# Patient Record
Sex: FEMALE | Race: BLACK OR AFRICAN AMERICAN | NOT HISPANIC OR LATINO | Employment: FULL TIME | URBAN - METROPOLITAN AREA
[De-identification: names, ages, dates, MRNs, and addresses within clinical notes are randomized per-mention and may not be internally consistent; named-entity substitution may affect disease eponyms.]

---

## 2017-02-17 ENCOUNTER — OFFICE VISIT (OUTPATIENT)
Dept: CARDIOLOGY | Facility: CLINIC | Age: 57
End: 2017-02-17
Payer: COMMERCIAL

## 2017-02-17 VITALS
HEART RATE: 70 BPM | WEIGHT: 205.5 LBS | BODY MASS INDEX: 32.25 KG/M2 | DIASTOLIC BLOOD PRESSURE: 80 MMHG | SYSTOLIC BLOOD PRESSURE: 122 MMHG | HEIGHT: 67 IN

## 2017-02-17 DIAGNOSIS — M79.606 PAIN OF LOWER EXTREMITY, UNSPECIFIED LATERALITY: ICD-10-CM

## 2017-02-17 DIAGNOSIS — E78.5 HYPERLIPIDEMIA, UNSPECIFIED HYPERLIPIDEMIA TYPE: ICD-10-CM

## 2017-02-17 DIAGNOSIS — I83.813 VARICOSE VEINS OF BILATERAL LOWER EXTREMITIES WITH PAIN: Primary | ICD-10-CM

## 2017-02-17 PROCEDURE — 99999 PR PBB SHADOW E&M-EST. PATIENT-LVL III: CPT | Mod: PBBFAC,,, | Performed by: INTERNAL MEDICINE

## 2017-02-17 PROCEDURE — 99203 OFFICE O/P NEW LOW 30 MIN: CPT | Mod: S$GLB,,, | Performed by: INTERNAL MEDICINE

## 2017-02-17 NOTE — MR AVS SNAPSHOT
[facility]   [facility]  [place] [address]  Phone: [phone]                  Rosamaria Agosto   2017 3:00 PM   Office Visit    Description:  Female : 1960   Provider:  Kathy Cross MD   Department:  [facility]           Reason for Visit     Leg Problem           Diagnoses this Visit        Comments    Varicose veins of bilateral lower extremities with pain    -  Primary     Pain of lower extremity, unspecified laterality         Hyperlipidemia, unspecified hyperlipidemia type                To Do List           Goals (5 Years of Data)     None      Follow-Up and Disposition     Call patient with results Return in about 6 months (around 2017).      [facility]     [facility] On Call Nurse Care Line -  Assistance  Registered nurses in the [facility] On Call Center provide clinical advisement, health education, appointment booking, and other advisory services.  Call for this free service at [phone].             Medications           Message regarding Medications     Verify the changes and/or additions to your medication regime listed below are the same as discussed with your clinician today.  If any of these changes or additions are incorrect, please notify your healthcare provider.             Verify that the below list of medications is an accurate representation of the medications you are currently taking.  If none reported, the list may be blank. If incorrect, please contact your healthcare provider. Carry this list with you in case of emergency.           Current Medications     atorvastatin (LIPITOR) 80 MG tablet Take 80 mg by mouth nightly.    diclofenac (VOLTAREN) 75 MG EC tablet Take 75 mg by mouth 2 (two) times daily.    hydrochlorothiazide (HYDRODIURIL) 25 MG tablet Take 25 mg by mouth once daily.    meloxicam (MOBIC) 7.5 MG tablet Take 7.5 mg by mouth once daily.    tramadol (ULTRAM) 50 mg tablet Take 50  "mg by mouth every 6 (six) hours as needed for Pain.    estradiol (VIVELLE-DOT) 0.1 mg/24 hr PTSW Place 1 patch onto the skin twice a week.           Clinical Reference Information           Your Vitals Were     BP Pulse Height Weight BMI    122/80 (BP Location: Left arm, Patient Position: Sitting, BP Method: Manual) 70 5' 7" (1.702 m) 93.2 kg (205 lb 7.5 oz) 32.18 kg/m2      Blood Pressure          Most Recent Value    BP  122/80      Allergies as of 2/17/2017     No Known Allergies      Immunizations Administered on Date of Encounter - 2/17/2017     None      Orders Placed During Today's Visit      Normal Orders This Visit    COMPRESSION STOCKINGS     Future Labs/Procedures Expected by Expires    CAR Ultrasound doppler venous legs bilat  2/17/2017 (Approximate) 2/17/2018      Language Assistance Services     ATTENTION: Language assistance services are available, free of charge. Please call [phone].      ATENCIÓN: Si habla joaquim, tiene a [place] disposición servicios gratuitos de asistencia lingüística. Llame al [phone].     CHÚ Ý: N?u b?n nói Ti?ng Vi?t, có các d?ch v? h? tr? ngôn ng? mi?n phí dành cho b?n. G?i s? [phone].         [facility] complies with applicable Federal civil rights laws and does not discriminate on the basis of race, color, national origin, age, disability, or sex.        "

## 2017-02-17 NOTE — PROGRESS NOTES
Subjective:    Patient ID:  Rosamaria Agosto is a 56 y.o. Y.o.female who presents for evaluation of leg pain.      HPI: 56 year old female with PMH of varicose veins S/P multiple EVLT/ FOAMS,a sclerotherapy long time ago, today she reports thigh tightness and pain, ankle swelling and new spider veins. She can walk with no limitation, she is on her feet for 8 hours daily. She doesn't use compression stocking.    Past Medical History   Diagnosis Date    Abnormal Pap smear of cervix yrs ago    History of uterine fibroid     Hyperlipidemia     Hypertension     Sinus problem        indicated that her mother is alive. She indicated that all of her three sisters are alive. She indicated that both of her brothers are alive. She indicated that her maternal grandmother is . She indicated that the status of her neg hx is unknown.     Social History     Social History    Marital status:      Spouse name: N/A    Number of children: N/A    Years of education: N/A     Occupational History    Not on file.     Social History Main Topics    Smoking status: Never Smoker    Smokeless tobacco: Not on file    Alcohol use Yes      Comment: seldom    Drug use: No    Sexual activity: Yes     Partners: Male     Birth control/ protection: Post-menopausal     Other Topics Concern    Not on file     Social History Narrative       Current Outpatient Prescriptions   Medication Sig    atorvastatin (LIPITOR) 80 MG tablet Take 80 mg by mouth nightly.    diclofenac (VOLTAREN) 75 MG EC tablet Take 75 mg by mouth 2 (two) times daily.    hydrochlorothiazide (HYDRODIURIL) 25 MG tablet Take 25 mg by mouth once daily.    meloxicam (MOBIC) 7.5 MG tablet Take 7.5 mg by mouth once daily.    tramadol (ULTRAM) 50 mg tablet Take 50 mg by mouth every 6 (six) hours as needed for Pain.    estradiol (VIVELLE-DOT) 0.1 mg/24 hr PTSW Place 1 patch onto the skin twice a week.     No current facility-administered medications for this  visit.        CMP  Sodium   Date Value Ref Range Status   10/19/2011 137 136 - 145 mmol/L Final     Potassium   Date Value Ref Range Status   10/19/2011 3.9 3.5 - 5.1 mmol/L Final     Chloride   Date Value Ref Range Status   10/19/2011 105 95 - 110 mmol/L Final     CO2   Date Value Ref Range Status   10/19/2011 25 23.0 - 29.0 mmol/L Final     Glucose   Date Value Ref Range Status   10/19/2011 104 70 - 110 mg/dl Final     BUN, Bld   Date Value Ref Range Status   10/19/2011 9 6 - 20 mg/dl Final     Creatinine   Date Value Ref Range Status   10/19/2011 0.6 0.5 - 1.4 mg/dl Final     Calcium   Date Value Ref Range Status   10/19/2011 8.8 8.7 - 10.5 mg/dl Final     Total Protein   Date Value Ref Range Status   10/19/2011 7.0 6.0 - 8.4 g/dL Final     Albumin   Date Value Ref Range Status   10/19/2011 3.6 3.5 - 5.2 g/dl Final     Total Bilirubin   Date Value Ref Range Status   10/19/2011 0.4 0.1 - 1.0 mg/dl Final     Comment:     For infants and newborns, interpretation of results should be based  on gestational age, weight and in agreement with clinical  observations.  .  Premature Infant recommended reference ranges:  Up to 24 hours.............<8.0 mg/dl  Up to 48 hours............<12.0 mg/dl  3-5 days..................<15.0 mg/dl  6-29 days.................<15.0 mg/dl     Alkaline Phosphatase   Date Value Ref Range Status   10/19/2011 60 55 - 135 U/L Final     AST   Date Value Ref Range Status   10/19/2011 12 10 - 40 U/L Final     ALT   Date Value Ref Range Status   10/19/2011 8 (L) 10 - 44 U/L Final     Anion Gap   Date Value Ref Range Status   10/19/2011 12 8 - 16 mmol/L Final     eGFR if    Date Value Ref Range Status   10/19/2011 >60 >60 mL/min Final     Comment:     Estimated glomerular filtration rate (eGFR) is normalized to an  average body surface area of 1.73 square meters.  The calculation  used to obtain the eGFR is the adjusted MDRD equation, which factors  patient sex, age, race, and  "creatinine result.  Since race is unknown  in our information system, the eGFR values for -American  and Non--American patients are given for each creatinine  result.     eGFR if non    Date Value Ref Range Status   10/19/2011 >60 >60 mL/min Final       Lab Results   Component Value Date    WBC 9.71 12/14/2010    HGB 14.6 12/14/2010    HCT 44.4 12/14/2010    MCV 90.4 12/14/2010     (H) 12/14/2010       Review of Systems   Constitution: Negative for decreased appetite, fever and weight gain.   HENT: Negative.    Cardiovascular: Positive for leg swelling. Negative for chest pain, claudication and cyanosis.   Respiratory: Negative for cough, shortness of breath and wheezing.    Skin: Negative for color change, dry skin, itching, rash and suspicious lesions.   Musculoskeletal: Positive for myalgias. Negative for arthritis, back pain, joint swelling and muscle weakness.   Gastrointestinal: Negative.    Genitourinary: Negative.    Neurological: Negative.  Negative for loss of balance, numbness and paresthesias.        Objective:     Visit Vitals    /80 (BP Location: Left arm, Patient Position: Sitting, BP Method: Manual)    Pulse 70    Ht 5' 7" (1.702 m)    Wt 93.2 kg (205 lb 7.5 oz)    BMI 32.18 kg/m2     Physical Exam   Constitutional: She is oriented to person, place, and time. She appears well-developed and well-nourished. No distress.   HENT:   Head: Normocephalic and atraumatic.   Eyes: Conjunctivae and EOM are normal. Pupils are equal, round, and reactive to light.   Neck: Normal range of motion. Neck supple. No JVD present.   Cardiovascular: Normal rate, regular rhythm, normal heart sounds and intact distal pulses.  Exam reveals no gallop and no friction rub.    No murmur heard.  Pulmonary/Chest: Effort normal and breath sounds normal. No respiratory distress. She has no wheezes.   Musculoskeletal: Normal range of motion. She exhibits edema and tenderness. "   Neurological: She is alert and oriented to person, place, and time.   Skin: Skin is warm. No rash noted. She is not diaphoretic. No erythema. No pallor.         Assessment:       1. Varicose veins of bilateral lower extremities with pain  CAR Ultrasound doppler venous legs bilat    COMPRESSION STOCKINGS   2. Pain of lower extremity, unspecified laterality  CAR Ultrasound doppler venous legs bilat    COMPRESSION STOCKINGS   3. Hyperlipidemia, unspecified hyperlipidemia type          Plan:       Rosamaria was seen today for leg problem.    Diagnoses and all orders for this visit:    Varicose veins of bilateral lower extremities with pain  -     CAR Ultrasound doppler venous legs bilat; Future; Expected date: 2/17/17  -     COMPRESSION STOCKINGS    Pain of lower extremity, unspecified laterality  -     CAR Ultrasound doppler venous legs bilat; Future; Expected date: 2/17/17  -     COMPRESSION STOCKINGS    Hyperlipidemia, unspecified hyperlipidemia type     -  Continue Same medications.    Kathy Cross

## 2017-02-24 ENCOUNTER — CLINICAL SUPPORT (OUTPATIENT)
Dept: CARDIOLOGY | Facility: CLINIC | Age: 57
End: 2017-02-24
Payer: COMMERCIAL

## 2017-02-24 DIAGNOSIS — I83.813 VARICOSE VEINS OF BILATERAL LOWER EXTREMITIES WITH PAIN: ICD-10-CM

## 2017-02-24 DIAGNOSIS — M79.606 PAIN OF LOWER EXTREMITY, UNSPECIFIED LATERALITY: ICD-10-CM

## 2017-02-24 PROCEDURE — 93970 EXTREMITY STUDY: CPT | Mod: S$GLB,,, | Performed by: INTERNAL MEDICINE

## 2017-02-27 ENCOUNTER — TELEPHONE (OUTPATIENT)
Dept: CARDIOLOGY | Facility: CLINIC | Age: 57
End: 2017-02-27

## 2017-02-27 NOTE — TELEPHONE ENCOUNTER
Spoke with patient in regards to venous doppler results.    Patient verbalized understanding .  Follow up appointment has been made.

## 2017-02-27 NOTE — TELEPHONE ENCOUNTER
----- Message from Kathy Cross MD sent at 2/26/2017  7:37 PM CST -----  Venous doppler shows evidence of varicose veins in both legs.  No DVT  Compression stocking follow up in 3 months.

## 2017-05-05 ENCOUNTER — OFFICE VISIT (OUTPATIENT)
Dept: CARDIOLOGY | Facility: CLINIC | Age: 57
End: 2017-05-05
Payer: COMMERCIAL

## 2017-05-05 VITALS
HEIGHT: 67 IN | SYSTOLIC BLOOD PRESSURE: 144 MMHG | DIASTOLIC BLOOD PRESSURE: 82 MMHG | HEART RATE: 80 BPM | WEIGHT: 210.44 LBS | BODY MASS INDEX: 33.03 KG/M2

## 2017-05-05 DIAGNOSIS — I10 ESSENTIAL HYPERTENSION: Primary | ICD-10-CM

## 2017-05-05 PROCEDURE — 1160F RVW MEDS BY RX/DR IN RCRD: CPT | Mod: S$GLB,,, | Performed by: INTERNAL MEDICINE

## 2017-05-05 PROCEDURE — 99213 OFFICE O/P EST LOW 20 MIN: CPT | Mod: S$GLB,,, | Performed by: INTERNAL MEDICINE

## 2017-05-05 PROCEDURE — 99999 PR PBB SHADOW E&M-EST. PATIENT-LVL III: CPT | Mod: PBBFAC,,, | Performed by: INTERNAL MEDICINE

## 2017-05-05 PROCEDURE — 3077F SYST BP >= 140 MM HG: CPT | Mod: S$GLB,,, | Performed by: INTERNAL MEDICINE

## 2017-05-05 PROCEDURE — 3079F DIAST BP 80-89 MM HG: CPT | Mod: S$GLB,,, | Performed by: INTERNAL MEDICINE

## 2017-05-05 RX ORDER — HYDROCHLOROTHIAZIDE 25 MG/1
25 TABLET ORAL DAILY
Qty: 90 TABLET | Refills: 3 | Status: SHIPPED | OUTPATIENT
Start: 2017-05-05 | End: 2017-05-17 | Stop reason: SDUPTHER

## 2017-05-05 NOTE — MR AVS SNAPSHOT
[facility]   [address]  [place] [address]  Phone: [phone]                  Rosamaria Agosto   2017 3:00 PM   Office Visit    Description:  Female : 1960   Provider:  Kathy Cross MD   Department:  [facility]           Reason for Visit     Varicose Veins           Diagnoses this Visit        Comments    Essential hypertension    -  Primary            To Do List           Future Appointments        Provider Department Dept Phone    2017 8:00 AM Bill Burns MD [facility] [phone]      Goals (5 Years of Data)     None      Follow-Up and Disposition     Return in about 1 year (around 2018), or if symptoms worsen or fail to improve.       These Medications        Disp Refills Start End    hydrochlorothiazide (HYDRODIURIL) 25 MG tablet 90 tablet 3 2017     Take 1 tablet (25 mg total) by mouth once daily. - Oral    Pharmacy: [address] #: [phone]         [facility] On Call     [facility] On Call Nurse Care Line -  Assistance  Unless otherwise directed by your provider, please contact [facility], our nurse care line that is available for  assistance.     Registered nurses in the [facility] On Call Center provide: appointment scheduling, clinical advisement, health education, and other advisory services.  Call: [phone] (toll free)               Medications           Message regarding Medications     Verify the changes and/or additions to your medication regime listed below are the same as discussed with your clinician today.  If any of these changes or additions are incorrect, please notify your healthcare provider.        CHANGE how you are taking these medications     Start Taking Instead of    hydrochlorothiazide (HYDRODIURIL) 25 MG tablet hydrochlorothiazide (HYDRODIURIL) 25 MG tablet    Dosage:  Take  "1 tablet (25 mg total) by mouth once daily. Dosage:  Take 25 mg by mouth once daily.    Reason for Change:  Reorder            Verify that the below list of medications is an accurate representation of the medications you are currently taking.  If none reported, the list may be blank. If incorrect, please contact your healthcare provider. Carry this list with you in case of emergency.           Current Medications     atorvastatin (LIPITOR) 80 MG tablet Take 80 mg by mouth nightly.    diclofenac (VOLTAREN) 75 MG EC tablet Take 75 mg by mouth 2 (two) times daily.    estradiol (VIVELLE-DOT) 0.1 mg/24 hr PTSW Place 1 patch onto the skin twice a week.    hydrochlorothiazide (HYDRODIURIL) 25 MG tablet Take 1 tablet (25 mg total) by mouth once daily.    meloxicam (MOBIC) 7.5 MG tablet Take 7.5 mg by mouth once daily.    tramadol (ULTRAM) 50 mg tablet Take 50 mg by mouth every 6 (six) hours as needed for Pain.           Clinical Reference Information           Your Vitals Were     BP Pulse Height Weight BMI    144/82 (BP Location: Left arm, Patient Position: Sitting, BP Method: Manual) 80 5' 7" (1.702 m) 95.5 kg (210 lb 6.9 oz) 32.96 kg/m2      Blood Pressure          Most Recent Value    BP  (!)  144/82      Allergies as of 5/5/2017     No Known Allergies      Immunizations Administered on Date of Encounter - 5/5/2017     None      Language Assistance Services     ATTENTION: Language assistance services are available, free of charge. Please call [phone].      ATENCIÓN: Si habla español, tiene a [place] disposición servicios gratuitos de asistencia lingüística. Llame al [phone].     [facility] Ý: N?u b?n nói Ti?ng Vi?t, có các d?ch v? h? tr? ngôn ng? mi?n phí dành cho b?n. G?i s? [phone].         [facility] complies with applicable Federal civil rights laws and does not discriminate on the basis of race, color, national origin, age, disability, or sex.        "

## 2017-05-05 NOTE — PROGRESS NOTES
Subjective:    Patient ID:  Rosamaria Agosto is a 56 y.o. Y.o.female who presents for evaluation of leg pain.      HPI: Mrs. Agosto presents today for a follow up visit. She report stable leg swelling and discomfort. She is S/P multiple EVLT/ FOAMS,a sclerotherapy long time ago,, she is on her feet for 8 hours daily. She started using compression stocking few months ago.    Past Medical History:   Diagnosis Date    Abnormal Pap smear of cervix yrs ago    History of uterine fibroid     Hyperlipidemia     Hypertension     Sinus problem        indicated that her mother is alive. She indicated that all of her three sisters are alive. She indicated that both of her brothers are alive. She indicated that her maternal grandmother is . She indicated that the status of her neg hx is unknown.     Social History     Social History    Marital status:      Spouse name: N/A    Number of children: N/A    Years of education: N/A     Occupational History    Not on file.     Social History Main Topics    Smoking status: Never Smoker    Smokeless tobacco: Not on file    Alcohol use Yes      Comment: seldom    Drug use: No    Sexual activity: Yes     Partners: Male     Birth control/ protection: Post-menopausal     Other Topics Concern    Not on file     Social History Narrative       Current Outpatient Prescriptions   Medication Sig    atorvastatin (LIPITOR) 80 MG tablet Take 80 mg by mouth nightly.    diclofenac (VOLTAREN) 75 MG EC tablet Take 75 mg by mouth 2 (two) times daily.    estradiol (VIVELLE-DOT) 0.1 mg/24 hr PTSW Place 1 patch onto the skin twice a week.    hydrochlorothiazide (HYDRODIURIL) 25 MG tablet Take 1 tablet (25 mg total) by mouth once daily.    meloxicam (MOBIC) 7.5 MG tablet Take 7.5 mg by mouth once daily.    tramadol (ULTRAM) 50 mg tablet Take 50 mg by mouth every 6 (six) hours as needed for Pain.     No current facility-administered medications for this visit.         CMP  Sodium   Date Value Ref Range Status   10/19/2011 137 136 - 145 mmol/L Final     Potassium   Date Value Ref Range Status   10/19/2011 3.9 3.5 - 5.1 mmol/L Final     Chloride   Date Value Ref Range Status   10/19/2011 105 95 - 110 mmol/L Final     CO2   Date Value Ref Range Status   10/19/2011 25 23.0 - 29.0 mmol/L Final     Glucose   Date Value Ref Range Status   10/19/2011 104 70 - 110 mg/dl Final     BUN, Bld   Date Value Ref Range Status   10/19/2011 9 6 - 20 mg/dl Final     Creatinine   Date Value Ref Range Status   10/19/2011 0.6 0.5 - 1.4 mg/dl Final     Calcium   Date Value Ref Range Status   10/19/2011 8.8 8.7 - 10.5 mg/dl Final     Total Protein   Date Value Ref Range Status   10/19/2011 7.0 6.0 - 8.4 g/dL Final     Albumin   Date Value Ref Range Status   10/19/2011 3.6 3.5 - 5.2 g/dl Final     Total Bilirubin   Date Value Ref Range Status   10/19/2011 0.4 0.1 - 1.0 mg/dl Final     Comment:     For infants and newborns, interpretation of results should be based  on gestational age, weight and in agreement with clinical  observations.  .  Premature Infant recommended reference ranges:  Up to 24 hours.............<8.0 mg/dl  Up to 48 hours............<12.0 mg/dl  3-5 days..................<15.0 mg/dl  6-29 days.................<15.0 mg/dl     Alkaline Phosphatase   Date Value Ref Range Status   10/19/2011 60 55 - 135 U/L Final     AST   Date Value Ref Range Status   10/19/2011 12 10 - 40 U/L Final     ALT   Date Value Ref Range Status   10/19/2011 8 (L) 10 - 44 U/L Final     Anion Gap   Date Value Ref Range Status   10/19/2011 12 8 - 16 mmol/L Final     eGFR if    Date Value Ref Range Status   10/19/2011 >60 >60 mL/min Final     Comment:     Estimated glomerular filtration rate (eGFR) is normalized to an  average body surface area of 1.73 square meters.  The calculation  used to obtain the eGFR is the adjusted MDRD equation, which factors  patient sex, age, race, and creatinine  "result.  Since race is unknown  in our information system, the eGFR values for -American  and Non--American patients are given for each creatinine  result.     eGFR if non    Date Value Ref Range Status   10/19/2011 >60 >60 mL/min Final       Lab Results   Component Value Date    WBC 9.71 12/14/2010    HGB 14.6 12/14/2010    HCT 44.4 12/14/2010    MCV 90.4 12/14/2010     (H) 12/14/2010       Review of Systems   Constitution: Negative for decreased appetite, fever and weight gain.   HENT: Negative.    Cardiovascular: Negative for chest pain, claudication, cyanosis and leg swelling.   Respiratory: Negative for cough, shortness of breath and wheezing.    Skin: Negative for color change, dry skin, itching, rash and suspicious lesions.   Musculoskeletal: Positive for myalgias. Negative for arthritis, back pain, joint swelling and muscle weakness.   Gastrointestinal: Negative.    Genitourinary: Negative.    Neurological: Negative.  Negative for loss of balance, numbness and paresthesias.        Objective:     BP (!) 144/82 (BP Location: Left arm, Patient Position: Sitting, BP Method: Manual)  Pulse 80  Ht 5' 7" (1.702 m)  Wt 95.5 kg (210 lb 6.9 oz)  BMI 32.96 kg/m2  Physical Exam   Constitutional: She is oriented to person, place, and time. She appears well-developed and well-nourished. No distress.   HENT:   Head: Normocephalic and atraumatic.   Eyes: Conjunctivae and EOM are normal. Pupils are equal, round, and reactive to light.   Neck: Normal range of motion. Neck supple. No JVD present.   Cardiovascular: Normal rate, regular rhythm and intact distal pulses.    Pulmonary/Chest: Effort normal and breath sounds normal. No respiratory distress. She has no wheezes.   Musculoskeletal: Normal range of motion. She exhibits edema. She exhibits no tenderness.   Neurological: She is alert and oriented to person, place, and time.   Skin: Skin is warm. No rash noted. She is not " diaphoretic. No erythema. No pallor.         Assessment:       1. Essential hypertension  hydrochlorothiazide (HYDRODIURIL) 25 MG tablet        Plan:       Rosamaria was seen today for leg problem.    Diagnoses and all orders for this visit:    1. Continue Compression stocking  2. Walking exercise  3. HCTZ refilled today.    Kathy Cross

## 2017-05-17 ENCOUNTER — OFFICE VISIT (OUTPATIENT)
Dept: INTERNAL MEDICINE | Facility: CLINIC | Age: 57
End: 2017-05-17
Payer: COMMERCIAL

## 2017-05-17 ENCOUNTER — HOSPITAL ENCOUNTER (OUTPATIENT)
Dept: RADIOLOGY | Facility: HOSPITAL | Age: 57
Discharge: HOME | End: 2017-05-17
Attending: FAMILY MEDICINE
Payer: COMMERCIAL

## 2017-05-17 ENCOUNTER — PATIENT MESSAGE (OUTPATIENT)
Dept: INTERNAL MEDICINE | Facility: CLINIC | Age: 57
End: 2017-05-17

## 2017-05-17 VITALS
HEART RATE: 73 BPM | WEIGHT: 207 LBS | TEMPERATURE: 98 F | SYSTOLIC BLOOD PRESSURE: 138 MMHG | BODY MASS INDEX: 32.49 KG/M2 | RESPIRATION RATE: 16 BRPM | HEIGHT: 67 IN | DIASTOLIC BLOOD PRESSURE: 98 MMHG

## 2017-05-17 DIAGNOSIS — M19.90 OSTEOARTHRITIS, UNSPECIFIED OSTEOARTHRITIS TYPE, UNSPECIFIED SITE: ICD-10-CM

## 2017-05-17 DIAGNOSIS — Z00.00 WELL ADULT EXAM: Primary | ICD-10-CM

## 2017-05-17 DIAGNOSIS — E66.9 OBESITY, UNSPECIFIED OBESITY SEVERITY, UNSPECIFIED OBESITY TYPE: ICD-10-CM

## 2017-05-17 DIAGNOSIS — J31.0 CHRONIC RHINITIS: ICD-10-CM

## 2017-05-17 DIAGNOSIS — E78.5 HYPERLIPIDEMIA, UNSPECIFIED HYPERLIPIDEMIA TYPE: ICD-10-CM

## 2017-05-17 DIAGNOSIS — Z12.31 VISIT FOR SCREENING MAMMOGRAM: ICD-10-CM

## 2017-05-17 DIAGNOSIS — M79.606 PAIN OF LOWER EXTREMITY, UNSPECIFIED LATERALITY: ICD-10-CM

## 2017-05-17 DIAGNOSIS — G47.33 OSA (OBSTRUCTIVE SLEEP APNEA): ICD-10-CM

## 2017-05-17 DIAGNOSIS — I10 ESSENTIAL HYPERTENSION: ICD-10-CM

## 2017-05-17 DIAGNOSIS — E55.9 VITAMIN D DEFICIENCY: ICD-10-CM

## 2017-05-17 DIAGNOSIS — I83.813 VARICOSE VEINS OF BILATERAL LOWER EXTREMITIES WITH PAIN: ICD-10-CM

## 2017-05-17 PROCEDURE — 77063 BREAST TOMOSYNTHESIS BI: CPT | Mod: 26,,, | Performed by: RADIOLOGY

## 2017-05-17 PROCEDURE — 77067 SCR MAMMO BI INCL CAD: CPT | Mod: 26,,, | Performed by: RADIOLOGY

## 2017-05-17 PROCEDURE — 3080F DIAST BP >= 90 MM HG: CPT | Mod: S$GLB,,, | Performed by: FAMILY MEDICINE

## 2017-05-17 PROCEDURE — 3075F SYST BP GE 130 - 139MM HG: CPT | Mod: S$GLB,,, | Performed by: FAMILY MEDICINE

## 2017-05-17 PROCEDURE — 99386 PREV VISIT NEW AGE 40-64: CPT | Mod: S$GLB,,, | Performed by: FAMILY MEDICINE

## 2017-05-17 PROCEDURE — 99999 PR PBB SHADOW E&M-EST. PATIENT-LVL IV: CPT | Mod: PBBFAC,,, | Performed by: FAMILY MEDICINE

## 2017-05-17 PROCEDURE — 77067 SCR MAMMO BI INCL CAD: CPT | Mod: TC

## 2017-05-17 RX ORDER — HYDROCHLOROTHIAZIDE 25 MG/1
25 TABLET ORAL DAILY
Qty: 30 TABLET | Refills: 11 | Status: SHIPPED | OUTPATIENT
Start: 2017-05-17 | End: 2018-05-01 | Stop reason: SDUPTHER

## 2017-05-17 RX ORDER — ATORVASTATIN CALCIUM 80 MG/1
80 TABLET, FILM COATED ORAL NIGHTLY
Qty: 30 TABLET | Refills: 11 | Status: SHIPPED | OUTPATIENT
Start: 2017-05-17 | End: 2018-05-01 | Stop reason: SDUPTHER

## 2017-05-17 RX ORDER — ERGOCALCIFEROL 1.25 MG/1
50000 CAPSULE ORAL
Qty: 4 CAPSULE | Refills: 2 | Status: SHIPPED | OUTPATIENT
Start: 2017-05-17 | End: 2017-06-16

## 2017-05-17 RX ORDER — DICLOFENAC SODIUM 75 MG/1
75 TABLET, DELAYED RELEASE ORAL 2 TIMES DAILY
Qty: 60 TABLET | Refills: 11 | Status: SHIPPED | OUTPATIENT
Start: 2017-05-17 | End: 2018-11-09 | Stop reason: SDUPTHER

## 2017-05-17 NOTE — MR AVS SNAPSHOT
[facility]   [facility]  [place] [address]  Phone: [phone]  Fax: [phone]                  Rosamaria Agosto   2017 8:00 AM   Office Visit    Description:  Female : 1960   Provider:  Bill Burns MD   Department:  [facility] [facility]           Reason for Visit     Annual Exam           Diagnoses this Visit        Comments    Well adult exam    -  Primary     Essential hypertension         Hyperlipidemia, unspecified hyperlipidemia type         Varicose veins of bilateral lower extremities with pain         Pain of lower extremity, unspecified laterality         Chronic rhinitis         DAVID (obstructive sleep apnea)         Osteoarthritis, unspecified osteoarthritis type, unspecified site         Obesity, unspecified obesity severity, unspecified obesity type         Visit for screening mammogram                To Do List           Goals (5 Years of Data)     None      Follow-Up and Disposition     Return in about 1 month (around 2017), or if symptoms worsen or fail to improve, for HTN reevaluation .       These Medications        Disp Refills Start End    atorvastatin (LIPITOR) 80 MG tablet 30 tablet 11 2017     Take 1 tablet (80 mg total) by mouth nightly. - Oral    Pharmacy: [address] Ph #: [phone]       hydrochlorothiazide (HYDRODIURIL) 25 MG tablet 30 tablet 11 2017     Take 1 tablet (25 mg total) by mouth once daily. - Oral    Pharmacy: [address] Ph #: [phone]       diclofenac (VOLTAREN) 75 MG EC tablet 60 tablet 11 2017     Take 1 tablet (75 mg total) by mouth 2 (two) times daily. - Oral    Pharmacy: [address] Ph #: [phone]         [facility]     [facility] Care [facility] -   Assistance  Unless otherwise directed by your provider, please contact [facility], our nurse care line that is available for 24/7 assistance.     Registered nurses in the [facility] provide: appointment scheduling, clinical advisement, health education, and other advisory services.  Call: [phone] (toll free)               Medications           Message regarding Medications     Verify the changes and/or additions to your medication regime listed below are the same as discussed with your clinician today.  If any of these changes or additions are incorrect, please notify your healthcare provider.        CHANGE how you are taking these medications     Start Taking Instead of    atorvastatin (LIPITOR) 80 MG tablet atorvastatin (LIPITOR) 80 MG tablet    Dosage:  Take 1 tablet (80 mg total) by mouth nightly. Dosage:  Take 80 mg by mouth nightly.    Reason for Change:  Reorder     diclofenac (VOLTAREN) 75 MG EC tablet diclofenac (VOLTAREN) 75 MG EC tablet    Dosage:  Take 1 tablet (75 mg total) by mouth 2 (two) times daily. Dosage:  Take 75 mg by mouth 2 (two) times daily.    Reason for Change:  Reorder            Verify that the below list of medications is an accurate representation of the medications you are currently taking.  If none reported, the list may be blank. If incorrect, please contact your healthcare provider. Carry this list with you in case of emergency.           Current Medications     atorvastatin (LIPITOR) 80 MG tablet Take 1 tablet (80 mg total) by mouth nightly.    diclofenac (VOLTAREN) 75 MG EC tablet Take 1 tablet (75 mg total) by mouth 2 (two) times daily.    hydrochlorothiazide (HYDRODIURIL) 25 MG tablet Take 1 tablet (25 mg total) by mouth once daily.    meloxicam (MOBIC) 7.5 MG tablet Take 7.5 mg by mouth once daily.    tramadol (ULTRAM) 50 mg tablet Take 50 mg by mouth every 6 (six) hours as needed for Pain.    estradiol (VIVELLE-DOT) 0.1 mg/24 hr PTSW Place 1 patch onto  "the skin twice a week.           Clinical Reference Information           Your Vitals Were     BP Pulse Temp Resp Height Weight    138/98 (BP Location: Left arm, Patient Position: Sitting, BP Method: Manual) 73 98.4 °F (36.9 °C) (Oral) 16 5' 7" (1.702 m) 93.9 kg (207 lb 0.2 oz)    BMI                32.42 kg/m2          Blood Pressure          Most Recent Value    BP  (!)  138/98      Allergies as of 5/17/2017     No Known Allergies      Immunizations Administered on Date of Encounter - 5/17/2017     None      Orders Placed During Today's Visit      Normal Orders This Visit    Ambulatory Referral to Bariatric Medicine     Ambulatory Referral to ENT     Future Labs/Procedures Expected by Expires    CBC auto differential  5/17/2017 5/17/2018    Comprehensive metabolic panel  5/17/2017 5/17/2018    Hemoglobin A1c  5/17/2017 7/16/2018    Lipid panel  5/17/2017 5/17/2018    Mammo Digital Screening Bilateral With CAD  5/17/2017 7/17/2018    T4, free  5/17/2017 5/17/2018    TSH  5/17/2017 5/17/2018    Urinalysis  5/17/2017 5/17/2018    Vitamin D  5/17/2017 5/17/2018    Polysomnography 4 or more parameters  As directed 5/17/2018      Language Assistance Services     ATTENTION: Language assistance services are available, free of charge. Please call [phone].      ATENCIÓN: Si habla español, tiene a [place] disposición servicios gratuitos de asistencia lingüística. Llame al [phone].     CHÚ Ý: N?u b?n nói Ti?ng Vi?t, có các d?ch v? h? tr? ngôn ng? mi?n phí dành cho b?n. G?i s? [phone].         [facility] complies with applicable Federal civil rights laws and does not discriminate on the basis of race, color, national origin, age, disability, or sex.        "

## 2017-05-17 NOTE — PROGRESS NOTES
Subjective:       Patient ID: Rosamaria Agosto is a 56 y.o. female.    Chief Complaint: Annual Exam    HPI 56-year-old -American female presents to clinic today to establish care.  She has previously been treated by cardiology for hypertension, hyperlipidemia, and currently lower extremity pain with varicose veins.  She reports that she has been out of her medications since January but has on occasion used her 's blood pressure medication.  At this time she has taken no medication in over 1 week.  She is routinely treated with hydrochlorothiazide 25 mg daily.  Hyperlipidemia is routinely treated with Lipitor 80 mg daily.  She reports chronic arthritic pain to the shoulders, knees, and back for which she has been seen orthopedics and has obtained relief with the use of diclofenac as needed.  She reports chronic sinusitis with frequent sinus infections for which she has seen ENT in the past.  Currently she uses Flonase nasal spray without relief.  She reports a past surgical history of hysterectomy, bilateral tubal ligation, breast biopsy, and encephalocele repair.  She reports a family history of diabetes and hypertension.  She is up-to-date with colonoscopy which was performed 3 years ago.  She cannot recall her last mammogram.  She is also interested in assistance with weight loss.  Finally, she reports frequent difficulty sleeping and states that her  reports snoring and has witnessed apneic episodes while the patient sleeps.  Review of Systems   Constitutional: Negative for appetite change, chills, fatigue and fever.   HENT: Positive for congestion, postnasal drip, rhinorrhea and sinus pressure. Negative for ear pain, hearing loss, sore throat and tinnitus.    Eyes: Negative for redness, itching and visual disturbance.   Respiratory: Positive for shortness of breath. Negative for cough and chest tightness.    Cardiovascular: Negative for chest pain and palpitations.   Gastrointestinal:  Negative for abdominal pain, constipation, diarrhea, nausea and vomiting.   Genitourinary: Negative for decreased urine volume, difficulty urinating, dysuria, frequency, hematuria and urgency.   Musculoskeletal: Positive for arthralgias and back pain. Negative for myalgias, neck pain and neck stiffness.   Skin: Negative for rash.   Neurological: Positive for headaches. Negative for dizziness and light-headedness.   Psychiatric/Behavioral: Negative.        Objective:      Physical Exam   Constitutional: She is oriented to person, place, and time. She appears well-developed and well-nourished. No distress.   HENT:   Head: Normocephalic and atraumatic.   Right Ear: External ear normal.   Left Ear: External ear normal.   Nose: Nose normal.   Mouth/Throat: Oropharynx is clear and moist. No oropharyngeal exudate.   Eyes: Conjunctivae and EOM are normal. Pupils are equal, round, and reactive to light. Right eye exhibits no discharge. Left eye exhibits no discharge. No scleral icterus.   Neck: Normal range of motion. Neck supple. No JVD present. No tracheal deviation present. No thyromegaly present.   Cardiovascular: Normal rate, regular rhythm, normal heart sounds and intact distal pulses.  Exam reveals no gallop and no friction rub.    No murmur heard.  Pulmonary/Chest: Effort normal and breath sounds normal. No stridor. No respiratory distress. She has no wheezes. She has no rales.   Abdominal: Soft. Bowel sounds are normal. She exhibits no distension and no mass. There is no tenderness. There is no rebound and no guarding.   Musculoskeletal: Normal range of motion. She exhibits no edema or tenderness.   Lymphadenopathy:     She has no cervical adenopathy.   Neurological: She is alert and oriented to person, place, and time.   Skin: Skin is warm and dry. No rash noted. She is not diaphoretic. No erythema. No pallor.   Psychiatric: She has a normal mood and affect. Her behavior is normal. Judgment and thought content  normal.   Nursing note and vitals reviewed.      Assessment:       1. Well adult exam    2. Essential hypertension    3. Hyperlipidemia, unspecified hyperlipidemia type    4. Varicose veins of bilateral lower extremities with pain    5. Pain of lower extremity, unspecified laterality    6. Chronic rhinitis    7. DAVID (obstructive sleep apnea)    8. Osteoarthritis, unspecified osteoarthritis type, unspecified site    9. Obesity, unspecified obesity severity, unspecified obesity type    10. Visit for screening mammogram        Plan:       1.  CBC, CMP, UA, TSH, free T4, fasting lipids, vitamin D level, and hemoglobin A1c.  2.  Restart hydrochlorothiazide 25 mg daily.  Encourage daily blood pressure monitoring.  3.  Restart Lipitor 80 mg daily.  Encourage diet and exercise.  4.  Continue follow-up with cardiology as scheduled.  5.  Continue diclofenac 75 mg twice a day as needed.  Continue follow-up with orthopedics as scheduled.  6.  Continue Flonase nasal spray 2 sprays per nostril daily and start over-the-counter Claritin nightly.  Refer to ENT for further evaluation and treatment.  7.  Sleep study.  8.  Refer to bariatric medicine for weight loss assistance.  9.  Screening mammogram.  10.  Return to clinic as needed or in 1 month for hypertension reevaluation.

## 2017-06-08 ENCOUNTER — TELEPHONE (OUTPATIENT)
Dept: INTERNAL MEDICINE | Facility: CLINIC | Age: 57
End: 2017-06-08

## 2017-06-08 DIAGNOSIS — G47.30 SLEEP APNEA, UNSPECIFIED TYPE: Primary | ICD-10-CM

## 2017-06-08 NOTE — TELEPHONE ENCOUNTER
[employer] was calling for a verbal order for a home study for the patient. [employer] is the company who handles sleep study authorizations for [employer].

## 2017-06-08 NOTE — TELEPHONE ENCOUNTER
----- Message from Nika Crain sent at 6/8/2017  8:36 AM CDT -----  Contact: Marija/ JACLYN [phone]  [employer] called about a sleep study you ordered. Pt is meeting for a home sleep study instead of lab study. If you send an order for the home sleep they can approve this right away. Please call to advise.    [employer] is the company who handles sleep study authorizations for [employer].

## 2017-06-14 ENCOUNTER — TELEPHONE (OUTPATIENT)
Dept: INTERNAL MEDICINE | Facility: CLINIC | Age: 57
End: 2017-06-14

## 2017-06-14 NOTE — TELEPHONE ENCOUNTER
----- Message from Sonya Mercado sent at 6/14/2017  8:20 AM CDT -----  Contact: patient   Department will call directly re scheduling sleep studies  ----- Message -----  From: Jesus Hinds  Sent: 6/13/2017   4:38 PM  To: Sonya Mercado    Patient called to scheduled Sleep apnea appointment    Please advise

## 2017-06-28 ENCOUNTER — OFFICE VISIT (OUTPATIENT)
Dept: OTOLARYNGOLOGY | Facility: CLINIC | Age: 57
End: 2017-06-28
Payer: COMMERCIAL

## 2017-06-28 VITALS — HEART RATE: 67 BPM | SYSTOLIC BLOOD PRESSURE: 138 MMHG | DIASTOLIC BLOOD PRESSURE: 89 MMHG

## 2017-06-28 DIAGNOSIS — J34.3 NASAL TURBINATE HYPERTROPHY: ICD-10-CM

## 2017-06-28 DIAGNOSIS — J34.2 DEVIATED NASAL SEPTUM: ICD-10-CM

## 2017-06-28 DIAGNOSIS — J32.0 CHRONIC MAXILLARY SINUSITIS: ICD-10-CM

## 2017-06-28 DIAGNOSIS — J31.0 CHRONIC RHINITIS: Primary | ICD-10-CM

## 2017-06-28 PROCEDURE — 99999 PR PBB SHADOW E&M-EST. PATIENT-LVL III: CPT | Mod: PBBFAC,,, | Performed by: OTOLARYNGOLOGY

## 2017-06-28 PROCEDURE — 31231 NASAL ENDOSCOPY DX: CPT | Mod: S$GLB,,, | Performed by: OTOLARYNGOLOGY

## 2017-06-28 PROCEDURE — 99244 OFF/OP CNSLTJ NEW/EST MOD 40: CPT | Mod: 25,S$GLB,, | Performed by: OTOLARYNGOLOGY

## 2017-06-28 RX ORDER — AMOXICILLIN 500 MG/1
500 TABLET, FILM COATED ORAL EVERY 12 HOURS
Qty: 20 TABLET | Refills: 0 | Status: SHIPPED | OUTPATIENT
Start: 2017-06-28 | End: 2017-07-08

## 2017-06-28 NOTE — Clinical Note
June 28, 2017      Bill Burns MD  [address] [facility] [place] [address]           [facility]  [address]  [facility] [address]  Phone: [phone]  Fax: [phone]          Patient: Rosamaria Agosto   MR Number: [id number]   YOB: 1960   Date of Visit: 6/28/2017       Dear Dr. Bill Burns:    Thank you for referring Rosamaria Agosto to me for evaluation. Attached you will find relevant portions of my assessment and plan of care.    If you have questions, please do not hesitate to call me. I look forward to following Rosamaria Agosto along with you.    Sincerely,    Nikunj Sofia MD    Enclosure  CC:  No Recipients    If you would like to receive this communication electronically, please contact [email or web address] or [phone] to request more information on [employer] access.    For providers and/or their staff who would like to refer a patient to [facility], please contact us through our one-stop-shop provider referral line, [facility], at [phone].    If you feel you have received this communication in error or would no longer like to receive these types of communications, please e-mail [email or web address]

## 2017-06-28 NOTE — LETTER
2017    [place] [address]           OTOLARYNGOLOGY AND COMMUNICATION SCIENCES    Froilan Valdez MD, FACS          SURGICAL AND MEDICAL DISEASES OF HEAD AND NECK  MD Froilan Rushing MD, FACS  Alonzo Palmer III, MD    OTOLOGY, NEUROTOLOGY and SKULL-BASE SURGERY  Justen George MD, FACS  Mauro Mcknight MD, Director    PEDIATRIC OTOLARYNGOLOGY & OTOLOGY  ISACC Sandoval MD, FAAP  Esau Palacio MD, FACS    FACIAL PLASTIC and RECONSTRUCTIVE SURGERY  LIVIA Edwards III, MD, FACS    RHINOLOGY and SINUS SURGERY  Nikunj Sofia MD, MPH, FACS  Director   LIVIA Edwards III, MD, FACS    LARYNGOLOGY  Yousif Crowder MD    SPEECH-LANGUAGE PATHOLOGY  Farhana Reid, PhD, [facility]-SLP  Bhumika Pelayo, MS, CCC-SLP  Cristal Rubi, MS, CCC-SLP  Mónica Gonzales MA, [facility]-SLP    AUDIOLOGY SECTION  Юлия Ward, MS, CCC-A  VASQUEZ Lopes, CCC-A  Nancy Kirkpatrick, CCC-A  Nancy Sanches, CCC-A  Mejia Tovar Jr., VASQUEZ, CCA-A  VASQUEZ Salazar, CCC-A  Nancy Bai, CCC-A    ADVANCED PRACTICE CLINICIANS  Head and Neck Surgical Oncology  REY Ricketts, FNP-C  Pedatric Otolaryngology  REY Pond, PNP-C       Re:  Rosamaria Agosto  :  1960    Dear Dr. Burns,    Thank you for referring your patient, Rosamaria Agosto, to us for evaluation and treatment.  I have enclosed a copy of my clinic note for your review and records.  If you have any questions please do not hesitate to contact our office.     Thank you for allowing me to participate in the care of your patient.    Sincerely,        Nikunj Sofia MD, MPH, FACS    Director, Rhinology and Sinus Surgery  Department of Otorhinolaryngology  [facility]    Encl:  Progress note       [facility] [address] [place] [address]  phone [phone]  fax [phone]   [email or web address]

## 2017-06-28 NOTE — PROCEDURES
Nasal/sinus endoscopy  Date/Time: 6/28/2017 5:10 PM  Performed by: SAMIA BATES  Authorized by: SAMIA BATES     Consent Done?:  Yes (Verbal)  Anesthesia:     Local anesthetic:  Lidocaine 4% and Michael-Synephrine 1/2%    Patient tolerance:  Patient tolerated the procedure well with no immediate complications  Nose:     Procedure Performed:  Nasal Endoscopy  External:      No external nasal deformity  Intranasal:      Mucosa no polyps     Mucosa ulcers not present     No mucosa lesions present     Enlarged turbinates     No septum gross deformity  Nasopharynx:      No mucosa lesions     Adenoids not present     Posterior choanae patent     Eustachian tube patent     Biphasic septal deviation, worse on right.  Large inferior turbinates, greater on left.  Diffuse edema and mucoid exudate.  Maxillary antrostomy not appreciated.  No pulsation in left ethmoid region.

## 2017-06-28 NOTE — PROGRESS NOTES
Subjective:      Rosamaria Agosto is a 56 y.o. female who was referred to me by Dr. Bill Burns in consultation for nasal congestion.    She relates a long history of sinonasal issues that date back about 15 years when she had a large left-sided cribiform encephalocele repaired by Dr. Jovi Santos.  This apparently healed without incident, though over the past several years she has had increasing difficulty with nasal congestion and blockage, which is bilateral but worse on the right.  She describes this as perennial and moderately severe and present throughout the day and night.  She had sought surgery last year by Dr. Oh, who ordered a CT scan, but this did not proceed.  She notes also worsening snoring, though denies witnessed apneas.  She notes anosmia since the prior surgery, and also notes postnasal drip and rhinorrhea bilaterally, which is only somewhat improved with fluticasone spray.  She has not had antibiotics for several months but notes that it has only provided short-lived relief in the past.  She denies more recent nasal trauma or surgery.    SNOT-22 score = 73, NOSE score = 80%    Global QOL = 65%    Days missed = Less than 5    PTC = deferred      Past Medical History  She has a past medical history of Abnormal Pap smear of cervix; Arthritis; History of uterine fibroid; Hyperlipidemia; Hypertension; and Sinus problem.    Past Surgical History  She has a past surgical history that includes Breast biopsy (24-25 years old); Tubal ligation; Hysterectomy (93'-95'); and Encephalocele repair (46).    Family History  Her family history includes Diabetes in her mother, sister, and sister; Hypertension in her mother; Stroke in her maternal grandmother.    Social History  She reports that she has never smoked. She does not have any smokeless tobacco history on file. She reports that she drinks alcohol. She reports that she does not use drugs.    Allergies  She has No Known  Allergies.    Medications   She has a current medication list which includes the following prescription(s): atorvastatin, diclofenac, estradiol, hydrochlorothiazide, meloxicam, and tramadol.    Review of Systems  Review of Systems   Constitutional: Positive for fatigue. Negative for fever and unexpected weight change.   HENT: Positive for congestion, postnasal drip and rhinorrhea. Negative for dental problem, ear discharge, ear pain, facial swelling, hearing loss, hoarse voice, nosebleeds, sinus pressure, sore throat, tinnitus, trouble swallowing and voice change.    Eyes: Negative for photophobia, discharge, itching and visual disturbance.   Respiratory: Negative for apnea, cough, shortness of breath and wheezing.    Cardiovascular: Negative for chest pain and palpitations.   Gastrointestinal: Negative for abdominal pain, nausea and vomiting.   Endocrine: Negative for cold intolerance and heat intolerance.   Genitourinary: Negative for difficulty urinating.   Musculoskeletal: Negative for arthralgias, back pain, myalgias and neck pain.   Skin: Negative for rash.   Allergic/Immunologic: Negative for environmental allergies and food allergies.   Neurological: Negative for dizziness, seizures, syncope, weakness and headaches.   Hematological: Negative for adenopathy. Does not bruise/bleed easily.   Psychiatric/Behavioral: Positive for sleep disturbance. Negative for decreased concentration and dysphoric mood. The patient is not nervous/anxious.           Objective:     /89 (BP Location: Right arm, Patient Position: Sitting, BP Method: Automatic)   Pulse 67        Constitutional:   She appears well-developed. She is cooperative. Normal speech.  No hoarse voice.      Head:  Normocephalic. Salivary glands normal.  Facial strength is normal.      Ears:    Right Ear: No drainage or tenderness. Tympanic membrane is not perforated. Tympanic membrane mobility is normal. No middle ear effusion. No decreased hearing is  noted.   Left Ear: No drainage or tenderness. Tympanic membrane is not perforated. Tympanic membrane mobility is normal.  No middle ear effusion. No decreased hearing is noted.     Nose:  No mucosal edema, rhinorrhea, septal deviation or polyps. No epistaxis. Turbinates normal, no turbinate masses and no turbinate hypertrophy.  Right sinus exhibits no maxillary sinus tenderness and no frontal sinus tenderness. Left sinus exhibits no maxillary sinus tenderness and no frontal sinus tenderness.     Mouth/Throat  Oropharynx clear and moist without lesions or asymmetry and normal uvula midline. She does not have dentures. Normal dentition. No oral lesions or mucous membrane lesions. No oropharyngeal exudate or posterior oropharyngeal erythema. Mirror exam not performed due to patient tolerance.  Mirror exam not performed due to patient tolerance.      Neck:  Neck normal without thyromegaly masses, asymmetry, normal tracheal structure, crepitus, and tenderness, thyroid normal, trachea normal and no adenopathy. Normal range of motion present.     She has no cervical adenopathy.     Cardiovascular:   Regular rhythm.      Pulmonary/Chest:   Effort normal.     Psychiatric:   She has a normal mood and affect. Her speech is normal and behavior is normal.     Neurological:   No cranial nerve deficit.     Skin:   No rash noted.       Procedure    Nasal endoscopy performed.  See procedure note.     Left nasal valve     Left MT remnant     Left maxillary ostium obstructed     Right nasal valve with septal deviation     Right MT     Right choana with mucoid exudate        Data Reviewed    WBC (K/uL)   Date Value   05/17/2017 8.29     Eosinophil% (%)   Date Value   05/17/2017 2.8     Eos # (K/uL)   Date Value   05/17/2017 0.2     Platelets (K/uL)   Date Value   05/17/2017 347     Glucose (mg/dL)   Date Value   05/17/2017 105     No results found for: IGE    I independently reviewed the images of the CT sinuses dated 2/24/16.  Pertinent findings include partial opacification of left ethmoid and frontals, bilateral OMC obstruction, and prior repair of expanded left cribiform defect with bony graft.       Assessment:     1. Chronic maxillary sinusitis    2. Chronic rhinitis    3. Deviated nasal septum    4. Nasal turbinate hypertrophy         Plan:     I had a long discussion with the patient regarding her condition and the further workup and management options.  I ordered a CT scan of the sinuses to assess for intraluminal obstruction.  I briefly mentioned that surgical management may be considered, to include septoplasty, turbinate reduction, and bilateral maxillary antrstomy, and possibly right sinuses based on CT findings, with avoidance of the left ethmoid region.  In the meantime, I prescribed a therapeutic course of amoxicillin for 10 days.    Return for test results.

## 2017-07-05 DIAGNOSIS — J32.9 CHRONIC RECURRENT SINUSITIS: Primary | ICD-10-CM

## 2017-07-21 ENCOUNTER — HOSPITAL ENCOUNTER (OUTPATIENT)
Dept: RADIOLOGY | Facility: OTHER | Age: 57
Discharge: HOME | End: 2017-07-21
Attending: OTOLARYNGOLOGY
Payer: COMMERCIAL

## 2017-07-21 DIAGNOSIS — J32.9 CHRONIC RECURRENT SINUSITIS: ICD-10-CM

## 2017-07-21 PROCEDURE — 70486 CT MAXILLOFACIAL W/O DYE: CPT | Mod: TC

## 2017-07-21 PROCEDURE — 70486 CT MAXILLOFACIAL W/O DYE: CPT | Mod: 26,,, | Performed by: RADIOLOGY

## 2017-07-27 ENCOUNTER — TELEPHONE (OUTPATIENT)
Dept: OTOLARYNGOLOGY | Facility: CLINIC | Age: 57
End: 2017-07-27

## 2017-07-27 NOTE — TELEPHONE ENCOUNTER
The CT showed sinusitis in both cheeks, but relatively clear otherwise on the right.  Left side still opacified with apparent skull base defect from prior repair.  She needs a follow-up appointment to discuss surgery.  Please let her know.

## 2017-08-08 ENCOUNTER — OFFICE VISIT (OUTPATIENT)
Dept: OTOLARYNGOLOGY | Facility: CLINIC | Age: 57
End: 2017-08-08
Payer: COMMERCIAL

## 2017-08-08 VITALS
BODY MASS INDEX: 32.46 KG/M2 | WEIGHT: 207.25 LBS | HEART RATE: 68 BPM | DIASTOLIC BLOOD PRESSURE: 85 MMHG | SYSTOLIC BLOOD PRESSURE: 131 MMHG

## 2017-08-08 DIAGNOSIS — Z01.818 PREOP TESTING: Primary | ICD-10-CM

## 2017-08-08 DIAGNOSIS — J34.3 NASAL TURBINATE HYPERTROPHY: ICD-10-CM

## 2017-08-08 DIAGNOSIS — Q01.9: ICD-10-CM

## 2017-08-08 DIAGNOSIS — J34.2 DEVIATED NASAL SEPTUM: ICD-10-CM

## 2017-08-08 DIAGNOSIS — J32.0 CHRONIC MAXILLARY SINUSITIS: Primary | ICD-10-CM

## 2017-08-08 PROCEDURE — 3079F DIAST BP 80-89 MM HG: CPT | Mod: S$GLB,,, | Performed by: OTOLARYNGOLOGY

## 2017-08-08 PROCEDURE — 99999 PR PBB SHADOW E&M-EST. PATIENT-LVL II: CPT | Mod: PBBFAC,,, | Performed by: OTOLARYNGOLOGY

## 2017-08-08 PROCEDURE — 99214 OFFICE O/P EST MOD 30 MIN: CPT | Mod: S$GLB,,, | Performed by: OTOLARYNGOLOGY

## 2017-08-08 PROCEDURE — 3008F BODY MASS INDEX DOCD: CPT | Mod: S$GLB,,, | Performed by: OTOLARYNGOLOGY

## 2017-08-08 PROCEDURE — 3075F SYST BP GE 130 - 139MM HG: CPT | Mod: S$GLB,,, | Performed by: OTOLARYNGOLOGY

## 2017-08-09 ENCOUNTER — PATIENT MESSAGE (OUTPATIENT)
Dept: OTOLARYNGOLOGY | Facility: CLINIC | Age: 57
End: 2017-08-09

## 2017-08-09 DIAGNOSIS — R44.8 FACIAL PRESSURE: ICD-10-CM

## 2017-08-09 DIAGNOSIS — J34.2 NASAL SEPTAL DEVIATION: ICD-10-CM

## 2017-08-09 DIAGNOSIS — Z01.818 PREOP TESTING: ICD-10-CM

## 2017-08-09 DIAGNOSIS — Q01.9: ICD-10-CM

## 2017-08-09 DIAGNOSIS — J34.3 HYPERTROPHY OF NASAL TURBINATES: ICD-10-CM

## 2017-08-09 DIAGNOSIS — J32.9 CHRONIC RECURRENT SINUSITIS: Primary | ICD-10-CM

## 2017-08-09 NOTE — PROGRESS NOTES
Subjective:      Rosamaria is a 56 y.o. female who comes for follow-up of sinusitis.  No change, still very congested daily, bilateral, with associated postnasal drip and moderate constant pressure in both cheeks.  No frontal headache or pressure.  Sprays and antibiotics no help.    QOL assessment deferred.    The patient's medications, allergies, past medical, surgical, social and family histories were reviewed and updated as appropriate.    A detailed review of systems was obtained with pertinent positives as per the above HPI, and otherwise negative.        Objective:     /85 (BP Location: Left arm, Patient Position: Sitting, BP Method: Automatic)   Pulse 68   Wt 94 kg (207 lb 3.7 oz)   BMI 32.46 kg/m²        Constitutional:   She appears well-developed. She is cooperative.     Head:  Normocephalic.     Nose:  Septal deviation present. No mucosal edema, rhinorrhea or polyps. No epistaxis. Turbinate hypertrophy.  Turbinates normal and no turbinate masses.  Right sinus exhibits no maxillary sinus tenderness and no frontal sinus tenderness. Left sinus exhibits no maxillary sinus tenderness and no frontal sinus tenderness.     Mouth/Throat  Oropharynx clear and moist without lesions or asymmetry. No oropharyngeal exudate or posterior oropharyngeal erythema.     Neck:  No adenopathy. Normal range of motion present.     She has no cervical adenopathy.       Procedure    None        Data Reviewed    I independently reviewed the images of the CT sinuses dated 7/21/17. Pertinent findings include diffuse patchy opacification of ethmoid and maxillaries, with obstructed OMCs bilaterally, partially opacified left frontal, deviated septum, large turbinates, and apparent repair of left cribiform meningocele defect.      Assessment:     1. Chronic maxillary sinusitis    2. Deviated nasal septum    3. Nasal turbinate hypertrophy    4. Congenital cranial meningocele         Plan:     She would benefit from endoscopic sinus  surgery and septoplasty for the treatment of her condition.  This would include the right ethmoid and frontal and bilateral maxillary sinuses.  I specifically counseled her that the left ethmoid and frontal would be avoided since they are not currently contributing to her symptoms and the prior history of skull base repair would greatly increase the risk of a postoperative CSF leak.  Inferior turbinate reduction would be included.  I discussed the risks, benefits and alternatives to surgery with the patient, as well as the expected postoperative course.  I gave her the opportunity to ask questions and I answered all of them.  I provided relevant printed information on her condition for her to review at home.  Same-day discharge is anticipated.  She will need evaluation in the pre-anesthesia clinic. She will also need diclofenac prior to surgery.  The surgery will be tentatively scheduled for August 21.  She will need to return for a postoperative visit 1 week after surgery.  Return for surgery.

## 2017-08-10 ENCOUNTER — TELEPHONE (OUTPATIENT)
Dept: OTOLARYNGOLOGY | Facility: CLINIC | Age: 57
End: 2017-08-10

## 2017-08-10 NOTE — TELEPHONE ENCOUNTER
Left message on voicemail for pt to call back when received message in regards to rescheduling pre op appointments.

## 2017-08-10 NOTE — TELEPHONE ENCOUNTER
----- Message from Florecita Wills sent at 8/10/2017  3:30 PM CDT -----  Contact: pt   Pt is calling to speak with the nurse pt wants to reschedule her ekg and lab appt for today due to the weather can you please call pt at [phone] jc

## 2017-08-11 ENCOUNTER — TELEPHONE (OUTPATIENT)
Dept: OTOLARYNGOLOGY | Facility: CLINIC | Age: 57
End: 2017-08-11

## 2017-08-11 NOTE — TELEPHONE ENCOUNTER
Left message on voicemail for pt to call back when received message in regards to re-schedulng pre op appointments.

## 2017-08-11 NOTE — TELEPHONE ENCOUNTER
----- Message from Ashlie Florez sent at 8/11/2017  9:18 AM CDT -----  Contact: patient  Please call above patient needs to reschedule test waiting on a call from the nurse

## 2017-08-14 ENCOUNTER — HOSPITAL ENCOUNTER (OUTPATIENT)
Dept: CARDIOLOGY | Facility: CLINIC | Age: 57
Discharge: HOME | End: 2017-08-14
Payer: COMMERCIAL

## 2017-08-14 DIAGNOSIS — Z01.818 PREOP TESTING: ICD-10-CM

## 2017-08-14 PROCEDURE — 93000 ELECTROCARDIOGRAM COMPLETE: CPT | Mod: S$GLB,,, | Performed by: INTERNAL MEDICINE

## 2017-08-15 ENCOUNTER — PATIENT MESSAGE (OUTPATIENT)
Dept: INTERNAL MEDICINE | Facility: CLINIC | Age: 57
End: 2017-08-15

## 2017-08-15 DIAGNOSIS — E55.9 VITAMIN D DEFICIENCY: Primary | ICD-10-CM

## 2017-08-15 RX ORDER — ERGOCALCIFEROL 1.25 MG/1
50000 CAPSULE ORAL
Qty: 12 CAPSULE | Refills: 0 | Status: SHIPPED | OUTPATIENT
Start: 2017-08-15 | End: 2017-09-14

## 2017-08-18 ENCOUNTER — TELEPHONE (OUTPATIENT)
Dept: OTOLARYNGOLOGY | Facility: CLINIC | Age: 57
End: 2017-08-18

## 2017-08-18 NOTE — TELEPHONE ENCOUNTER
Left message on voicemail for pt to call back when received message in regards to arrival time for surgery on Monday 08/21/17 with Dr. Suárez.

## 2017-08-18 NOTE — TELEPHONE ENCOUNTER
Spoke with pt and gave her arrival time of 6:15am for surgery on Monday 08/21/17 with Dr. Suárez. Pt understood and agreed.

## 2017-08-18 NOTE — TELEPHONE ENCOUNTER
----- Message from Rony Herron sent at 8/18/2017 12:55 PM CDT -----  Contact: [phone]  Please follow up with pt regarding confirmed arrival time for surgery at soonest convenience

## 2017-08-18 NOTE — TELEPHONE ENCOUNTER
----- Message from Rony Herron sent at 8/18/2017  1:01 PM CDT -----  Contact: [phone]  Pt returning staff call, please follow up at soonest convenience

## 2017-08-20 ENCOUNTER — ANESTHESIA EVENT (OUTPATIENT)
Dept: SURGERY | Facility: HOSPITAL | Age: 57
End: 2017-08-20
Payer: COMMERCIAL

## 2017-08-20 NOTE — ANESTHESIA PREPROCEDURE EVALUATION
Pre-operative evaluation for Procedure(s) (LRB):  SINUS SURGERY FUNCTIONAL ENDOSCOPIC WITH NAVIGATION stealth and propel needed (Bilateral)  SEPTOPLASTY (Bilateral)  RESECTION-TURBINATES (SMR) (Bilateral)      Mrs. Agosto is a 57 year old female with a  Past medical history of HLD, HTN, and obesity who presents for the above stated procedure 2/2 chronic maxillary sinusitis, deviated nasal septum, nasal turbinate hypertrophy, and congenital cranial meningocele.       IV Access: Peripheral IV    Last Airway:  Noneon file.     Patient Active Problem List   Diagnosis    Pain of lower extremity    Varicose veins of bilateral lower extremities with pain    Hyperlipidemia    Essential hypertension    Chronic rhinitis    Obesity    Osteoarthritis    Vitamin D deficiency       Review of patient's allergies indicates:  No Known Allergies    No current facility-administered medications on file prior to encounter.      Current Outpatient Prescriptions on File Prior to Encounter   Medication Sig Dispense Refill    atorvastatin (LIPITOR) 80 MG tablet Take 1 tablet (80 mg total) by mouth nightly. 30 tablet 11    diclofenac (VOLTAREN) 75 MG EC tablet Take 1 tablet (75 mg total) by mouth 2 (two) times daily. 60 tablet 11    estradiol (VIVELLE-DOT) 0.1 mg/24 hr PTSW Place 1 patch onto the skin twice a week. 8 patch 11    hydrochlorothiazide (HYDRODIURIL) 25 MG tablet Take 1 tablet (25 mg total) by mouth once daily. 30 tablet 11    meloxicam (MOBIC) 7.5 MG tablet Take 7.5 mg by mouth once daily.      tramadol (ULTRAM) 50 mg tablet Take 50 mg by mouth every 6 (six) hours as needed for Pain.         Past Surgical History:   Procedure Laterality Date    BREAST BIOPSY  24-25 years old    ENCEPHALOCELE REPAIR  46    HYSTERECTOMY  93'-95'    ovaries remain    TUBAL LIGATION         Social History     Social History    Marital status: Single     Spouse name: N/A    Number of children: N/A    Years of education: N/A      Occupational History    Not on file.     Social History Main Topics    Smoking status: Never Smoker    Smokeless tobacco: Not on file    Alcohol use Yes      Comment: seldom    Drug use: No    Sexual activity: Yes     Partners: Male     Birth control/ protection: Post-menopausal     Other Topics Concern    Not on file     Social History Narrative    No narrative on file         Vital Signs Range (Last 24H):         CBC: No results for input(s): WBC, RBC, HGB, HCT, PLT, MCV, MCH, MCHC in the last 72 hours.    CMP: No results for input(s): NA, K, CL, CO2, BUN, CREATININE, GLU, MG, PHOS, CALCIUM, ALBUMIN, PROT, ALKPHOS, ALT, AST, BILITOT in the last 72 hours.    INR  No results for input(s): INR, PROTIME, APTT in the last 72 hours.    Invalid input(s): PT        Diagnostic Studies:      EK/14/17    Test Reason : Z01.818  Vent. Rate : 069 BPM     Atrial Rate : 069 BPM     P-R Int : 180 ms          QRS Dur : 084 ms      QT Int : 430 ms       P-R-T Axes : 050 -10 046 degrees     QTc Int : 460 ms    Normal sinus rhythm with sinus arrhythmia  Normal ECG  When compared with ECG of 04-MAY-2011 19:25,  No significant change was found  Confirmed by Amy ALEXANDER, Ashkan Lan ([id number]) on 2017 6:13:12 PM      2D Echo: None on file.       Anesthesia Evaluation    I have reviewed the Patient Summary Reports.    I have reviewed the Nursing Notes.      Review of Systems  Anesthesia Hx:  History of prior surgery of interest to airway management or planning: Previous anesthesia: General Denies Family Hx of Anesthesia complications.   Denies Personal Hx of Anesthesia complications.   Social:  Social Alcohol Use, Non-Smoker    Hematology/Oncology:  Hematology Normal   Oncology Normal     EENT/Dental:   chronic maxillary sinusitis, deviated nasal septum, nasal turbinate hypertrophy, and congenital cranial meningocele.   Cardiovascular:   Hypertension hyperlipidemia    Pulmonary:  Pulmonary Normal     Renal/:  Renal/ Normal     Hepatic/GI:  Hepatic/GI Normal    Musculoskeletal:   Arthritis     Neurological:  Neurology Normal    Endocrine:  Endocrine Normal    Dermatological:  Skin Normal    Psych:  Psychiatric Normal           Physical Exam  General:  Obesity    Airway/Jaw/Neck:  Airway Findings: Mouth Opening: Normal Tongue: Normal  General Airway Assessment: Adult  Mallampati: I  TM Distance: Normal, at least 6 cm  Jaw/Neck Findings:  Neck ROM: Normal ROM  Neck Findings: Normal    Eyes/Ears/Nose:  EYES/EARS/NOSE FINDINGS: Normal   Dental:  Dental Findings: Periodontal disease, Mild, In tact, upper front caps    Chest/Lungs:  Chest/Lungs Findings: Clear to auscultation, Normal Respiratory Rate     Heart/Vascular:  Heart Findings: Rate: Normal  Rhythm: Regular Rhythm     Abdomen:  Abdomen Findings: Normal    Musculoskeletal:  Musculoskeletal Findings: Normal   Skin:  Skin Findings: Normal    Mental Status:  Mental Status Findings: Normal        Anesthesia Plan  Type of Anesthesia, risks & benefits discussed:  Anesthesia Type:  general  Patient's Preference:   Intra-op Monitoring Plan: standard ASA monitors  Intra-op Monitoring Plan Comments:   Post Op Pain Control Plan: multimodal analgesia and per primary service following discharge from PACU  Post Op Pain Control Plan Comments:   Induction:   IV  Beta Blocker:  Patient is not currently on a Beta-Blocker (No further documentation required).       Informed Consent: Patient understands risks and agrees with Anesthesia plan.  Questions answered. Anesthesia consent signed with patient.  ASA Score: 2     Day of Surgery Review of History & Physical: I have interviewed and examined the patient. I have reviewed the patient's H&P dated:  There are no significant changes.  H&P update referred to the surgeon.         Ready For Surgery From Anesthesia Perspective.

## 2017-08-21 ENCOUNTER — SURGERY (OUTPATIENT)
Age: 57
End: 2017-08-21

## 2017-08-21 ENCOUNTER — HOSPITAL ENCOUNTER (OUTPATIENT)
Facility: HOSPITAL | Age: 57
Discharge: HOME | End: 2017-08-21
Attending: OTOLARYNGOLOGY | Admitting: OTOLARYNGOLOGY
Payer: COMMERCIAL

## 2017-08-21 ENCOUNTER — ANESTHESIA (OUTPATIENT)
Dept: SURGERY | Facility: HOSPITAL | Age: 57
End: 2017-08-21
Payer: COMMERCIAL

## 2017-08-21 DIAGNOSIS — J32.9 SINUSITIS: Primary | ICD-10-CM

## 2017-08-21 DIAGNOSIS — J34.2 DEVIATED NASAL SEPTUM: ICD-10-CM

## 2017-08-21 DIAGNOSIS — J34.3 NASAL TURBINATE HYPERTROPHY: ICD-10-CM

## 2017-08-21 DIAGNOSIS — J32.8 OTHER CHRONIC SINUSITIS: ICD-10-CM

## 2017-08-21 PROCEDURE — 25000003 PHARM REV CODE 250: Performed by: STUDENT IN AN ORGANIZED HEALTH CARE EDUCATION/TRAINING PROGRAM

## 2017-08-21 PROCEDURE — 88305 TISSUE EXAM BY PATHOLOGIST: CPT | Mod: 26,,, | Performed by: PATHOLOGY

## 2017-08-21 PROCEDURE — 63600175 PHARM REV CODE 636 W HCPCS: Performed by: STUDENT IN AN ORGANIZED HEALTH CARE EDUCATION/TRAINING PROGRAM

## 2017-08-21 PROCEDURE — 63600175 PHARM REV CODE 636 W HCPCS: Performed by: OTOLARYNGOLOGY

## 2017-08-21 PROCEDURE — 27800903 OPTIME MED/SURG SUP & DEVICES OTHER IMPLANTS: Performed by: OTOLARYNGOLOGY

## 2017-08-21 PROCEDURE — 71000016 HC POSTOP RECOV ADDL HR: Performed by: OTOLARYNGOLOGY

## 2017-08-21 PROCEDURE — 36000710: Performed by: OTOLARYNGOLOGY

## 2017-08-21 PROCEDURE — D9220A PRA ANESTHESIA: Mod: ,,, | Performed by: ANESTHESIOLOGY

## 2017-08-21 PROCEDURE — 30520 REPAIR OF NASAL SEPTUM: CPT | Mod: ,,, | Performed by: OTOLARYNGOLOGY

## 2017-08-21 PROCEDURE — 61782 SCAN PROC CRANIAL EXTRA: CPT | Mod: ,,, | Performed by: OTOLARYNGOLOGY

## 2017-08-21 PROCEDURE — 71000033 HC RECOVERY, INTIAL HOUR: Performed by: OTOLARYNGOLOGY

## 2017-08-21 PROCEDURE — 37000008 HC ANESTHESIA 1ST 15 MINUTES: Performed by: OTOLARYNGOLOGY

## 2017-08-21 PROCEDURE — 27000221 HC OXYGEN, UP TO 24 HOURS

## 2017-08-21 PROCEDURE — 27201423 OPTIME MED/SURG SUP & DEVICES STERILE SUPPLY: Performed by: OTOLARYNGOLOGY

## 2017-08-21 PROCEDURE — 31276 NSL/SINS NDSC FRNT TISS RMVL: CPT | Mod: 51,RT,, | Performed by: OTOLARYNGOLOGY

## 2017-08-21 PROCEDURE — 88305 TISSUE EXAM BY PATHOLOGIST: CPT | Performed by: PATHOLOGY

## 2017-08-21 PROCEDURE — 37000009 HC ANESTHESIA EA ADD 15 MINS: Performed by: OTOLARYNGOLOGY

## 2017-08-21 PROCEDURE — 71000015 HC POSTOP RECOV 1ST HR: Performed by: OTOLARYNGOLOGY

## 2017-08-21 PROCEDURE — 31267 ENDOSCOPY MAXILLARY SINUS: CPT | Mod: 50,51,, | Performed by: OTOLARYNGOLOGY

## 2017-08-21 PROCEDURE — 36000711: Performed by: OTOLARYNGOLOGY

## 2017-08-21 PROCEDURE — 25000003 PHARM REV CODE 250: Performed by: OTOLARYNGOLOGY

## 2017-08-21 PROCEDURE — 30140 RESECT INFERIOR TURBINATE: CPT | Mod: 50,51,, | Performed by: OTOLARYNGOLOGY

## 2017-08-21 PROCEDURE — 71000039 HC RECOVERY, EACH ADD'L HOUR: Performed by: OTOLARYNGOLOGY

## 2017-08-21 PROCEDURE — 31255 NSL/SINS NDSC W/TOT ETHMDCT: CPT | Mod: 51,RT,, | Performed by: OTOLARYNGOLOGY

## 2017-08-21 DEVICE — IMPLANT PROPEL MOMETASONE: Type: IMPLANTABLE DEVICE | Site: NOSE | Status: FUNCTIONAL

## 2017-08-21 RX ORDER — ACETAMINOPHEN 10 MG/ML
INJECTION, SOLUTION INTRAVENOUS
Status: DISCONTINUED | OUTPATIENT
Start: 2017-08-21 | End: 2017-08-21

## 2017-08-21 RX ORDER — NEOSTIGMINE METHYLSULFATE 1 MG/ML
INJECTION, SOLUTION INTRAVENOUS
Status: DISCONTINUED | OUTPATIENT
Start: 2017-08-21 | End: 2017-08-21

## 2017-08-21 RX ORDER — PROPOFOL 10 MG/ML
VIAL (ML) INTRAVENOUS
Status: DISCONTINUED | OUTPATIENT
Start: 2017-08-21 | End: 2017-08-21

## 2017-08-21 RX ORDER — FENTANYL CITRATE 50 UG/ML
INJECTION, SOLUTION INTRAMUSCULAR; INTRAVENOUS
Status: DISCONTINUED | OUTPATIENT
Start: 2017-08-21 | End: 2017-08-21

## 2017-08-21 RX ORDER — PHENYLEPHRINE HYDROCHLORIDE 10 MG/ML
INJECTION INTRAVENOUS
Status: DISCONTINUED | OUTPATIENT
Start: 2017-08-21 | End: 2017-08-21

## 2017-08-21 RX ORDER — SODIUM CHLORIDE 0.9 % (FLUSH) 0.9 %
3 SYRINGE (ML) INJECTION
Status: DISCONTINUED | OUTPATIENT
Start: 2017-08-21 | End: 2017-08-21 | Stop reason: HOSPADM

## 2017-08-21 RX ORDER — LIDOCAINE HCL/PF 100 MG/5ML
SYRINGE (ML) INTRAVENOUS
Status: DISCONTINUED | OUTPATIENT
Start: 2017-08-21 | End: 2017-08-21

## 2017-08-21 RX ORDER — OXYCODONE AND ACETAMINOPHEN 5; 325 MG/1; MG/1
1 TABLET ORAL EVERY 6 HOURS PRN
Status: DISCONTINUED | OUTPATIENT
Start: 2017-08-21 | End: 2017-08-21 | Stop reason: HOSPADM

## 2017-08-21 RX ORDER — DEXAMETHASONE SODIUM PHOSPHATE 4 MG/ML
INJECTION, SOLUTION INTRA-ARTICULAR; INTRALESIONAL; INTRAMUSCULAR; INTRAVENOUS; SOFT TISSUE
Status: DISCONTINUED | OUTPATIENT
Start: 2017-08-21 | End: 2017-08-21

## 2017-08-21 RX ORDER — ROCURONIUM BROMIDE 10 MG/ML
INJECTION, SOLUTION INTRAVENOUS
Status: DISCONTINUED | OUTPATIENT
Start: 2017-08-21 | End: 2017-08-21

## 2017-08-21 RX ORDER — MIDAZOLAM HYDROCHLORIDE 1 MG/ML
INJECTION, SOLUTION INTRAMUSCULAR; INTRAVENOUS
Status: DISCONTINUED | OUTPATIENT
Start: 2017-08-21 | End: 2017-08-21

## 2017-08-21 RX ORDER — SODIUM CHLORIDE 9 MG/ML
INJECTION, SOLUTION INTRAVENOUS CONTINUOUS PRN
Status: DISCONTINUED | OUTPATIENT
Start: 2017-08-21 | End: 2017-08-21

## 2017-08-21 RX ORDER — EPINEPHRINE 1 MG/ML
INJECTION, SOLUTION INTRACARDIAC; INTRAMUSCULAR; INTRAVENOUS; SUBCUTANEOUS
Status: DISCONTINUED | OUTPATIENT
Start: 2017-08-21 | End: 2017-08-21 | Stop reason: HOSPADM

## 2017-08-21 RX ORDER — GLYCOPYRROLATE 0.2 MG/ML
INJECTION INTRAMUSCULAR; INTRAVENOUS
Status: DISCONTINUED | OUTPATIENT
Start: 2017-08-21 | End: 2017-08-21

## 2017-08-21 RX ORDER — CEFAZOLIN SODIUM 2 G/50ML
2 SOLUTION INTRAVENOUS ONCE
Status: COMPLETED | OUTPATIENT
Start: 2017-08-21 | End: 2017-08-21

## 2017-08-21 RX ORDER — OXYCODONE AND ACETAMINOPHEN 5; 325 MG/1; MG/1
1 TABLET ORAL EVERY 6 HOURS PRN
Qty: 40 TABLET | Refills: 0 | Status: SHIPPED | OUTPATIENT
Start: 2017-08-21 | End: 2017-12-13

## 2017-08-21 RX ORDER — ONDANSETRON 4 MG/1
4 TABLET, ORALLY DISINTEGRATING ORAL EVERY 6 HOURS PRN
Qty: 10 TABLET | Refills: 0 | Status: SHIPPED | OUTPATIENT
Start: 2017-08-21 | End: 2017-09-26

## 2017-08-21 RX ORDER — ONDANSETRON 2 MG/ML
INJECTION INTRAMUSCULAR; INTRAVENOUS
Status: DISCONTINUED | OUTPATIENT
Start: 2017-08-21 | End: 2017-08-21

## 2017-08-21 RX ORDER — ESMOLOL HYDROCHLORIDE 10 MG/ML
INJECTION INTRAVENOUS
Status: DISCONTINUED | OUTPATIENT
Start: 2017-08-21 | End: 2017-08-21

## 2017-08-21 RX ORDER — KETAMINE HCL IN 0.9 % NACL 50 MG/5 ML
SYRINGE (ML) INTRAVENOUS
Status: DISCONTINUED | OUTPATIENT
Start: 2017-08-21 | End: 2017-08-21

## 2017-08-21 RX ORDER — LIDOCAINE HYDROCHLORIDE 10 MG/ML
1 INJECTION, SOLUTION EPIDURAL; INFILTRATION; INTRACAUDAL; PERINEURAL ONCE
Status: COMPLETED | OUTPATIENT
Start: 2017-08-21 | End: 2017-08-21

## 2017-08-21 RX ORDER — LIDOCAINE HYDROCHLORIDE AND EPINEPHRINE 20; 10 MG/ML; UG/ML
INJECTION, SOLUTION INFILTRATION; PERINEURAL
Status: DISCONTINUED | OUTPATIENT
Start: 2017-08-21 | End: 2017-08-21 | Stop reason: HOSPADM

## 2017-08-21 RX ORDER — HYDROMORPHONE HYDROCHLORIDE 1 MG/ML
0.2 INJECTION, SOLUTION INTRAMUSCULAR; INTRAVENOUS; SUBCUTANEOUS EVERY 5 MIN PRN
Status: DISCONTINUED | OUTPATIENT
Start: 2017-08-21 | End: 2017-08-21 | Stop reason: HOSPADM

## 2017-08-21 RX ADMIN — PHENYLEPHRINE HYDROCHLORIDE 100 MCG: 10 INJECTION INTRAVENOUS at 08:08

## 2017-08-21 RX ADMIN — FENTANYL CITRATE 25 MCG: 50 INJECTION INTRAMUSCULAR; INTRAVENOUS at 09:08

## 2017-08-21 RX ADMIN — COCAINE HYDROCHLORIDE 4 ML: 40 SOLUTION TOPICAL at 08:08

## 2017-08-21 RX ADMIN — Medication 30 MG: at 07:08

## 2017-08-21 RX ADMIN — LIDOCAINE HYDROCHLORIDE AND EPINEPHRINE 16 ML: 20; 10 INJECTION, SOLUTION INFILTRATION; PERINEURAL at 11:08

## 2017-08-21 RX ADMIN — SODIUM CHLORIDE, SODIUM GLUCONATE, SODIUM ACETATE, POTASSIUM CHLORIDE, MAGNESIUM CHLORIDE, SODIUM PHOSPHATE, DIBASIC, AND POTASSIUM PHOSPHATE: .53; .5; .37; .037; .03; .012; .00082 INJECTION, SOLUTION INTRAVENOUS at 08:08

## 2017-08-21 RX ADMIN — OXYCODONE HYDROCHLORIDE AND ACETAMINOPHEN 1 TABLET: 5; 325 TABLET ORAL at 11:08

## 2017-08-21 RX ADMIN — LIDOCAINE HYDROCHLORIDE 1 MG: 10 INJECTION, SOLUTION EPIDURAL; INFILTRATION; INTRACAUDAL; PERINEURAL at 07:08

## 2017-08-21 RX ADMIN — ACETAMINOPHEN 1000 MG: 10 INJECTION, SOLUTION INTRAVENOUS at 07:08

## 2017-08-21 RX ADMIN — ESMOLOL HYDROCHLORIDE 30 MG: 10 INJECTION INTRAVENOUS at 07:08

## 2017-08-21 RX ADMIN — SODIUM CHLORIDE: 0.9 INJECTION, SOLUTION INTRAVENOUS at 07:08

## 2017-08-21 RX ADMIN — MIDAZOLAM HYDROCHLORIDE 2 MG: 1 INJECTION, SOLUTION INTRAMUSCULAR; INTRAVENOUS at 07:08

## 2017-08-21 RX ADMIN — PHENYLEPHRINE HYDROCHLORIDE 100 MCG: 10 INJECTION INTRAVENOUS at 09:08

## 2017-08-21 RX ADMIN — FENTANYL CITRATE 50 MCG: 50 INJECTION INTRAMUSCULAR; INTRAVENOUS at 08:08

## 2017-08-21 RX ADMIN — THROMBIN, TOPICAL (BOVINE) 15000 UNITS: KIT at 11:08

## 2017-08-21 RX ADMIN — Medication 10 MG: at 09:08

## 2017-08-21 RX ADMIN — FENTANYL CITRATE 50 MCG: 50 INJECTION INTRAMUSCULAR; INTRAVENOUS at 07:08

## 2017-08-21 RX ADMIN — ONDANSETRON 4 MG: 2 INJECTION INTRAMUSCULAR; INTRAVENOUS at 10:08

## 2017-08-21 RX ADMIN — GLYCOPYRROLATE 0.6 MG: 0.2 INJECTION, SOLUTION INTRAMUSCULAR; INTRAVENOUS at 10:08

## 2017-08-21 RX ADMIN — PHENYLEPHRINE HYDROCHLORIDE 100 MCG: 10 INJECTION INTRAVENOUS at 10:08

## 2017-08-21 RX ADMIN — NEOSTIGMINE METHYLSULFATE 5 MG: 1 INJECTION INTRAVENOUS at 10:08

## 2017-08-21 RX ADMIN — ESMOLOL HYDROCHLORIDE 20 MG: 10 INJECTION INTRAVENOUS at 09:08

## 2017-08-21 RX ADMIN — PROPOFOL 30 MG: 10 INJECTION, EMULSION INTRAVENOUS at 07:08

## 2017-08-21 RX ADMIN — ROCURONIUM BROMIDE 40 MG: 10 SOLUTION INTRAVENOUS at 07:08

## 2017-08-21 RX ADMIN — PROPOFOL 150 MG: 10 INJECTION, EMULSION INTRAVENOUS at 07:08

## 2017-08-21 RX ADMIN — HYDROMORPHONE HYDROCHLORIDE 0.2 MG: 1 INJECTION, SOLUTION INTRAMUSCULAR; INTRAVENOUS; SUBCUTANEOUS at 02:08

## 2017-08-21 RX ADMIN — LIDOCAINE HYDROCHLORIDE 60 MG: 20 INJECTION, SOLUTION INTRAVENOUS at 07:08

## 2017-08-21 RX ADMIN — DEXAMETHASONE SODIUM PHOSPHATE 8 MG: 4 INJECTION, SOLUTION INTRAMUSCULAR; INTRAVENOUS at 08:08

## 2017-08-21 RX ADMIN — EPINEPHRINE 2 MG: 1 INJECTION, SOLUTION INTRAMUSCULAR; SUBCUTANEOUS at 11:08

## 2017-08-21 RX ADMIN — CEFAZOLIN SODIUM 2 G: 2 SOLUTION INTRAVENOUS at 07:08

## 2017-08-21 NOTE — PROGRESS NOTES
Daughter, Joe, Updated via cell phone of mother's stable status in pacu, and plan of care including pain treatment and transfer to phase II upon full recovery from anesthesia. Daughter verbalized understanding. All questions answered. PT VSS, will continue to monitor.

## 2017-08-21 NOTE — BRIEF OP NOTE
[facility]  Brief Operative Note     SUMMARY     Surgery Date: 8/21/2017     Surgeon(s) and Role:     * Jacob Patrick Brunner, MD - Resident - Assisting     * Nikunj Sofia MD - Primary        Pre-op Diagnosis:  Hypertrophy of nasal turbinates [J34.3]  Congenital cranial meningocele [Q01.9]  Nasal septal deviation [J34.2]  Preop testing [Z01.818]  Chronic recurrent sinusitis [J32.9]  Facial pressure [R68.89]    Post-op Diagnosis:  Post-Op Diagnosis Codes:     * Hypertrophy of nasal turbinates [J34.3]     * Congenital cranial meningocele [Q01.9]     * Nasal septal deviation [J34.2]     * Preop testing [Z01.818]     * Chronic recurrent sinusitis [J32.9]     * Facial pressure [R68.89]    Procedure(s) (LRB):  SINUS SURGERY FUNCTIONAL ENDOSCOPIC WITH NAVIGATION stealth and propel needed (Bilateral)  SEPTOPLASTY (Bilateral)  RESECTION-TURBINATES (SMR) (Bilateral)    Anesthesia: General    Description of the findings of the procedure: see op note    Findings/Key Components: see op note    Estimated Blood Loss: * No values recorded between 8/21/2017  8:07 AM and 8/21/2017 11:02 AM *         Specimens:   Specimen (12h ago through future)    Start     Ordered    08/21/17 1042  Specimen to Pathology - Surgery  Once     Comments:  1. Right ethmoid (perm)2. Left maxillary sinus (perm)      08/21/17 1041          Discharge Note    SUMMARY     Admit Date: 8/21/2017    Discharge Date and Time:  08/21/2017 11:02 AM    Hospital Course (synopsis of major diagnoses, care, treatment, and services provided during the course of the hospital stay): POD0 s/p FESS, septoplasty, turbinate reduction. Procedure went without complications and was well tolerated by patient, will discharge to home with prompt follow up.     Final Diagnosis: Post-Op Diagnosis Codes:     * Hypertrophy of nasal turbinates [J34.3]     * Congenital cranial meningocele [Q01.9]     * Nasal septal deviation [J34.2]     * Preop testing [Z01.818]      * Chronic recurrent sinusitis [J32.9]     * Facial pressure [R68.89]    Disposition: Home or Self Care    Follow Up/Patient Instructions:     Medications:  Reconciled Home Medications:   Current Discharge Medication List      START taking these medications    Details   ondansetron (ZOFRAN-ODT) 4 MG TbDL Take 1 tablet (4 mg total) by mouth every 6 (six) hours as needed.  Qty: 10 tablet, Refills: 0      oxycodone-acetaminophen (PERCOCET) 5-325 mg per tablet Take 1 tablet by mouth every 6 (six) hours as needed for Pain.  Qty: 40 tablet, Refills: 0         CONTINUE these medications which have NOT CHANGED    Details   diclofenac (VOLTAREN) 75 MG EC tablet Take 1 tablet (75 mg total) by mouth 2 (two) times daily.  Qty: 60 tablet, Refills: 11      ergocalciferol (ERGOCALCIFEROL) 50,000 unit Cap Take 1 capsule (50,000 Units total) by mouth every 7 days.  Qty: 12 capsule, Refills: 0    Associated Diagnoses: Vitamin D deficiency      hydrochlorothiazide (HYDRODIURIL) 25 MG tablet Take 1 tablet (25 mg total) by mouth once daily.  Qty: 30 tablet, Refills: 11    Associated Diagnoses: Essential hypertension      meloxicam (MOBIC) 7.5 MG tablet Take 7.5 mg by mouth once daily.      tramadol (ULTRAM) 50 mg tablet Take 50 mg by mouth every 6 (six) hours as needed for Pain.      atorvastatin (LIPITOR) 80 MG tablet Take 1 tablet (80 mg total) by mouth nightly.  Qty: 30 tablet, Refills: 11      estradiol (VIVELLE-DOT) 0.1 mg/24 hr PTSW Place 1 patch onto the skin twice a week.  Qty: 8 patch, Refills: 11    Associated Diagnoses: Menopausal symptoms             Discharge Procedure Orders  Diet general     Other restrictions (specify):   Scheduling Instructions: See patient instructions     Call MD for:  temperature >100.4     Call MD for:  persistent nausea and vomiting or diarrhea     Call MD for:  severe uncontrolled pain     Call MD for:  redness, tenderness, or signs of infection (pain, swelling, redness, odor or green/yellow  discharge around incision site)     Call MD for:  difficulty breathing or increased cough     Call MD for:  severe persistent headache     Call MD for:  worsening rash     Call MD for:  persistent dizziness, light-headedness, or visual disturbances     Call MD for:  increased confusion or weakness     No dressing needed       Follow-up Information     Nikunj Sofia MD In 1 week.    Specialty:  Otolaryngology  Contact information:  [address]  [facility] [address]  [phone]

## 2017-08-21 NOTE — DISCHARGE INSTRUCTIONS
INSTRUCTIONS TO FOLLOW AFTER SINUS SURGERY  DR. McCOUL - [facility]      Your first office visit with Dr. Sofia after surgery should have been already scheduled.  If you dont know when it is, call Dr. Maldonado nurse Jose at [phone].    ACTIVITY:    Sleep on your back with the head of the bead elevated, up on 2-3 pillows, or in a recliner for the first 3 to 5 days. This will help with swelling.     After surgery you may have a lot of nasal drainage. This is normal. Do not wipe, touch, or dab your nose. You may breathe through your nose if youre able but avoid inhaling forcibly. Let all drainage fall on your mustache dressing and change it as needed.    You may shower 24 hours after surgery.    If you use CPAP or BiPAP to sleep at night, you should wait at least 48 hours before resuming use.  Dr. Sofia will advise you when it is safe to do this.    DRESSINGS:    Change the mustache dressing under your nose as needed. (If unsure what this dressing is or how to do this, ask your doctor or nurse before you leave the clinic/hospital.) You may have pinkish-red drainage for 2-3 days.    In certain cases you may have gauze packing inside your nostrils.  If so, these will turn from white to red from the drainage. This is normal so do not be alarmed. Do not touch or pull at the packing. The packing will be removed by your doctor at your first post-op visit.     You may also have a dissolvable stent or dissolvable sponge placed into the sinuses during surgery.  These usually do not need to be removed unless you are told otherwise by Dr. Sofia.  You may notice small fragments of these items come out of your nose in the weeks following surgery.    MEDICATIONS:  After surgery, you are generally sent home with prescription for a pain medication and an anti-nausea medication.       Most people need prescription pain medication for the first few days after surgery.  If you find that this is not necessary, you may use  over-the-counter Tylenol (Acetaminophen) instead.    Avoid Aspirin, Motrin (Ibuprofen), Advil, Aleve and Vitamin E for 1 week before surgery and 1 week after surgery. There are many other medications to avoid as well due to the fact they act as blood thinners.      Some people have problems with bowel movements after surgery. If you have NOT had a bowel movement 3-5 days after surgery, go to your local pharmacy and purchase an over the counter stool softener such as COLACE. You can also ask the pharmacist for his or her recommendation. If you still do not have a bowel movement after starting the softener, please call Dr. Maldonado office.    You will need these over-the-counter medications after surgery:    Saline Sinus Rinse (Saeed Med brand):  You will use this to rinse out your nose and sinuses after surgery.  Begin doing this the day after surgery, unless instructed otherwise by Dr. Sofia.  You should do this 2 times a day, following the instructions on the box.    Afrin (regular strength): Only use if you have nasal congestion or bleeding. Use 2 times per day for 3 days, stop for 1 day, continue 2 times per day for 3 days, then stop completely.  NOTE:  You may not need to do this at all.      RESTRICTED ACTIVITIES AFTER SURGERY:    Do NOT blow your nose for 2 weeks. If you have to sneeze or cough, do so with your mouth open.   AVOID all heavy lifting, straining or bending for 2 weeks.   AVOID any sexual activity for 2 weeks after surgery.  AVOID semi-contact sports or vigorous exercising for 3-4 weeks. Dr. Sofia will let you know when you are cleared to resume exercise.  No Swimming for 3-4 weeks.  No Flying for 2 weeks.    Do NOT operate a motor vehicle or any type of heavy machinery within 24 hours of taking pain medication.  DO NOT smoke or be around smokers.  AVOID irritating substances such as dust, chalk, harsh chemicals, and allergic triggers.    DIET:    Avoid hot and spicy foods, or salty soups for 1  week after surgery.  Begin with bland foods the day after surgery and advance to your regular diet as tolerated.  It is not necessary to take only soft food unless you are recovering from tonsil surgery.  Drink plenty of fluids (water is best).   Avoid alcoholic and caffeinated beverages for 1 week after surgery.

## 2017-08-21 NOTE — TRANSFER OF CARE
"Anesthesia Transfer of Care Note    Patient: Rosamaria Agosto    Procedure(s) Performed: Procedure(s) (LRB):  SINUS SURGERY FUNCTIONAL ENDOSCOPIC WITH NAVIGATION stealth and propel needed (Bilateral)  SEPTOPLASTY (Bilateral)  RESECTION-TURBINATES (SMR) (Bilateral)    Patient location: PACU    Anesthesia Type: general    Transport from OR: Transported from OR on 6-10 L/min O2 by face mask with adequate spontaneous ventilation    Post pain: adequate analgesia    Post assessment: no apparent anesthetic complications    Post vital signs: stable    Level of consciousness: awake    Nausea/Vomiting: no nausea/vomiting    Complications: none    Transfer of care protocol was followed      Last vitals:   Visit Vitals  /67 (BP Location: Left arm, Patient Position: Lying)   Pulse 94   Temp 36 °C (96.8 °F) (Axillary)   Resp 14   Ht 5' 7" (1.702 m)   Wt 93.9 kg (207 lb)   SpO2 98%   Breastfeeding? No   BMI 32.42 kg/m²     "

## 2017-08-21 NOTE — OP NOTE
DATE OF OPERATION: 8/21/2017    SURGEON:  Nikunj Sofia MD     ASSISTANT SURGEON:  Jacob Brunner, MD     OPERATION:     1. Endoscopic septoplasty.  2. Bilateral inferior turbinate reduction with submucosal resection with posterior pole resection.  3. Right image-guided endoscopic total ethmoidectomy.  4. Bilateral image-guided endoscopic maxillary antrostomy.  5. Right image-guided endoscopic frontal dissection with Draf IIA sinusotomy.     PREOPERATIVE DIAGNOSIS:      1. Deviated nasal septum.  2. Hypertrophic turbinates.  3. Chronic rhinosinusitis.  4. History of left-sided anterior skull base meningoencephalocele.     POSTOPERATIVE DIAGNOSIS:      1. Deviated nasal septum.  2. Hypertrophic turbinates.  3. Chronic rhinosinusitis.  4. History of left-sided anterior skull base meningoencephalocele.     ANESTHESIA: General.     COMPLICATIONS: None.     ESTIMATED BLOOD LOSS: 150 mL     SPECIMEN: Right ethmoid. Left maxillary sinus contents.     WOUND EXPECTANCY: Clean-contaminated.    DRESSING: Propel in right middle meatus.  No nasal packing.    FINDINGS: Rightward septal deviation with prior superior septectomy.  Compound inferior turbinate hypertrophy.   Obstruction of osteomeatal complex by uncinate process bilaterally.  Polypoid mucosa in maxillary sinuses bilaterally.  Soft tissue obscuring left frontoethmoidal recess at site of prior skull base reconstruction.     INDICATIONS: Chronic rhinosinusitis and anatomic nasal obstruction, not controlled with maximal medical therapy.     I discussed the risks, benefits and alternatives of surgical correction of the septal deviation, turbinate hypertrophy and chronically obstructed sinuses with the patient as well as the expected postoperative course. I gave her the opportunity to ask questions and I answered all of them. On the morning of surgery I again met with the patient and reviewed the indications for surgery and she consented to proceed.     DESCRIPTION OF  PROCEDURE: The patient was brought to the operating room and placed supine on the operating table. The patient was placed under general anesthesia and intubated. The patient was positioned with a donut under the head and the image-guidance headset for the Med[employer]onic Fusion system was applied.  Image-guided navigation was indicated to facilitate exenteration of all ethmoid cells and the extent of sinusotomy.  The CT scan disc was loaded into the image-guidance system and registered with the patient tracking system according to the 's instructions. The pointer was calibrated and registration was verified using predefined landmarks.  Cottonoid pledgets soaked with 4% cocaine was placed into the nasal cavity bilaterally for mucosal decongestion. The mucosa of the nasal septum was injected with 1% lidocaine with 1:100,000 parts epinephrine. Prophylactic cefazolin was given prior to the surgery start. A time-out was performed to confirm the proper patient, site and procedure.  The CT images were again reviewed prior to the surgical start. The patient was prepped and draped in the usual fashion.  The bed was placed in 20-degree reverse Trendelenberg position.     Surgery began with performance of submucous resection of the nasal septum. This began with additional injections, assisted by a 0-degree endoscope. Then, a hemitransfixion incision was made using a #15 blade on the left side. The submucoperichondrial plane was identified and this was elevated using a Ewing elevator. This plane was carried posteriorly to the bony-cartilaginous junction.  A Ivan elevator was used to incise the cartilage at the junction and a contralateral mucoperiosteal flap was elevated. Then, using a 0-degree endoscope, the septal pocket was visualized and there was an additional long process of cartilage along the floor causing a deviation. This was resected using a Stonewall elevator and was removed.       Additional elevation of the  flaps over the vomer revealed a large spur that was jutting into the right middle meatus. This portion of the bone was resected top and bottom using a Fomon scissors and the spur was removed using a Diego forceps. After removal, there was no perforation of the mucoperichondrial flap.  A preexisting superior septectomy from the prior surgery was identified and not involved in the dissection.     At this point, 10,000 units of topical thrombin with 1:10,000 parts epinephrine was applied to pledgets and placed between the flaps for topical hemostasis.  After these pledgets were removed, there was excellent hemostasis at the septal site.       Then, under endoscopic visualization, a transseptal quilting suture of 4-0 plain gut was used to reapproximate the nasal septal flaps.  Then the hemitransfixion incision was closed using 4-0 chromic gut suture in figure-of-eight fashion times two.   Repeated nasal endoscopy at this point revealed marked improvement of the septal deviation and good visualization of the vertical suspension of both middle turbinates.     Attention was then turned to the turbinates.  Additional injections were performed around the inferior turbinates and the sphenopalatine ganglion region bilaterally.  Incisions were made in the anterior head of the inferior turbinate using a #6400 blade.  A Ivan elevator was then tunnelled medially to the turbinate bone and used to segmentally outfracture and morselize the bone.  Then, a 2 mm blade on the powered tissue shaver was tunneled submucosally and used to resect soft tissue of the turbinate while overlying mucosa was preserved. The posterior pole was polypoid and therefore reduced using the tissue shaver, with hemostasis by the suction cautery at a setting of 25 sorto. Finally, the turbinates were outfractured using a Gupta/Boies elevator.  An identical procedure was performed bilaterally.     Attention was then turned to the sinuses. The right  middle meatus was topically decongested using thrombin with epinephrine pledgets.    The uncinate process was then visualized and a micro-fredo backbiter was used to incise the uncinate process. The uncinate was dissected to its superior attachment and removed using cutting forceps.  A estela bullosa was not present.       After removal of the bone, the natural ostium of the maxillary sinus was visible. The lumen was visualized with a 30-degree scope and entered using a curved suction to confirm the dimension. The antrostomy was enlarged with cutting instruments to include the natural sinus ostium, taking care not to move anterior to the maxillary line so as to avoid injury to the nasolacrimal duct.  Additional bone was removed using forceps.  Polypoid tissue  was present within the maxillary sinus.  This was thoroughly removed and sent for pathologic evaluation.     The ethmoid bulla was entered using a microdebrider and anterior ethmoidectomy was performed.  The roof of the bulla was removed with forceps and the suprabullar recess was exposed. The grand lamella of the middle turbinate was then traversed and posterior ethmoidectomy was performed.  An Onodi cell was not present.  The mucosa was hypertrophic throughout the sinuses, indicative of chronic inflammation.  Topical hemostatic agents on pledgets were then placed into the ethmoid cavity for hemostasis.    Dissection was performed at the site of the nasofrontal recess.  Using a 70-degree scope, a suprabullar cell was dissected and uncapped in a medial-to-lateral direction.  An agger nasi cell was then opened from an retrograde approach.  A supra-agger frontal cell was present which was also opened in a retrograde fashion using angulated cutting instruments.  Afterward, the frontal sinus ostium was visible but stenotic.  Therefore, the ostium was enlarged anteriorly using cutting instruments, taking care to avoid circumerential mucosal injury.  Powered  instrumentation was not required.  The floor of the frontal sinus was removed between the lamina of the orbit and the suspension of the middle turbinate to complete a Draf IIA sinusotomy.  The anterior ethmoidal artery was identified and avoided with assistance from the image-guidance system probe.  At the conclusion of dissection there was excellent visualization into the frontal sinus, which would not otherwise have been possible.     Attention was then turned to the left side of the sinonasal tract.  A similar procedure of uncinectomy and maxillary antrostomy were performed due to the elevated risk of CSF leak with frontoethmoidal dissection.     At the conclusion of these procedures, the image-guidance probe was used to verify that all ethmoid cells had been properly opened and that the skull base was visible and that the lamina papyracea had not been traversed on either side.  All sinuses were copiously irrigated with warm normal saline solution.     At this point, the pledgets were all removed. Chencho hemostatic agent was placed into the surgical sites.  A Propel steroid-eluting stent was placed into the right ethmoid cavity to stent open the surgical site.  The nasal cavity was not packed.  Mupirocin ointment was applied to the vestibule bilaterally.  At this point, the headset was removed and the drapes were taken down. Intravenous dexamethasone was given toward the end of the case. The patient was turned back toward the anesthesiologist and awakened from anesthesia, extubated and transferred to the recovery room in stable condition.      POSTOPERATIVE PLAN:  Budesonide once stents are out.

## 2017-08-21 NOTE — ANESTHESIA POSTPROCEDURE EVALUATION
"Anesthesia Post Evaluation    Patient: Rosamaria Agosto    Procedure(s) Performed: Procedure(s) (LRB):  SINUS SURGERY FUNCTIONAL ENDOSCOPIC WITH NAVIGATION stealth and propel needed (Bilateral)  SEPTOPLASTY (Bilateral)  RESECTION-TURBINATES (SMR) (Bilateral)    Final Anesthesia Type: general  Patient location during evaluation: PACU  Patient participation: Yes- Able to Participate  Level of consciousness: awake and alert and oriented  Post-procedure vital signs: reviewed and stable  Pain management: adequate  Airway patency: patent  PONV status at discharge: No PONV  Anesthetic complications: no      Cardiovascular status: blood pressure returned to baseline and hemodynamically stable  Respiratory status: unassisted, spontaneous ventilation and room air  Hydration status: euvolemic  Follow-up not needed.        Visit Vitals  BP (!) 156/88   Pulse 84   Temp 36.8 °C (98.3 °F) (Skin)   Resp 16   Ht 5' 7" (1.702 m)   Wt 93.9 kg (207 lb)   SpO2 96%   Breastfeeding? No   BMI 32.42 kg/m²       Pain/Bakari Score: Pain Assessment Performed: Yes (8/21/2017 12:17 PM)  Presence of Pain: denies (8/21/2017 12:17 PM)  Pain Rating Prior to Med Admin: 3 (8/21/2017 11:57 AM)  Bakari Score: 9 (8/21/2017 12:17 PM)      "

## 2017-08-22 VITALS
OXYGEN SATURATION: 98 % | BODY MASS INDEX: 32.49 KG/M2 | WEIGHT: 207 LBS | TEMPERATURE: 98 F | RESPIRATION RATE: 16 BRPM | DIASTOLIC BLOOD PRESSURE: 90 MMHG | SYSTOLIC BLOOD PRESSURE: 169 MMHG | HEART RATE: 92 BPM | HEIGHT: 67 IN

## 2017-08-30 ENCOUNTER — OFFICE VISIT (OUTPATIENT)
Dept: OTOLARYNGOLOGY | Facility: CLINIC | Age: 57
End: 2017-08-30
Payer: COMMERCIAL

## 2017-08-30 VITALS
HEART RATE: 69 BPM | DIASTOLIC BLOOD PRESSURE: 75 MMHG | BODY MASS INDEX: 31.59 KG/M2 | WEIGHT: 201.75 LBS | SYSTOLIC BLOOD PRESSURE: 132 MMHG

## 2017-08-30 DIAGNOSIS — J31.0 CHRONIC RHINITIS, UNSPECIFIED TYPE: ICD-10-CM

## 2017-08-30 DIAGNOSIS — D72.10 EOSINOPHILIA: ICD-10-CM

## 2017-08-30 DIAGNOSIS — J32.4 CHRONIC PANSINUSITIS: Primary | ICD-10-CM

## 2017-08-30 PROCEDURE — 99999 PR PBB SHADOW E&M-EST. PATIENT-LVL III: CPT | Mod: PBBFAC,,, | Performed by: OTOLARYNGOLOGY

## 2017-08-30 PROCEDURE — 31237 NSL/SINS NDSC SURG BX POLYPC: CPT | Mod: 79,RT,S$GLB, | Performed by: OTOLARYNGOLOGY

## 2017-08-30 PROCEDURE — 99024 POSTOP FOLLOW-UP VISIT: CPT | Mod: S$GLB,,, | Performed by: OTOLARYNGOLOGY

## 2017-08-30 RX ORDER — BUDESONIDE 0.5 MG/2ML
0.5 INHALANT ORAL 2 TIMES DAILY
Qty: 180 ML | Refills: 1 | Status: SHIPPED | OUTPATIENT
Start: 2017-08-30 | End: 2017-10-06 | Stop reason: SDUPTHER

## 2017-08-30 NOTE — PROGRESS NOTES
Subjective:      Rosamaria Agosto is a 57 y.o. female who comes for follow-up 1 week status-post endoscopic sinus surgery.  Doing fine, some right frontal headache starting yesterday.  Mild congestion, some mucoid discharge, using saline rinse.    QOL assessment deferred.      Objective:     /75 (BP Location: Right arm)   Pulse 69   Wt 91.5 kg (201 lb 11.5 oz)   BMI 31.59 kg/m²      General:   not in distress   Nasal:  edematous mucosa   no septal hematoma   no bleeding   Oral Cavity:   clear   Oropharynx:   no bleeding   Neck:   nontender       Procedure    Endoscopic debridement performed.  See procedure note.        Data Reviewed    Pathology report indicated chronic inflammation with eosinophilia.         Assessment:     Doing well following bilateral endoscopic sinus surgery.  She also underwent septum/turbinate surgery which is unrelated to the reason for today's visit.    1. Chronic pansinusitis    2. Eosinophilia    3. Chronic rhinitis, unspecified type         Plan:     Rx budesonide sinus rinse BID.  RTW letter given for Sept 6.  Return in about 3 weeks (around 9/20/2017).

## 2017-08-30 NOTE — LETTER
2017             OTOLARYNGOLOGY AND COMMUNICATION SCIENCES    Froilan Valdez MD, FACS          SURGICAL AND MEDICAL DISEASES OF HEAD AND NECK  MD Froilan Rushing MD, FACS  Alonzo Palmer III, MD    OTOLOGY, NEUROTOLOGY and SKULL-BASE SURGERY  Justen George MD, FACS  Mauro Mcknight MD, Director    PEDIATRIC OTOLARYNGOLOGY & OTOLOGY  ISACC Sandoval MD, FAAP  Esau Palacio MD, FACS    FACIAL PLASTIC and RECONSTRUCTIVE SURGERY  LIVIA Edwards III, MD, FACS    RHINOLOGY and SINUS SURGERY  Nikunj Sofia MD, MPH, FACS  Director   LIVIA Edwards III, MD, FACS    LARYNGOLOGY  Yousif Crowder MD    SPEECH-LANGUAGE PATHOLOGY  Farhana Reid, PhD, [facility]-SLP  Bhumika Pelayo, MS, CCC-SLP  Cristal Rubi, MS, CCC-SLP  Mónica Gonzales MA, [facility]-SLP    AUDIOLOGY SECTION  Юлия Ward, MS, CCC-A  VAQSUEZ Lopes, CCC-A  Priscilla Wolf, Nancy, CCC-A  Nancy Sanches, CCC-A  Mejia Tovar Jr., VASQUEZ, CCA-A  VASQUEZ Salazar, CCC-A  Nancy Bai, CCC-A    [facility]  Head and Neck Surgical Oncology  REY Ricketts, FNP-C  Pedatric Otolaryngology  REY Pond, PNP-C       Re:  Rosamaria Agosto  :  1960    To whom it may concern:    Rosamaria Agosto underwent surgery on 2017.  She may return to work without restrictions as of 2017.      Feel free to contact me with any questions.    Sincerely,        Nikunj Sofia MD, MPH, FACS    Director, Rhinology and Sinus Surgery  Department of Otorhinolaryngology   [facility]         [facility] [address] [place] [address]  phone [phone]  fax [phone]  [email or web address]

## 2017-08-30 NOTE — PROCEDURES
Nasal/sinus endoscopy  Date/Time: 8/30/2017 5:24 PM  Performed by: SAMIA BATES  Authorized by: SAMIA BATES     Consent Done?:  Yes (Verbal)  Anesthesia:     Local anesthetic:  Lidocaine 4% and Michael-Synephrine 1/2%    Patient tolerance:  Patient tolerated the procedure well with no immediate complications  Nose:     Procedure Performed:  Removal of Debridement  External:      No external nasal deformity  Intranasal:      Mucosa no polyps     Mucosa ulcers not present     No mucosa lesions present     Turbinates not enlarged     No septum gross deformity  Nasopharynx:      No mucosa lesions     Adenoids not present     Posterior choanae patent     Eustachian tube patent     Debridement of right ethmoid cavity performed with Cheney forceps.  Sinuses otherwise patent.  Propel stent in place.

## 2017-09-03 ENCOUNTER — PATIENT MESSAGE (OUTPATIENT)
Dept: OTOLARYNGOLOGY | Facility: CLINIC | Age: 57
End: 2017-09-03

## 2017-09-03 DIAGNOSIS — J32.4 CHRONIC PANSINUSITIS: Primary | ICD-10-CM

## 2017-09-05 RX ORDER — AMOXICILLIN 500 MG/1
500 TABLET, FILM COATED ORAL EVERY 12 HOURS
Qty: 20 TABLET | Refills: 0 | Status: SHIPPED | OUTPATIENT
Start: 2017-09-05 | End: 2017-09-15

## 2017-09-05 NOTE — TELEPHONE ENCOUNTER
I spoke to her.  Symptoms consistent with acute sinusitis, will call in amoxicillin 10 day course.

## 2017-09-26 ENCOUNTER — OFFICE VISIT (OUTPATIENT)
Dept: OTOLARYNGOLOGY | Facility: CLINIC | Age: 57
End: 2017-09-26
Payer: COMMERCIAL

## 2017-09-26 VITALS
SYSTOLIC BLOOD PRESSURE: 146 MMHG | DIASTOLIC BLOOD PRESSURE: 87 MMHG | WEIGHT: 207 LBS | HEART RATE: 68 BPM | BODY MASS INDEX: 32.42 KG/M2

## 2017-09-26 DIAGNOSIS — J32.4 CHRONIC PANSINUSITIS: ICD-10-CM

## 2017-09-26 DIAGNOSIS — J31.0 CHRONIC RHINITIS, UNSPECIFIED TYPE: ICD-10-CM

## 2017-09-26 DIAGNOSIS — J01.20 ACUTE NON-RECURRENT ETHMOIDAL SINUSITIS: Primary | ICD-10-CM

## 2017-09-26 PROCEDURE — 87186 SC STD MICRODIL/AGAR DIL: CPT

## 2017-09-26 PROCEDURE — 87076 CULTURE ANAEROBE IDENT EACH: CPT

## 2017-09-26 PROCEDURE — 87077 CULTURE AEROBIC IDENTIFY: CPT

## 2017-09-26 PROCEDURE — 3077F SYST BP >= 140 MM HG: CPT | Mod: S$GLB,,, | Performed by: OTOLARYNGOLOGY

## 2017-09-26 PROCEDURE — 31231 NASAL ENDOSCOPY DX: CPT | Mod: 79,S$GLB,, | Performed by: OTOLARYNGOLOGY

## 2017-09-26 PROCEDURE — 99999 PR PBB SHADOW E&M-EST. PATIENT-LVL III: CPT | Mod: PBBFAC,,, | Performed by: OTOLARYNGOLOGY

## 2017-09-26 PROCEDURE — 99214 OFFICE O/P EST MOD 30 MIN: CPT | Mod: 25,24,S$GLB, | Performed by: OTOLARYNGOLOGY

## 2017-09-26 PROCEDURE — 87075 CULTR BACTERIA EXCEPT BLOOD: CPT

## 2017-09-26 PROCEDURE — 3008F BODY MASS INDEX DOCD: CPT | Mod: S$GLB,,, | Performed by: OTOLARYNGOLOGY

## 2017-09-26 PROCEDURE — 3079F DIAST BP 80-89 MM HG: CPT | Mod: S$GLB,,, | Performed by: OTOLARYNGOLOGY

## 2017-09-26 PROCEDURE — 87070 CULTURE OTHR SPECIMN AEROBIC: CPT

## 2017-09-26 NOTE — PROGRESS NOTES
Subjective:      Rosamaria Agosto is a 57 y.o. female who comes for follow-up 5 weeks status-post endoscopic sinus surgery.  Doing better, breathing well though still snoring.  Some pain around midface, mild but daily.  Blowing out thick mucus occasionally.  Using budesonide rinse.    QOL assessment deferred.      Objective:     BP (!) 146/87   Pulse 68   Wt 93.9 kg (207 lb 0.2 oz)   BMI 32.42 kg/m²      General:   not in distress   Nasal:  edematous mucosa   no septal hematoma   no bleeding   Oral Cavity:   clear   Oropharynx:   no bleeding   Neck:   nontender       Procedure    Nasal endoscopy performed.  See procedure note.     Left nasal valve     Left maxillary sinus clear     Right nasal valve     Right ethmoid with exudate, swabbed for culture     Right posterior mucopurulent drainage        Data Reviewed    Pathology report indicated chronic inflammation with eosinophilia.         Assessment:     Acute sinusitis following bilateral endoscopic sinus surgery.  She also underwent septum/turbinate surgery which is unrelated to the reason for today's visit.    1. Chronic pansinusitis    2. Chronic rhinitis, unspecified type         Plan:     Cultures taken.  Will call in antibiotics based on result.  Continue budesonide rinse daily.  Return in about 2 months (around 11/26/2017).

## 2017-09-26 NOTE — PROCEDURES
Nasal/sinus endoscopy  Date/Time: 9/26/2017 4:40 PM  Performed by: SAMIA BATES  Authorized by: SAMIA BATES     Consent Done?:  Yes (Verbal)  Anesthesia:     Local anesthetic:  Lidocaine 4% and Michael-Synephrine 1/2%    Patient tolerance:  Patient tolerated the procedure well with no immediate complications  Nose:     Procedure Performed:  Nasal Endoscopy  External:      No external nasal deformity  Intranasal:      Mucosa no polyps     Mucosa ulcers not present     No mucosa lesions present     Turbinates not enlarged     No septum gross deformity  Nasopharynx:      No mucosa lesions     Adenoids not present     Posterior choanae patent     Eustachian tube patent     All post-surgical sinuses patent bilaterally.  Trail of mucopurulence from right anterior ethmoid trailing into nasopharynx, swabbed for culture.

## 2017-09-29 LAB — BACTERIA SPEC AEROBE CULT: NORMAL

## 2017-10-01 ENCOUNTER — PATIENT MESSAGE (OUTPATIENT)
Dept: OTOLARYNGOLOGY | Facility: CLINIC | Age: 57
End: 2017-10-01

## 2017-10-01 DIAGNOSIS — J01.20 ACUTE NON-RECURRENT ETHMOIDAL SINUSITIS: Primary | ICD-10-CM

## 2017-10-02 ENCOUNTER — TELEPHONE (OUTPATIENT)
Dept: OTOLARYNGOLOGY | Facility: CLINIC | Age: 57
End: 2017-10-02

## 2017-10-02 RX ORDER — AMOXICILLIN AND CLAVULANATE POTASSIUM 875; 125 MG/1; MG/1
1 TABLET, FILM COATED ORAL 2 TIMES DAILY
Qty: 28 TABLET | Refills: 0 | Status: SHIPPED | OUTPATIENT
Start: 2017-10-02 | End: 2017-10-16

## 2017-10-02 NOTE — TELEPHONE ENCOUNTER
LPN spoke with patient and advised her that Dr. Sofia will call in antibiotic.Patient verbalized understanding.  Patient asked if she should continue saline rinses and if she should continue using budesonide.  LPN advised her that she will ask Dr. Sofia and call her back. Patient verbalized understanding.

## 2017-10-03 ENCOUNTER — TELEPHONE (OUTPATIENT)
Dept: BARIATRICS | Facility: CLINIC | Age: 57
End: 2017-10-03

## 2017-10-03 ENCOUNTER — TELEPHONE (OUTPATIENT)
Dept: OTOLARYNGOLOGY | Facility: CLINIC | Age: 57
End: 2017-10-03

## 2017-10-03 NOTE — TELEPHONE ENCOUNTER
LPN spoke with patient and asked her if she picked up her prescription, she confirmed that she did. LPN also advised her as per Dr. Sofia to continue using budesonide and saline. Patient verbalized understanding and was appreciative.  Patient stated that she needs budesonide refilled.  LPN advised patient that refill request would be put in.  Patient verbalized understanding.

## 2017-10-04 ENCOUNTER — INITIAL CONSULT (OUTPATIENT)
Dept: BARIATRICS | Facility: CLINIC | Age: 57
End: 2017-10-04
Payer: COMMERCIAL

## 2017-10-04 VITALS
BODY MASS INDEX: 32.25 KG/M2 | SYSTOLIC BLOOD PRESSURE: 124 MMHG | HEIGHT: 67 IN | DIASTOLIC BLOOD PRESSURE: 81 MMHG | HEART RATE: 69 BPM | WEIGHT: 205.5 LBS

## 2017-10-04 DIAGNOSIS — E66.9 OBESITY (BMI 30.0-34.9): Primary | ICD-10-CM

## 2017-10-04 DIAGNOSIS — I10 ESSENTIAL HYPERTENSION: ICD-10-CM

## 2017-10-04 DIAGNOSIS — E78.00 PURE HYPERCHOLESTEROLEMIA: ICD-10-CM

## 2017-10-04 LAB — BACTERIA SPEC ANAEROBE CULT: NORMAL

## 2017-10-04 PROCEDURE — 99244 OFF/OP CNSLTJ NEW/EST MOD 40: CPT | Mod: S$GLB,,, | Performed by: INTERNAL MEDICINE

## 2017-10-04 PROCEDURE — 99999 PR PBB SHADOW E&M-EST. PATIENT-LVL III: CPT | Mod: PBBFAC,,, | Performed by: INTERNAL MEDICINE

## 2017-10-04 RX ORDER — MUPIROCIN 20 MG/G
OINTMENT TOPICAL
Refills: 0 | COMMUNITY
Start: 2017-08-02 | End: 2017-12-13

## 2017-10-04 RX ORDER — PHENTERMINE HYDROCHLORIDE 37.5 MG/1
37.5 TABLET ORAL
Qty: 30 TABLET | Refills: 2 | Status: SHIPPED | OUTPATIENT
Start: 2017-10-04 | End: 2017-11-03

## 2017-10-04 RX ORDER — NAPROXEN 500 MG/1
TABLET ORAL
Refills: 1 | COMMUNITY
Start: 2017-08-07 | End: 2017-12-13

## 2017-10-04 RX ORDER — CEPHALEXIN 500 MG/1
CAPSULE ORAL
Refills: 0 | COMMUNITY
Start: 2017-08-07 | End: 2017-10-18

## 2017-10-04 RX ORDER — SULFAMETHOXAZOLE AND TRIMETHOPRIM 800; 160 MG/1; MG/1
TABLET ORAL
Refills: 0 | COMMUNITY
Start: 2017-08-02 | End: 2017-12-13 | Stop reason: ALTCHOICE

## 2017-10-04 RX ORDER — ERGOCALCIFEROL 1.25 MG/1
CAPSULE ORAL
Refills: 0 | COMMUNITY
Start: 2017-10-02 | End: 2018-05-28 | Stop reason: SDUPTHER

## 2017-10-04 RX ORDER — AMOXICILLIN 500 MG/1
CAPSULE ORAL
Refills: 0 | COMMUNITY
Start: 2017-09-05 | End: 2017-10-18

## 2017-10-04 NOTE — LETTER
[facility]  [address]  [facility] [address]  Phone: [phone]  Fax: [phone] October 5, 2017      Bill Burns MD  [address] [facility] [address]    Patient: Rosamaria Agosto   MR Number: [id number]   YOB: 1960   Date of Visit: 10/4/2017     Dear Dr. Burns:    Thank you for referring Rosamaria Agosto to me for evaluation. Below are the relevant portions of my assessment and plan of care.    ASSESSMENT:  1. Obesity (BMI 30.0-34.9)    2. Essential hypertension    3. Pure hypercholesterolemia      PLAN: 1. Obesity (BMI 30.0-34.9) - Patient warned of common side effects of Phentermine including anxiety, insomnia, palpitations and increased blood pressure. It was also explained that it is for short-term usage along with diet and exercise, and that stopping the medication without making lifestyle changes will result in regain of weight. Patient states understanding.     Weight loss medications are controlled substances.  They require routine follow up. Prescription or pills that are lost or destroyed will not be replaced.     Start Phentermine with 1/2 pill a day to see if that will control your appetite.  Go up to a full pill when needed.     Get back to walking.     Patient counseled in strategies for long term weight loss and maintenance: Keeping a food diary, exercise for 1 hour a day and eating breakfast everyday.    3 meals a day made up of the following:  Unlimited green vegetables, tomatoes, mushrooms, spaghetti squash, cauliflower, meat, poultry, seafood, eggs and hard cheeses.   Milk and plain yogurt  Dressings, seasonings, condiments, etc should have less than 2 g sugars.   beans or nuts can have 1 x a day.   1-2 servings of citrus fruits, berries, pineapple or melon a day (1/2 cup)  Avoid fried foods    No grains, rice, pasta, potatoes, bread, corn, peas, oatmeal, grits, tortillas, crackers, chips    No soda, sweet tea, juices or  lemonade    [email or web address] for recipes.    The patient was given individualized diet, exercise, and follow-up instructions.      2. Essential hypertension - The current medical regimen is effective;  continue present plan and medications. Expect improvement with weight loss.     3. Pure hypercholesterolemia - Expect improvement with weight loss. Recheck after 10% TBW lost.     If you have questions, please do not hesitate to call me. I look forward to following Rosamaria along with you.    Sincerely,      Li Kamara MD   Medical Weight Loss   [facility]     AF/brigitte

## 2017-10-04 NOTE — PROGRESS NOTES
"Subjective:       Patient ID: Rosamaria Agosto is a 57 y.o. female.    Chief Complaint: No chief complaint on file.    CC: "Ms. Agosto can't seem to lose weight"    Pt 18 min late for consult today.     Current attempts at weight loss: New pt to me referred by Bill Burns MD  [address] [place] [address] with Patient Active Problem List:     Pain of lower extremity     Varicose veins of bilateral lower extremities with pain     Hyperlipidemia     Essential hypertension     Chronic rhinitis     Obesity     Osteoarthritis     Vitamin D deficiency     Chronic sinusitis     Deviated nasal septum     Nasal turbinate hypertrophy     Lab Results       Component                Value               Date                       CHOL                     285 (H)             05/17/2017                 CHOL                     229 (H)             10/19/2011                 CHOL                     217 (H)             04/14/2011            Lab Results       Component                Value               Date                       HDL                      58                  05/17/2017                 HDL                      55                  10/19/2011                 HDL                      56                  04/14/2011            Lab Results       Component                Value               Date                       LDLCALC                  207.8 (H)           05/17/2017                 LDLCALC                  160.8 (H)           10/19/2011                 LDLCALC                  147.0 (H)           04/14/2011            Lab Results       Component                Value               Date                       TRIG                     96                  05/17/2017                 TRIG                     66                  10/19/2011                 TRIG                     70                  04/14/2011            Lab Results       Component                Value               Date                     "   CHOLHDL                  20.4                05/17/2017                 CHOLHDL                  24.0                10/19/2011                 CHOLHDL                  25.8                04/14/2011              No exercise in over a month. Has been cleared for normal activity since surgery. Poor eating habits.       Previous diet attempts: WW > 15 years ago. Lost 5-10 lbs, but did not stick with it.     History of medication for loss: Redux years ago. States did help.    Heaviest weight: 215#    Lightest weight: 119#    Goal weight:  135#        Typical eating patterns:   at [employer]. Lives with grand son and . Pt does cooking.   Breakfast: 2 boiled eggs with 2 sausage or wilder. Grits with cheese. Weekends- grits, sausage, egg or skips.     Lunch: Eats at work. Fried chicken( 2 quarters) with red beans and rice. Fried fish or shrimp. If off: Fried chicken with fires or rice, or shrimp poboy    Dinner: Pork chops with rice, gravy and vegetable. Ribs with potatoes and veg.     Snacks: chips, pickles, candy, frozen cups.      Beverages: Coke 1 a day, Juice every morining,  lemonade with added sugar, iced tea- sweet, green tea with AS. 1-2 drinks if out at a party.     Willingness to change: 10/10    EKG:Normal sinus rhythm with sinus arrhythmia  Normal ECG  When compared with ECG of 04-MAY-2011 19:25,  No significant change was found      BMR: 1556        Review of Systems   Constitutional: Negative for chills and fever.   Respiratory: Positive for shortness of breath.         + snores badly. Waiting to see if sinus surgery helps.    Cardiovascular: Positive for leg swelling. Negative for chest pain.   Gastrointestinal: Negative for constipation and diarrhea.        Some GERD   Genitourinary: Negative for difficulty urinating and dyspareunia.   Musculoskeletal: Positive for arthralgias and back pain.        Shoulders   Neurological: Negative for dizziness and light-headedness.  "  Psychiatric/Behavioral: Negative for dysphoric mood. The patient is not nervous/anxious.        Objective:     /81   Pulse 69   Ht 5' 7" (1.702 m)   Wt 93.2 kg (205 lb 7.5 oz)   BMI 32.18 kg/m²     Physical Exam   Constitutional: She is oriented to person, place, and time. She appears well-developed and well-nourished. No distress.   HENT:   Head: Normocephalic and atraumatic.   Eyes: EOM are normal. Pupils are equal, round, and reactive to light. No scleral icterus.   Neck: Normal range of motion. Neck supple. No thyromegaly present.   Cardiovascular: Normal rate and normal heart sounds.  Exam reveals no gallop and no friction rub.    No murmur heard.  Pulmonary/Chest: Effort normal and breath sounds normal. No respiratory distress. She has no wheezes.   Abdominal: Soft. Bowel sounds are normal. She exhibits no distension. There is no tenderness.   Musculoskeletal: Normal range of motion. She exhibits no edema.   Neurological: She is alert and oriented to person, place, and time. No cranial nerve deficit.   Skin: Skin is warm and dry. No erythema.   Psychiatric: She has a normal mood and affect. Her behavior is normal. Judgment normal.   Vitals reviewed.      Assessment:       1. Obesity (BMI 30.0-34.9)    2. Essential hypertension    3. Pure hypercholesterolemia        Plan:       1. Obesity (BMI 30.0-34.9)  Patient warned of common side effects of phentermine including anxiety, insomnia, palpitations and increased blood pressure. It was also explained that it is for short-term usage along with diet and exercise, and that stopping the medication without making lifestyle changes will result in regain of weight. Patient states understanding.     Weight loss medications are controlled substances.  They require routine follow up. Prescription or pills that are lost or destroyed will not be replaced.     Start phentermine with 1/2 pill a day to see if that will control your appetite.  Go up to a full pill " when needed.     Get back to walking.     Patient counseled in strategies for long term weight loss and maintenance: Keeping a food diary, exercise for 1 hour a day and eating breakfast everyday.      3 meals a day made up of the following:  Unlimited green vegetables, tomatoes, mushrooms, spaghetti squash, cauliflower, meat, poultry, seafood, eggs and hard cheeses.   Milk and plain yogurt  Dressings, seasonings, condiments, etc should have less than 2 g sugars.   beans or nuts can have 1 x a day.   1-2 servings of citrus fruits, berries, pineapple or melon a day (1/2 cup)  Avoid fried foods    No grains, rice, pasta, potatoes, bread, corn, peas, oatmeal, grits, tortillas, crackers, chips    No soda, sweet tea, juices or lemonade    [email or web address] for recipes.      Nutrition materials provided today.  Meal ideas given today.     2. Essential hypertension  The current medical regimen is effective;  continue present plan and medications. Expect improvement with weight loss.     3. Pure hypercholesterolemia  Expect improvement with weight loss. Recheck after 10% TBW lost.

## 2017-10-04 NOTE — PATIENT INSTRUCTIONS
Patient warned of common side effects of phentermine including anxiety, insomnia, palpitations and increased blood pressure. It was also explained that it is for short-term usage along with diet and exercise, and that stopping the medication without making lifestyle changes will result in regain of weight. Patient states understanding.     Weight loss medications are controlled substances.  They require routine follow up. Prescription or pills that are lost or destroyed will not be replaced.     Start phentermine with 1/2 pill a day to see if that will control your appetite.  Go up to a full pill when needed.     Get back to walking.     Patient counseled in strategies for long term weight loss and maintenance: Keeping a food diary, exercise for 1 hour a day and eating breakfast everyday.      3 meals a day made up of the following:  Unlimited green vegetables, tomatoes, mushrooms, spaghetti squash, cauliflower, meat, poultry, seafood, eggs and hard cheeses.   Milk and plain yogurt  Dressings, seasonings, condiments, etc should have less than 2 g sugars.   beans or nuts can have 1 x a day.   1-2 servings of citrus fruits, berries, pineapple or melon a day (1/2 cup)  Avoid fried foods    No grains, rice, pasta, potatoes, bread, corn, peas, oatmeal, grits, tortillas, crackers, chips    No soda, sweet tea, juices or lemonade    [email or web address] for recipes.    Fruits and Vegetables       Include 1-2 servings of fruit daily.      1 serving of fruit includes ½ cup unsweetened applesauce, ½ medium banana, tennis ball size piece of fruit, 17 grapes, 1 cup melon, 1 cup strawberries, ¼ cup dried fruit     Include 2-3 servings of vegetables daily. 1 serving is 1 cup raw or ½ cup cooked.     Non-starchy vegetables include artichoke, asparagus, baby corn, bamboo shoots, beans: green/Italian/wax, bean sprouts, beets, broccoli, Bennet sprouts, cabbage, carrots, cauliflower, celery, cucumber, eggplant, green onions or  scallions, greens, jicama, leeks, mushrooms, okra, onions, pea pods, peppers, radishes, spinach, summer squash, tomatoes and salsa, turnips, vegetable juice cocktail, water chestnuts, zucchini        Meal Ideas for Regular Bariatric Diet  *Recipes and products available at [email or web address]      Breakfast: (15-20g protein)    - Egg white omelet: 2 egg whites or ½ cup Egg Beaters. (Optional proteins: cheese, shrimp, black beans, chicken, sliced turkey) (Optional veggies: tomatoes, salsa, spinach, mushrooms, onions, green peppers, or small slice avocado)     - Egg and sausage: 1 egg or ¼ cup Egg Beaters (any variety), with 1 kasandra or 2 links of Turkey sausage or Veggie breakfast sausage ([employer] or 5app)    - Crust-less breakfast quiche: To make a glass pie dish, mix 4oz part skim Ricotta, 1 cup skim milk, and 2 eggs as your base. Add protein: shredded cheese, sliced lean ham or turkey, turkey wilder/sausage. Add veggies: tomato, onion, green onion, mushroom, green pepper, spinach, etc.    - Yogurt parfait: Mix 1 - 6oz container Dannon Light N Fit vanilla yogurt, with ¼ cup Kashi Go Lean cereal    - Cottage cheese and fruit: ½ cup part-skim cottage cheese or ricotta cheese topped with fresh fruit or sugar free preserves     - Na Mcclellan's Vanilla Egg custard* (add 2 Tbsp instant coffee granules to make Cappuccino Custard*)    - Hi-Protein café latte (skim milk, decaf coffee, 1 scoop protein powder). Optional to add Sugar free syrup or extract flavoring.    Lunch: (20-30g protein)    - ½ cup Black bean soup (Homemade or Progresso), with ¼ cup shredded low-fat cheese. Top with chopped tomato or fresh salsa.     - Lean deli turkey breast and low-fat sliced cheese, mustard or light donaldsno to moisten, rolled up together, or wrapped in a Yunior lettuce leaf    - Chicken salad made from dinner leftovers, moisten with low-fat salad dressing or light donaldson. Also try leftover salmon, shrimp, tuna or boiled eggs.  Serve ½ cup over dark green salad    - Fat-free canned refried beans, topped with ¼ cup shredded low-fat cheese. Top with chopped tomato or fresh salsa.     - Greek salad: Top mixed greens with 1-2oz grilled chicken, tomatoes, red onions, 2-3 kalamata olives, and sprinkle lightly with feta cheese. Spritz with Balsamic vinegar to taste.     - Crust-less lunch quiche: To make a glass pie dish, mix 4oz part skim Ricotta, 1 cup skim milk, and 2 eggs as your base. Add protein: shredded cheese, sliced lean ham or turkey, shrimp, chicken. Add veggies: tomato, onion, green onion, mushroom, green pepper, spinach, artichoke, broccoli, etc.    - Pizza bake: tomato sauce, low-fat shredded mozzarella and turkey pepperoni or [place] wilder. Add any veggies.    - Cucumber crab bites: Spread ¼ cup crab dip (lump crabmeat + light cream cheese and green onions) over sliced cucumber.     - Chicken with light spinach and artichoke dip*: Puree in : 6oz cooked and drained spinach, 2 cloves garlic, 1 can cannelloni beans, ½ cup chopped green onions, 1 can drained artichoke hearts (not marinated in oil), lemon juice and basil. Mix in 2oz chopped up chicken.    Supper: (20-30g protein)    - Serve grilled fish over dark green salad tossed with low-fat dressing, served with grilled asparagus diaz     - Rotisserie chicken salad: served with sliced strawberries, walnuts, fat-free feta cheese crumbles and 1 tbsp Nelsons Own Light Raspberry Marathon Vinaigrette    - Shrimp cocktail: Dip cold boiled shrimp in homemade low-sugar cocktail sauce (1/2 cup Jean One Carb ketchup, 2 tbsp horseradish, 1/4 tsp hot sauce, 1 tsp Worcestershire sauce, 1 tbsp freshly-squeezed lemon juice). Serve with dark green salad, walnuts, and crumbled blue cheese drizzled with olive oil and Balsamic vinegar    - Tuna Melt: Spread tuna salad onto 2 thick slices of tomato. Top with low-fat cheese and broil until cheese is melted. May also be made with  chicken salad of shrimp salad. [place] with different types of cheeses.    - Homemade low-fat Chili using extra lean ground beef or ground turkey. Top with shredded cheese and salsa as desired. May add dollop fat-free sour cream if desired    - Dinner Omelet with shrimp or chicken and onion, green peppers and chives.    - No noodle lasagna: Use sliced zucchini or eggplant in place of noodles.  Layer with part skim ricotta cheese and low sugar meat sauce (use very lean ground beef or ground turkey).    - Mexican chicken bake: Bake chunks of chicken breast or thigh with taco seasoning, Pace brand enchilada sauce, green onions and low-fat cheese. Serve with ¼ cup black beans or fat free refried beans topped with chopped tomatoes or salsa.    - Vilma frozen meatballs, simmered in Classico Marinara sauce. Different flavors of salsa or spaghetti sauce create different dishes! Sprinkle with parmesan cheese. Serve with grilled or steamed veggies, or a dark green salad.    - Simmer boneless skinless chicken thigh chunks in Classico Marinara sauce or roasted salsa until tender with chopped onion, bell pepper, garlic, mushrooms, spinach, etc.     - Hamburger, without the bun, dressed the way you like. Served with grilled or steamed veggies.    - Eggplant parmesan: Bake slices of eggplant at 350 degrees for 15 minutes. Layer tomato sauce, sliced eggplant and low-fat mozzarella cheese in a baking dish and cover with foil. Bake 30-40 more minutes or until bubbly. Uncover and bake at 400 degrees for about 15 more minutes, or until top is slightly crisp.    - Fish tacos: grilled/baked white fish, wrapped in Yunior lettuce leaf, topped with salsa, shredded low-fat cheese, and light coleslaw.    Snacks: (100-200 calories; >5g protein)    - 1 low-fat cheese stick with 8 cherry tomatoes or 1 serving fresh fruit  - 4 thin slices fat-free turkey breast and 1 slice low-fat cheese  - 4 thin slices fat-free honey ham with wedge of  melon  - 1/4 cup unsalted nuts with ½ cup fruit  - 6-oz container Dannon Light n Fit vanilla yogurt, topped with 1oz unsalted nuts         - apple, celery or baby carrots spread with 2 Tbsp natural peanut butter or almond butter   - apple slices with 1 oz slice low-fat cheese  - celery, cucumber, bell pepper or baby carrots dipped in ¼ cup hummus bean spread or light spinach and artichoke dip (*recipe in lunch section)  - 100 calorie bag microwave light popcorn with 3 tbsp grated parmesan cheese  - Jameson Links Beef Steak - 14g protein! (similar to beef jerky)  - 2 wedges Laughing Cow - Light Herb & Garlic Cheese with sliced cucumber or green bell pepper  - 1/2 cup low-fat cottage cheese with ¼ cup fruit or ¼ cup salsa  - RTD Protein drinks: Atkins, Low Carb Slim Fast, EAS light, Muscle Milk Light, etc.  - Homemade Protein drinks: GN Soy95, Isopure, Nectar, UNJURY, Whey Gourmet, etc. Mix 1 scoop powder with 8oz skim/1% milk or light soymilk.  - Protein bars: Atkins, EAS, Pure Protein, Think Thin, Detour, etc. Must have 0-4 grams sugar - Read the label.    Takeout Options: No more than twice/week  Deli - Salads (no pasta or rice), meats, cheeses. Roasted chicken. Lox (salmon)    Mexican - Platters which don't include tortillas, chips, or rice. Go easy on the beans. Example: Fajitas without the tortillas. Ask the  not to bring chips to the table if they are too tempting.    Greek - Meat or fish and vegetable, but no bread or rice. Including hummus, baba ganoush, etc, is OK. Most sit-down Greek restaurants can provide you with cucumber slices for dipping instead of анна bread.    Fast Food (Avoid as much as possible) - Salads (no croutons and limit salad dressing to 2 tbsp), grilled chicken sandwich without the bun and ask for no donaldson. Nithyas low fat chili or Taco Bell pintos and cheese.    BBQ - The meats are fine if you ask for sauces on the side, but most of the traditional side dishes are loaded with carbs.  Qamar slaw, baked beans and BBQ sauce are typically made with sugar.    Chinese - Nothing deep-fried, no rice or noodles. Many Chinese sauces have starch and sugar in them, so you'll have to use your judgement. If you find that these sauces trigger cravings, or cause Dumping, you can ask for the sauce to be made without sugar or just use soy sauce.

## 2017-10-04 NOTE — LETTER
October 5, 2017      Bill Burns MD  [address] [facility] [place] [address]           [facility]  [address]  [facility] [address]  Phone: [phone]  Fax: [phone]          Patient: Rosamaria Agosto   MR Number: [id number]   YOB: 1960   Date of Visit: 10/4/2017       Dear Dr. Bill Burns:    Thank you for referring Rosamaria Agosto to me for evaluation. Attached you will find relevant portions of my assessment and plan of care.    If you have questions, please do not hesitate to call me. I look forward to following Rosamaria Agosto along with you.    Sincerely,    Li Kamara MD    Enclosure  CC:  No Recipients    If you would like to receive this communication electronically, please contact [email or web address] or [phone] to request more information on [employer] Link access.    For providers and/or their staff who would like to refer a patient to [facility], please contact us through our one-stop-shop provider referral line, [facility], at [phone].    If you feel you have received this communication in error or would no longer like to receive these types of communications, please e-mail [email or web address]

## 2017-10-06 DIAGNOSIS — J32.4 CHRONIC PANSINUSITIS: Primary | ICD-10-CM

## 2017-10-06 RX ORDER — BUDESONIDE 0.5 MG/2ML
0.5 INHALANT ORAL 2 TIMES DAILY
Qty: 180 ML | Refills: 1 | Status: SHIPPED | OUTPATIENT
Start: 2017-10-06 | End: 2017-11-20

## 2017-10-18 DIAGNOSIS — J32.4 CHRONIC PANSINUSITIS: Primary | ICD-10-CM

## 2017-10-18 RX ORDER — AMOXICILLIN AND CLAVULANATE POTASSIUM 875; 125 MG/1; MG/1
1 TABLET, FILM COATED ORAL 2 TIMES DAILY
Qty: 28 TABLET | Refills: 0 | Status: SHIPPED | OUTPATIENT
Start: 2017-10-18 | End: 2017-11-01

## 2017-10-19 ENCOUNTER — TELEPHONE (OUTPATIENT)
Dept: OTOLARYNGOLOGY | Facility: CLINIC | Age: 57
End: 2017-10-19

## 2017-10-19 NOTE — TELEPHONE ENCOUNTER
LPN spoke with patient and advised her that her medication has been sent to pharmacy. Patient verbalized understanding.

## 2017-10-19 NOTE — TELEPHONE ENCOUNTER
LPN left message for patient to return my call. (regarding want to let her know that her prescription has been sent)

## 2017-11-22 ENCOUNTER — OFFICE VISIT (OUTPATIENT)
Dept: OTOLARYNGOLOGY | Facility: CLINIC | Age: 57
End: 2017-11-22
Payer: COMMERCIAL

## 2017-11-22 VITALS
DIASTOLIC BLOOD PRESSURE: 86 MMHG | BODY MASS INDEX: 31.77 KG/M2 | SYSTOLIC BLOOD PRESSURE: 135 MMHG | HEART RATE: 68 BPM | WEIGHT: 202.81 LBS

## 2017-11-22 DIAGNOSIS — J31.0 CHRONIC RHINITIS, UNSPECIFIED TYPE: Primary | ICD-10-CM

## 2017-11-22 DIAGNOSIS — J32.4 CHRONIC PANSINUSITIS: ICD-10-CM

## 2017-11-22 PROCEDURE — 99999 PR PBB SHADOW E&M-EST. PATIENT-LVL III: CPT | Mod: PBBFAC,,, | Performed by: OTOLARYNGOLOGY

## 2017-11-22 PROCEDURE — 99213 OFFICE O/P EST LOW 20 MIN: CPT | Mod: 25,S$GLB,, | Performed by: OTOLARYNGOLOGY

## 2017-11-22 PROCEDURE — 31231 NASAL ENDOSCOPY DX: CPT | Mod: S$GLB,,, | Performed by: OTOLARYNGOLOGY

## 2017-11-22 RX ORDER — AZELASTINE 1 MG/ML
1 SPRAY, METERED NASAL 2 TIMES DAILY
Qty: 30 ML | Refills: 2 | Status: SHIPPED | OUTPATIENT
Start: 2017-11-22 | End: 2024-03-20

## 2017-11-22 NOTE — PROGRESS NOTES
Subjective:      Rosamaria is a 57 y.o. female who comes for follow-up of sinusitis.  Her last visit with me was on 9/26/2017.  Now just over 3 months status-post endoscopic sinus surgery.   Feels much better.  No nasal discharge, minimal mucus, no pressure, breathing well.  Using saline rinse daily.    SNOT-22 score = 21, NOSE score = 10%    The patient's medications, allergies, past medical, surgical, social and family histories were reviewed and updated as appropriate.    A detailed review of systems was obtained with pertinent positives as per the above HPI, and otherwise negative.        Objective:     /86   Pulse 68   Wt 92 kg (202 lb 13.2 oz)   BMI 31.77 kg/m²        Constitutional:   She appears well-developed. She is cooperative.     Head:  Normocephalic.     Nose:  No mucosal edema, rhinorrhea, septal deviation or polyps. No epistaxis. Turbinates normal, no turbinate masses and no turbinate hypertrophy.  Right sinus exhibits no maxillary sinus tenderness and no frontal sinus tenderness. Left sinus exhibits no maxillary sinus tenderness and no frontal sinus tenderness.     Mouth/Throat  Oropharynx clear and moist without lesions or asymmetry. No oropharyngeal exudate or posterior oropharyngeal erythema.     Neck:  No adenopathy. Normal range of motion present.     She has no cervical adenopathy.       Procedure    Nasal endoscopy performed.  See procedure note.     Left nasal valve     Left ethmoid repair site from prior surgery     Right nasal valve     Right MT     Right maxillary and ethmoid     Right frontal recess     Right choana with edema and mucus        Data Reviewed    WBC (K/uL)   Date Value   05/17/2017 8.29     Eosinophil% (%)   Date Value   05/17/2017 2.8     Eos # (K/uL)   Date Value   05/17/2017 0.2     Platelets (K/uL)   Date Value   05/17/2017 347     Glucose (mg/dL)   Date Value   08/14/2017 84     No results found for: IGE    Pathology report indicated chronic inflammation with  eosinophilia.    Cultures showed E coli.      Assessment:     1. Chronic rhinitis, unspecified type    2. Chronic pansinusitis         Plan:     Rx azelastine spray 1-2 times daily.  Saline prn.  Return if symptoms worsen or fail to improve.

## 2017-11-22 NOTE — PROCEDURES
Nasal/sinus endoscopy  Date/Time: 11/22/2017 4:41 PM  Performed by: SAMIA BATES  Authorized by: SAMIA BATES     Consent Done?:  Yes (Verbal)  Anesthesia:     Local anesthetic:  Lidocaine 4% and Michael-Synephrine 1/2%    Patient tolerance:  Patient tolerated the procedure well with no immediate complications  Nose:     Procedure Performed:  Nasal Endoscopy  External:      No external nasal deformity  Intranasal:      Mucosa no polyps     Mucosa ulcers not present     No mucosa lesions present     Turbinates not enlarged     No septum gross deformity  Nasopharynx:      No mucosa lesions     Adenoids not present     Posterior choanae patent     Eustachian tube patent     Sinuses all patent.  No purulence.  Copious posterior nasal mucus.

## 2017-12-13 ENCOUNTER — OFFICE VISIT (OUTPATIENT)
Dept: OBSTETRICS AND GYNECOLOGY | Facility: CLINIC | Age: 57
End: 2017-12-13
Attending: OBSTETRICS & GYNECOLOGY
Payer: COMMERCIAL

## 2017-12-13 ENCOUNTER — OFFICE VISIT (OUTPATIENT)
Dept: URGENT CARE | Facility: CLINIC | Age: 57
End: 2017-12-13
Payer: COMMERCIAL

## 2017-12-13 VITALS
TEMPERATURE: 98 F | OXYGEN SATURATION: 97 % | HEIGHT: 67 IN | DIASTOLIC BLOOD PRESSURE: 96 MMHG | BODY MASS INDEX: 31.39 KG/M2 | SYSTOLIC BLOOD PRESSURE: 150 MMHG | WEIGHT: 200 LBS | HEART RATE: 73 BPM | RESPIRATION RATE: 18 BRPM

## 2017-12-13 VITALS
HEIGHT: 67 IN | BODY MASS INDEX: 31.45 KG/M2 | SYSTOLIC BLOOD PRESSURE: 128 MMHG | WEIGHT: 200.38 LBS | DIASTOLIC BLOOD PRESSURE: 88 MMHG

## 2017-12-13 DIAGNOSIS — R10.2 PELVIC PRESSURE IN FEMALE: Primary | ICD-10-CM

## 2017-12-13 DIAGNOSIS — N81.89 PELVIC RELAXATION: ICD-10-CM

## 2017-12-13 DIAGNOSIS — Z78.0 POSTMENOPAUSAL STATUS: ICD-10-CM

## 2017-12-13 DIAGNOSIS — Z90.710 HISTORY OF HYSTERECTOMY: ICD-10-CM

## 2017-12-13 DIAGNOSIS — R52 BODY ACHES: Primary | ICD-10-CM

## 2017-12-13 DIAGNOSIS — J01.00 ACUTE NON-RECURRENT MAXILLARY SINUSITIS: ICD-10-CM

## 2017-12-13 LAB
CTP QC/QA: YES
FLUAV AG NPH QL: NEGATIVE
FLUBV AG NPH QL: NEGATIVE

## 2017-12-13 PROCEDURE — 87088 URINE BACTERIA CULTURE: CPT

## 2017-12-13 PROCEDURE — 99999 PR PBB SHADOW E&M-EST. PATIENT-LVL III: CPT | Mod: PBBFAC,,, | Performed by: OBSTETRICS & GYNECOLOGY

## 2017-12-13 PROCEDURE — 96372 THER/PROPH/DIAG INJ SC/IM: CPT | Mod: S$GLB,,, | Performed by: EMERGENCY MEDICINE

## 2017-12-13 PROCEDURE — 99214 OFFICE O/P EST MOD 30 MIN: CPT | Mod: 25,S$GLB,, | Performed by: EMERGENCY MEDICINE

## 2017-12-13 PROCEDURE — 99213 OFFICE O/P EST LOW 20 MIN: CPT | Mod: S$GLB,,, | Performed by: OBSTETRICS & GYNECOLOGY

## 2017-12-13 PROCEDURE — 87086 URINE CULTURE/COLONY COUNT: CPT

## 2017-12-13 PROCEDURE — 87186 SC STD MICRODIL/AGAR DIL: CPT

## 2017-12-13 PROCEDURE — 87804 INFLUENZA ASSAY W/OPTIC: CPT | Mod: 59,QW,S$GLB, | Performed by: EMERGENCY MEDICINE

## 2017-12-13 PROCEDURE — 87077 CULTURE AEROBIC IDENTIFY: CPT

## 2017-12-13 RX ORDER — CODEINE PHOSPHATE AND GUAIFENESIN 10; 100 MG/5ML; MG/5ML
10 SOLUTION ORAL EVERY 8 HOURS PRN
Qty: 150 ML | Refills: 0 | Status: SHIPPED | OUTPATIENT
Start: 2017-12-13 | End: 2021-08-20 | Stop reason: SDUPTHER

## 2017-12-13 RX ORDER — BUDESONIDE 0.5 MG/2ML
INHALANT ORAL
Refills: 1 | COMMUNITY
Start: 2017-12-09 | End: 2019-12-31

## 2017-12-13 RX ORDER — PHENTERMINE HYDROCHLORIDE 37.5 MG/1
37.5 TABLET ORAL
Refills: 2 | COMMUNITY
Start: 2017-12-09 | End: 2018-11-09 | Stop reason: SDUPTHER

## 2017-12-13 RX ORDER — BETAMETHASONE SODIUM PHOSPHATE AND BETAMETHASONE ACETATE 3; 3 MG/ML; MG/ML
9 INJECTION, SUSPENSION INTRA-ARTICULAR; INTRALESIONAL; INTRAMUSCULAR; SOFT TISSUE
Status: COMPLETED | OUTPATIENT
Start: 2017-12-13 | End: 2017-12-13

## 2017-12-13 RX ORDER — CEFDINIR 300 MG/1
300 CAPSULE ORAL EVERY 12 HOURS
Qty: 14 CAPSULE | Refills: 0 | Status: SHIPPED | OUTPATIENT
Start: 2017-12-13 | End: 2021-08-20 | Stop reason: SDUPTHER

## 2017-12-13 RX ADMIN — BETAMETHASONE SODIUM PHOSPHATE AND BETAMETHASONE ACETATE 9 MG: 3; 3 INJECTION, SUSPENSION INTRA-ARTICULAR; INTRALESIONAL; INTRAMUSCULAR; SOFT TISSUE at 05:12

## 2017-12-13 NOTE — PROGRESS NOTES
"Chief Complaint   Patient presents with    Vaginal Pain     pt says that she was exercising when she felt a pull that now is a pressure in her vagina and it feels as if its falling       HPI:  Rosamaria Agosto is a 57 y.o. female patient  who presents today for evaluation of pelvic pressure.  S/P hysterectomy.  About 2 weeks ago, while performing squats for exercise, she began to notice a prominent pelvic pressure / fullness.  After discontinuation of the exercise, this sensation has persisted.  She denies dysuria or urgency but notes some urinary frequency.  No pelvic "pain" or fever.  No bowel complaints.  Reports hot flashes / sweats - previously on ERT patches, but does not want to return to hormones.  No LMP recorded (lmp unknown). Patient is postmenopausal.     Urine dip: Negative    Past Medical History:   Diagnosis Date    Abnormal Pap smear of cervix yrs ago    Arthritis     History of uterine fibroid     Hyperlipidemia     Hypertension     Sinus problem        Past Surgical History:   Procedure Laterality Date    BREAST BIOPSY  24-25 years old    ENCEPHALOCELE REPAIR  46    HYSTERECTOMY  93'-95'    ovaries remain    TUBAL LIGATION           ROS:  GENERAL: Reports some fatigue.   SKIN: Denies rash or lesions.   HEAD: Denies head injury or headache.   NODES: Denies enlarged lymph nodes.   CHEST: Denies chest pain or shortness of breath.   CARDIOVASCULAR: Denies palpitations or left sided chest pain.   ABDOMEN: No abdominal pain, nausea, vomiting or rectal bleeding.   URINARY: Reports some frequency, but no dysuria or hematuria.  REPRODUCTIVE: See HPI.   BREASTS: Denies pain, lumps, or nipple discharge.   HEMATOLOGIC: No easy bruisability or excessive bleeding.   MUSCULOSKELETAL: Reports low back and knee discomfort.   NEUROLOGIC: Denies syncope or weakness.   PSYCHIATRIC: Denies depression.    PE:   (chaperone present during entire exam)  APPEARANCE: Well nourished, well developed, in no " acute distress.  ABDOMEN: Soft. No tenderness or masses. No CVA tenderness.  VULVA: Atrophic. No lesions. Normal female genitalia.  URETHRAL MEATUS: Normal size and location, no lesions, no prolapse.  URETHRA: No masses, tenderness, prolapse or scarring.  VAGINA: Atrophic, no discharge, mild / moderate cystocele.  CERVIX / UTERUS: Surgically absent.  ADNEXA: No masses, tenderness or CDS nodularity.  ANUS PERINEUM: Normal.    Diagnosis:  1. Pelvic pressure in female    2. Pelvic relaxation    3. History of hysterectomy    4. Postmenopausal status          PLAN:    Orders Placed This Encounter    Urine culture       Patient was counseled today on pelvic pressure and the various etiologies.  Urine dip today was negative- we will send urine for a culture to rule out UTI.  We also discussed her symptoms possibly related to pelvic relaxation / cystocele.  If her urine culture is negative, we will refer her to GYN-urology for evaluation.    Follow-up for annual exam    Total time of visit: 20 minutes (counseling >75% of time)

## 2017-12-13 NOTE — PATIENT INSTRUCTIONS
REST AND HYDRATE WITH PLENTY OF FLUIDS  OTC CLARITIN FOR CONGESTION (OR ZYRTEC OR ALLEGRA)  FLU SWAB NEGATIVE  1.5 CC CELESTONE GIVEN IN CLINIC  CEFDINIR RX  CHERATUSSIN AC RX FOR SEVERE COUGH. MAY HELP WITH ACHES AS WELL  OTC NASAL SPRAY     SEE SINUSITIS SHEET  FOLLOW UP WITH PCP  RETURN FOR ANY CONCERNS OR PROBLEMS  Sinusitis (Antibiotic Treatment)    The sinuses are air-filled spaces within the bones of the face. They connect to the inside of the nose. Sinusitis is an inflammation of the tissue lining the sinus cavity. Sinus inflammation can occur during a cold. It can also be due to allergies to pollens and other particles in the air. Sinusitis can cause symptoms of sinus congestion and fullness. A sinus infection causes fever, headache and facial pain. There is often green or yellow drainage from the nose or into the back of the throat (post-nasal drip). You have been given antibiotics to treat this condition.  Home care:  · Take the full course of antibiotics as instructed. Do not stop taking them, even if you feel better.  · Drink plenty of water, hot tea, and other liquids. This may help thin mucus. It also may promote sinus drainage.  · Heat may help soothe painful areas of the face. Use a towel soaked in hot water. Or,  the shower and direct the hot spray onto your face. Using a vaporizer along with a menthol rub at night may also help.   · An expectorant containing guaifenesin may help thin the mucus and promote drainage from the sinuses.  · Over-the-counter decongestants may be used unless a similar medicine was prescribed. Nasal sprays work the fastest. Use one that contains phenylephrine or oxymetazoline. First blow the nose gently. Then use the spray. Do not use these medicines more often than directed on the label or symptoms may get worse. You may also use tablets containing pseudoephedrine. Avoid products that combine ingredients, because side effects may be increased. Read labels. You can  also ask the pharmacist for help. (NOTE: Persons with high blood pressure should not use decongestants. They can raise blood pressure.)  · Over-the-counter antihistamines may help if allergies contributed to your sinusitis.    · Do not use nasal rinses or irrigation during an acute sinus infection, unless told to by your health care provider. Rinsing may spread the infection to other sinuses.  · Use acetaminophen or ibuprofen to control pain, unless another pain medicine was prescribed. (If you have chronic liver or kidney disease or ever had a stomach ulcer, talk with your doctor before using these medicines. Aspirin should never be used in anyone under 18 years of age who is ill with a fever. It may cause severe liver damage.)  · Don't smoke. This can worsen symptoms.  Follow-up care  Follow up with your healthcare provider or our staff if you are not improving within the next week.  When to seek medical advice  Call your healthcare provider if any of these occur:  · Facial pain or headache becoming more severe  · Stiff neck  · Unusual drowsiness or confusion  · Swelling of the forehead or eyelids  · Vision problems, including blurred or double vision  · Fever of 100.4ºF (38ºC) or higher, or as directed by your healthcare provider  · Seizure  · Breathing problems  · Symptoms not resolving within 10 days  Date Last Reviewed: 4/13/2015  © 4156-7749 [employer]. [address] [place] [address], [place] [address]. All rights reserved. This information is not intended as a substitute for professional medical care. Always follow your healthcare professional's instructions.

## 2017-12-16 LAB — BACTERIA UR CULT: NORMAL

## 2017-12-18 ENCOUNTER — TELEPHONE (OUTPATIENT)
Dept: OBSTETRICS AND GYNECOLOGY | Facility: CLINIC | Age: 57
End: 2017-12-18

## 2017-12-18 RX ORDER — NITROFURANTOIN 25; 75 MG/1; MG/1
100 CAPSULE ORAL 2 TIMES DAILY
Qty: 14 CAPSULE | Refills: 0 | Status: SHIPPED | OUTPATIENT
Start: 2017-12-18 | End: 2017-12-25

## 2017-12-18 NOTE — TELEPHONE ENCOUNTER
Called patient:    Discussed results of urine culture:    49k E coli, sensitive to Macrobid.    Reports continued lower abdominal pressure.    Rx Macrobid sent to pharmacy.    To increase water intake.    To let us know her progress in 1-2 weeks.

## 2017-12-29 ENCOUNTER — OFFICE VISIT (OUTPATIENT)
Dept: BARIATRICS | Facility: CLINIC | Age: 57
End: 2017-12-29
Payer: COMMERCIAL

## 2017-12-29 VITALS
WEIGHT: 197.75 LBS | SYSTOLIC BLOOD PRESSURE: 130 MMHG | DIASTOLIC BLOOD PRESSURE: 72 MMHG | HEART RATE: 60 BPM | BODY MASS INDEX: 31.04 KG/M2 | HEIGHT: 67 IN

## 2017-12-29 DIAGNOSIS — I10 ESSENTIAL HYPERTENSION: ICD-10-CM

## 2017-12-29 DIAGNOSIS — E66.9 OBESITY (BMI 30-39.9): ICD-10-CM

## 2017-12-29 PROCEDURE — 99213 OFFICE O/P EST LOW 20 MIN: CPT | Mod: S$GLB,,, | Performed by: INTERNAL MEDICINE

## 2017-12-29 PROCEDURE — 99999 PR PBB SHADOW E&M-EST. PATIENT-LVL III: CPT | Mod: PBBFAC,,, | Performed by: INTERNAL MEDICINE

## 2017-12-29 RX ORDER — DIETHYLPROPION HYDROCHLORIDE 75 MG/1
75 TABLET, EXTENDED RELEASE ORAL DAILY
Qty: 30 TABLET | Refills: 1 | Status: SHIPPED | OUTPATIENT
Start: 2018-01-29 | End: 2018-11-09

## 2017-12-29 NOTE — PATIENT INSTRUCTIONS
Patient warned of common side effects of diethylpropion including anxiety, insomnia, palpitations and increased blood pressure. It was also explained that it is for short-term usage along with diet and exercise, and that stopping the medication without making lifestyle changes will result in regain of weight. Patient states understanding.    Weight loss medications are controlled substances.  They require routine follow up. Prescription or pills that are lost or destroyed will not be replaced.       Get back to walking.       3 meals a day made up of the following:  Unlimited green vegetables, tomatoes, mushrooms, spaghetti squash, cauliflower, meat, poultry, seafood, eggs and hard cheeses.   Milk and plain yogurt  Dressings, seasonings, condiments, etc should have less than 2 g sugars.   beans or nuts can have 1 x a day.   1-2 servings of citrus fruits, berries, pineapple or melon a day (1/2 cup)  Avoid fried foods    No grains, rice, pasta, potatoes, bread, corn, peas, oatmeal, grits, tortillas, crackers, chips    No soda, sweet tea, juices or lemonade    [email or web address] for recipes. Moderate carb intake.       Eating well to be healthy and lose weight does not have to be hard. It also does not have to be time consuming or expensive. There a lots of ways you can work in healthy choices into your day. Many of these are easy, quick and even family friendly!    Homemade hazelnut au lait  Brew your favorite brand of hazelnut flavored coffee ([employer] makes a good one). Microwave 1/2 cup of milk that fits your eating plan (whole, skim or sugar-free almond milk can all work). Add half to 1 oz sugar free hazelnut syrup.     Quick and easy breakfast  1-2 boiled eggs or mini-frittatas with a tangerine. The boiled eggs and mini-frittatas can both be made ahead and last for up to 4 days in the refrigerator. Bonus if you portion them out in ready to go containers or zipper bags.     Breakfast Egg Muffins with Ashraf and  Spinach  Makes 12 muffins  Ingredients    6 eggs  ¼ cup milk  ¼ teaspoon salt  2 cups grated cheddar cheese  3/4 cup spinach, cooked and drained (about 8 oz fresh spinach)  6 wilder slices, cooked, drained of fat, and chopped  1/2 cup grated Parmesan cheese (optional)    Instructions      Preheat oven to 350 degrees. Use a regular 12-cup muffin pan. Spray the muffin pan with non-stick cooking spray.  In a large bowl, beat eggs until smooth. Add milk, salt, Cheddar cheese and mix. Stir spinach, cooked wilder into the egg mixture. Ladle the egg mixture into greased muffin cups ¾ full.  Top each muffin cup with grated Parmesan cheese.  Bake for 25 minutes. Remove from the oven, let the muffins cool for 30 minutes before removing them from the pan.      Be a brown bagger! When you make dinner, plan for an extra helping. When you serve your plate for dinner, serve an additional helping into a container that you can take with you the next day. If you don't have a refrigerator available during the day, an insulated lunch bag and ice packs will help you safely store you lunch.     Cold Brewed Iced tea. Fill a pitcher with 64 oz filtered water. Add either 4 regular tea bags of your choice or a large iced tea bag. Refrigerate over night then remove the tea bags. The tea will not be bitter and is super flavorful. Get creative! Try combinations like green tea and hibiscus tea or black tea with lemon zinger. Add orange or lemon slices for even more flavor.     Snack wisely. Protein filled snacks will fill you up, allowing you to get by with fewer calories. String cheese, pork skins (chicharrones), turkey pepperoni, or celery with cream cheese will all fit the bill.       Ditch the take out. Turkey tacos (with or without a low carb tortilla), burgers (without the bun), or fun stir fries are all quick and easy. The whole family will be happy, and you can save calories and money.      Orange Chicken Stir shabazz with asparagus   Makes 6  servings  Ingredients:    1.5 lbs boneless skinless chicken breast/tenders, diced into 1-inch pieces  1 Tbsp extra virgin olive or avocado oil, divided  2 lb asparagus, end portions trimmed and remainder diced into 1 1/2-inch pieces  1 small yellow onion, sliced into thin strips  8 oz button mushrooms, sliced  1 Tbsp peeled and finely grated fresh justin  4 cloves garlic, minced  1/2 cup low-sodium chicken broth  Juice of 2 fresh oranges  2 Tbsp low sodium soy sauce  2 Tbsp cornstarch  Sea salt and freshly ground black pepper    Directions:    In a 12-inch non-stick wok, heat 1/2 oil over moderately high heat. Once oil is hot, add diced chicken and season lightly with salt and pepper. Sauté until cooked through, tossing occasionally, about 5-6 minutes.  Place chicken on a large plate and set aside. Return wok, reduce to medium-high heat, add remaining oil.  Once oil is hot, add asparagus, yellow onion and mushrooms, and sauté until tender-crisp, about 4 - 5 minutes, adding in garlic and justin during the last 1 minute of sautéing.  Meanwhile, in a mixing bowl whisk together chicken broth, orange juice, soy sauce and cornstarch until well blended.  Pour chicken broth mixture into skillet with veggies, season with salt and pepper to taste, and bring mixture to a light boil, stirring constantly. Allow mixture to gently boil, stirring constantly, until thickened, about 1 minute.  Toss chicken into mixture and serve immediately over cauliflower rice or Shirataki noodles.      Skinny Chicken Tortilla Soup  Makes 7 servings    2 teaspoons olive oil  1 cup onion, chopped (about 1 small)  2 cups celery, sliced (about 4 medium stalks)  4 garlic cloves, minced  4 medium tomatoes, chopped  2 cups water  4 cups low-sodium organic chicken broth  3 cups chopped and/or shredded rotisserie chicken, skinless  2 cups sliced carrots (about 3 medium)  1 teaspoon dried oregano leaves  2 teaspoons chili powder  1 teaspoon garlic powder  2  teaspoons cumin  ½ teaspoon cayenne pepper (add less or omit, if you don't want a spicy soup)  ½ teaspoon sea salt + more to taste  ½ teaspoon pepper + more to taste    Directions:   Put all ingredients into a large crock pot. Cook on low for 5-6 hours.     Optional garnish with chopped avocado, chopped fresh cilantro, crumbled Cotija cheese, sour cream, Greek yogurt, your favorite hot sauce.           Vegan Avocado Banana Chocolate Pudding  Makes 4 servings  Ingredients    1 1/2 ripe avocados  2 ripe bananas  6 tbsp raw cacao powder or unsweetened cocoa powder  2-3 tbsp maple syrup (or calorie free sweetener)  1/4 cup almond milk  Instructions    Blend everything together in a  until the consistency is smooth and velvety. Taste and see if more sweetener is needed and stir to make sure everything is evenly mixed. Blend a second time if needed.  Top with banana slices, raw cacao nibs, almond butter, or any other toppings and enjoy!

## 2017-12-29 NOTE — PROGRESS NOTES
"Subjective:       Patient ID: Rosamaria Agosto is a 57 y.o. female.    Chief Complaint: Follow-up    Pt here today for follow-up. Has lost 8 lbs. BP is good today. Has been on LCHF diet with phentermine x 2 months last filled 12/9/17. States she did not exercise as much as she would have liked. Was sick for a while. Did get off track with holiday. Has 1 rf left.       Review of Systems   Constitutional: Negative for chills and fever.   Respiratory: Positive for shortness of breath.         + snores badly. Waiting to see if sinus surgery helps.    Cardiovascular: Positive for leg swelling. Negative for chest pain.   Gastrointestinal: Negative for constipation and diarrhea.        Some GERD   Genitourinary: Negative for difficulty urinating and dyspareunia.   Musculoskeletal: Positive for arthralgias and back pain.        Shoulders   Neurological: Negative for dizziness and light-headedness.   Psychiatric/Behavioral: Negative for dysphoric mood. The patient is not nervous/anxious.        Objective:     /72   Pulse 60   Ht 5' 7" (1.702 m)   Wt 89.7 kg (197 lb 12 oz)   LMP  (LMP Unknown)   BMI 30.97 kg/m²     Physical Exam   Constitutional: She is oriented to person, place, and time. She appears well-developed and well-nourished. No distress.   HENT:   Head: Normocephalic and atraumatic.   Eyes: EOM are normal. Pupils are equal, round, and reactive to light. No scleral icterus.   Neck: Normal range of motion. Neck supple.   Cardiovascular: Normal rate and normal heart sounds.  Exam reveals no gallop and no friction rub.    No murmur heard.  Pulmonary/Chest: Effort normal and breath sounds normal. No respiratory distress. She has no wheezes.   Musculoskeletal: Normal range of motion. She exhibits no edema.   Neurological: She is alert and oriented to person, place, and time. No cranial nerve deficit.   Skin: Skin is warm and dry. No erythema.   Psychiatric: She has a normal mood and affect. Her behavior is " normal. Judgment normal.   Vitals reviewed.      Assessment:       1. Essential hypertension    2. Obesity (BMI 30-39.9)        Plan:       1. Essential hypertension  The current medical regimen is effective;  continue present plan and medications. Expect improvement with weight loss.     2. Obesity (BMI 30-39.9)  Can use add'l rf on phentermine  - diethylpropion 75 mg TbSR; Take 75 mg by mouth once daily.  Dispense: 30 tablet; Refill: 1    Patient warned of common side effects of diethylpropion including anxiety, insomnia, palpitations and increased blood pressure. It was also explained that it is for short-term usage along with diet and exercise, and that stopping the medication without making lifestyle changes will result in regain of weight. Patient states understanding.    Weight loss medications are controlled substances.  They require routine follow up. Prescription or pills that are lost or destroyed will not be replaced.       Get back to walking.       3 meals a day made up of the following:  Unlimited green vegetables, tomatoes, mushrooms, spaghetti squash, cauliflower, meat, poultry, seafood, eggs and hard cheeses.   Milk and plain yogurt  Dressings, seasonings, condiments, etc should have less than 2 g sugars.   beans or nuts can have 1 x a day.   1-2 servings of citrus fruits, berries, pineapple or melon a day (1/2 cup)  Avoid fried foods    No grains, rice, pasta, potatoes, bread, corn, peas, oatmeal, grits, tortillas, crackers, chips    No soda, sweet tea, juices or lemonade    [email or web address] for recipes. Moderate carb intake.   Healthy all day tips given.

## 2018-03-26 ENCOUNTER — TELEPHONE (OUTPATIENT)
Dept: OTOLARYNGOLOGY | Facility: CLINIC | Age: 58
End: 2018-03-26

## 2018-03-26 NOTE — TELEPHONE ENCOUNTER
Spoke with patient and she reports yellow mucous again. She said it keeps recurring every so often and that she has completed antibiotic therapy.Patient wants to know what to do next.

## 2018-03-28 ENCOUNTER — TELEPHONE (OUTPATIENT)
Dept: OTOLARYNGOLOGY | Facility: CLINIC | Age: 58
End: 2018-03-28

## 2018-03-28 NOTE — TELEPHONE ENCOUNTER
Spoke with patient and offered to schedule appt for her as per Dr. Sofia.  Patient stated that she will call back next week to see when she is able to.

## 2018-05-01 ENCOUNTER — OFFICE VISIT (OUTPATIENT)
Dept: INTERNAL MEDICINE | Facility: CLINIC | Age: 58
End: 2018-05-01
Payer: COMMERCIAL

## 2018-05-01 VITALS
SYSTOLIC BLOOD PRESSURE: 130 MMHG | TEMPERATURE: 98 F | RESPIRATION RATE: 18 BRPM | BODY MASS INDEX: 32.95 KG/M2 | OXYGEN SATURATION: 97 % | DIASTOLIC BLOOD PRESSURE: 80 MMHG | HEART RATE: 62 BPM | HEIGHT: 65 IN | WEIGHT: 197.75 LBS

## 2018-05-01 DIAGNOSIS — M19.90 OSTEOARTHRITIS, UNSPECIFIED OSTEOARTHRITIS TYPE, UNSPECIFIED SITE: ICD-10-CM

## 2018-05-01 DIAGNOSIS — I83.813 VARICOSE VEINS OF BILATERAL LOWER EXTREMITIES WITH PAIN: ICD-10-CM

## 2018-05-01 DIAGNOSIS — E66.9 OBESITY, UNSPECIFIED CLASSIFICATION, UNSPECIFIED OBESITY TYPE, UNSPECIFIED WHETHER SERIOUS COMORBIDITY PRESENT: ICD-10-CM

## 2018-05-01 DIAGNOSIS — I10 ESSENTIAL HYPERTENSION: ICD-10-CM

## 2018-05-01 DIAGNOSIS — E55.9 VITAMIN D DEFICIENCY: ICD-10-CM

## 2018-05-01 DIAGNOSIS — J34.3 NASAL TURBINATE HYPERTROPHY: ICD-10-CM

## 2018-05-01 DIAGNOSIS — J34.2 DEVIATED NASAL SEPTUM: ICD-10-CM

## 2018-05-01 DIAGNOSIS — J31.0 CHRONIC RHINITIS: ICD-10-CM

## 2018-05-01 DIAGNOSIS — Z00.00 WELL ADULT EXAM: Primary | ICD-10-CM

## 2018-05-01 DIAGNOSIS — Z09 FOLLOW UP: Primary | ICD-10-CM

## 2018-05-01 DIAGNOSIS — E78.00 PURE HYPERCHOLESTEROLEMIA: ICD-10-CM

## 2018-05-01 DIAGNOSIS — M47.816 OSTEOARTHRITIS OF LUMBAR SPINE, UNSPECIFIED SPINAL OSTEOARTHRITIS COMPLICATION STATUS: ICD-10-CM

## 2018-05-01 DIAGNOSIS — J32.9 CHRONIC SINUSITIS, UNSPECIFIED LOCATION: ICD-10-CM

## 2018-05-01 DIAGNOSIS — M79.606 PAIN OF LOWER EXTREMITY, UNSPECIFIED LATERALITY: ICD-10-CM

## 2018-05-01 PROCEDURE — 3075F SYST BP GE 130 - 139MM HG: CPT | Mod: CPTII,S$GLB,, | Performed by: FAMILY MEDICINE

## 2018-05-01 PROCEDURE — 99999 PR PBB SHADOW E&M-EST. PATIENT-LVL IV: CPT | Mod: PBBFAC,,, | Performed by: FAMILY MEDICINE

## 2018-05-01 PROCEDURE — 3079F DIAST BP 80-89 MM HG: CPT | Mod: CPTII,S$GLB,, | Performed by: FAMILY MEDICINE

## 2018-05-01 PROCEDURE — 99214 OFFICE O/P EST MOD 30 MIN: CPT | Mod: S$GLB,,, | Performed by: FAMILY MEDICINE

## 2018-05-01 RX ORDER — ATORVASTATIN CALCIUM 80 MG/1
80 TABLET, FILM COATED ORAL NIGHTLY
Qty: 30 TABLET | Refills: 11 | Status: SHIPPED | OUTPATIENT
Start: 2018-05-01 | End: 2020-05-18

## 2018-05-01 RX ORDER — FLUTICASONE PROPIONATE 50 MCG
2 SPRAY, SUSPENSION (ML) NASAL DAILY
Qty: 16 G | Refills: 11 | Status: SHIPPED | OUTPATIENT
Start: 2018-05-01 | End: 2018-05-31

## 2018-05-01 RX ORDER — HYDROCHLOROTHIAZIDE 25 MG/1
25 TABLET ORAL DAILY
Qty: 30 TABLET | Refills: 11 | Status: SHIPPED | OUTPATIENT
Start: 2018-05-01 | End: 2019-06-26 | Stop reason: SDUPTHER

## 2018-05-01 NOTE — PROGRESS NOTES
Subjective:       Patient ID: Rosamaria Agosto is a 57 y.o. female.    Chief Complaint: Cough; Nasal Congestion; Medication Refill; and Sinus Problem    HPI 57-year-old -American female returns to clinic today for medication refills.  She was last seen in May 2017 and did not return for follow-up concerning her hypertension.  She continues to be treated for hypertension and hydrochlorothiazide 25 mg daily.  At this time blood pressure is well controlled.  She continues to be treated for hyperlipidemia with Lipitor 80 mg daily.  She reports being followed on a an outside neurosurgeon for chronic back pain secondary to degenerative joint disease.  At this time she is interested in reestablishing care with a new back specialist.  She also continues to see ENT for chronic sinusitis.  At this point she uses nasal sinus rinse and Astelin nasal spray with mild relief.  She reports that over the past week she has been noticing increased congestion, postnasal drip, and occasional cough.  Review of Systems   Constitutional: Negative for appetite change, chills, fatigue and fever.   HENT: Positive for congestion, postnasal drip and rhinorrhea. Negative for ear pain, hearing loss, sinus pressure, sore throat and tinnitus.    Eyes: Negative for redness, itching and visual disturbance.   Respiratory: Positive for cough (Yellowish sputum) and chest tightness. Negative for shortness of breath.    Cardiovascular: Negative for chest pain and palpitations.   Gastrointestinal: Negative for abdominal pain, constipation, diarrhea, nausea and vomiting.   Genitourinary: Negative for decreased urine volume, difficulty urinating, dysuria, frequency, hematuria and urgency.   Musculoskeletal: Negative for back pain, myalgias, neck pain and neck stiffness.   Skin: Negative for rash.   Neurological: Negative for dizziness, light-headedness and headaches.   Psychiatric/Behavioral: Negative.        Objective:      Physical Exam    Constitutional: She is oriented to person, place, and time. She appears well-developed and well-nourished. No distress.   HENT:   Head: Normocephalic and atraumatic.   Right Ear: External ear normal.   Left Ear: External ear normal.   Nose: Mucosal edema and rhinorrhea present.   Mouth/Throat: Oropharynx is clear and moist. No oropharyngeal exudate.   Eyes: Conjunctivae and EOM are normal. Pupils are equal, round, and reactive to light. Right eye exhibits no discharge. Left eye exhibits no discharge. No scleral icterus.   Neck: Normal range of motion. Neck supple. No JVD present. No tracheal deviation present. No thyromegaly present.   Cardiovascular: Normal rate, regular rhythm, normal heart sounds and intact distal pulses.  Exam reveals no gallop and no friction rub.    No murmur heard.  Pulmonary/Chest: Effort normal and breath sounds normal. No stridor. No respiratory distress. She has no wheezes. She has no rales.   Abdominal: Soft. Bowel sounds are normal. She exhibits no distension and no mass. There is no tenderness. There is no rebound and no guarding.   Musculoskeletal: Normal range of motion. She exhibits no edema or tenderness.   Lymphadenopathy:     She has no cervical adenopathy.   Neurological: She is alert and oriented to person, place, and time.   Skin: Skin is warm and dry. No rash noted. She is not diaphoretic. No erythema. No pallor.   Psychiatric: She has a normal mood and affect. Her behavior is normal. Judgment and thought content normal.   Nursing note and vitals reviewed.      Assessment:       1. Follow up    2. Essential hypertension    3. Chronic sinusitis, unspecified location    4. Chronic rhinitis    5. Pure hypercholesterolemia    6. Vitamin D deficiency    7. Osteoarthritis, unspecified osteoarthritis type, unspecified site    8. Obesity, unspecified classification, unspecified obesity type, unspecified whether serious comorbidity present    9. Osteoarthritis of lumbar spine,  unspecified spinal osteoarthritis complication status        Plan:       1.  Continue hydrochlorothiazide 25 mg daily.  Hypertension is well controlled.  2.  Continue nasal sinus rinse and Astelin nasal spray as prescribed and start Flonase nasal spray 2 sprays per nostril daily.  Continue follow-up with ENT as scheduled.  3.  Continue Lipitor 80 mg daily.  4.  Continue vitamin D 2000 units daily.  5.  Refer to physical medicine and rehabilitation for treatment of osteoarthritis of the lumbar spine.  6.  Encourage diet and exercise.  7.  Return to clinic as needed or in 1 month for annual exam.

## 2018-05-08 ENCOUNTER — OFFICE VISIT (OUTPATIENT)
Dept: URGENT CARE | Facility: CLINIC | Age: 58
End: 2018-05-08
Payer: COMMERCIAL

## 2018-05-08 VITALS
SYSTOLIC BLOOD PRESSURE: 102 MMHG | WEIGHT: 197 LBS | HEIGHT: 65 IN | RESPIRATION RATE: 18 BRPM | BODY MASS INDEX: 32.82 KG/M2 | OXYGEN SATURATION: 96 % | HEART RATE: 71 BPM | DIASTOLIC BLOOD PRESSURE: 68 MMHG | TEMPERATURE: 98 F

## 2018-05-08 DIAGNOSIS — L03.113 RIGHT ARM CELLULITIS: Primary | ICD-10-CM

## 2018-05-08 PROCEDURE — 3078F DIAST BP <80 MM HG: CPT | Mod: CPTII,S$GLB,, | Performed by: NURSE PRACTITIONER

## 2018-05-08 PROCEDURE — 99214 OFFICE O/P EST MOD 30 MIN: CPT | Mod: S$GLB,,, | Performed by: NURSE PRACTITIONER

## 2018-05-08 PROCEDURE — 3008F BODY MASS INDEX DOCD: CPT | Mod: CPTII,S$GLB,, | Performed by: NURSE PRACTITIONER

## 2018-05-08 PROCEDURE — 3074F SYST BP LT 130 MM HG: CPT | Mod: CPTII,S$GLB,, | Performed by: NURSE PRACTITIONER

## 2018-05-08 RX ORDER — SULFAMETHOXAZOLE AND TRIMETHOPRIM 800; 160 MG/1; MG/1
1 TABLET ORAL 2 TIMES DAILY
Qty: 10 TABLET | Refills: 0 | Status: SHIPPED | OUTPATIENT
Start: 2018-05-08 | End: 2018-05-13

## 2018-05-08 RX ORDER — MUPIROCIN 20 MG/G
OINTMENT TOPICAL
Qty: 22 G | Refills: 0 | Status: SHIPPED | OUTPATIENT
Start: 2018-05-08 | End: 2018-10-18

## 2018-05-08 NOTE — PROGRESS NOTES
"Subjective:       Patient ID: Rosamaria Agosto is a 57 y.o. female.    Vitals:    05/08/18 1735   BP: 102/68   Pulse: 71   Resp: 18   Temp: 98.2 °F (36.8 °C)   TempSrc: Oral   SpO2: 96%   Weight: 89.4 kg (197 lb)   Height: 5' 5" (1.651 m)       Chief Complaint: Insect Bite    Pt states she thinks she was bitten by a spider on Sunday on her right forearm. Pt states she was sitting by her bed and saw a spider drop down then she took a shower and her arm was burning.  Pt states she has some body aches.      Insect Bite   This is a new problem. The current episode started in the past 7 days (Sunday). The problem occurs constantly. The problem has been gradually worsening. Pertinent negatives include no abdominal pain, chest pain, chills, fever, headaches, nausea, rash, sore throat or vomiting. Nothing aggravates the symptoms. Treatments tried: bandaid  The treatment provided no relief.     Review of Systems   Constitution: Negative for chills and fever.   HENT: Negative for sore throat.    Eyes: Negative for blurred vision.   Cardiovascular: Negative for chest pain.   Respiratory: Negative for shortness of breath.    Skin: Positive for color change, poor wound healing and suspicious lesions. Negative for rash.   Musculoskeletal: Negative for back pain and joint pain.   Gastrointestinal: Negative for abdominal pain, diarrhea, nausea and vomiting.   Neurological: Negative for headaches.   Psychiatric/Behavioral: The patient is not nervous/anxious.        Objective:      Physical Exam   Constitutional: She is oriented to person, place, and time. She appears well-developed and well-nourished.   HENT:   Head: Normocephalic and atraumatic. Head is without abrasion, without contusion and without laceration.   Right Ear: External ear normal.   Left Ear: External ear normal.   Nose: Nose normal.   Mouth/Throat: Oropharynx is clear and moist.   Eyes: Conjunctivae, EOM and lids are normal. Pupils are equal, round, and reactive " to light.   Neck: Trachea normal, full passive range of motion without pain and phonation normal. Neck supple.   Cardiovascular: Normal rate, regular rhythm and normal heart sounds.    Pulmonary/Chest: Effort normal and breath sounds normal. No stridor. No respiratory distress.   Musculoskeletal: Normal range of motion.   Neurological: She is alert and oriented to person, place, and time.   Skin: Skin is warm, dry and intact. Capillary refill takes less than 2 seconds. Lesion and rash noted. No abrasion, no bruising, no burn, no ecchymosis and no laceration noted. There is erythema.        Psychiatric: She has a normal mood and affect. Her speech is normal and behavior is normal. Judgment and thought content normal. Cognition and memory are normal.   Nursing note and vitals reviewed.            Assessment:       1. Right arm cellulitis        Plan:     The patient presents to the clinic today with complaints of worsening swelling and redness to the right wrist. She noticed on Sunday that a possible spider could have bit her. She noticed a pain in her right wrist and soon after she killed a spider. She does have a reddened area to the right wrist. The area is tender and warm to the touch. She has been having body aches. The patient will be treated for possible cellulitis. She is given education on wound care and concerns of worsening infection. She is also given education on when to go to the ER.    Rosamaria was seen today for insect bite.    Diagnoses and all orders for this visit:    Right arm cellulitis  -     sulfamethoxazole-trimethoprim 800-160mg (BACTRIM DS) 800-160 mg Tab; Take 1 tablet by mouth 2 (two) times daily.  -     mupirocin (BACTROBAN) 2 % ointment; Apply to affected area 3 times daily for the next 48 hours      Patient Instructions     Cellulitis  Cellulitis is an infection of the deep layers of skin. A break in the skin, such as a cut or scratch, can let bacteria under the skin. If the bacteria get to  deep layers of the skin, it can be serious. If not treated, cellulitis can get into the bloodstream and lymph nodes. The infection can then spread throughout the body. This causes serious illness.  Cellulitis causes the affected skin to become red, swollen, warm, and sore. The reddened areas have a visible border. An open sore may leak fluid (pus). You may have a fever, chills, and pain.  Cellulitis is treated with antibiotics taken for 7 to 10 days. An open sore may be cleaned and covered with cool wet gauze. Symptoms should get better 1 to 2 days after treatment is started. Make sure to take all the antibiotics for the full number of days until they are gone. Keep taking the medicine even if your symptoms go away.  Home care  Follow these tips:  · Limit the use of the part of your body with cellulitis.   · If the infection is on your leg, keep your leg raised while sitting. This will help to reduce swelling.  · Take all of the antibiotic medicine exactly as directed until it is gone. Do not miss any doses, especially during the first 7 days. Dont stop taking the medicine when your symptoms get better.  · Keep the affected area clean and dry.  · Wash your hands with soap and warm water before and after touching your skin. Anyone else who touches your skin should also wash his or her hands. Don't share towels.  Follow-up care  Follow up with your healthcare provider, or as advised. If your infection does not go away on the first antibiotic, your healthcare provider will prescribe a different one.  When to seek medical advice  Call your healthcare provider right away if any of these occur:  · Red areas that spread  · Swelling or pain that gets worse  · Fluid leaking from the skin (pus)  · Fever higher of 100.4º F (38.0º C) or higher after 2 days on antibiotics  Date Last Reviewed: 9/1/2016  © 3610-1418 [employer]. [address] [place] [address], [place] [address]. All rights reserved. This information is not  intended as a substitute for professional medical care. Always follow your healthcare professional's instructions.        Brown Recluse Spider Bite    Most spiders are harmless to people. But there are 2 spiders in the [place] that can cause harm: the black  and the brown recluse.  Brown recluse spiders may be light or dark brown. They are ¼ inch to ¾ inch long, and have long, thin, legs. Some have a fiddle-shaped andree on their back. They are most often found in the [place], [place], and [place]. They live in places such as closets, attics, basements, porches, laila, and woodpiles.  A brown recluse spider bite may be painless at first or may burn slightly like a bee sting. A small red andree may form at the site. Then small blisters may appear. Within 2 to 8 hours, the area may become painful, swollen, and itchy. Other symptoms can include a general feeling of discomfort (malaise), fever, joint pain, nausea, and vomiting. In most cases, the bite site heals on its own within a week. But in some cases, the skin at the bite site may break down. (For instance, the skin may turn black and sink down as the tissue below dies.) This can cause an open wound called an ulcer to form. It may take a few weeks to a few months to heal. In rare cases, a brown recluse spider bite leads to more serious problems. These can include muscle damage, kidney failure, problems with blood clotting, and coma.  The bite site will be cleaned. Medicines may be given to relieve pain, if needed. Antibiotics may also be given to treat infection. A tetanus shot may be given. If an ulcer forms, skin grafting may be needed later to repair severe damage to the skin. But most bites heal without scarring.  Home care  · You may use acetaminophen or ibuprofen to relieve pain, unless another pain medicine was prescribed. Note: Talk with your healthcare provider before using these medicines if you have chronic liver or kidney disease or ever had a stomach ulcer or  GI bleeding.  · If you were prescribed antibiotics, be sure to take them exactly as directed. Also be sure to complete the medicines.  · Care for the bite site as directed by your healthcare provider. This may involve cleaning the wound at least once a day with soap and water.  · To help ease pain and swelling, apply cold packs to the bite site as directed. You can use a cool wet washcloth. Or you can make a cold pack by filling a plastic bag that seals at the top with ice cubes and wrapping it with a thin towel. Do not apply ice directly on the skin.  · If you were bitten on the arm or leg, keep the body part raised (elevated). Swelling may worsen in the down position.  · Check the bite site daily for signs of infection (see below).  Follow-up care  Follow up with your healthcare provider, or as advised.  When to seek medical advice  Call your healthcare provider right away if any of these occur:  · Fever of 100.4°F (38°C) or higher, or as directed by your provider  · Pain worsens and is not relieved with medicine  · Signs of infection at the bite site such as increased redness or streaking, swelling, or foul-smelling drainage  · Bite site becomes black or blue  · Bite site wont heal or grows larger  Date Last Reviewed: 3/1/2017  © 5919-8837 [employer]. [address] [place] [address], [place] [address]. All rights reserved. This information is not intended as a substitute for professional medical care. Always follow your healthcare professional's instructions.    -education given for possible opportunity of a brown recluse spider bite.  -Start applying bactroban to the area for the next 48 hours.  -be sure to complete your antibiotics that are given today.  -if you symptoms worsen and you start to develop fever, worsening redness and swelling go to the ER.

## 2018-05-08 NOTE — PATIENT INSTRUCTIONS
Cellulitis  Cellulitis is an infection of the deep layers of skin. A break in the skin, such as a cut or scratch, can let bacteria under the skin. If the bacteria get to deep layers of the skin, it can be serious. If not treated, cellulitis can get into the bloodstream and lymph nodes. The infection can then spread throughout the body. This causes serious illness.  Cellulitis causes the affected skin to become red, swollen, warm, and sore. The reddened areas have a visible border. An open sore may leak fluid (pus). You may have a fever, chills, and pain.  Cellulitis is treated with antibiotics taken for 7 to 10 days. An open sore may be cleaned and covered with cool wet gauze. Symptoms should get better 1 to 2 days after treatment is started. Make sure to take all the antibiotics for the full number of days until they are gone. Keep taking the medicine even if your symptoms go away.  Home care  Follow these tips:  · Limit the use of the part of your body with cellulitis.   · If the infection is on your leg, keep your leg raised while sitting. This will help to reduce swelling.  · Take all of the antibiotic medicine exactly as directed until it is gone. Do not miss any doses, especially during the first 7 days. Dont stop taking the medicine when your symptoms get better.  · Keep the affected area clean and dry.  · Wash your hands with soap and warm water before and after touching your skin. Anyone else who touches your skin should also wash his or her hands. Don't share towels.  Follow-up care  Follow up with your healthcare provider, or as advised. If your infection does not go away on the first antibiotic, your healthcare provider will prescribe a different one.  When to seek medical advice  Call your healthcare provider right away if any of these occur:  · Red areas that spread  · Swelling or pain that gets worse  · Fluid leaking from the skin (pus)  · Fever higher of 100.4º F (38.0º C) or higher after 2 days  on antibiotics  Date Last Reviewed: 9/1/2016  © 1644-8427 [employer]. [address] [place] [address], [place] [address]. All rights reserved. This information is not intended as a substitute for professional medical care. Always follow your healthcare professional's instructions.        Brown Recluse Spider Bite    Most spiders are harmless to people. But there are 2 spiders in the [place] that can cause harm: the black  and the brown recluse.  Brown recluse spiders may be light or dark brown. They are ¼ inch to ¾ inch long, and have long, thin, legs. Some have a fiddle-shaped andree on their back. They are most often found in the [place], [place], and [place]. They live in places such as closets, attics, basements, porches, laila, and woodpiles.  A brown recluse spider bite may be painless at first or may burn slightly like a bee sting. A small red andree may form at the site. Then small blisters may appear. Within 2 to 8 hours, the area may become painful, swollen, and itchy. Other symptoms can include a general feeling of discomfort (malaise), fever, joint pain, nausea, and vomiting. In most cases, the bite site heals on its own within a week. But in some cases, the skin at the bite site may break down. (For instance, the skin may turn black and sink down as the tissue below dies.) This can cause an open wound called an ulcer to form. It may take a few weeks to a few months to heal. In rare cases, a brown recluse spider bite leads to more serious problems. These can include muscle damage, kidney failure, problems with blood clotting, and coma.  The bite site will be cleaned. Medicines may be given to relieve pain, if needed. Antibiotics may also be given to treat infection. A tetanus shot may be given. If an ulcer forms, skin grafting may be needed later to repair severe damage to the skin. But most bites heal without scarring.  Home care  · You may use acetaminophen or ibuprofen to relieve pain, unless another  pain medicine was prescribed. Note: Talk with your healthcare provider before using these medicines if you have chronic liver or kidney disease or ever had a stomach ulcer or GI bleeding.  · If you were prescribed antibiotics, be sure to take them exactly as directed. Also be sure to complete the medicines.  · Care for the bite site as directed by your healthcare provider. This may involve cleaning the wound at least once a day with soap and water.  · To help ease pain and swelling, apply cold packs to the bite site as directed. You can use a cool wet washcloth. Or you can make a cold pack by filling a plastic bag that seals at the top with ice cubes and wrapping it with a thin towel. Do not apply ice directly on the skin.  · If you were bitten on the arm or leg, keep the body part raised (elevated). Swelling may worsen in the down position.  · Check the bite site daily for signs of infection (see below).  Follow-up care  Follow up with your healthcare provider, or as advised.  When to seek medical advice  Call your healthcare provider right away if any of these occur:  · Fever of 100.4°F (38°C) or higher, or as directed by your provider  · Pain worsens and is not relieved with medicine  · Signs of infection at the bite site such as increased redness or streaking, swelling, or foul-smelling drainage  · Bite site becomes black or blue  · Bite site wont heal or grows larger  Date Last Reviewed: 3/1/2017  © 5444-5180 [employer]. [address] [place] [address], [place] [address]. All rights reserved. This information is not intended as a substitute for professional medical care. Always follow your healthcare professional's instructions.    -education given for possible opportunity of a brown recluse spider bite.  -Start applying bactroban to the area for the next 48 hours.  -be sure to complete your antibiotics that are given today.  -if you symptoms worsen and you start to develop fever, worsening redness and  swelling go to the ER.

## 2018-05-16 ENCOUNTER — HOSPITAL ENCOUNTER (EMERGENCY)
Facility: OTHER | Age: 58
Discharge: HOME | End: 2018-05-16
Attending: EMERGENCY MEDICINE
Payer: COMMERCIAL

## 2018-05-16 VITALS
TEMPERATURE: 100 F | HEART RATE: 91 BPM | WEIGHT: 194 LBS | SYSTOLIC BLOOD PRESSURE: 138 MMHG | RESPIRATION RATE: 18 BRPM | OXYGEN SATURATION: 99 % | BODY MASS INDEX: 32.32 KG/M2 | DIASTOLIC BLOOD PRESSURE: 72 MMHG | HEIGHT: 65 IN

## 2018-05-16 DIAGNOSIS — T63.484A INSECT STINGS, UNDETERMINED INTENT, INITIAL ENCOUNTER: Primary | ICD-10-CM

## 2018-05-16 DIAGNOSIS — L03.113 CELLULITIS OF RIGHT UPPER EXTREMITY: ICD-10-CM

## 2018-05-16 PROCEDURE — 25000003 PHARM REV CODE 250: Performed by: PHYSICIAN ASSISTANT

## 2018-05-16 PROCEDURE — 99283 EMERGENCY DEPT VISIT LOW MDM: CPT

## 2018-05-16 RX ORDER — IBUPROFEN 400 MG/1
400 TABLET ORAL
Status: COMPLETED | OUTPATIENT
Start: 2018-05-16 | End: 2018-05-16

## 2018-05-16 RX ORDER — CLINDAMYCIN HYDROCHLORIDE 300 MG/1
300 CAPSULE ORAL 4 TIMES DAILY
Qty: 28 CAPSULE | Refills: 0 | Status: SHIPPED | OUTPATIENT
Start: 2018-05-16 | End: 2018-05-23

## 2018-05-16 RX ORDER — ACETAMINOPHEN 500 MG
1000 TABLET ORAL
Status: COMPLETED | OUTPATIENT
Start: 2018-05-16 | End: 2018-05-16

## 2018-05-16 RX ADMIN — ACETAMINOPHEN 1000 MG: 500 TABLET, FILM COATED ORAL at 08:05

## 2018-05-16 RX ADMIN — IBUPROFEN 400 MG: 400 TABLET, FILM COATED ORAL at 08:05

## 2018-05-17 NOTE — ED PROVIDER NOTES
"Encounter Date: 5/16/2018       History     Chief Complaint   Patient presents with    Insect Bite     Pt was bit by spider on Thurdsday last week, pt was given Bactrim and Bactroban. Pt was bit on the L ankle today by an unknown insect. Pt states that she has found several spiders in her room today. Pt complains of feeling like she has "the flu" since being bit. Pt has small raised bumps on R wrist and L ankle.     Patient is 57 year old female who presents with complaints of "insect stings" to the right upper extremity and the left lower extremity.  She reports that she was cleaning her room earlier today and noticed a bunch of spiders.  She suspects that she was bitten by spiders in the right upper arm and left lower leg.  She reports the areas are very tender and sometimes itchy.  She admits that she is concerned that they will get infected like the 1st and 2nd bite on her right wrist.  It was recently treated with a course of Bactrim which she completed today.  She reports overall today she felt generalized malaise with possible fever but denies checking her temperature.  She has not taken any medications to help with her symptoms today but admits that she cleaned her insect stings and apply topical Bactroban to them.  She denies associated chest pain, shortness of breath, dizziness, altered mental status, bleeding, purulence.  She is currently accompanied by her  is at bedside.          Review of patient's allergies indicates:  No Known Allergies  Past Medical History:   Diagnosis Date    Abnormal Pap smear of cervix yrs ago    Arthritis     History of uterine fibroid     Hyperlipidemia     Hypertension     Sinus problem      Past Surgical History:   Procedure Laterality Date    BREAST BIOPSY  24-25 years old    ENCEPHALOCELE REPAIR  46    HYSTERECTOMY  93'-95'    ovaries remain    TUBAL LIGATION       Family History   Problem Relation Age of Onset    Stroke Maternal Grandmother     " Diabetes Mother     Hypertension Mother     Diabetes Sister     Diabetes Sister     Breast cancer Neg Hx     Colon cancer Neg Hx     Ovarian cancer Neg Hx      Social History   Substance Use Topics    Smoking status: Never Smoker    Smokeless tobacco: Never Used    Alcohol use Yes      Comment: seldom     Review of Systems   Constitutional: Negative for fever.   HENT: Negative for sore throat.    Respiratory: Negative for shortness of breath.    Cardiovascular: Negative for chest pain.   Gastrointestinal: Negative for nausea.   Genitourinary: Negative for dysuria.   Musculoskeletal: Negative for back pain.   Skin: Positive for wound. Negative for rash.        Insect stings to right upper extremity and left lower extremity   Neurological: Negative for weakness.   Hematological: Does not bruise/bleed easily.       Physical Exam     Initial Vitals [05/16/18 1945]   BP Pulse Resp Temp SpO2   138/72 91 18 99.9 °F (37.7 °C) 99 %      MAP       94         Physical Exam    Nursing note and vitals reviewed.  Constitutional: She appears well-developed and well-nourished. She is not diaphoretic. No distress.   Healthy appearing female in no acute distress or apparent pain. She makes good eye contact, speaks in clear full sentences and ambulate with ease.   HENT:   Head: Normocephalic and atraumatic.   Eyes: Conjunctivae and EOM are normal. Pupils are equal, round, and reactive to light. Right eye exhibits no discharge. Left eye exhibits no discharge. No scleral icterus.   Neck: Normal range of motion.   Cardiovascular: Normal rate, regular rhythm and normal heart sounds. Exam reveals no gallop and no friction rub.    No murmur heard.  Pulmonary/Chest: Breath sounds normal. She has no wheezes. She has no rhonchi. She has no rales.   Clear lungs auscultation bilaterally   Abdominal: Soft. Bowel sounds are normal. There is no tenderness. There is no rebound and no guarding.   Benign abdomen   Musculoskeletal: Normal  range of motion. She exhibits no edema or tenderness.   Lymphadenopathy:     She has no cervical adenopathy.   Neurological: She is alert and oriented to person, place, and time. She has normal strength. No cranial nerve deficit or sensory deficit.   Skin: Skin is warm. Capillary refill takes less than 2 seconds. No rash and no abscess noted. No erythema.   There are 2 erythematous papular lesions to the triceps area on the right upper extremity.  There is tenderness to palpation with no purulence her centralized fluctuance.  No active bleeding.  There is 2 cm circular area of tenderness surrounding with slight erythema.  No warmth to touch or edema noted.    There is a 2 erythematous papular lesions to the lateral aspect of the left lower leg with no surrounding erythema, edema, tenderness to palpation.  No bleeding or purulence.   Psychiatric: She has a normal mood and affect. Her behavior is normal. Thought content normal.         ED Course   Procedures  Labs Reviewed - No data to display          Medical Decision Making:   ED Management:  Urgent evaluation a 57-year-old female who presents with complaints of insect stings to her extremities with concern for cellulitis to right upper extremity.  She is afebrile, nontoxic appearing, hemodynamically stable. Physical exam outlined above and reveals possible insect sting with early surrounding cellulitis noted. No abscess requiring I and D. No systemic sequelae noted at this time.  Will initiate clindamycin antibiotic regimen encourage wound care and will encourage application of Bactroban twice daily.  She is encouraged to follow up with the primary care provider in 1-2 days for wound recheck.  She is educated on return precautions and verbalized understanding.                        Clinical Impression:   The primary encounter diagnosis was Insect stings, undetermined intent, initial encounter. A diagnosis of Cellulitis of right upper extremity was also pertinent  to this visit.                           Li Stewart PA-C  05/16/18 2057

## 2018-05-17 NOTE — ED TRIAGE NOTES
"Pt states " I got bit by a spider yesterday. Today I noticed I had bites all over my body. My right arm started feeling weak". Pt denies fever, chills, sob, cp, n/v. Pt states " I have been putting cream on the bites"   "

## 2018-05-22 ENCOUNTER — OFFICE VISIT (OUTPATIENT)
Dept: URGENT CARE | Facility: CLINIC | Age: 58
End: 2018-05-22
Payer: COMMERCIAL

## 2018-05-22 VITALS
HEART RATE: 95 BPM | BODY MASS INDEX: 32.32 KG/M2 | DIASTOLIC BLOOD PRESSURE: 95 MMHG | TEMPERATURE: 101 F | SYSTOLIC BLOOD PRESSURE: 172 MMHG | OXYGEN SATURATION: 98 % | WEIGHT: 194 LBS | HEIGHT: 65 IN

## 2018-05-22 DIAGNOSIS — J01.10 ACUTE NON-RECURRENT FRONTAL SINUSITIS: Primary | ICD-10-CM

## 2018-05-22 PROCEDURE — 3077F SYST BP >= 140 MM HG: CPT | Mod: CPTII,S$GLB,, | Performed by: NURSE PRACTITIONER

## 2018-05-22 PROCEDURE — 96372 THER/PROPH/DIAG INJ SC/IM: CPT | Mod: S$GLB,,, | Performed by: EMERGENCY MEDICINE

## 2018-05-22 PROCEDURE — 3008F BODY MASS INDEX DOCD: CPT | Mod: CPTII,S$GLB,, | Performed by: NURSE PRACTITIONER

## 2018-05-22 PROCEDURE — 3080F DIAST BP >= 90 MM HG: CPT | Mod: CPTII,S$GLB,, | Performed by: NURSE PRACTITIONER

## 2018-05-22 PROCEDURE — 99214 OFFICE O/P EST MOD 30 MIN: CPT | Mod: 25,S$GLB,, | Performed by: NURSE PRACTITIONER

## 2018-05-22 RX ORDER — IBUPROFEN 200 MG
400 TABLET ORAL
Status: COMPLETED | OUTPATIENT
Start: 2018-05-22 | End: 2018-05-22

## 2018-05-22 RX ORDER — BETAMETHASONE SODIUM PHOSPHATE AND BETAMETHASONE ACETATE 3; 3 MG/ML; MG/ML
9 INJECTION, SUSPENSION INTRA-ARTICULAR; INTRALESIONAL; INTRAMUSCULAR; SOFT TISSUE ONCE
Status: COMPLETED | OUTPATIENT
Start: 2018-05-22 | End: 2018-05-22

## 2018-05-22 RX ORDER — AMOXICILLIN AND CLAVULANATE POTASSIUM 875; 125 MG/1; MG/1
1 TABLET, FILM COATED ORAL 2 TIMES DAILY
Qty: 20 TABLET | Refills: 0 | Status: SHIPPED | OUTPATIENT
Start: 2018-05-22 | End: 2018-05-28 | Stop reason: SDUPTHER

## 2018-05-22 RX ADMIN — BETAMETHASONE SODIUM PHOSPHATE AND BETAMETHASONE ACETATE 9 MG: 3; 3 INJECTION, SUSPENSION INTRA-ARTICULAR; INTRALESIONAL; INTRAMUSCULAR; SOFT TISSUE at 01:05

## 2018-05-22 RX ADMIN — Medication 400 MG: at 01:05

## 2018-05-22 NOTE — PROGRESS NOTES
"Subjective:       Patient ID: Rosamaria Agosto is a 57 y.o. female.    Vitals:  height is 5' 5" (1.651 m) and weight is 88 kg (194 lb). Her oral temperature is 101.2 °F (38.4 °C) (abnormal). Her blood pressure is 172/95 (abnormal) and her pulse is 95. Her oxygen saturation is 98%.     Chief Complaint: Migraine    Woke up this morning to severe headache. Elevated temp, elevated BP, challenged both twice.       Migraine    This is a new problem. The current episode started today. The problem occurs constantly. The pain is located in the temporal and frontal region. The pain does not radiate. The pain quality is not similar to prior headaches. The quality of the pain is described as pulsating and throbbing. The pain is at a severity of 10/10. The pain is severe. Associated symptoms include a fever. Pertinent negatives include no blurred vision, dizziness, nausea, neck pain, numbness, photophobia, seizures, tinnitus, vomiting or weakness. She has tried nothing for the symptoms.     Review of Systems   Constitution: Positive for fever. Negative for chills and weakness.   HENT: Positive for congestion and hoarse voice. Negative for tinnitus.    Eyes: Negative for blurred vision and photophobia.   Skin: Negative for rash.   Musculoskeletal: Negative for neck pain.   Gastrointestinal: Negative for nausea and vomiting.   Neurological: Positive for headaches. Negative for disturbances in coordination, dizziness, numbness and seizures.   Psychiatric/Behavioral: Negative for altered mental status. The patient is not nervous/anxious.        Objective:      Physical Exam   Constitutional: She is oriented to person, place, and time. She appears well-developed and well-nourished. She is cooperative.  Non-toxic appearance. She does not appear ill. No distress.   HENT:   Head: Normocephalic and atraumatic.   Right Ear: Hearing, tympanic membrane and ear canal normal.   Left Ear: Hearing and ear canal normal. A middle ear effusion " is present.   Nose: Sinus tenderness present. No mucosal edema, rhinorrhea or nasal deformity. No epistaxis. Right sinus exhibits frontal sinus tenderness. Right sinus exhibits no maxillary sinus tenderness. Left sinus exhibits frontal sinus tenderness. Left sinus exhibits no maxillary sinus tenderness.   Mouth/Throat: Uvula is midline and mucous membranes are normal. No trismus in the jaw. Normal dentition. No uvula swelling. No posterior oropharyngeal erythema.   Eyes: Conjunctivae and lids are normal. Right eye exhibits no discharge. Left eye exhibits no discharge. No scleral icterus.   Sclera clear bilat   Neck: Trachea normal, normal range of motion, full passive range of motion without pain and phonation normal. Neck supple.   Cardiovascular: Normal rate, regular rhythm, normal heart sounds, intact distal pulses and normal pulses.    Pulmonary/Chest: Effort normal and breath sounds normal. No respiratory distress.   Abdominal: Soft. Normal appearance and bowel sounds are normal. She exhibits no distension, no pulsatile midline mass and no mass. There is no tenderness.   Musculoskeletal: Normal range of motion. She exhibits no edema or deformity.   Neurological: She is alert and oriented to person, place, and time. She exhibits normal muscle tone. Coordination normal.   Skin: Skin is warm, dry and intact. She is not diaphoretic. No pallor.   Psychiatric: She has a normal mood and affect. Her speech is normal and behavior is normal. Judgment and thought content normal. Cognition and memory are normal.   Nursing note and vitals reviewed.      Assessment:       1. Acute non-recurrent frontal sinusitis        Plan:       Patient Instructions     Sinusitis (Antibiotic Treatment)    The sinuses are air-filled spaces within the bones of the face. They connect to the inside of the nose. Sinusitis is an inflammation of the tissue lining the sinus cavity. Sinus inflammation can occur during a cold. It can also be due to  allergies to pollens and other particles in the air. Sinusitis can cause symptoms of sinus congestion and fullness. A sinus infection causes fever, headache and facial pain. There is often green or yellow drainage from the nose or into the back of the throat (post-nasal drip). You have been given antibiotics to treat this condition.  Home care:  · Take the full course of antibiotics as instructed. Do not stop taking them, even if you feel better.  · Drink plenty of water, hot tea, and other liquids. This may help thin mucus. It also may promote sinus drainage.  · Heat may help soothe painful areas of the face. Use a towel soaked in hot water. Or,  the shower and direct the hot spray onto your face. Using a vaporizer along with a menthol rub at night may also help.   · An expectorant containing guaifenesin may help thin the mucus and promote drainage from the sinuses.  · Over-the-counter decongestants may be used unless a similar medicine was prescribed. Nasal sprays work the fastest. Use one that contains phenylephrine or oxymetazoline. First blow the nose gently. Then use the spray. Do not use these medicines more often than directed on the label or symptoms may get worse. You may also use tablets containing pseudoephedrine. Avoid products that combine ingredients, because side effects may be increased. Read labels. You can also ask the pharmacist for help. (NOTE: Persons with high blood pressure should not use decongestants. They can raise blood pressure.)  · Over-the-counter antihistamines may help if allergies contributed to your sinusitis.    · Do not use nasal rinses or irrigation during an acute sinus infection, unless told to by your health care provider. Rinsing may spread the infection to other sinuses.  · Use acetaminophen or ibuprofen to control pain, unless another pain medicine was prescribed. (If you have chronic liver or kidney disease or ever had a stomach ulcer, talk with your doctor before  using these medicines. Aspirin should never be used in anyone under 18 years of age who is ill with a fever. It may cause severe liver damage.)  · Don't smoke. This can worsen symptoms.  Follow-up care  Follow up with your healthcare provider or our staff if you are not improving within the next week.  When to seek medical advice  Call your healthcare provider if any of these occur:  · Facial pain or headache becoming more severe  · Stiff neck  · Unusual drowsiness or confusion  · Swelling of the forehead or eyelids  · Vision problems, including blurred or double vision  · Fever of 100.4ºF (38ºC) or higher, or as directed by your healthcare provider  · Seizure  · Breathing problems  · Symptoms not resolving within 10 days  Date Last Reviewed: 4/13/2015 © 2000-2017 [employer]. [address] [place] [address], [place] [address]. All rights reserved. This information is not intended as a substitute for professional medical care. Always follow your healthcare professional's instructions.              Acute non-recurrent frontal sinusitis    Other orders  -     betamethasone acetate-betamethasone sodium phosphate injection 9 mg; Inject 1.5 mLs (9 mg total) into the muscle once.  -     amoxicillin-clavulanate 875-125mg (AUGMENTIN) 875-125 mg per tablet; Take 1 tablet by mouth 2 (two) times daily.  Dispense: 20 tablet; Refill: 0  -     ibuprofen tablet 400 mg; Take 2 tablets (400 mg total) by mouth one time.

## 2018-05-22 NOTE — PATIENT INSTRUCTIONS
Sinusitis (Antibiotic Treatment)    The sinuses are air-filled spaces within the bones of the face. They connect to the inside of the nose. Sinusitis is an inflammation of the tissue lining the sinus cavity. Sinus inflammation can occur during a cold. It can also be due to allergies to pollens and other particles in the air. Sinusitis can cause symptoms of sinus congestion and fullness. A sinus infection causes fever, headache and facial pain. There is often green or yellow drainage from the nose or into the back of the throat (post-nasal drip). You have been given antibiotics to treat this condition.  Home care:  · Take the full course of antibiotics as instructed. Do not stop taking them, even if you feel better.  · Drink plenty of water, hot tea, and other liquids. This may help thin mucus. It also may promote sinus drainage.  · Heat may help soothe painful areas of the face. Use a towel soaked in hot water. Or,  the shower and direct the hot spray onto your face. Using a vaporizer along with a menthol rub at night may also help.   · An expectorant containing guaifenesin may help thin the mucus and promote drainage from the sinuses.  · Over-the-counter decongestants may be used unless a similar medicine was prescribed. Nasal sprays work the fastest. Use one that contains phenylephrine or oxymetazoline. First blow the nose gently. Then use the spray. Do not use these medicines more often than directed on the label or symptoms may get worse. You may also use tablets containing pseudoephedrine. Avoid products that combine ingredients, because side effects may be increased. Read labels. You can also ask the pharmacist for help. (NOTE: Persons with high blood pressure should not use decongestants. They can raise blood pressure.)  · Over-the-counter antihistamines may help if allergies contributed to your sinusitis.    · Do not use nasal rinses or irrigation during an acute sinus infection, unless told to by  your health care provider. Rinsing may spread the infection to other sinuses.  · Use acetaminophen or ibuprofen to control pain, unless another pain medicine was prescribed. (If you have chronic liver or kidney disease or ever had a stomach ulcer, talk with your doctor before using these medicines. Aspirin should never be used in anyone under 18 years of age who is ill with a fever. It may cause severe liver damage.)  · Don't smoke. This can worsen symptoms.  Follow-up care  Follow up with your healthcare provider or our staff if you are not improving within the next week.  When to seek medical advice  Call your healthcare provider if any of these occur:  · Facial pain or headache becoming more severe  · Stiff neck  · Unusual drowsiness or confusion  · Swelling of the forehead or eyelids  · Vision problems, including blurred or double vision  · Fever of 100.4ºF (38ºC) or higher, or as directed by your healthcare provider  · Seizure  · Breathing problems  · Symptoms not resolving within 10 days  Date Last Reviewed: 4/13/2015  © 2065-9151 [employer]. [address] [place] [address], [place] [address]. All rights reserved. This information is not intended as a substitute for professional medical care. Always follow your healthcare professional's instructions.

## 2018-05-24 ENCOUNTER — NURSE TRIAGE (OUTPATIENT)
Dept: ADMINISTRATIVE | Facility: CLINIC | Age: 58
End: 2018-05-24

## 2018-05-24 NOTE — TELEPHONE ENCOUNTER
Spoke with pt re: symptoms.  Pt went to Urgent care on Tuesday 5-22-18. She was given an ABX & steroid injection.  Pt still feeling bad. Pain scale  in head =9  Pt states she has fever at night & none during the day.    Please advise

## 2018-05-24 NOTE — TELEPHONE ENCOUNTER
Reason for Disposition   Nursing judgment    Protocols used: ST NO PROTOCOL CALL: INFORMATION ONLY-[place]    Rosamaria is calling to report she is not receiving any relief from the sinus pain and pressure. States is taking Tylenol and meds prescribed with no relief.  Please contact Rosamaria to advise.  She can be reached at [phone] or [phone].

## 2018-05-25 ENCOUNTER — TELEPHONE (OUTPATIENT)
Dept: OTOLARYNGOLOGY | Facility: CLINIC | Age: 58
End: 2018-05-25

## 2018-05-25 NOTE — TELEPHONE ENCOUNTER
Spoke with patient and advised her to continue antibiotics as prescribed and call back on Monday if not feeling better to make appt.

## 2018-05-25 NOTE — TELEPHONE ENCOUNTER
Spoke with patient and she states that she went to [facility] Tues 5/22/18.  Was told she has bad ear and sinus infection.  Was given steroid shot, amox-clav, ibuprofen and Tylenol for pain.  Patient reports that pain is not going away and running fever every now and then.  Patient requesting appt on Monday.

## 2018-05-28 ENCOUNTER — LAB VISIT (OUTPATIENT)
Dept: LAB | Facility: HOSPITAL | Age: 58
End: 2018-05-28
Attending: FAMILY MEDICINE
Payer: COMMERCIAL

## 2018-05-28 ENCOUNTER — TELEPHONE (OUTPATIENT)
Dept: OTOLARYNGOLOGY | Facility: CLINIC | Age: 58
End: 2018-05-28

## 2018-05-28 ENCOUNTER — PATIENT MESSAGE (OUTPATIENT)
Dept: INTERNAL MEDICINE | Facility: CLINIC | Age: 58
End: 2018-05-28

## 2018-05-28 ENCOUNTER — OFFICE VISIT (OUTPATIENT)
Dept: OTOLARYNGOLOGY | Facility: CLINIC | Age: 58
End: 2018-05-28
Payer: COMMERCIAL

## 2018-05-28 VITALS
WEIGHT: 194 LBS | BODY MASS INDEX: 32.32 KG/M2 | DIASTOLIC BLOOD PRESSURE: 81 MMHG | HEART RATE: 73 BPM | TEMPERATURE: 97 F | HEIGHT: 65 IN | SYSTOLIC BLOOD PRESSURE: 130 MMHG

## 2018-05-28 DIAGNOSIS — R51.9 NONINTRACTABLE EPISODIC HEADACHE, UNSPECIFIED HEADACHE TYPE: ICD-10-CM

## 2018-05-28 DIAGNOSIS — H92.02 LEFT EAR PAIN: ICD-10-CM

## 2018-05-28 DIAGNOSIS — E55.9 VITAMIN D DEFICIENCY: ICD-10-CM

## 2018-05-28 DIAGNOSIS — E78.00 PURE HYPERCHOLESTEROLEMIA: ICD-10-CM

## 2018-05-28 DIAGNOSIS — J34.2 NASAL SEPTAL DEVIATION: ICD-10-CM

## 2018-05-28 DIAGNOSIS — E55.9 VITAMIN D DEFICIENCY: Primary | ICD-10-CM

## 2018-05-28 DIAGNOSIS — J30.9 ALLERGIC RHINITIS, UNSPECIFIED SEASONALITY, UNSPECIFIED TRIGGER: ICD-10-CM

## 2018-05-28 DIAGNOSIS — H69.93 DYSFUNCTION OF BOTH EUSTACHIAN TUBES: ICD-10-CM

## 2018-05-28 DIAGNOSIS — J01.90 ACUTE NON-RECURRENT SINUSITIS, UNSPECIFIED LOCATION: Primary | ICD-10-CM

## 2018-05-28 DIAGNOSIS — Z00.00 ROUTINE GENERAL MEDICAL EXAMINATION AT A HEALTH CARE FACILITY: Primary | ICD-10-CM

## 2018-05-28 DIAGNOSIS — J34.3 NASAL TURBINATE HYPERTROPHY: ICD-10-CM

## 2018-05-28 DIAGNOSIS — Z00.00 WELL ADULT EXAM: ICD-10-CM

## 2018-05-28 LAB
25(OH)D3+25(OH)D2 SERPL-MCNC: 12 NG/ML
ALBUMIN SERPL BCP-MCNC: 3.7 G/DL
ALP SERPL-CCNC: 70 U/L
ALT SERPL W/O P-5'-P-CCNC: 11 U/L
ANION GAP SERPL CALC-SCNC: 14 MMOL/L
AST SERPL-CCNC: 12 U/L
BASOPHILS # BLD AUTO: 0.07 K/UL
BASOPHILS NFR BLD: 0.6 %
BILIRUB SERPL-MCNC: 1.1 MG/DL
BUN SERPL-MCNC: 15 MG/DL
CALCIUM SERPL-MCNC: 10.3 MG/DL
CHLORIDE SERPL-SCNC: 89 MMOL/L
CHOLEST SERPL-MCNC: 259 MG/DL
CHOLEST/HDLC SERPL: 6.3 {RATIO}
CO2 SERPL-SCNC: 30 MMOL/L
CREAT SERPL-MCNC: 0.7 MG/DL
DIFFERENTIAL METHOD: ABNORMAL
EOSINOPHIL # BLD AUTO: 0.2 K/UL
EOSINOPHIL NFR BLD: 1.2 %
ERYTHROCYTE [DISTWIDTH] IN BLOOD BY AUTOMATED COUNT: 13 %
EST. GFR  (AFRICAN AMERICAN): >60 ML/MIN/1.73 M^2
EST. GFR  (NON AFRICAN AMERICAN): >60 ML/MIN/1.73 M^2
ESTIMATED AVG GLUCOSE: 114 MG/DL
GLUCOSE SERPL-MCNC: 97 MG/DL
HBA1C MFR BLD HPLC: 5.6 %
HCT VFR BLD AUTO: 44.8 %
HDLC SERPL-MCNC: 41 MG/DL
HDLC SERPL: 15.8 %
HGB BLD-MCNC: 14.4 G/DL
IMM GRANULOCYTES # BLD AUTO: 0.1 K/UL
IMM GRANULOCYTES NFR BLD AUTO: 0.8 %
LDLC SERPL CALC-MCNC: 192.2 MG/DL
LYMPHOCYTES # BLD AUTO: 2.1 K/UL
LYMPHOCYTES NFR BLD: 17.1 %
MCH RBC QN AUTO: 29.3 PG
MCHC RBC AUTO-ENTMCNC: 32.1 G/DL
MCV RBC AUTO: 91 FL
MONOCYTES # BLD AUTO: 1 K/UL
MONOCYTES NFR BLD: 8.1 %
NEUTROPHILS # BLD AUTO: 9 K/UL
NEUTROPHILS NFR BLD: 72.2 %
NONHDLC SERPL-MCNC: 218 MG/DL
NRBC BLD-RTO: 0 /100 WBC
PLATELET # BLD AUTO: 509 K/UL
PMV BLD AUTO: 9.1 FL
POTASSIUM SERPL-SCNC: 3.9 MMOL/L
PROT SERPL-MCNC: 8 G/DL
RBC # BLD AUTO: 4.91 M/UL
SODIUM SERPL-SCNC: 133 MMOL/L
T4 FREE SERPL-MCNC: 1.42 NG/DL
TRIGL SERPL-MCNC: 129 MG/DL
TSH SERPL DL<=0.005 MIU/L-ACNC: 1.24 UIU/ML
WBC # BLD AUTO: 12.42 K/UL

## 2018-05-28 PROCEDURE — 3079F DIAST BP 80-89 MM HG: CPT | Mod: CPTII,S$GLB,, | Performed by: SPECIALIST

## 2018-05-28 PROCEDURE — 82306 VITAMIN D 25 HYDROXY: CPT

## 2018-05-28 PROCEDURE — 31231 NASAL ENDOSCOPY DX: CPT | Mod: S$GLB,,, | Performed by: SPECIALIST

## 2018-05-28 PROCEDURE — 3075F SYST BP GE 130 - 139MM HG: CPT | Mod: CPTII,S$GLB,, | Performed by: SPECIALIST

## 2018-05-28 PROCEDURE — 85025 COMPLETE CBC W/AUTO DIFF WBC: CPT

## 2018-05-28 PROCEDURE — 3008F BODY MASS INDEX DOCD: CPT | Mod: CPTII,S$GLB,, | Performed by: SPECIALIST

## 2018-05-28 PROCEDURE — 80053 COMPREHEN METABOLIC PANEL: CPT

## 2018-05-28 PROCEDURE — 99214 OFFICE O/P EST MOD 30 MIN: CPT | Mod: S$GLB,,, | Performed by: SPECIALIST

## 2018-05-28 PROCEDURE — 36415 COLL VENOUS BLD VENIPUNCTURE: CPT | Mod: PO

## 2018-05-28 PROCEDURE — 80061 LIPID PANEL: CPT

## 2018-05-28 PROCEDURE — 84443 ASSAY THYROID STIM HORMONE: CPT

## 2018-05-28 PROCEDURE — 84439 ASSAY OF FREE THYROXINE: CPT

## 2018-05-28 PROCEDURE — 83036 HEMOGLOBIN GLYCOSYLATED A1C: CPT

## 2018-05-28 RX ORDER — ERGOCALCIFEROL 1.25 MG/1
CAPSULE ORAL
Qty: 4 CAPSULE | Refills: 2 | Status: SHIPPED | OUTPATIENT
Start: 2018-05-28 | End: 2019-04-08 | Stop reason: SDUPTHER

## 2018-05-28 RX ORDER — PREDNISONE 5 MG/1
5 TABLET ORAL DAILY
Qty: 21 TABLET | Refills: 0 | Status: SHIPPED | OUTPATIENT
Start: 2018-05-28 | End: 2018-06-03

## 2018-05-28 RX ORDER — AMOXICILLIN AND CLAVULANATE POTASSIUM 875; 125 MG/1; MG/1
1 TABLET, FILM COATED ORAL 2 TIMES DAILY
Qty: 20 TABLET | Refills: 0 | Status: SHIPPED | OUTPATIENT
Start: 2018-05-28 | End: 2018-06-07

## 2018-05-28 NOTE — TELEPHONE ENCOUNTER
Spoke with patient.  She states that she has pain/pressure L-temple radiating across eyes, still staking antibiotics and ibuprofen for pain.  Advised her to call [facility] and [facility] and gave her name and numbers of physicians.

## 2018-05-28 NOTE — TELEPHONE ENCOUNTER
Mrs Agosto, is experiencing headache and sinus congestion. Would like to be seen today. She went to urgent care already and still having symptoms. Appt scheduled today for 11:45am

## 2018-05-28 NOTE — TELEPHONE ENCOUNTER
----- Message from Florecita Jett sent at 5/28/2018 10:34 AM CDT -----  Contact: Pt  ---FST Request---    Name of Who is Calling: MARGIE FERNANDEZ [[id number]]      What is the request in detail: Patient states she is experiencing a severe headache and sinus congestion patient is requesting to be seen today..............Please contact to further discuss and advise       Can the clinic reply by [facility]: No      What Number to Call Back if not in [facility]: [phone]

## 2018-05-29 NOTE — PROGRESS NOTES
Subjective:       Patient ID: Rosamaria Agosto is a 57 y.o. female.    Chief Complaint: Sinusitis (Pressure in head by temples/feels congested/started on Tuesday/went to  Tuesday)    The patient has been having nasal congestion, mild headaches and postnasal drip for the last few weeks.  The left facial and ear pain became severe 6 days ago.  She was treated in an urgent care clinic where she received a steroid injection and was started on Augmentin.  She felt no improvement from the steroid injection.  At the time nasal secretions or yellow and there are now clear.  She continues to have the pressure and pain in the entire left facial area and side of the head with radiation to the left ear.  She is not having fever.      Review of Systems   Constitutional: Positive for fatigue. Negative for activity change, appetite change, chills, fever and unexpected weight change.   HENT: Positive for congestion, ear pain, postnasal drip, rhinorrhea, sinus pain, sinus pressure and sore throat. Negative for ear discharge, facial swelling, hearing loss, mouth sores, sneezing, tinnitus, trouble swallowing and voice change.    Eyes: Negative for photophobia, pain, discharge, redness, itching and visual disturbance.   Respiratory: Positive for cough. Negative for apnea, choking, shortness of breath and wheezing.    Cardiovascular: Negative for chest pain and palpitations.   Gastrointestinal: Negative for abdominal distention, abdominal pain, nausea and vomiting.   Musculoskeletal: Negative for arthralgias, myalgias, neck pain and neck stiffness.   Skin: Negative.  Negative for color change, pallor and rash.   Allergic/Immunologic: Negative for environmental allergies, food allergies and immunocompromised state.   Neurological: Positive for headaches. Negative for dizziness, facial asymmetry, speech difficulty, weakness, light-headedness and numbness.   Hematological: Negative for adenopathy. Does not bruise/bleed easily.    Psychiatric/Behavioral: Negative for confusion, decreased concentration and sleep disturbance.       Objective:      Physical Exam   Constitutional: She is oriented to person, place, and time. She appears well-developed and well-nourished. She is cooperative.   HENT:   Head: Normocephalic.   Right Ear: External ear and ear canal normal. Tympanic membrane is retracted.   Left Ear: External ear and ear canal normal. Tympanic membrane is retracted. Tympanic membrane mobility is abnormal.   Nose: Mucosal edema (cyanotic, boggy inferior turbinates bilaterally), rhinorrhea (clear mucus bilaterally) and septal deviation present.   Mouth/Throat: Uvula is midline, oropharynx is clear and moist and mucous membranes are normal. No oral lesions.   Eyes: EOM and lids are normal. Pupils are equal, round, and reactive to light. Right eye exhibits no discharge and no exudate. Left eye exhibits no discharge and no exudate. Right conjunctiva is injected. Left conjunctiva is injected.   Neck: Trachea normal and normal range of motion. No muscular tenderness present. No tracheal deviation present. No thyroid mass and no thyromegaly present.   Cardiovascular: Normal rate, regular rhythm, normal heart sounds and normal pulses.    Pulmonary/Chest: Effort normal and breath sounds normal. No stridor. She has no decreased breath sounds. She has no wheezes. She has no rhonchi. She has no rales.   Abdominal: Soft. Bowel sounds are normal. There is no tenderness.   Musculoskeletal: Normal range of motion.   Lymphadenopathy:        Head (right side): No submental, no submandibular, no preauricular, no posterior auricular and no occipital adenopathy present.        Head (left side): No submental, no submandibular, no preauricular, no posterior auricular and no occipital adenopathy present.     She has no cervical adenopathy.   Neurological: She is alert and oriented to person, place, and time. She has normal strength. No cranial nerve deficit  or sensory deficit. Gait normal.   Skin: Skin is warm and dry. No petechiae and no rash noted. No cyanosis. Nails show no clubbing.   Psychiatric: She has a normal mood and affect. Her speech is normal and behavior is normal. Judgment and thought content normal. Cognition and memory are normal.       Assessment:       1. Acute non-recurrent sinusitis, unspecified location    2. Nonintractable episodic headache, unspecified headache type    3. Dysfunction of both eustachian tubes    4. Left ear pain    5. Allergic rhinitis, unspecified seasonality, unspecified trigger    6. Nasal septal deviation    7. Nasal turbinate hypertrophy        Plan:       The patient continue with her antibiotics.  I'm placing her on an oral steroid taper and will recheck her in 1 week.

## 2018-05-29 NOTE — PROCEDURES
"Nasal/sinus endoscopy  Date/Time: 5/29/2018 8:25 AM    Time out: Immediately prior to procedure a "time out" was called to verify the correct patient, procedure, equipment, support staff and site/side marked as required.  Performed by: KARINA TUBBS  Authorized by: KARINA TUBBS     Consent Done?:  Yes (Verbal)  Anesthesia:     Local anesthetic:  Lidocaine 2% and Michael-Synephrine 1/2% (Topical aerosol)    Location: Bilateral rigid nasal endoscopy with video.    Endoscope type: 0 degree, 3 mm rigid nasal telescope.    Patient tolerance:  Patient tolerated the procedure well with no immediate complications  Nose:     Procedure Performed:  Nasal Endoscopy  External:      No external nasal deformity  Intranasal:      Mucosa no polyps     Mucosa ulcers not present     Mucosa lesions present (Left endoscopic antrostomy and nasal frontal recess surgical resection is with clear margins and well-healed, mucus crusting in sphenoid ethmoid recess on the right)     Enlarged turbinates ( edema of middle turbinates bilaterally, inferior turbinates enlarged bilaterally )     Septum gross deformity ( to the right)  Nasopharynx:      No mucosa lesions     Adenoids not present     Posterior choanae patent     Eustachian tube not patent ( edema of torus tubarius bilaterally)        "

## 2018-06-01 ENCOUNTER — OFFICE VISIT (OUTPATIENT)
Dept: INTERNAL MEDICINE | Facility: CLINIC | Age: 58
End: 2018-06-01
Payer: COMMERCIAL

## 2018-06-01 VITALS
HEART RATE: 63 BPM | RESPIRATION RATE: 18 BRPM | OXYGEN SATURATION: 98 % | SYSTOLIC BLOOD PRESSURE: 124 MMHG | WEIGHT: 197.75 LBS | BODY MASS INDEX: 32.95 KG/M2 | HEIGHT: 65 IN | DIASTOLIC BLOOD PRESSURE: 68 MMHG

## 2018-06-01 DIAGNOSIS — E78.00 PURE HYPERCHOLESTEROLEMIA: ICD-10-CM

## 2018-06-01 DIAGNOSIS — E55.9 VITAMIN D DEFICIENCY: ICD-10-CM

## 2018-06-01 DIAGNOSIS — M19.90 OSTEOARTHRITIS, UNSPECIFIED OSTEOARTHRITIS TYPE, UNSPECIFIED SITE: ICD-10-CM

## 2018-06-01 DIAGNOSIS — I10 ESSENTIAL HYPERTENSION: ICD-10-CM

## 2018-06-01 DIAGNOSIS — E66.9 OBESITY, UNSPECIFIED CLASSIFICATION, UNSPECIFIED OBESITY TYPE, UNSPECIFIED WHETHER SERIOUS COMORBIDITY PRESENT: ICD-10-CM

## 2018-06-01 DIAGNOSIS — Z00.00 WELL ADULT EXAM: Primary | ICD-10-CM

## 2018-06-01 DIAGNOSIS — Z12.31 VISIT FOR SCREENING MAMMOGRAM: Primary | ICD-10-CM

## 2018-06-01 DIAGNOSIS — I83.813 VARICOSE VEINS OF BILATERAL LOWER EXTREMITIES WITH PAIN: ICD-10-CM

## 2018-06-01 PROCEDURE — 99999 PR PBB SHADOW E&M-EST. PATIENT-LVL III: CPT | Mod: PBBFAC,,, | Performed by: FAMILY MEDICINE

## 2018-06-01 PROCEDURE — 3078F DIAST BP <80 MM HG: CPT | Mod: CPTII,S$GLB,, | Performed by: FAMILY MEDICINE

## 2018-06-01 PROCEDURE — 99396 PREV VISIT EST AGE 40-64: CPT | Mod: S$GLB,,, | Performed by: FAMILY MEDICINE

## 2018-06-01 PROCEDURE — 3074F SYST BP LT 130 MM HG: CPT | Mod: CPTII,S$GLB,, | Performed by: FAMILY MEDICINE

## 2018-06-01 NOTE — PROGRESS NOTES
Subjective:       Patient ID: Rosamaria Agosto is a 57 y.o. female.    Chief Complaint: Annual Exam    HPI 57-year-old  female presents to clinic today for annual physical exam.  She has previously been treated by Cardiology for hypertension, hyperlipidemia, and lower extremity varicose veins.  At this time she has well-controlled hypertension on hydrochlorothiazide 25 mg daily.  She continues to take Lipitor 80 mg daily but continues to have substantially elevated cholesterol levels.  She reports frequent dietary noncompliance with increased fried food intake.  She continues to be followed by ENT secondary to chronic sinusitis which is currently stable on Flonase nasal spray she has a past surgical history of hysterectomy, bilateral tubal ligation, breast biopsy, encephalocele repair, and nasal sinus surgery.  She has a family history of diabetes and hypertension.  She is up-to-date with colonoscopy and mammogram.  Review of Systems   Constitutional: Negative for appetite change, chills, fatigue and fever.   HENT: Negative for congestion, ear pain, hearing loss, postnasal drip, rhinorrhea, sinus pressure, sore throat and tinnitus.    Eyes: Negative for redness, itching and visual disturbance.   Respiratory: Negative for cough, chest tightness and shortness of breath.    Cardiovascular: Negative for chest pain and palpitations.   Gastrointestinal: Negative for abdominal pain, constipation, diarrhea, nausea and vomiting.   Genitourinary: Negative for decreased urine volume, difficulty urinating, dysuria, frequency, hematuria and urgency.   Musculoskeletal: Negative for back pain, myalgias, neck pain and neck stiffness.   Skin: Negative for rash.   Neurological: Negative for dizziness, light-headedness and headaches.   Psychiatric/Behavioral: Negative.        Objective:      Physical Exam   Constitutional: She is oriented to person, place, and time. She appears well-developed and well-nourished. No  distress.   HENT:   Head: Normocephalic and atraumatic.   Right Ear: External ear normal.   Left Ear: External ear normal.   Nose: Nose normal.   Mouth/Throat: Oropharynx is clear and moist. No oropharyngeal exudate.   Eyes: Conjunctivae and EOM are normal. Pupils are equal, round, and reactive to light. Right eye exhibits no discharge. Left eye exhibits no discharge. No scleral icterus.   Neck: Normal range of motion. Neck supple. No JVD present. No tracheal deviation present. No thyromegaly present.   Cardiovascular: Normal rate, regular rhythm, normal heart sounds and intact distal pulses.  Exam reveals no gallop and no friction rub.    No murmur heard.  Pulmonary/Chest: Effort normal and breath sounds normal. No stridor. No respiratory distress. She has no wheezes. She has no rales.   Abdominal: Soft. Bowel sounds are normal. She exhibits no distension and no mass. There is no tenderness. There is no rebound and no guarding.   Musculoskeletal: Normal range of motion. She exhibits no edema or tenderness.   Lymphadenopathy:     She has no cervical adenopathy.   Neurological: She is alert and oriented to person, place, and time.   Skin: Skin is warm and dry. No rash noted. She is not diaphoretic. No erythema. No pallor.   Psychiatric: She has a normal mood and affect. Her behavior is normal. Judgment and thought content normal.   Nursing note and vitals reviewed.      Assessment:       1. Well adult exam    2. Essential hypertension    3. Pure hypercholesterolemia    4. Vitamin D deficiency    5. Varicose veins of bilateral lower extremities with pain    6. Obesity, unspecified classification, unspecified obesity type, unspecified whether serious comorbidity present    7. Osteoarthritis, unspecified osteoarthritis type, unspecified site        Plan:       1.  Labs have been reviewed and were overall within normal limits except for vitamin D deficiency and continued hyperlipidemia.  2.  Continue hydrochlorothiazide  25 mg daily.  Hypertension is well controlled.  3.  Continue Lipitor 80 mg daily and encourage low-fat diet.  4.  Vitamin-D 17632 units once weekly and repeat vitamin-D level in 3 months.  5.  Continue aspirin daily.  6.  Encourage diet and exercise.  7.  Tylenol or meloxicam as needed for osteoarthritis.  8.  Return to clinic as needed or in 1 year for annual exam.

## 2018-06-12 ENCOUNTER — HOSPITAL ENCOUNTER (OUTPATIENT)
Dept: RADIOLOGY | Facility: HOSPITAL | Age: 58
Discharge: HOME | End: 2018-06-12
Attending: PHYSICAL MEDICINE & REHABILITATION
Payer: COMMERCIAL

## 2018-06-12 ENCOUNTER — INITIAL CONSULT (OUTPATIENT)
Dept: PHYSICAL MEDICINE AND REHAB | Facility: CLINIC | Age: 58
End: 2018-06-12
Payer: COMMERCIAL

## 2018-06-12 VITALS
HEIGHT: 65 IN | SYSTOLIC BLOOD PRESSURE: 146 MMHG | BODY MASS INDEX: 32.51 KG/M2 | HEART RATE: 65 BPM | DIASTOLIC BLOOD PRESSURE: 85 MMHG | WEIGHT: 195.13 LBS

## 2018-06-12 DIAGNOSIS — G89.29 CHRONIC BILATERAL LOW BACK PAIN, WITH SCIATICA PRESENCE UNSPECIFIED: Primary | ICD-10-CM

## 2018-06-12 DIAGNOSIS — G89.29 CHRONIC BILATERAL LOW BACK PAIN, WITH SCIATICA PRESENCE UNSPECIFIED: ICD-10-CM

## 2018-06-12 DIAGNOSIS — M54.5 CHRONIC BILATERAL LOW BACK PAIN, WITH SCIATICA PRESENCE UNSPECIFIED: Primary | ICD-10-CM

## 2018-06-12 DIAGNOSIS — M47.26 OSTEOARTHRITIS OF SPINE WITH RADICULOPATHY, LUMBAR REGION: ICD-10-CM

## 2018-06-12 DIAGNOSIS — E66.9 OBESITY (BMI 30.0-34.9): ICD-10-CM

## 2018-06-12 DIAGNOSIS — G89.29 CHRONIC PAIN OF RIGHT KNEE: ICD-10-CM

## 2018-06-12 DIAGNOSIS — M25.561 CHRONIC PAIN OF RIGHT KNEE: ICD-10-CM

## 2018-06-12 DIAGNOSIS — M54.5 CHRONIC BILATERAL LOW BACK PAIN, WITH SCIATICA PRESENCE UNSPECIFIED: ICD-10-CM

## 2018-06-12 PROCEDURE — 99204 OFFICE O/P NEW MOD 45 MIN: CPT | Mod: S$GLB,,, | Performed by: PHYSICAL MEDICINE & REHABILITATION

## 2018-06-12 PROCEDURE — 72120 X-RAY BEND ONLY L-S SPINE: CPT | Mod: TC

## 2018-06-12 PROCEDURE — 3077F SYST BP >= 140 MM HG: CPT | Mod: CPTII,S$GLB,, | Performed by: PHYSICAL MEDICINE & REHABILITATION

## 2018-06-12 PROCEDURE — 72120 X-RAY BEND ONLY L-S SPINE: CPT | Mod: 26,,, | Performed by: RADIOLOGY

## 2018-06-12 PROCEDURE — 73560 X-RAY EXAM OF KNEE 1 OR 2: CPT | Mod: 26,50,, | Performed by: RADIOLOGY

## 2018-06-12 PROCEDURE — 99999 PR PBB SHADOW E&M-EST. PATIENT-LVL III: CPT | Mod: PBBFAC,,, | Performed by: PHYSICAL MEDICINE & REHABILITATION

## 2018-06-12 PROCEDURE — 73560 X-RAY EXAM OF KNEE 1 OR 2: CPT | Mod: TC,50

## 2018-06-12 PROCEDURE — 72100 X-RAY EXAM L-S SPINE 2/3 VWS: CPT | Mod: 26,,, | Performed by: RADIOLOGY

## 2018-06-12 PROCEDURE — 3008F BODY MASS INDEX DOCD: CPT | Mod: CPTII,S$GLB,, | Performed by: PHYSICAL MEDICINE & REHABILITATION

## 2018-06-12 PROCEDURE — 3079F DIAST BP 80-89 MM HG: CPT | Mod: CPTII,S$GLB,, | Performed by: PHYSICAL MEDICINE & REHABILITATION

## 2018-06-12 RX ORDER — ACETAMINOPHEN 500 MG
500-1000 TABLET ORAL 3 TIMES DAILY PRN
Refills: 0 | COMMUNITY
Start: 2018-06-12 | End: 2018-10-18

## 2018-06-12 RX ORDER — MELOXICAM 15 MG/1
15 TABLET ORAL DAILY
Qty: 90 TABLET | Refills: 1 | Status: SHIPPED | OUTPATIENT
Start: 2018-06-12 | End: 2019-04-08 | Stop reason: SDUPTHER

## 2018-06-12 RX ORDER — TRAMADOL HYDROCHLORIDE 50 MG/1
50 TABLET ORAL DAILY PRN
Qty: 30 TABLET | Refills: 2 | Status: SHIPPED | OUTPATIENT
Start: 2018-06-12 | End: 2021-08-20

## 2018-06-12 NOTE — PATIENT INSTRUCTIONS
Neck/Back Pain [General]    Both neck and back pain are usually caused by injury to the muscles or ligaments of the spine. Sometimes the disks that separate each bone of the spine may cause pain by putting pressure on a nearby nerve. Back and neck pain may appear after a sudden twisting/bending force (such as in a car accident), or sometimes after a simple awkward movement. In either case, muscle spasm is often present and adds to the pain.  Acute neck and back pain usually gets better in one to two weeks. Pain related to disk disease, arthritis in the spinal joints or spinal stenosis (narrowing of the spinal canal) can become chronic and last for months or years.  Home Care:  · FOR NECK PAIN: Use a comfortable pillow that supports the head and keeps the spine in a neutral position. The position of the head should not be tilted forward or backward.  · FOR BACK PAIN: You may need to stay in bed the first few days. But, as soon as possible, begin sitting or walking to avoid problems with prolonged bed rest (muscle weakness, worsening back stiffness and pain, blood clots in the legs).  · When in bed, try to find a position of comfort. A firm mattress is best. Try lying flat on your back with pillows under your knees. You can also try lying on your side with your knees bent up towards your chest and a pillow between your knees.  · Avoid prolonged sitting. This puts more stress on the lower back than standing or walking.  · During the first two days after injury, apply an ICE PACK to the painful area for 20 minutes every 2-4 hours. This will reduce swelling and pain. HEAT (hot shower, hot bath or heating pad) works well for muscle spasm. You can start with ice, then switch to heat after two days. Some patients feel best alternating ice and heat treatments. Use the one method that feels the best to you.  · You may use acetaminophen (Tylenol) or ibuprofen (Motrin, Advil) to control pain, unless another pain medicine was  prescribed. [NOTE: If you have chronic liver or kidney disease or ever had a stomach ulcer or GI bleeding, talk with your doctor before using these medicines.]  · Be aware of safe lifting methods and do not lift anything over 15 pounds until all the pain is gone.  Follow Up  with your physician or this facility if your symptoms do not start to improve after one week. Physical therapy or further tests may be needed.  [NOTE: If X-rays were taken, they will be reviewed by a radiologist. You will be notified of any new findings that may affect your care.]  Get Prompt Medical Attention  if any of the following occur:  · Pain becomes worse or spreads into your arms or legs  · Weakness, numbness or pain in one or both arms or legs  · Loss of bowel or bladder control  · Numbness in the groin area  · Difficulty walking  · Fever of 100.4ºF (38ºC) or higher, or as directed by your healthcare provider  © 6576-7204 [facility], [address] [place] [address]. All rights reserved. This information is not intended as a substitute for professional medical care. Always follow your healthcare professional's instructions.    Back Exercises: Back Press  Do this exercise on your hands and knees. Keep your knees under your hips and your hands under your shoulders. Keep your spine in a neutral position (not arched or sagging). Be sure to maintain your necks natural curve.  · Tighten your abdominal and buttocks muscles to press your back upward. Let your head drop slightly.  · Hold for 5 seconds. Return to starting position.  · Repeat 5 times.     © 8338-4445 [facility], [address] [place] [address]. All rights reserved. This information is not intended as a substitute for professional medical care. Always follow your healthcare professional's instructions.    Back Exercises: Back Release  Do this exercise on your hands and knees. Keep your knees under your hips and your hands under your shoulders. Keep  your spine in a neutral position (not arched or sagging). Be sure to maintain your necks natural curve.  · Relax your abdominal and buttocks muscles, lift your head, and let your back sag. Be sure to keep your weight evenly distributed. Dont sit back on your hips.   · Hold for 5 seconds.  · Return to starting position.  · Repeat 5 times.  © 6360-4962 [facility] [place] [address]. All rights reserved. This information is not intended as a substitute for professional medical care. Always follow your healthcare professional's instructions.    Back Exercises: Bridge  The Bridge exercise strengthens your abdominal, buttocks, and hamstring muscles. This helps keep your back stable and aligned when you walk.  · Lie on the floor with your back and palms flat. Bend your knees. Keep your feet flat on the floor.  · Contract your abdominal and buttocks muscles. Slowly lift your buttocks off the floor until there is a straight line from your knees to your shoulders.  · Hold for 5  seconds. Repeat 10 times.    © 3061-5989 [place] [address]. All rights reserved. This information is not intended as a substitute for professional medical care. Always follow your healthcare professional's instructions.    Back Exercises: Elbow Press    To start, lie face down on your stomach, feet slightly apart, forehead on the floor. Breathe deeply. You should feel comfortable and relaxed in this position.  · Press up on your forearms. Keep your abdomen and hips on the floor.  · Hold for 20 seconds. Lower slowly.  · Repeat 2 times.  · Return to starting position.  © 6934-3118 [facility], [address] [place] [address]. All rights reserved. This information is not intended as a substitute for professional medical care. Always follow your healthcare professional's instructions.    Back Exercises: Pelvic Tilt  To start, lie on your back with your knees bent and  feet flat on the floor. Dont press your neck or lower back to the floor. Breathe deeply. You should feel comfortable and relaxed in this position.  · Tighten your abdomen and buttocks, and press your lower back toward the floor. This should be a small, subtle movement.  · Hold for 5 seconds. Release.  · Repeat 5 times.         © 8436-8551 [facility], [address] [place] [address]. All rights reserved. This information is not intended as a substitute for professional medical care. Always follow your healthcare professional's instructions.    Back Exercises: Partial Curl-Ups          To start, lie on your back with your knees bent and feet flat on the floor. Dont press your neck or lower back to the floor. Breathe deeply. You should feel comfortable and relaxed in this position.  · Cross your arms loosely.  · Tighten your abdomen and curl USP up, keeping your head in line with your shoulders.  · Hold for 5 seconds. Uncurl to lie down.  · Repeat 5 times.   © 3230-0754 [facility], [address] [place] [address]. All rights reserved. This information is not intended as a substitute for professional medical care. Always follow your healthcare professional's instructions.    Back Exercises: Lower Back Stretch                            To start, sit in a chair with your feet flat on the floor. Shift your weight slightly forward to avoid rounding your back. Relax, and keep your ears, shoulders, and hips aligned.  · Sit with your feet well apart.  · Bend forward and touch the floor with the backs of your hands. Relax and let your body drop.  · Hold for 20 seconds. Return to starting position.  · Repeat 2 times.   © 4516-4460 [facility], [address] [place] [address]. All rights reserved. This information is not intended as a substitute for professional medical care. Always follow your healthcare professional's instructions.    Back Exercises: Seated Rotation      To  start, sit in a chair with your feet flat on the floor. Shift your weight slightly forward to avoid rounding your back. Relax, and keep your ears, shoulders, and hips aligned.  · Fold your arms, elbows just below shoulder height.  · Turn from the waist with hips forward. Turn your head last.  · Hold for a count of 5. Return to starting position.  · Repeat 5 times on one side. Then switch sides.  © 6214-2406 [facility], [address] [place] [address]. All rights reserved. This information is not intended as a substitute for professional medical care. Always follow your healthcare professional's instructions.    Back Exercises: Side Stretch  To start, sit in a chair with your feet flat on the floor. Shift your weight slightly forward to avoid rounding your back. Relax. Keep your ears, shoulders, and hips aligned.  · Stretch your right arm overhead.  · Slowly bend to the left. Dont twist your torso.  · Hold for 20 seconds. Return to starting position.  · Repeat 2 times. Then, switch to the other side.  © 3923-7479 [facility], [address] [place] [address]. All rights reserved. This information is not intended as a substitute for professional medical care. Always follow your healthcare professional's instructions

## 2018-06-12 NOTE — LETTER
June 12, 2018      Bill Burns MD  [address] [facility] [place] [address]           [facility]  [address]  [facility] [address]  Phone: [phone]          Patient: Rosamaria Agosto   MR Number: [id number]   YOB: 1960   Date of Visit: 6/12/2018       Dear Dr. Bill Burns:    Thank you for referring Rosamaria Agosto to me for evaluation. Attached you will find relevant portions of my assessment and plan of care.    If you have questions, please do not hesitate to call me. I look forward to following Rosamaria Agosto along with you.    Sincerely,    Aimee Camara MD    Enclosure  CC:  No Recipients    If you would like to receive this communication electronically, please contact [email or web address] or [phone] to request more information on [facility] Link access.    For providers and/or their staff who would like to refer a patient to [facility], please contact us through our one-stop-shop provider referral line, [facility], at [phone].    If you feel you have received this communication in error or would no longer like to receive these types of communications, please e-mail [email or web address]
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
## 2018-06-18 ENCOUNTER — PATIENT MESSAGE (OUTPATIENT)
Dept: PHYSICAL MEDICINE AND REHAB | Facility: CLINIC | Age: 58
End: 2018-06-18

## 2018-06-23 ENCOUNTER — HOSPITAL ENCOUNTER (OUTPATIENT)
Dept: RADIOLOGY | Facility: HOSPITAL | Age: 58
Discharge: HOME | End: 2018-06-23
Attending: FAMILY MEDICINE
Payer: COMMERCIAL

## 2018-06-23 DIAGNOSIS — Z12.31 VISIT FOR SCREENING MAMMOGRAM: ICD-10-CM

## 2018-06-23 PROCEDURE — 77067 SCR MAMMO BI INCL CAD: CPT | Mod: TC

## 2018-06-23 PROCEDURE — 77067 SCR MAMMO BI INCL CAD: CPT | Mod: 26,,, | Performed by: RADIOLOGY

## 2018-06-23 PROCEDURE — 77063 BREAST TOMOSYNTHESIS BI: CPT | Mod: 26,,, | Performed by: RADIOLOGY

## 2018-08-27 PROBLEM — J01.90 ACUTE NON-RECURRENT SINUSITIS: Status: RESOLVED | Noted: 2018-05-28 | Resolved: 2018-08-27

## 2018-09-12 ENCOUNTER — HOSPITAL ENCOUNTER (EMERGENCY)
Facility: OTHER | Age: 58
Discharge: HOME | End: 2018-09-12
Attending: EMERGENCY MEDICINE
Payer: COMMERCIAL

## 2018-09-12 VITALS
HEART RATE: 68 BPM | HEIGHT: 69 IN | SYSTOLIC BLOOD PRESSURE: 136 MMHG | OXYGEN SATURATION: 98 % | BODY MASS INDEX: 29.62 KG/M2 | WEIGHT: 200 LBS | TEMPERATURE: 98 F | RESPIRATION RATE: 18 BRPM | DIASTOLIC BLOOD PRESSURE: 82 MMHG
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
## 2018-09-12 DIAGNOSIS — S39.012A BACK STRAIN, INITIAL ENCOUNTER: ICD-10-CM

## 2018-09-12 PROCEDURE — 63600175 PHARM REV CODE 636 W HCPCS: Performed by: EMERGENCY MEDICINE

## 2018-09-12 PROCEDURE — 96372 THER/PROPH/DIAG INJ SC/IM: CPT

## 2018-09-12 PROCEDURE — 99283 EMERGENCY DEPT VISIT LOW MDM: CPT | Mod: 25

## 2018-09-12 RX ORDER — CYCLOBENZAPRINE HCL 10 MG
10 TABLET ORAL 3 TIMES DAILY PRN
Qty: 20 TABLET | Refills: 0 | Status: SHIPPED | OUTPATIENT
Start: 2018-09-12 | End: 2018-09-17

## 2018-09-12 RX ORDER — KETOROLAC TROMETHAMINE 30 MG/ML
15 INJECTION, SOLUTION INTRAMUSCULAR; INTRAVENOUS
Status: COMPLETED | OUTPATIENT
Start: 2018-09-12 | End: 2018-09-12

## 2018-09-12 RX ADMIN — KETOROLAC TROMETHAMINE 15 MG: 30 INJECTION, SOLUTION INTRAMUSCULAR at 11:09
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
## 2018-10-18 ENCOUNTER — HOSPITAL ENCOUNTER (OUTPATIENT)
Dept: RADIOLOGY | Facility: HOSPITAL | Age: 58
Discharge: HOME | End: 2018-10-18
Attending: OTOLARYNGOLOGY
Payer: COMMERCIAL

## 2018-10-18 ENCOUNTER — OFFICE VISIT (OUTPATIENT)
Dept: OTOLARYNGOLOGY | Facility: CLINIC | Age: 58
End: 2018-10-18
Payer: COMMERCIAL

## 2018-10-18 VITALS — HEART RATE: 64 BPM | DIASTOLIC BLOOD PRESSURE: 92 MMHG | SYSTOLIC BLOOD PRESSURE: 138 MMHG

## 2018-10-18 DIAGNOSIS — J34.89 RHINORRHEA: ICD-10-CM

## 2018-10-18 DIAGNOSIS — J30.9 ALLERGIC RHINITIS, UNSPECIFIED SEASONALITY, UNSPECIFIED TRIGGER: ICD-10-CM

## 2018-10-18 DIAGNOSIS — G47.30 SLEEP-DISORDERED BREATHING: ICD-10-CM

## 2018-10-18 DIAGNOSIS — J32.4 CHRONIC PANSINUSITIS: Primary | ICD-10-CM

## 2018-10-18 PROCEDURE — 87077 CULTURE AEROBIC IDENTIFY: CPT

## 2018-10-18 PROCEDURE — 87070 CULTURE OTHR SPECIMN AEROBIC: CPT

## 2018-10-18 PROCEDURE — 70486 CT MAXILLOFACIAL W/O DYE: CPT | Mod: 26,,, | Performed by: RADIOLOGY

## 2018-10-18 PROCEDURE — 70486 CT MAXILLOFACIAL W/O DYE: CPT | Mod: TC

## 2018-10-18 PROCEDURE — 3075F SYST BP GE 130 - 139MM HG: CPT | Mod: CPTII,S$GLB,, | Performed by: OTOLARYNGOLOGY

## 2018-10-18 PROCEDURE — 99214 OFFICE O/P EST MOD 30 MIN: CPT | Mod: 25,S$GLB,, | Performed by: OTOLARYNGOLOGY

## 2018-10-18 PROCEDURE — 87075 CULTR BACTERIA EXCEPT BLOOD: CPT

## 2018-10-18 PROCEDURE — 87186 SC STD MICRODIL/AGAR DIL: CPT

## 2018-10-18 PROCEDURE — 99999 PR PBB SHADOW E&M-EST. PATIENT-LVL III: CPT | Mod: PBBFAC,,, | Performed by: OTOLARYNGOLOGY

## 2018-10-18 PROCEDURE — 3080F DIAST BP >= 90 MM HG: CPT | Mod: CPTII,S$GLB,, | Performed by: OTOLARYNGOLOGY

## 2018-10-18 PROCEDURE — 31231 NASAL ENDOSCOPY DX: CPT | Mod: S$GLB,,, | Performed by: OTOLARYNGOLOGY

## 2018-10-18 NOTE — PROCEDURES
Nasal/sinus endoscopy  Date/Time: 10/18/2018 11:07 AM  Performed by: Nikunj Sofia MD  Authorized by: Nikunj Sofia MD     Consent Done?:  Yes (Verbal)  Anesthesia:     Local anesthetic:  Lidocaine 4% and Michael-Synephrine 1/2%    Patient tolerance:  Patient tolerated the procedure well with no immediate complications  Nose:     Procedure Performed:  Nasal Endoscopy  External:      No external nasal deformity  Intranasal:      Mucosa no polyps     Mucosa ulcers not present     No mucosa lesions present     Turbinates not enlarged     No septum gross deformity  Nasopharynx:      No mucosa lesions     Adenoids not present     Posterior choanae patent     Eustachian tube patent     Mucopurulent exudate in bilateral sinuses, swabbed for culture.  Sinuses partially open.  No visible pulsations or masses.

## 2018-10-18 NOTE — PROGRESS NOTES
Subjective:      Rosamaria is a 58 y.o. female who comes for follow-up of sinusitis.  Her last visit with me was on 11/22/2017.  Now 14 months status-post endoscopic sinus surgery.   Here for unrelated issue, 3 days ago while stooping in bathroom had spontaneous onset of left-sided watery rhinorrhea.  This has persisted when straining or bending over.  Prior to this, about 4 months ago she had a sinus infection with severe frontal headache that was treated with antibiotics and a steroid shot.  She continues to have occasional yellow-brown nasal discharge.  She has used Flonase daily and saline rinse once a month.  Also wishes to proceed with sleep study.    SNOT-22 score = 37, NOSE score = 30%, ETDQ-7 score = 1.0    The patient's medications, allergies, past medical, surgical, social and family histories were reviewed and updated as appropriate.    A detailed review of systems was obtained with pertinent positives as per the above HPI, and otherwise negative.        Objective:     BP (!) 138/92   Pulse 64   LMP  (LMP Unknown)        Constitutional:   She appears well-developed. She is cooperative. Normal speech.  No hoarse voice.      Head:  Normocephalic. Salivary glands normal.  Facial strength is normal.      Ears:    Right Ear: No drainage or tenderness. Tympanic membrane is not perforated. Tympanic membrane mobility is normal. No middle ear effusion. No decreased hearing is noted.   Left Ear: No drainage or tenderness. Tympanic membrane is not perforated. Tympanic membrane mobility is normal.  No middle ear effusion. No decreased hearing is noted.     Nose:  No mucosal edema, rhinorrhea, septal deviation or polyps. No epistaxis. Turbinates normal, no turbinate masses and no turbinate hypertrophy.  Right sinus exhibits no maxillary sinus tenderness and no frontal sinus tenderness. Left sinus exhibits no maxillary sinus tenderness and no frontal sinus tenderness.     Mouth/Throat  Oropharynx clear and moist  without lesions or asymmetry and normal uvula midline. She does not have dentures. Normal dentition. No oral lesions or mucous membrane lesions. No oropharyngeal exudate or posterior oropharyngeal erythema. Mirror exam not performed due to patient tolerance.  Mirror exam not performed due to patient tolerance.      Neck:  Neck normal without thyromegaly masses, asymmetry, normal tracheal structure, crepitus, and tenderness, thyroid normal, trachea normal and no adenopathy. Normal range of motion present.     She has no cervical adenopathy.     Cardiovascular:   Regular rhythm.      Pulmonary/Chest:   Effort normal.     Psychiatric:   She has a normal mood and affect. Her speech is normal and behavior is normal.     Neurological:   No cranial nerve deficit.     Skin:   No rash noted.       Procedure    Nasal endoscopy performed.  See procedure note.     Left ethmoid     Left ethmoid     Right MT     Right SE recess with mucopurulence, swabbed for culture        Data Reviewed    WBC (K/uL)   Date Value   05/28/2018 12.42     Eosinophil% (%)   Date Value   05/28/2018 1.2     Eos # (K/uL)   Date Value   05/28/2018 0.2     Platelets (K/uL)   Date Value   05/28/2018 509 (H)     Glucose (mg/dL)   Date Value   05/28/2018 97     No results found for: IGE    Pathology report indicated chronic inflammation with eosinophilia.    Cultures showed E coli.      Assessment:     1. Chronic pansinusitis    2. Rhinorrhea    3. Sleep-disordered breathing    4. Allergic rhinitis, unspecified seasonality, unspecified trigger         Plan:     Cultures taken.  Get CT sinuses to rule out skull base defect.  Given vial to collect nasal fluid for beta-2 transferrin assay.  Referred to sleep medicine, with caveat to defer testing until after current acute issue is resolved.  Follow-up for test results.

## 2018-10-18 NOTE — LETTER
October 18, 2018      [facility]  [address]  [facility] [address]  Phone: [phone]  Fax: [phone]       Patient: Rosamaria Agosto   YOB: 1960  Date of Visit: 10/18/2018    To Whom It May Concern:    Timothy Agosto  was at [facility] on 10/18/2018. She may return to work on 10/19/18 with no restrictions. If you have any questions or concerns, or if I can be of further assistance, please do not hesitate to contact me.    Sincerely,    Alexandra Kamara LPN

## 2018-10-19 ENCOUNTER — OFFICE VISIT (OUTPATIENT)
Dept: OBSTETRICS AND GYNECOLOGY | Facility: CLINIC | Age: 58
End: 2018-10-19
Payer: COMMERCIAL

## 2018-10-19 VITALS — WEIGHT: 200.63 LBS | BODY MASS INDEX: 29.71 KG/M2 | HEIGHT: 69 IN

## 2018-10-19 DIAGNOSIS — L98.9 SKIN LESION: Primary | ICD-10-CM

## 2018-10-19 DIAGNOSIS — N81.10 PROLAPSE OF ANTERIOR VAGINAL WALL: ICD-10-CM

## 2018-10-19 DIAGNOSIS — E66.3 OVERWEIGHT: ICD-10-CM

## 2018-10-19 LAB
BILIRUB SERPL-MCNC: NORMAL MG/DL
BLOOD URINE, POC: NORMAL
COLOR, POC UA: NORMAL
GLUCOSE UR QL STRIP: NORMAL
KETONES UR QL STRIP: 15
LEUKOCYTE ESTERASE URINE, POC: NORMAL
NITRITE, POC UA: NORMAL
PH, POC UA: 7
PROTEIN, POC: NORMAL
SPECIFIC GRAVITY, POC UA: 1.02
UROBILINOGEN, POC UA: NORMAL

## 2018-10-19 PROCEDURE — 3008F BODY MASS INDEX DOCD: CPT | Mod: CPTII,S$GLB,, | Performed by: OBSTETRICS & GYNECOLOGY

## 2018-10-19 PROCEDURE — 99214 OFFICE O/P EST MOD 30 MIN: CPT | Mod: 25,S$GLB,, | Performed by: OBSTETRICS & GYNECOLOGY

## 2018-10-19 PROCEDURE — 99999 PR PBB SHADOW E&M-EST. PATIENT-LVL III: CPT | Mod: PBBFAC,,, | Performed by: OBSTETRICS & GYNECOLOGY

## 2018-10-19 PROCEDURE — 81002 URINALYSIS NONAUTO W/O SCOPE: CPT | Mod: S$GLB,,, | Performed by: OBSTETRICS & GYNECOLOGY

## 2018-10-19 NOTE — PROGRESS NOTES
"CC: 59 yo  female, here for problem visit     HPI: 58 is overall well today. She is a . S/p TVH in  for uterine fibroids. Has several complaints today. 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 Never sees bulge but feels lots of pressure. No problems emptying her bladder or rectum. No splinting. No loss of urine. Also complains of vaginal dryness. Given Rx years ago for premarin but never used it. She reports growth on her left labia as well as skin change on her lower abdomen that has been there for almost 9 months, but possibly growing.     She is overweight, BMI 29. Was working with nutritionist previously, but stopped going. She has plans to return to diet/exercise routine in order to lose excess weight.     OB History    Para Term  AB Living   2 2 0     2   SAB TAB Ectopic Multiple Live Births           2      # Outcome Date GA Lbr Aleksey/2nd Weight Sex Delivery Anes PTL Lv   2 Para     M Vag-Spont   JESU   1 Para     F Vag-Spont   JESU          Past Medical History:   Diagnosis Date    Abnormal Pap smear of cervix yrs ago    Arthritis     History of uterine fibroid     Hyperlipidemia     Hypertension     Sinus problem        Past Surgical History:   Procedure Laterality Date    BREAST BIOPSY  24-25 years old    ENCEPHALOCELE REPAIR  46    HYSTERECTOMY  93'-95'    ovaries remain    NASAL SINUS SURGERY      RESECTION-TURBINATES (SMR) Bilateral 2017    [facility] 2ND FLR    SEPTOPLASTY Bilateral 2017    [facility]    SINUS SURGERY FUNCTIONAL ENDOSCOPIC WITH NAVIGATION stealth and propel needed Bilateral 2017    Performed [facility] at [facility] OR 2ND FLR    TUBAL LIGATION      VAGINAL DELIVERY         OB History      Para Term  AB Living    2 2 0     2    SAB TAB Ectopic " "Multiple Live Births            2          Current Outpatient Medications on File Prior to Visit   Medication Sig Dispense Refill    atorvastatin (LIPITOR) 80 MG tablet Take 1 tablet (80 mg total) by mouth nightly. 30 tablet 11    budesonide (PULMICORT) 0.5 mg/2 mL nebulizer solution inhale 1 nebules per nebulizer twice daily, for use in saline irrigation as directed  1    diclofenac (VOLTAREN) 75 MG EC tablet Take 1 tablet (75 mg total) by mouth 2 (two) times daily. 60 tablet 11    diethylpropion 75 mg TbSR Take 75 mg by mouth once daily. 30 tablet 1    hydroCHLOROthiazide (HYDRODIURIL) 25 MG tablet Take 1 tablet (25 mg total) by mouth once daily. 30 tablet 11    meloxicam (MOBIC) 15 MG tablet Take 1 tablet (15 mg total) by mouth once daily. 90 tablet 1    phentermine (ADIPEX-P) 37.5 mg tablet Take 37.5 mg by mouth before breakfast.  2    traMADol (ULTRAM) 50 mg tablet Take 1 tablet (50 mg total) by mouth daily as needed (severe pain). 30 tablet 2    VITAMIN D2 50,000 unit capsule Take 1 capsule (50,000 Units total) by mouth every 7 days. 4 capsule 2    azelastine (ASTELIN) 137 mcg (0.1 %) nasal spray 1 spray (137 mcg total) by Nasal route 2 (two) times daily. 30 mL 2     No current facility-administered medications on file prior to visit.          ROS:  GENERAL: Reports weigh gain. Feeling well overall.   SKIN: Reports skin lesion   ABDOMEN: No abdominal pain  URINARY: No frequency, dysuria, hematuria or burning on urination.  REPRODUCTIVE: See HPI.   MUSCULOSKELETAL: Denies joint pain or swelling.     Physical Exam:   Ht 5' 9" (1.753 m)   Wt 91 kg (200 lb 9.9 oz)   LMP 10/19/1993 (Approximate)   BMI 29.63 kg/m²   General: No distress, well appearing, overweight   Heart: Regular rate  Lungs: No increased work of breathing  Abdomen: soft, nontender, no masses, obese. There is oval shaped hyperpigmentation in right lower abdomen, just below her panus.   MS: lower extremeties symmetrical, no " edema  Pelvic Exam: NEFG. There is a 3 mm skin tag on the left labia minora. Vaginal mucosa is atrophic appearing. On speculum exam, vaginal cuff normal and intact. See below for POP-Q. On bimanual exam, uterus is surgically absent. No adnexal masses/fullnes. Cuff intact.     Aa: -1  Ba: -1  C: - 9 cm   TVL: 9 cm  Ap:-3  Bp:-3  D: X    Voided (erroneously did not measure)  PVR 5 ccs  Urine dip negative       ASSESSMENT/PLAN: 59 yo here with multiple complaints.    1. Pelvic pressure, s/p TVH   - no evidence of apical prolapse on exam  - Anterior prolapse, stage 2. We discussed management options including expectant management/weight loss and lifestyle changes, pessary and surgical management. After reviewing risks/benefits of each, she has opted to try expectant management with weight loss. She plans to follow up with nutritionist she was seeing previously.     2. Vaginal atrophy  - Previously given Rx for premarin but never used.   - Will send Rx for estring. Reviewed instructions for use.    3. Skin lesion  - Referral placed to dermatology.     4. Vulvar skin tag  - Expectant management. Offered removal. Not too bothersome, so she would like to leave alone for now.     Parul Marie MD  Obstetrics and Gynecology  [facility]

## 2018-10-22 LAB — BACTERIA SPEC AEROBE CULT: NORMAL

## 2018-10-23 ENCOUNTER — PATIENT MESSAGE (OUTPATIENT)
Dept: OTOLARYNGOLOGY | Facility: CLINIC | Age: 58
End: 2018-10-23

## 2018-10-23 DIAGNOSIS — J32.8 OTHER CHRONIC SINUSITIS: Primary | ICD-10-CM

## 2018-10-23 LAB — BACTERIA SPEC ANAEROBE CULT: NORMAL

## 2018-10-23 RX ORDER — AMOXICILLIN 500 MG/1
500 TABLET, FILM COATED ORAL EVERY 12 HOURS
Qty: 42 TABLET | Refills: 0 | Status: SHIPPED | OUTPATIENT
Start: 2018-10-23 | End: 2018-11-13

## 2018-10-24 ENCOUNTER — PATIENT MESSAGE (OUTPATIENT)
Dept: OTOLARYNGOLOGY | Facility: CLINIC | Age: 58
End: 2018-10-24

## 2018-11-02 ENCOUNTER — PATIENT MESSAGE (OUTPATIENT)
Dept: OBSTETRICS AND GYNECOLOGY | Facility: CLINIC | Age: 58
End: 2018-11-02

## 2018-11-05 RX ORDER — FLUCONAZOLE 150 MG/1
150 TABLET ORAL DAILY
Qty: 1 TABLET | Refills: 0 | Status: SHIPPED | OUTPATIENT
Start: 2018-11-05 | End: 2018-11-06

## 2018-11-09 ENCOUNTER — OFFICE VISIT (OUTPATIENT)
Dept: BARIATRICS | Facility: CLINIC | Age: 58
End: 2018-11-09
Payer: COMMERCIAL

## 2018-11-09 VITALS
SYSTOLIC BLOOD PRESSURE: 120 MMHG | BODY MASS INDEX: 29.65 KG/M2 | DIASTOLIC BLOOD PRESSURE: 74 MMHG | WEIGHT: 200.19 LBS | HEIGHT: 69 IN | HEART RATE: 72 BPM

## 2018-11-09 DIAGNOSIS — E66.3 OVERWEIGHT (BMI 25.0-29.9): Primary | ICD-10-CM

## 2018-11-09 PROCEDURE — 99999 PR PBB SHADOW E&M-EST. PATIENT-LVL III: CPT | Mod: PBBFAC,,, | Performed by: INTERNAL MEDICINE

## 2018-11-09 PROCEDURE — 3078F DIAST BP <80 MM HG: CPT | Mod: CPTII,S$GLB,, | Performed by: INTERNAL MEDICINE

## 2018-11-09 PROCEDURE — 3074F SYST BP LT 130 MM HG: CPT | Mod: CPTII,S$GLB,, | Performed by: INTERNAL MEDICINE

## 2018-11-09 PROCEDURE — 3008F BODY MASS INDEX DOCD: CPT | Mod: CPTII,S$GLB,, | Performed by: INTERNAL MEDICINE

## 2018-11-09 PROCEDURE — 99213 OFFICE O/P EST LOW 20 MIN: CPT | Mod: S$GLB,,, | Performed by: INTERNAL MEDICINE

## 2018-11-09 RX ORDER — PHENTERMINE HYDROCHLORIDE 37.5 MG/1
37.5 TABLET ORAL
Qty: 30 TABLET | Refills: 2 | Status: SHIPPED | OUTPATIENT
Start: 2018-11-09 | End: 2019-06-07

## 2018-11-09 RX ORDER — DICLOFENAC SODIUM 75 MG/1
75 TABLET, DELAYED RELEASE ORAL 2 TIMES DAILY
Qty: 60 TABLET | Refills: 11 | Status: SHIPPED | OUTPATIENT
Start: 2018-11-09 | End: 2019-03-12

## 2018-11-09 NOTE — PROGRESS NOTES
"Subjective:       Patient ID: Rosamaria Agosto is a 58 y.o. female.    Chief Complaint: Follow-up    Pt here today for follow-up. Has gained 3 lbs since last in a year ago. Net neg 5 lbs.  BP is good today. In past tried LCHF diet with both phentermine (had some modest weight lost), then diethylpropion (she did not follow up after).  Pt states she just quit. She had lost 12 or 13 lbs in total.. She is now having some bladder issues. Feels pressure. Weight loss has been advised. In the past few days shehas been cutting soda and increasing her vegetable.       Review of Systems   Constitutional: Negative for chills and fever.   Respiratory: Positive for shortness of breath.         + snores. Not as bad since sinus surgery   Cardiovascular: Positive for leg swelling. Negative for chest pain.   Gastrointestinal: Negative for constipation and diarrhea.        Some GERD   Genitourinary: Positive for urgency. Negative for difficulty urinating and dyspareunia.   Musculoskeletal: Positive for arthralgias and back pain.        Shoulders   Neurological: Negative for dizziness and light-headedness.   Psychiatric/Behavioral: Negative for dysphoric mood. The patient is not nervous/anxious.        Objective:     /74   Pulse 72   Ht 5' 9" (1.753 m)   Wt 90.8 kg (200 lb 2.8 oz)   LMP 10/19/1993 (Approximate)   BMI 29.56 kg/m²     Physical Exam   Constitutional: She is oriented to person, place, and time. She appears well-developed and well-nourished. No distress.   HENT:   Head: Normocephalic and atraumatic.   Eyes: EOM are normal. Pupils are equal, round, and reactive to light. No scleral icterus.   Neck: Normal range of motion. Neck supple.   Cardiovascular: Normal rate.   Pulmonary/Chest: Effort normal.   Musculoskeletal: Normal range of motion. She exhibits no edema.   Neurological: She is alert and oriented to person, place, and time. No cranial nerve deficit.   Skin: Skin is warm and dry. No erythema. "   Psychiatric: She has a normal mood and affect. Her behavior is normal. Judgment normal.   Vitals reviewed.      Assessment:       1. Overweight (BMI 25.0-29.9)        Plan:       Rosamaria was seen today for follow-up.    Diagnoses and all orders for this visit:    Overweight (BMI 25.0-29.9)  -     phentermine (ADIPEX-P) 37.5 mg tablet; Take 1 tablet (37.5 mg total) by mouth before breakfast.        Patient warned of common side effects of phentermine including anxiety, insomnia, palpitations and increased blood pressure. It was also explained that it is for short-term usage along with diet and exercise, and that stopping the medication without making lifestyle changes will result in regain of weight. Patient states understanding.     Weight loss medications are controlled substances.  They require routine follow up. Prescription or pills that are lost or destroyed will not be replaced.      Start phentermine with 1/2 pill a day for at least 1 week to see if that will control your appetite.  Go up to a full pill when needed.      Start Exercise 30 min 3 times a week.         3 meals a day made up of the following:  Unlimited green vegetables, tomatoes, mushrooms, spaghetti squash, cauliflower, meat, poultry, seafood, eggs and hard cheeses.   Milk and plain yogurt  Dressings, seasonings, condiments, etc should have less than 2 g sugars.   Beans (1-1.5 cups) or nuts (1/4 cup) can have 1 x a day.   1-2 servings of citrus fruits, berries, pineapple or melon a day (1/2 cup)  Avoid fried foods    No grains, rice, pasta, potatoes, bread, corn, peas, oatmeal, grits, tortillas, crackers, chips    No soda, sweet tea, juices or lemonade    Www.dietdoctor.com for recipes. Moderate carb intake    Holiday tips given.

## 2018-11-09 NOTE — PATIENT INSTRUCTIONS
Patient warned of common side effects of phentermine including anxiety, insomnia, palpitations and increased blood pressure. It was also explained that it is for short-term usage along with diet and exercise, and that stopping the medication without making lifestyle changes will result in regain of weight. Patient states understanding.     Weight loss medications are controlled substances.  They require routine follow up. Prescription or pills that are lost or destroyed will not be replaced.      Start phentermine with 1/2 pill a day for at least 1 week to see if that will control your appetite.  Go up to a full pill when needed.      Start Exercise 30 min 3 times a week.     3 meals a day made up of the following:  Unlimited green vegetables, tomatoes, mushrooms, spaghetti squash, cauliflower, meat, poultry, seafood, eggs and hard cheeses.   Milk and plain yogurt  Dressings, seasonings, condiments, etc should have less than 2 g sugars.   Beans (1-1.5 cups) or nuts (1/4 cup) can have 1 x a day.   1-2 servings of citrus fruits, berries, pineapple or melon a day (1/2 cup)  Avoid fried foods    No grains, rice, pasta, potatoes, bread, corn, peas, oatmeal, grits, tortillas, crackers, chips    No soda, sweet tea, juices or lemonade    Www.dietdoctor.[employer] for recipes. Moderate carb intake    Helpful tips to survive the holidays:  - Dont save yourself for the meal: Make sure you continue to eat high protein small meals every 3-4 hours to ensure to do not become over-hungry. Avoid temptation by not showing up to a holiday party or gathering hungry.   - Plan ahead. Bring a dish to a party if you know there may not be an appropriate option.   - Choose sugar-free drinks: Stick to water or other sugar-free beverages and make sure you are getting 6-8 cups of fluid each day (but not with meals!). Avoid alcohol, carbonated beverages, and high-fat/high-sugar beverages like hot chocolate and eggnog. Try sugar-free hot cocoa made  with skim milk or water, or sugar-free spiced tea to add some holiday flair to your beverage (see sugar-free mulled cider recipe below)  - Take your time: Eat mindfully. Dont graze on food throughout the day. Sit down to enjoy your small meals. Chew slowly and thoroughly. Cut your food into small pieces before eating.  - Listen to your body: Stop eating as soon as you feel full. Do not feel pressured to try certain (or all) foods or to eat all of the food on your plate. Listen to your hunger cues.   - REMEMBER: Make your holidays about spending time with family and friends instead of focusing gatherings around food.  - Keep up your exercise routine: Make sure you continue to get regular exercise throughout the holiday season. Encourage friends and family to be active by taking a walk together after a meal, to look at holiday lights, or to window-shop.    Good Holiday Meal Options:  - Roasted Turkey, NO skin. Use low sodium broth instead of gravy.   - Stuffed Bell Peppers made WITHOUT bread crumbs or Rice. Try using parmesan cheese instead  - Gumbo, NO rice. Try picking out mostly the meat/seafood and vegetables with little broth.   - Green Bean Casserole made with 98% fat free cream of mushroom soup and crushed almonds/pecans instead of fried onions  - Side salad w/ low fat dressing. Try a different kind of salad maybe use Kale or spinach.   - Roasted non-starchy vegetables like brussel sprouts, broccoli, green beans, zucchini, butternut squash, cauliflower  - Cauliflower Mash (steam or roast cauliflower, puree w/ low fat cheese, dash of fat free milk and 2-3 sprays of I cant believe its not butter spray. Add garlic powder and black pepper to season). Use Low sodium broth instead of gravy.   - Try Loaded Cauliflower Mash (Make cauliflower like above cauliflower mash. Top with diced turkey wilder, ¼ cup low fat cheddar cheese and bake @ 350* F for 5-10 minutes, until cheese is melted. Top with minced chives, black  pepper and garlic to taste).   - Homemade cranberry sauce using Splenda or another alternative sweetener. Boil fresh cranberries and add splenda to taste. Boil until cranberries break open and then simmer until it reaches the consistency you want (less time for more watery sauce and simmer for longer to create a thicker sauce).   - Deviled eggs: make using low fat donaldson, mustard, DILL relish (not sweet relish).   - Vegetable tray w/ Greek yogurt Ranch Dip. Mix 1 packet of [place] ranch dip mix w/ 16 oz low fat plain greek yogurt.     Good Holiday Dessert Options:  - High protein Pumpkin Cheesecake (see recipe below)  - Pumpkin Whip (see recipe below)  - Quest Apple Pie or Cinnamon Roll flavored protein bar (warm in microwave for 10-15 seconds)  - Eggnog Protein shake (see recipe below)  - Fresh fruit w/ low fat cheese  - Sugar-free Jello Parfaits. Layer Red and Green sugar-free jello in cups and top w/ 2 tbsp Sugar-free cool-whip    Pumpkin Cheesecake    8 ounces fat free cream cheese, softened   2 scoops of vanilla protein powder (<4 g sugar per serving)   ¼ tsp Fine salt   2 eggs, at room temperature   1/3 cup fat free sour cream  1/3 cup fat free half and half  1 15 -ounce can pure pumpkin puree   1 tablespoon pumpkin pie spice, plus more for dusting   Unsalted nuts, crushed  *Add splenda to taste    Directions     1. Preheat the oven to 300 degrees F. Line 18 muffin cups with paper liners. Sprinkle 1 tsp crushed unsalted nuts at the bottom of each of muffin cup liner.     2. In a large bowl, beat the cream cheese, vanilla protein powder and 1/4 teaspoon fine salt on medium-high speed until smooth and creamy, 2 to 3 minutes. Scrape down the sides, reduce speed to low and beat in the eggs, 1 at a time, until combined. Beat in 1/3 cup fat free sour cream and fat free half and half. Stir in the pumpkin puree and pumpkin pie spice until smooth. Divide evenly among cookie-lined paper cups, filling almost all the  way to the top.     3. Bake until the filling is just set, 40 to 45 minutes. A sharp knife inserted into the center will come out moist, but clean. Cool completely in tins on a wire rack. Refrigerate until cold, 4 hours, or overnight. Top with a dusting of pumpkin pie spice.    Recipe altered from the following recipe: http://www.[employer]/recipes/food-network-parth/mini-pumpkin-cheesecakes-recipe.print.html?oc=linkback    Pumpkin Whip    Box of sugar-free vanilla pudding  Can of pumpkin puree  Pumpkin Pie spice (sprinkle to taste)  ½ cup of sugar-free Cool Whip    Directions:  Make sugar-free pudding according to package directions using fat free or 1% milk. Stir in pumpkin and cool whip. Add pumpkin pie spice to taste.     Egg Nog Protein shake    8 oz skim or 1% milk  1 scoop vanilla protein powder  1 tbsp sugar-free vanilla pudding mix  ½ tsp butter flavor extract  ½ tsp rum extract  ½ tsp cinnamon     Shake together or blend with ice and serve.     Sugar-Free Mulled Cider    3 oz diet cran-apple juice  6 oz water  1 packet sugar-free apple cider mix  ½ tsp apple pie spice  ½ tsp butter flavor extract  1 tbsp Sugar-free Syrup    Mix together. Warm if needed and serve w/ orange wedge and cinnamon stick.

## 2019-01-22 ENCOUNTER — OFFICE VISIT (OUTPATIENT)
Dept: URGENT CARE | Facility: CLINIC | Age: 59
End: 2019-01-22
Payer: COMMERCIAL

## 2019-01-22 VITALS
OXYGEN SATURATION: 99 % | TEMPERATURE: 98 F | RESPIRATION RATE: 18 BRPM | SYSTOLIC BLOOD PRESSURE: 140 MMHG | HEIGHT: 69 IN | WEIGHT: 200 LBS | HEART RATE: 69 BPM | DIASTOLIC BLOOD PRESSURE: 83 MMHG | BODY MASS INDEX: 29.62 KG/M2

## 2019-01-22 DIAGNOSIS — R30.0 DYSURIA: Primary | ICD-10-CM

## 2019-01-22 DIAGNOSIS — N32.89 BLADDER SPASM: ICD-10-CM

## 2019-01-22 DIAGNOSIS — B36.9 FUNGAL RASH OF TORSO: ICD-10-CM

## 2019-01-22 LAB
BILIRUB UR QL STRIP: NEGATIVE
GLUCOSE UR QL STRIP: NEGATIVE
KETONES UR QL STRIP: NEGATIVE
LEUKOCYTE ESTERASE UR QL STRIP: NEGATIVE
PH, POC UA: 5.5 (ref 5–8)
POC BLOOD, URINE: POSITIVE
POC NITRATES, URINE: NEGATIVE
PROT UR QL STRIP: NEGATIVE
SP GR UR STRIP: 1.01 (ref 1–1.03)
UROBILINOGEN UR STRIP-ACNC: ABNORMAL (ref 0.1–1.1)

## 2019-01-22 PROCEDURE — 3008F PR BODY MASS INDEX (BMI) DOCUMENTED: ICD-10-PCS | Mod: CPTII,S$GLB,, | Performed by: NURSE PRACTITIONER

## 2019-01-22 PROCEDURE — 87088 URINE BACTERIA CULTURE: CPT

## 2019-01-22 PROCEDURE — 87186 SC STD MICRODIL/AGAR DIL: CPT

## 2019-01-22 PROCEDURE — 3008F BODY MASS INDEX DOCD: CPT | Mod: CPTII,S$GLB,, | Performed by: NURSE PRACTITIONER

## 2019-01-22 PROCEDURE — 87077 CULTURE AEROBIC IDENTIFY: CPT

## 2019-01-22 PROCEDURE — 99214 PR OFFICE/OUTPT VISIT, EST, LEVL IV, 30-39 MIN: ICD-10-PCS | Mod: 25,S$GLB,, | Performed by: NURSE PRACTITIONER

## 2019-01-22 PROCEDURE — 87184 SC STD DISK METHOD PER PLATE: CPT

## 2019-01-22 PROCEDURE — 99214 OFFICE O/P EST MOD 30 MIN: CPT | Mod: 25,S$GLB,, | Performed by: NURSE PRACTITIONER

## 2019-01-22 PROCEDURE — 3079F PR MOST RECENT DIASTOLIC BLOOD PRESSURE 80-89 MM HG: ICD-10-PCS | Mod: CPTII,S$GLB,, | Performed by: NURSE PRACTITIONER

## 2019-01-22 PROCEDURE — 3077F SYST BP >= 140 MM HG: CPT | Mod: CPTII,S$GLB,, | Performed by: NURSE PRACTITIONER

## 2019-01-22 PROCEDURE — 3079F DIAST BP 80-89 MM HG: CPT | Mod: CPTII,S$GLB,, | Performed by: NURSE PRACTITIONER

## 2019-01-22 PROCEDURE — 81003 POCT URINALYSIS, DIPSTICK, AUTOMATED, W/O SCOPE: ICD-10-PCS | Mod: QW,S$GLB,, | Performed by: NURSE PRACTITIONER

## 2019-01-22 PROCEDURE — 87086 URINE CULTURE/COLONY COUNT: CPT

## 2019-01-22 PROCEDURE — 81003 URINALYSIS AUTO W/O SCOPE: CPT | Mod: QW,S$GLB,, | Performed by: NURSE PRACTITIONER

## 2019-01-22 PROCEDURE — 3077F PR MOST RECENT SYSTOLIC BLOOD PRESSURE >= 140 MM HG: ICD-10-PCS | Mod: CPTII,S$GLB,, | Performed by: NURSE PRACTITIONER

## 2019-01-22 RX ORDER — CLOTRIMAZOLE 1 %
CREAM (GRAM) TOPICAL
Qty: 30 G | Refills: 1 | Status: SHIPPED | OUTPATIENT
Start: 2019-01-22 | End: 2019-04-08

## 2019-01-22 RX ORDER — CIPROFLOXACIN 250 MG/1
250 TABLET, FILM COATED ORAL 2 TIMES DAILY
Qty: 6 TABLET | Refills: 0 | Status: SHIPPED | OUTPATIENT
Start: 2019-01-22 | End: 2019-01-25

## 2019-01-22 RX ORDER — HYOSCYAMINE SULFATE 0.125 MG
125 TABLET ORAL EVERY 6 HOURS PRN
Qty: 15 TABLET | Refills: 0 | Status: SHIPPED | OUTPATIENT
Start: 2019-01-22 | End: 2019-04-08

## 2019-01-22 NOTE — LETTER
January 22, 2019      [facility] [address] [address]  Phone: [phone]  Fax: [phone]       Patient: Rosamaria Agosto   YOB: 1960  Date of Visit: 01/22/2019    To Whom It May Concern:    Timothy Agosto  was at [facility] on 01/22/2019. She may return to work/school on 1/23/19 with no restrictions. If you have any questions or concerns, or if I can be of further assistance, please do not hesitate to contact me.    Sincerely,      Charisse Shoemaker NP

## 2019-01-22 NOTE — PROGRESS NOTES
"Subjective:       Patient ID: Rosamaria Agosto is a 58 y.o. female.    Vitals:  height is 5' 9" (1.753 m) and weight is 90.7 kg (200 lb). Her temperature is 98.3 °F (36.8 °C). Her blood pressure is 140/83 (abnormal) and her pulse is 69. Her respiration is 18 and oxygen saturation is 99%.     Chief Complaint: Urinary Tract Infection (started yesterday) and Rash (2-3 weeks)    Urinary Tract Infection    This is a new problem. The current episode started yesterday. The problem has been unchanged. The quality of the pain is described as aching. The pain is at a severity of 7/10. The pain is moderate. There has been no fever. Associated symptoms include urgency. Pertinent negatives include no chills, frequency, hematuria, nausea, vomiting or rash. Treatments tried: AZO. The treatment provided no relief.   Rash   This is a new problem. The current episode started 1 to 4 weeks ago. The problem is unchanged. Location: lower abdomen. The rash is characterized by itchiness. She was exposed to nothing. Pertinent negatives include no fever or vomiting. Past treatments include antibiotic cream (triple antibiotic). The treatment provided no relief.       Constitution: Negative for chills and fever.   Neck: Negative for painful lymph nodes.   Gastrointestinal: Negative for abdominal pain, nausea and vomiting.   Genitourinary: Positive for dysuria and urgency. Negative for frequency, urine decreased, hematuria, history of kidney stones, painful menstruation, irregular menstruation, missed menses, heavy menstrual bleeding, ovarian cysts, genital trauma, vaginal pain, vaginal discharge, vaginal bleeding, vaginal odor, painful intercourse, genital sore, painful ejaculation and pelvic pain.   Musculoskeletal: Negative for back pain.   Skin: Negative for rash and lesion.   Hematologic/Lymphatic: Negative for swollen lymph nodes.       Objective:      Physical Exam   Constitutional: She is oriented to person, place, and time. She " appears well-developed and well-nourished.   HENT:   Head: Normocephalic and atraumatic.   Right Ear: External ear normal.   Left Ear: External ear normal.   Nose: Nose normal.   Mouth/Throat: Mucous membranes are normal.   Eyes: Conjunctivae and lids are normal.   Neck: Trachea normal, normal range of motion, full passive range of motion without pain and phonation normal. Neck supple.   Cardiovascular: Normal rate, regular rhythm, normal heart sounds and normal pulses.   Pulmonary/Chest: Effort normal and breath sounds normal. No respiratory distress.   Abdominal: Soft. Normal appearance and bowel sounds are normal. She exhibits no distension, no abdominal bruit, no pulsatile midline mass and no mass. There is no tenderness. There is no CVA tenderness.       Musculoskeletal: Normal range of motion. She exhibits no edema.   Neurological: She is alert and oriented to person, place, and time. She has normal strength.   Skin: Skin is warm, dry and intact. She is not diaphoretic. No pallor.   Psychiatric: She has a normal mood and affect. Her speech is normal and behavior is normal. Judgment and thought content normal. Cognition and memory are normal.   Nursing note and vitals reviewed.      Assessment:       1. Dysuria    2. Bladder spasm    3. Fungal rash of torso        Plan:         Dysuria  -     Cancel: POCT Urinalysis, Dipstick, Automated, W/O Scope  -     POCT Urinalysis, Dipstick, Automated, W/O Scope  -     Urine culture    Bladder spasm  -     Urine culture    Fungal rash of torso    Other orders  -     ciprofloxacin HCl (CIPRO) 250 MG tablet; Take 1 tablet (250 mg total) by mouth 2 (two) times daily. for 3 days  Dispense: 6 tablet; Refill: 0  -     clotrimazole (LOTRIMIN) 1 % cream; Apply to affected area 2 times daily  Dispense: 30 g; Refill: 1  -     hyoscyamine (ANASPAZ,LEVSIN) 0.125 mg Tab; Take 1 tablet (125 mcg total) by mouth every 6 (six) hours as needed.  Dispense: 15 tablet; Refill: 0      Results  for orders placed or performed in visit on 01/22/19   POCT Urinalysis, Dipstick, Automated, W/O Scope   Result Value Ref Range    POC Blood, Urine Positive (A) Negative    POC Bilirubin, Urine Negative Negative    POC Urobilinogen, Urine norm 0.1 - 1.1    POC Ketones, Urine Negative Negative    POC Protein, Urine Negative Negative    POC Nitrates, Urine Negative Negative    POC Glucose, Urine Negative Negative    pH, UA 5.5 5 - 8    POC Specific Gravity, Urine 1.010 1.003 - 1.029    POC Leukocytes, Urine Negative Negative

## 2019-01-26 LAB — BACTERIA UR CULT: NORMAL

## 2019-01-27 ENCOUNTER — TELEPHONE (OUTPATIENT)
Dept: URGENT CARE | Facility: CLINIC | Age: 59
End: 2019-01-27

## 2019-01-27 NOTE — TELEPHONE ENCOUNTER
----- Message from Sherrill Holliday NP sent at 1/26/2019  6:55 PM CST -----  Please let patient know urine culture shows infection. She was treated effectively with cipro. Have her finish out the antibiotics and follow up if symptoms not improved.    Called patient about culture results. Left voicemail

## 2019-01-28 ENCOUNTER — TELEPHONE (OUTPATIENT)
Dept: URGENT CARE | Facility: CLINIC | Age: 59
End: 2019-01-28

## 2019-01-28 RX ORDER — CIPROFLOXACIN 250 MG/1
250 TABLET, FILM COATED ORAL 2 TIMES DAILY
Qty: 4 TABLET | Refills: 0 | Status: SHIPPED | OUTPATIENT
Start: 2019-01-28 | End: 2019-01-30

## 2019-01-28 NOTE — TELEPHONE ENCOUNTER
Patient is better but not completely resolved, will add 2 more days of antibiotics for a total of 5 days of antibiotics with Cipro which is sensitive to Klebsiella

## 2019-01-29 ENCOUNTER — TELEPHONE (OUTPATIENT)
Dept: URGENT CARE | Facility: CLINIC | Age: 59
End: 2019-01-29

## 2019-01-29 NOTE — TELEPHONE ENCOUNTER
----- Message from Sherrill Holliday NP sent at 1/26/2019  6:55 PM CST -----  Please let patient know urine culture shows infection. She was treated effectively with cipro. Have her finish out the antibiotics and follow up if symptoms not improved.

## 2019-03-06 ENCOUNTER — OFFICE VISIT (OUTPATIENT)
Dept: URGENT CARE | Facility: CLINIC | Age: 59
End: 2019-03-06
Payer: COMMERCIAL

## 2019-03-06 VITALS
OXYGEN SATURATION: 98 % | TEMPERATURE: 98 F | RESPIRATION RATE: 18 BRPM | SYSTOLIC BLOOD PRESSURE: 153 MMHG | WEIGHT: 200 LBS | BODY MASS INDEX: 29.62 KG/M2 | DIASTOLIC BLOOD PRESSURE: 80 MMHG | HEIGHT: 69 IN | HEART RATE: 66 BPM

## 2019-03-06 DIAGNOSIS — G89.29 CHRONIC PAIN OF BOTH SHOULDERS: ICD-10-CM

## 2019-03-06 DIAGNOSIS — S99.921A RIGHT FOOT INJURY, INITIAL ENCOUNTER: ICD-10-CM

## 2019-03-06 DIAGNOSIS — M25.512 CHRONIC PAIN OF BOTH SHOULDERS: ICD-10-CM

## 2019-03-06 DIAGNOSIS — M79.622 LEFT UPPER ARM PAIN: Primary | ICD-10-CM

## 2019-03-06 DIAGNOSIS — M25.511 CHRONIC PAIN OF BOTH SHOULDERS: ICD-10-CM

## 2019-03-06 PROCEDURE — 3079F DIAST BP 80-89 MM HG: CPT | Mod: CPTII,S$GLB,, | Performed by: PHYSICIAN ASSISTANT

## 2019-03-06 PROCEDURE — 3008F BODY MASS INDEX DOCD: CPT | Mod: CPTII,S$GLB,, | Performed by: PHYSICIAN ASSISTANT

## 2019-03-06 PROCEDURE — 96372 THER/PROPH/DIAG INJ SC/IM: CPT | Mod: S$GLB,,, | Performed by: PHYSICIAN ASSISTANT

## 2019-03-06 PROCEDURE — 99213 PR OFFICE/OUTPT VISIT, EST, LEVL III, 20-29 MIN: ICD-10-PCS | Mod: 25,S$GLB,, | Performed by: PHYSICIAN ASSISTANT

## 2019-03-06 PROCEDURE — 96372 PR INJECTION,THERAP/PROPH/DIAG2ST, IM OR SUBCUT: ICD-10-PCS | Mod: S$GLB,,, | Performed by: PHYSICIAN ASSISTANT

## 2019-03-06 PROCEDURE — 73630 XR FOOT COMPLETE 3 VIEW RIGHT: ICD-10-PCS | Mod: RT,S$GLB,, | Performed by: RADIOLOGY

## 2019-03-06 PROCEDURE — 3079F PR MOST RECENT DIASTOLIC BLOOD PRESSURE 80-89 MM HG: ICD-10-PCS | Mod: CPTII,S$GLB,, | Performed by: PHYSICIAN ASSISTANT

## 2019-03-06 PROCEDURE — 3077F SYST BP >= 140 MM HG: CPT | Mod: CPTII,S$GLB,, | Performed by: PHYSICIAN ASSISTANT

## 2019-03-06 PROCEDURE — 3008F PR BODY MASS INDEX (BMI) DOCUMENTED: ICD-10-PCS | Mod: CPTII,S$GLB,, | Performed by: PHYSICIAN ASSISTANT

## 2019-03-06 PROCEDURE — 3077F PR MOST RECENT SYSTOLIC BLOOD PRESSURE >= 140 MM HG: ICD-10-PCS | Mod: CPTII,S$GLB,, | Performed by: PHYSICIAN ASSISTANT

## 2019-03-06 PROCEDURE — 99213 OFFICE O/P EST LOW 20 MIN: CPT | Mod: 25,S$GLB,, | Performed by: PHYSICIAN ASSISTANT

## 2019-03-06 PROCEDURE — 73630 X-RAY EXAM OF FOOT: CPT | Mod: RT,S$GLB,, | Performed by: RADIOLOGY

## 2019-03-06 RX ORDER — KETOROLAC TROMETHAMINE 30 MG/ML
30 INJECTION, SOLUTION INTRAMUSCULAR; INTRAVENOUS
Status: COMPLETED | OUTPATIENT
Start: 2019-03-06 | End: 2019-03-06

## 2019-03-06 RX ORDER — KETOROLAC TROMETHAMINE 10 MG/1
10 TABLET, FILM COATED ORAL EVERY 6 HOURS
Qty: 20 TABLET | Refills: 0 | Status: SHIPPED | OUTPATIENT
Start: 2019-03-07 | End: 2019-03-12

## 2019-03-06 RX ADMIN — KETOROLAC TROMETHAMINE 30 MG: 30 INJECTION, SOLUTION INTRAMUSCULAR; INTRAVENOUS at 03:03

## 2019-03-06 NOTE — PROGRESS NOTES
Subjective:       Patient ID: Rosamaria Agosto is a 58 y.o. female.    Chief Complaint: Arm Pain; Shoulder Pain; and Foot Injury    Both shoulder pain for over 2yrs and receiving treatment. New left arm pain for 3 days      Arm Pain    The incident occurred 3 to 5 days ago. The incident occurred at home. There was no injury mechanism. The pain is present in the upper left arm, left elbow, left shoulder and right shoulder. The quality of the pain is described as aching. The pain radiates to the left arm. The pain is at a severity of 10/10. The pain is severe. The pain has been intermittent since the incident. The symptoms are aggravated by movement and lifting.   Shoulder Pain    The pain is present in the left shoulder, right shoulder and left arm. This is a chronic problem. The current episode started more than 1 year ago. There has been no history of extremity trauma. The problem occurs intermittently. The problem has been gradually worsening. The quality of the pain is described as aching. The pain is at a severity of 10/10. The pain is severe. Associated symptoms include headaches and stiffness. The symptoms are aggravated by activity. She has tried oral narcotics and movement for the symptoms. The treatment provided moderate relief. Family history includes arthritis.   Foot Injury    The incident occurred more than 1 week ago. The incident occurred at home. The injury mechanism was a fall. The pain is present in the right foot. The quality of the pain is described as burning and stabbing. The pain is at a severity of 7/10. The pain is moderate. The pain has been intermittent since onset. She reports no foreign bodies present. The symptoms are aggravated by weight bearing and movement. She has tried nothing for the symptoms.     Review of Systems   Musculoskeletal: Positive for joint swelling and stiffness.   Neurological: Positive for headaches.       Patient states that she fell off of a stool in her kitchen  a few weeks ago but has noticed an increase in discomfort in the past few days. She states that touch elicits pain, as does wearing her shoes which makes walking more difficult.    Patient also c/o bilateral shoulder pain which she has been treated for with cortisone injections and has found relief in the past. Patient states that she is now with [facility] and would like a referral to see an [facility] physician. She denies any recent trauma or other causative factor to explain the return of her pain.     Objective:      Physical Exam   Constitutional: She is oriented to person, place, and time. She appears well-developed and well-nourished. She is cooperative.  Non-toxic appearance. She does not appear ill. No distress.   HENT:   Head: Normocephalic and atraumatic. Head is without abrasion, without contusion and without laceration.   Right Ear: Hearing, tympanic membrane, external ear and ear canal normal. No hemotympanum.   Left Ear: Hearing, tympanic membrane, external ear and ear canal normal. No hemotympanum.   Nose: Nose normal. No mucosal edema, rhinorrhea or nasal deformity. No epistaxis. Right sinus exhibits no maxillary sinus tenderness and no frontal sinus tenderness. Left sinus exhibits no maxillary sinus tenderness and no frontal sinus tenderness.   Mouth/Throat: Uvula is midline, oropharynx is clear and moist and mucous membranes are normal. No trismus in the jaw. Normal dentition. No uvula swelling. No posterior oropharyngeal erythema.   Eyes: Conjunctivae, EOM and lids are normal. Pupils are equal, round, and reactive to light. Right eye exhibits no discharge. Left eye exhibits no discharge. No scleral icterus.   Sclera clear bilat   Neck: Trachea normal, normal range of motion, full passive range of motion without pain and phonation normal. Neck supple. No spinous process tenderness and no muscular tenderness present. No neck rigidity. No tracheal deviation present.   Cardiovascular: Normal  rate, regular rhythm, normal heart sounds, intact distal pulses and normal pulses.   Pulmonary/Chest: Effort normal and breath sounds normal. No respiratory distress.   Abdominal: Soft. Normal appearance and bowel sounds are normal. She exhibits no distension, no pulsatile midline mass and no mass. There is no tenderness.   Musculoskeletal: Normal range of motion. She exhibits no edema or deformity.        Right foot: There is normal range of motion and no deformity.   Bilateral shoulder pain, history of arthritis and successful intra-articular cortisone injections   Feet:   Right Foot:   Skin Integrity: Negative for ulcer, blister, skin breakdown, erythema, warmth, callus or dry skin.   Neurological: She is alert and oriented to person, place, and time. She has normal strength. No cranial nerve deficit or sensory deficit. She exhibits normal muscle tone. She displays no seizure activity. Coordination normal. GCS eye subscore is 4. GCS verbal subscore is 5. GCS motor subscore is 6.   Skin: Skin is warm, dry and intact. Capillary refill takes less than 2 seconds. No abrasion, no bruising, no burn, no ecchymosis and no laceration noted. She is not diaphoretic. No pallor.   Psychiatric: She has a normal mood and affect. Her speech is normal and behavior is normal. Judgment and thought content normal. Cognition and memory are normal.   Nursing note and vitals reviewed.      Reading Physician Reading Date Result Priority   Bob Hendrix MD 3/6/2019       Narrative     EXAMINATION:  XR FOOT COMPLETE 3 VIEW RIGHT    CLINICAL HISTORY:  fell from stool landed on lateral right foot, pain to palpation;. Unspecified injury of right foot, initial encounter    TECHNIQUE:  AP, lateral, and oblique views of the right foot were performed.    COMPARISON:  Right heel 12/14/2010    FINDINGS:  The skeletal structures are intact.  No fracture or dislocation is identified.  Small soft tissue calcification next to 1st metatarsal  head looks old, and mild degenerative joint disease is noted at the 1st MTP joint.  Other joint spaces are satisfactory.  Spurs are present at the posterior and plantar aspects of the calcaneus.  No focal soft tissue swelling is detected.      Impression       No acute fracture or dislocation.  Chronic findings including calcaneal enthesophytes and mild DJD.      Electronically signed by: Bob Hendrix MD  Date: 03/06/2019  Time: 15:57       Assessment:       1. Left upper arm pain    2. Right foot injury, initial encounter    3. Chronic pain of both shoulders        Plan:       Referral with Orthopedic physician. Patient was informed that she will receive a phone call to make arrangements for follow up.  Toradol IM injection given in clinic with RX for po sent to patient's pharmacy. Patient was also informed of xray finding and to treat foot pain symptomatically with elevation, NSAIDs, and possible further work up as needed.    Patient Instructions     Shoulder Pain with Uncertain Cause  Shoulder pain can have many causes. Pain often comes from the structures that surround the shoulder joint. These are the joint capsule, ligaments, tendons, muscles, and bursa. Pain can also come from cartilage in the joint. Cartilage can become worn out or injured. Its important to know whats causing your pain so the healthcare provider can use the correct treatment. But sometimes its difficult to find the exact cause of shoulder pain. You may need to see a specialist (orthopedist). You may also need special tests such as a CT scan or MRI. The provider may need to use special tools to look inside the joint (arthroscopy).  Shoulder pain can be treated with a sling or a device that keeps your shoulder from moving. You can take an anti-inflammatory medicine such as ibuprofen to ease pain. You may need to do special shoulder exercises. Follow up with a specialist if the pain is severe or doesnt go away after a few weeks.  Home  care  Follow these tips when caring for yourself at home:  · If a sling was given to you, leave it in place for the time advised by your healthcare provider. If you arent sure how long to wear it, ask for advice. If the sling becomes loose, adjust it so that your forearm is level with the ground. Your shoulder should feel well supported.  · Put an ice pack on the injured area for 20 minutes every 1 to 2 hours the first day. You can make your own ice pack by putting ice cubes in a plastic bag. Wrap the bag in a thin towel. Continue with ice packs 3 to 4 times a day for the next 2 days. Then use the pack as needed to ease pain and swelling.  · You may use acetaminophen or ibuprofen to control pain, unless another pain medicine was prescribed. If you have chronic liver or kidney disease, talk with your healthcare provider before using these medicines. Also talk with your provider if youve ever had a stomach ulcer or GI bleeding.  · Shoulder pain may seem worse at night, when there is less to distract you from the pain. If you sleep on your side, try to keep weight off your painful shoulder. Propping pillows behind you may stop you from rolling over onto that shoulder during sleep.   · Shoulder and elbow joints can become stiff if left in a sling for too long. You should start range of motion exercises about 7 to 10 days after the injury. Talk with your provider to find out what type of exercises to do and how soon to start.  · You can take the sling off to shower or bathe.  Follow-up care  Follow up with your healthcare provider if you dont start to get better in the next 5 days.  When to seek medical advice  Call your healthcare provider right away if any of these occur:  · Pain or swelling gets worse or continues for more than a few days  · Your hand or fingers become cold, blue, numb, or tingly  · Large amount of bruising on your shoulder or upper arm  · Difficulty moving your hand or fingers  · Weakness in your  hand or fingers  · Your shoulder becomes stiff  · It feels like your shoulder is popping out  · You are less able to do your daily activities  Date Last Reviewed: 10/1/2016  © 8386-9401 [employer], [email or web address]. [address] [place] [address], [place] [address]. All rights reserved. This information is not intended as a substitute for professional medical care. Always follow your healthcare professional's instructions.          Please follow up with your Primary care provider within 2-5 days if your signs and symptoms have not resolved or worsen.     If your condition worsens or fails to improve we recommend that you receive another evaluation at the emergency room immediately or contact your primary medical clinic to discuss your concerns.   You must understand that you have received an Urgent Care treatment only and that you may be released before all of your medical problems are known or treated. You, the patient, will arrange for follow up care as instructed.     RED FLAGS/WARNING SYMPTOMS DISCUSSED WITH PATIENT THAT WOULD WARRANT EMERGENT MEDICAL ATTENTION. PATIENT VERBALIZED UNDERSTANDING.

## 2019-03-06 NOTE — PATIENT INSTRUCTIONS
Shoulder Pain with Uncertain Cause  Shoulder pain can have many causes. Pain often comes from the structures that surround the shoulder joint. These are the joint capsule, ligaments, tendons, muscles, and bursa. Pain can also come from cartilage in the joint. Cartilage can become worn out or injured. Its important to know whats causing your pain so the healthcare provider can use the correct treatment. But sometimes its difficult to find the exact cause of shoulder pain. You may need to see a specialist (orthopedist). You may also need special tests such as a CT scan or MRI. The provider may need to use special tools to look inside the joint (arthroscopy).  Shoulder pain can be treated with a sling or a device that keeps your shoulder from moving. You can take an anti-inflammatory medicine such as ibuprofen to ease pain. You may need to do special shoulder exercises. Follow up with a specialist if the pain is severe or doesnt go away after a few weeks.  Home care  Follow these tips when caring for yourself at home:  · If a sling was given to you, leave it in place for the time advised by your healthcare provider. If you arent sure how long to wear it, ask for advice. If the sling becomes loose, adjust it so that your forearm is level with the ground. Your shoulder should feel well supported.  · Put an ice pack on the injured area for 20 minutes every 1 to 2 hours the first day. You can make your own ice pack by putting ice cubes in a plastic bag. Wrap the bag in a thin towel. Continue with ice packs 3 to 4 times a day for the next 2 days. Then use the pack as needed to ease pain and swelling.  · You may use acetaminophen or ibuprofen to control pain, unless another pain medicine was prescribed. If you have chronic liver or kidney disease, talk with your healthcare provider before using these medicines. Also talk with your provider if youve ever had a stomach ulcer or GI bleeding.  · Shoulder pain may seem  worse at night, when there is less to distract you from the pain. If you sleep on your side, try to keep weight off your painful shoulder. Propping pillows behind you may stop you from rolling over onto that shoulder during sleep.   · Shoulder and elbow joints can become stiff if left in a sling for too long. You should start range of motion exercises about 7 to 10 days after the injury. Talk with your provider to find out what type of exercises to do and how soon to start.  · You can take the sling off to shower or bathe.  Follow-up care  Follow up with your healthcare provider if you dont start to get better in the next 5 days.  When to seek medical advice  Call your healthcare provider right away if any of these occur:  · Pain or swelling gets worse or continues for more than a few days  · Your hand or fingers become cold, blue, numb, or tingly  · Large amount of bruising on your shoulder or upper arm  · Difficulty moving your hand or fingers  · Weakness in your hand or fingers  · Your shoulder becomes stiff  · It feels like your shoulder is popping out  · You are less able to do your daily activities  Date Last Reviewed: 10/1/2016  © 8565-9900 [employer]. [address] [place] [address], [place] [address]. All rights reserved. This information is not intended as a substitute for professional medical care. Always follow your healthcare professional's instructions.

## 2019-03-12 ENCOUNTER — HOSPITAL ENCOUNTER (OUTPATIENT)
Dept: RADIOLOGY | Facility: HOSPITAL | Age: 59
Discharge: HOME | End: 2019-03-12
Attending: PHYSICIAN ASSISTANT
Payer: COMMERCIAL

## 2019-03-12 ENCOUNTER — OFFICE VISIT (OUTPATIENT)
Dept: ORTHOPEDICS | Facility: CLINIC | Age: 59
End: 2019-03-12
Payer: COMMERCIAL

## 2019-03-12 VITALS
HEART RATE: 66 BPM | DIASTOLIC BLOOD PRESSURE: 83 MMHG | BODY MASS INDEX: 29.61 KG/M2 | SYSTOLIC BLOOD PRESSURE: 130 MMHG | HEIGHT: 69 IN | WEIGHT: 199.94 LBS

## 2019-03-12 DIAGNOSIS — M25.511 BILATERAL SHOULDER PAIN, UNSPECIFIED CHRONICITY: Primary | ICD-10-CM

## 2019-03-12 DIAGNOSIS — M25.511 BILATERAL SHOULDER PAIN, UNSPECIFIED CHRONICITY: ICD-10-CM

## 2019-03-12 DIAGNOSIS — M79.671 RIGHT FOOT PAIN: ICD-10-CM

## 2019-03-12 DIAGNOSIS — M25.512 BILATERAL SHOULDER PAIN, UNSPECIFIED CHRONICITY: Primary | ICD-10-CM

## 2019-03-12 DIAGNOSIS — M25.819 SHOULDER IMPINGEMENT, UNSPECIFIED LATERALITY: ICD-10-CM

## 2019-03-12 DIAGNOSIS — M25.512 BILATERAL SHOULDER PAIN, UNSPECIFIED CHRONICITY: ICD-10-CM

## 2019-03-12 PROCEDURE — 73030 XR SHOULDER COMPLETE 2 OR MORE VIEWS BILATERAL: ICD-10-PCS | Mod: 26,RT,, | Performed by: RADIOLOGY

## 2019-03-12 PROCEDURE — 73030 X-RAY EXAM OF SHOULDER: CPT | Mod: TC,50

## 2019-03-12 PROCEDURE — 20610 PR DRAIN/INJECT LARGE JOINT/BURSA: ICD-10-PCS | Mod: 50,S$GLB,, | Performed by: PHYSICIAN ASSISTANT

## 2019-03-12 PROCEDURE — 3075F PR MOST RECENT SYSTOLIC BLOOD PRESS GE 130-139MM HG: ICD-10-PCS | Mod: CPTII,S$GLB,, | Performed by: PHYSICIAN ASSISTANT

## 2019-03-12 PROCEDURE — 3075F SYST BP GE 130 - 139MM HG: CPT | Mod: CPTII,S$GLB,, | Performed by: PHYSICIAN ASSISTANT

## 2019-03-12 PROCEDURE — 73030 X-RAY EXAM OF SHOULDER: CPT | Mod: 26,RT,, | Performed by: RADIOLOGY

## 2019-03-12 PROCEDURE — 99999 PR PBB SHADOW E&M-EST. PATIENT-LVL IV: CPT | Mod: PBBFAC,,, | Performed by: PHYSICIAN ASSISTANT

## 2019-03-12 PROCEDURE — 3008F BODY MASS INDEX DOCD: CPT | Mod: CPTII,S$GLB,, | Performed by: PHYSICIAN ASSISTANT

## 2019-03-12 PROCEDURE — 3079F DIAST BP 80-89 MM HG: CPT | Mod: CPTII,S$GLB,, | Performed by: PHYSICIAN ASSISTANT

## 2019-03-12 PROCEDURE — 99204 OFFICE O/P NEW MOD 45 MIN: CPT | Mod: 25,S$GLB,, | Performed by: PHYSICIAN ASSISTANT

## 2019-03-12 PROCEDURE — 3079F PR MOST RECENT DIASTOLIC BLOOD PRESSURE 80-89 MM HG: ICD-10-PCS | Mod: CPTII,S$GLB,, | Performed by: PHYSICIAN ASSISTANT

## 2019-03-12 PROCEDURE — 73030 X-RAY EXAM OF SHOULDER: CPT | Mod: 26,LT,, | Performed by: RADIOLOGY

## 2019-03-12 PROCEDURE — 3008F PR BODY MASS INDEX (BMI) DOCUMENTED: ICD-10-PCS | Mod: CPTII,S$GLB,, | Performed by: PHYSICIAN ASSISTANT

## 2019-03-12 PROCEDURE — 99999 PR PBB SHADOW E&M-EST. PATIENT-LVL IV: ICD-10-PCS | Mod: PBBFAC,,, | Performed by: PHYSICIAN ASSISTANT

## 2019-03-12 PROCEDURE — 20610 DRAIN/INJ JOINT/BURSA W/O US: CPT | Mod: 50,S$GLB,, | Performed by: PHYSICIAN ASSISTANT

## 2019-03-12 PROCEDURE — 99204 PR OFFICE/OUTPT VISIT, NEW, LEVL IV, 45-59 MIN: ICD-10-PCS | Mod: 25,S$GLB,, | Performed by: PHYSICIAN ASSISTANT

## 2019-03-12 RX ORDER — METHYLPREDNISOLONE ACETATE 80 MG/ML
80 INJECTION, SUSPENSION INTRA-ARTICULAR; INTRALESIONAL; INTRAMUSCULAR; SOFT TISSUE
Status: COMPLETED | OUTPATIENT
Start: 2019-03-12 | End: 2019-03-12

## 2019-03-12 RX ORDER — DICLOFENAC SODIUM 75 MG/1
75 TABLET, DELAYED RELEASE ORAL 2 TIMES DAILY
Qty: 60 TABLET | Refills: 0 | Status: SHIPPED | OUTPATIENT
Start: 2019-03-12 | End: 2019-04-08

## 2019-03-12 RX ADMIN — METHYLPREDNISOLONE ACETATE 80 MG: 80 INJECTION, SUSPENSION INTRA-ARTICULAR; INTRALESIONAL; INTRAMUSCULAR; SOFT TISSUE at 06:03

## 2019-03-12 NOTE — LETTER
March 12, 2019      Alicia Starkey PA-C  [address]  [address]  [facility] [address]           [facility] - Orthopedics  [address]  [facility] [address]  Phone: [phone]          Patient: Rosamaria Agosto   MR Number: [id number]   YOB: 1960   Date of Visit: 3/12/2019       Dear Alicia Starkey:    Thank you for referring Rosamaria Agosto to me for evaluation. Attached you will find relevant portions of my assessment and plan of care.    If you have questions, please do not hesitate to call me. I look forward to following Rosamaria Agosto along with you.    Sincerely,    Jess Phillips PA-C    Enclosure  CC:  No Recipients    If you would like to receive this communication electronically, please contact [email or web address] or [phone] to request more information on [employer] access.    For providers and/or their staff who would like to refer a patient to [facility], please contact us through our one-stop-shop provider referral line, [facility], at [phone].    If you feel you have received this communication in error or would no longer like to receive these types of communications, please e-mail [email or web address]

## 2019-03-12 NOTE — PROGRESS NOTES
Subjective:      Patient ID: Rosamaria Agosto is a 58 y.o. female.    Chief Complaint: Pain of the Left Shoulder; Pain of the Right Shoulder; and Pain of the Right Foot    HPI  58 year old female presents with chief complaint of intermittent bilateral shoulder pain L>R x years but returned 1 week ago. She is RHD and denies trauma. 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 She took diclofenac in the past with some relief. She had cortisone injections in her shoulders in the past with good relief. Last injection was over a year ago at [facility]. No PT has been done.   Patient reports right foot pain since she fell in August. Pain is at the lateral foot around the 5th MT head. She reports burning pain and throbbing the more she is on it. Icy Hot gives little relief.   Review of Systems   Constitution: Negative for chills, fever and night sweats.   Cardiovascular: Negative for chest pain.   Respiratory: Negative for cough and shortness of breath.    Hematologic/Lymphatic: Does not bruise/bleed easily.   Skin: Negative for color change.   Gastrointestinal: Negative for heartburn.   Genitourinary: Negative for dysuria.   Neurological: Negative for numbness and paresthesias.   Psychiatric/Behavioral: Negative for altered mental status.   Allergic/Immunologic: Negative for persistent infections.         Objective:            General    Vitals reviewed.  Constitutional: She is oriented to person, place, and time. She appears well-developed and well-nourished.   Cardiovascular: Normal rate.    Neurological: She is alert and oriented to person, place, and time.         Right Ankle/Foot Exam     Inspection   Erythema: absent    Range of Motion   The patient has normal right ankle ROM.    Other   Sensation: normal    Comments:  TTP around 5th MT head. No warmth or erythema.     Right Shoulder Exam     Range of Motion   Active abduction: normal   Forward Flexion: normal   External Rotation 0 degrees: normal    Internal rotation 0 degrees: normal     Tests & Signs   Claros test: positive  Impingement: positive  Speed's Test: negative    Other   Sensation: normal    Comments:  Positive empty can test.     Left Shoulder Exam     Range of Motion   Active abduction: normal   Forward Flexion: normal   External Rotation 0 degrees: normal   Internal rotation 0 degrees: normal     Tests & Signs   Claros test: positive  Impingement: positive  Speed's Test: negative    Other   Sensation: normal     Comments:  Positive empty can test.      Muscle Strength   Right Upper Extremity   Shoulder Abduction: 5/5   Supraspinatus: 5/5/5   Biceps: 5/5/5   Left Upper Extremity  Shoulder Abduction: 5/5   Supraspinatus: 5/5/5   Biceps: 5/5/5     Vascular Exam     Right Pulses  Dorsalis Pedis:      2+    Radial:                    2+      Left Pulses      Radial:                    2+          X-ray shoulder: ordered and reviewed by myself. Mild DJD.    X-ray foot: reviewed by myself. No acute fracture or dislocation.  Chronic findings including calcaneal enthesophytes and mild DJD.        Assessment:       Encounter Diagnoses   Name Primary?    Bilateral shoulder pain, unspecified chronicity Yes    Right foot pain     Shoulder impingement, unspecified laterality           Plan:       MRI foot was ordered due to chronic pain and negative x-ray. Diclofenac refilled. She would like bilateral shoulder injections. RTC pending MRI results.     PROCEDURE:  I have explained the risks, benefits, and alternatives of the procedure in detail.  The patient voices understanding and all questions have been answered.  The patient agrees to proceed as planned. So after I performed a sterile prep of the skin in the normal fashion the bilateral shoulder is injected from the anterior approach using a 22 gauge needle with a combination of 4cc 1% plain lidocaine and 80 mg of depo medrol. The patient is cautioned and immediate relief of pain is secondary to the  local anesthetic and will be temporary.  After the anesthetic wears off there may be a increase in pain that may last for a few hours or a few days and they should use ice to help alleviate this flair up of pain.

## 2019-03-12 NOTE — LETTER
March 12, 2019    Rosamaria Agosto  [address] [facility] [address]             [facility] - Orthopedics  [address]  [facility] [address]  Phone: [phone] To whom it may concern:    Please excuse Ms. Agosto from work due to her shoulder pain. She can return to work on 3/14/19 with no restrictions.      If you have any questions or concerns, please don't hesitate to call.    Sincerely,        Jess Phillips PA-C

## 2019-03-16 ENCOUNTER — HOSPITAL ENCOUNTER (OUTPATIENT)
Dept: RADIOLOGY | Facility: HOSPITAL | Age: 59
Discharge: HOME | End: 2019-03-16
Attending: PHYSICIAN ASSISTANT
Payer: COMMERCIAL

## 2019-03-16 DIAGNOSIS — M79.671 RIGHT FOOT PAIN: ICD-10-CM

## 2019-03-16 PROCEDURE — 73718 MRI LOWER EXTREMITY W/O DYE: CPT | Mod: 26,RT,, | Performed by: RADIOLOGY

## 2019-03-16 PROCEDURE — 73718 MRI LOWER EXTREMITY W/O DYE: CPT | Mod: TC,RT

## 2019-03-16 PROCEDURE — 73718 MRI FOOT (FOREFOOT) RIGHT WITHOUT CONTRAST: ICD-10-PCS | Mod: 26,RT,, | Performed by: RADIOLOGY

## 2019-03-18 ENCOUNTER — TELEPHONE (OUTPATIENT)
Dept: ORTHOPEDICS | Facility: CLINIC | Age: 59
End: 2019-03-18

## 2019-03-19 NOTE — TELEPHONE ENCOUNTER
Spoke to patient regarding MRI results. Will refer her to Dr. Tapia to discuss tx options/ possible injection. She voiced understanding. MA will call her to schedule.

## 2019-03-22 ENCOUNTER — HOSPITAL ENCOUNTER (EMERGENCY)
Facility: OTHER | Age: 59
Discharge: HOME | End: 2019-03-22
Attending: EMERGENCY MEDICINE
Payer: COMMERCIAL

## 2019-03-22 VITALS
DIASTOLIC BLOOD PRESSURE: 88 MMHG | TEMPERATURE: 99 F | WEIGHT: 200 LBS | OXYGEN SATURATION: 99 % | BODY MASS INDEX: 29.62 KG/M2 | HEIGHT: 69 IN | SYSTOLIC BLOOD PRESSURE: 140 MMHG | RESPIRATION RATE: 16 BRPM | HEART RATE: 63 BPM

## 2019-03-22 DIAGNOSIS — R07.9 CHEST PAIN: Primary | ICD-10-CM

## 2019-03-22 DIAGNOSIS — M79.605 PAIN OF LEFT LOWER EXTREMITY: ICD-10-CM

## 2019-03-22 LAB
ALBUMIN SERPL BCP-MCNC: 4 G/DL
ALP SERPL-CCNC: 89 U/L
ALT SERPL W/O P-5'-P-CCNC: 18 U/L
ANION GAP SERPL CALC-SCNC: 10 MMOL/L
AST SERPL-CCNC: 15 U/L
BASOPHILS # BLD AUTO: 0.03 K/UL
BASOPHILS NFR BLD: 0.3 %
BILIRUB SERPL-MCNC: 0.3 MG/DL
BUN SERPL-MCNC: 19 MG/DL
CALCIUM SERPL-MCNC: 10 MG/DL
CHLORIDE SERPL-SCNC: 99 MMOL/L
CO2 SERPL-SCNC: 28 MMOL/L
CREAT SERPL-MCNC: 0.8 MG/DL
DIFFERENTIAL METHOD: ABNORMAL
EOSINOPHIL # BLD AUTO: 0.1 K/UL
EOSINOPHIL NFR BLD: 0.8 %
ERYTHROCYTE [DISTWIDTH] IN BLOOD BY AUTOMATED COUNT: 13.9 %
EST. GFR  (AFRICAN AMERICAN): >60 ML/MIN/1.73 M^2
EST. GFR  (NON AFRICAN AMERICAN): >60 ML/MIN/1.73 M^2
GLUCOSE SERPL-MCNC: 108 MG/DL
HCT VFR BLD AUTO: 43.5 %
HGB BLD-MCNC: 14.5 G/DL
INR PPP: 1
LYMPHOCYTES # BLD AUTO: 2.6 K/UL
LYMPHOCYTES NFR BLD: 22.9 %
MCH RBC QN AUTO: 30.2 PG
MCHC RBC AUTO-ENTMCNC: 33.3 G/DL
MCV RBC AUTO: 91 FL
MONOCYTES # BLD AUTO: 1.1 K/UL
MONOCYTES NFR BLD: 9.7 %
NEUTROPHILS # BLD AUTO: 7.5 K/UL
NEUTROPHILS NFR BLD: 65.9 %
PLATELET # BLD AUTO: 438 K/UL
PMV BLD AUTO: 9.3 FL
POTASSIUM SERPL-SCNC: 3.8 MMOL/L
PROT SERPL-MCNC: 8 G/DL
PROTHROMBIN TIME: 11.4 SEC
RBC # BLD AUTO: 4.8 M/UL
SODIUM SERPL-SCNC: 137 MMOL/L
TROPONIN I SERPL DL<=0.01 NG/ML-MCNC: <0.006 NG/ML
WBC # BLD AUTO: 11.31 K/UL

## 2019-03-22 PROCEDURE — 99285 EMERGENCY DEPT VISIT HI MDM: CPT | Mod: 25

## 2019-03-22 PROCEDURE — 93010 EKG 12-LEAD: ICD-10-PCS | Mod: ,,, | Performed by: INTERNAL MEDICINE

## 2019-03-22 PROCEDURE — 93010 ELECTROCARDIOGRAM REPORT: CPT | Mod: ,,, | Performed by: INTERNAL MEDICINE

## 2019-03-22 PROCEDURE — 85025 COMPLETE CBC W/AUTO DIFF WBC: CPT

## 2019-03-22 PROCEDURE — 80053 COMPREHEN METABOLIC PANEL: CPT

## 2019-03-22 PROCEDURE — 93005 ELECTROCARDIOGRAM TRACING: CPT

## 2019-03-22 PROCEDURE — 85610 PROTHROMBIN TIME: CPT

## 2019-03-22 PROCEDURE — 84484 ASSAY OF TROPONIN QUANT: CPT

## 2019-03-23 NOTE — DISCHARGE INSTRUCTIONS
You were seen for your chest pain. We did a chest xray, bloodwork, and EKG.     Based on your physical exam and results today, the exact cause of your chest pain is not certain. Your condition does not seem serious and your pain does not appear to be coming from your heart. However, sometimes the signs of a serious problem take more time to appear. Therefore, please watch for the warning signs listed below.      Call your primary care doctor to make the first available appointment.     Keep all your medical appointments.     Take your regular medication as prescribed. Contact your primary care provider before running out of medication, or for any problems obtaining them.    Do not drive or operate heavy machinery while taking opioid or muscle relaxing medications. Examples include norco, percocet, xanax, valium, flexeril.     Overuse or long term use of pain and sedating medication may lead to addiction, dependence, organ failure, family and work problems, legal problems, accidental overdose and death.    If you do not have health insurance, you probably qualify for heavily discounted rates:  Call 1-382.729.7015 ([facility] hotline) or go to www.healthy.[place].gov    Your evaluation in the ED does not suggest any emergent or life threatening medical condition requiring admission or immediate intervention beyond that provided in the ED.   However, the signs of a serious problem sometimes take more time to appear.   RETURN TO THE ER if any of the following occur:    Weakness, dizziness, fainting, or loss of consciousness   Fever of 100.4ºF (38ºC) or higher  Any worse symptoms  Any new or concerning symptoms

## 2019-03-23 NOTE — ED PROVIDER NOTES
"Encounter Date: 3/22/2019    SCRIBE #1 NOTE: I, Narayan Rodgers, am scribing for, and in the presence of, Dr. Trimble.       History     Chief Complaint   Patient presents with    Spasms     pt started with spasms to left lower calf yesterday, then started with pains in chest, shoulder and arms, had cortisine shots to shoulders on Thursday     Time seen by provider: 8:52 PM    This is a 58 y.o. female who presents with complaint of muscle spasms that began yesterday. The patient reports that she was experiencing a "charley horse" that radiates up her left calf and one in her left upper arm that radiates downward. She also reports that she experiences chest pains that last only "a minute at a time", when they occur. The patient reports that she had two Cortizone shots in bilateral shoulders approximately one week ago, which was an abnormal area to get them. She states that she has has been increasing her intake of garlic recently because she states that it reduces inflammation. The patient states that she ate eight cloves of garlic yesterday and six cloves of garlic today. The patient states that she has had veins removes in her left lower extremity. She denies fever, sore throat, shortness of breath, nausea, and dysuria.     The history is provided by the patient.     Review of patient's allergies indicates:  No Known Allergies  Past Medical History:   Diagnosis Date    Abnormal Pap smear of cervix yrs ago    Arthritis     History of uterine fibroid     Hyperlipidemia     Hypertension     Sinus problem      Past Surgical History:   Procedure Laterality Date    BREAST BIOPSY  24-25 years old    ENCEPHALOCELE REPAIR  46    HYSTERECTOMY  93'-95'    ovaries remain    NASAL SINUS SURGERY      RESECTION-TURBINATES (SMR) Bilateral 8/21/2017    Performed by [facility]unj Sofia MD at [facility] OR [address] [place] [address]    SEPTOPLASTY Bilateral 8/21/2017    Performed by Nikunj Sofia MD at [facility] OR [address] [place] [address]    SINUS SURGERY FUNCTIONAL " "ENDOSCOPIC WITH NAVIGATION stealth and propel needed Bilateral 8/21/2017    Performed by [facility] at [facility] OR [facility]    TUBAL LIGATION      VAGINAL DELIVERY       Family History   Problem Relation Age of Onset    Stroke Maternal Grandmother     Diabetes Mother     Hypertension Mother     Diabetes Sister     Diabetes Sister     Breast cancer Neg Hx     Colon cancer Neg Hx     Ovarian cancer Neg Hx      Social History     Tobacco Use    Smoking status: Never Smoker    Smokeless tobacco: Never Used   Substance Use Topics    Alcohol use: Yes     Comment: seldom    Drug use: No     ROS: As per HPI and below:   General: No fever. No chills. No generalized weakness.   HENT: No sore throat. No rhinorrhea.   Eyes: No visual changes. No eye pain.   Cardiovascular: No chest pain.   Respiratory: No dyspnea. No cough.   GI: No abdominal pain. No nausea. No vomiting. No diarrhea.   Skin: No rashes.  Neuro: No focal weakness. No headache. No syncope.  Musculoskeletal: No extremity pain. No swelling.    Psych: No acute changes.  All other systems negative.     Physical Exam     Initial Vitals [03/22/19 2033]   BP Pulse Resp Temp SpO2   133/75 76 18 98.2 °F (36.8 °C) 97 %      MAP       --         Nursing note and vitals reviewed.  BP (!) 140/88 (BP Location: Left arm, Patient Position: Sitting)   Pulse 63   Temp 98.7 °F (37.1 °C) (Oral)   Resp 16   Ht 5' 9" (1.753 m)   Wt 90.7 kg (200 lb)   LMP 10/19/1993 (Approximate)   SpO2 99%   BMI 29.53 kg/m²   Constitutional: AAOx3. Well-developed and well-nourished. No distress. Obese.   HENT:   Mouth/Throat: Oropharynx is clear and moist.  Eyes: EOMI. No discharge. Anicteric.  Neck: Normal range of motion. Neck supple.  Cardiovascular: Normal rate. No murmur, no gallop and no friction rub heard.   Pulmonary/Chest: No respiratory distress. Effort normal. No wheezes, no rales, no rhonchi. Right anterior chest wall with puncture wound. No puncture wound on the " left, but the patient states there was an injection there.   Abdominal: Bowel sounds normal. Soft. No distension and no mass. There is no tenderness. There is no rebound, no guarding, no tenderness at McBurney's point and negative Conway's sign.   Musculoskeletal: Normal range of motion. Mild symmetric leg edema. Varicosities. No calf tenderness. Normal DP pulses.   Neurological: GCS 15. Alert and oriented to person, place, and time. No gross cranial nerve, light touch or strength deficit. Coordination normal.   Skin: Skin is warm and dry.   Psychiatric: Behavior is normal. Judgment normal.      ED Course   Procedures  Labs Reviewed   CBC W/ AUTO DIFFERENTIAL - Abnormal; Notable for the following components:       Result Value    Platelets 438 (*)     Mono # 1.1 (*)     All other components within normal limits   COMPREHENSIVE METABOLIC PANEL   TROPONIN I   PROTIME-INR     EKG Readings: (Independently Interpreted)   I independently reviewed and interpreted EKG which shows normal sinus rhythm at 71 beats per minute, no STEMI, no ischemic changes, normal intervals.       ECG Results          EKG 12-lead (Final result)  Result time 03/23/19 15:13:18    Final result by Interface, Lab In [facility] (03/23/19 15:13:18)                 Narrative:    Test Reason : R07.9,    Vent. Rate : 071 BPM     Atrial Rate : 071 BPM     P-R Int : 174 ms          QRS Dur : 082 ms      QT Int : 400 ms       P-R-T Axes : 076 025 066 degrees     QTc Int : 434 ms    Normal sinus rhythm  Right atrial enlargement  Nonspecific T wave abnormality  Abnormal ECG    Confirmed by Phillip Bailey MD ([id number]) on 3/23/2019 3:13:08 PM    Referred By: [email or web address]   SELF           Confirmed By:Phillip Bailey MD                             EKG 12-LEAD (Final result)  Result time 03/25/19 12:54:50    Final result by Unknown User (03/25/19 12:54:50)                                Imaging Results          X-Ray Chest 1 View (Final result)  Result time 03/22/19  21:39:51   Procedure changed from X-Ray Chest AP Portable     Final result by Danie Hayward MD (03/22/19 21:39:51)                 Impression:      1. No acute cardiopulmonary process.      Electronically signed by: Danie Hayward MD  Date:    03/22/2019  Time:    21:39             Narrative:    EXAMINATION:  XR CHEST 1 VIEW    CLINICAL HISTORY:  chest pain;    TECHNIQUE:  Single frontal view of the chest was performed.    COMPARISON:  None    FINDINGS:  The cardiomediastinal silhouette is not enlarged.  There is no pleural effusion.  The trachea is midline.  The lungs are symmetrically expanded bilaterally without evidence of acute parenchymal process. No large focal consolidation seen.  There is no pneumothorax.  The osseous structures are remarkable for degenerative changes..                              X-Rays:   Independently Interpreted Readings:   Chest X-Ray: I independently reviewed and interpreted CXR which shows no acute process.     Medical Decision Making:   Independently Interpreted Test(s):   I have ordered and independently interpreted X-rays - see prior notes.  I have ordered and independently interpreted EKG Reading(s) - see prior notes  Clinical Tests:   Lab Tests: Ordered and Reviewed  Radiological Study: Ordered and Reviewed  Medical Tests: Reviewed            Scribe Attestation:   Scribe #1: I performed the above scribed service and the documentation accurately describes the services I performed. I attest to the accuracy of the note.    Attending Attestation:           Physician Attestation for Scribe:  Physician Attestation Statement for Scribe #1: I, Dr. Tribmle, reviewed documentation, as scribed by Narayan Rodgers  in my presence, and it is both accurate and complete.                 ED Course as of Mar 27 2105   Fri Mar 22, 2019   2048 I independently reviewed and interpreted EKG which shows normal sinus rhythm at 71 beats per minute, no STEMI, no ischemic changes, normal intervals.       [RC]   2237 Patient is a 58-year-old female with hypertension, denies any history of ACS/MI, who presents with several episodes of sharp chest pain, and left leg pain over the past day.  Of note, the patient had steroid injections performed for her chronic shoulder pain yesterday, with an anterior chest wall approach.   Patient denies radiation to both arms, associated vomiting, worsening with exertion, pre/syncope which would increase the pretest probability of ACS/MI.  Denies travel/immobility, recent procedures / admissions, cough, hemoptysis, LE edema or pain, dyspnea, or fevers.  HEART score is  less than 3 thus a less than 2% risk of major adverse cardiac event next 6 weeks. Low risk patients have a score 0-3 and have a less than 2% risk of major adverse cardiac event next 6 weeks.    Wells DVT score is low. Low pretest probability for acute DVT.    I independently reviewed and interpreted labs which are unremarkable / unrevealing.   I discussed with patient and/or guardian/caretaker that this evaluation in the ED does not suggest any emergent or life threatening medical condition requiring admission or immediate intervention beyond what was provided in the ED. Regardless, an unremarkable evaluation in the ED does not preclude the development or presence of a serious or life threatening condition. As such, patient was instructed to return immediately for any worsening or change in current symptoms.     I had a detailed discussion with patient  and/or guardian/caretaker regarding findings, plan, return precautions, importance of medication adherence, need to follow-up with a PCP and specialist. All questions answered.     Note was created using voice recognition software. Note may have occasional typographical errors that may not have been identified and edited despite initial review prior to signing.      [RC]      ED Course User Index  [RC] Bill Trimble MD     Clinical Impression:     1. Chest pain    2.  Pain of left lower extremity                                   Bill Trimble MD  03/27/19 7232

## 2019-03-23 NOTE — ED NOTES
NEURO: Pt AAO x 4. Behavior and speech appropriate to situation.   CARDIAC: Regular rhythm auscultated, chest pain not reproducible with palpation   RESPIRATORY: Respirations even and unlabored. Breath sounds clear to all lung fields.   MUSCULOSKELETAL: Active ROM noted to extremities. BILATERAL  STRENGTH + 5     Cap RF to all extremity digits < 3 sec

## 2019-03-23 NOTE — ED TRIAGE NOTES
"Pt presents with c/o "tobi horse" to left lateral ankle onset yesterday. Pt states she messaged the "knot" and it resolved after about 15 minutes but reprots still has pain the the ankle to shoots up her leg. Pt also reports a pain down her left arm and a pain to mid chest. Pt states it feels like a pressure pain that lasts for a few seconds, states a few episodes las tnight and a few episodes today. Pt denies SOB. pt Reports getting steroid shot to both shoulder. Pt states she wants to make sure she is not having a stroke, heart attack, or blood clot. Pt is well appearing, pt in NAD  "

## 2019-04-08 ENCOUNTER — OFFICE VISIT (OUTPATIENT)
Dept: INTERNAL MEDICINE | Facility: CLINIC | Age: 59
End: 2019-04-08
Payer: COMMERCIAL

## 2019-04-08 ENCOUNTER — OFFICE VISIT (OUTPATIENT)
Dept: CARDIOLOGY | Facility: CLINIC | Age: 59
End: 2019-04-08
Payer: COMMERCIAL

## 2019-04-08 VITALS
RESPIRATION RATE: 18 BRPM | BODY MASS INDEX: 29.98 KG/M2 | DIASTOLIC BLOOD PRESSURE: 76 MMHG | HEIGHT: 69 IN | SYSTOLIC BLOOD PRESSURE: 128 MMHG | OXYGEN SATURATION: 99 % | HEART RATE: 67 BPM | TEMPERATURE: 98 F | WEIGHT: 202.38 LBS

## 2019-04-08 VITALS
HEIGHT: 69 IN | BODY MASS INDEX: 29.93 KG/M2 | WEIGHT: 202.06 LBS | SYSTOLIC BLOOD PRESSURE: 118 MMHG | HEART RATE: 64 BPM | DIASTOLIC BLOOD PRESSURE: 74 MMHG

## 2019-04-08 DIAGNOSIS — E55.9 VITAMIN D DEFICIENCY: ICD-10-CM

## 2019-04-08 DIAGNOSIS — M25.512 ACUTE PAIN OF LEFT SHOULDER: Primary | ICD-10-CM

## 2019-04-08 DIAGNOSIS — I83.813 VARICOSE VEINS OF BILATERAL LOWER EXTREMITIES WITH PAIN: Primary | ICD-10-CM

## 2019-04-08 DIAGNOSIS — I10 ESSENTIAL HYPERTENSION: ICD-10-CM

## 2019-04-08 DIAGNOSIS — E78.00 PURE HYPERCHOLESTEROLEMIA: ICD-10-CM

## 2019-04-08 PROCEDURE — 3074F PR MOST RECENT SYSTOLIC BLOOD PRESSURE < 130 MM HG: ICD-10-PCS | Mod: CPTII,S$GLB,, | Performed by: INTERNAL MEDICINE

## 2019-04-08 PROCEDURE — 3074F PR MOST RECENT SYSTOLIC BLOOD PRESSURE < 130 MM HG: ICD-10-PCS | Mod: CPTII,S$GLB,, | Performed by: FAMILY MEDICINE

## 2019-04-08 PROCEDURE — 3074F SYST BP LT 130 MM HG: CPT | Mod: CPTII,S$GLB,, | Performed by: INTERNAL MEDICINE

## 2019-04-08 PROCEDURE — 3078F DIAST BP <80 MM HG: CPT | Mod: CPTII,S$GLB,, | Performed by: INTERNAL MEDICINE

## 2019-04-08 PROCEDURE — 99999 PR PBB SHADOW E&M-EST. PATIENT-LVL IV: ICD-10-PCS | Mod: PBBFAC,,, | Performed by: INTERNAL MEDICINE

## 2019-04-08 PROCEDURE — 99214 OFFICE O/P EST MOD 30 MIN: CPT | Mod: S$GLB,,, | Performed by: INTERNAL MEDICINE

## 2019-04-08 PROCEDURE — 3008F PR BODY MASS INDEX (BMI) DOCUMENTED: ICD-10-PCS | Mod: CPTII,S$GLB,, | Performed by: FAMILY MEDICINE

## 2019-04-08 PROCEDURE — 99214 OFFICE O/P EST MOD 30 MIN: CPT | Mod: S$GLB,,, | Performed by: FAMILY MEDICINE

## 2019-04-08 PROCEDURE — 3078F PR MOST RECENT DIASTOLIC BLOOD PRESSURE < 80 MM HG: ICD-10-PCS | Mod: CPTII,S$GLB,, | Performed by: FAMILY MEDICINE

## 2019-04-08 PROCEDURE — 99214 PR OFFICE/OUTPT VISIT, EST, LEVL IV, 30-39 MIN: ICD-10-PCS | Mod: S$GLB,,, | Performed by: INTERNAL MEDICINE

## 2019-04-08 PROCEDURE — 99999 PR PBB SHADOW E&M-EST. PATIENT-LVL III: CPT | Mod: PBBFAC,,, | Performed by: FAMILY MEDICINE

## 2019-04-08 PROCEDURE — 99999 PR PBB SHADOW E&M-EST. PATIENT-LVL III: ICD-10-PCS | Mod: PBBFAC,,, | Performed by: FAMILY MEDICINE

## 2019-04-08 PROCEDURE — 3078F DIAST BP <80 MM HG: CPT | Mod: CPTII,S$GLB,, | Performed by: FAMILY MEDICINE

## 2019-04-08 PROCEDURE — 3008F PR BODY MASS INDEX (BMI) DOCUMENTED: ICD-10-PCS | Mod: CPTII,S$GLB,, | Performed by: INTERNAL MEDICINE

## 2019-04-08 PROCEDURE — 3074F SYST BP LT 130 MM HG: CPT | Mod: CPTII,S$GLB,, | Performed by: FAMILY MEDICINE

## 2019-04-08 PROCEDURE — 99214 PR OFFICE/OUTPT VISIT, EST, LEVL IV, 30-39 MIN: ICD-10-PCS | Mod: S$GLB,,, | Performed by: FAMILY MEDICINE

## 2019-04-08 PROCEDURE — 3008F BODY MASS INDEX DOCD: CPT | Mod: CPTII,S$GLB,, | Performed by: INTERNAL MEDICINE

## 2019-04-08 PROCEDURE — 3008F BODY MASS INDEX DOCD: CPT | Mod: CPTII,S$GLB,, | Performed by: FAMILY MEDICINE

## 2019-04-08 PROCEDURE — 3078F PR MOST RECENT DIASTOLIC BLOOD PRESSURE < 80 MM HG: ICD-10-PCS | Mod: CPTII,S$GLB,, | Performed by: INTERNAL MEDICINE

## 2019-04-08 PROCEDURE — 99999 PR PBB SHADOW E&M-EST. PATIENT-LVL IV: CPT | Mod: PBBFAC,,, | Performed by: INTERNAL MEDICINE

## 2019-04-08 RX ORDER — MELOXICAM 15 MG/1
15 TABLET ORAL DAILY
Qty: 90 TABLET | Refills: 0 | Status: SHIPPED | OUTPATIENT
Start: 2019-04-08 | End: 2019-09-17 | Stop reason: SDUPTHER

## 2019-04-08 RX ORDER — ERGOCALCIFEROL 1.25 MG/1
CAPSULE ORAL
Qty: 4 CAPSULE | Refills: 2 | Status: SHIPPED | OUTPATIENT
Start: 2019-04-08 | End: 2019-12-31 | Stop reason: ALTCHOICE

## 2019-04-08 NOTE — PROGRESS NOTES
Subjective:       Patient ID: Rosamaria Agosto is a 58 y.o. female.    Chief Complaint: Shoulder Pain (left)    HPI 58-year-old  female presents to clinic today secondary to a complaint of continued left shoulder pain despite evaluation approximately 1 month ago by Orthopedics.  X-rays were overall normal except for degenerative joint disease.  Patient did receive bilateral shoulder injections which she reports did not assist with her pain.  She continues to have left shoulder pain with frequent pain radiating into her left arm.  She has continued to use diclofenac 2 times per day, diclofenac gel, and Tiger Balm without relief.  She rates her pain at a 10/10.  Review of Systems   Constitutional: Negative for appetite change, chills, fatigue and fever.   HENT: Negative for congestion, ear pain, hearing loss, postnasal drip, rhinorrhea, sinus pressure, sore throat and tinnitus.    Eyes: Negative for redness, itching and visual disturbance.   Respiratory: Negative for cough, chest tightness and shortness of breath.    Cardiovascular: Negative for chest pain and palpitations.   Gastrointestinal: Negative for abdominal pain, constipation, diarrhea, nausea and vomiting.   Genitourinary: Negative for decreased urine volume, difficulty urinating, dysuria, frequency, hematuria and urgency.   Musculoskeletal: Positive for arthralgias (Left shoulder). Negative for back pain, myalgias, neck pain and neck stiffness.   Skin: Negative for rash.   Neurological: Negative for dizziness, light-headedness and headaches.   Psychiatric/Behavioral: Negative.        Objective:      Physical Exam   Constitutional: She is oriented to person, place, and time. She appears well-developed and well-nourished. No distress.   HENT:   Head: Normocephalic and atraumatic.   Right Ear: External ear normal.   Left Ear: External ear normal.   Nose: Nose normal.   Mouth/Throat: Oropharynx is clear and moist. No oropharyngeal exudate.    Eyes: Pupils are equal, round, and reactive to light. Conjunctivae and EOM are normal. Right eye exhibits no discharge. Left eye exhibits no discharge. No scleral icterus.   Neck: Normal range of motion. Neck supple. No JVD present. No tracheal deviation present. No thyromegaly present.   Musculoskeletal: She exhibits no edema.        Left shoulder: She exhibits decreased range of motion, tenderness, pain and spasm.   Lymphadenopathy:     She has no cervical adenopathy.   Neurological: She is alert and oriented to person, place, and time.   Skin: Skin is warm and dry. No rash noted. She is not diaphoretic. No erythema. No pallor.   Psychiatric: She has a normal mood and affect. Her behavior is normal. Judgment and thought content normal.   Nursing note and vitals reviewed.      Assessment:       1. Acute pain of left shoulder    2. Vitamin D deficiency        Plan:       1.  Meloxicam 15 mg daily.  2.  Continue diclofenac gel as needed.  3.  Tylenol as needed for pain.  4.  Return to Orthopedics for further evaluation.  5.  Refill of vitamin-D has been given.  6.  Return to clinic as needed if symptoms persist or worsen.

## 2019-04-15 NOTE — PROGRESS NOTES
Subjective:   Chief Complaint: Leg Pain    Last Clinic Visit:  Many years ago with Dr. Burns    History of Present Illness: Rosamaria gAosto is a 58 y.o. lady with hypertension, hyperlipidemia, and lower extremity venous reflux who presents to discuss lower extremity edema.  She reports bilateral lower extremity edema worse after a long day at work, mildly relieved by raising her legs.  She has significant bilateral lower extremity pain associated with this edema.  She has tried compression stockings without significant relief blood pressure well controlled, and weight within normal limits, also currently on hydrochlorothiazide for mild diuresis.  In the past she has received sclerotherapy, and followed with Dr. burns in the past.  She had a lower extremity venous ultrasound after her most recent procedure showing ongoing lower extremity venous reflux.  She specifically would like recurrent evaluation by interventionalist, and feels like compression stockings are no longer working all of her symptoms were relieved previously with sclerotherapy in the past    PMHx:  Hypertension  Hyperlipidemia  Lower extremity varicose veins and venous reflux by ultrasound    Review of Systems   Constitution: Negative.   HENT: Negative.    Eyes: Negative.    Cardiovascular: Positive for leg swelling.   Respiratory: Negative.    Hematologic/Lymphatic: Negative.    Skin: Negative.    Musculoskeletal: Negative.    Gastrointestinal: Negative.    Genitourinary: Negative.        Medications:  Current Outpatient Medications on File Prior to Visit   Medication Sig    atorvastatin (LIPITOR) 80 MG tablet Take 1 tablet (80 mg total) by mouth nightly.    azelastine (ASTELIN) 137 mcg (0.1 %) nasal spray 1 spray (137 mcg total) by Nasal route 2 (two) times daily.    budesonide (PULMICORT) 0.5 mg/2 mL nebulizer solution inhale 1 nebules per nebulizer twice daily, for use in saline irrigation as directed    hydroCHLOROthiazide (HYDRODIURIL) 25  "MG tablet Take 1 tablet (25 mg total) by mouth once daily.    traMADol (ULTRAM) 50 mg tablet Take 1 tablet (50 mg total) by mouth daily as needed (severe pain).    phentermine (ADIPEX-P) 37.5 mg tablet Take 1 tablet (37.5 mg total) by mouth before breakfast.     No current facility-administered medications on file prior to visit.      Family History:  Rosamaria's family history includes Diabetes in her mother, sister, and sister; Hypertension in her mother; Stroke in her maternal grandmother.    Social History:  Rosamaria reports that she has never smoked. She has never used smokeless tobacco. She reports that she drinks alcohol. She reports that she does not use drugs.    Objective:   /74   Pulse 64   Ht 5' 9" (1.753 m)   Wt 91.7 kg (202 lb 0.8 oz)   LMP 10/19/1993 (Approximate)   BMI 29.84 kg/m²     Physical Exam   Constitutional: She is oriented to person, place, and time and well-developed, well-nourished, and in no distress. No distress.   HENT:   Head: Normocephalic and atraumatic.   Mouth/Throat: No oropharyngeal exudate.   Eyes: EOM are normal. No scleral icterus.   Neck: No JVD present. No tracheal deviation present. No thyromegaly present.   Cardiovascular: Normal rate and regular rhythm. Exam reveals no gallop and no friction rub.   No murmur heard.  Pulmonary/Chest: Effort normal and breath sounds normal. No respiratory distress. She has no wheezes. She has no rales. She exhibits no tenderness.   Abdominal: Soft. She exhibits no distension. There is no tenderness. There is no rebound and no guarding.   Musculoskeletal: Normal range of motion. She exhibits edema.   Neurological: She is alert and oriented to person, place, and time.   Skin: Skin is warm and dry. She is not diaphoretic. No erythema.   Psychiatric: Affect normal.     EKG:  My independent visualization of most recent EKG is normal sinus rhythm with sinus arrhythmia  Lipids:  Recent Labs   Lab 05/28/18  0820   LDL CHOLESTEROL 192.2 H "   HDL 41   CHOLESTEROL 259 H      02/24/2017  Lower extremity venous ultrasound  Impression:    RIGHT:  No evidence of Right lower extremity DVT.    There is reflux > 500 msec in the Saphenofemoral Junction Vein.    There is reflux > 500 msec above and below the knee in the Greater Saphenous Vein.    GSV ak measures 0.25 cm.    GSV bk measures 0.33 cm.    GSV at SFJ measures 0.44 cm.    GSV prox measures 0.43 cm.      LEFT:  No evidence of Left lower extremity DVT.    There is reflux > 500 msec in the Saphenofemoral Junction Vein.    There is reflux > 500 msec above and below the knee in the Greater Saphenous Vein.    There is reflux > 500 msec below the knee in the Lesser Saphenous Vein.    GSV ak measures 0.27 cm.    GSV bk measures 0.26 cm.    LSV proximal measures 0.42 cm.    GSV at SFJ measures 0.64 cm.    GSV prox measures 0.51 cm.          Assessment:     1. Varicose veins of bilateral lower extremities with pain    2. Essential hypertension    3. Pure hypercholesterolemia        Plan:   1. Varicose veins of bilateral lower extremities with pain  Will refer her to interventional cardiology for evaluation as she request  - Ambulatory Referral to Interventional Cardiology    2. Essential hypertension  Continue hydrochlorothiazide blood pressure well controlled    3. Pure hypercholesterolemia  Continue Lipitor, she reports she recently had a lipid panel checked by outside primary care physician, encouraged her to follow-up with this physician      Follow up in about 1 year (around 4/8/2020).

## 2019-04-25 ENCOUNTER — PATIENT MESSAGE (OUTPATIENT)
Dept: ORTHOPEDICS | Facility: CLINIC | Age: 59
End: 2019-04-25

## 2019-04-26 ENCOUNTER — OFFICE VISIT (OUTPATIENT)
Dept: OBSTETRICS AND GYNECOLOGY | Facility: CLINIC | Age: 59
End: 2019-04-26
Payer: COMMERCIAL

## 2019-04-26 ENCOUNTER — TELEPHONE (OUTPATIENT)
Dept: ORTHOPEDICS | Facility: CLINIC | Age: 59
End: 2019-04-26

## 2019-04-26 ENCOUNTER — INITIAL CONSULT (OUTPATIENT)
Dept: CARDIOLOGY | Facility: CLINIC | Age: 59
End: 2019-04-26
Payer: COMMERCIAL

## 2019-04-26 ENCOUNTER — CLINICAL SUPPORT (OUTPATIENT)
Dept: CARDIOLOGY | Facility: CLINIC | Age: 59
End: 2019-04-26
Attending: STUDENT IN AN ORGANIZED HEALTH CARE EDUCATION/TRAINING PROGRAM
Payer: COMMERCIAL

## 2019-04-26 VITALS
HEIGHT: 69 IN | BODY MASS INDEX: 29.91 KG/M2 | WEIGHT: 201.94 LBS | DIASTOLIC BLOOD PRESSURE: 86 MMHG | SYSTOLIC BLOOD PRESSURE: 140 MMHG

## 2019-04-26 VITALS
OXYGEN SATURATION: 99 % | SYSTOLIC BLOOD PRESSURE: 147 MMHG | HEART RATE: 63 BPM | HEIGHT: 69 IN | DIASTOLIC BLOOD PRESSURE: 92 MMHG | BODY MASS INDEX: 30.04 KG/M2 | WEIGHT: 202.81 LBS

## 2019-04-26 DIAGNOSIS — I87.2 VENOUS INSUFFICIENCY: Primary | ICD-10-CM

## 2019-04-26 DIAGNOSIS — I83.813 VARICOSE VEINS OF BILATERAL LOWER EXTREMITIES WITH PAIN: ICD-10-CM

## 2019-04-26 DIAGNOSIS — N81.10 PROLAPSE OF ANTERIOR VAGINAL WALL: Primary | ICD-10-CM

## 2019-04-26 DIAGNOSIS — I87.2 VENOUS INSUFFICIENCY: ICD-10-CM

## 2019-04-26 LAB
LEFT GREAT SAPHENOUS JUNCTION DIA: 0.67 CM
RIGHT GREAT SAPHENOUS JUNCTION DIA: 0.56 CM
RIGHT GREAT SAPHENOUS MIDDLE THIGH DIA: 0.57 CM

## 2019-04-26 PROCEDURE — 93970 EXTREMITY STUDY: CPT | Mod: S$GLB,,, | Performed by: INTERNAL MEDICINE

## 2019-04-26 PROCEDURE — 99999 PR PBB SHADOW E&M-EST. PATIENT-LVL III: CPT | Mod: PBBFAC,,, | Performed by: INTERNAL MEDICINE

## 2019-04-26 PROCEDURE — 99215 PR OFFICE/OUTPT VISIT, EST, LEVL V, 40-54 MIN: ICD-10-PCS | Mod: S$GLB,,, | Performed by: INTERNAL MEDICINE

## 2019-04-26 PROCEDURE — 3079F DIAST BP 80-89 MM HG: CPT | Mod: CPTII,S$GLB,, | Performed by: OBSTETRICS & GYNECOLOGY

## 2019-04-26 PROCEDURE — 3078F PR MOST RECENT DIASTOLIC BLOOD PRESSURE < 80 MM HG: ICD-10-PCS | Mod: CPTII,S$GLB,, | Performed by: INTERNAL MEDICINE

## 2019-04-26 PROCEDURE — 99215 OFFICE O/P EST HI 40 MIN: CPT | Mod: S$GLB,,, | Performed by: INTERNAL MEDICINE

## 2019-04-26 PROCEDURE — 3008F BODY MASS INDEX DOCD: CPT | Mod: CPTII,S$GLB,, | Performed by: OBSTETRICS & GYNECOLOGY

## 2019-04-26 PROCEDURE — 3077F PR MOST RECENT SYSTOLIC BLOOD PRESSURE >= 140 MM HG: ICD-10-PCS | Mod: CPTII,S$GLB,, | Performed by: OBSTETRICS & GYNECOLOGY

## 2019-04-26 PROCEDURE — 3008F PR BODY MASS INDEX (BMI) DOCUMENTED: ICD-10-PCS | Mod: CPTII,S$GLB,, | Performed by: INTERNAL MEDICINE

## 2019-04-26 PROCEDURE — 99999 PR PBB SHADOW E&M-EST. PATIENT-LVL III: CPT | Mod: PBBFAC,,, | Performed by: OBSTETRICS & GYNECOLOGY

## 2019-04-26 PROCEDURE — 99999 PR PBB SHADOW E&M-EST. PATIENT-LVL III: ICD-10-PCS | Mod: PBBFAC,,, | Performed by: OBSTETRICS & GYNECOLOGY

## 2019-04-26 PROCEDURE — 3079F PR MOST RECENT DIASTOLIC BLOOD PRESSURE 80-89 MM HG: ICD-10-PCS | Mod: CPTII,S$GLB,, | Performed by: OBSTETRICS & GYNECOLOGY

## 2019-04-26 PROCEDURE — 99999 PR PBB SHADOW E&M-EST. PATIENT-LVL III: ICD-10-PCS | Mod: PBBFAC,,, | Performed by: INTERNAL MEDICINE

## 2019-04-26 PROCEDURE — 3077F PR MOST RECENT SYSTOLIC BLOOD PRESSURE >= 140 MM HG: ICD-10-PCS | Mod: CPTII,S$GLB,, | Performed by: INTERNAL MEDICINE

## 2019-04-26 PROCEDURE — 3008F PR BODY MASS INDEX (BMI) DOCUMENTED: ICD-10-PCS | Mod: CPTII,S$GLB,, | Performed by: OBSTETRICS & GYNECOLOGY

## 2019-04-26 PROCEDURE — 3077F SYST BP >= 140 MM HG: CPT | Mod: CPTII,S$GLB,, | Performed by: INTERNAL MEDICINE

## 2019-04-26 PROCEDURE — 99214 OFFICE O/P EST MOD 30 MIN: CPT | Mod: S$GLB,,, | Performed by: OBSTETRICS & GYNECOLOGY

## 2019-04-26 PROCEDURE — 3077F SYST BP >= 140 MM HG: CPT | Mod: CPTII,S$GLB,, | Performed by: OBSTETRICS & GYNECOLOGY

## 2019-04-26 PROCEDURE — 3008F BODY MASS INDEX DOCD: CPT | Mod: CPTII,S$GLB,, | Performed by: INTERNAL MEDICINE

## 2019-04-26 PROCEDURE — 93970 CV US LOWER VENOUS INSUFFICIENCY BILATERAL (CUPID ONLY): ICD-10-PCS | Mod: S$GLB,,, | Performed by: INTERNAL MEDICINE

## 2019-04-26 PROCEDURE — 99214 PR OFFICE/OUTPT VISIT, EST, LEVL IV, 30-39 MIN: ICD-10-PCS | Mod: S$GLB,,, | Performed by: OBSTETRICS & GYNECOLOGY

## 2019-04-26 PROCEDURE — 3078F DIAST BP <80 MM HG: CPT | Mod: CPTII,S$GLB,, | Performed by: INTERNAL MEDICINE

## 2019-04-26 NOTE — LETTER
April 29, 2019      Jett Lieberman MD  [address]  [facility] [address]           [facility]  [address]  [facility] [address]  Phone: [phone]          Patient: Rosamaria Agosto   MR Number: [id number]   YOB: 1960   Date of Visit: 4/26/2019       Dear Dr. Jett Lieberman:    Thank you for referring Rosamaria Agosto to me for evaluation. Attached you will find relevant portions of my assessment and plan of care.    If you have questions, please do not hesitate to call me. I look forward to following Rosamaria Agosto along with you.    Sincerely,    Douglas Barrow MD    Enclosure  CC:  No Recipients    If you would like to receive this communication electronically, please contact [email or web address] or [phone] to request more information on [employer] access.    For providers and/or their staff who would like to refer a patient to [facility], please contact us through our one-stop-shop provider referral line, [facility] , at [phone].    If you feel you have received this communication in error or would no longer like to receive these types of communications, please e-mail [email or web address]

## 2019-04-26 NOTE — PROGRESS NOTES
Interventional Cardiology Clinic Note  Reason for Visit: Venous insufficiency    HPI:   Pt is a 58 year old lady who was referred by Dr. Lieberman for venous insufficiency    She had a laser about 4-5 years ago by Dr. Camacho after the procedure her leg swelling improved however for about the past 1-2 years she has noticed bilateral leg swelling that gets worse throughout the day and seems to improve after sleeping. She has associated pain in both lower extremities. She does not wear compression stockings because she states that they are uncomfortable.     Review of Systems   All other systems reviewed and are negative.      PMH:     Past Medical History:   Diagnosis Date    Abnormal Pap smear of cervix yrs ago    Arthritis     History of uterine fibroid     Hyperlipidemia     Hypertension     Sinus problem      Past Surgical History:   Procedure Laterality Date    BREAST BIOPSY  24-25 years old    ENCEPHALOCELE REPAIR  46    HYSTERECTOMY  93'-95'    ovaries remain    NASAL SINUS SURGERY      RESECTION-TURBINATES (SMR) Bilateral 8/21/2017    [facility]    SEPTOPLASTY Bilateral 8/21/2017    Per[facility] at [facility]    SINUS SURGERY FUNCTIONAL ENDOSCOPIC WITH NAVIGATION stealth and propel needed Bilateral 8/21/2017    Performed by [facility] at [facility]    TUBAL LIGATION      VAGINAL DELIVERY       Allergies:   Review of patient's allergies indicates:  No Known Allergies  Medications:     Current Outpatient Medications on File Prior to Visit   Medication Sig Dispense Refill    atorvastatin (LIPITOR) 80 MG tablet Take 1 tablet (80 mg total) by mouth nightly. 30 tablet 11    azelastine (ASTELIN) 137 mcg (0.1 %) nasal spray 1 spray (137 mcg total) by Nasal route 2 (two) times daily. 30 mL 2    budesonide (PULMICORT) 0.5 mg/2 mL nebulizer solution inhale 1 nebules per nebulizer twice daily, for use in saline irrigation  "as directed  1    hydroCHLOROthiazide (HYDRODIURIL) 25 MG tablet Take 1 tablet (25 mg total) by mouth once daily. 30 tablet 11    meloxicam (MOBIC) 15 MG tablet Take 1 tablet (15 mg total) by mouth once daily. 90 tablet 0    VITAMIN D2 50,000 unit capsule Take 1 capsule (50,000 Units total) by mouth every 7 days. 4 capsule 2    phentermine (ADIPEX-P) 37.5 mg tablet Take 1 tablet (37.5 mg total) by mouth before breakfast. 30 tablet 2    traMADol (ULTRAM) 50 mg tablet Take 1 tablet (50 mg total) by mouth daily as needed (severe pain). 30 tablet 2     No current facility-administered medications on file prior to visit.      Social History:     Social History     Tobacco Use    Smoking status: Never Smoker    Smokeless tobacco: Never Used   Substance Use Topics    Alcohol use: Yes     Alcohol/week: 0.6 oz     Types: 1 Shots of liquor per week     Comment: seldom     Family History:     Family History   Problem Relation Age of Onset    Stroke Maternal Grandmother     Diabetes Mother     Hypertension Mother     Diabetes Sister     Diabetes Sister     Breast cancer Neg Hx     Colon cancer Neg Hx     Ovarian cancer Neg Hx        Physical Exam  BP (!) 147/92 (BP Location: Right arm, Patient Position: Sitting, BP Method: Large (Automatic))   Pulse 63   Ht 5' 9" (1.753 m)   Wt 92 kg (202 lb 13.2 oz)   LMP 10/19/1993 (Approximate)   SpO2 99%   BMI 29.95 kg/m²    GEN: Alert and oriented in NAD  NECK: no JVD appreciated   CVS: RRR, s1/s2, no MRG  PULM: CTAB no rales  ABD: NT/ND BS +  Extremities: warm and dry, palpable pulses, no edema  NEURO: Alert and oriented x 3  PSYCH: appropriate affect.             Labs:     Lab Results   Component Value Date     03/22/2019    K 3.8 03/22/2019    CL 99 03/22/2019    CO2 28 03/22/2019    BUN 19 03/22/2019    CREATININE 0.8 03/22/2019    ANIONGAP 10 03/22/2019     Lab Results   Component Value Date    HGBA1C 5.6 05/28/2018     No results found for: BNP, " BNPTRIAGEBLO Lab Results   Component Value Date    WBC 11.31 03/22/2019    HGB 14.5 03/22/2019    HCT 43.5 03/22/2019     (H) 03/22/2019    GRAN 7.5 03/22/2019    GRAN 65.9 03/22/2019     Lab Results   Component Value Date    CHOL 259 (H) 05/28/2018    HDL 41 05/28/2018    LDLCALC 192.2 (H) 05/28/2018    TRIG 129 05/28/2018          No results found for: EF      Assessment and Plan  Rosamaria Agosto is a 58 y.o. here for venous insufficiency    Varicose veins of bilateral lower extremities with pain  Pt with past laser to lower extremities here with increasing leg swelling. Has not had a recent ultrasound but the one in 2017 shows reflux in several areas. She is not wearing compression stockings at this point. Have recommended her to start wearing compression stockings and will get an ultrasound of the lower extremities and if little improvement will offer repeat laser therapy.     Signed:        Megan Rojas MD  Cardiology Fellow  Pager [phone]    I have personally taken the history and examined this patient. I have discussed and agree with the resident's findings and plan as documented in the resident's note.  Recommend compression stockings 20 to 30 mmhg x three months. FollowUp 3 months for symptomatic improvement. If symptoms have not improved In three months with conservative measures, Patient may qualify for either radiofrequency ablation or sclerotherapy.  Douglas Barrow

## 2019-04-26 NOTE — TELEPHONE ENCOUNTER
Called patient and let her know that we did receive her message. But, Mrs. Mercado doesn't work on Friday's. I am sending a message to her in regards to getting an MRI and we will call her on Monday.

## 2019-04-27 NOTE — PROGRESS NOTES
"CC: 59 yo  female, here for fu visit      HPI: 58 is overall well today. She is a . S/p TVH in  for uterine fibroids. 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 Never sees bulge but feels lots of pressure and often has to push bulge back in. No problems emptying her bladder or rectum. No splinting. No loss of urine.     History of  x 2. She is overweight, BMI 49. Was working with nutritionist previously, but stopped going. She has plans to return to diet/exercise routine in order to lose excess weight.     OB History    Para Term  AB Living   2 2 0     2   SAB TAB Ectopic Multiple Live Births           2      # Outcome Date GA Lbr Aleksey/2nd Weight Sex Delivery Anes PTL Lv   2 Para     M Vag-Spont   JESU   1 Para     F Vag-Spont   JESU       Past Medical History:   Diagnosis Date    Abnormal Pap smear of cervix yrs ago    Arthritis     History of uterine fibroid     Hyperlipidemia     Hypertension     Sinus problem        Past Surgical History:   Procedure Laterality Date    BREAST BIOPSY  24-25 years old    ENCEPHALOCELE REPAIR  46    HYSTERECTOMY  93'-95'    ovaries remain    NASAL SINUS SURGERY      RESECTION-TURBINATES (SMR) Bilateral 2017    [facility]    SEPTOPLASTY Bilateral 2017    [facility]    SINUS SURGERY FUNCTIONAL ENDOSCOPIC WITH NAVIGATION stealth and propel needed Bilateral 2017    [facility]    TUBAL LIGATION      VAGINAL DELIVERY         OB History        2    Para   2    Term   0            AB        Living   2       SAB        TAB        Ectopic        Multiple        Live Births   2                 Current Outpatient Medications on File Prior to Visit   Medication Sig Dispense Refill    atorvastatin (LIPITOR) 80 " "MG tablet Take 1 tablet (80 mg total) by mouth nightly. 30 tablet 11    azelastine (ASTELIN) 137 mcg (0.1 %) nasal spray 1 spray (137 mcg total) by Nasal route 2 (two) times daily. 30 mL 2    budesonide (PULMICORT) 0.5 mg/2 mL nebulizer solution inhale 1 nebules per nebulizer twice daily, for use in saline irrigation as directed  1    hydroCHLOROthiazide (HYDRODIURIL) 25 MG tablet Take 1 tablet (25 mg total) by mouth once daily. 30 tablet 11    meloxicam (MOBIC) 15 MG tablet Take 1 tablet (15 mg total) by mouth once daily. 90 tablet 0    phentermine (ADIPEX-P) 37.5 mg tablet Take 1 tablet (37.5 mg total) by mouth before breakfast. 30 tablet 2    traMADol (ULTRAM) 50 mg tablet Take 1 tablet (50 mg total) by mouth daily as needed (severe pain). 30 tablet 2    VITAMIN D2 50,000 unit capsule Take 1 capsule (50,000 Units total) by mouth every 7 days. 4 capsule 2     No current facility-administered medications on file prior to visit.          ROS:  GENERAL: Reports weigh gain. Feeling well overall.   ABDOMEN: No abdominal pain  URINARY: No frequency, dysuria, hematuria or burning on urination.  REPRODUCTIVE: See HPI.   MUSCULOSKELETAL: Denies joint pain or swelling.     Physical Exam:   BP (!) 140/86 (BP Location: Left arm, Patient Position: Sitting)   Ht 5' 9" (1.753 m)   Wt 91.6 kg (201 lb 15.1 oz)   LMP 10/19/1993 (Approximate)   BMI 29.82 kg/m²   General: No distress, well appearing, overweight   Heart: Regular rate  Lungs: No increased work of breathing  MS: lower extremeties symmetrical, no edema  Pelvic Exam: NEFG. There is a 3 mm skin tag on the left labia minora. Vaginal mucosa is atrophic appearing. On speculum exam, vaginal cuff normal and intact. See below for POP-Q. On bimanual exam, uterus is surgically absent. No adnexal masses/fullnes. Cuff intact.     Aa: -1  Ba: -1  C: - 9 cm   TVL: 9 cm  Ap:-3  Bp:-3  D: X    From prior visit: PVR was 5 ccs and negative urine dip    ASSESSMENT/PLAN: 59 yo " here with stage 2 POP.    1. Stage 2 POP, anterior (s/p TVH)  - We discussed management options including expectant management/weight loss and lifestyle changes, pessary, pelvic floor physical therapy and surgical management. After reviewing risks/benefits of each, she would like to consider pessary.  - Referral placed to urogynecology for consideration of pessary for anterior prolapse. Happy to follow her in our clinic after initial evaluation by urogynecology.       Parul Marie MD  Obstetrics and Gynecology  [facility]

## 2019-04-29 ENCOUNTER — TELEPHONE (OUTPATIENT)
Dept: ORTHOPEDICS | Facility: CLINIC | Age: 59
End: 2019-04-29

## 2019-04-29 ENCOUNTER — TELEPHONE (OUTPATIENT)
Dept: CARDIOLOGY | Facility: CLINIC | Age: 59
End: 2019-04-29

## 2019-04-29 DIAGNOSIS — M25.512 LEFT SHOULDER PAIN, UNSPECIFIED CHRONICITY: Primary | ICD-10-CM

## 2019-04-29 NOTE — TELEPHONE ENCOUNTER
Called patient with results of venous ultrasound. Patient is booked on 7/23/19 with Dr. Barrow for 3 mo. F/u. Verbalized understanding.

## 2019-04-29 NOTE — TELEPHONE ENCOUNTER
Called patient, told her that we didn't have any appointment on Saturday. But, we did have late appointments available for Friday. Told her I will call her to let her know if her MRI was approved.

## 2019-04-29 NOTE — PROGRESS NOTES
Patient reports no relief with left shoulder cortisone injection she received last month. MRI was ordered to r/o RCT. Will call with results.

## 2019-05-02 ENCOUNTER — INITIAL CONSULT (OUTPATIENT)
Dept: UROGYNECOLOGY | Facility: CLINIC | Age: 59
End: 2019-05-02
Payer: COMMERCIAL

## 2019-05-02 ENCOUNTER — TELEPHONE (OUTPATIENT)
Dept: ORTHOPEDICS | Facility: CLINIC | Age: 59
End: 2019-05-02

## 2019-05-02 VITALS
SYSTOLIC BLOOD PRESSURE: 164 MMHG | DIASTOLIC BLOOD PRESSURE: 86 MMHG | WEIGHT: 200.81 LBS | HEIGHT: 69 IN | BODY MASS INDEX: 29.74 KG/M2

## 2019-05-02 DIAGNOSIS — N95.2 VAGINAL ATROPHY: ICD-10-CM

## 2019-05-02 DIAGNOSIS — N99.3 VAGINAL VAULT PROLAPSE AFTER HYSTERECTOMY: Primary | ICD-10-CM

## 2019-05-02 DIAGNOSIS — N81.82 PELVIC RELAXATION DUE DUE TO PUBOCERVICAL TISSUE INCOMPETENCE: ICD-10-CM

## 2019-05-02 PROCEDURE — 99214 OFFICE O/P EST MOD 30 MIN: CPT | Mod: S$GLB,,, | Performed by: OBSTETRICS & GYNECOLOGY

## 2019-05-02 PROCEDURE — 3079F PR MOST RECENT DIASTOLIC BLOOD PRESSURE 80-89 MM HG: ICD-10-PCS | Mod: CPTII,S$GLB,, | Performed by: OBSTETRICS & GYNECOLOGY

## 2019-05-02 PROCEDURE — 99214 PR OFFICE/OUTPT VISIT, EST, LEVL IV, 30-39 MIN: ICD-10-PCS | Mod: S$GLB,,, | Performed by: OBSTETRICS & GYNECOLOGY

## 2019-05-02 PROCEDURE — 3077F PR MOST RECENT SYSTOLIC BLOOD PRESSURE >= 140 MM HG: ICD-10-PCS | Mod: CPTII,S$GLB,, | Performed by: OBSTETRICS & GYNECOLOGY

## 2019-05-02 PROCEDURE — 3008F BODY MASS INDEX DOCD: CPT | Mod: CPTII,S$GLB,, | Performed by: OBSTETRICS & GYNECOLOGY

## 2019-05-02 PROCEDURE — 3077F SYST BP >= 140 MM HG: CPT | Mod: CPTII,S$GLB,, | Performed by: OBSTETRICS & GYNECOLOGY

## 2019-05-02 PROCEDURE — 99999 PR PBB SHADOW E&M-EST. PATIENT-LVL III: ICD-10-PCS | Mod: PBBFAC,,, | Performed by: OBSTETRICS & GYNECOLOGY

## 2019-05-02 PROCEDURE — 3079F DIAST BP 80-89 MM HG: CPT | Mod: CPTII,S$GLB,, | Performed by: OBSTETRICS & GYNECOLOGY

## 2019-05-02 PROCEDURE — 99999 PR PBB SHADOW E&M-EST. PATIENT-LVL III: CPT | Mod: PBBFAC,,, | Performed by: OBSTETRICS & GYNECOLOGY

## 2019-05-02 PROCEDURE — 3008F PR BODY MASS INDEX (BMI) DOCUMENTED: ICD-10-PCS | Mod: CPTII,S$GLB,, | Performed by: OBSTETRICS & GYNECOLOGY

## 2019-05-02 RX ORDER — ESTRADIOL 0.1 MG/G
1 CREAM VAGINAL
Qty: 12 G | Refills: 11 | Status: SHIPPED | OUTPATIENT
Start: 2019-05-03 | End: 2019-06-07

## 2019-05-02 NOTE — LETTER
May 2, 2019      Parul Marie MD  [address] [facility] [address]           [facility] [place] [place] [address]   [address] [address]  Phone: [phone]          Patient: Rosamaria Agosto   MR Number: [id number]   YOB: 1960   Date of Visit: 5/2/2019       Dear Dr. Parul Marie:    Thank you for referring Rosamaria Agosto to me for evaluation. Attached you will find relevant portions of my assessment and plan of care.    If you have questions, please do not hesitate to call me. I look forward to following Rosamaria Agosto along with you.    Sincerely,    Anson Ellison MD    Enclosure  CC:  No Recipients    If you would like to receive this communication electronically, please contact [email or web address] or [phone] to request more information on [employer] Link access.    For providers and/or their staff who would like to refer a patient to [facility], please contact us through our one-stop-shop provider referral line, [facility], at [phone].    If you feel you have received this communication in error or would no longer like to receive these types of communications, please e-mail [email or web address]

## 2019-05-02 NOTE — TELEPHONE ENCOUNTER
Called patient, left message for patient stating that her MRI was approved for tomorrow. If she had any questions, that she can give us a call back.

## 2019-05-03 ENCOUNTER — HOSPITAL ENCOUNTER (OUTPATIENT)
Dept: RADIOLOGY | Facility: HOSPITAL | Age: 59
Discharge: HOME | End: 2019-05-03
Attending: PHYSICIAN ASSISTANT
Payer: COMMERCIAL

## 2019-05-03 DIAGNOSIS — M25.512 LEFT SHOULDER PAIN, UNSPECIFIED CHRONICITY: ICD-10-CM

## 2019-05-03 PROCEDURE — 73221 MRI SHOULDER WITHOUT CONTRAST LEFT: ICD-10-PCS | Mod: 26,LT,, | Performed by: RADIOLOGY

## 2019-05-03 PROCEDURE — 73221 MRI JOINT UPR EXTREM W/O DYE: CPT | Mod: TC,LT

## 2019-05-03 PROCEDURE — 73221 MRI JOINT UPR EXTREM W/O DYE: CPT | Mod: 26,LT,, | Performed by: RADIOLOGY

## 2019-05-06 ENCOUNTER — TELEPHONE (OUTPATIENT)
Dept: ORTHOPEDICS | Facility: CLINIC | Age: 59
End: 2019-05-06

## 2019-05-07 ENCOUNTER — PATIENT MESSAGE (OUTPATIENT)
Dept: UROGYNECOLOGY | Facility: CLINIC | Age: 59
End: 2019-05-07

## 2019-05-07 ENCOUNTER — TELEPHONE (OUTPATIENT)
Dept: ORTHOPEDICS | Facility: CLINIC | Age: 59
End: 2019-05-07

## 2019-05-07 NOTE — TELEPHONE ENCOUNTER
Called patient twice today regarding her MRI results. No answer. Able to leave vm with the first call. Mailbox was full for the second call. Consult has been scheduled for her shoulder with Dr. Soto. Will mail appt slip.

## 2019-05-08 ENCOUNTER — TELEPHONE (OUTPATIENT)
Dept: UROGYNECOLOGY | Facility: CLINIC | Age: 59
End: 2019-05-08

## 2019-05-08 NOTE — TELEPHONE ENCOUNTER
----- Message from Anson Ellison MD sent at 5/8/2019 10:48 AM CDT -----  Please call patient and schedule a robotic sacral colpopexy    Additionally please set patient up for UDS and cystoscopy.

## 2019-05-08 NOTE — TELEPHONE ENCOUNTER
Left voice message for pt to give the office a call back at [phone]. Offered pt 6/24 or 7/22 as surgery dates with Dr. Ellison.

## 2019-05-16 ENCOUNTER — HOSPITAL ENCOUNTER (OUTPATIENT)
Dept: RADIOLOGY | Facility: HOSPITAL | Age: 59
Discharge: HOME | End: 2019-05-16
Attending: ORTHOPAEDIC SURGERY
Payer: COMMERCIAL

## 2019-05-16 ENCOUNTER — OFFICE VISIT (OUTPATIENT)
Dept: SPORTS MEDICINE | Facility: CLINIC | Age: 59
End: 2019-05-16
Payer: COMMERCIAL

## 2019-05-16 DIAGNOSIS — M19.012 ARTHRITIS OF LEFT ACROMIOCLAVICULAR JOINT: ICD-10-CM

## 2019-05-16 DIAGNOSIS — M75.122 COMPLETE ROTATOR CUFF TEAR OF LEFT SHOULDER: ICD-10-CM

## 2019-05-16 DIAGNOSIS — M75.22 BICEPS TENDONITIS, LEFT: ICD-10-CM

## 2019-05-16 DIAGNOSIS — M75.50 SUBACROMIAL BURSITIS: ICD-10-CM

## 2019-05-16 DIAGNOSIS — M19.012 GLENOHUMERAL ARTHRITIS, LEFT: ICD-10-CM

## 2019-05-16 DIAGNOSIS — M75.122 COMPLETE ROTATOR CUFF TEAR OF LEFT SHOULDER: Primary | ICD-10-CM

## 2019-05-16 PROCEDURE — 73020 X-RAY EXAM OF SHOULDER: CPT | Mod: TC,FY,PO,LT

## 2019-05-16 PROCEDURE — 73020 XR SHOULDER 1 VIEW LEFT: ICD-10-PCS | Mod: 26,LT,, | Performed by: RADIOLOGY

## 2019-05-16 PROCEDURE — 99999 PR PBB SHADOW E&M-EST. PATIENT-LVL II: CPT | Mod: PBBFAC,,, | Performed by: ORTHOPAEDIC SURGERY

## 2019-05-16 PROCEDURE — 99999 PR PBB SHADOW E&M-EST. PATIENT-LVL II: ICD-10-PCS | Mod: PBBFAC,,, | Performed by: ORTHOPAEDIC SURGERY

## 2019-05-16 PROCEDURE — 99204 OFFICE O/P NEW MOD 45 MIN: CPT | Mod: S$GLB,,, | Performed by: ORTHOPAEDIC SURGERY

## 2019-05-16 PROCEDURE — 73020 X-RAY EXAM OF SHOULDER: CPT | Mod: 26,LT,, | Performed by: RADIOLOGY

## 2019-05-16 PROCEDURE — 99204 PR OFFICE/OUTPT VISIT, NEW, LEVL IV, 45-59 MIN: ICD-10-PCS | Mod: S$GLB,,, | Performed by: ORTHOPAEDIC SURGERY
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
## 2019-05-16 NOTE — LETTER
May 27, 2019      Jess Phillips PA-C  [address]  [facility] [address]           [facility]  [address]  [facility] [address]  Phone: [phone]          Patient: Rosamaria Agosto   MR Number: [id number]   YOB: 1960   Date of Visit: 5/16/2019       Dear Jess Phillips:    Thank you for referring Rosamaria Agsoto to me for evaluation. Attached you will find relevant portions of my assessment and plan of care.    If you have questions, please do not hesitate to call me. I look forward to following Rosamaria Agosto along with you.    Sincerely,    ASMITA Soto MD    Enclosure  CC:  No Recipients    If you would like to receive this communication electronically, please contact [email or web address] or [phone] to request more information on [employer] Link access.    For providers and/or their staff who would like to refer a patient to [facility], please contact us through our one-stop-shop provider referral line, [facility], at [phone].    If you feel you have received this communication in error or would no longer like to receive these types of communications, please e-mail [email or web address]

## 2019-05-28 ENCOUNTER — PATIENT MESSAGE (OUTPATIENT)
Dept: OBSTETRICS AND GYNECOLOGY | Facility: CLINIC | Age: 59
End: 2019-05-28

## 2019-05-29 ENCOUNTER — PATIENT MESSAGE (OUTPATIENT)
Dept: ORTHOPEDICS | Facility: CLINIC | Age: 59
End: 2019-05-29

## 2019-05-30 ENCOUNTER — TELEPHONE (OUTPATIENT)
Dept: ORTHOPEDICS | Facility: CLINIC | Age: 59
End: 2019-05-30

## 2019-05-30 NOTE — TELEPHONE ENCOUNTER
Patient is aware that she can  her parking pass at the       ----- Message from Katy Lemus sent at 5/30/2019  4:37 PM CDT -----  Contact: Self   Pt is calling to see when and where she can  the handicap pass that was discussed.     [phone]

## 2019-06-06 ENCOUNTER — TELEPHONE (OUTPATIENT)
Dept: UROGYNECOLOGY | Facility: CLINIC | Age: 59
End: 2019-06-06

## 2019-06-06 ENCOUNTER — OFFICE VISIT (OUTPATIENT)
Dept: UROGYNECOLOGY | Facility: CLINIC | Age: 59
End: 2019-06-06
Payer: COMMERCIAL

## 2019-06-06 VITALS
HEIGHT: 69 IN | WEIGHT: 204.13 LBS | DIASTOLIC BLOOD PRESSURE: 80 MMHG | BODY MASS INDEX: 30.23 KG/M2 | SYSTOLIC BLOOD PRESSURE: 110 MMHG

## 2019-06-06 DIAGNOSIS — N81.11 CYSTOCELE, MIDLINE: ICD-10-CM

## 2019-06-06 DIAGNOSIS — N99.3 PROLAPSE OF VAGINAL VAULT AFTER HYSTERECTOMY: Primary | ICD-10-CM

## 2019-06-06 DIAGNOSIS — N39.3 SUI (STRESS URINARY INCONTINENCE, FEMALE): ICD-10-CM

## 2019-06-06 PROCEDURE — 3079F PR MOST RECENT DIASTOLIC BLOOD PRESSURE 80-89 MM HG: ICD-10-PCS | Mod: CPTII,S$GLB,, | Performed by: OBSTETRICS & GYNECOLOGY

## 2019-06-06 PROCEDURE — 3008F BODY MASS INDEX DOCD: CPT | Mod: CPTII,S$GLB,, | Performed by: OBSTETRICS & GYNECOLOGY

## 2019-06-06 PROCEDURE — 3008F PR BODY MASS INDEX (BMI) DOCUMENTED: ICD-10-PCS | Mod: CPTII,S$GLB,, | Performed by: OBSTETRICS & GYNECOLOGY

## 2019-06-06 PROCEDURE — 99999 PR PBB SHADOW E&M-EST. PATIENT-LVL III: ICD-10-PCS | Mod: PBBFAC,,, | Performed by: OBSTETRICS & GYNECOLOGY

## 2019-06-06 PROCEDURE — 99215 PR OFFICE/OUTPT VISIT, EST, LEVL V, 40-54 MIN: ICD-10-PCS | Mod: S$GLB,,, | Performed by: OBSTETRICS & GYNECOLOGY

## 2019-06-06 PROCEDURE — 99215 OFFICE O/P EST HI 40 MIN: CPT | Mod: S$GLB,,, | Performed by: OBSTETRICS & GYNECOLOGY

## 2019-06-06 PROCEDURE — 99999 PR PBB SHADOW E&M-EST. PATIENT-LVL III: CPT | Mod: PBBFAC,,, | Performed by: OBSTETRICS & GYNECOLOGY

## 2019-06-06 PROCEDURE — 3074F SYST BP LT 130 MM HG: CPT | Mod: CPTII,S$GLB,, | Performed by: OBSTETRICS & GYNECOLOGY

## 2019-06-06 PROCEDURE — 3079F DIAST BP 80-89 MM HG: CPT | Mod: CPTII,S$GLB,, | Performed by: OBSTETRICS & GYNECOLOGY

## 2019-06-06 PROCEDURE — 3074F PR MOST RECENT SYSTOLIC BLOOD PRESSURE < 130 MM HG: ICD-10-PCS | Mod: CPTII,S$GLB,, | Performed by: OBSTETRICS & GYNECOLOGY

## 2019-06-06 NOTE — PROGRESS NOTES
Subjective:      Patient ID: Rosamaria Agosto is a 58 y.o. female.    Chief Complaint:  Vaginal Prolapse (follow up)      History of Present Illness  Patient returns with her  to discuss surgery.  Many questions were asked we reviewed her defect we reviewed expected management versus conservative therapy versus transvaginal approach.  With native tissue repair versus robotic sacral colpopexy.  Many questions were asked regarding all approaches all answered patient is wanting robotic reconstruction she wishes to have surgery on           Past Medical History:   Diagnosis Date    Abnormal Pap smear of cervix yrs ago    Arthritis     History of uterine fibroid     Hyperlipidemia     Hypertension     Sinus problem        Past Surgical History:   Procedure Laterality Date    BREAST BIOPSY  24-25 years old    ENCEPHALOCELE REPAIR  46    HYSTERECTOMY  93'-95'    ovaries remain    NASAL SINUS SURGERY      RESECTION-TURBINATES (SMR) Bilateral 2017    Performed [facility] OR [employer] [facility]    SEPTOPLASTY Bilateral 2017    Performed by [facility] at [facility] OR [employer] [facility]    SINUS SURGERY FUNCTIONAL ENDOSCOPIC WITH NAVIGATION stealth and propel needed Bilateral 2017    Performed by [facility] at [facility] OR [employer] [facility]    TUBAL LIGATION      VAGINAL DELIVERY         GYN & OB History  Patient's last menstrual period was 10/19/1993 (approximate).   Date of Last Pap: No result found    OB History    Para Term  AB Living   2 2 0     2   SAB TAB Ectopic Multiple Live Births           2      # Outcome Date GA Lbr Aleksey/2nd Weight Sex Delivery Anes PTL Lv   2 Para     M Vag-Spont   JESU   1 Para     F Vag-Spont   JESU       Health Maintenance       Date Due Completion Date    Hepatitis C Screening 1960 ---    TETANUS VACCINE 1979 ---    Influenza Vaccine 2019    Mammogram 2020    Lipid Panel 2023     Colonoscopy 05/16/2024 5/16/2014 (Done)    Override on 5/16/2014: Done          Family History   Problem Relation Age of Onset    Stroke Maternal Grandmother     Diabetes Mother     Hypertension Mother     Diabetes Sister     Diabetes Sister     Breast cancer Neg Hx     Colon cancer Neg Hx     Ovarian cancer Neg Hx        Social History     Socioeconomic History    Marital status: Single     Spouse name: Not on file    Number of children: Not on file    Years of education: Not on file    Highest education level: Not on file   Occupational History    Not on file   Social Needs    Financial resource strain: Not on file    Food insecurity:     Worry: Not on file     Inability: Not on file    Transportation needs:     Medical: Not on file     Non-medical: Not on file   Tobacco Use    Smoking status: Never Smoker    Smokeless tobacco: Never Used   Substance and Sexual Activity    Alcohol use: Yes     Alcohol/week: 0.6 oz     Types: 1 Shots of liquor per week     Comment: seldom    Drug use: No    Sexual activity: Yes     Partners: Male     Birth control/protection: Post-menopausal, See Surgical Hx     Comment: hysterectomy 20 yrs ago   Lifestyle    Physical activity:     Days per week: Not on file     Minutes per session: Not on file    Stress: Not on file   Relationships    Social connections:     Talks on phone: Not on file     Gets together: Not on file     Attends [employer] service: Not on file     Active member of club or organization: Not on file     Attends meetings of clubs or organizations: Not on file     Relationship status: Not on file   Other Topics Concern    Not on file   Social History Narrative    Not on file       Review of Systems  Review of Systems   Genitourinary: Difficulty urinating: Positive for bulge pressure positive for incontinence.   All other systems reviewed and are negative.         Objective:   /80 (BP Location: Left arm, Patient Position: Sitting)   Ht  "5' 9" (1.753 m)   Wt 92.6 kg (204 lb 2.3 oz)   LMP 10/19/1993 (Approximate)   BMI 30.15 kg/m²     Physical Exam   Cardiovascular: Normal rate and regular rhythm.   Pulmonary/Chest: Effort normal and breath sounds normal.    Pelvic exam unchanged     Assessment:     1. Prolapse of vaginal vault after hysterectomy    2. Cystocele, midline    3. KEN (stress urinary incontinence, female)            Plan:     1. Prolapse of vaginal vault after hysterectomy    2. Cystocele, midline    3. KEN (stress urinary incontinence, female)          Were able to did have a long discussion with patient and patient's .  Review of entire American year or urogynecology a diagram of patient's prolapse as well as power point presentation regarding normal anatomy in all options.  Patient is consented for robotic sacral colpopexy patient arrangement for pre-admission testing his carried out.  Patient will have medical clearance we are scheduled for surgery June 24th.  Please note large amount questions regarding all different options large amount of questions regarding graft all answers in direct face-to-face manner.  Total time of this consultation today 45 min greater than 50% time 30 min spent direct discussion with patient    There are no Patient Instructions on file for this visit.      "

## 2019-06-06 NOTE — H&P (VIEW-ONLY)
Subjective:      Patient ID: Rosamaria Agosto is a 58 y.o. female.    Chief Complaint:  Vaginal Prolapse (follow up)      History of Present Illness  Patient returns with her  to discuss surgery.  Many questions were asked we reviewed her defect we reviewed expected management versus conservative therapy versus transvaginal approach.  With native tissue repair versus robotic sacral colpopexy.  Many questions were asked regarding all approaches all answered patient is wanting robotic reconstruction she wishes to have surgery on           Past Medical History:   Diagnosis Date    Abnormal Pap smear of cervix yrs ago    Arthritis     History of uterine fibroid     Hyperlipidemia     Hypertension     Sinus problem        Past Surgical History:   Procedure Laterality Date    BREAST BIOPSY  24-25 years old    ENCEPHALOCELE REPAIR  46    HYSTERECTOMY  93'-95'    ovaries remain    NASAL SINUS SURGERY      RESECTION-TURBINATES (SMR) Bilateral 2017    [facility]    SEPTOPLASTY Bilateral 2017    Per[facility] by [facility] at [facility]    SINUS SURGERY FUNCTIONAL ENDOSCOPIC WITH NAVIGATION stealth and propel needed Bilateral 2017    Performed by [facility] at [facility]    TUBAL LIGATION      VAGINAL DELIVERY         GYN & OB History  Patient's last menstrual period was 10/19/1993 (approximate).   Date of Last Pap: No result found    OB History    Para Term  AB Living   2 2 0     2   SAB TAB Ectopic Multiple Live Births           2      # Outcome Date GA Lbr Aleksey/2nd Weight Sex Delivery Anes PTL Lv   2 Para     M Vag-Spont   JESU   1 Para     F Vag-Spont   JESU       Health Maintenance       Date Due Completion Date    Hepatitis C Screening 1960 ---    TETANUS VACCINE 1979 ---    Influenza Vaccine 2019    Mammogram 2020    Lipid Panel 2023     Colonoscopy 05/16/2024 5/16/2014 (Done)    Override on 5/16/2014: Done          Family History   Problem Relation Age of Onset    Stroke Maternal Grandmother     Diabetes Mother     Hypertension Mother     Diabetes Sister     Diabetes Sister     Breast cancer Neg Hx     Colon cancer Neg Hx     Ovarian cancer Neg Hx        Social History     Socioeconomic History    Marital status: Single     Spouse name: Not on file    Number of children: Not on file    Years of education: Not on file    Highest education level: Not on file   Occupational History    Not on file   Social Needs    Financial resource strain: Not on file    Food insecurity:     Worry: Not on file     Inability: Not on file    Transportation needs:     Medical: Not on file     Non-medical: Not on file   Tobacco Use    Smoking status: Never Smoker    Smokeless tobacco: Never Used   Substance and Sexual Activity    Alcohol use: Yes     Alcohol/week: 0.6 oz     Types: 1 Shots of liquor per week     Comment: seldom    Drug use: No    Sexual activity: Yes     Partners: Male     Birth control/protection: Post-menopausal, See Surgical Hx     Comment: hysterectomy 20 yrs ago   Lifestyle    Physical activity:     Days per week: Not on file     Minutes per session: Not on file    Stress: Not on file   Relationships    Social connections:     Talks on phone: Not on file     Gets together: Not on file     Attends [employer] service: Not on file     Active member of club or organization: Not on file     Attends meetings of clubs or organizations: Not on file     Relationship status: Not on file   Other Topics Concern    Not on file   Social History Narrative    Not on file       Review of Systems  Review of Systems   Genitourinary: Difficulty urinating: Positive for bulge pressure positive for incontinence.   All other systems reviewed and are negative.         Objective:   /80 (BP Location: Left arm, Patient Position: Sitting)   Ht  "5' 9" (1.753 m)   Wt 92.6 kg (204 lb 2.3 oz)   LMP 10/19/1993 (Approximate)   BMI 30.15 kg/m²     Physical Exam   Cardiovascular: Normal rate and regular rhythm.   Pulmonary/Chest: Effort normal and breath sounds normal.    Pelvic exam unchanged     Assessment:     1. Prolapse of vaginal vault after hysterectomy    2. Cystocele, midline    3. KEN (stress urinary incontinence, female)            Plan:     1. Prolapse of vaginal vault after hysterectomy    2. Cystocele, midline    3. KEN (stress urinary incontinence, female)          Were able to did have a long discussion with patient and patient's .  Review of entire American year or urogynecology a diagram of patient's prolapse as well as power point presentation regarding normal anatomy in all options.  Patient is consented for robotic sacral colpopexy patient arrangement for pre-admission testing his carried out.  Patient will have medical clearance we are scheduled for surgery June 24th.  Please note large amount questions regarding all different options large amount of questions regarding graft all answers in direct face-to-face manner.  Total time of this consultation today 45 min greater than 50% time 30 min spent direct discussion with patient    There are no Patient Instructions on file for this visit.      "

## 2019-06-06 NOTE — TELEPHONE ENCOUNTER
----- Message from Jennifer Cole sent at 6/6/2019 11:40 AM CDT -----  Contact: self  Type:  Patient Returning Call    Who Called: MARGIE FERNANDEZ [[id number]]    Who Left Message for Patient:  unknown    Does the patient know what this is regarding?: Y, call was dropped pt wanted to know if there was appointment for tomorrow if the weather was to bad to come into office today.     Best Call Back Number: [phone]    Additional Information:

## 2019-06-06 NOTE — TELEPHONE ENCOUNTER
Spoke with pt that just wanted to confirm if she could change her appointment from today to tomorrow if the weather becomes bad, explain to pt that would not be a problem there is openings on tomorrow pt will call if she needs to reschedule voiced understanding and call was ended.

## 2019-06-07 ENCOUNTER — LAB VISIT (OUTPATIENT)
Dept: LAB | Facility: HOSPITAL | Age: 59
End: 2019-06-07
Attending: FAMILY MEDICINE
Payer: COMMERCIAL

## 2019-06-07 ENCOUNTER — OFFICE VISIT (OUTPATIENT)
Dept: INTERNAL MEDICINE | Facility: CLINIC | Age: 59
End: 2019-06-07
Payer: COMMERCIAL

## 2019-06-07 VITALS
SYSTOLIC BLOOD PRESSURE: 120 MMHG | TEMPERATURE: 99 F | WEIGHT: 205.25 LBS | DIASTOLIC BLOOD PRESSURE: 72 MMHG | HEIGHT: 69 IN | HEART RATE: 80 BPM | BODY MASS INDEX: 30.4 KG/M2

## 2019-06-07 DIAGNOSIS — I10 ESSENTIAL HYPERTENSION: ICD-10-CM

## 2019-06-07 DIAGNOSIS — R94.31 PROLONGED QT INTERVAL: ICD-10-CM

## 2019-06-07 DIAGNOSIS — E78.00 PURE HYPERCHOLESTEROLEMIA: ICD-10-CM

## 2019-06-07 DIAGNOSIS — Z01.818 PRE-OP EXAM: Primary | ICD-10-CM

## 2019-06-07 DIAGNOSIS — N81.10 FEMALE CYSTOCELE: ICD-10-CM

## 2019-06-07 DIAGNOSIS — Z01.818 PRE-OP EXAM: ICD-10-CM

## 2019-06-07 PROCEDURE — 80053 COMPREHEN METABOLIC PANEL: CPT

## 2019-06-07 PROCEDURE — 3078F DIAST BP <80 MM HG: CPT | Mod: CPTII,S$GLB,, | Performed by: FAMILY MEDICINE

## 2019-06-07 PROCEDURE — 3074F PR MOST RECENT SYSTOLIC BLOOD PRESSURE < 130 MM HG: ICD-10-PCS | Mod: CPTII,S$GLB,, | Performed by: FAMILY MEDICINE

## 2019-06-07 PROCEDURE — 3078F PR MOST RECENT DIASTOLIC BLOOD PRESSURE < 80 MM HG: ICD-10-PCS | Mod: CPTII,S$GLB,, | Performed by: FAMILY MEDICINE

## 2019-06-07 PROCEDURE — 85610 PROTHROMBIN TIME: CPT

## 2019-06-07 PROCEDURE — 99999 PR PBB SHADOW E&M-EST. PATIENT-LVL III: CPT | Mod: PBBFAC,,, | Performed by: FAMILY MEDICINE

## 2019-06-07 PROCEDURE — 36415 COLL VENOUS BLD VENIPUNCTURE: CPT | Mod: PO

## 2019-06-07 PROCEDURE — 3008F BODY MASS INDEX DOCD: CPT | Mod: CPTII,S$GLB,, | Performed by: FAMILY MEDICINE

## 2019-06-07 PROCEDURE — 99214 OFFICE O/P EST MOD 30 MIN: CPT | Mod: S$GLB,,, | Performed by: FAMILY MEDICINE

## 2019-06-07 PROCEDURE — 3008F PR BODY MASS INDEX (BMI) DOCUMENTED: ICD-10-PCS | Mod: CPTII,S$GLB,, | Performed by: FAMILY MEDICINE

## 2019-06-07 PROCEDURE — 93005 EKG 12-LEAD: ICD-10-PCS | Mod: S$GLB,,, | Performed by: FAMILY MEDICINE

## 2019-06-07 PROCEDURE — 99999 PR PBB SHADOW E&M-EST. PATIENT-LVL III: ICD-10-PCS | Mod: PBBFAC,,, | Performed by: FAMILY MEDICINE

## 2019-06-07 PROCEDURE — 93010 EKG 12-LEAD: ICD-10-PCS | Mod: S$GLB,,, | Performed by: INTERNAL MEDICINE

## 2019-06-07 PROCEDURE — 99214 PR OFFICE/OUTPT VISIT, EST, LEVL IV, 30-39 MIN: ICD-10-PCS | Mod: S$GLB,,, | Performed by: FAMILY MEDICINE

## 2019-06-07 PROCEDURE — 93005 ELECTROCARDIOGRAM TRACING: CPT | Mod: S$GLB,,, | Performed by: FAMILY MEDICINE

## 2019-06-07 PROCEDURE — 85025 COMPLETE CBC W/AUTO DIFF WBC: CPT

## 2019-06-07 PROCEDURE — 3074F SYST BP LT 130 MM HG: CPT | Mod: CPTII,S$GLB,, | Performed by: FAMILY MEDICINE

## 2019-06-07 PROCEDURE — 93010 ELECTROCARDIOGRAM REPORT: CPT | Mod: S$GLB,,, | Performed by: INTERNAL MEDICINE

## 2019-06-07 NOTE — PROGRESS NOTES
Subjective:       Patient ID: Rosamaria Agosto is a 58 y.o. female.    Chief Complaint: Pre-op Exam    HPI 58-year-old  female presents to clinic today for preoperative exam in order to have vaginal prolapse and cystocele repair.  She has previously been followed by Cardiology for treatment of hypertension, hyperlipidemia, and varicose veins.  At this time hypertension remains well controlled on hydrochlorothiazide 25 mg daily.  Hyperlipidemia remains well controlled on Lipitor 80 mg daily.  She denies any chest pain, shortness of breath, headaches, dizziness, or lightheadedness but current EKG is mildly changed from previous EKG in March with slight QT prolongation.  I recommend further evaluation by Cardiology.  Review of Systems   Constitutional: Negative for appetite change, chills, fatigue and fever.   HENT: Negative for congestion, ear pain, hearing loss, postnasal drip, rhinorrhea, sinus pressure, sore throat and tinnitus.    Eyes: Negative for redness, itching and visual disturbance.   Respiratory: Negative for cough, chest tightness and shortness of breath.    Cardiovascular: Negative for chest pain and palpitations.   Gastrointestinal: Negative for abdominal pain, constipation, diarrhea, nausea and vomiting.   Genitourinary: Negative for decreased urine volume, difficulty urinating, dysuria, frequency, hematuria and urgency.   Musculoskeletal: Negative for back pain, myalgias, neck pain and neck stiffness.   Skin: Negative for rash.   Neurological: Negative for dizziness, light-headedness and headaches.   Psychiatric/Behavioral: Negative.        Objective:      Physical Exam   Constitutional: She is oriented to person, place, and time. She appears well-developed and well-nourished. No distress.   HENT:   Head: Normocephalic and atraumatic.   Right Ear: External ear normal.   Left Ear: External ear normal.   Nose: Nose normal.   Mouth/Throat: Oropharynx is clear and moist. No oropharyngeal  exudate.   Eyes: Pupils are equal, round, and reactive to light. Conjunctivae and EOM are normal. Right eye exhibits no discharge. Left eye exhibits no discharge. No scleral icterus.   Neck: Normal range of motion. Neck supple. No JVD present. No tracheal deviation present. No thyromegaly present.   Cardiovascular: Normal rate, regular rhythm, normal heart sounds and intact distal pulses. Exam reveals no gallop and no friction rub.   No murmur heard.  Pulmonary/Chest: Effort normal and breath sounds normal. No stridor. No respiratory distress. She has no wheezes. She has no rales.   Abdominal: Soft. Bowel sounds are normal. She exhibits no distension and no mass. There is no tenderness. There is no rebound and no guarding.   Musculoskeletal: Normal range of motion. She exhibits no edema or tenderness.   Lymphadenopathy:     She has no cervical adenopathy.   Neurological: She is alert and oriented to person, place, and time.   Skin: Skin is warm and dry. No rash noted. She is not diaphoretic. No erythema. No pallor.   Psychiatric: She has a normal mood and affect. Her behavior is normal. Judgment and thought content normal.   Nursing note and vitals reviewed.    EKG:  Normal sinus rhythm with prolonged QT interval ventricular rate 75 beats per minute.  Component      Latest Ref Rng & Units 6/7/2019   WBC      3.90 - 12.70 K/uL 9.05   RBC      4.00 - 5.40 M/uL 4.62   Hemoglobin      12.0 - 16.0 g/dL 14.0   Hematocrit      37.0 - 48.5 % 42.5   MCV      82 - 98 fL 92   MCH      27.0 - 31.0 pg 30.3   MCHC      32.0 - 36.0 g/dL 32.9   RDW      11.5 - 14.5 % 14.0   Platelets      150 - 350 K/uL 368 (H)   MPV      9.2 - 12.9 fL 10.2   Immature Granulocytes      0.0 - 0.5 % 0.7 (H)   Gran # (ANC)      1.8 - 7.7 K/uL 5.6   Immature Grans (Abs)      0.00 - 0.04 K/uL 0.06 (H)   Lymph #      1.0 - 4.8 K/uL 2.3   Mono #      0.3 - 1.0 K/uL 0.7   Eos #      0.0 - 0.5 K/uL 0.4   Baso #      0.00 - 0.20 K/uL 0.07   nRBC      0 /100  WBC 0   Gran%      38.0 - 73.0 % 61.4   Lymph%      18.0 - 48.0 % 24.9   Mono%      4.0 - 15.0 % 8.1   Eosinophil%      0.0 - 8.0 % 4.1   Basophil%      0.0 - 1.9 % 0.8   Differential Method       Automated   Sodium      136 - 145 mmol/L 139   Potassium      3.5 - 5.1 mmol/L 3.4 (L)   Chloride      95 - 110 mmol/L 98   CO2      23 - 29 mmol/L 27   Glucose      70 - 110 mg/dL 102   BUN, Bld      6 - 20 mg/dL 12   Creatinine      0.5 - 1.4 mg/dL 0.7   Calcium      8.7 - 10.5 mg/dL 10.2   PROTEIN TOTAL      6.0 - 8.4 g/dL 7.7   Albumin      3.5 - 5.2 g/dL 3.9   BILIRUBIN TOTAL      0.1 - 1.0 mg/dL 0.2   Alkaline Phosphatase      55 - 135 U/L 76   AST      10 - 40 U/L 18   ALT      10 - 44 U/L 16   Anion Gap      8 - 16 mmol/L 14   eGFR if African American      >60 mL/min/1.73 m:2 >60.0   eGFR if non African American      >60 mL/min/1.73 m:2 >60.0   Protime      9.0 - 12.5 sec 11.3   Coumadin Monitoring INR      0.8 - 1.2 1.1     Assessment:       1. Pre-op exam    2. Female cystocele    3. Prolonged QT interval    4. Essential hypertension    5. Pure hypercholesterolemia        Plan:       1.  CBC, CMP, and PT/INR now.  2.  EKG now.  3.  Refer to Cardiology for further evaluation and treatment of prolonged QT interval.  Likely secondary to hypokalemia.  Encourage hydration.  Surgical clearance is pending cardiology exam.  4.  Continue hydrochlorothiazide 25 mg daily.  5.  Continue Lipitor 80 mg daily.  6.  Return to clinic as needed or as previously scheduled.

## 2019-06-08 LAB
ALBUMIN SERPL BCP-MCNC: 3.9 G/DL (ref 3.5–5.2)
ALP SERPL-CCNC: 76 U/L (ref 55–135)
ALT SERPL W/O P-5'-P-CCNC: 16 U/L (ref 10–44)
ANION GAP SERPL CALC-SCNC: 14 MMOL/L (ref 8–16)
AST SERPL-CCNC: 18 U/L (ref 10–40)
BASOPHILS # BLD AUTO: 0.07 K/UL (ref 0–0.2)
BASOPHILS NFR BLD: 0.8 % (ref 0–1.9)
BILIRUB SERPL-MCNC: 0.2 MG/DL (ref 0.1–1)
BUN SERPL-MCNC: 12 MG/DL (ref 6–20)
CALCIUM SERPL-MCNC: 10.2 MG/DL (ref 8.7–10.5)
CHLORIDE SERPL-SCNC: 98 MMOL/L (ref 95–110)
CO2 SERPL-SCNC: 27 MMOL/L (ref 23–29)
CREAT SERPL-MCNC: 0.7 MG/DL (ref 0.5–1.4)
DIFFERENTIAL METHOD: ABNORMAL
EOSINOPHIL # BLD AUTO: 0.4 K/UL (ref 0–0.5)
EOSINOPHIL NFR BLD: 4.1 % (ref 0–8)
ERYTHROCYTE [DISTWIDTH] IN BLOOD BY AUTOMATED COUNT: 14 % (ref 11.5–14.5)
EST. GFR  (AFRICAN AMERICAN): >60 ML/MIN/1.73 M^2
EST. GFR  (NON AFRICAN AMERICAN): >60 ML/MIN/1.73 M^2
GLUCOSE SERPL-MCNC: 102 MG/DL (ref 70–110)
HCT VFR BLD AUTO: 42.5 % (ref 37–48.5)
HGB BLD-MCNC: 14 G/DL (ref 12–16)
IMM GRANULOCYTES # BLD AUTO: 0.06 K/UL (ref 0–0.04)
IMM GRANULOCYTES NFR BLD AUTO: 0.7 % (ref 0–0.5)
INR PPP: 1.1 (ref 0.8–1.2)
LYMPHOCYTES # BLD AUTO: 2.3 K/UL (ref 1–4.8)
LYMPHOCYTES NFR BLD: 24.9 % (ref 18–48)
MCH RBC QN AUTO: 30.3 PG (ref 27–31)
MCHC RBC AUTO-ENTMCNC: 32.9 G/DL (ref 32–36)
MCV RBC AUTO: 92 FL (ref 82–98)
MONOCYTES # BLD AUTO: 0.7 K/UL (ref 0.3–1)
MONOCYTES NFR BLD: 8.1 % (ref 4–15)
NEUTROPHILS # BLD AUTO: 5.6 K/UL (ref 1.8–7.7)
NEUTROPHILS NFR BLD: 61.4 % (ref 38–73)
NRBC BLD-RTO: 0 /100 WBC
PLATELET # BLD AUTO: 368 K/UL (ref 150–350)
PMV BLD AUTO: 10.2 FL (ref 9.2–12.9)
POTASSIUM SERPL-SCNC: 3.4 MMOL/L (ref 3.5–5.1)
PROT SERPL-MCNC: 7.7 G/DL (ref 6–8.4)
PROTHROMBIN TIME: 11.3 SEC (ref 9–12.5)
RBC # BLD AUTO: 4.62 M/UL (ref 4–5.4)
SODIUM SERPL-SCNC: 139 MMOL/L (ref 136–145)
WBC # BLD AUTO: 9.05 K/UL (ref 3.9–12.7)

## 2019-06-10 ENCOUNTER — OFFICE VISIT (OUTPATIENT)
Dept: CARDIOLOGY | Facility: CLINIC | Age: 59
End: 2019-06-10
Payer: COMMERCIAL

## 2019-06-10 VITALS
WEIGHT: 208.13 LBS | HEART RATE: 68 BPM | HEIGHT: 69 IN | BODY MASS INDEX: 30.83 KG/M2 | DIASTOLIC BLOOD PRESSURE: 82 MMHG | SYSTOLIC BLOOD PRESSURE: 126 MMHG

## 2019-06-10 DIAGNOSIS — R94.31 PROLONGED Q-T INTERVAL ON ECG: Primary | ICD-10-CM

## 2019-06-10 DIAGNOSIS — I10 ESSENTIAL HYPERTENSION: ICD-10-CM

## 2019-06-10 DIAGNOSIS — E78.2 MIXED HYPERLIPIDEMIA: ICD-10-CM

## 2019-06-10 PROCEDURE — 99214 OFFICE O/P EST MOD 30 MIN: CPT | Mod: S$GLB,,, | Performed by: INTERNAL MEDICINE

## 2019-06-10 PROCEDURE — 3008F PR BODY MASS INDEX (BMI) DOCUMENTED: ICD-10-PCS | Mod: CPTII,S$GLB,, | Performed by: INTERNAL MEDICINE

## 2019-06-10 PROCEDURE — 3074F PR MOST RECENT SYSTOLIC BLOOD PRESSURE < 130 MM HG: ICD-10-PCS | Mod: CPTII,S$GLB,, | Performed by: INTERNAL MEDICINE

## 2019-06-10 PROCEDURE — 3008F BODY MASS INDEX DOCD: CPT | Mod: CPTII,S$GLB,, | Performed by: INTERNAL MEDICINE

## 2019-06-10 PROCEDURE — 3079F DIAST BP 80-89 MM HG: CPT | Mod: CPTII,S$GLB,, | Performed by: INTERNAL MEDICINE

## 2019-06-10 PROCEDURE — 3074F SYST BP LT 130 MM HG: CPT | Mod: CPTII,S$GLB,, | Performed by: INTERNAL MEDICINE

## 2019-06-10 PROCEDURE — 99999 PR PBB SHADOW E&M-EST. PATIENT-LVL III: CPT | Mod: PBBFAC,,, | Performed by: INTERNAL MEDICINE

## 2019-06-10 PROCEDURE — 99214 PR OFFICE/OUTPT VISIT, EST, LEVL IV, 30-39 MIN: ICD-10-PCS | Mod: S$GLB,,, | Performed by: INTERNAL MEDICINE

## 2019-06-10 PROCEDURE — 99999 PR PBB SHADOW E&M-EST. PATIENT-LVL III: ICD-10-PCS | Mod: PBBFAC,,, | Performed by: INTERNAL MEDICINE

## 2019-06-10 PROCEDURE — 3079F PR MOST RECENT DIASTOLIC BLOOD PRESSURE 80-89 MM HG: ICD-10-PCS | Mod: CPTII,S$GLB,, | Performed by: INTERNAL MEDICINE

## 2019-06-10 RX ORDER — POTASSIUM CHLORIDE 20 MEQ/1
20 TABLET, EXTENDED RELEASE ORAL 2 TIMES DAILY
Qty: 90 TABLET | Refills: 3 | Status: SHIPPED | OUTPATIENT
Start: 2019-06-10 | End: 2020-07-16

## 2019-06-10 RX ORDER — DEXTROMETHORPHAN HYDROBROMIDE, GUAIFENESIN 5; 100 MG/5ML; MG/5ML
1300 LIQUID ORAL
COMMUNITY
End: 2021-06-22

## 2019-06-10 NOTE — PROGRESS NOTES
Subjective:   Chief Complaint: preop clearance  Last Clinic Visit:  2017 by Dr. Chavez    History of Present Illness: Rosamaria Agosto is a 58 y.o. lady with hypertension, hyperlipidemia, obesity, and venous insufficiency/varicosities who presents to discuss abnormal QTC recently found on EKG by primary care physician.  She denies any history of QTC prolongation, no history of familial QTC prolongation, no history of familial sudden cardiac death, denies any arrhythmias, no palpitations, no syncope, no presyncope.  She denies any significant orthopnea, no lower extremity edema, no fatigue, or other symptoms concerning for heart failure.  She reports that blood pressure well controlled at home.  Most recent LDL noted to be significantly elevated, she was started on statin, and does not appear that she has had a lipid panel in the past year.  Recent BMP noted to have potassium of 3.4.  She had previous preoperative risk assessment by Dr. Burns.    PMHx:  Hypertension  Hyperlipidemia  Obesity  Lower extremity edema/varicose veins    Review of Systems   Constitution: Negative.   HENT: Negative.    Eyes: Negative.    Cardiovascular: Negative.    Respiratory: Negative.    Hematologic/Lymphatic: Negative.    Skin: Negative.    Musculoskeletal: Negative.    Gastrointestinal: Negative.    Genitourinary: Negative.      Medications:  Current Outpatient Medications on File Prior to Visit   Medication Sig    acetaminophen (TYLENOL) 650 MG TbSR Take 1,300 mg by mouth as needed.    atorvastatin (LIPITOR) 80 MG tablet Take 1 tablet (80 mg total) by mouth nightly.    azelastine (ASTELIN) 137 mcg (0.1 %) nasal spray 1 spray (137 mcg total) by Nasal route 2 (two) times daily.    budesonide (PULMICORT) 0.5 mg/2 mL nebulizer solution inhale 1 nebules per nebulizer twice daily, for use in saline irrigation as directed    hydroCHLOROthiazide (HYDRODIURIL) 25 MG tablet Take 1 tablet (25 mg total) by mouth once daily.    meloxicam  "(MOBIC) 15 MG tablet Take 1 tablet (15 mg total) by mouth once daily.    traMADol (ULTRAM) 50 mg tablet Take 1 tablet (50 mg total) by mouth daily as needed (severe pain).    VITAMIN D2 50,000 unit capsule Take 1 capsule (50,000 Units total) by mouth every 7 days.     No current facility-administered medications on file prior to visit.      Family History:  Rosamaria's family history includes Diabetes in her mother, sister, and sister; Hypertension in her mother; Stroke in her maternal grandmother.    Social History:  Rosamaria reports that she has never smoked. She has never used smokeless tobacco. She reports that she drinks about 0.6 oz of alcohol per week. She reports that she does not use drugs.    Objective:   /82   Pulse 68   Ht 5' 9" (1.753 m)   Wt 94.4 kg (208 lb 1.8 oz)   LMP 10/19/1993 (Approximate)   BMI 30.73 kg/m²     Physical Exam   Constitutional: She is oriented to person, place, and time and well-developed, well-nourished, and in no distress. No distress.   HENT:   Head: Normocephalic and atraumatic.   Mouth/Throat: No oropharyngeal exudate.   Eyes: EOM are normal. No scleral icterus.   Neck: No JVD present. No tracheal deviation present. No thyromegaly present.   Cardiovascular: Normal rate and regular rhythm. Exam reveals no gallop and no friction rub.   No murmur heard.  Pulmonary/Chest: Effort normal and breath sounds normal. No respiratory distress. She has no wheezes. She has no rales. She exhibits no tenderness.   Abdominal: Soft. She exhibits no distension. There is no tenderness. There is no rebound and no guarding.   Musculoskeletal: Normal range of motion. She exhibits no edema.   Neurological: She is alert and oriented to person, place, and time.   Skin: Skin is warm and dry. She is not diaphoretic. No erythema.   Psychiatric: Affect normal.     EKG:  My independent visualization of most recent EKG is normal sinus rhythm, , but subjectively less than 1/2 of RR " interval    TTE:    Lipids:  Recent Labs   Lab 05/28/18  0820   LDL CHOLESTEROL 192.2 H   HDL 41   CHOLESTEROL 259 H      Renal:  Recent Labs   Lab 06/07/19  1526   POTASSIUM 3.4 L   CO2 27   BUN BLD 12   CREATININE 0.7     Liver:  Recent Labs   Lab 06/07/19  1526   AST 18   ALT 16       Assessment:     1. Prolonged Q-T interval on ECG    2. Mixed hyperlipidemia    3. Essential hypertension        Plan:   1. Prolonged Q-T interval on ECG  Most likely secondary to hypokalemia, most recent potassium 3.4, will supplement for 2 weeks, and follow-up potassium on the 21st, this is most likely secondary to hydrochlorothiazide.  From a surgical standpoint this should not preclude any surgery, would direct remainder of preoperative assessment to Dr. Burns.    2. Mixed hyperlipidemia  Most recent LDL significantly elevated, will recheck lipids to determine if statin has been effective  - Lipid panel; Future    3. Essential hypertension  Continue hydrochlorothiazide, but will supplement with potassium.  - Basic metabolic panel; Future  - Magnesium; Future  - potassium chloride SA (K-DUR,KLOR-CON) 20 MEQ tablet; Take 1 tablet (20 mEq total) by mouth 2 (two) times daily.  Dispense: 90 tablet; Refill: 3      Follow up if symptoms worsen or fail to improve.

## 2019-06-10 NOTE — LETTER
Tomeka 10, 2019      Bill Burns MD  [address] [facility] [address] [facility]  [place] [address]           [place] [facility]  [address] [facility]  [place] [address]  Phone: [phone]          Patient: Rosamaria Agosto   MR Number: [id number]   YOB: 1960   Date of Visit: 6/10/2019       Dear Dr. Bill Burns:    Thank you for referring Rosamaria Agosto to me for evaluation. Attached you will find relevant portions of my assessment and plan of care.    If you have questions, please do not hesitate to call me. I look forward to following Rosamaria Agosto along with you.    Sincerely,    Jett Lieberman MD    Enclosure  CC:  No Recipients    If you would like to receive this communication electronically, please contact [email or web address] or [phone] to request more information on [employer] access.    For providers and/or their staff who would like to refer a patient to [facility], please contact us through our one-stop-shop provider referral line, [facility], at [phone].    If you feel you have received this communication in error or would no longer like to receive these types of communications, please e-mail [email or web address]

## 2019-06-12 ENCOUNTER — TELEPHONE (OUTPATIENT)
Dept: UROGYNECOLOGY | Facility: CLINIC | Age: 59
End: 2019-06-12

## 2019-06-12 NOTE — TELEPHONE ENCOUNTER
Attempted to contact pt about setting up her pre admit appointment. Pt didn't answer and no voicemail box was set up.

## 2019-06-12 NOTE — TELEPHONE ENCOUNTER
----- Message from Mary Baldwin sent at 6/12/2019  3:07 PM CDT -----  Contact: pt  Name of Who is Calling: MARGIE FERNANDEZ [[id number]]      What is the request in detail: pt calling in regards to seeing the anesthesiologist.. Please advise..    Can the clinic reply by [facility]: no      What Number to Call Back if not in [facility]: [phone]

## 2019-06-17 ENCOUNTER — TELEPHONE (OUTPATIENT)
Dept: UROGYNECOLOGY | Facility: CLINIC | Age: 59
End: 2019-06-17

## 2019-06-17 DIAGNOSIS — N81.10 VAGINAL PROLAPSE WITHOUT UTERINE PROLAPSE: ICD-10-CM

## 2019-06-17 DIAGNOSIS — N99.3 PROLAPSE OF VAGINAL VAULT AFTER HYSTERECTOMY: Primary | ICD-10-CM

## 2019-06-17 RX ORDER — SODIUM CHLORIDE, SODIUM LACTATE, POTASSIUM CHLORIDE, CALCIUM CHLORIDE 600; 310; 30; 20 MG/100ML; MG/100ML; MG/100ML; MG/100ML
INJECTION, SOLUTION INTRAVENOUS CONTINUOUS
Status: CANCELLED | OUTPATIENT
Start: 2019-06-17

## 2019-06-17 RX ORDER — PHENAZOPYRIDINE HYDROCHLORIDE 100 MG/1
200 TABLET, FILM COATED ORAL
Status: CANCELLED | OUTPATIENT
Start: 2019-06-17 | End: 2019-06-17

## 2019-06-17 RX ORDER — LIDOCAINE HYDROCHLORIDE 10 MG/ML
1 INJECTION, SOLUTION EPIDURAL; INFILTRATION; INTRACAUDAL; PERINEURAL ONCE
Status: CANCELLED | OUTPATIENT
Start: 2019-06-17 | End: 2019-06-17

## 2019-06-17 RX ORDER — ACETAMINOPHEN 500 MG
1000 TABLET ORAL
Status: CANCELLED | OUTPATIENT
Start: 2019-06-17 | End: 2019-06-17

## 2019-06-17 NOTE — TELEPHONE ENCOUNTER
----- Message from Susi Lagunas MA sent at 6/17/2019 11:18 AM CDT -----  Regarding: Disability  The disability form for patient Rosamaria Agosto was sent to your email on 06/17. Please review and sign.    Thank you    Susi

## 2019-06-20 ENCOUNTER — HOSPITAL ENCOUNTER (OUTPATIENT)
Dept: PREADMISSION TESTING | Facility: OTHER | Age: 59
Discharge: HOME | End: 2019-06-20
Attending: OBSTETRICS & GYNECOLOGY
Payer: COMMERCIAL

## 2019-06-20 ENCOUNTER — TELEPHONE (OUTPATIENT)
Dept: UROGYNECOLOGY | Facility: CLINIC | Age: 59
End: 2019-06-20

## 2019-06-20 ENCOUNTER — ANESTHESIA EVENT (OUTPATIENT)
Dept: SURGERY | Facility: OTHER | Age: 59
End: 2019-06-20
Payer: COMMERCIAL

## 2019-06-20 VITALS
RESPIRATION RATE: 16 BRPM | WEIGHT: 197 LBS | OXYGEN SATURATION: 98 % | BODY MASS INDEX: 29.18 KG/M2 | DIASTOLIC BLOOD PRESSURE: 77 MMHG | HEIGHT: 69 IN | SYSTOLIC BLOOD PRESSURE: 140 MMHG | TEMPERATURE: 98 F | HEART RATE: 72 BPM

## 2019-06-20 DIAGNOSIS — N99.3 PROLAPSE OF VAGINAL VAULT AFTER HYSTERECTOMY: ICD-10-CM

## 2019-06-20 LAB
ABO + RH BLD: NORMAL
ANION GAP SERPL CALC-SCNC: 9 MMOL/L (ref 8–16)
BILIRUB UR QL STRIP: NEGATIVE
BLD GP AB SCN CELLS X3 SERPL QL: NORMAL
BUN SERPL-MCNC: 13 MG/DL (ref 6–20)
CALCIUM SERPL-MCNC: 10 MG/DL (ref 8.7–10.5)
CHLORIDE SERPL-SCNC: 101 MMOL/L (ref 95–110)
CLARITY UR: CLEAR
CO2 SERPL-SCNC: 30 MMOL/L (ref 23–29)
COLOR UR: YELLOW
CREAT SERPL-MCNC: 0.7 MG/DL (ref 0.5–1.4)
EST. GFR  (AFRICAN AMERICAN): >60 ML/MIN/1.73 M^2
EST. GFR  (NON AFRICAN AMERICAN): >60 ML/MIN/1.73 M^2
GLUCOSE SERPL-MCNC: 99 MG/DL (ref 70–110)
GLUCOSE UR QL STRIP: NEGATIVE
HGB UR QL STRIP: NEGATIVE
KETONES UR QL STRIP: NEGATIVE
LEUKOCYTE ESTERASE UR QL STRIP: NEGATIVE
NITRITE UR QL STRIP: NEGATIVE
PH UR STRIP: 5 [PH] (ref 5–8)
POTASSIUM SERPL-SCNC: 3.9 MMOL/L (ref 3.5–5.1)
PROT UR QL STRIP: NEGATIVE
SODIUM SERPL-SCNC: 140 MMOL/L (ref 136–145)
SP GR UR STRIP: 1.02 (ref 1–1.03)
URN SPEC COLLECT METH UR: NORMAL
UROBILINOGEN UR STRIP-ACNC: NEGATIVE EU/DL

## 2019-06-20 PROCEDURE — 80048 BASIC METABOLIC PNL TOTAL CA: CPT

## 2019-06-20 PROCEDURE — 86901 BLOOD TYPING SEROLOGIC RH(D): CPT

## 2019-06-20 PROCEDURE — 81003 URINALYSIS AUTO W/O SCOPE: CPT

## 2019-06-20 RX ORDER — ACETAMINOPHEN 500 MG
1000 TABLET ORAL
Status: CANCELLED | OUTPATIENT
Start: 2019-06-20 | End: 2019-06-20

## 2019-06-20 RX ORDER — SODIUM CHLORIDE, SODIUM LACTATE, POTASSIUM CHLORIDE, CALCIUM CHLORIDE 600; 310; 30; 20 MG/100ML; MG/100ML; MG/100ML; MG/100ML
INJECTION, SOLUTION INTRAVENOUS CONTINUOUS
Status: CANCELLED | OUTPATIENT
Start: 2019-06-20

## 2019-06-20 RX ORDER — CELECOXIB 200 MG/1
400 CAPSULE ORAL
Status: CANCELLED | OUTPATIENT
Start: 2019-06-20 | End: 2019-06-20

## 2019-06-20 RX ORDER — PREGABALIN 75 MG/1
75 CAPSULE ORAL
Status: CANCELLED | OUTPATIENT
Start: 2019-06-20 | End: 2019-06-20

## 2019-06-20 RX ORDER — LIDOCAINE HYDROCHLORIDE 10 MG/ML
0.5 INJECTION, SOLUTION EPIDURAL; INFILTRATION; INTRACAUDAL; PERINEURAL ONCE
Status: CANCELLED | OUTPATIENT
Start: 2019-06-20 | End: 2019-06-20

## 2019-06-20 NOTE — TELEPHONE ENCOUNTER
----- Message from Amanda Paulson sent at 6/20/2019 12:07 PM CDT -----  Contact: pt    Name of Who is Calling:MARGIE FERNANDEZ [[id number]]    What is the request in detail: patient would like a call back regarding her short term disability was received and signed by provider Please contact to further discuss and advise     Can the clinic reply by [facility]: no      [facility]: [phone]

## 2019-06-20 NOTE — ANESTHESIA PREPROCEDURE EVALUATION
06/20/2019  Rosamaria Agosto is a 58 y.o., female.    Anesthesia Evaluation    I have reviewed the Patient Summary Reports.    I have reviewed the Nursing Notes.   I have reviewed the Medications.     Review of Systems  Anesthesia Hx:  No problems with previous Anesthesia  History of prior surgery of interest to airway management or planning: Previous anesthesia: General Sinus Surg 1 yr ago with general anesthesia.  Denies Family Hx of Anesthesia complications.   Denies Personal Hx of Anesthesia complications.   Social:  Non-Smoker    Hematology/Oncology:  Hematology Normal   Oncology Normal     EENT/Dental:EENT/Dental Normal   Cardiovascular:   Exercise tolerance: good Hypertension, well controlled hyperlipidemia    Pulmonary:  Pulmonary Normal    Renal/:  Renal/ Normal     Hepatic/GI:  Hepatic/GI Normal    Musculoskeletal:   Arthritis     Neurological:  Neurology Normal    Endocrine:  Endocrine Normal    Dermatological:  Skin Normal    Psych:  Psychiatric Normal           Physical Exam  General:  Obesity    Airway/Jaw/Neck:  Airway Findings: Mouth Opening: Normal Tongue: Normal  General Airway Assessment: Adult  Mallampati: II      Dental:  Dental Findings: In tact        Mental Status:  Mental Status Findings:  Cooperative, Alert and Oriented         Anesthesia Plan  Type of Anesthesia, risks & benefits discussed:  Anesthesia Type:  general  Patient's Preference:   Intra-op Monitoring Plan: standard ASA monitors  Intra-op Monitoring Plan Comments:   Post Op Pain Control Plan: multimodal analgesia and per primary service following discharge from PACU  Post Op Pain Control Plan Comments:   Induction:   IV  Beta Blocker:         Informed Consent: Patient understands risks and agrees with Anesthesia plan.  Questions answered.   ASA Score: 2     Day of Surgery Review of History & Physical:    H&P update  referred to the surgeon.     Anesthesia Plan Notes: Repeat labs today        Ready For Surgery From Anesthesia Perspective.

## 2019-06-20 NOTE — DISCHARGE INSTRUCTIONS
PRE-ADMIT TESTING -  [phone]    [address]  [facility]          Your surgery has been scheduled at [facility]. We are pleased to have the opportunity to serve you. For Further Information please call [phone].    On the day of surgery please report to the Information Desk on the 1st floor.    · CONTACT YOUR PHYSICIAN'S OFFICE THE DAY PRIOR TO YOUR SURGERY TO OBTAIN YOUR ARRIVAL TIME.     · The evening before surgery do not eat anything after 9 p.m. ( this includes hard candy, chewing gum and mints).  You may only have GATORADE, POWERADE AND WATER  from 9 p.m. until you leave your home.   DO NOT DRINK ANY LIQUIDS ON THE WAY TO THE HOSPITAL.      SPECIAL MEDICATION INSTRUCTIONS: TAKE medications checked off by the Anesthesiologist on your Medication List.    Angiogram Patients: Take medications as instructed by your physician, including aspirin.     Surgery Patients:    If you take ASPIRIN - Your PHYSICIAN/SURGEON will need to inform you IF/OR when you need to stop taking aspirin prior to your surgery.     Do Not take any medications containing IBUPROFEN.  Do Not Wear any make-up or dark nail polish   (especially eye make-up) to surgery. If you come to surgery with makeup on you will be required to remove the makeup or nail polish.    Do not shave your surgical area at least 5 days prior to your surgery. The surgical prep will be performed at the hospital according to Infection Control regulations.    Leave all valuables at home.   Do Not wear any jewelry or watches, including any metal in body piercings. Jewelry must be removed prior to coming to the hospital.  There is a possibility that rings that are unable to be removed may be cut off if they are on the surgical extremity.    Contact Lens must be removed before surgery. Either do not wear the contact lens or bring a case and solution for storage.  Please bring a container for eyeglasses or dentures as required.  Bring  any paperwork your physician has provided, such as consent forms,  history and physicals, doctor's orders, etc.   Bring comfortable clothes that are loose fitting to wear upon discharge. Take into consideration the type of surgery being performed.  Maintain your diet as advised per your physician the day prior to surgery.      Adequate rest the night before surgery is advised.   Park in the Parking lot behind the hospital or in the [employer] [facility] across the street from the parking lot. Parking is complimentary.  If you will be discharged the same day as your procedure, please arrange for a responsible adult to drive you home or to accompany you if traveling by taxi.   YOU WILL NOT BE PERMITTED TO DRIVE OR TO LEAVE THE HOSPITAL ALONE AFTER SURGERY.   It is strongly recommended that you arrange for someone to remain with you for the first 24 hrs following your surgery.    The Surgeon will speak to your family/visitor after your surgery regarding the outcome of your surgery and post op care.  The Surgeon may speak to you after your surgery, but there is a possibility you may not remember the details.  Please check with your family members regarding the conversation with the Surgeon.      Thank you for your cooperation.  The Staff of [facility].                Bathing Instructions with Hibiclens     Shower the evening before and morning of your procedure with Hibiclens:   Wash your face with water and your regular face wash/soap   Apply Hibiclens directly on your skin or on a wet washcloth and wash gently. When showering: Move away from the shower stream when applying Hibiclens to avoid rinsing off too soon.   Rinse thoroughly with warm water   Do not dilute Hibiclens         Dry off as usual, do not use any deodorant, powder, body lotions, perfume, after shave or cologne.

## 2019-06-20 NOTE — TELEPHONE ENCOUNTER
Attempted to inform pt that her disability info was signed and faxed to disability again today however no voicemail box was set up.

## 2019-06-21 ENCOUNTER — LAB VISIT (OUTPATIENT)
Dept: LAB | Facility: HOSPITAL | Age: 59
End: 2019-06-21
Attending: INTERNAL MEDICINE
Payer: COMMERCIAL

## 2019-06-21 ENCOUNTER — TELEPHONE (OUTPATIENT)
Dept: UROGYNECOLOGY | Facility: CLINIC | Age: 59
End: 2019-06-21

## 2019-06-21 DIAGNOSIS — E78.2 MIXED HYPERLIPIDEMIA: ICD-10-CM

## 2019-06-21 DIAGNOSIS — I10 ESSENTIAL HYPERTENSION: ICD-10-CM

## 2019-06-21 LAB
ANION GAP SERPL CALC-SCNC: 13 MMOL/L (ref 8–16)
BUN SERPL-MCNC: 14 MG/DL (ref 6–20)
CALCIUM SERPL-MCNC: 10.1 MG/DL (ref 8.7–10.5)
CHLORIDE SERPL-SCNC: 99 MMOL/L (ref 95–110)
CHOLEST SERPL-MCNC: 234 MG/DL (ref 120–199)
CHOLEST/HDLC SERPL: 3.8 {RATIO} (ref 2–5)
CO2 SERPL-SCNC: 25 MMOL/L (ref 23–29)
CREAT SERPL-MCNC: 0.7 MG/DL (ref 0.5–1.4)
EST. GFR  (AFRICAN AMERICAN): >60 ML/MIN/1.73 M^2
EST. GFR  (NON AFRICAN AMERICAN): >60 ML/MIN/1.73 M^2
GLUCOSE SERPL-MCNC: 96 MG/DL (ref 70–110)
HDLC SERPL-MCNC: 62 MG/DL (ref 40–75)
HDLC SERPL: 26.5 % (ref 20–50)
LDLC SERPL CALC-MCNC: 152.8 MG/DL (ref 63–159)
MAGNESIUM SERPL-MCNC: 1.8 MG/DL (ref 1.6–2.6)
NONHDLC SERPL-MCNC: 172 MG/DL
POTASSIUM SERPL-SCNC: 4.6 MMOL/L (ref 3.5–5.1)
SODIUM SERPL-SCNC: 137 MMOL/L (ref 136–145)
TRIGL SERPL-MCNC: 96 MG/DL (ref 30–150)

## 2019-06-21 PROCEDURE — 83735 ASSAY OF MAGNESIUM: CPT

## 2019-06-21 PROCEDURE — 80048 BASIC METABOLIC PNL TOTAL CA: CPT

## 2019-06-21 PROCEDURE — 80061 LIPID PANEL: CPT

## 2019-06-21 PROCEDURE — 36415 COLL VENOUS BLD VENIPUNCTURE: CPT | Mod: PO

## 2019-06-21 NOTE — TELEPHONE ENCOUNTER
----- Message from Luis E Oneill sent at 6/21/2019  4:20 PM CDT -----  Please call pt she needs a time for her surgery on mon [phone]

## 2019-06-24 ENCOUNTER — HOSPITAL ENCOUNTER (OUTPATIENT)
Facility: OTHER | Age: 59
Discharge: HOME | End: 2019-06-25
Attending: OBSTETRICS & GYNECOLOGY | Admitting: OBSTETRICS & GYNECOLOGY
Payer: COMMERCIAL

## 2019-06-24 ENCOUNTER — ANESTHESIA (OUTPATIENT)
Dept: SURGERY | Facility: OTHER | Age: 59
End: 2019-06-24
Payer: COMMERCIAL

## 2019-06-24 ENCOUNTER — PATIENT MESSAGE (OUTPATIENT)
Dept: CARDIOLOGY | Facility: CLINIC | Age: 59
End: 2019-06-24

## 2019-06-24 DIAGNOSIS — N81.10 VAGINAL PROLAPSE WITHOUT UTERINE PROLAPSE: Primary | ICD-10-CM

## 2019-06-24 DIAGNOSIS — N99.3 PROLAPSE OF VAGINAL VAULT AFTER HYSTERECTOMY: ICD-10-CM

## 2019-06-24 PROCEDURE — S0020 INJECTION, BUPIVICAINE HYDRO: HCPCS | Performed by: OBSTETRICS & GYNECOLOGY

## 2019-06-24 PROCEDURE — 71000033 HC RECOVERY, INTIAL HOUR: Performed by: OBSTETRICS & GYNECOLOGY

## 2019-06-24 PROCEDURE — 37000009 HC ANESTHESIA EA ADD 15 MINS: Performed by: OBSTETRICS & GYNECOLOGY

## 2019-06-24 PROCEDURE — 25000003 PHARM REV CODE 250: Performed by: OBSTETRICS & GYNECOLOGY

## 2019-06-24 PROCEDURE — 63600175 PHARM REV CODE 636 W HCPCS: Performed by: ANESTHESIOLOGY

## 2019-06-24 PROCEDURE — 25000003 PHARM REV CODE 250: Performed by: NURSE ANESTHETIST, CERTIFIED REGISTERED

## 2019-06-24 PROCEDURE — 94761 N-INVAS EAR/PLS OXIMETRY MLT: CPT

## 2019-06-24 PROCEDURE — 36000712 HC OR TIME LEV V 1ST 15 MIN: Performed by: OBSTETRICS & GYNECOLOGY

## 2019-06-24 PROCEDURE — 58661 PR LAP,RMV  ADNEXAL STRUCTURE: ICD-10-PCS | Mod: 22,51,, | Performed by: OBSTETRICS & GYNECOLOGY

## 2019-06-24 PROCEDURE — 88305 TISSUE EXAM BY PATHOLOGIST: CPT | Performed by: PATHOLOGY

## 2019-06-24 PROCEDURE — 71000039 HC RECOVERY, EACH ADD'L HOUR: Performed by: OBSTETRICS & GYNECOLOGY

## 2019-06-24 PROCEDURE — 63600175 PHARM REV CODE 636 W HCPCS: Performed by: OBSTETRICS & GYNECOLOGY

## 2019-06-24 PROCEDURE — C1781 MESH (IMPLANTABLE): HCPCS | Performed by: OBSTETRICS & GYNECOLOGY

## 2019-06-24 PROCEDURE — 58661 LAPAROSCOPY REMOVE ADNEXA: CPT | Mod: 22,51,, | Performed by: OBSTETRICS & GYNECOLOGY

## 2019-06-24 PROCEDURE — S0077 INJECTION, CLINDAMYCIN PHOSP: HCPCS | Performed by: OBSTETRICS & GYNECOLOGY

## 2019-06-24 PROCEDURE — 27000221 HC OXYGEN, UP TO 24 HOURS

## 2019-06-24 PROCEDURE — 88305 TISSUE SPECIMEN TO PATHOLOGY - SURGERY: ICD-10-PCS | Mod: 26,,, | Performed by: PATHOLOGY

## 2019-06-24 PROCEDURE — 36000713 HC OR TIME LEV V EA ADD 15 MIN: Performed by: OBSTETRICS & GYNECOLOGY

## 2019-06-24 PROCEDURE — P9045 ALBUMIN (HUMAN), 5%, 250 ML: HCPCS | Mod: JG | Performed by: NURSE ANESTHETIST, CERTIFIED REGISTERED

## 2019-06-24 PROCEDURE — 57425 LAPAROSCOPY SURG COLPOPEXY: CPT | Mod: ,,, | Performed by: OBSTETRICS & GYNECOLOGY

## 2019-06-24 PROCEDURE — 57425 PR LAPAROSCOPY, SURG, COLPOPEXY: ICD-10-PCS | Mod: ,,, | Performed by: OBSTETRICS & GYNECOLOGY

## 2019-06-24 PROCEDURE — 25000003 PHARM REV CODE 250: Performed by: ANESTHESIOLOGY

## 2019-06-24 PROCEDURE — 37000008 HC ANESTHESIA 1ST 15 MINUTES: Performed by: OBSTETRICS & GYNECOLOGY

## 2019-06-24 PROCEDURE — S0028 INJECTION, FAMOTIDINE, 20 MG: HCPCS | Performed by: OBSTETRICS & GYNECOLOGY

## 2019-06-24 PROCEDURE — 27201423 OPTIME MED/SURG SUP & DEVICES STERILE SUPPLY: Performed by: OBSTETRICS & GYNECOLOGY

## 2019-06-24 PROCEDURE — 88305 TISSUE EXAM BY PATHOLOGIST: CPT | Mod: 26,,, | Performed by: PATHOLOGY

## 2019-06-24 PROCEDURE — 63600175 PHARM REV CODE 636 W HCPCS: Performed by: NURSE ANESTHETIST, CERTIFIED REGISTERED

## 2019-06-24 DEVICE — MESH RESTORELLE Y CONTOUR 24X3: Type: IMPLANTABLE DEVICE | Site: VAGINA | Status: FUNCTIONAL

## 2019-06-24 RX ORDER — MIDAZOLAM HYDROCHLORIDE 5 MG/ML
INJECTION INTRAMUSCULAR; INTRAVENOUS
Status: DISCONTINUED | OUTPATIENT
Start: 2019-06-24 | End: 2019-06-24

## 2019-06-24 RX ORDER — BISACODYL 10 MG
10 SUPPOSITORY, RECTAL RECTAL DAILY PRN
Status: DISCONTINUED | OUTPATIENT
Start: 2019-06-24 | End: 2019-06-25 | Stop reason: HOSPADM

## 2019-06-24 RX ORDER — OXYCODONE AND ACETAMINOPHEN 5; 325 MG/1; MG/1
1 TABLET ORAL EVERY 4 HOURS PRN
Status: DISCONTINUED | OUTPATIENT
Start: 2019-06-24 | End: 2019-06-25 | Stop reason: HOSPADM

## 2019-06-24 RX ORDER — SODIUM CHLORIDE 0.9 % (FLUSH) 0.9 %
3 SYRINGE (ML) INJECTION
Status: DISCONTINUED | OUTPATIENT
Start: 2019-06-24 | End: 2019-06-25 | Stop reason: HOSPADM

## 2019-06-24 RX ORDER — CLINDAMYCIN PHOSPHATE 900 MG/50ML
900 INJECTION, SOLUTION INTRAVENOUS
Status: COMPLETED | OUTPATIENT
Start: 2019-06-24 | End: 2019-06-24

## 2019-06-24 RX ORDER — ONDANSETRON 2 MG/ML
INJECTION INTRAMUSCULAR; INTRAVENOUS
Status: DISCONTINUED | OUTPATIENT
Start: 2019-06-24 | End: 2019-06-24

## 2019-06-24 RX ORDER — SODIUM CHLORIDE, SODIUM LACTATE, POTASSIUM CHLORIDE, CALCIUM CHLORIDE 600; 310; 30; 20 MG/100ML; MG/100ML; MG/100ML; MG/100ML
INJECTION, SOLUTION INTRAVENOUS CONTINUOUS
Status: DISCONTINUED | OUTPATIENT
Start: 2019-06-24 | End: 2019-06-25

## 2019-06-24 RX ORDER — DIPHENHYDRAMINE HYDROCHLORIDE 50 MG/ML
25 INJECTION INTRAMUSCULAR; INTRAVENOUS EVERY 4 HOURS PRN
Status: DISCONTINUED | OUTPATIENT
Start: 2019-06-24 | End: 2019-06-25 | Stop reason: HOSPADM

## 2019-06-24 RX ORDER — FENTANYL CITRATE 50 UG/ML
INJECTION, SOLUTION INTRAMUSCULAR; INTRAVENOUS
Status: DISCONTINUED | OUTPATIENT
Start: 2019-06-24 | End: 2019-06-24

## 2019-06-24 RX ORDER — DEXAMETHASONE SODIUM PHOSPHATE 4 MG/ML
INJECTION, SOLUTION INTRA-ARTICULAR; INTRALESIONAL; INTRAMUSCULAR; INTRAVENOUS; SOFT TISSUE
Status: DISCONTINUED | OUTPATIENT
Start: 2019-06-24 | End: 2019-06-24

## 2019-06-24 RX ORDER — LIDOCAINE HCL/PF 100 MG/5ML
SYRINGE (ML) INTRAVENOUS
Status: DISCONTINUED | OUTPATIENT
Start: 2019-06-24 | End: 2019-06-24

## 2019-06-24 RX ORDER — BUPIVACAINE HYDROCHLORIDE 5 MG/ML
INJECTION, SOLUTION EPIDURAL; INTRACAUDAL
Status: DISCONTINUED | OUTPATIENT
Start: 2019-06-24 | End: 2019-06-24 | Stop reason: HOSPADM

## 2019-06-24 RX ORDER — MUPIROCIN 20 MG/G
1 OINTMENT TOPICAL 2 TIMES DAILY
Status: DISCONTINUED | OUTPATIENT
Start: 2019-06-24 | End: 2019-06-25 | Stop reason: HOSPADM

## 2019-06-24 RX ORDER — HYDROMORPHONE HYDROCHLORIDE 2 MG/ML
0.4 INJECTION, SOLUTION INTRAMUSCULAR; INTRAVENOUS; SUBCUTANEOUS EVERY 5 MIN PRN
Status: DISCONTINUED | OUTPATIENT
Start: 2019-06-24 | End: 2019-06-24 | Stop reason: HOSPADM

## 2019-06-24 RX ORDER — PHENAZOPYRIDINE HYDROCHLORIDE 200 MG/1
200 TABLET, FILM COATED ORAL
Status: COMPLETED | OUTPATIENT
Start: 2019-06-24 | End: 2019-06-24

## 2019-06-24 RX ORDER — DIPHENHYDRAMINE HCL 25 MG
25 CAPSULE ORAL EVERY 4 HOURS PRN
Status: DISCONTINUED | OUTPATIENT
Start: 2019-06-24 | End: 2019-06-25 | Stop reason: HOSPADM

## 2019-06-24 RX ORDER — POLYETHYLENE GLYCOL 3350 17 G/17G
17 POWDER, FOR SOLUTION ORAL DAILY
Status: DISCONTINUED | OUTPATIENT
Start: 2019-06-25 | End: 2019-06-25 | Stop reason: HOSPADM

## 2019-06-24 RX ORDER — CELECOXIB 200 MG/1
400 CAPSULE ORAL
Status: COMPLETED | OUTPATIENT
Start: 2019-06-24 | End: 2019-06-24

## 2019-06-24 RX ORDER — DIPHENHYDRAMINE HYDROCHLORIDE 50 MG/ML
25 INJECTION INTRAMUSCULAR; INTRAVENOUS EVERY 6 HOURS PRN
Status: DISCONTINUED | OUTPATIENT
Start: 2019-06-24 | End: 2019-06-24 | Stop reason: HOSPADM

## 2019-06-24 RX ORDER — GLYCOPYRROLATE 0.2 MG/ML
INJECTION INTRAMUSCULAR; INTRAVENOUS
Status: DISCONTINUED | OUTPATIENT
Start: 2019-06-24 | End: 2019-06-24

## 2019-06-24 RX ORDER — DIPHENHYDRAMINE HYDROCHLORIDE 50 MG/ML
INJECTION INTRAMUSCULAR; INTRAVENOUS
Status: DISCONTINUED | OUTPATIENT
Start: 2019-06-24 | End: 2019-06-24

## 2019-06-24 RX ORDER — LIDOCAINE HYDROCHLORIDE 10 MG/ML
1 INJECTION, SOLUTION EPIDURAL; INFILTRATION; INTRACAUDAL; PERINEURAL ONCE
Status: DISCONTINUED | OUTPATIENT
Start: 2019-06-24 | End: 2019-06-25 | Stop reason: HOSPADM

## 2019-06-24 RX ORDER — HYDROMORPHONE HYDROCHLORIDE 1 MG/ML
1 INJECTION, SOLUTION INTRAMUSCULAR; INTRAVENOUS; SUBCUTANEOUS EVERY 4 HOURS PRN
Status: DISCONTINUED | OUTPATIENT
Start: 2019-06-24 | End: 2019-06-25 | Stop reason: HOSPADM

## 2019-06-24 RX ORDER — ACETAMINOPHEN 500 MG
1000 TABLET ORAL
Status: DISCONTINUED | OUTPATIENT
Start: 2019-06-24 | End: 2019-06-24

## 2019-06-24 RX ORDER — OXYCODONE HYDROCHLORIDE 5 MG/1
5 TABLET ORAL
Status: DISCONTINUED | OUTPATIENT
Start: 2019-06-24 | End: 2019-06-24 | Stop reason: HOSPADM

## 2019-06-24 RX ORDER — ONDANSETRON 2 MG/ML
4 INJECTION INTRAMUSCULAR; INTRAVENOUS DAILY PRN
Status: DISCONTINUED | OUTPATIENT
Start: 2019-06-24 | End: 2019-06-24 | Stop reason: HOSPADM

## 2019-06-24 RX ORDER — FAMOTIDINE 10 MG/ML
20 INJECTION INTRAVENOUS EVERY 12 HOURS
Status: DISCONTINUED | OUTPATIENT
Start: 2019-06-24 | End: 2019-06-25 | Stop reason: HOSPADM

## 2019-06-24 RX ORDER — OXYCODONE AND ACETAMINOPHEN 10; 325 MG/1; MG/1
1 TABLET ORAL EVERY 4 HOURS PRN
Status: DISCONTINUED | OUTPATIENT
Start: 2019-06-24 | End: 2019-06-25 | Stop reason: HOSPADM

## 2019-06-24 RX ORDER — KETOROLAC TROMETHAMINE 30 MG/ML
INJECTION, SOLUTION INTRAMUSCULAR; INTRAVENOUS
Status: DISCONTINUED | OUTPATIENT
Start: 2019-06-24 | End: 2019-06-24

## 2019-06-24 RX ORDER — IBUPROFEN 600 MG/1
600 TABLET ORAL EVERY 6 HOURS
Status: DISCONTINUED | OUTPATIENT
Start: 2019-06-26 | End: 2019-06-25 | Stop reason: HOSPADM

## 2019-06-24 RX ORDER — CEFAZOLIN SODIUM 1 G/3ML
2 INJECTION, POWDER, FOR SOLUTION INTRAMUSCULAR; INTRAVENOUS
Status: DISCONTINUED | OUTPATIENT
Start: 2019-06-24 | End: 2019-06-24 | Stop reason: HOSPADM

## 2019-06-24 RX ORDER — PROPOFOL 10 MG/ML
VIAL (ML) INTRAVENOUS
Status: DISCONTINUED | OUTPATIENT
Start: 2019-06-24 | End: 2019-06-24

## 2019-06-24 RX ORDER — MEPERIDINE HYDROCHLORIDE 50 MG/ML
12.5 INJECTION INTRAMUSCULAR; INTRAVENOUS; SUBCUTANEOUS ONCE AS NEEDED
Status: COMPLETED | OUTPATIENT
Start: 2019-06-24 | End: 2019-06-24

## 2019-06-24 RX ORDER — POLYETHYLENE GLYCOL 3350 17 G/17G
17 POWDER, FOR SOLUTION ORAL DAILY
Status: DISCONTINUED | OUTPATIENT
Start: 2019-06-25 | End: 2019-06-24

## 2019-06-24 RX ORDER — ALBUMIN HUMAN 50 G/1000ML
SOLUTION INTRAVENOUS CONTINUOUS PRN
Status: DISCONTINUED | OUTPATIENT
Start: 2019-06-24 | End: 2019-06-24

## 2019-06-24 RX ORDER — KETOROLAC TROMETHAMINE 30 MG/ML
30 INJECTION, SOLUTION INTRAMUSCULAR; INTRAVENOUS EVERY 6 HOURS
Status: DISCONTINUED | OUTPATIENT
Start: 2019-06-25 | End: 2019-06-25 | Stop reason: HOSPADM

## 2019-06-24 RX ORDER — NEOSTIGMINE METHYLSULFATE 1 MG/ML
INJECTION, SOLUTION INTRAVENOUS
Status: DISCONTINUED | OUTPATIENT
Start: 2019-06-24 | End: 2019-06-24

## 2019-06-24 RX ORDER — ROCURONIUM BROMIDE 10 MG/ML
INJECTION, SOLUTION INTRAVENOUS
Status: DISCONTINUED | OUTPATIENT
Start: 2019-06-24 | End: 2019-06-24

## 2019-06-24 RX ORDER — ACETAMINOPHEN 500 MG
1000 TABLET ORAL
Status: COMPLETED | OUTPATIENT
Start: 2019-06-24 | End: 2019-06-24

## 2019-06-24 RX ORDER — PHENYLEPHRINE HYDROCHLORIDE 10 MG/ML
INJECTION INTRAVENOUS
Status: DISCONTINUED | OUTPATIENT
Start: 2019-06-24 | End: 2019-06-24

## 2019-06-24 RX ORDER — LIDOCAINE HYDROCHLORIDE 10 MG/ML
0.5 INJECTION, SOLUTION EPIDURAL; INFILTRATION; INTRACAUDAL; PERINEURAL ONCE
Status: DISCONTINUED | OUTPATIENT
Start: 2019-06-24 | End: 2019-06-24 | Stop reason: HOSPADM

## 2019-06-24 RX ORDER — PREGABALIN 75 MG/1
75 CAPSULE ORAL
Status: COMPLETED | OUTPATIENT
Start: 2019-06-24 | End: 2019-06-24

## 2019-06-24 RX ADMIN — FENTANYL CITRATE 50 MCG: 50 INJECTION, SOLUTION INTRAMUSCULAR; INTRAVENOUS at 04:06

## 2019-06-24 RX ADMIN — CLINDAMYCIN PHOSPHATE 900 MG: 18 INJECTION, SOLUTION INTRAVENOUS at 02:06

## 2019-06-24 RX ADMIN — ROCURONIUM BROMIDE 10 MG: 10 INJECTION INTRAVENOUS at 03:06

## 2019-06-24 RX ADMIN — MEPERIDINE HYDROCHLORIDE 12.5 MG: 50 INJECTION INTRAMUSCULAR; INTRAVENOUS; SUBCUTANEOUS at 07:06

## 2019-06-24 RX ADMIN — SODIUM CHLORIDE, SODIUM LACTATE, POTASSIUM CHLORIDE, AND CALCIUM CHLORIDE: .6; .31; .03; .02 INJECTION, SOLUTION INTRAVENOUS at 09:06

## 2019-06-24 RX ADMIN — FAMOTIDINE 20 MG: 10 INJECTION, SOLUTION INTRAVENOUS at 09:06

## 2019-06-24 RX ADMIN — SODIUM CHLORIDE, SODIUM LACTATE, POTASSIUM CHLORIDE, AND CALCIUM CHLORIDE: 600; 310; 30; 20 INJECTION, SOLUTION INTRAVENOUS at 05:06

## 2019-06-24 RX ADMIN — FENTANYL CITRATE 100 MCG: 50 INJECTION, SOLUTION INTRAMUSCULAR; INTRAVENOUS at 01:06

## 2019-06-24 RX ADMIN — CARBOXYMETHYLCELLULOSE SODIUM 2 DROP: 2.5 SOLUTION/ DROPS OPHTHALMIC at 01:06

## 2019-06-24 RX ADMIN — CELECOXIB 400 MG: 200 CAPSULE ORAL at 10:06

## 2019-06-24 RX ADMIN — ALBUMIN (HUMAN): 2.5 SOLUTION INTRAVENOUS at 04:06

## 2019-06-24 RX ADMIN — ROCURONIUM BROMIDE 50 MG: 10 INJECTION INTRAVENOUS at 01:06

## 2019-06-24 RX ADMIN — FENTANYL CITRATE 50 MCG: 50 INJECTION, SOLUTION INTRAMUSCULAR; INTRAVENOUS at 02:06

## 2019-06-24 RX ADMIN — NEOSTIGMINE METHYLSULFATE 5 MG: 1 INJECTION INTRAVENOUS at 06:06

## 2019-06-24 RX ADMIN — PREGABALIN 75 MG: 75 CAPSULE ORAL at 10:06

## 2019-06-24 RX ADMIN — DEXAMETHASONE SODIUM PHOSPHATE 8 MG: 4 INJECTION, SOLUTION INTRAMUSCULAR; INTRAVENOUS at 02:06

## 2019-06-24 RX ADMIN — GLYCOPYRROLATE 0.6 MG: 0.2 INJECTION, SOLUTION INTRAMUSCULAR; INTRAVENOUS at 06:06

## 2019-06-24 RX ADMIN — PHENAZOPYRIDINE HYDROCHLORIDE 200 MG: 200 TABLET ORAL at 10:06

## 2019-06-24 RX ADMIN — SODIUM CHLORIDE, SODIUM LACTATE, POTASSIUM CHLORIDE, AND CALCIUM CHLORIDE: 600; 310; 30; 20 INJECTION, SOLUTION INTRAVENOUS at 12:06

## 2019-06-24 RX ADMIN — FENTANYL CITRATE 50 MCG: 50 INJECTION, SOLUTION INTRAMUSCULAR; INTRAVENOUS at 03:06

## 2019-06-24 RX ADMIN — ROCURONIUM BROMIDE 20 MG: 10 INJECTION INTRAVENOUS at 02:06

## 2019-06-24 RX ADMIN — LIDOCAINE HYDROCHLORIDE 60 MG: 20 INJECTION, SOLUTION INTRAVENOUS at 01:06

## 2019-06-24 RX ADMIN — GLYCOPYRROLATE 0.2 MG: 0.2 INJECTION, SOLUTION INTRAMUSCULAR; INTRAVENOUS at 02:06

## 2019-06-24 RX ADMIN — ROCURONIUM BROMIDE 10 MG: 10 INJECTION INTRAVENOUS at 04:06

## 2019-06-24 RX ADMIN — ALBUMIN (HUMAN): 2.5 SOLUTION INTRAVENOUS at 03:06

## 2019-06-24 RX ADMIN — PHENYLEPHRINE HYDROCHLORIDE 200 MCG: 10 INJECTION INTRAVENOUS at 02:06

## 2019-06-24 RX ADMIN — PHENYLEPHRINE HYDROCHLORIDE 200 MCG: 10 INJECTION INTRAVENOUS at 01:06

## 2019-06-24 RX ADMIN — KETOROLAC TROMETHAMINE 30 MG: 30 INJECTION, SOLUTION INTRAMUSCULAR; INTRAVENOUS at 06:06

## 2019-06-24 RX ADMIN — MIDAZOLAM 2 MG: 5 INJECTION INTRAMUSCULAR; INTRAVENOUS at 01:06

## 2019-06-24 RX ADMIN — PROPOFOL 200 MG: 10 INJECTION, EMULSION INTRAVENOUS at 01:06

## 2019-06-24 RX ADMIN — ACETAMINOPHEN 1000 MG: 500 TABLET, FILM COATED ORAL at 10:06

## 2019-06-24 RX ADMIN — ONDANSETRON 4 MG: 2 INJECTION INTRAMUSCULAR; INTRAVENOUS at 06:06

## 2019-06-24 RX ADMIN — DIPHENHYDRAMINE HYDROCHLORIDE 6.25 MG: 50 INJECTION, SOLUTION INTRAMUSCULAR; INTRAVENOUS at 02:06

## 2019-06-24 RX ADMIN — GENTAMICIN SULFATE 387.5 MG: 40 INJECTION, SOLUTION INTRAMUSCULAR; INTRAVENOUS at 01:06

## 2019-06-24 RX ADMIN — MUPIROCIN 1 G: 20 OINTMENT TOPICAL at 09:06

## 2019-06-24 NOTE — OP NOTE
Operative Note       Surgery Date: 6/24/2019     Surgeon(s) and Role:     * Anson Ellison MD - Primary     * Elijah Neal MD - Resident - Assisting    Pre-op Diagnosis:  Prolapse of vaginal vault after hysterectomy [N99.3]  Cystocele, midline [N81.11]  KEN (stress urinary incontinence, female) [N39.3]    Post-op Diagnosis: Post-Op Diagnosis Codes:     * Prolapse of vaginal vault after hysterectomy [N99.3]     * Cystocele, midline [N81.11]     * KEN (stress urinary incontinence, female) [N39.3]    Procedure(s) (LRB):  XI ROBOTIC SACROCOLPOPEXY, ABDOMINAL (N/A)  CYSTOSCOPY (N/A)  Robotic bilateral salpingo-oophorectomy  Robotic lysis of adhesions 25 min in duration  Anesthesia: General    Procedure in Detail/Findings:  The patient was identified in the preoperative area where informed consent was confirmed, and she was taken to the operating room where an adequate level of general anesthesia was obtained. The patient was positioned in high lithotomy position with legs in yellow fin stirrups. Care was taken to avoid joint hyperflexion or hyperextension, and all extremity surfaces were carefully padded so as to minimize risk of neurologic injury. Intravenous antibiotics were administered preoperatively. Sequential compression devices were applied to the patient's lower extremities preoperatively VTE prophylaxis. Surgical time-out was performed, where the patient was identified and procedures confirmed. An examination under anesthesia was performed with findings described as above. The patient's abdomen, perineum, and vagina were sterilely prepped and draped. A mckinnon catheter was placed in the bladder for drainage.   Exam under anesthesia revealed stage 2 apical prolapse and stage 3 anterior vaginal wall prolapse. Attention was then turned to the abdomen. A 10-mm incision suprafraumbilical incision was made with a scalpel. A 5 mm laparoscoped 0 degree was placed into a 5 mm trocar under direct visualization  the abdomen was entered. Insufflation was activated. Appropriate intraperitoneal pressure returned. We insufflated to 15 mm of mercury. Reinserted the laparoscoped safe entry confirmed.   The camera was inserted through this port for visualization of all subsequent port placement. Two 8 mm ports were placed on either side of the supraumbilical port, each approximately 8 cm lateral, along the mid clavicular line. Each 8 mm trocar was inserted under direct visualization without significant trauma, at least 2 cm cephalad and medial to the anterior superior iliac spine. A third 8 mm port was placed in the left upper quadrant, 2 cm below the costal margin in his lateral as possible. A 12 mm assistant port was placed in the righ upper quadrant, cephalad to and midway between the umbilical and 8 mm right lower quadrant port in a triangulated fashion. There were no complications with these port placements. At this point, a total of 5 ports ( 4 robotic) had been placed, including the umbilical port for the camera. The robot was then docked.    In the lower right quadrant that port is designated as arm 1. And we placed a monopolar scissors through that under direct visualization  In the lower left quadrant that port is designated as arm number 3 arm 2. Will be holding the camera through the super umbilical port. In the upper left quadrant that will be designated as the 4th port for the 4th robotic arm.  Through the 3rd robotic port a [place] bipolar is placed under direct visualization arm, the 4th robotic port Has a robotic Cardier  placed under direct visualization.        The [employer] ALLY positioning system was deployed with the YUSUF manipulator with small SACROTIP ATTACHED.    Due to being oriented to the it where the apex is I was able to meticulously remove omental adhesions from that apex and take this down so as to free the cul-de-sac duration of this meticulous dissection was about 15 min  We were then able  to then come up the left pelvic sidewall of the inferior aspect of the vagina was able to take down adhesions of the ovary to the pelvic sidewall then finally isolate the infundibulopelvic ligament come across perpendicular to the ovary close to the ovary as stated well away from the path of the ureter which I was able to outline transected and then I was able to follow the long access of the fallopian tube remnant to the apex coming across with my biopsy Pap bipolar in a parallel manner transecting left side was done 1st as stated I can carried out the exact same sequence of steps on the patient's right side.  Right side was just as meticulous.  Each side took about 10 min of dissection.  On the right I was able to state free the ovary from the pelvic sidewall isolate the IP on the right transected close to the ovary well away from the path of the ureter which I was able to concern keep under direct visualization and then come towards the apex in a longitudinal manner in a parallel manner following the long axis of the fallopian tube remnant transecting the measles stopping 6 remnants excellent hemostasis for after removal of both tubes and ovaries each was individually placed into Endo-Catch bag and removed without difficulty.      Attention was then turned to the apex itself and the peritoneum dissected off the vagina anterior and posteriorly. Great care and attention was put forth to maintain as much of the peritoneum as possible. Thus we had an anterior leaflet developed and posterior leaflet.  Attention was then turned to the sacral promontory, which was identified by visualization and palpation. Again, the course of the right ureter as well as the location of the sigmoid colon was identified. The peritoneum overlying the sacral promontory was then elevated and incised using the robotic scissors. Gentle blunt dissection was then undertaken to reveal presacral fascia again with hemostasis noted. A Channel was  created along the right aspect of the pelvic sidewall making sure to remain medial to the right ureter and lateral to the sigmoid.   The mesh was cut to size. The mesh was introduced into the abdomen and the mesh was sutured in place with CV 4 Cortex suturesl.The anterior and posterior arms of the mesh were attached to the anterior and posterior aspects of the vagina With such suture. Posteriorly multiple sutures anteriorly multiple sutures.  The anterior and posterior leaves of the peritoneum of the vagina that were referenced above were then pursestring together utilizing a single 0 Monocryl on a CT 1 needle. The suture was then parked safely. The sacro tip was oriented towards the presacral space and exposed anterior longitudinal ligament.  The tail of the Y graft under appropriate tension was then fixated to the anterior longitudinal ligament without difficulty using 0 Ethibond suture x3. Visual and tactile check showed excellent tension. The peritoneal channel was then closed with the same suture that was picked back up and then brought to the apex of the incision that was utilized to enter the presacral space  The long arm of the mesh was then brought through the tunnel. The long arm of the mesh was then tensioned appropriately and attached to the sacral promontory with 2 sutures of 0 Ethibond. Excess mesh was again trimmed. The peritoneum overlying the sacral promontory was reapproximated with 0 Vicryl in a pursestring fashion. The anterior and posterior leafs of the peritoneum in the pelvis were reapproximated using 0 Vicryl in a pursestring fashion as well, essentially covering the entire mesh. Excellent hemostasis was noted in the pelvis.   Cystourethrsocopy: normal bladder mucosa, small area at the base of the bladder with erythema no active bleeding. The bladder mucosa without stones or diverticulum. Bilateral ureteral jets were noted. Normal urethra. The Vargas catheter was reinserted.       Attention  was again turned to the abdomen, close inspection of the pelvis revealed excellent hemostasis. All instruments were then removed from the abdomen and the pelvis. A jacinta donovan t device was deployed to close the 12 mm air seal port an endoscopic fashion in the appropriate manner. The remainder of the skin incisions were reapproximated with 4.0 Monocryl, Steri-strips, 2x2s, and Tegaderm.The patient tolerated the procedure well. Needle, sponge lap, and instrument counts were correct x2.         The patient was transferred to recovery in stable condition. The vagina was packed with guaze.    Estimated Blood Loss: 100 mL           Specimens (From admission, onward)    Start     Ordered    06/24/19 1832  Specimen to Pathology - Surgery  Once     Start Status     06/24/19 1832 Collected (06/24/19 1831) Order ID: [id number]       06/24/19 1831        Implants:   Implant Name Type Inv. Item Serial No.  Lot No. LRB No. Used   MESH RESTORELLE Y CONTOUR 24X3 - [id number]  MESH RESTORELLE Y CONTOUR 24X3  COLOPLAST LAYO [id number] N/A 1              Disposition: PACU - hemodynamically stable.           Condition: Good    Attestation:  I was present and scrubbed for the entire procedure.           Discharge Note    Admit Date: 6/24/2019    Attending Physician: Anson Ellison MD     Discharge Physician: Anson Ellison MD    Final Diagnosis: Post-Op Diagnosis Codes:     * Prolapse of vaginal vault after hysterectomy [N99.3]     * Cystocele, midline [N81.11]     * KEN (stress urinary incontinence, female) [N39.3]    Disposition: Home or Self Care    Patient Instructions:   Current Discharge Medication List      CONTINUE these medications which have NOT CHANGED    Details   acetaminophen (TYLENOL) 650 MG TbSR Take 1,300 mg by mouth as needed.      atorvastatin (LIPITOR) 80 MG tablet Take 1 tablet (80 mg total) by mouth nightly.  Qty: 30 tablet, Refills: 11      azelastine (ASTELIN) 137 mcg (0.1 %) nasal spray 1 spray  (137 mcg total) by Nasal route 2 (two) times daily.  Qty: 30 mL, Refills: 2    Associated Diagnoses: Chronic rhinitis, unspecified type      budesonide (PULMICORT) 0.5 mg/2 mL nebulizer solution inhale 1 nebules per nebulizer twice daily, for use in saline irrigation as directed  Refills: 1      hydroCHLOROthiazide (HYDRODIURIL) 25 MG tablet Take 1 tablet (25 mg total) by mouth once daily.  Qty: 30 tablet, Refills: 11    Associated Diagnoses: Essential hypertension      meloxicam (MOBIC) 15 MG tablet Take 1 tablet (15 mg total) by mouth once daily.  Qty: 90 tablet, Refills: 0    Associated Diagnoses: Acute pain of left shoulder      potassium chloride SA (K-DUR,KLOR-CON) 20 MEQ tablet Take 1 tablet (20 mEq total) by mouth 2 (two) times daily.  Qty: 90 tablet, Refills: 3    Associated Diagnoses: Essential hypertension      traMADol (ULTRAM) 50 mg tablet Take 1 tablet (50 mg total) by mouth daily as needed (severe pain).  Qty: 30 tablet, Refills: 2    Associated Diagnoses: Chronic bilateral low back pain, with sciatica presence unspecified; Chronic pain of right knee      VITAMIN D2 50,000 unit capsule Take 1 capsule (50,000 Units total) by mouth every 7 days.  Qty: 4 capsule, Refills: 2    Associated Diagnoses: Vitamin D deficiency             Discharge Procedure Orders (must include Diet, Follow-up, Activity)  No discharge procedures on file.     Discharge Date: No discharge date for patient encounter.

## 2019-06-24 NOTE — MEDICAL/APP STUDENT
1130 B13 Robotic Sacrocolpoplexy w/Midurethral Sling    Name: Rosamaria Agosto  MRN: [id number]  : 1960 58 y.o.    Admission Dx:Vaginal Prolapse s/p hysterectomy  POD#0  EBL: ***         Pre H/H: B+  PMH: HTN; Fibroids; Hyperlipidiemia; Sinus Problem; abnol pap; Arthritis  PSH:  Hysterectomy; FESS; Breast Bx  Meds: Acetaminphen; Atorvastatin; Azelastine; Budesonide; HCTZ; Meloxicam; Tramadol; Vitamin D  Allergies: NKDA  Other:     F/U:

## 2019-06-24 NOTE — INTERVAL H&P NOTE
The patient has been examined and the H&P has been reviewed:    I concur with the findings and no changes have occurred since H&P was written.    Anesthesia/Surgery risks, benefits and alternative options discussed and understood by patient/family.          Active Hospital Problems    Diagnosis  POA    Vaginal prolapse without uterine prolapse [N81.10]  Yes      Resolved Hospital Problems   No resolved problems to display.

## 2019-06-24 NOTE — PLAN OF CARE
"Patient c/o vaginal dryness and concerned that packing will "cut her up down there".  Note for MD placed on chart at patient's request and noted in assessment  "

## 2019-06-24 NOTE — BRIEF OP NOTE
[facility]  Brief Operative Note    SUMMARY     Surgery Date: 6/24/2019     Surgeon(s) and Role:     * Anson Ellison MD - Primary     * Elijah Neal MD - Resident - Assisting        Pre-op Diagnosis:  Prolapse of vaginal vault after hysterectomy [N99.3]  Cystocele, midline [N81.11]  KEN (stress urinary incontinence, female) [N39.3]    Post-op Diagnosis:  Post-Op Diagnosis Codes:     * Prolapse of vaginal vault after hysterectomy [N99.3]     * Cystocele, midline [N81.11]     * KEN (stress urinary incontinence, female) [N39.3]    Procedure(s) (LRB):  XI ROBOTIC SACROCOLPOPEXY, ABDOMINAL (N/A)  CYSTOSCOPY (N/A)    Anesthesia: General    Description of Procedure: 1) Robotic sacral colpopexy  2) robotic bilateral salpingo-oophorectomy  3) robotic lysis of adhesions 25 min in duration      Description of the findings of the procedure:  Hospital pelvis with bilateral ovaries and tubes adhered to pelvic sidewall as well as complete occlusion of cul-de-sac    Estimated Blood Loss: 100 mL         Specimens:   Specimen (12h ago, onward)    Start     Ordered    06/24/19 1832  Specimen to Pathology - Surgery  Once     Comments:  1-BILATERAL TUBES AND OVARIES     Start Status     06/24/19 1832 Collected (06/24/19 1831) Order ID: [id number]       06/24/19 1831

## 2019-06-25 VITALS
TEMPERATURE: 98 F | OXYGEN SATURATION: 95 % | RESPIRATION RATE: 16 BRPM | HEIGHT: 69 IN | WEIGHT: 200.19 LBS | DIASTOLIC BLOOD PRESSURE: 87 MMHG | BODY MASS INDEX: 29.65 KG/M2 | HEART RATE: 67 BPM | SYSTOLIC BLOOD PRESSURE: 168 MMHG

## 2019-06-25 LAB
BASOPHILS # BLD AUTO: 0.01 K/UL (ref 0–0.2)
BASOPHILS NFR BLD: 0.1 % (ref 0–1.9)
DIFFERENTIAL METHOD: ABNORMAL
EOSINOPHIL # BLD AUTO: 0 K/UL (ref 0–0.5)
EOSINOPHIL NFR BLD: 0 % (ref 0–8)
ERYTHROCYTE [DISTWIDTH] IN BLOOD BY AUTOMATED COUNT: 13.7 % (ref 11.5–14.5)
HCT VFR BLD AUTO: 38 % (ref 37–48.5)
HGB BLD-MCNC: 12.5 G/DL (ref 12–16)
LYMPHOCYTES # BLD AUTO: 1 K/UL (ref 1–4.8)
LYMPHOCYTES NFR BLD: 11 % (ref 18–48)
MCH RBC QN AUTO: 29.6 PG (ref 27–31)
MCHC RBC AUTO-ENTMCNC: 32.9 G/DL (ref 32–36)
MCV RBC AUTO: 90 FL (ref 82–98)
MONOCYTES # BLD AUTO: 0.7 K/UL (ref 0.3–1)
MONOCYTES NFR BLD: 7.8 % (ref 4–15)
NEUTROPHILS # BLD AUTO: 7.4 K/UL (ref 1.8–7.7)
NEUTROPHILS NFR BLD: 81.1 % (ref 38–73)
PLATELET # BLD AUTO: 349 K/UL (ref 150–350)
PMV BLD AUTO: 9.4 FL (ref 9.2–12.9)
RBC # BLD AUTO: 4.22 M/UL (ref 4–5.4)
WBC # BLD AUTO: 9.19 K/UL (ref 3.9–12.7)

## 2019-06-25 PROCEDURE — 25000003 PHARM REV CODE 250: Performed by: OBSTETRICS & GYNECOLOGY

## 2019-06-25 PROCEDURE — 36415 COLL VENOUS BLD VENIPUNCTURE: CPT

## 2019-06-25 PROCEDURE — S0028 INJECTION, FAMOTIDINE, 20 MG: HCPCS | Performed by: OBSTETRICS & GYNECOLOGY

## 2019-06-25 PROCEDURE — 63600175 PHARM REV CODE 636 W HCPCS: Performed by: OBSTETRICS & GYNECOLOGY

## 2019-06-25 PROCEDURE — 85025 COMPLETE CBC W/AUTO DIFF WBC: CPT

## 2019-06-25 RX ORDER — OXYCODONE AND ACETAMINOPHEN 5; 325 MG/1; MG/1
1 TABLET ORAL EVERY 4 HOURS PRN
Qty: 20 TABLET | Refills: 0 | Status: SHIPPED | OUTPATIENT
Start: 2019-06-25 | End: 2019-12-31

## 2019-06-25 RX ORDER — IBUPROFEN 600 MG/1
600 TABLET ORAL EVERY 6 HOURS PRN
Qty: 30 TABLET | Refills: 0 | Status: SHIPPED | OUTPATIENT
Start: 2019-06-25 | End: 2019-12-31

## 2019-06-25 RX ORDER — ONDANSETRON 8 MG/1
8 TABLET, ORALLY DISINTEGRATING ORAL EVERY 8 HOURS PRN
Status: DISCONTINUED | OUTPATIENT
Start: 2019-06-25 | End: 2019-06-25 | Stop reason: HOSPADM

## 2019-06-25 RX ADMIN — POLYETHYLENE GLYCOL 3350 17 G: 17 POWDER, FOR SOLUTION ORAL at 09:06

## 2019-06-25 RX ADMIN — KETOROLAC TROMETHAMINE 30 MG: 30 INJECTION, SOLUTION INTRAMUSCULAR; INTRAVENOUS at 12:06

## 2019-06-25 RX ADMIN — OXYCODONE HYDROCHLORIDE AND ACETAMINOPHEN 1 TABLET: 5; 325 TABLET ORAL at 04:06

## 2019-06-25 RX ADMIN — FAMOTIDINE 20 MG: 10 INJECTION, SOLUTION INTRAVENOUS at 09:06

## 2019-06-25 RX ADMIN — KETOROLAC TROMETHAMINE 30 MG: 30 INJECTION, SOLUTION INTRAMUSCULAR; INTRAVENOUS at 06:06

## 2019-06-25 RX ADMIN — SODIUM CHLORIDE, SODIUM LACTATE, POTASSIUM CHLORIDE, AND CALCIUM CHLORIDE: .6; .31; .03; .02 INJECTION, SOLUTION INTRAVENOUS at 06:06

## 2019-06-25 NOTE — TRANSFER OF CARE
"Anesthesia Transfer of Care Note    Patient: Rosamaria Agosto    Procedure(s) Performed: Procedure(s) (LRB):  XI ROBOTIC SACROCOLPOPEXY, ABDOMINAL (N/A)  CYSTOSCOPY (N/A)    Patient location: PACU    Anesthesia Type: general    Transport from OR: Transported from OR on 2-3 L/min O2 by NC with adequate spontaneous ventilation. Continuous SpO2 monitoring in transport    Post pain: adequate analgesia    Post assessment: no apparent anesthetic complications    Post vital signs: stable    Level of consciousness: responds to stimulation    Nausea/Vomiting: no nausea/vomiting    Complications: none    Transfer of care protocol was followed      Last vitals:   Visit Vitals  BP (!) 152/84 (BP Location: Left arm, Patient Position: Lying)   Pulse 61   Temp 36.8 °C (98.3 °F) (Oral)   Resp 18   Ht 5' 9" (1.753 m)   Wt 89.4 kg (197 lb)   LMP 10/19/1993 (Approximate)   SpO2 98%   Breastfeeding? No   BMI 29.09 kg/m²     "

## 2019-06-25 NOTE — PROGRESS NOTES
MD to bedside for post-operative exam    Patient is awake, resting in bed with her partner at the bedside. She states that she came to the room about an hour ago and has been resting since. She is drinking fluids without difficulty. She has had some jello, but nothing else to eat so far.     Temp:  [97.5 °F (36.4 °C)-98.3 °F (36.8 °C)] 98.1 °F (36.7 °C)  Pulse:  [61-95] 94  Resp:  [16-20] 16  SpO2:  [95 %-100 %] 95 %  BP: (117-190)/(61-88) 157/86     PE:   Gen: NAD, laying in bed resting comfortably  CV: RRR  Resp: CTAB  Abd: soft, non-distended; active bowel sounds throughout are quiet; 5 LSC incisions are c/d/i with steri strips and band-aids  Ext: symmetric, nontender, SCD and ARNULFO are in place    Plan:   - pain control adequate  - IVF maintenance overnight until tolerating PO  -  cc in the hat via mckinnon catheter  - vaginal packing in place, was not inspected; remove in AM prior to mckinnon removal  - regular diet as tolerated  - medically stable, recovering well, anticipate discharge to home tomorrow, POD#1    Linda Little MD, PhD  OBGYN, PGY-3

## 2019-06-25 NOTE — PLAN OF CARE
Problem: Adult Inpatient Plan of Care  Goal: Plan of Care Review  Outcome: Ongoing (interventions implemented as appropriate)  Pt remains free from injury or falls. Vital signs stable throughout night on 2 liters NC. Positions self independently. Pain managed with schedule IV Toradol, no complaints of nausea. Vargas draining to gravity, lap sites x 5  to abdomen intact. Spouse at bedside, bed in low locked position and call light within reach.  Will continue to monitor.

## 2019-06-25 NOTE — PLAN OF CARE
06/25/19 1714   Final Note   Assessment Type Final Discharge Note   Anticipated Discharge Disposition Home   Hospital Follow Up  Appt(s) scheduled? Yes   Discharge plans and expectations educations in teach back method with documentation complete? Yes   Right Care Referral Info   Referral Type see AVS

## 2019-06-25 NOTE — PROGRESS NOTES
[facility]  Obstetrics & Gynecology  Progress Note    Patient Name: Rosamaria Agosto  MRN: [id number]  Admission Date: 6/24/2019  Primary Care Provider: Bill Burns MD  Principal Problem: <principal problem not specified>    Subjective:     Interval History: POD #1 s/p RASCP/BSO/cysto. Patient is doing well this morning. Pain is well controlled. Tolerating clear liquids without complications. She denies fever/chills, nausea/vomiting, CP, and SOB. Vargas in place, draining clear urine. She has not ambulated.     Scheduled Meds:   famotidine (PF)  20 mg Intravenous Q12H    [START ON 6/26/2019] ibuprofen  600 mg Oral Q6H    ketorolac  30 mg Intravenous Q6H    lidocaine (PF) 10 mg/ml (1%)  1 mL Intradermal Once    mupirocin  1 g Nasal BID    polyethylene glycol  17 g Oral Daily     Continuous Infusions:  PRN Meds:bisacodyl, diphenhydrAMINE, diphenhydrAMINE, HYDROmorphone, oxyCODONE-acetaminophen, oxyCODONE-acetaminophen, sodium chloride 0.9%    Review of patient's allergies indicates:  No Known Allergies    Objective:     Vital Signs (Most Recent):  Temp: 98.3 °F (36.8 °C) (06/25/19 0358)  Pulse: 83 (06/25/19 0358)  Resp: 16 (06/25/19 0358)  BP: (!) 163/84 (06/25/19 0358)  SpO2: 98 % (06/25/19 0358) Vital Signs (24h Range):  Temp:  [97.5 °F (36.4 °C)-98.5 °F (36.9 °C)] 98.3 °F (36.8 °C)  Pulse:  [61-95] 83  Resp:  [16-20] 16  SpO2:  [95 %-100 %] 98 %  BP: (117-190)/(61-88) 163/84     Weight: 90.8 kg (200 lb 2.8 oz)  Body mass index is 29.56 kg/m².  Patient's last menstrual period was 10/19/1993 (approximate).    I&O (Last 24H):    Intake/Output Summary (Last 24 hours) at 6/25/2019 0614  Last data filed at 6/25/2019 0456  Gross per 24 hour   Intake 1850 ml   Output 2190 ml   Net -340 ml       Physical Exam:   Constitutional: She is oriented to person, place, and time. She appears well-developed and well-nourished. No distress.    HENT:   Head: Normocephalic and atraumatic.    Eyes:  Conjunctivae and EOM are normal.    Neck: Normal range of motion. Neck supple. No thyromegaly present.    Cardiovascular: Normal rate and regular rhythm.     Pulmonary/Chest: Effort normal. No respiratory distress.        Abdominal: Soft. She exhibits no distension. There is no tenderness.   Incisions c/d/i             Musculoskeletal: Normal range of motion and moves all extremeties. She exhibits no edema or tenderness.       Neurological: She is alert and oriented to person, place, and time.    Skin: Skin is warm and dry. No rash noted.    Psychiatric: She has a normal mood and affect. Her behavior is normal.       Laboratory:  CBC: No results for input(s): WBC, RBC, HGB, HCT, PLT, MCV, MCH, MCHC in the last 48 hours.      Assessment/Plan:     Active Diagnoses:    Diagnosis Date Noted POA    Vaginal prolapse without uterine prolapse [N81.10] 06/24/2019 Yes      Problems Resolved During this Admission:     1. Post op  - Pain controlled - continue current medication  - Vargas in draining clear urine - remove this morning with PVT  - Encourage ambulation with nurse assistance  - Spirometer at bedside  - Advance diet as tolerated today    2. HTN  - home meds held  - restart if BP consistently elevated    Dispo: Anticipate discharge today as patient continues to meet post-operative goals.      Elijah Neal MD  Obstetrics & Gynecology  [facility]

## 2019-06-25 NOTE — DISCHARGE SUMMARY
[facility]  Obstetrics & Gynecology  Discharge Summary    Patient Name: Rosamaria Agosto  MRN: [id number]  Admission Date: 6/24/2019  Hospital Length of Stay: 0 days  Discharge Date and Time: 6/25/2019 12:35 PM  Attending Physician: Anson Ellison MD   Discharging Provider: Elijah Neal MD  Primary Care Provider: Bill Burns MD    Hospital Course: Patient presented for scheduled procedure. Patient was passed back to OR for RASCP/cysto. Please see OP note for further details. Tolerated procedure well and patient was taken to recovery in a stable condition. Prior to discharge on POD#1, patient was able to void, ambulate, tolerate PO and pain was well controlled with PO meds. Patient was given routine post-op instructions for which patient voiced understanding. Patient was subsequently discharged home.      Procedure(s) (LRB):  XI ROBOTIC SACROCOLPOPEXY, ABDOMINAL (N/A)  CYSTOSCOPY (N/A)     Significant Diagnostic Studies: Labs:   CBC   Recent Labs   Lab 06/25/19  0538   WBC 9.19   HGB 12.5   HCT 38.0          Pending Diagnostic Studies:     None        Final Active Diagnoses:    Diagnosis Date Noted POA    PRINCIPAL PROBLEM:  Vaginal prolapse without uterine prolapse [N81.10] 06/24/2019 Yes      Problems Resolved During this Admission:        Discharged Condition: good    Disposition: Home or Self Care    Follow Up:  Follow-up Information     Call Anson Ellison MD.    Specialty:  Obstetrics and Gynecology  Why:  To schedule postop appointment  Contact information:  [address] [place] [id number] [phone]                 Patient Instructions:      Call MD for:  temperature >100.4     Call MD for:  persistent nausea and vomiting or diarrhea     Call MD for:  severe uncontrolled pain     Call MD for:  redness, tenderness, or signs of infection (pain, swelling, redness, odor or green/yellow discharge around incision site)     Call MD for:   difficulty breathing or increased cough     Call MD for:  severe persistent headache     Call MD for:  persistent dizziness, light-headedness, or visual disturbances     Call MD for:  increased confusion or weakness     Activity as tolerated     Medications:  Reconciled Home Medications:      Medication List      START taking these medications    ibuprofen 600 MG tablet  Commonly known as:  ADVIL,MOTRIN  Take 1 tablet (600 mg total) by mouth every 6 (six) hours as needed for Pain.     oxyCODONE-acetaminophen 5-325 mg per tablet  Commonly known as:  PERCOCET  Take 1 tablet by mouth every 4 (four) hours as needed.        CONTINUE taking these medications    acetaminophen 650 MG Tbsr  Commonly known as:  TYLENOL  Take 1,300 mg by mouth as needed.     atorvastatin 80 MG tablet  Commonly known as:  LIPITOR  Take 1 tablet (80 mg total) by mouth nightly.     azelastine 137 mcg (0.1 %) nasal spray  Commonly known as:  ASTELIN  1 spray (137 mcg total) by Nasal route 2 (two) times daily.     budesonide 0.5 mg/2 mL nebulizer solution  Commonly known as:  PULMICORT  inhale 1 nebules per nebulizer twice daily, for use in saline irrigation as directed     hydroCHLOROthiazide 25 MG tablet  Commonly known as:  HYDRODIURIL  Take 1 tablet (25 mg total) by mouth once daily.     meloxicam 15 MG tablet  Commonly known as:  MOBIC  Take 1 tablet (15 mg total) by mouth once daily.     potassium chloride SA 20 MEQ tablet  Commonly known as:  K-DUR,KLOR-CON  Take 1 tablet (20 mEq total) by mouth 2 (two) times daily.     traMADol 50 mg tablet  Commonly known as:  ULTRAM  Take 1 tablet (50 mg total) by mouth daily as needed (severe pain).     VITAMIN D2 50,000 unit Cap  Generic drug:  ergocalciferol  Take 1 capsule (50,000 Units total) by mouth every 7 days.            Elijah Neal MD  Obstetrics & Gynecology  [facility]

## 2019-06-25 NOTE — ANESTHESIA POSTPROCEDURE EVALUATION
Anesthesia Post Evaluation    Patient: Rosamaria Agosto    Procedure(s) Performed: Procedure(s) (LRB):  XI ROBOTIC SACROCOLPOPEXY, ABDOMINAL (N/A)  CYSTOSCOPY (N/A)    Final Anesthesia Type: general  Patient location during evaluation: PACU  Patient participation: Yes- Able to Participate  Level of consciousness: awake and alert  Post-procedure vital signs: reviewed and stable  Pain management: adequate  Airway patency: patent  PONV status at discharge: No PONV  Anesthetic complications: no      Cardiovascular status: blood pressure returned to baseline  Respiratory status: unassisted  Hydration status: euvolemic  Follow-up not needed.          Vitals Value Taken Time   /65 6/24/2019  7:45 PM   Temp 36.4 °C (97.5 °F) 6/24/2019  6:55 PM   Pulse 89 6/24/2019  7:52 PM   Resp 16 6/24/2019  7:45 PM   SpO2 100 % 6/24/2019  7:52 PM   Vitals shown include unvalidated device data.      No case tracking events are documented in the log.      Pain/Bakari Score: Pain Rating Prior to Med Admin: 0 (6/24/2019 10:04 AM)  Bakari Score: 8 (6/24/2019  7:30 PM)

## 2019-06-25 NOTE — PLAN OF CARE
Problem: Adult Inpatient Plan of Care  Goal: Plan of Care Review  Outcome: Outcome(s) achieved Date Met: 06/25/19  VU of DC instructions. Prescriptions delivered per outpatient pharmacy.  IV removed w/ cath tip intact, WNL. Voided spontaneously. Ambulated in no distress. Free from falls, injury, or skin breakdown this hospital admission.

## 2019-06-25 NOTE — PLAN OF CARE
06/25/19 1714   Discharge Assessment   Assessment Type Discharge Planning Assessment   Confirmed/corrected address and phone number on facesheet? No   Assessment information obtained from? Medical Record   Patient/Family in Agreement with Plan unable to assess

## 2019-06-26 DIAGNOSIS — I10 ESSENTIAL HYPERTENSION: ICD-10-CM

## 2019-06-26 RX ORDER — HYDROCHLOROTHIAZIDE 25 MG/1
25 TABLET ORAL DAILY
Qty: 30 TABLET | Refills: 11 | Status: SHIPPED | OUTPATIENT
Start: 2019-06-26 | End: 2019-09-17 | Stop reason: SDUPTHER

## 2019-07-01 ENCOUNTER — TELEPHONE (OUTPATIENT)
Dept: UROGYNECOLOGY | Facility: CLINIC | Age: 59
End: 2019-07-01

## 2019-07-01 ENCOUNTER — LAB VISIT (OUTPATIENT)
Dept: LAB | Facility: HOSPITAL | Age: 59
End: 2019-07-01
Attending: OBSTETRICS & GYNECOLOGY
Payer: COMMERCIAL

## 2019-07-01 DIAGNOSIS — R30.0 DYSURIA: Primary | ICD-10-CM

## 2019-07-01 DIAGNOSIS — R30.0 DYSURIA: ICD-10-CM

## 2019-07-01 LAB
BILIRUB UR QL STRIP: NEGATIVE
CLARITY UR REFRACT.AUTO: CLEAR
COLOR UR AUTO: NORMAL
GLUCOSE UR QL STRIP: NEGATIVE
HGB UR QL STRIP: NEGATIVE
KETONES UR QL STRIP: NEGATIVE
LEUKOCYTE ESTERASE UR QL STRIP: NEGATIVE
NITRITE UR QL STRIP: NEGATIVE
PH UR STRIP: 5 [PH] (ref 5–8)
PROT UR QL STRIP: NEGATIVE
SP GR UR STRIP: 1.01 (ref 1–1.03)
URN SPEC COLLECT METH UR: NORMAL

## 2019-07-01 PROCEDURE — 87088 URINE BACTERIA CULTURE: CPT

## 2019-07-01 PROCEDURE — 81003 URINALYSIS AUTO W/O SCOPE: CPT

## 2019-07-01 PROCEDURE — 87086 URINE CULTURE/COLONY COUNT: CPT

## 2019-07-01 PROCEDURE — 87147 CULTURE TYPE IMMUNOLOGIC: CPT

## 2019-07-01 NOTE — TELEPHONE ENCOUNTER
Spoke with pt who states she had surgery on 6/24/19 and she is now having some pain on the completion of her voiding, pt suspect maybe an infection. Advised pt that orders were placed for her to drop off a urine sample at her nearest [facility] lab pt will do that today pt also concerned that the area where her stiches were its really itch and irritable pt states she does not see a rash or redness but its really itchy advised pt I will forward this information to the doctor she voiced understanding and call was ended.

## 2019-07-01 NOTE — TELEPHONE ENCOUNTER
----- Message from Winter Quick sent at 7/1/2019 10:28 AM CDT -----  Contact: MARGIE FERNANDEZ   Name of Who is Calling: MARGIE FERNANDEZ       What is the request in detail: Patient is requesting a call back she sates she feel a little pain when she urinates and her stiches is itching real bad she needs to know what should she do     Can the clinic reply by [facility]: no    What Number to Call Back if not in [facility]: [phone]

## 2019-07-02 ENCOUNTER — TELEPHONE (OUTPATIENT)
Dept: UROGYNECOLOGY | Facility: CLINIC | Age: 59
End: 2019-07-02

## 2019-07-02 LAB — BACTERIA UR CULT: ABNORMAL

## 2019-07-02 NOTE — TELEPHONE ENCOUNTER
----- Message from Priya Evans sent at 7/2/2019  3:43 PM CDT -----  Contact: Patient   Patient called as a follow-up to her last call regarding a request for prescription for a UTI. The patient states, the symptoms have worsen and it's becoming unbearable. The patient can be reached at [phone].

## 2019-07-02 NOTE — TELEPHONE ENCOUNTER
Spoke with pt who states that the pain she is experiencing after urination had worsened, pt gave urine sample on yesterday UA looks normal changed pt's post-op appointment to Friday and advised her I will for her symptoms to the doctor to address she voiced understanding and call was ended.

## 2019-07-03 ENCOUNTER — TELEPHONE (OUTPATIENT)
Dept: UROGYNECOLOGY | Facility: CLINIC | Age: 59
End: 2019-07-03

## 2019-07-03 RX ORDER — FLUCONAZOLE 150 MG/1
150 TABLET ORAL DAILY
Qty: 1 TABLET | Refills: 0 | Status: SHIPPED | OUTPATIENT
Start: 2019-07-03 | End: 2019-07-04

## 2019-07-03 RX ORDER — FLAVOXATE HYDROCHLORIDE 100 MG/1
100 TABLET ORAL 3 TIMES DAILY PRN
Qty: 90 TABLET | Refills: 3 | Status: SHIPPED | OUTPATIENT
Start: 2019-07-03 | End: 2019-12-31

## 2019-07-03 RX ORDER — CEPHALEXIN 500 MG/1
500 CAPSULE ORAL EVERY 12 HOURS
Qty: 10 CAPSULE | Refills: 0 | Status: SHIPPED | OUTPATIENT
Start: 2019-07-03 | End: 2019-07-08

## 2019-07-03 NOTE — PROGRESS NOTES
Please call patient with her results.    Thank you    Anson ROBERT    Additionally let her know that I have called in a an antibiotic as well as Urispas to her pharmacy

## 2019-07-03 NOTE — TELEPHONE ENCOUNTER
Called pt relayed urine results and confirmed she had to medications to  from her pharmacy, pt voiced understanding and call was ended.

## 2019-07-03 NOTE — TELEPHONE ENCOUNTER
----- Message from Luis E Oneill sent at 7/3/2019 12:06 PM CDT -----  Pt needs you to call in her rx to [phone] also she will need one pill for yeast she can be reached @ [phone]

## 2019-07-03 NOTE — TELEPHONE ENCOUNTER
Attempted to contact pt to let her no medication for yeats infection has been sent to her pharmacy no answer left message

## 2019-07-03 NOTE — TELEPHONE ENCOUNTER
----- Message from Anson Ellison MD sent at 7/3/2019  9:58 AM CDT -----  Please call patient with her results.    Thank you    Anson ROBERT    Additionally let her know that I have called in a an antibiotic as well as Urispas to her pharmacy

## 2019-07-05 ENCOUNTER — OFFICE VISIT (OUTPATIENT)
Dept: UROGYNECOLOGY | Facility: CLINIC | Age: 59
End: 2019-07-05
Payer: COMMERCIAL

## 2019-07-05 VITALS
BODY MASS INDEX: 29.52 KG/M2 | SYSTOLIC BLOOD PRESSURE: 110 MMHG | HEIGHT: 69 IN | WEIGHT: 199.31 LBS | DIASTOLIC BLOOD PRESSURE: 60 MMHG

## 2019-07-05 DIAGNOSIS — Z09 POSTOP CHECK: Primary | ICD-10-CM

## 2019-07-05 PROCEDURE — 99999 PR PBB SHADOW E&M-EST. PATIENT-LVL III: ICD-10-PCS | Mod: PBBFAC,,, | Performed by: OBSTETRICS & GYNECOLOGY

## 2019-07-05 PROCEDURE — 99024 POSTOP FOLLOW-UP VISIT: CPT | Mod: S$GLB,,, | Performed by: OBSTETRICS & GYNECOLOGY

## 2019-07-05 PROCEDURE — 99024 PR POST-OP FOLLOW-UP VISIT: ICD-10-PCS | Mod: S$GLB,,, | Performed by: OBSTETRICS & GYNECOLOGY

## 2019-07-05 PROCEDURE — 99999 PR PBB SHADOW E&M-EST. PATIENT-LVL III: CPT | Mod: PBBFAC,,, | Performed by: OBSTETRICS & GYNECOLOGY

## 2019-07-05 RX ORDER — FLUCONAZOLE 150 MG/1
150 TABLET ORAL
Qty: 3 TABLET | Refills: 0 | Status: SHIPPED | OUTPATIENT
Start: 2019-07-05 | End: 2019-07-12

## 2019-07-05 RX ORDER — ESTRADIOL 0.1 MG/G
1 CREAM VAGINAL
Qty: 1 TUBE | Refills: 11 | Status: SHIPPED | OUTPATIENT
Start: 2019-07-05 | End: 2019-12-31

## 2019-07-05 NOTE — PROGRESS NOTES
Subjective:      Patient ID: Rosamaria Agosto is a 58 y.o. female.    Chief Complaint:  Post-op Evaluation      History of Present Illness  Twelve days postop from robotic reconstruction.  No complaints.  At this time.  Patient had some dysuria that is resolved at 3 antibiotics but then developed yeast infection patient complaining of rash under breasts line as well as in the inguinal core          Past Medical History:   Diagnosis Date    Abnormal Pap smear of cervix yrs ago    Arthritis     History of uterine fibroid     Hyperlipidemia     Hypertension     Shoulder injury     lt    Sinus problem     Sinusitis        Past Surgical History:   Procedure Laterality Date    BREAST BIOPSY  24-25 years old    BREAST SURGERY      CYSTOSCOPY N/A 2019    [facility]formed by [facility] at [facility] OR    ENCEPHALOCELE REPAIR  46    HYSTERECTOMY  93'-95'    ovaries remain    NASAL SINUS SURGERY      RESECTION-TURBINATES (SMR) Bilateral 2017    [facility] at [facility] 2ND FLR    SEPTOPLASTY Bilateral 2017    Per[facility] by [facility] at [facility] OR 2ND FLR    SINUS SURGERY FUNCTIONAL ENDOSCOPIC WITH NAVIGATION stealth and propel needed Bilateral 2017    Performed by [facility] at [facility] OR 2ND FLR    TUBAL LIGATION      VAGINAL DELIVERY      XI ROBOTIC SACROCOLPOPEXY, ABDOMINAL N/A 2019    Performed by [facility] at [facility] OR       GYN & OB History  Patient's last menstrual period was 10/19/1993 (approximate).   Date of Last Pap: No result found    OB History    Para Term  AB Living   2 2 0     2   SAB TAB Ectopic Multiple Live Births           2      # Outcome Date GA Lbr Aleksey/2nd Weight Sex Delivery Anes PTL Lv   2 Para     M Vag-Spont   JESU   1 Para     F Vag-Spont   JESU       Health Maintenance       Date Due Completion Date    Hepatitis C Screening 1960 ---    TETANUS VACCINE 1979 ---    Shingles Vaccine (1 of 2)  08/19/2011 ---    Influenza Vaccine 08/01/2019 12/17/2018    Mammogram 06/23/2020 6/23/2018    Colonoscopy 05/16/2024 5/16/2014 (Done)    Override on 5/16/2014: Done    Lipid Panel 06/21/2024 6/21/2019          Family History   Problem Relation Age of Onset    Stroke Maternal Grandmother     Diabetes Mother     Hypertension Mother     Diabetes Sister     Diabetes Sister     Breast cancer Neg Hx     Colon cancer Neg Hx     Ovarian cancer Neg Hx        Social History     Socioeconomic History    Marital status: Single     Spouse name: Not on file    Number of children: Not on file    Years of education: Not on file    Highest education level: Not on file   Occupational History    Not on file   Social Needs    Financial resource strain: Not on file    Food insecurity:     Worry: Not on file     Inability: Not on file    Transportation needs:     Medical: Not on file     Non-medical: Not on file   Tobacco Use    Smoking status: Never Smoker    Smokeless tobacco: Never Used   Substance and Sexual Activity    Alcohol use: Yes     Alcohol/week: 0.6 oz     Types: 1 Shots of liquor per week     Comment: seldom    Drug use: No    Sexual activity: Yes     Partners: Male     Birth control/protection: Post-menopausal, See Surgical Hx     Comment: hysterectomy 20 yrs ago   Lifestyle    Physical activity:     Days per week: Not on file     Minutes per session: Not on file    Stress: Not on file   Relationships    Social connections:     Talks on phone: Not on file     Gets together: Not on file     Attends Cheondoism service: Not on file     Active member of club or organization: Not on file     Attends meetings of clubs or organizations: Not on file     Relationship status: Not on file   Other Topics Concern    Not on file   Social History Narrative    Not on file       Review of Systems  Review of Systems   All other systems reviewed and are negative.         Objective:   /60 (BP Location: Left  "arm, Patient Position: Sitting, BP Method: Large (Manual))   Ht 5' 9" (1.753 m)   Wt 90.4 kg (199 lb 4.7 oz)   LMP 10/19/1993 (Approximate)   BMI 29.43 kg/m²     Physical Exam   Genitourinary: Pelvic exam was performed with patient supine. Skenes normal and bartholins normal. Right labia normal and left labia normal. Urethra exhibits no urethral diverticulum, no urethral mass and no hypermobility. No rectocele or cystocele in the vagina. Vaginal discharge ( positive for yeast infection) found.      Excellent anatomy at this really point  Assessment:     1. Postop check            Plan:     1. Postop check      Excellent healing at this time there is some irritation to the skin 6 yeast will clear after Diflucan therapy otherwise at this early point excellent healing patient is doing very very well no concerns or complaints patient will follow up in 2-3 weeks        Patient Instructions     Candida Vaginal Infection    You have a Candida vaginal infection. This is also known as a yeast infection. It is most often caused by a type of yeast (fungus) called Candida. Candida are normally found in the vagina. But if they increase in number, this can lead to infection and cause symptoms.  Symptoms of a yeast infection can include:  · Clumpy or thin, white discharge, which may look like cottage cheese  · Itching or burning  · Burning with urination  Certain factors can make a yeast infection more likely. These can include:  · Taking certain medicines, such as antibiotics or birth control pills  · Pregnancy  · Diabetes  · Weakened immune system  A yeast infection is most often treated with antifungal medicine. This may be given as a vaginal cream or pills you take by mouth. Treatment may last for about 1 to 7 days. Women with severe or recurrent infections may need longer courses of treatment.  Home care  · If youre prescribed medicine, be sure to use it as directed. Finish all of the medicine, even if your symptoms go " away. Note: Dont try to treat yourself using over-the-counter products without talking to your provider first. He or she will let you know if this is a good option for you.  · Ask your provider what steps you can take to help reduce your risk of having a yeast infection in the future.  Follow-up care  Follow up with your healthcare provider, or as directed.  When to seek medical advice  Call your healthcare provider right away if:  · You have a fever of 100.4ºF (38ºC) or higher, or as directed by your provider.  · Your symptoms worsen, or they dont go away within a few days of starting treatment.  · You have new pain in the lower belly or pelvic region.  · You have side effects that bother you or a reaction to the cream or pills youre prescribed.  · You or any partners you have sex with have new symptoms, such as a rash, joint pain, or sores.  Date Last Reviewed: 7/30/2015  © 3460-6189 [employer]. [address] [place] [address], [place] [address]. All rights reserved. This information is not intended as a substitute for professional medical care. Always follow your healthcare professional's instructions.

## 2019-07-05 NOTE — PATIENT INSTRUCTIONS
Candida Vaginal Infection    You have a Candida vaginal infection. This is also known as a yeast infection. It is most often caused by a type of yeast (fungus) called Candida. Candida are normally found in the vagina. But if they increase in number, this can lead to infection and cause symptoms.  Symptoms of a yeast infection can include:  · Clumpy or thin, white discharge, which may look like cottage cheese  · Itching or burning  · Burning with urination  Certain factors can make a yeast infection more likely. These can include:  · Taking certain medicines, such as antibiotics or birth control pills  · Pregnancy  · Diabetes  · Weakened immune system  A yeast infection is most often treated with antifungal medicine. This may be given as a vaginal cream or pills you take by mouth. Treatment may last for about 1 to 7 days. Women with severe or recurrent infections may need longer courses of treatment.  Home care  · If youre prescribed medicine, be sure to use it as directed. Finish all of the medicine, even if your symptoms go away. Note: Dont try to treat yourself using over-the-counter products without talking to your provider first. He or she will let you know if this is a good option for you.  · Ask your provider what steps you can take to help reduce your risk of having a yeast infection in the future.  Follow-up care  Follow up with your healthcare provider, or as directed.  When to seek medical advice  Call your healthcare provider right away if:  · You have a fever of 100.4ºF (38ºC) or higher, or as directed by your provider.  · Your symptoms worsen, or they dont go away within a few days of starting treatment.  · You have new pain in the lower belly or pelvic region.  · You have side effects that bother you or a reaction to the cream or pills youre prescribed.  · You or any partners you have sex with have new symptoms, such as a rash, joint pain, or sores.  Date Last Reviewed: 7/30/2015  © 5991-8230 [employer]. [address] [place] [address], [place] [address]. All rights reserved. This information is not intended as a substitute for professional medical care. Always follow your healthcare professional's instructions.

## 2019-07-09 ENCOUNTER — PATIENT MESSAGE (OUTPATIENT)
Dept: SPORTS MEDICINE | Facility: CLINIC | Age: 59
End: 2019-07-09

## 2019-07-09 ENCOUNTER — PATIENT MESSAGE (OUTPATIENT)
Dept: INTERNAL MEDICINE | Facility: CLINIC | Age: 59
End: 2019-07-09

## 2019-07-09 ENCOUNTER — TELEPHONE (OUTPATIENT)
Dept: SPORTS MEDICINE | Facility: CLINIC | Age: 59
End: 2019-07-09

## 2019-07-10 DIAGNOSIS — M12.9 ARTHRITIS/ARTHROPATHY OF MULTIPLE JOINTS: ICD-10-CM

## 2019-07-10 DIAGNOSIS — M75.22 BICEPS TENDINITIS OF LEFT UPPER EXTREMITY: ICD-10-CM

## 2019-07-10 DIAGNOSIS — M75.102 NONTRAUMATIC TEAR OF LEFT ROTATOR CUFF: Primary | ICD-10-CM

## 2019-07-15 ENCOUNTER — TELEPHONE (OUTPATIENT)
Dept: SPORTS MEDICINE | Facility: CLINIC | Age: 59
End: 2019-07-15

## 2019-07-15 NOTE — TELEPHONE ENCOUNTER
LVM for the patient to call us back to discuss her questions.     ----- Message from Rafaela Sexton sent at 7/15/2019  3:15 PM CDT -----  Contact: patient   Please call pt at [phone]    Patient has questions pertaining future surgery    Thank you

## 2019-07-16 ENCOUNTER — TELEPHONE (OUTPATIENT)
Dept: SPORTS MEDICINE | Facility: CLINIC | Age: 59
End: 2019-07-16

## 2019-07-16 ENCOUNTER — TELEPHONE (OUTPATIENT)
Dept: UROGYNECOLOGY | Facility: CLINIC | Age: 59
End: 2019-07-16

## 2019-07-16 DIAGNOSIS — R30.0 DYSURIA: Primary | ICD-10-CM

## 2019-07-16 NOTE — TELEPHONE ENCOUNTER
Pt states she's having some dysuria. Pt was advised to drop off a urine specimen at the nearest [facility] and that we'd give her a call when the results come in pt voiced understanding and call ended. Message was also sent to Dr. Ellison.

## 2019-07-16 NOTE — TELEPHONE ENCOUNTER
Returned pt call and let her know surg still pending. Talked to Linda and let pt know she should be able to go back to work in 2 wks after surg. Also rescheduled her pre op for a later time due to a scheduling conflict.

## 2019-07-16 NOTE — TELEPHONE ENCOUNTER
----- Message from Priya Evans sent at 7/16/2019  2:48 PM CDT -----  Contact: Patient   Patient called with a request for another prescription as before to the [facility] on [facility]. The patient can be reached at [phone].

## 2019-07-16 NOTE — TELEPHONE ENCOUNTER
----- Message from [employer] Swedenchristine Mariah sent at 7/16/2019 10:41 AM CDT -----  Contact: Self   Needs Advice    Reason for call: Pt would like a call back in regards to surgery. Pt called yesterday and did not get a call back. She wants to know if it was approved and when she would be able to return to work.         Communication Preference: [phone]      Additional Information:

## 2019-07-17 ENCOUNTER — TELEPHONE (OUTPATIENT)
Dept: INTERNAL MEDICINE | Facility: CLINIC | Age: 59
End: 2019-07-17

## 2019-07-17 NOTE — TELEPHONE ENCOUNTER
Spoke with pt re: what kind of surgery?  Pt is having left shoulder rotator cuff surgery on 7-24-19 with [facility]    Does pt need another OV for clearacne or can you give her clearance again?    Please advise

## 2019-07-17 NOTE — TELEPHONE ENCOUNTER
No further office visit is needed.  The patient is okay to proceed with rotator cuff surgery as scheduled.  Thank you.

## 2019-07-17 NOTE — TELEPHONE ENCOUNTER
----- Message from Kadie Hernandez sent at 7/17/2019 11:38 AM CDT -----  Contact: Self [phone]  Patient will like to know do she needs another Pre-op clearance for an up coming surgery?

## 2019-07-18 ENCOUNTER — LAB VISIT (OUTPATIENT)
Dept: LAB | Facility: HOSPITAL | Age: 59
End: 2019-07-18
Attending: OBSTETRICS & GYNECOLOGY
Payer: COMMERCIAL

## 2019-07-18 DIAGNOSIS — R30.0 DYSURIA: ICD-10-CM

## 2019-07-18 LAB
BILIRUB UR QL STRIP: NEGATIVE
CLARITY UR REFRACT.AUTO: CLEAR
COLOR UR AUTO: NORMAL
GLUCOSE UR QL STRIP: NEGATIVE
HGB UR QL STRIP: NEGATIVE
KETONES UR QL STRIP: NEGATIVE
LEUKOCYTE ESTERASE UR QL STRIP: NEGATIVE
NITRITE UR QL STRIP: NEGATIVE
PH UR STRIP: 5 [PH] (ref 5–8)
PROT UR QL STRIP: NEGATIVE
SP GR UR STRIP: 1.02 (ref 1–1.03)
URN SPEC COLLECT METH UR: NORMAL

## 2019-07-18 PROCEDURE — 87086 URINE CULTURE/COLONY COUNT: CPT

## 2019-07-18 PROCEDURE — 87186 SC STD MICRODIL/AGAR DIL: CPT

## 2019-07-18 PROCEDURE — 81003 URINALYSIS AUTO W/O SCOPE: CPT

## 2019-07-18 PROCEDURE — 87077 CULTURE AEROBIC IDENTIFY: CPT

## 2019-07-18 PROCEDURE — 87147 CULTURE TYPE IMMUNOLOGIC: CPT

## 2019-07-18 PROCEDURE — 87088 URINE BACTERIA CULTURE: CPT

## 2019-07-19 ENCOUNTER — OFFICE VISIT (OUTPATIENT)
Dept: SPORTS MEDICINE | Facility: CLINIC | Age: 59
End: 2019-07-19
Payer: COMMERCIAL

## 2019-07-19 VITALS
SYSTOLIC BLOOD PRESSURE: 130 MMHG | WEIGHT: 199 LBS | HEIGHT: 69 IN | HEART RATE: 73 BPM | BODY MASS INDEX: 29.47 KG/M2 | DIASTOLIC BLOOD PRESSURE: 80 MMHG

## 2019-07-19 DIAGNOSIS — M19.012 ARTHRITIS OF LEFT ACROMIOCLAVICULAR JOINT: ICD-10-CM

## 2019-07-19 DIAGNOSIS — M75.22 BICEPS TENDONITIS ON LEFT: ICD-10-CM

## 2019-07-19 DIAGNOSIS — M75.112 INCOMPLETE TEAR OF LEFT ROTATOR CUFF: Primary | ICD-10-CM

## 2019-07-19 PROCEDURE — 99499 NO LOS: ICD-10-PCS | Mod: S$GLB,,, | Performed by: PHYSICIAN ASSISTANT

## 2019-07-19 PROCEDURE — 99999 PR PBB SHADOW E&M-EST. PATIENT-LVL III: CPT | Mod: PBBFAC,,, | Performed by: PHYSICIAN ASSISTANT

## 2019-07-19 PROCEDURE — 99999 PR PBB SHADOW E&M-EST. PATIENT-LVL III: ICD-10-PCS | Mod: PBBFAC,,, | Performed by: PHYSICIAN ASSISTANT

## 2019-07-19 PROCEDURE — 99499 UNLISTED E&M SERVICE: CPT | Mod: S$GLB,,, | Performed by: PHYSICIAN ASSISTANT

## 2019-07-19 RX ORDER — ASPIRIN 81 MG/1
81 TABLET ORAL 2 TIMES DAILY
Qty: 28 TABLET | Refills: 0 | Status: SHIPPED | OUTPATIENT
Start: 2019-07-19 | End: 2021-06-22

## 2019-07-19 RX ORDER — ONDANSETRON 4 MG/1
4 TABLET, FILM COATED ORAL EVERY 8 HOURS PRN
Qty: 30 TABLET | Refills: 0 | Status: SHIPPED | OUTPATIENT
Start: 2019-07-19 | End: 2019-07-26

## 2019-07-19 RX ORDER — OXYCODONE HYDROCHLORIDE 5 MG/1
5-10 TABLET ORAL
Qty: 28 TABLET | Refills: 0 | Status: SHIPPED | OUTPATIENT
Start: 2019-07-19 | End: 2019-12-31

## 2019-07-19 NOTE — H&P (VIEW-ONLY)
"Rosamaria Agosto  is here for a completion of her perioperative paperwork. she  Is scheduled to undergo left shoulder arthroscopic rotator cuff repair, SAD, possible capsular releases, possible DCE, biceps tenotomy versus tenodesis on 7/24/19.  She is a healthy individual and does need clearance for this procedure.     Dr. Burns, PCP, stated that "The patient is okay to proceed with rotator cuff surgery as scheduled."    Risks, indications and benefits of the surgical procedure were discussed with the patient. All questions with regard to surgery, rehab, expected return to functional activities, activities of daily living and recreational endeavors were answered to her satisfaction.    Patient was informed and understands the risks of surgery are greater for patients with a current condition or hx of heart disease, obesity, clotting disorders, recurrent infections, steroid use, current or past smoking, and factors such as sedentary lifestyle and noncompliance with medications, therapy or f/u. The degree of the increased risk is hard to estimate w/ any degree of precision.    Once no other questions were asked, a brief history and physical exam was then performed.    PAST MEDICAL HISTORY:   Past Medical History:   Diagnosis Date    Abnormal Pap smear of cervix yrs ago    Arthritis     History of uterine fibroid     Hyperlipidemia     Hypertension     Shoulder injury     lt    Sinus problem     Sinusitis      PAST SURGICAL HISTORY:   Past Surgical History:   Procedure Laterality Date    BREAST BIOPSY  25-25 years old    BREAST SURGERY      CYSTOSCOPY N/A 6/24/2019    [facility]    ENCEPHALOCELE REPAIR  46    HYSTERECTOMY  93'-95'    ovaries remain    NASAL SINUS SURGERY      RESECTION-TURBINATES (SMR) Bilateral 8/21/2017    [facility]    SEPTOPLASTY Bilateral 8/21/2017    [facility] "    SINUS SURGERY FUNCTIONAL ENDOSCOPIC WITH NAVIGATION stealth and propel needed Bilateral 8/21/2017    [facility] OR 2[facility]    TUBAL LIGATION      VAGINAL DELIVERY      XI ROBOTIC SACROCOLPOPEXY, ABDOMINAL N/A 6/24/2019    [facility]     FAMILY HISTORY:   Family History   Problem Relation Age of Onset    Stroke Maternal Grandmother     Diabetes Mother     Hypertension Mother     Diabetes Sister     Diabetes Sister     Breast cancer Neg Hx     Colon cancer Neg Hx     Ovarian cancer Neg Hx      SOCIAL HISTORY:   Social History     Socioeconomic History    Marital status: Single     Spouse name: Not on file    Number of children: Not on file    Years of education: Not on file    Highest education level: Not on file   Occupational History    Not on file   Social Needs    Financial resource strain: Not on file    Food insecurity:     Worry: Not on file     Inability: Not on file    Transportation needs:     Medical: Not on file     Non-medical: Not on file   Tobacco Use    Smoking status: Never Smoker    Smokeless tobacco: Never Used   Substance and Sexual Activity    Alcohol use: Yes     Alcohol/week: 0.6 oz     Types: 1 Shots of liquor per week     Comment: seldom    Drug use: No    Sexual activity: Yes     Partners: Male     Birth control/protection: Post-menopausal, See Surgical Hx     Comment: hysterectomy 20 yrs ago   Lifestyle    Physical activity:     Days per week: Not on file     Minutes per session: Not on file    Stress: Not on file   Relationships    Social connections:     Talks on phone: Not on file     Gets together: Not on file     Attends Jehovah's witness service: Not on file     Active member of club or organization: Not on file     Attends meetings of clubs or organizations: Not on file     Relationship status: Not on file   Other Topics Concern    Not on file   Social History Narrative    Not on file        MEDICATIONS:   Current Outpatient Medications:     acetaminophen (TYLENOL) 650 MG TbSR, Take 1,300 mg by mouth as needed., Disp: , Rfl:     atorvastatin (LIPITOR) 80 MG tablet, Take 1 tablet (80 mg total) by mouth nightly., Disp: 30 tablet, Rfl: 11    azelastine (ASTELIN) 137 mcg (0.1 %) nasal spray, 1 spray (137 mcg total) by Nasal route 2 (two) times daily., Disp: 30 mL, Rfl: 2    budesonide (PULMICORT) 0.5 mg/2 mL nebulizer solution, inhale 1 nebules per nebulizer twice daily, for use in saline irrigation as directed, Disp: , Rfl: 1    estradiol (ESTRACE) 0.01 % (0.1 mg/gram) vaginal cream, Place 1 g vaginally every Mon, Wed, Fri., Disp: 1 Tube, Rfl: 11    flavoxATE (URISPAS) 100 mg Tab, Take 1 tablet (100 mg total) by mouth 3 (three) times daily as needed., Disp: 90 tablet, Rfl: 3    hydroCHLOROthiazide (HYDRODIURIL) 25 MG tablet, Take 1 tablet (25 mg total) by mouth once daily., Disp: 30 tablet, Rfl: 11    ibuprofen (ADVIL,MOTRIN) 600 MG tablet, Take 1 tablet (600 mg total) by mouth every 6 (six) hours as needed for Pain., Disp: 30 tablet, Rfl: 0    meloxicam (MOBIC) 15 MG tablet, Take 1 tablet (15 mg total) by mouth once daily., Disp: 90 tablet, Rfl: 0    oxyCODONE-acetaminophen (PERCOCET) 5-325 mg per tablet, Take 1 tablet by mouth every 4 (four) hours as needed., Disp: 20 tablet, Rfl: 0    potassium chloride SA (K-DUR,KLOR-CON) 20 MEQ tablet, Take 1 tablet (20 mEq total) by mouth 2 (two) times daily., Disp: 90 tablet, Rfl: 3    traMADol (ULTRAM) 50 mg tablet, Take 1 tablet (50 mg total) by mouth daily as needed (severe pain)., Disp: 30 tablet, Rfl: 2    VITAMIN D2 50,000 unit capsule, Take 1 capsule (50,000 Units total) by mouth every 7 days., Disp: 4 capsule, Rfl: 2  ALLERGIES: Review of patient's allergies indicates:  No Known Allergies    Review of Systems   Constitution: Negative. Negative for chills, fever and night sweats.   HENT: Negative for congestion and headaches.    Eyes:  Negative for blurred vision, left vision loss and right vision loss.   Cardiovascular: Negative for chest pain and syncope.   Respiratory: Negative for cough and shortness of breath.    Endocrine: Negative for polydipsia, polyphagia and polyuria.   Hematologic/Lymphatic: Negative for bleeding problem. Does not bruise/bleed easily.   Skin: Negative for dry skin, itching and rash.   Musculoskeletal: Negative for falls and muscle weakness.   Gastrointestinal: Negative for abdominal pain and bowel incontinence.   Genitourinary: Negative for bladder incontinence and nocturia.   Neurological: Negative for disturbances in coordination, loss of balance and seizures.   Psychiatric/Behavioral: Negative for depression. The patient does not have insomnia.    Allergic/Immunologic: Negative for hives and persistent infections.     PHYSICAL EXAM:  GEN: A&Ox3, WD WN NAD  HEENT: WNL  CHEST: CTAB, no W/R/R  HEART: RRR, no M/R/G   ABD: Soft, NT ND, BS x4 QUADS  MS: Refer to previous note for detailed MS exam  NEURO: CN II-XII intact       The surgical consent was then reviewed with the patient, who agreed with all the contents of the consent form and it was signed.     PHYSICAL THERAPY:  She was also instructed regarding physical therapy and will begin on POD#1-3 at [facility].    POST OP CARE: Instructions were reviewed including care of the wound and dressing after surgery and when she can shower.     PAIN MANAGEMENT: Rosamaria Agosto was instructed regarding the Polar ice unit that will be in place after surgery and her postoperative pain medications.     MEDICATION:  Roxicodone 5 mg 1-2 q 4 hours PRN for pain  Zofran 4 mg q 8 hours PRN for nausea and vomiting.  Aspirin 81mg BID x 2 weeks for DVT prophylaxis starting on the evening after surgery.      Patient was instructed to contact us immediately if they are unable to  mediations prior to surgery.     Patient was instructed to purchase and take Colace to counter  possible GI side effects of taking opiates.     DVT prophylaxis was discussed with the patient today including risk factors for developing DVTs and history of DVTs. The patient was asked if any specific recommendations were given from the doctor/s that did pre-operative surgical clearance.      If the patient was previously taking 81mg baby aspirin, they were told to not take additional baby aspirin, using the above stated aspirin and to restart the 81mg aspirin daily after completion of the aspirin dose.      Patient was also told to buy over the counter Prilosec medication and take it once daily for GI protection as long as they are taking NSAIDs or Aspirin.     The patient was told that narcotic pain medications may make them drowsy and instructions were given to not sign legal documents, drive or operate heavy machinery, cars, or equipment while under the influence of narcotic medications.     Dr. Soto was not present in clinic. However, patient had no further questions for Dr. Soto or myself.     As there were no other questions to be asked, she was given my business card along with Dr. Soto's business card if she has any questions or concerns prior to surgery or in the postop period.

## 2019-07-19 NOTE — H&P
"Rosamaria Agosto  is here for a completion of her perioperative paperwork. she  Is scheduled to undergo left shoulder arthroscopic rotator cuff repair, SAD, possible capsular releases, possible DCE, biceps tenotomy versus tenodesis on 7/24/19.  She is a healthy individual and does need clearance for this procedure.     Dr. Burns, PCP, stated that "The patient is okay to proceed with rotator cuff surgery as scheduled."    Risks, indications and benefits of the surgical procedure were discussed with the patient. All questions with regard to surgery, rehab, expected return to functional activities, activities of daily living and recreational endeavors were answered to her satisfaction.    Patient was informed and understands the risks of surgery are greater for patients with a current condition or hx of heart disease, obesity, clotting disorders, recurrent infections, steroid use, current or past smoking, and factors such as sedentary lifestyle and noncompliance with medications, therapy or f/u. The degree of the increased risk is hard to estimate w/ any degree of precision.    Once no other questions were asked, a brief history and physical exam was then performed.    PAST MEDICAL HISTORY:   Past Medical History:   Diagnosis Date    Abnormal Pap smear of cervix yrs ago    Arthritis     History of uterine fibroid     Hyperlipidemia     Hypertension     Shoulder injury     lt    Sinus problem     Sinusitis      PAST SURGICAL HISTORY:   Past Surgical History:   Procedure Laterality Date    BREAST BIOPSY  25-25 years old    BREAST SURGERY      CYSTOSCOPY N/A 6/24/2019    [facility]    ENCEPHALOCELE REPAIR  46    HYSTERECTOMY  93'-95'    ovaries remain    NASAL SINUS SURGERY      RESECTION-TURBINATES (SMR) Bilateral 8/21/2017    [facility]    SEPTOPLASTY Bilateral 8/21/2017    [facility] "    SINUS SURGERY FUNCTIONAL ENDOSCOPIC WITH NAVIGATION stealth and propel needed Bilateral 8/21/2017    [facility] 2[facility]LR    TUBAL LIGATION      VAGINAL DELIVERY      XI ROBOTIC SACROCOLPOPEXY, ABDOMINAL N/A 6/24/2019    [facility]     FAMILY HISTORY:   Family History   Problem Relation Age of Onset    Stroke Maternal Grandmother     Diabetes Mother     Hypertension Mother     Diabetes Sister     Diabetes Sister     Breast cancer Neg Hx     Colon cancer Neg Hx     Ovarian cancer Neg Hx      SOCIAL HISTORY:   Social History     Socioeconomic History    Marital status: Single     Spouse name: Not on file    Number of children: Not on file    Years of education: Not on file    Highest education level: Not on file   Occupational History    Not on file   Social Needs    Financial resource strain: Not on file    Food insecurity:     Worry: Not on file     Inability: Not on file    Transportation needs:     Medical: Not on file     Non-medical: Not on file   Tobacco Use    Smoking status: Never Smoker    Smokeless tobacco: Never Used   Substance and Sexual Activity    Alcohol use: Yes     Alcohol/week: 0.6 oz     Types: 1 Shots of liquor per week     Comment: seldom    Drug use: No    Sexual activity: Yes     Partners: Male     Birth control/protection: Post-menopausal, See Surgical Hx     Comment: hysterectomy 20 yrs ago   Lifestyle    Physical activity:     Days per week: Not on file     Minutes per session: Not on file    Stress: Not on file   Relationships    Social connections:     Talks on phone: Not on file     Gets together: Not on file     Attends Mu-ism service: Not on file     Active member of club or organization: Not on file     Attends meetings of clubs or organizations: Not on file     Relationship status: Not on file   Other Topics Concern    Not on file   Social History Narrative    Not on file        MEDICATIONS:   Current Outpatient Medications:     acetaminophen (TYLENOL) 650 MG TbSR, Take 1,300 mg by mouth as needed., Disp: , Rfl:     atorvastatin (LIPITOR) 80 MG tablet, Take 1 tablet (80 mg total) by mouth nightly., Disp: 30 tablet, Rfl: 11    azelastine (ASTELIN) 137 mcg (0.1 %) nasal spray, 1 spray (137 mcg total) by Nasal route 2 (two) times daily., Disp: 30 mL, Rfl: 2    budesonide (PULMICORT) 0.5 mg/2 mL nebulizer solution, inhale 1 nebules per nebulizer twice daily, for use in saline irrigation as directed, Disp: , Rfl: 1    estradiol (ESTRACE) 0.01 % (0.1 mg/gram) vaginal cream, Place 1 g vaginally every Mon, Wed, Fri., Disp: 1 Tube, Rfl: 11    flavoxATE (URISPAS) 100 mg Tab, Take 1 tablet (100 mg total) by mouth 3 (three) times daily as needed., Disp: 90 tablet, Rfl: 3    hydroCHLOROthiazide (HYDRODIURIL) 25 MG tablet, Take 1 tablet (25 mg total) by mouth once daily., Disp: 30 tablet, Rfl: 11    ibuprofen (ADVIL,MOTRIN) 600 MG tablet, Take 1 tablet (600 mg total) by mouth every 6 (six) hours as needed for Pain., Disp: 30 tablet, Rfl: 0    meloxicam (MOBIC) 15 MG tablet, Take 1 tablet (15 mg total) by mouth once daily., Disp: 90 tablet, Rfl: 0    oxyCODONE-acetaminophen (PERCOCET) 5-325 mg per tablet, Take 1 tablet by mouth every 4 (four) hours as needed., Disp: 20 tablet, Rfl: 0    potassium chloride SA (K-DUR,KLOR-CON) 20 MEQ tablet, Take 1 tablet (20 mEq total) by mouth 2 (two) times daily., Disp: 90 tablet, Rfl: 3    traMADol (ULTRAM) 50 mg tablet, Take 1 tablet (50 mg total) by mouth daily as needed (severe pain)., Disp: 30 tablet, Rfl: 2    VITAMIN D2 50,000 unit capsule, Take 1 capsule (50,000 Units total) by mouth every 7 days., Disp: 4 capsule, Rfl: 2  ALLERGIES: Review of patient's allergies indicates:  No Known Allergies    Review of Systems   Constitution: Negative. Negative for chills, fever and night sweats.   HENT: Negative for congestion and headaches.    Eyes:  Negative for blurred vision, left vision loss and right vision loss.   Cardiovascular: Negative for chest pain and syncope.   Respiratory: Negative for cough and shortness of breath.    Endocrine: Negative for polydipsia, polyphagia and polyuria.   Hematologic/Lymphatic: Negative for bleeding problem. Does not bruise/bleed easily.   Skin: Negative for dry skin, itching and rash.   Musculoskeletal: Negative for falls and muscle weakness.   Gastrointestinal: Negative for abdominal pain and bowel incontinence.   Genitourinary: Negative for bladder incontinence and nocturia.   Neurological: Negative for disturbances in coordination, loss of balance and seizures.   Psychiatric/Behavioral: Negative for depression. The patient does not have insomnia.    Allergic/Immunologic: Negative for hives and persistent infections.     PHYSICAL EXAM:  GEN: A&Ox3, WD WN NAD  HEENT: WNL  CHEST: CTAB, no W/R/R  HEART: RRR, no M/R/G   ABD: Soft, NT ND, BS x4 QUADS  MS: Refer to previous note for detailed MS exam  NEURO: CN II-XII intact       The surgical consent was then reviewed with the patient, who agreed with all the contents of the consent form and it was signed.     PHYSICAL THERAPY:  She was also instructed regarding physical therapy and will begin on POD#1-3 at [facility].    POST OP CARE: Instructions were reviewed including care of the wound and dressing after surgery and when she can shower.     PAIN MANAGEMENT: Rosamaria Agosto was instructed regarding the Polar ice unit that will be in place after surgery and her postoperative pain medications.     MEDICATION:  Roxicodone 5 mg 1-2 q 4 hours PRN for pain  Zofran 4 mg q 8 hours PRN for nausea and vomiting.  Aspirin 81mg BID x 2 weeks for DVT prophylaxis starting on the evening after surgery.      Patient was instructed to contact us immediately if they are unable to  mediations prior to surgery.     Patient was instructed to purchase and take Colace to counter  possible GI side effects of taking opiates.     DVT prophylaxis was discussed with the patient today including risk factors for developing DVTs and history of DVTs. The patient was asked if any specific recommendations were given from the doctor/s that did pre-operative surgical clearance.      If the patient was previously taking 81mg baby aspirin, they were told to not take additional baby aspirin, using the above stated aspirin and to restart the 81mg aspirin daily after completion of the aspirin dose.      Patient was also told to buy over the counter Prilosec medication and take it once daily for GI protection as long as they are taking NSAIDs or Aspirin.     The patient was told that narcotic pain medications may make them drowsy and instructions were given to not sign legal documents, drive or operate heavy machinery, cars, or equipment while under the influence of narcotic medications.     Dr. Soto was not present in clinic. However, patient had no further questions for Dr. Soto or myself.     As there were no other questions to be asked, she was given my business card along with Dr. Soto's business card if she has any questions or concerns prior to surgery or in the postop period.

## 2019-07-20 LAB
BACTERIA UR CULT: ABNORMAL
BACTERIA UR CULT: ABNORMAL

## 2019-07-22 RX ORDER — SULFAMETHOXAZOLE AND TRIMETHOPRIM 800; 160 MG/1; MG/1
1 TABLET ORAL 2 TIMES DAILY
Qty: 10 TABLET | Refills: 0 | Status: SHIPPED | OUTPATIENT
Start: 2019-07-22 | End: 2019-07-27

## 2019-07-23 ENCOUNTER — ANESTHESIA EVENT (OUTPATIENT)
Dept: SURGERY | Facility: HOSPITAL | Age: 59
End: 2019-07-23
Payer: COMMERCIAL

## 2019-07-24 ENCOUNTER — TELEPHONE (OUTPATIENT)
Dept: SPORTS MEDICINE | Facility: CLINIC | Age: 59
End: 2019-07-24

## 2019-07-24 ENCOUNTER — HOSPITAL ENCOUNTER (OUTPATIENT)
Facility: HOSPITAL | Age: 59
Discharge: HOME | End: 2019-07-24
Attending: ORTHOPAEDIC SURGERY | Admitting: ORTHOPAEDIC SURGERY
Payer: COMMERCIAL

## 2019-07-24 ENCOUNTER — ANESTHESIA (OUTPATIENT)
Dept: SURGERY | Facility: HOSPITAL | Age: 59
End: 2019-07-24
Payer: COMMERCIAL

## 2019-07-24 VITALS
TEMPERATURE: 98 F | WEIGHT: 190 LBS | SYSTOLIC BLOOD PRESSURE: 126 MMHG | BODY MASS INDEX: 28.14 KG/M2 | OXYGEN SATURATION: 95 % | HEIGHT: 69 IN | DIASTOLIC BLOOD PRESSURE: 78 MMHG | HEART RATE: 93 BPM | RESPIRATION RATE: 18 BRPM

## 2019-07-24 DIAGNOSIS — M75.22 BICEPS TENDONITIS ON LEFT: ICD-10-CM

## 2019-07-24 DIAGNOSIS — M75.112 INCOMPLETE TEAR OF LEFT ROTATOR CUFF: Primary | ICD-10-CM

## 2019-07-24 DIAGNOSIS — M19.012 ARTHRITIS OF LEFT ACROMIOCLAVICULAR JOINT: ICD-10-CM

## 2019-07-24 PROCEDURE — 29826 PR SHLDR ARTHROSCOP,PART ACROMIOPLAS: ICD-10-PCS | Mod: LT,,, | Performed by: ORTHOPAEDIC SURGERY

## 2019-07-24 PROCEDURE — 37000009 HC ANESTHESIA EA ADD 15 MINS: Performed by: ORTHOPAEDIC SURGERY

## 2019-07-24 PROCEDURE — 76942 INTERSCALENE BRACHIAL PLEXUS CATHETER: ICD-10-PCS | Mod: 26,,, | Performed by: ANESTHESIOLOGY

## 2019-07-24 PROCEDURE — 37000008 HC ANESTHESIA 1ST 15 MINUTES: Performed by: ORTHOPAEDIC SURGERY

## 2019-07-24 PROCEDURE — 63600175 PHARM REV CODE 636 W HCPCS: Performed by: ORTHOPAEDIC SURGERY

## 2019-07-24 PROCEDURE — 27201423 OPTIME MED/SURG SUP & DEVICES STERILE SUPPLY: Performed by: ORTHOPAEDIC SURGERY

## 2019-07-24 PROCEDURE — 29823 SHO ARTHRS SRG XTNSV DBRDMT: CPT | Mod: 51,LT,, | Performed by: ORTHOPAEDIC SURGERY

## 2019-07-24 PROCEDURE — 36000710: Performed by: ORTHOPAEDIC SURGERY

## 2019-07-24 PROCEDURE — 27800517 HC TRAY,EPIDURAL-CONTINUOUS: Performed by: ANESTHESIOLOGY

## 2019-07-24 PROCEDURE — D9220A PRA ANESTHESIA: ICD-10-PCS | Mod: ,,, | Performed by: ANESTHESIOLOGY

## 2019-07-24 PROCEDURE — 29827 PR SHLDR ARTHROSCOP,SURG,W/ROTAT CUFF REPR: ICD-10-PCS | Mod: LT,,, | Performed by: ORTHOPAEDIC SURGERY

## 2019-07-24 PROCEDURE — 29828 PR ARTHROSCOPY SHOULDER SURGICAL BICEPS TENODESIS: ICD-10-PCS | Mod: 51,LT,, | Performed by: ORTHOPAEDIC SURGERY

## 2019-07-24 PROCEDURE — 64416 NJX AA&/STRD BRCH PL NFS IMG: CPT | Mod: 59,LT,, | Performed by: ANESTHESIOLOGY

## 2019-07-24 PROCEDURE — 71000015 HC POSTOP RECOV 1ST HR: Performed by: ORTHOPAEDIC SURGERY

## 2019-07-24 PROCEDURE — 63600175 PHARM REV CODE 636 W HCPCS: Performed by: PHYSICIAN ASSISTANT

## 2019-07-24 PROCEDURE — 25000003 PHARM REV CODE 250: Performed by: ANESTHESIOLOGY

## 2019-07-24 PROCEDURE — 25000003 PHARM REV CODE 250: Performed by: ORTHOPAEDIC SURGERY

## 2019-07-24 PROCEDURE — 64416 INTERSCALENE BRACHIAL PLEXUS CATHETER: ICD-10-PCS | Mod: 59,LT,, | Performed by: ANESTHESIOLOGY

## 2019-07-24 PROCEDURE — 63600175 PHARM REV CODE 636 W HCPCS: Performed by: ANESTHESIOLOGY

## 2019-07-24 PROCEDURE — 76942 ECHO GUIDE FOR BIOPSY: CPT | Mod: 26,,, | Performed by: ANESTHESIOLOGY

## 2019-07-24 PROCEDURE — 29827 SHO ARTHRS SRG RT8TR CUF RPR: CPT | Mod: LT,,, | Performed by: ORTHOPAEDIC SURGERY

## 2019-07-24 PROCEDURE — 63600175 PHARM REV CODE 636 W HCPCS: Performed by: STUDENT IN AN ORGANIZED HEALTH CARE EDUCATION/TRAINING PROGRAM

## 2019-07-24 PROCEDURE — 71000016 HC POSTOP RECOV ADDL HR: Performed by: ORTHOPAEDIC SURGERY

## 2019-07-24 PROCEDURE — 29828 SHO ARTHRS SRG BICP TENODSIS: CPT | Mod: 51,LT,, | Performed by: ORTHOPAEDIC SURGERY

## 2019-07-24 PROCEDURE — 76942 ECHO GUIDE FOR BIOPSY: CPT | Performed by: ANESTHESIOLOGY

## 2019-07-24 PROCEDURE — 94761 N-INVAS EAR/PLS OXIMETRY MLT: CPT

## 2019-07-24 PROCEDURE — 36000711: Performed by: ORTHOPAEDIC SURGERY

## 2019-07-24 PROCEDURE — 29823 PR SHLDR ARTHROSCOP,EXTEN DEBRIDE: ICD-10-PCS | Mod: 51,LT,, | Performed by: ORTHOPAEDIC SURGERY

## 2019-07-24 PROCEDURE — D9220A PRA ANESTHESIA: Mod: ,,, | Performed by: ANESTHESIOLOGY

## 2019-07-24 PROCEDURE — 71000039 HC RECOVERY, EACH ADD'L HOUR: Performed by: ORTHOPAEDIC SURGERY

## 2019-07-24 PROCEDURE — C1713 ANCHOR/SCREW BN/BN,TIS/BN: HCPCS | Performed by: ORTHOPAEDIC SURGERY

## 2019-07-24 PROCEDURE — 25000003 PHARM REV CODE 250: Performed by: STUDENT IN AN ORGANIZED HEALTH CARE EDUCATION/TRAINING PROGRAM

## 2019-07-24 PROCEDURE — 64416 NJX AA&/STRD BRCH PL NFS IMG: CPT | Performed by: ANESTHESIOLOGY

## 2019-07-24 PROCEDURE — 27000221 HC OXYGEN, UP TO 24 HOURS

## 2019-07-24 PROCEDURE — 29826 SHO ARTHRS SRG DECOMPRESSION: CPT | Mod: LT,,, | Performed by: ORTHOPAEDIC SURGERY

## 2019-07-24 PROCEDURE — 71000033 HC RECOVERY, INTIAL HOUR: Performed by: ORTHOPAEDIC SURGERY

## 2019-07-24 DEVICE — ANCHOR SUT BIOCOM 5.5X19.1MM: Type: IMPLANTABLE DEVICE | Site: SHOULDER | Status: FUNCTIONAL

## 2019-07-24 DEVICE — ANCHOR BIOCOMP SWVLLOK: Type: IMPLANTABLE DEVICE | Site: SHOULDER | Status: FUNCTIONAL

## 2019-07-24 DEVICE — CORKSCREW BIOCOMPOSITE 5.5MM: Type: IMPLANTABLE DEVICE | Site: SHOULDER | Status: FUNCTIONAL

## 2019-07-24 DEVICE — ANCHOR BIOCOMP W/3 SUTURES: Type: IMPLANTABLE DEVICE | Site: SHOULDER | Status: FUNCTIONAL

## 2019-07-24 RX ORDER — LABETALOL HYDROCHLORIDE 5 MG/ML
INJECTION, SOLUTION INTRAVENOUS
Status: DISCONTINUED | OUTPATIENT
Start: 2019-07-24 | End: 2019-07-24

## 2019-07-24 RX ORDER — CEFAZOLIN SODIUM 1 G/3ML
2 INJECTION, POWDER, FOR SOLUTION INTRAMUSCULAR; INTRAVENOUS
Status: DISCONTINUED | OUTPATIENT
Start: 2019-07-24 | End: 2019-07-24 | Stop reason: HOSPADM

## 2019-07-24 RX ORDER — SODIUM CHLORIDE 9 MG/ML
INJECTION, SOLUTION INTRAVENOUS CONTINUOUS PRN
Status: DISCONTINUED | OUTPATIENT
Start: 2019-07-24 | End: 2019-07-24

## 2019-07-24 RX ORDER — METOCLOPRAMIDE HYDROCHLORIDE 5 MG/ML
5 INJECTION INTRAMUSCULAR; INTRAVENOUS EVERY 6 HOURS PRN
Status: DISCONTINUED | OUTPATIENT
Start: 2019-07-24 | End: 2019-07-24 | Stop reason: HOSPADM

## 2019-07-24 RX ORDER — PROPOFOL 10 MG/ML
VIAL (ML) INTRAVENOUS
Status: DISCONTINUED | OUTPATIENT
Start: 2019-07-24 | End: 2019-07-24

## 2019-07-24 RX ORDER — SUCCINYLCHOLINE CHLORIDE 20 MG/ML
INJECTION INTRAMUSCULAR; INTRAVENOUS
Status: DISCONTINUED | OUTPATIENT
Start: 2019-07-24 | End: 2019-07-24

## 2019-07-24 RX ORDER — DEXAMETHASONE SODIUM PHOSPHATE 4 MG/ML
INJECTION, SOLUTION INTRA-ARTICULAR; INTRALESIONAL; INTRAMUSCULAR; INTRAVENOUS; SOFT TISSUE
Status: DISCONTINUED | OUTPATIENT
Start: 2019-07-24 | End: 2019-07-24

## 2019-07-24 RX ORDER — ROCURONIUM BROMIDE 10 MG/ML
INJECTION, SOLUTION INTRAVENOUS
Status: DISCONTINUED | OUTPATIENT
Start: 2019-07-24 | End: 2019-07-24

## 2019-07-24 RX ORDER — SODIUM CHLORIDE 0.9 % (FLUSH) 0.9 %
10 SYRINGE (ML) INJECTION
Status: DISCONTINUED | OUTPATIENT
Start: 2019-07-24 | End: 2019-07-24 | Stop reason: HOSPADM

## 2019-07-24 RX ORDER — MIDAZOLAM HYDROCHLORIDE 1 MG/ML
0.5 INJECTION INTRAMUSCULAR; INTRAVENOUS
Status: DISCONTINUED | OUTPATIENT
Start: 2019-07-24 | End: 2019-07-24 | Stop reason: HOSPADM

## 2019-07-24 RX ORDER — LIDOCAINE HCL/PF 100 MG/5ML
SYRINGE (ML) INTRAVENOUS
Status: DISCONTINUED | OUTPATIENT
Start: 2019-07-24 | End: 2019-07-24

## 2019-07-24 RX ORDER — BUPIVACAINE HYDROCHLORIDE AND EPINEPHRINE 2.5; 5 MG/ML; UG/ML
INJECTION, SOLUTION EPIDURAL; INFILTRATION; INTRACAUDAL; PERINEURAL
Status: COMPLETED | OUTPATIENT
Start: 2019-07-24 | End: 2019-07-24

## 2019-07-24 RX ORDER — ONDANSETRON 8 MG/1
8 TABLET, ORALLY DISINTEGRATING ORAL EVERY 8 HOURS PRN
Status: DISCONTINUED | OUTPATIENT
Start: 2019-07-24 | End: 2019-07-24 | Stop reason: HOSPADM

## 2019-07-24 RX ORDER — EPINEPHRINE 1 MG/ML
INJECTION, SOLUTION INTRACARDIAC; INTRAMUSCULAR; INTRAVENOUS; SUBCUTANEOUS
Status: DISCONTINUED | OUTPATIENT
Start: 2019-07-24 | End: 2019-07-24 | Stop reason: HOSPADM

## 2019-07-24 RX ORDER — MIDAZOLAM HYDROCHLORIDE 1 MG/ML
INJECTION, SOLUTION INTRAMUSCULAR; INTRAVENOUS
Status: DISCONTINUED | OUTPATIENT
Start: 2019-07-24 | End: 2019-07-24

## 2019-07-24 RX ORDER — OXYCODONE HYDROCHLORIDE 5 MG/1
10 TABLET ORAL EVERY 4 HOURS PRN
Status: DISCONTINUED | OUTPATIENT
Start: 2019-07-24 | End: 2019-07-24 | Stop reason: HOSPADM

## 2019-07-24 RX ORDER — TRAMADOL HYDROCHLORIDE 50 MG/1
50 TABLET ORAL EVERY 4 HOURS PRN
Status: DISCONTINUED | OUTPATIENT
Start: 2019-07-24 | End: 2019-07-24 | Stop reason: HOSPADM

## 2019-07-24 RX ORDER — HYDROCODONE BITARTRATE AND ACETAMINOPHEN 5; 325 MG/1; MG/1
1 TABLET ORAL EVERY 4 HOURS PRN
Status: DISCONTINUED | OUTPATIENT
Start: 2019-07-24 | End: 2019-07-24 | Stop reason: HOSPADM

## 2019-07-24 RX ORDER — FENTANYL CITRATE 50 UG/ML
25 INJECTION, SOLUTION INTRAMUSCULAR; INTRAVENOUS EVERY 5 MIN PRN
Status: DISCONTINUED | OUTPATIENT
Start: 2019-07-24 | End: 2019-07-24 | Stop reason: HOSPADM

## 2019-07-24 RX ORDER — ESMOLOL HYDROCHLORIDE 10 MG/ML
INJECTION INTRAVENOUS
Status: DISCONTINUED | OUTPATIENT
Start: 2019-07-24 | End: 2019-07-24

## 2019-07-24 RX ORDER — FENTANYL CITRATE 50 UG/ML
INJECTION, SOLUTION INTRAMUSCULAR; INTRAVENOUS
Status: DISCONTINUED | OUTPATIENT
Start: 2019-07-24 | End: 2019-07-24

## 2019-07-24 RX ORDER — KETAMINE HCL IN 0.9 % NACL 50 MG/5 ML
SYRINGE (ML) INTRAVENOUS
Status: DISCONTINUED | OUTPATIENT
Start: 2019-07-24 | End: 2019-07-24

## 2019-07-24 RX ADMIN — SODIUM CHLORIDE: 0.9 INJECTION, SOLUTION INTRAVENOUS at 11:07

## 2019-07-24 RX ADMIN — MIDAZOLAM HYDROCHLORIDE 1 MG: 1 INJECTION, SOLUTION INTRAMUSCULAR; INTRAVENOUS at 11:07

## 2019-07-24 RX ADMIN — DEXAMETHASONE SODIUM PHOSPHATE 8 MG: 4 INJECTION, SOLUTION INTRAMUSCULAR; INTRAVENOUS at 12:07

## 2019-07-24 RX ADMIN — SUCCINYLCHOLINE CHLORIDE 140 MG: 20 INJECTION, SOLUTION INTRAMUSCULAR; INTRAVENOUS at 11:07

## 2019-07-24 RX ADMIN — ONDANSETRON 8 MG: 8 TABLET, ORALLY DISINTEGRATING ORAL at 02:07

## 2019-07-24 RX ADMIN — PROPOFOL 200 MG: 10 INJECTION, EMULSION INTRAVENOUS at 11:07

## 2019-07-24 RX ADMIN — HYDROCODONE BITARTRATE AND ACETAMINOPHEN 1 TABLET: 5; 325 TABLET ORAL at 03:07

## 2019-07-24 RX ADMIN — BUPIVACAINE HYDROCHLORIDE AND EPINEPHRINE BITARTRATE 20 ML: 2.5; .0091 INJECTION, SOLUTION EPIDURAL; INFILTRATION; INTRACAUDAL; PERINEURAL at 10:07

## 2019-07-24 RX ADMIN — Medication 30 MG: at 12:07

## 2019-07-24 RX ADMIN — LABETALOL HYDROCHLORIDE 10 MG: 5 INJECTION, SOLUTION INTRAVENOUS at 01:07

## 2019-07-24 RX ADMIN — CEFAZOLIN 2 G: 330 INJECTION, POWDER, FOR SOLUTION INTRAMUSCULAR; INTRAVENOUS at 12:07

## 2019-07-24 RX ADMIN — LIDOCAINE HYDROCHLORIDE 100 MG: 20 INJECTION, SOLUTION INTRAVENOUS at 11:07

## 2019-07-24 RX ADMIN — FENTANYL CITRATE 100 MCG: 50 INJECTION, SOLUTION INTRAMUSCULAR; INTRAVENOUS at 11:07

## 2019-07-24 RX ADMIN — ROPIVACAINE HYDROCHLORIDE 6 ML/HR: 2 INJECTION, SOLUTION EPIDURAL; INFILTRATION at 02:07

## 2019-07-24 RX ADMIN — ROCURONIUM BROMIDE 5 MG: 10 INJECTION, SOLUTION INTRAVENOUS at 11:07

## 2019-07-24 RX ADMIN — ROCURONIUM BROMIDE 45 MG: 10 INJECTION, SOLUTION INTRAVENOUS at 12:07

## 2019-07-24 RX ADMIN — FENTANYL CITRATE 100 MCG: 50 INJECTION, SOLUTION INTRAMUSCULAR; INTRAVENOUS at 01:07

## 2019-07-24 RX ADMIN — MIDAZOLAM HYDROCHLORIDE 2 MG: 1 INJECTION, SOLUTION INTRAMUSCULAR; INTRAVENOUS at 09:07

## 2019-07-24 RX ADMIN — ESMOLOL HYDROCHLORIDE 50 MG: 10 INJECTION INTRAVENOUS at 01:07

## 2019-07-24 NOTE — TELEPHONE ENCOUNTER
Received FMLA forms from [employer]. Forms completed and faxed to them at [phone].    [facility]

## 2019-07-24 NOTE — TRANSFER OF CARE
"Anesthesia Transfer of Care Note    Patient: Rosamaria Agosto    Procedure(s) Performed: Procedure(s) (LRB):  BICEPS TENODESIS (Left)  REPAIR, ROTATOR CUFF, ARTHROSCOPIC (Left)  ARTHROSCOPY, SHOULDER, WITH SUBACROMIAL SPACE DECOMPRESSION (Left)    Patient location: PACU    Anesthesia Type: general    Transport from OR: Transported from OR on 6-10 L/min O2 by face mask with adequate spontaneous ventilation    Post pain: adequate analgesia    Post assessment: no apparent anesthetic complications and tolerated procedure well    Post vital signs: stable    Level of consciousness: awake and alert    Nausea/Vomiting: no nausea/vomiting    Complications: none          Last vitals:   Visit Vitals  BP (P) 112/71 (BP Location: Right arm, Patient Position: Lying)   Pulse (P) 69   Temp 36.7 °C (98.1 °F) (Oral)   Resp (P) 18   Ht 5' 9" (1.753 m)   Wt 86.2 kg (190 lb)   LMP 10/19/1993 (Approximate)   SpO2 (P) 99%   Breastfeeding? No   BMI 28.06 kg/m²     "

## 2019-07-24 NOTE — ANESTHESIA POSTPROCEDURE EVALUATION
Anesthesia Post Evaluation    Patient: Rosamaria Agosto    Procedure(s) Performed: Procedure(s) (LRB):  BICEPS TENODESIS (Left)  REPAIR, ROTATOR CUFF, ARTHROSCOPIC (Left)  ARTHROSCOPY, SHOULDER, WITH SUBACROMIAL SPACE DECOMPRESSION (Left)    Final Anesthesia Type: general  Patient location during evaluation: PACU  Patient participation: Yes- Able to Participate  Level of consciousness: awake and alert  Post-procedure vital signs: reviewed and stable  Pain management: adequate  Airway patency: patent  PONV status at discharge: No PONV  Anesthetic complications: no      Cardiovascular status: blood pressure returned to baseline  Respiratory status: unassisted  Hydration status: euvolemic  Follow-up not needed.          Vitals Value Taken Time   /82 7/24/2019  3:17 PM   Temp 35.8 °C (96.4 °F) 7/24/2019  2:43 PM   Pulse 71 7/24/2019  3:26 PM   Resp 14 7/24/2019  3:26 PM   SpO2 93 % 7/24/2019  3:26 PM   Vitals shown include unvalidated device data.      No case tracking events are documented in the log.      Pain/Bakari Score: Pain Rating Prior to Med Admin: 4 (7/24/2019  3:03 PM)  Bakari Score: 9 (7/24/2019 11:15 AM)

## 2019-07-24 NOTE — ANESTHESIA PREPROCEDURE EVALUATION
[facility]  Anesthesia Pre-Operative Evaluation         Patient Name: Rosamaria Agosto  YOB: 1960  MRN: [id number]    SUBJECTIVE:     Pre-operative evaluation for Procedure(s) (LRB):  ARTHROSCOPY, DECOMPRESSION OF SUBACROMIAL SPACE WITH PARTIAL ACROMIOPLASTY WITH OR WITHOUT CORACOACROMIAL RELEASE WITH REPAIR, ROTATOR CUFF (Left)  BICEPS TENODESIS (Left)  ARTHROSCOPY, DISTAL CLAVICULECTOMY INCLUDING DISTAL ARTICULAR SURGACE (A-C RESECTION) (Left)     07/24/2019    Rosamaria Agosto is a 58 y.o. female w/ a significant PMHx of HTN, HLD, OA, chronic sinusitis, and obesity who presents w/ progressive L shoulder pain.    Patient now presents for the above procedure(s).      LDA: None documented.       Prev airway: Hogan, ETT 7.5, easy mask ventilation, grade 1 view, complications noted include short neck and obesity.    Patient Active Problem List   Diagnosis    Pain of lower extremity    Varicose veins of bilateral lower extremities with pain    Hyperlipidemia    Essential hypertension    Chronic rhinitis    Obesity    Osteoarthritis    Vitamin D deficiency    Chronic sinusitis    Deviated nasal septum    Nasal turbinate hypertrophy    Allergic rhinitis    Vaginal prolapse without uterine prolapse       Review of patient's allergies indicates:  No Known Allergies    Current Inpatient Medications:      No current facility-administered medications on file prior to encounter.      Current Outpatient Medications on File Prior to Encounter   Medication Sig Dispense Refill    atorvastatin (LIPITOR) 80 MG tablet Take 1 tablet (80 mg total) by mouth nightly. 30 tablet 11    azelastine (ASTELIN) 137 mcg (0.1 %) nasal spray 1 spray (137 mcg total) by Nasal route 2 (two) times daily. 30 mL 2    estradiol (ESTRACE) 0.01 % (0.1 mg/gram) vaginal cream Place 1 g vaginally every Mon, Wed, Fri. 1 Tube 11    flavoxATE (URISPAS) 100 mg Tab Take 1 tablet (100 mg total) by mouth 3  (three) times daily as needed. 90 tablet 3    hydroCHLOROthiazide (HYDRODIURIL) 25 MG tablet Take 1 tablet (25 mg total) by mouth once daily. 30 tablet 11    ibuprofen (ADVIL,MOTRIN) 600 MG tablet Take 1 tablet (600 mg total) by mouth every 6 (six) hours as needed for Pain. 30 tablet 0    meloxicam (MOBIC) 15 MG tablet Take 1 tablet (15 mg total) by mouth once daily. 90 tablet 0    potassium chloride SA (K-DUR,KLOR-CON) 20 MEQ tablet Take 1 tablet (20 mEq total) by mouth 2 (two) times daily. 90 tablet 3    VITAMIN D2 50,000 unit capsule Take 1 capsule (50,000 Units total) by mouth every 7 days. 4 capsule 2    acetaminophen (TYLENOL) 650 MG TbSR Take 1,300 mg by mouth as needed.      budesonide (PULMICORT) 0.5 mg/2 mL nebulizer solution inhale 1 nebules per nebulizer twice daily, for use in saline irrigation as directed  1    oxyCODONE-acetaminophen (PERCOCET) 5-325 mg per tablet Take 1 tablet by mouth every 4 (four) hours as needed. 20 tablet 0    traMADol (ULTRAM) 50 mg tablet Take 1 tablet (50 mg total) by mouth daily as needed (severe pain). 30 tablet 2       Past Surgical History:   Procedure Laterality Date    BREAST BIOPSY  24-25 years old    BREAST SURGERY      CYSTOSCOPY N/A 6/24/2019    Performed by [facility]ison [facility] at [facility] OR    ENCEPHALOCELE REPAIR  46    HYSTERECTOMY  93'-95'    ovaries remain    NASAL SINUS SURGERY      RESECTION-TURBINATES (SMR) Bilateral 8/21/2017    Performed by [facility]j [facility] at [facility] OR 2ND FLR    SEPTOPLASTY Bilateral 8/21/2017    Performed by Nikunj Sofia MD at [facility] OR 2ND FLR    SINUS SURGERY FUNCTIONAL ENDOSCOPIC WITH NAVIGATION stealth and propel needed Bilateral 8/21/2017    Performed by [facility] at [facility] OR 2ND FLR    TUBAL LIGATION      VAGINAL DELIVERY      XI ROBOTIC SACROCOLPOPEXY, ABDOMINAL N/A 6/24/2019    Performed by Anson Ellison MD at [facility] OR       Social History     Socioeconomic History    Marital status: Single      Spouse name: Not on file    Number of children: Not on file    Years of education: Not on file    Highest education level: Not on file   Occupational History    Not on file   Social Needs    Financial resource strain: Not on file    Food insecurity:     Worry: Not on file     Inability: Not on file    Transportation needs:     Medical: Not on file     Non-medical: Not on file   Tobacco Use    Smoking status: Never Smoker    Smokeless tobacco: Never Used   Substance and Sexual Activity    Alcohol use: Yes     Alcohol/week: 0.6 oz     Types: 1 Shots of liquor per week     Comment: seldom    Drug use: No    Sexual activity: Yes     Partners: Male     Birth control/protection: Post-menopausal, See Surgical Hx     Comment: hysterectomy 20 yrs ago   Lifestyle    Physical activity:     Days per week: Not on file     Minutes per session: Not on file    Stress: Not on file   Relationships    Social connections:     Talks on phone: Not on file     Gets together: Not on file     Attends Christian service: Not on file     Active member of club or organization: Not on file     Attends meetings of clubs or organizations: Not on file     Relationship status: Not on file   Other Topics Concern    Not on file   Social History Narrative    Not on file       OBJECTIVE:     Vital Signs Range (Last 24H):         Significant Labs:  Lab Results   Component Value Date    WBC 9.19 06/25/2019    HGB 12.5 06/25/2019    HCT 38.0 06/25/2019     06/25/2019    CHOL 234 (H) 06/21/2019    TRIG 96 06/21/2019    HDL 62 06/21/2019    ALT 16 06/07/2019    AST 18 06/07/2019     06/21/2019    K 4.6 06/21/2019    CL 99 06/21/2019    CREATININE 0.7 06/21/2019    BUN 14 06/21/2019    CO2 25 06/21/2019    TSH 1.243 05/28/2018    INR 1.1 06/07/2019    HGBA1C 5.6 05/28/2018       Diagnostic Studies: No relevant studies.    EKG: No recent studies available.    2D ECHO:  No results found for this or any previous  visit.      ASSESSMENT/PLAN:                                                                                      Anesthesia Evaluation    I have reviewed the Patient Summary Reports.    I have reviewed the Nursing Notes.   I have reviewed the Medications.     Review of Systems  Anesthesia Hx:  History of prior surgery of interest to airway management or planning: Denies Family Hx of Anesthesia complications.   Denies Personal Hx of Anesthesia complications.   Cardiovascular:   Hypertension hyperlipidemia    Musculoskeletal:   Arthritis            Anesthesia Plan  Type of Anesthesia, risks & benefits discussed:  Anesthesia Type:  general, MAC  Patient's Preference:   Intra-op Monitoring Plan: standard ASA monitors  Intra-op Monitoring Plan Comments:   Post Op Pain Control Plan: multimodal analgesia, peripheral nerve block, IV/PO Opioids PRN and per primary service following discharge from PACU  Post Op Pain Control Plan Comments:   Induction:   IV  Beta Blocker:  Patient is not currently on a Beta-Blocker (No further documentation required).       Informed Consent: Patient understands risks and agrees with Anesthesia plan.  Questions answered. Anesthesia consent signed with patient.  ASA Score: 2     Day of Surgery Review of History & Physical:    H&P update referred to the surgeon.         Ready For Surgery From Anesthesia Perspective.

## 2019-07-24 NOTE — INTERVAL H&P NOTE
The patient has been examined and the H&P has been reviewed:    I concur with the findings and no changes have occurred since H&P was written.    Anesthesia/Surgery risks, benefits and alternative options discussed and understood by patient/family.          Active Hospital Problems    Diagnosis  POA    Incomplete tear of left rotator cuff [M75.112]  Yes      Resolved Hospital Problems   No resolved problems to display.

## 2019-07-24 NOTE — DISCHARGE INSTRUCTIONS
PATIENT INSTRUCTIONS  POST-ANESTHESIA    IMMEDIATELY FOLLOWING SURGERY:  Do not drive or operate machinery for the first twenty four hours after surgery.  Do not make any important decisions for twenty four hours after surgery or while taking narcotic pain medications or sedatives.  If you develop intractable nausea and vomiting or a severe headache please notify your doctor immediately.    FOLLOW-UP:  Please make an appointment with your surgeon as instructed. You do not need to follow up with anesthesia unless specifically instructed to do so.    WOUND CARE INSTRUCTIONS (if applicable):  Keep a dry clean dressing on the anesthesia/puncture wound site if there is drainage.  Once the wound has quit draining you may leave it open to air.  Generally you should leave the bandage intact for twenty four hours unless there is drainage.  If the epidural site drains for more than 36-48 hours please call the anesthesia department.        Discharge Instructions for Open Rotator Cuff Repair  You had a procedure called open rotator cuff repair. The rotator cuff consists of the muscles and tendons that surround your shoulder. The rotator cuff keeps the top of your upper arm bone (humerus) securely in the shoulder joint. Your doctor made an incision near your shoulder blade and repaired the torn muscles or tendons in your shoulder. Here are instructions to follow when caring for your arm at home.  Activity     You may be told to do daily pendulum swings to improve your joints flexibility. Use your torso to move your arm in a [place] as it hangs straight down, make circles with your hand first in one direction, then the other.   · After surgery, rest your arm and relax for the rest of day.  · If you had general anesthesia (were put to sleep for the procedure), dont operate power tools or machinery, drink alcohol, or make any major decisions for at least 24 hours after surgery.  · Wear your sling, brace, or immobilizer, as  directed.  · Dont drive a car until your doctor says its OK. And never drive while taking opioid pain medicine.  · Flex your wrist and wiggle your fingers often to help blood flow.  · Your doctor may recommend pendulum exercises after your surgery. If this recommendation is made:   ¨ Hold on to the back of a chair, or lean on a tabletop with your healthy hand.  ¨ Let your arm hang straight down toward the floor and use your torso to move your affected arm in a [place]. First do 20 circles in one direction. Then do 20 circles in the other direction.  ¨ Repeat the pendulum exercise every 2 hour(s) while you are awake. When you feel ready, increase the number of circles to 50 in each direction every 2 hour(s).  Incision care  · Check your incision daily for redness, tenderness, or drainage.  · Dont soak in a bathtub, hot tub, or pool until your doctor says its OK.  · Wait several days after your surgery to start showering, or until your doctor says it's OK. Then shower as needed. Carefully wash your incision with soap and water. Gently pat it dry. Dont rub the incision, or apply creams or lotions.  · Your incision was closed using sutures, staples, or strips of tape. If you have sutures or staples, they may need to be removed up to 2 to 3 weeks after surgery. Allow the strips of tape to fall off on their own.  Home care  · Use pain medicine as directed by your doctor.  · Apply an ice pack or bag of frozen peas--or something similar--wrapped in a thin towel on your shoulder to reduce swelling for the first 48 hours. Leave the ice pack on for 20 minutes; then take it off for 20 minutes. Repeat as needed.  · Take your temperature daily for 7 days after your surgery. Report a fever above 100.4°F (38°C)  to your doctor. Fever may be a sign of infection.  Follow-up care  Follow up with your healthcare provider, or as advised.      When to call your healthcare provider  Call 911 right away if you have any of the  following:  · Chest pain  · Shortness of breath  Otherwise, call your healthcare provider right away if you have any of these:  · Increasing shoulder pain or pain not relieved by medicine  · Pain or swelling in the arm on the side of your surgery  · Numbness, tingling, coolness, or blue-gray color of your arm or fingers on the side of your surgery  · Fever above 100.4°F (38°C), or as advised  · Shaking chills  · Drainage or oozing, redness, or warmth at the incision  · Nausea or vomiting   Date Last Reviewed: 7/1/2016  © 1643-5932 [employer]. [address] [place] [address]. All rights reserved. This information is not intended as a substitute for professional medical care. Always follow your healthcare professional's instructions.          QUESTIONS?:  Please feel free to call your physician or the hospital  if you have any questions, and they will be happy to assist you.       [facility] Anesthesia Department  [address] [facility]  [phone]

## 2019-07-24 NOTE — PROGRESS NOTES
Pt resting quietly,VSS, resp unlabored,denies c/o pain, polar ice and beach chair splint intact, wiggles fingers freely, cap refill<3sec, fingers wrm to touch,stable at present.

## 2019-07-24 NOTE — DISCHARGE SUMMARY
[facility]    Brief Operative Note     SUMMARY     Surgery Date: 7/24/2019     Surgeon(s) and Role:     * ASMITA Soto MD - Primary     * Mejia Albarran MD - Resident - Assisting        Pre-op Diagnosis:  Nontraumatic tear of left rotator cuff [M75.102]  Biceps tendinitis of left upper extremity [M75.22]  Arthritis/arthropathy of multiple joints [M12.9]    Post-op Diagnosis:  Post-Op Diagnosis Codes:     * Nontraumatic tear of left rotator cuff [M75.102]     * Biceps tendinitis of left upper extremity [M75.22]     * Arthritis/arthropathy of multiple joints [M12.9]    Procedure: Procedure(s) (LRB):  BICEPS TENODESIS (Left)  REPAIR, ROTATOR CUFF, ARTHROSCOPIC (Left)  ARTHROSCOPY, SHOULDER, WITH SUBACROMIAL SPACE DECOMPRESSION (Left)    Anesthesia: General    Description of the findings of the procedure: See Dictation    Findings/Key Components: left shoulder arthroscopic rotator cuff repair, biceps tenodesis, subacromial decompression    Estimated Blood Loss: * No values recorded between 7/24/2019 12:20 PM and 7/24/2019  2:23 PM *         Specimens:   Specimen (12h ago, onward)    None          Disposition: Patient tolerated the procedure well and was transferred to PACU in stable condition.      Discharge Note    SUMMARY     Admit Date: 7/24/2019    Discharge Date and Time:   07/24/2019 2:24 PM    Pre-op Diagnosis:  Nontraumatic tear of left rotator cuff [M75.102]  Biceps tendinitis of left upper extremity [M75.22]  Arthritis/arthropathy of multiple joints [M12.9]    Post-op Diagnosis:  Post-Op Diagnosis Codes:     * Nontraumatic tear of left rotator cuff [M75.102]     * Biceps tendinitis of left upper extremity [M75.22]     * Arthritis/arthropathy of multiple joints [M12.9]    Procedure: Procedure(s) (LRB):  BICEPS TENODESIS (Left)  REPAIR, ROTATOR CUFF, ARTHROSCOPIC (Left)  ARTHROSCOPY, SHOULDER, WITH SUBACROMIAL SPACE DECOMPRESSION (Left)    Hospital Course (synopsis of major diagnoses, care,  treatment, and services provided during the course of the hospital stay): left shoulder arthroscopic rotator cuff repair, biceps tenodesis, subacromial decompression     Final Diagnosis: Post-Op Diagnosis Codes:     * Nontraumatic tear of left rotator cuff [M75.102]     * Biceps tendinitis of left upper extremity [M75.22]     * Arthritis/arthropathy of multiple joints [M12.9]    Disposition: Home or Self Care    Follow Up/Patient Instructions:     Medications:  Reconciled Home Medications:      Medication List      CONTINUE taking these medications    acetaminophen 650 MG Tbsr  Commonly known as:  TYLENOL  Take 1,300 mg by mouth as needed.     aspirin 81 MG EC tablet  Commonly known as:  ECOTRIN  Take 1 tablet (81 mg total) by mouth 2 (two) times daily. for 14 days     atorvastatin 80 MG tablet  Commonly known as:  LIPITOR  Take 1 tablet (80 mg total) by mouth nightly.     azelastine 137 mcg (0.1 %) nasal spray  Commonly known as:  ASTELIN  1 spray (137 mcg total) by Nasal route 2 (two) times daily.     budesonide 0.5 mg/2 mL nebulizer solution  Commonly known as:  PULMICORT  inhale 1 nebules per nebulizer twice daily, for use in saline irrigation as directed     estradiol 0.01 % (0.1 mg/gram) vaginal cream  Commonly known as:  ESTRACE  Place 1 g vaginally every Mon, Wed, Fri.     flavoxATE 100 mg Tab  Commonly known as:  URISPAS  Take 1 tablet (100 mg total) by mouth 3 (three) times daily as needed.     hydroCHLOROthiazide 25 MG tablet  Commonly known as:  HYDRODIURIL  Take 1 tablet (25 mg total) by mouth once daily.     ibuprofen 600 MG tablet  Commonly known as:  ADVIL,MOTRIN  Take 1 tablet (600 mg total) by mouth every 6 (six) hours as needed for Pain.     meloxicam 15 MG tablet  Commonly known as:  MOBIC  Take 1 tablet (15 mg total) by mouth once daily.     ondansetron 4 MG tablet  Commonly known as:  ZOFRAN  Take 1 tablet (4 mg total) by mouth every 8 (eight) hours as needed.     oxyCODONE 5 MG immediate  release tablet  Commonly known as:  ROXICODONE  Take 1-2 tablets (5-10 mg total) by mouth every 4 to 6 hours as needed for Pain.     oxyCODONE-acetaminophen 5-325 mg per tablet  Commonly known as:  PERCOCET  Take 1 tablet by mouth every 4 (four) hours as needed.     potassium chloride SA 20 MEQ tablet  Commonly known as:  K-DUR,KLOR-CON  Take 1 tablet (20 mEq total) by mouth 2 (two) times daily.     sulfamethoxazole-trimethoprim 800-160mg 800-160 mg Tab  Commonly known as:  BACTRIM DS  Take 1 tablet by mouth 2 (two) times daily. for 5 days     traMADol 50 mg tablet  Commonly known as:  ULTRAM  Take 1 tablet (50 mg total) by mouth daily as needed (severe pain).     VITAMIN D2 50,000 unit Cap  Generic drug:  ergocalciferol  Take 1 capsule (50,000 Units total) by mouth every 7 days.          Discharge Procedure Orders   Diet general     Call MD for:  temperature >100.4     Call MD for:  persistent nausea and vomiting     Call MD for:  severe uncontrolled pain     Call MD for:  difficulty breathing, headache or visual disturbances     Call MD for:  redness, tenderness, or signs of infection (pain, swelling, redness, odor or green/yellow discharge around incision site)     Call MD for:  hives     Call MD for:  persistent dizziness or light-headedness     Call MD for:  extreme fatigue     Lifting restrictions     Remove dressing in 72 hours

## 2019-07-24 NOTE — ANESTHESIA PROCEDURE NOTES
Interscalene Brachial Plexus Catheter    Patient location during procedure: pre-op   Block not for primary anesthetic.  Reason for block: at surgeon's request and post-op pain management   Post-op Pain Location: left shoulder pain  Start time: 7/24/2019 9:51 AM  Timeout: 7/24/2019 9:20 AM   End time: 7/24/2019 10:11 AM    Staffing  Authorizing Provider: Olinda Au MD  Performing Provider: Juliana Aldana MD    Preanesthetic Checklist  Completed: patient identified, site marked, surgical consent, pre-op evaluation, timeout performed, IV checked, risks and benefits discussed and monitors and equipment checked  Peripheral Block  Patient position: sitting  Prep: ChloraPrep and site prepped and draped  Patient monitoring: heart rate, cardiac monitor, continuous pulse ox, continuous capnometry and frequent blood pressure checks  Block type: interscalene  Laterality: left  Injection technique: continuous  Needle  Needle type: Tuohy   Needle gauge: 18 G  Needle length: 2 in  Needle localization: anatomical landmarks and ultrasound guidance  Catheter type: non-stimulating  Catheter size: 20 G  Test dose: lidocaine 1.5% with Epi 1-to-200,000 and negative   -ultrasound image captured on disc.  Assessment  Injection assessment: negative aspiration, negative parasthesia and local visualized surrounding nerve  Paresthesia pain: none  Heart rate change: no  Slow fractionated injection: yes  Additional Notes  VSS.  [facility] RN monitoring vitals throughout procedure.  Patient tolerated procedure well.

## 2019-07-24 NOTE — PROGRESS NOTES
Pt discharged to home . Pt IV removed. Pt has all discharge instructions and personal belongings. Tolerating clear liquids well. No further questions. Adequate for discharge.

## 2019-07-25 NOTE — OP NOTE
[facility]   OPERATIVE REPORT   ORTHOPAEDIC SURGERY   PROVIDER: DR. PILAR OJEDA    PATIENT INFORMATION   Rosamaria Agosto 58 y.o. female 1960   MRN: [id number]   LOCATION: [facility]     DATE OF PROCEDURE: 7/24/2019     PREOPERATIVE DIAGNOSES:   Left  1. Shoulder rotator cuff tear   2. Shoulder biceps tendinopathy  3. Shoulder external impingement  4. Shoulder chondromalacia    POSTOPERATIVE DIAGNOSES:   Left  1. Shoulder rotator cuff tear, medium to large crescent tear with intrasubstance degeneration and delamination  2. Shoulder biceps tendinopathy  3. Shoulder external impingement  4. Shoulder synovitis  5. Shoulder capsulitis  6. Shoulder chondromalacia, grade 3 wear over the inferior central glenoid     OPERATION:   Left  1. Shoulder arthroscopic rotator cuff repair, transosseous equivalent double row (CPT 98964)  2. Shoulder arthroscopic biceps tenodesis (CPT 46741)  3. Shoulder arthroscopic extensive debridement (anterior, posterior glenohumeral joint, subacromial space) (CPT 75195)    4. Shoulder arthroscopic subacromial decompression (CPT 72177)      Surgeon(s) and Role:     * ASMITA Ojeda MD - Primary     * Mejia Albarran MD - Resident - Assisting     * Linda Huerta [facility]    ANESTHESIA: General with interscalene block    ESTIMATED BLOOD LOSS: 20 cc    IMPLANTS:   Implant Name Type Inv. Item Serial No.  Lot No. LRB No. Used   CORKSCREW BIOCOMPOSITE 5.5MM - [id number]  CORKSCREW BIOCOMPOSITE 5.5MM  [employer] [id number] Left 1   ANCHOR BIOCOMP W/3 SUTURES - [id number]  ANCHOR BIOCOMP W/3 SUTURES  [employer] [id number] Left 1   ANCHOR BIOCOMP SWVLLOK - [id number]  ANCHOR BIOCOMP SWVLLOK  [employer] [id number] Left 1   SUTURE ANCH BIOCOM 5.5X19.1MM - [id number]  SUTURE ANCH BIOCOM 5.5X19.1MM  [employer] [id number] Left 1        SPECIMENS:   Specimen (12h ago, onward)    None        COMPLICATIONS: None.     INTRAOPERATIVE COUNTS: Correct.     PROPHYLACTIC IV ANTIBIOTICS: Given  per [facility] Protocol.    INDICATIONS FOR PROCEDURE:   Rosamaria Agosto 58 y.o. female  has been seen and evaluated in the office for continued left shoulder pain and mechanical symptoms.  After a lengthy discussion and failed nonoperative management, the patient wished to proceed with surgical intervention and was fully informed of risks and benefits.    DETAILS OF PROCEDURE:  After the correct operative site was marked by the operating surgeon, an interscalene block was administered by the anesthesia team.  The patient was then taken to the operating room and placed supine on the operating room table, where the patient underwent general anesthesia by the anesthesia team.  The patient was then rolled into the lateral decubitus position with the operative side up.  A well-padded axillary roll, beanbag and pillows were placed.  All pressure points were carefully padded and checked.  The upper extremities and both lower extremities were placed in comfortable positions and were also well-padded.      A verbal timeout was confirmed to identify the patient, operative site and planned operative procedure. It was also confirmed the patient had received preoperative IV antibiotic per protocol.     Examination under anesthesia demonstrated: Forward elevation 180 degrees, external rotation with arm to side 60 degrees, internal rotation at 90 abduction 70 degrees; grade 1 anterior and posterior load and shift, negative sulcus.    The operative upper extremity was then prepped and draped in the usual sterile fashion.     The Spider arm positioner was implemented with balanced suspension and appropriate landmarks were noted on the skin.  A posterior followed by cleveland-superior portals were created and systematic examination of the joint revealed the following:      -Partial tearing of the intra-articular biceps long head tendon with associated tendinitis  -Diffuse synovitis from anterior to posterior with labral degenerative  fraying  -Thickened and inflamed capsular tissue over the anterior rotator interval extending distally through the MGHL  -A posterior push-pull maneuver with probing was performing demonstrating an intact subscapularis  -The undersurface of the superior cuff was torn with an exposed insertional footprint, anterior at the supraspinatus  -No loose bodies  -Grade 3 focal area of chondral loss over the inferior central aspect of the glenoid.  No significant DJD otherwise.    A shaver was again brought in to clear the field of view and to debride torn labrum and undersurface cuff from anterior to posterior. Extensive synovitis was also removed. Cautery was used to resect the interval to the coracoid and to release capsule through the MGHL. All thickened capsular tissue was released adjacent to the glenoid labrum. Care was taken to protect the axillary nerve during this portion of the procedure.     The biceps was tagged with a Fiberlink suture and released with Metzenbaum scissors.     The torn cuff undersurface was debrided carefully with a shaver. Starting at the level of the superior glenoid and moving medially, a hand held rasp was used to complete the capsular sided releases. Care was taken to avoid injury to the suprascapular nerve. The cuff footprint was also debrided and lightly decorticated for healing response.    Attention was then turned to the subacromial space where significant hypertrophic bursa was encountered. Through an anterolateral working portal, shaver and cautery devices were introduced to clear the subacromial space of bursa and adhesions. Bursal reflections to the deltoid fascia anteriorly and posteriorly were taken down to further expand this space. This created a nice room with a view. The undersurface of the acromion was exposed with cautery to delineate bony anatomy. Systematic examination of this space revealed the following:    -Subacromial spurring with narrowing of the anterolateral  subacromial interval  -Degenerative changes with spurring of the medial acromial facet articulation  -Fraying and degeneration of the CA ligament indicative of chronic outlet impingement  -There was high-grade partial bursal sided tearing of the supraspinatus anteriorly which extended to full-thickness involvement over its posterior extent, involving the anterior portion of the infraspinatus.  There was intrasubstance delamination particularly over the posterior aspect of the tear. This was a medium to large size crescent tear. No significant tissue retraction.    Bursal sided releases were performed down to the scapular spine. The tuberosity was prepared with the shaver through accessory posterolateral and posterior portals while looking from the standard anterolateral portal.  The tear was then repaired with Fiberwire suture originating from two 5.5 mm Swivelock medial row anchors passed in horizontal mattress fashion. The medial row was then tied for a total of 5 knots. These suture limbs were then taken laterally to two 4.75 mm Swivelock anchors in a transosseous equivalent double row configuration. The cuff was repaired to its native position on the greater tuberosity without excessive tension. Following repair, probing of the repair site revealed good tissue apposition to the footprint and good construct stability. Of note, the Fiberlink suture used to tag the biceps long head was also incorporated into the anterolateral knotless anchor for arthroscopic biceps tenodesis.    Subacromial decompression was completed using posterior cutting block technique in the standard fashion with a 4.5 mm hillary without difficulty.  The anterior osteophyte was flattened.  Confirmation of adequate resection was confirmed while viewing from the lateral portal and referencing from the posterior acromial undersurface.    The shoulder and subacromial space were then irrigated and fluid was extravasated using suction.     All portals  were reapproximated using 3-0 Nylon. Xeroform and absorbant mepilex pads were placed to cover all incisions.  A polar care shoulder sleeve was secured followed by a  sling with abduction pillow. The patient was then repositioned supine, extubated and taken to the recovery room where the patient arrived in stable condition with the compartments of the arm and forearm soft and good perfusion in all digits.     POSTOPERATIVE PLAN OF CARE:  -Patient will be discharged home according to protocol.  -Physical Therapy: Follow the <3 cm repair rehab protocol.  The patient does have some underlying chondromalacia and will be at risk for stiffness. Tissue quality was good. Repair quality was good. PROM now. AROM starts in 7 weeks. No cuff resistive activity x 12 weeks. No biceps resistive activity x 8 weeks.  -DVT prophylaxis: ASA 81 mg twice a day x 2 weeks.

## 2019-07-25 NOTE — PROGRESS NOTES
Called patient with the number provided. Patient doing well. Pain controlled with OnQ PNC, No signs or symptoms of local anesthetic toxicity. Re-iterated that the catheter is to be removed tomorrow and to check that the blue catheter tip is intact. All questions answered.     Karina Murillo MD

## 2019-07-26 ENCOUNTER — CLINICAL SUPPORT (OUTPATIENT)
Dept: REHABILITATION | Facility: HOSPITAL | Age: 59
End: 2019-07-26
Payer: COMMERCIAL

## 2019-07-26 DIAGNOSIS — Z98.890 S/P ROTATOR CUFF REPAIR: ICD-10-CM

## 2019-07-26 DIAGNOSIS — M75.102 ROTATOR CUFF TEAR ARTHROPATHY OF LEFT SHOULDER: ICD-10-CM

## 2019-07-26 DIAGNOSIS — M12.812 ROTATOR CUFF TEAR ARTHROPATHY OF LEFT SHOULDER: ICD-10-CM

## 2019-07-26 DIAGNOSIS — M25.612 DECREASED ROM OF LEFT SHOULDER: ICD-10-CM

## 2019-07-26 PROCEDURE — 97162 PT EVAL MOD COMPLEX 30 MIN: CPT | Mod: PO

## 2019-07-26 PROCEDURE — 97110 THERAPEUTIC EXERCISES: CPT | Mod: PO

## 2019-07-26 NOTE — PROGRESS NOTES
See eval in POC under treatment section to thiago    [facility]  Physical Therapy Initial Evaluation    Name: Rosamaria Agosto  Clinic Number: [id number]    Therapy Diagnosis: decreased ROM and pain at the shoulder s/p RTC  Physician: Arina Corea, LOGAN    Physician Orders: PT Eval and Treat   Medical Diagnosis from Referral: s/p RTC repair and biceps tenodesis  Evaluation Date: 7/26/2019  Authorization Period Expiration: 12/31/19  Plan of Care Expiration: 10/31/19  Visit # / Visits authorized: 1/1    Time In: 300 pm  Time Out: 355 pm  Total Billable Time: 55 minutes Ramin , TE -1    Precautions: Standard, RTC protocol NO AROM at elbow joint s/p biceps tenodesis, PROM at elbow and shoulder but no excessive ER stopping at initial end feel per phase I protocol and follow up with MD for protocol    Subjective   Date of onset: 7/24/19 s/p RTC repair and biceps tenodesis  History of current condition - Rosamaria is right handed and  reports that the original injury was 1 year ago with attempts with PT services and pain management for Left shoulder pain. After months of unscuccessful therapy she was examined and referred to ortho for current diagnosis.  Pt is 2 days post- op for evaluation of phase I protocol.     Medical History:   Past Medical History:   Diagnosis Date    Abnormal Pap smear of cervix yrs ago    Arthritis     History of uterine fibroid     Hyperlipidemia     Hypertension     Shoulder injury     lt    Sinus problem     Sinusitis        Surgical History:   Rosamaria Agosto  has a past surgical history that includes Breast biopsy (24-25 years old); Tubal ligation; Hysterectomy (93'-95'); Encephalocele repair (46); Nasal sinus surgery; Vaginal delivery; Breast surgery; Robot-assisted laparoscopic abdominal sacrocolpopexy using da Shailesh Xi (N/A, 6/24/2019); Cystoscopy (N/A, 6/24/2019); Shoulder arthroscopy (Left, 7/24/2019); Arthroscopic repair of rotator cuff of  shoulder (Left, 7/24/2019); and Arthroscopy of shoulder with decompression of subacromial space (Left, 7/24/2019).    Medications:   Rosamaria has a current medication list which includes the following prescription(s): acetaminophen, aspirin, atorvastatin, azelastine, budesonide, estradiol, flavoxate, hydrochlorothiazide, ibuprofen, meloxicam, ondansetron, oxycodone, oxycodone-acetaminophen, potassium chloride sa, sulfamethoxazole-trimethoprim 800-160mg, tramadol, and vitamin d2.    Allergies:   Review of patient's allergies indicates:  No Known Allergies     Imaging, MRI studies: and X ray    MRI Shoulder Without Contrast Left   Order: [id number]   Status:  Final result   Visible to patient:  Yes (Patient Portal)   Next appt:  07/29/2019 at 10:00 AM in Outpatient Rehab (Gerson Hubbard, PT)   Dx:  Left shoulder pain, unspecified chron...   Details     Reading Physician Reading Date Result Priority   Mehdi Adair MD 5/3/2019       Narrative     EXAMINATION:  MRI SHOULDER WITHOUT CONTRAST LEFT    CLINICAL HISTORY:  Shoulder pain, prior xray, rotator cuff tear / impingement suspected;    TECHNIQUE:  Routine MRI evaluation of the left shoulder performed without contrast using the following sequences: Coronal PD, T2 FS; Sagittal T2 FS, T1; axial PD FS.    COMPARISON:  Radiograph 03/12/2019.    FINDINGS:  ROTATOR CUFF: There is a near full-thickness to full-thickness tear of the supraspinatus tendon that involves the anterior infraspinatus and demonstrates a crescent morphology with origin at the level of the footprint and retraction of 1.5 cm.  Note is made of fluid at the myotendinous junction of the infraspinatus tendon, likely related to a delaminating interstitial component.  There is subscapularis tendinosis with mild undersurface fraying.  Teres minor tendon is intact.  Muscle bulk is preserved.    LABRUM: There is a suspected posterior labral tear that extends from superior to inferior ( o'clock).  There is  suspected free edge fraying of the superior labrum.  No paralabral cyst.    BICEPS: There is thickening and increased signal intensity within the intra-articular biceps tendon, likely degenerative in nature.    BONES: Degenerative changes noted about the greater and lesser tuberosities.  No fractures.  No avascular necrosis.  No marrow infiltrative process..    AC JOINT: There is prominent AC joint hypertrophy.  There is a flat lateral acromion with prominent undersurface spurring.    CARTILAGE: There is suspected superficial and deep cartilage fissuring overlying the anterior glenoid noting a few foci of subchondral edema.    MISCELLANEOUS: There is complex fluid within the subacromial/subdeltoid bursa.  There is a small joint effusion with associated synovitis.  No definite intra-articular loose bodies.  Inferior glenohumeral ligament is intact.  No axillary lymphadenopathy.      Impression       1. Near full-thickness to full-thickness tear of the supraspinatus and infraspinatus tendons with mild retraction.  No significant volume loss or fatty infiltration.  2. Probable posterior labral tear.  Suspected fraying of the superior labrum.  3. Subscapularis tendinosis with undersurface fraying.  4. Biceps tendinosis.  5. Chondral fissuring over the anterior glenoid.  6. Prominent AC joint hypertrophy and subacromial spurring.  7. Subacromial/subdeltoid bursitis.      Electronically signed by: Mehdi Adair MD  Date: 05/03/2019  Time: 22:57                 X-Ray Shoulder Left 1 View   Order: [id number]   Status:  Final result   Visible to patient:  Yes (Patient Portal)   Next appt:  07/29/2019 at 10:00 AM in Outpatient Rehab (Gerson Hubbard, CASIE)   Dx:  Subacromial bursitis; Complete rotato...   Details     Reading Physician Reading Date Result Priority   Mejia Beavers MD 5/16/2019       Narrative     EXAMINATION:  XR SHOULDER 1 VIEW LEFT    CLINICAL HISTORY:  Complete rotator cuff tear or rupture of left shoulder, not  specified as traumatic    COMPARISON:  None    FINDINGS:  No fracture or dislocation.  No bone destruction identified.      Impression       See above      Electronically signed by: Mejia Beavers MD  Date: 05/16/2019  Time: 12:23             Prior Therapy: yes for RTC tear   Social History: lives with family  Occupation: food services  Prior Level of Function: independent community ambulation  Current Level of Function: limited community mobility and assist with ADLs    Pain:  Current 7/10, worst 9/10, best 5/10   Location: left shoulder   Description: Aching and Throbbing  Aggravating Factors: Getting out of bed/chair and motion with transitions or mobility  Easing Factors: movement out of the sling    Pts goals: pt to get more motion back into left arm like before the injury    Objective     Evaluation limited greatly but s/p RTC repair and biceps tenodesis    Patient is right handed and RUE WNL with ROM and strength      PROM   Left elbow   Flexion  120 deg  Extension  -10 deg at initial end feel    Left forearm supination, pronation, wrist flexion, extension and ulnar/radial deviation WNL    Left shoulder  Flexion  60 deg  Extension  20 deg  ER   0 deg stopped at initial end feel  IR  30 deg    Strength L shoulder deferred due to restriction with AROM     Sensation grossly intact BUEs with light touch and proprioception    Edema at left shoulder to elbow +2 non pitting edema        CMS Impairment/Limitation/Restriction for FOTO shoulder/ upper extremity Survey    Therapist reviewed FOTO scores for Rosamaria Agosto on 7/26/2019.   FOTO documents entered into [employer] - see Media section.    Limitation Score: 70%  Category: Self Care    Current : CL = least 60% but < 80% impaired, limited or restricted  Goal: CK = at least 40% but < 60% impaired, limited or restricted  Discharge:          TREATMENT   Treatment Time In: 335 pm  Treatment Time Out: 355 pm  Total Treatment time separate from Evaluation: 20  minutes    Rosamaria received therapeutic exercises to develop ROM for 20  minutes including: shoulder pendulum and middle range PROM at the elbow and supination pronation, AROM wrist extension and flexion and gripping activity/ ball squeeze.  Pt educ at this time with donning and doffing of medically prescribed post op sling for proper positioning and instructed to take a photo when getting to the car to maintain proper positioning.  Pt performed pendulum 2-3 x  Allow trunk and torso to initiate motion and sway in circular pattern for PROM. Pt instructed and return demo on non surgical right arm before L surgical arm with good safety    Home Exercises and Patient Education Provided    Education provided:   Sleeping position, sling positioning and shoulder pendulums, needed mod cues with use of RUE for PROM of Left elbow so deferred at this time with concerns about compliance with PROM only at elbow with biceps tenodesis.       Written Home Exercises Provided: yes.  Exercises were reviewed and Rosamaria was able to demonstrate them prior to the end of the session.  Rosamaria demonstrated good  understanding of the education provided.     See EMR under Patient Instructions for exercises provided 7/26/2019.    Assessment   Rosamaria is a 58 y.o. female referred to outpatient Physical Therapy with a medical diagnosis of s/p RTC and biceps tenodesis on L shoulder 2 days ago. Pt presents with increased pain, limited ROM, shoulder precautions, decreased positioning and management of sling.  Pt with decreased overall mobility but very receptive to education. Pt is apprehensive of injury to post op care and shoulder repair.  Pt with moderate edema in area and poor positioning of shoulder into extension and ER with slling likely cause mild strain and increased pain to surgically repaired shoulder.  Pt was able to return demo on pendulum with proper form and technique on R unaffected shoulder initially and with L surgical repaired.  Pt will  need re-enforcement with limits and POC with precautions.    Pt prognosis is Good.   Pt will benefit from skilled outpatient Physical Therapy to address the deficits stated above and in the chart below, provide pt/family education, and to maximize pt's level of independence.     Plan of care discussed with patient: Yes  Pt's spiritual, cultural and educational needs considered and patient is agreeable to the plan of care and goals as stated below:     Anticipated Barriers for therapy: pain and precautions    Medical Necessity is demonstrated by the following  History  Co-morbidities and personal factors that may impact the plan of care Co-morbidities:   none    Personal Factors:   no deficits     low   Examination  Body Structures and Functions, activity limitations and participation restrictions that may impact the plan of care Body Regions:   neck  upper extremities    Body Systems:    ROM  strength  gross coordinated movement  transfers  transitions    Participation Restrictions:   none    Activity limitations:   Learning and applying knowledge  no deficits    General Tasks and Commands  no deficits    Communication  no deficits    Mobility  lifting and carrying objects  driving (bike, car, motorcycle)    Self care  washing oneself (bathing, drying, washing hands)  dressing    Domestic Life  shopping  cooking  doing house work (cleaning house, washing dishes, laundry)    Interactions/Relationships  no deficits    Life Areas  no deficits    Community and Social Life  recreation and leisure         moderate   Clinical Presentation stable and uncomplicated moderate   Decision Making/ Complexity Score: moderate     Goals:  Short Term Goals: 3 weeks   1.  Pt independent with HEP for s/p RTC repair and biceps tenodesis with return demonstration  2.  Pt to be independent with donning and doffing surgical sling for proper pain management   3.  Pt to increase PROM as tolerated at left shoulder to 90 deg shoulder flexion  and 80 deg abduction without pain  4.  Pt to decrease overall pain at left shoulder to less than 4/10 after session    Long Term Goals: 12 weeks   1.  Pt to been independent with discharge HEP without cues and proper form.  2.  Pt return to independent ADLs to include dressing, grooming with BUEs without compensation.  3.  Pt to increased AROM at elbow WNL and Left shoulder to 170 deg flexion and 160 deg abduction, 70 deg ER without pain or compensation  4.  Pt to increased L shoulder strength to 4+/5 into flexion extension, ER, IR   5  Pt to increase tolerance to perform 1 hour of household chores, ie cleaning without increased L shoulder pain  6. Pt to decrease pain at left shoulder to 3/10 after session for improved functional activities      Plan   Plan of care Certification: 7/26/2019 to 10/31/19    Outpatient Physical Therapy 2 times weekly for 10 weeks to include the following interventions: Cervical/Lumbar Traction, Electrical Stimulation ,, Manual Therapy, Moist Heat/ Ice, Neuromuscular Re-ed, Patient Education, Therapeutic Exercise and Ultrasound.     Shanel Carvalho, PT

## 2019-07-26 NOTE — PROGRESS NOTES
Called patient with the number provided. Patient doing well. Pain controlled with OnQ PNC, No signs or symptoms of local anesthetic toxicity. Re-iterated that the catheter is to be removed today and to check that the blue catheter tip is intact. All questions answered. Patient voiced understanding, catheter to removed shortly per relative.    Karina Murillo MD

## 2019-07-27 NOTE — PLAN OF CARE
[facility]  Physical Therapy Initial Evaluation    Name: Rosamaria Agosto  Clinic Number: [id number]    Therapy Diagnosis: decreased ROM and pain at the shoulder s/p RTC  Physician: Arina Corea PA-C    Physician Orders: PT Eval and Treat   Medical Diagnosis from Referral: s/p RTC repair and biceps tenodesis  Evaluation Date: 7/26/2019  Authorization Period Expiration: 12/31/19  Plan of Care Expiration: 10/31/19  Visit # / Visits authorized: 1/1    Time In: 300 pm  Time Out: 355 pm  Total Billable Time: 55 minutes Ramin TE -1    Precautions: Standard, RTC protocol NO AROM at elbow joint s/p biceps tenodesis, PROM at elbow and shoulder but no excessive ER stopping at initial end feel per phase I protocol and follow up with MD for protocol    Subjective   Date of onset: 7/24/19 s/p RTC repair and biceps tenodesis  History of current condition - Rosamaria is right handed and  reports that the original injury was 1 year ago with attempts with PT services and pain management for Left shoulder pain. After months of unscuccessful therapy she was examined and referred to ortho for current diagnosis.  Pt is 2 days post- op for evaluation of phase I protocol.     Medical History:   Past Medical History:   Diagnosis Date    Abnormal Pap smear of cervix yrs ago    Arthritis     History of uterine fibroid     Hyperlipidemia     Hypertension     Shoulder injury     lt    Sinus problem     Sinusitis        Surgical History:   Rosamaria Agosto  has a past surgical history that includes Breast biopsy (24-25 years old); Tubal ligation; Hysterectomy (93'-95'); Encephalocele repair (46); Nasal sinus surgery; Vaginal delivery; Breast surgery; Robot-assisted laparoscopic abdominal sacrocolpopexy using da Shailesh Xi (N/A, 6/24/2019); Cystoscopy (N/A, 6/24/2019); Shoulder arthroscopy (Left, 7/24/2019); Arthroscopic repair of rotator cuff of shoulder (Left, 7/24/2019); and Arthroscopy of shoulder with  decompression of subacromial space (Left, 7/24/2019).    Medications:   Rosamaria has a current medication list which includes the following prescription(s): acetaminophen, aspirin, atorvastatin, azelastine, budesonide, estradiol, flavoxate, hydrochlorothiazide, ibuprofen, meloxicam, ondansetron, oxycodone, oxycodone-acetaminophen, potassium chloride sa, sulfamethoxazole-trimethoprim 800-160mg, tramadol, and vitamin d2.    Allergies:   Review of patient's allergies indicates:  No Known Allergies     Imaging, MRI studies: and X ray    MRI Shoulder Without Contrast Left   Order: [id number]   Status:  Final result   Visible to patient:  Yes (Patient Portal)   Next appt:  07/29/2019 at 10:00 AM in Outpatient Rehab (Gerson Hubbard, PT)   Dx:  Left shoulder pain, unspecified chron...   Details     Reading Physician Reading Date Result Priority   Mehdi Adair MD 5/3/2019       Narrative     EXAMINATION:  MRI SHOULDER WITHOUT CONTRAST LEFT    CLINICAL HISTORY:  Shoulder pain, prior xray, rotator cuff tear / impingement suspected;    TECHNIQUE:  Routine MRI evaluation of the left shoulder performed without contrast using the following sequences: Coronal PD, T2 FS; Sagittal T2 FS, T1; axial PD FS.    COMPARISON:  Radiograph 03/12/2019.    FINDINGS:  ROTATOR CUFF: There is a near full-thickness to full-thickness tear of the supraspinatus tendon that involves the anterior infraspinatus and demonstrates a crescent morphology with origin at the level of the footprint and retraction of 1.5 cm.  Note is made of fluid at the myotendinous junction of the infraspinatus tendon, likely related to a delaminating interstitial component.  There is subscapularis tendinosis with mild undersurface fraying.  Teres minor tendon is intact.  Muscle bulk is preserved.    LABRUM: There is a suspected posterior labral tear that extends from superior to inferior ( o'clock).  There is suspected free edge fraying of the superior labrum.  No  paralabral cyst.    BICEPS: There is thickening and increased signal intensity within the intra-articular biceps tendon, likely degenerative in nature.    BONES: Degenerative changes noted about the greater and lesser tuberosities.  No fractures.  No avascular necrosis.  No marrow infiltrative process..    AC JOINT: There is prominent AC joint hypertrophy.  There is a flat lateral acromion with prominent undersurface spurring.    CARTILAGE: There is suspected superficial and deep cartilage fissuring overlying the anterior glenoid noting a few foci of subchondral edema.    MISCELLANEOUS: There is complex fluid within the subacromial/subdeltoid bursa.  There is a small joint effusion with associated synovitis.  No definite intra-articular loose bodies.  Inferior glenohumeral ligament is intact.  No axillary lymphadenopathy.      Impression       1. Near full-thickness to full-thickness tear of the supraspinatus and infraspinatus tendons with mild retraction.  No significant volume loss or fatty infiltration.  2. Probable posterior labral tear.  Suspected fraying of the superior labrum.  3. Subscapularis tendinosis with undersurface fraying.  4. Biceps tendinosis.  5. Chondral fissuring over the anterior glenoid.  6. Prominent AC joint hypertrophy and subacromial spurring.  7. Subacromial/subdeltoid bursitis.      Electronically signed by: Mehdi Adair MD  Date: 05/03/2019  Time: 22:57                 X-Ray Shoulder Left 1 View   Order: [id number]   Status:  Final result   Visible to patient:  Yes (Patient Portal)   Next appt:  07/29/2019 at 10:00 AM in Outpatient Rehab (Gerson Hubbard, PT)   Dx:  Subacromial bursitis; Complete rotato...   Details     Reading Physician Reading Date Result Priority   Mejia Beavers MD 5/16/2019       Narrative     EXAMINATION:  XR SHOULDER 1 VIEW LEFT    CLINICAL HISTORY:  Complete rotator cuff tear or rupture of left shoulder, not specified as  traumatic    COMPARISON:  None    FINDINGS:  No fracture or dislocation.  No bone destruction identified.      Impression       See above      Electronically signed by: Mejia Beavers MD  Date: 05/16/2019  Time: 12:23             Prior Therapy: yes for RTC tear   Social History: lives with family  Occupation: food services  Prior Level of Function: independent community ambulation  Current Level of Function: limited community mobility and assist with ADLs    Pain:  Current 7/10, worst 9/10, best 5/10   Location: left shoulder   Description: Aching and Throbbing  Aggravating Factors: Getting out of bed/chair and motion with transitions or mobility  Easing Factors: movement out of the sling    Pts goals: pt to get more motion back into left arm like before the injury    Objective     Evaluation limited greatly but s/p RTC repair and biceps tenodesis    Patient is right handed and RUE WNL with ROM and strength      PROM   Left elbow   Flexion  120 deg  Extension  -10 deg at initial end feel    Left forearm supination, pronation, wrist flexion, extension and ulnar/radial deviation WNL    Left shoulder  Flexion  60 deg  Extension  20 deg  ER   0 deg stopped at initial end feel  IR  30 deg    Strength L shoulder deferred due to restriction with AROM     Sensation grossly intact BUEs with light touch and proprioception    Edema at left shoulder to elbow +2 non pitting edema        CMS Impairment/Limitation/Restriction for FOTO shoulder/ upper extremity Survey    Therapist reviewed FOTO scores for Rosamaria Agosto on 7/26/2019.   FOTO documents entered into [employer] - see Media section.    Limitation Score: 70%  Category: Self Care    Current : CL = least 60% but < 80% impaired, limited or restricted  Goal: CK = at least 40% but < 60% impaired, limited or restricted  Discharge:          TREATMENT   Treatment Time In: 335 pm  Treatment Time Out: 355 pm  Total Treatment time separate from Evaluation: 20 minutes    Rosamaria received  therapeutic exercises to develop ROM for 20  minutes including: shoulder pendulum and middle range PROM at the elbow and supination pronation, AROM wrist extension and flexion and gripping activity/ ball squeeze.  Pt educ at this time with donning and doffing of medically prescribed post op sling for proper positioning and instructed to take a photo when getting to the car to maintain proper positioning.  Pt performed pendulum 2-3 x  Allow trunk and torso to initiate motion and sway in circular pattern for PROM. Pt instructed and return demo on non surgical right arm before L surgical arm with good safety    Home Exercises and Patient Education Provided    Education provided:   Sleeping position, sling positioning and shoulder pendulums, needed mod cues with use of RUE for PROM of Left elbow so deferred at this time with concerns about compliance with PROM only at elbow with biceps tenodesis.       Written Home Exercises Provided: yes.  Exercises were reviewed and Rosamaria was able to demonstrate them prior to the end of the session.  Rosamaria demonstrated good  understanding of the education provided.     See EMR under Patient Instructions for exercises provided 7/26/2019.    Assessment   Rosamaria is a 58 y.o. female referred to outpatient Physical Therapy with a medical diagnosis of s/p RTC and biceps tenodesis on L shoulder 2 days ago. Pt presents with increased pain, limited ROM, shoulder precautions, decreased positioning and management of sling.  Pt with decreased overall mobility but very receptive to education. Pt is apprehensive of injury to post op care and shoulder repair.  Pt with moderate edema in area and poor positioning of shoulder into extension and ER with slling likely cause mild strain and increased pain to surgically repaired shoulder.  Pt was able to return demo on pendulum with proper form and technique on R unaffected shoulder initially and with L surgical repaired.  Pt will need re-enforcement with  limits and POC with precautions.    Pt prognosis is Good.   Pt will benefit from skilled outpatient Physical Therapy to address the deficits stated above and in the chart below, provide pt/family education, and to maximize pt's level of independence.     Plan of care discussed with patient: Yes  Pt's spiritual, cultural and educational needs considered and patient is agreeable to the plan of care and goals as stated below:     Anticipated Barriers for therapy: pain and precautions    Medical Necessity is demonstrated by the following  History  Co-morbidities and personal factors that may impact the plan of care Co-morbidities:   none    Personal Factors:   no deficits     low   Examination  Body Structures and Functions, activity limitations and participation restrictions that may impact the plan of care Body Regions:   neck  upper extremities    Body Systems:    ROM  strength  gross coordinated movement  transfers  transitions    Participation Restrictions:   none    Activity limitations:   Learning and applying knowledge  no deficits    General Tasks and Commands  no deficits    Communication  no deficits    Mobility  lifting and carrying objects  driving (bike, car, motorcycle)    Self care  washing oneself (bathing, drying, washing hands)  dressing    Domestic Life  shopping  cooking  doing house work (cleaning house, washing dishes, laundry)    Interactions/Relationships  no deficits    Life Areas  no deficits    Community and Social Life  recreation and leisure         moderate   Clinical Presentation stable and uncomplicated moderate   Decision Making/ Complexity Score: moderate     Goals:  Short Term Goals: 3 weeks   1.  Pt independent with HEP for s/p RTC repair and biceps tenodesis with return demonstration  2.  Pt to be independent with donning and doffing surgical sling for proper pain management   3.  Pt to increase PROM as tolerated at left shoulder to 90 deg shoulder flexion and 80 deg abduction  without pain  4.  Pt to decrease overall pain at left shoulder to less than 4/10 after session    Long Term Goals: 12 weeks   1.  Pt to been independent with discharge HEP without cues and proper form.  2.  Pt return to independent ADLs to include dressing, grooming with BUEs without compensation.  3.  Pt to increased AROM at elbow WNL and Left shoulder to 170 deg flexion and 160 deg abduction, 70 deg ER without pain or compensation  4.  Pt to increased L shoulder strength to 4+/5 into flexion extension, ER, IR   5  Pt to increase tolerance to perform 1 hour of household chores, ie cleaning without increased L shoulder pain  6. Pt to decrease pain at left shoulder to 3/10 after session for improved functional activities      Plan   Plan of care Certification: 7/26/2019 to 10/31/19    Outpatient Physical Therapy 2 times weekly for 10 weeks to include the following interventions: Cervical/Lumbar Traction, Electrical Stimulation ,, Manual Therapy, Moist Heat/ Ice, Neuromuscular Re-ed, Patient Education, Therapeutic Exercise and Ultrasound.     Shanel Carvalho, PT

## 2019-07-29 ENCOUNTER — CLINICAL SUPPORT (OUTPATIENT)
Dept: REHABILITATION | Facility: HOSPITAL | Age: 59
End: 2019-07-29
Payer: COMMERCIAL

## 2019-07-29 DIAGNOSIS — M25.612 DECREASED ROM OF LEFT SHOULDER: ICD-10-CM

## 2019-07-29 PROCEDURE — 97110 THERAPEUTIC EXERCISES: CPT | Mod: PO

## 2019-07-30 ENCOUNTER — TELEPHONE (OUTPATIENT)
Dept: SPORTS MEDICINE | Facility: CLINIC | Age: 59
End: 2019-07-30

## 2019-07-30 NOTE — PROGRESS NOTES
[facility]  Physical Therapy Note     Name: Rosamaria Agosto  Clinic Number: [id number]     Therapy Diagnosis: decreased ROM and pain at the shoulder s/p RTC  Physician: Arina Corea PA-C     Physician Orders: PT Eval and Treat   Medical Diagnosis from Referral: s/p RTC repair and biceps tenodesis  Evaluation Date: 7/26/2019  Authorization Period Expiration: 12/31/19  Plan of Care Expiration: 10/31/19  Visit # / Visits authorized: 2/     Time In:    Time Out:   Total Billable Time: 30 min     Precautions: Standard, RTC protocol NO AROM at elbow joint s/p biceps tenodesis, PROM at elbow and shoulder but no excessive ER stopping at initial end feel per phase I protocol and follow up with MD for protocol     Subjective     Pt stated that she has been sleeping a little better, has some questions about the sling.  Pain today is at 3/10, feels really stiff, but he pendulums seem to help.    Objective      TREATMENT        Rosamaria received therapeutic exercises to develop ROM for 30  minutes including:   -shoulder pendulum and middle range  - PROM at the elbow with limited strain to bicep at ext  - Prom shoulder abd, scaption within protocol    Education provided:   Sleeping position, sling positioning and shoulder pendulums, donning strategy for sling, review post surgery restrictions.         Written Home Exercises Provided: yes.  Exercises were reviewed and Rosamaria was able to demonstrate them prior to the end of the session.  Rosamaria demonstrated good  understanding of the education provided.      See EMR under Patient Instructions for exercises provided 7/26/2019.     Assessment     Pt did well with PROM today.  Max pain at endrange motion was 5/10, endfeel was guarded to elastic in scaption and abd.  Pt demonstrated good technique with pendulums.  Pt cont to require PT to address ROM defecits within post surgical protocol.     Goals:  Short Term Goals: 3 weeks   1.  Pt independent with  HEP for s/p RTC repair and biceps tenodesis with return demonstration  2.  Pt to be independent with donning and doffing surgical sling for proper pain management   3.  Pt to increase PROM as tolerated at left shoulder to 90 deg shoulder flexion and 80 deg abduction without pain  4.  Pt to decrease overall pain at left shoulder to less than 4/10 after session     Long Term Goals: 12 weeks   1.  Pt to been independent with discharge HEP without cues and proper form.  2.  Pt return to independent ADLs to include dressing, grooming with BUEs without compensation.  3.  Pt to increased AROM at elbow WNL and Left shoulder to 170 deg flexion and 160 deg abduction, 70 deg ER without pain or compensation  4.  Pt to increased L shoulder strength to 4+/5 into flexion extension, ER, IR   5  Pt to increase tolerance to perform 1 hour of household chores, ie cleaning without increased L shoulder pain  6. Pt to decrease pain at left shoulder to 3/10 after session for improved functional activities        Plan   Plan of care Certification: 7/26/2019 to 10/31/19  Cont with PROM, introduce more periscapular work.

## 2019-07-30 NOTE — TELEPHONE ENCOUNTER
----- Message from Akiko Mark sent at 7/30/2019 11:04 AM CDT -----  Contact: Self  Pt called to check the status of her leave of absence paperwork    contact pt @ [phone]

## 2019-07-31 ENCOUNTER — CLINICAL SUPPORT (OUTPATIENT)
Dept: REHABILITATION | Facility: HOSPITAL | Age: 59
End: 2019-07-31
Payer: COMMERCIAL

## 2019-07-31 DIAGNOSIS — M75.112 INCOMPLETE TEAR OF LEFT ROTATOR CUFF: ICD-10-CM

## 2019-07-31 DIAGNOSIS — M25.612 DECREASED ROM OF LEFT SHOULDER: ICD-10-CM

## 2019-07-31 PROCEDURE — 97110 THERAPEUTIC EXERCISES: CPT | Mod: PO

## 2019-07-31 NOTE — PROGRESS NOTES
[facility]  Physical Therapy Note     Name: Rosamaria Agosto  Clinic Number: [id number]     Therapy Diagnosis: decreased ROM and pain at the shoulder s/p RTC  Physician: Arina Corea PA-C     Physician Orders: PT Eval and Treat   Medical Diagnosis from Referral: s/p RTC repair and biceps tenodesis  Evaluation Date: 7/26/2019  Authorization Period Expiration: 12/31/19  Plan of Care Expiration: 10/31/19  Visit # / Visits authorized: 3/30     Time In:  1005am  Time Out: 1040am  Total Billable Time: 30 min     Precautions: Standard, RTC protocol NO AROM at elbow joint s/p biceps tenodesis, PROM at elbow and shoulder but no excessive ER stopping at initial end feel per phase I protocol and follow up with MD for protocol     Subjective     Pt stated that she was able to do all the HEP, pendulums still help with the tightness.  Pain today is at 3/10, still feels stiff.    Objective:          TREATMENT        Rosamaria received therapeutic exercises to develop ROM for 30  minutes including:   -shoulder pendulum and middle range  - PROM at the elbow with limited strain to bicep at ext  - Prom shoulder abd, scaption within protocol  - Pendulums 3 min  - scap retraction/depression 3x15 ea    Education provided:   Sleeping position, sling positioning and shoulder pendulums, donning strategy for sling, review post surgery restrictions. Pt issued putty for , decreased edema pooling at elbow        Written Home Exercises Provided: yes.  Exercises were reviewed and Rosamaria was able to demonstrate them prior to the end of the session.  Rosamaria demonstrated good  understanding of the education provided.      See EMR under Patient Instructions for exercises provided 7/26/2019.     Assessment     Pt did well with PROM today.  Max pain at endrange motion was 5/10, endfeel was guarded to elastic in scaption and abd.  Pt demonstrated good technique with pendulums.  Pt cont to require PT to address ROM  defecits within post surgical protocol.     Goals:  Short Term Goals: 3 weeks   1.  Pt independent with HEP for s/p RTC repair and biceps tenodesis with return demonstration  2.  Pt to be independent with donning and doffing surgical sling for proper pain management   3.  Pt to increase PROM as tolerated at left shoulder to 90 deg shoulder flexion and 80 deg abduction without pain  4.  Pt to decrease overall pain at left shoulder to less than 4/10 after session     Long Term Goals: 12 weeks   1.  Pt to been independent with discharge HEP without cues and proper form.  2.  Pt return to independent ADLs to include dressing, grooming with BUEs without compensation.  3.  Pt to increased AROM at elbow WNL and Left shoulder to 170 deg flexion and 160 deg abduction, 70 deg ER without pain or compensation  4.  Pt to increased L shoulder strength to 4+/5 into flexion extension, ER, IR   5  Pt to increase tolerance to perform 1 hour of household chores, ie cleaning without increased L shoulder pain  6. Pt to decrease pain at left shoulder to 3/10 after session for improved functional activities        Plan   Plan of care Certification: 7/26/2019 to 10/31/19  Cont with PROM, measure progress next visit

## 2019-08-05 ENCOUNTER — OFFICE VISIT (OUTPATIENT)
Dept: UROGYNECOLOGY | Facility: CLINIC | Age: 59
End: 2019-08-05
Payer: COMMERCIAL

## 2019-08-05 VITALS — SYSTOLIC BLOOD PRESSURE: 140 MMHG | DIASTOLIC BLOOD PRESSURE: 70 MMHG | HEIGHT: 69 IN | BODY MASS INDEX: 28.06 KG/M2

## 2019-08-05 DIAGNOSIS — Z09 POSTOP CHECK: Primary | ICD-10-CM

## 2019-08-05 DIAGNOSIS — N30.00 ACUTE CYSTITIS WITHOUT HEMATURIA: ICD-10-CM

## 2019-08-05 PROCEDURE — 87086 URINE CULTURE/COLONY COUNT: CPT

## 2019-08-05 PROCEDURE — 87088 URINE BACTERIA CULTURE: CPT

## 2019-08-05 PROCEDURE — 99024 PR POST-OP FOLLOW-UP VISIT: ICD-10-PCS | Mod: S$GLB,,, | Performed by: OBSTETRICS & GYNECOLOGY

## 2019-08-05 PROCEDURE — 99999 PR PBB SHADOW E&M-EST. PATIENT-LVL II: CPT | Mod: PBBFAC,,, | Performed by: OBSTETRICS & GYNECOLOGY

## 2019-08-05 PROCEDURE — 87186 SC STD MICRODIL/AGAR DIL: CPT

## 2019-08-05 PROCEDURE — 99024 POSTOP FOLLOW-UP VISIT: CPT | Mod: S$GLB,,, | Performed by: OBSTETRICS & GYNECOLOGY

## 2019-08-05 PROCEDURE — 87077 CULTURE AEROBIC IDENTIFY: CPT

## 2019-08-05 PROCEDURE — 99999 PR PBB SHADOW E&M-EST. PATIENT-LVL II: ICD-10-PCS | Mod: PBBFAC,,, | Performed by: OBSTETRICS & GYNECOLOGY

## 2019-08-05 RX ORDER — SULFAMETHOXAZOLE AND TRIMETHOPRIM 800; 160 MG/1; MG/1
1 TABLET ORAL 2 TIMES DAILY
Qty: 10 TABLET | Refills: 0 | Status: SHIPPED | OUTPATIENT
Start: 2019-08-05 | End: 2019-08-10

## 2019-08-05 RX ORDER — FLUCONAZOLE 150 MG/1
150 TABLET ORAL DAILY
Qty: 1 TABLET | Refills: 0 | Status: SHIPPED | OUTPATIENT
Start: 2019-08-05 | End: 2019-08-06

## 2019-08-05 NOTE — PROGRESS NOTES
Subjective:      Patient ID: Rosamaria Agosto is a 58 y.o. female.    Chief Complaint:  Post-op Evaluation      History of Present Illness  Patient is now about 6 weeks postop from robotic reconstruction doing very well.  Patient with no incontinence no pain occasional dysuria          Past Medical History:   Diagnosis Date    Abnormal Pap smear of cervix yrs ago    Arthritis     History of uterine fibroid     Hyperlipidemia     Hypertension     Shoulder injury     lt    Sinus problem     Sinusitis        Past Surgical History:   Procedure Laterality Date    ARTHROSCOPY, SHOULDER, WITH SUBACROMIAL SPACE DECOMPRESSION Left 2019    Performed by [facility] at [facility] OR 2ND [facility]    BICEPS TENODESIS Left 2019    Performed by [facility] at [facility]    BREAST BIOPSY  24-25 years old    BREAST SURGERY      CYSTOSCOPY N/A 2019    [facility]formed by [facility] at [facility] OR    ENCEPHALOCELE REPAIR  46    HYSTERECTOMY  93'-95'    ovaries remain    NASAL SINUS SURGERY      REPAIR, ROTATOR CUFF, ARTHROSCOPIC Left 2019    [facility]    RESECTION-TURBINATES (SMR) Bilateral 2017    [facility] by [facility]unj [facility] at [facility]    SEPTOPLASTY Bilateral 2017    [facility]    SINUS SURGERY FUNCTIONAL ENDOSCOPIC WITH NAVIGATION stealth and propel needed Bilateral 2017    [facility] at [facility]    TUBAL LIGATION      VAGINAL DELIVERY      XI ROBOTIC SACROCOLPOPEXY, ABDOMINAL N/A 2019    Performed by [facility]ison [facility] at [facility] OR       GYN & OB History  Patient's last menstrual period was 10/19/1993 (approximate).   Date of Last Pap: No result found    OB History    Para Term  AB Living   2 2 0     2   SAB TAB Ectopic Multiple Live Births           2      # Outcome Date GA Lbr Aleksey/2nd Weight Sex Delivery Anes PTL Lv    2 Para     M Vag-Spont   JESU   1 Para     F Vag-Spont   JESU       Health Maintenance       Date Due Completion Date    Hepatitis C Screening 1960 ---    TETANUS VACCINE 08/19/1979 ---    Shingles Vaccine (1 of 2) 08/19/2011 ---    Influenza Vaccine (1) 08/01/2019 12/17/2018    Mammogram 06/23/2020 6/23/2018    Colonoscopy 05/16/2024 5/16/2014 (Done)    Override on 5/16/2014: Done    Lipid Panel 06/21/2024 6/21/2019          Family History   Problem Relation Age of Onset    Stroke Maternal Grandmother     Diabetes Mother     Hypertension Mother     Diabetes Sister     Diabetes Sister     Breast cancer Neg Hx     Colon cancer Neg Hx     Ovarian cancer Neg Hx        Social History     Socioeconomic History    Marital status: Single     Spouse name: Not on file    Number of children: Not on file    Years of education: Not on file    Highest education level: Not on file   Occupational History    Not on file   Social Needs    Financial resource strain: Not on file    Food insecurity:     Worry: Not on file     Inability: Not on file    Transportation needs:     Medical: Not on file     Non-medical: Not on file   Tobacco Use    Smoking status: Never Smoker    Smokeless tobacco: Never Used   Substance and Sexual Activity    Alcohol use: Yes     Alcohol/week: 0.6 oz     Types: 1 Shots of liquor per week     Comment: seldom    Drug use: No    Sexual activity: Yes     Partners: Male     Birth control/protection: Post-menopausal, See Surgical Hx     Comment: hysterectomy 20 yrs ago   Lifestyle    Physical activity:     Days per week: Not on file     Minutes per session: Not on file    Stress: Not on file   Relationships    Social connections:     Talks on phone: Not on file     Gets together: Not on file     Attends Congregational service: Not on file     Active member of club or organization: Not on file     Attends meetings of clubs or organizations: Not on file     Relationship status: Not on file  "  Other Topics Concern    Not on file   Social History Narrative    Not on file       Review of Systems  Review of Systems   Genitourinary: Positive for dysuria.          Objective:   BP (!) 140/70 (BP Location: Right arm, Patient Position: Sitting, BP Method: Large (Manual))   Ht 5' 9" (1.753 m)   LMP 10/19/1993 (Approximate)   BMI 28.06 kg/m²     Physical Exam   Genitourinary: Pelvic exam was performed with patient supine. Vagina normal and skenes normal. Left labia normal. Urethra exhibits no urethral caruncle, no urethral diverticulum, no urethral mass and no hypermobility. No rectocele, cystocele or unspecified prolapse of vaginal walls in the vagina.      Excellent anatomy  Well healed  Assessment:     1. Postop check            Plan:     1. Postop check      After examination patient explained to patient excellent of the repair.  Patient should not engage in any type of activity that can week in the reconstruction.  Patient should refrain from lifting anything greater than 10 lb.  That is the only serous some addition once again heavy lifting fin possibly week in the repair.  At this point no heavy lifting moving forward.  Otherwise patient is released patient return for any concerns or questions in the near future.    There are no Patient Instructions on file for this visit.      "

## 2019-08-06 ENCOUNTER — CLINICAL SUPPORT (OUTPATIENT)
Dept: REHABILITATION | Facility: HOSPITAL | Age: 59
End: 2019-08-06
Payer: COMMERCIAL

## 2019-08-06 DIAGNOSIS — M75.112 INCOMPLETE TEAR OF LEFT ROTATOR CUFF: ICD-10-CM

## 2019-08-06 DIAGNOSIS — M25.612 DECREASED ROM OF LEFT SHOULDER: ICD-10-CM

## 2019-08-06 PROCEDURE — 97110 THERAPEUTIC EXERCISES: CPT | Mod: PO

## 2019-08-06 NOTE — PROGRESS NOTES
"[facility]  Physical Therapy Note     Name: Rosamaria Agosto  Clinic Number: [id number]     Therapy Diagnosis: decreased ROM and pain at the shoulder s/p RTC  Physician: Arina Corea PA-C     Physician Orders: PT Eval and Treat   Medical Diagnosis from Referral: s/p RTC repair and biceps tenodesis  Evaluation Date: 7/26/2019  Authorization Period Expiration: 12/31/19  Plan of Care Expiration: 10/31/19  Visit # / Visits authorized: 4/30     Time In:  3:10 pm  Time Out: 3: 50 pm  Total Billable Time: 40 minutes ( TE-2)     Precautions: Standard, RTC protocol NO AROM at elbow joint s/p biceps tenodesis, PROM at elbow and shoulder but no excessive ER stopping at initial end feel per phase I protocol and follow up with MD for protocol     Subjective     Pt reports: she has been keeping up with her HEP and is doing well today. Pt stated she has no pain currently and that she took her pain pill 45 minutes ago to prepare for therapy.         Pain : 0/10  Location: Left shoulder    TREATMENT     Rosamaria received therapeutic exercises to develop ROM for 40 minutes including:   -shoulder pendulum and middle range  - PROM at the elbow with limited strain to bicep at ext  - Prom shoulder abd, scaption within protocol  - Pendulums 3 min  - scap retraction/depression 3x15 ea    Education provided:   Sleeping position, sling positioning and shoulder pendulums, donning strategy for sling, review post surgery restrictions. Pt issued putty for , decreased edema pooling at elbow     Written Home Exercises Provided: yes.  Exercises were reviewed and Rosamaria was able to demonstrate them prior to the end of the session.  Rosamaria demonstrated good  understanding of the education provided.      See EMR under Patient Instructions for exercises provided 7/26/2019.     Assessment     Pt demonstrated moderate amount of guarding today with PROM. Pt denies any pain and reports a good "stretch" towards end range " motions. Will continue to progress per post surgical protocol.     Goals:  Short Term Goals: 3 weeks   1.  Pt independent with HEP for s/p RTC repair and biceps tenodesis with return demonstration  2.  Pt to be independent with donning and doffing surgical sling for proper pain management   3.  Pt to increase PROM as tolerated at left shoulder to 90 deg shoulder flexion and 80 deg abduction without pain  4.  Pt to decrease overall pain at left shoulder to less than 4/10 after session     Long Term Goals: 12 weeks   1.  Pt to been independent with discharge HEP without cues and proper form.  2.  Pt return to independent ADLs to include dressing, grooming with BUEs without compensation.  3.  Pt to increased AROM at elbow WNL and Left shoulder to 170 deg flexion and 160 deg abduction, 70 deg ER without pain or compensation  4.  Pt to increased L shoulder strength to 4+/5 into flexion extension, ER, IR   5  Pt to increase tolerance to perform 1 hour of household chores, ie cleaning without increased L shoulder pain  6. Pt to decrease pain at left shoulder to 3/10 after session for improved functional activities        Plan   Plan of care Certification: 7/26/2019 to 10/31/19  Cont with PROM, measure progress next visit  Trista Iraheta, PTA

## 2019-08-07 ENCOUNTER — TELEPHONE (OUTPATIENT)
Dept: SPORTS MEDICINE | Facility: CLINIC | Age: 59
End: 2019-08-07

## 2019-08-07 ENCOUNTER — OFFICE VISIT (OUTPATIENT)
Dept: SPORTS MEDICINE | Facility: CLINIC | Age: 59
End: 2019-08-07
Payer: COMMERCIAL

## 2019-08-07 VITALS
BODY MASS INDEX: 28.14 KG/M2 | HEIGHT: 69 IN | HEART RATE: 81 BPM | WEIGHT: 190 LBS | DIASTOLIC BLOOD PRESSURE: 84 MMHG | SYSTOLIC BLOOD PRESSURE: 122 MMHG

## 2019-08-07 DIAGNOSIS — Z98.890 S/P ARTHROSCOPY OF LEFT SHOULDER: Primary | ICD-10-CM

## 2019-08-07 LAB — BACTERIA UR CULT: ABNORMAL

## 2019-08-07 PROCEDURE — 99024 PR POST-OP FOLLOW-UP VISIT: ICD-10-PCS | Mod: S$GLB,,, | Performed by: PHYSICIAN ASSISTANT

## 2019-08-07 PROCEDURE — 99024 POSTOP FOLLOW-UP VISIT: CPT | Mod: S$GLB,,, | Performed by: PHYSICIAN ASSISTANT

## 2019-08-07 PROCEDURE — 99999 PR PBB SHADOW E&M-EST. PATIENT-LVL III: CPT | Mod: PBBFAC,,, | Performed by: PHYSICIAN ASSISTANT

## 2019-08-07 PROCEDURE — 99999 PR PBB SHADOW E&M-EST. PATIENT-LVL III: ICD-10-PCS | Mod: PBBFAC,,, | Performed by: PHYSICIAN ASSISTANT

## 2019-08-07 RX ORDER — HYDROCODONE BITARTRATE AND ACETAMINOPHEN 5; 325 MG/1; MG/1
1 TABLET ORAL EVERY 6 HOURS PRN
Qty: 28 TABLET | Refills: 0 | Status: SHIPPED | OUTPATIENT
Start: 2019-08-07 | End: 2021-06-22

## 2019-08-07 NOTE — PROGRESS NOTES
S:Rosamaria Agosto presents for post-operative evaluation.     DATE OF PROCEDURE: 7/24/2019   OPERATION:   Left  1. Shoulder arthroscopic rotator cuff repair, transosseous equivalent double row (CPT 75470)  2. Shoulder arthroscopic biceps tenodesis (CPT 23951)  3. Shoulder arthroscopic extensive debridement (anterior, posterior glenohumeral joint, subacromial space) (CPT 17291)    4. Shoulder arthroscopic subacromial decompression (CPT 54701)       Surgeon(s) and Role:     * ASMITA Soto MD - Primary     * Mejia Albarran MD - Resident - Assisting     * SMA Pilar    Rosamaria Agosto reports to be doing very well 2wk s/p the above mentioned procedure. Denies fevers, chills, night sweats, chest pain, difficulty breathing, calf pain or tenderness. Going to PT [facility] at the [facility] location. Seeing good progress daily. Pain today is 0/10. She has been taking roxicodone but states that she would like a weaker pain medication because she was having headaches and hallucinations when taking roxicodone. Very pleased thus far. Sling in place.    O: The incisions are healing well.  Sutures removed. Steri strips place over one incision. No signs of infection. No significant pain or unusual tenderness.  PROM FF 80  PROM ER 5  PROM Abduction 60    A/P: Arthroscopic pictures were reviewed with the patient. Plan to follow the rehab plan as previously outlined. RTC in 4 weeks with Dr. Soto. Sent [place] 5mg to patient's pharmacy. Continue sling.    Physical Therapy: Follow the <3 cm repair rehab protocol.  The patient does have some underlying chondromalacia and will be at risk for stiffness. Tissue quality was good. Repair quality was good. PROM now. AROM starts at 7 weeks post op. No cuff resistive activity x 12 weeks post op. No biceps resistive activity x 8 weeks post op.

## 2019-08-07 NOTE — TELEPHONE ENCOUNTER
Let patient know Elizabeth and Dr. Soto's staff have not received disability paperwork. Told her to have it faxed to [phone] addressed to Dr. Soto.

## 2019-08-07 NOTE — TELEPHONE ENCOUNTER
----- Message from Rafaela Sexton sent at 8/7/2019  2:35 PM CDT -----  Contact: patient   Please call pt at [phone]    What is the status of her disability paperwork?    Thank you

## 2019-08-08 ENCOUNTER — CLINICAL SUPPORT (OUTPATIENT)
Dept: REHABILITATION | Facility: HOSPITAL | Age: 59
End: 2019-08-08
Payer: COMMERCIAL

## 2019-08-08 DIAGNOSIS — M25.612 DECREASED ROM OF LEFT SHOULDER: ICD-10-CM

## 2019-08-08 DIAGNOSIS — M75.112 INCOMPLETE TEAR OF LEFT ROTATOR CUFF: ICD-10-CM

## 2019-08-08 PROCEDURE — 97110 THERAPEUTIC EXERCISES: CPT | Mod: PO

## 2019-08-08 NOTE — PROGRESS NOTES
[facility]  Physical Therapy Note     Name: Rosamaria Agosto  Clinic Number: [id number]     Therapy Diagnosis: decreased ROM and pain at the shoulder s/p RTC  Physician: Arina Corea PA-C     Physician Orders: PT Eval and Treat   Medical Diagnosis from Referral: s/p RTC repair and biceps tenodesis  Evaluation Date: 7/26/2019  Authorization Period Expiration: 12/31/19  Plan of Care Expiration: 10/31/19  Visit # / Visits authorized: 5/30     Time In:  1:52 pm  Time Out: 2: 35 pm  Total Billable Time: 40 minutes (TE-2)     Precautions: Standard, RTC protocol NO AROM at elbow joint s/p biceps tenodesis, PROM at elbow and shoulder but no excessive ER stopping at initial end feel per phase I protocol and follow up with MD for protocol     Subjective     Pt reports: she got her stiches out yesterday and is feeling a little bit better now. Pt stated she was a little achy after last session.        Pain : 0/10  Location: Left shoulder    TREATMENT     Rosamaria received therapeutic exercises to develop ROM for 43 minutes including:   - shoulder pendulum and middle range  - PROM at the elbow with limited strain to bicep at ext  - Prom shoulder abd, scaption within protocol  - Pendulums 3 min  - scap retraction/depression 3x15 ea    Education provided:   Sleeping position, sling positioning and shoulder pendulums, donning strategy for sling, review post surgery restrictions. Pt issued putty for , decreased edema pooling at elbow     Written Home Exercises Provided: yes.  Exercises were reviewed and Rosamaria was able to demonstrate them prior to the end of the session.  Rosamaria demonstrated good  understanding of the education provided.      See EMR under Patient Instructions for exercises provided 7/26/2019.     Assessment     Pt exhibited a slightly increased tolerance with PROM today. Slight improvements in pts passive elbow extension noted . Pt remains guarded and limited with shoulder flexion  PROM. Pt experienced some mild discomfort in her posterior shoulder with passive abduction.   Will continue to progress per post surgical protocol.     Goals:  Short Term Goals: 3 weeks   1.  Pt independent with HEP for s/p RTC repair and biceps tenodesis with return demonstration  2.  Pt to be independent with donning and doffing surgical sling for proper pain management   3.  Pt to increase PROM as tolerated at left shoulder to 90 deg shoulder flexion and 80 deg abduction without pain  4.  Pt to decrease overall pain at left shoulder to less than 4/10 after session     Long Term Goals: 12 weeks   1.  Pt to been independent with discharge HEP without cues and proper form.  2.  Pt return to independent ADLs to include dressing, grooming with BUEs without compensation.  3.  Pt to increased AROM at elbow WNL and Left shoulder to 170 deg flexion and 160 deg abduction, 70 deg ER without pain or compensation  4.  Pt to increased L shoulder strength to 4+/5 into flexion extension, ER, IR   5  Pt to increase tolerance to perform 1 hour of household chores, ie cleaning without increased L shoulder pain  6. Pt to decrease pain at left shoulder to 3/10 after session for improved functional activities        Plan   Plan of care Certification: 7/26/2019 to 10/31/19    Cont with PROM, measure progress next visit.    Trista Iraheta, PTA

## 2019-08-12 ENCOUNTER — CLINICAL SUPPORT (OUTPATIENT)
Dept: REHABILITATION | Facility: HOSPITAL | Age: 59
End: 2019-08-12
Payer: COMMERCIAL

## 2019-08-12 ENCOUNTER — TELEPHONE (OUTPATIENT)
Dept: SPORTS MEDICINE | Facility: CLINIC | Age: 59
End: 2019-08-12

## 2019-08-12 DIAGNOSIS — M25.612 DECREASED ROM OF LEFT SHOULDER: ICD-10-CM

## 2019-08-12 DIAGNOSIS — M75.112 INCOMPLETE TEAR OF LEFT ROTATOR CUFF: ICD-10-CM

## 2019-08-12 PROCEDURE — 97010 HOT OR COLD PACKS THERAPY: CPT | Mod: PO

## 2019-08-12 PROCEDURE — 97110 THERAPEUTIC EXERCISES: CPT | Mod: PO

## 2019-08-12 PROCEDURE — 97140 MANUAL THERAPY 1/> REGIONS: CPT | Mod: PO

## 2019-08-12 NOTE — TELEPHONE ENCOUNTER
LVM returning pt call and let her know we did receive her paperwork. Dr. Soto needs to finish his portion and has been out of the office.

## 2019-08-12 NOTE — TELEPHONE ENCOUNTER
----- Message from Rafaela Sexton sent at 8/12/2019 11:57 AM CDT -----  Contact: patient  Please call pt at [phone]    Patient would like to know if the office received her short term disability paperwork yet?    Thank you

## 2019-08-12 NOTE — PROGRESS NOTES
[facility]  Physical Therapy Note     Name: Rosamaria Agosto  Clinic Number: [id number]     Therapy Diagnosis: decreased ROM and pain at the shoulder s/p RTC  Physician: Arina Corea PA-C     Physician Orders: PT Eval and Treat   Medical Diagnosis from Referral: s/p RTC repair and biceps tenodesis  Evaluation Date: 7/26/2019  Authorization Period Expiration: 12/31/19  Plan of Care Expiration: 10/31/19  Visit # / Visits authorized: 5/30     Time In:  900 am  Time Out: 1010 am  Total Billable Time: 60 minutes (TE-3, MT 1) + 10 ice pack     Precautions: Standard, RTC protocol NO AROM at elbow joint s/p biceps tenodesis, PROM at elbow and shoulder but no excessive ER stopping at initial end feel per phase I protocol and follow up with MD for protocol     Subjective     Pt reports: no issues over the weekend decreased use of pain meds but a little tight this early in the morning.       Pain : 0/10 at beginning and end of session  Location: Left shoulder    TREATMENT     Rosamaria received therapeutic exercises to develop ROM for 45 minutes including:   - shoulder pendulum and middle range  - PROM at the elbow with limited strain to bicep at ext  - Prom progressing to AAROM shoulder abd, scaption, flexion and extension stopping at first end feel to avoid biceps stress within protocol in supine and in 60 deg incline supported position 4 x10 reps total per movement  - wrist AROM flex/ext in pronated than supinated position  And then AROM supinate/pronate  Total per movement 2x15  - Pendulums 3 min  - scap retraction/depression 2x15 ea with min manual cues     PROM at L shoulder  70 deg abd,  20 deg ext stopping at initial end feel  60 deg flex with cues to avoid shoulder hike, compensation.  0 deg with ER stopping at initial end feel  And   Elbow extension at  0 deg    Manual therapy 15 min with STM./ MFR to pect minor, triceps medial head, teres minor, and rhomboids and levator, GH mobs grade  I/II AP and lateral distraction for pain relief into empty end feel before restrictions    Education provided:   Sleeping position, sling positioning and shoulder pendulums, donning strategy for sling, review post surgery restrictions. Pt issued putty for , decreased edema pooling at elbow     Written Home Exercises Provided: yes.  Exercises were reviewed and Rosamaria was able to demonstrate them prior to the end of the session.  Rosamaria demonstrated good  understanding of the education provided.      See EMR under Patient Instructions for exercises provided 7/26/2019.     Assessment     Pt exhibited a slightly increased tolerance with PROM today and able to assist with flexion/ scaption with contralateral arm for support.  Pt with mod cues for decrease shoulder elevation and hiking.  Pt with good pain control and independent donning and doffing of arm sling with good positioning.  Pt progressive stretch in session without pain.  Pt tolerated session without pain.  Slight improvements in pts passive elbow extension noted . Pt remains guarded and limited with shoulder flexion PROM. Pt  Needed cues for shoulder retraction during ROM.  Overall pain is well controlled and mild increased PROM at the shoulder and elbow.    Goals:  Short Term Goals: 3 weeks   1.  Pt independent with HEP for s/p RTC repair and biceps tenodesis with return demonstration  2.  Pt to be independent with donning and doffing surgical sling for proper pain management . Met 8/12/19  3.  Pt to increase PROM as tolerated at left shoulder to 90 deg shoulder flexion and 80 deg abduction without pain  4.  Pt to decrease overall pain at left shoulder to less than 4/10 after session. Met 8/12/19     Long Term Goals: 12 weeks   1.  Pt to been independent with discharge HEP without cues and proper form.  2.  Pt return to independent ADLs to include dressing, grooming with BUEs without compensation.  3.  Pt to increased AROM at elbow WNL and Left shoulder to  170 deg flexion and 160 deg abduction, 70 deg ER without pain or compensation  4.  Pt to increased L shoulder strength to 4+/5 into flexion extension, ER, IR   5  Pt to increase tolerance to perform 1 hour of household chores, ie cleaning without increased L shoulder pain  6. Pt to decrease pain at left shoulder to 3/10 after session for improved functional activities        Plan   Plan of care Certification: 7/26/2019 to 10/31/19    Cont with PROM, measure progress next visit.    Shanel Carvalho, PT

## 2019-08-13 ENCOUNTER — TELEPHONE (OUTPATIENT)
Dept: SPORTS MEDICINE | Facility: CLINIC | Age: 59
End: 2019-08-13

## 2019-08-13 NOTE — TELEPHONE ENCOUNTER
----- Message from Sherrill Escobar sent at 8/13/2019  9:25 AM CDT -----  Contact: Self  Pt requesting a call back from [employer] staff regarding her disability paper work that was sent over from [employer]. Was calling to see if it has been received. Please contact pt at [phone]

## 2019-08-13 NOTE — TELEPHONE ENCOUNTER
Returned pt call and let her know we did get her paperwork and we just needed Dr. Soto to fill out his part. She was happy with that and just wanted to make sure we did receive it.

## 2019-08-13 NOTE — TELEPHONE ENCOUNTER
Received STD forms from [employer]. Forms completed and faxed to them at [phone].    [facility]

## 2019-08-16 ENCOUNTER — TELEPHONE (OUTPATIENT)
Dept: UROGYNECOLOGY | Facility: CLINIC | Age: 59
End: 2019-08-16

## 2019-08-16 ENCOUNTER — PATIENT MESSAGE (OUTPATIENT)
Dept: UROGYNECOLOGY | Facility: CLINIC | Age: 59
End: 2019-08-16

## 2019-08-16 ENCOUNTER — CLINICAL SUPPORT (OUTPATIENT)
Dept: REHABILITATION | Facility: HOSPITAL | Age: 59
End: 2019-08-16
Payer: COMMERCIAL

## 2019-08-16 DIAGNOSIS — M75.112 INCOMPLETE TEAR OF LEFT ROTATOR CUFF: ICD-10-CM

## 2019-08-16 DIAGNOSIS — M25.612 DECREASED ROM OF LEFT SHOULDER: ICD-10-CM

## 2019-08-16 PROCEDURE — 97110 THERAPEUTIC EXERCISES: CPT | Mod: PO

## 2019-08-16 PROCEDURE — 97140 MANUAL THERAPY 1/> REGIONS: CPT | Mod: PO

## 2019-08-16 PROCEDURE — 97010 HOT OR COLD PACKS THERAPY: CPT | Mod: PO

## 2019-08-16 RX ORDER — SULFAMETHOXAZOLE AND TRIMETHOPRIM 800; 160 MG/1; MG/1
1 TABLET ORAL 2 TIMES DAILY
Qty: 10 TABLET | Refills: 0 | Status: SHIPPED | OUTPATIENT
Start: 2019-08-16 | End: 2019-08-21

## 2019-08-16 NOTE — PROGRESS NOTES
[facility]  Physical Therapy Note     Name: Rosamaria Agosto  Clinic Number: [id number]     Therapy Diagnosis: decreased ROM and pain at the shoulder s/p RTC  Physician: Arina Corea PA-C     Physician Orders: PT Eval and Treat   Medical Diagnosis from Referral: s/p RTC repair and biceps tenodesis  Evaluation Date: 7/26/2019  Authorization Period Expiration: 12/31/19  Plan of Care Expiration: 10/31/19  Visit # / Visits authorized: 5/30     Time In:  355 pm  Time Out: 455 pm  Total Billable Time: 30 minutes (TE-1, MT 1) + 10 min ice pack     Precautions: Standard, RTC protocol NO AROM at elbow joint s/p biceps tenodesis, PROM at elbow and shoulder but no excessive ER stopping at initial end feel per phase I protocol and follow up with MD for protocol     Subjective     Pt reports: no issues over the weekend decreased use of pain meds but a little tight this early in the morning.       Pain : 0/10 at beginning and end of session  Location: Left shoulder    TREATMENT     Rosamaria received therapeutic exercises to develop ROM for 45 minutes including:   - shoulder pendulum and middle range  - PROM at the elbow with limited strain to bicep at ext  - Prom progressing to AAROM shoulder abd, scaption, flexion and extension stopping at first end feel to avoid biceps stress within protocol in supine and in 60 deg incline supported position 4 x10 reps total per movement  - wrist AROM flex/ext in pronated than supinated position  And then AROM supinate/pronate  Total per movement 2x15  - Pendulums 3 min  - scap retraction/depression 3x15 ea with min manual cues     PROM at L shoulder 8/16/19  70 deg abd,  25 deg ext stopping at initial end feel  60 deg flex with cues to avoid shoulder hike, compensation.  0 deg with ER stopping at initial end feel  And   Elbow extension at  0 deg    Manual therapy 15 min with STM./ MFR to pect minor, triceps medial head, teres minor, and rhomboids and levator, GH  mobs grade I/II AP and lateral distraction for pain relief into empty end feel before restrictions    Education provided:   Sleeping position, sling positioning and shoulder pendulums, donning strategy for sling, review post surgery restrictions. Pt issued putty for , decreased edema pooling at elbow     Written Home Exercises Provided: yes.  Exercises were reviewed and Rosamaria was able to demonstrate them prior to the end of the session.  Rosamaria demonstrated good  understanding of the education provided.      See EMR under Patient Instructions for exercises provided 7/26/2019.     Assessment     Pt exhibited a slightly increased tolerance with PROM today and able to assist with flexion/ scaption with contralateral arm for support.  Pt with min cues for decrease shoulder elevation and hiking which remain equal and level from arrival and throughout session.  Pt with good pain control and independent donning and doffing of arm sling with good positioning.  Pt progressive stretch in session without pain.  Pt tolerated session without pain.  Slight improvements in pts passive elbow extension noted and no loss of ROM  . Pt remains guarded and limited with shoulder flexion PROM. Pt  Needed cues for shoulder retraction during ROM.  Overall pain is well controlled and mild increased PROM at the shoulder and elbow.    Goals:  Short Term Goals: 3 weeks   1.  Pt independent with HEP for s/p RTC repair and biceps tenodesis with return demonstration  2.  Pt to be independent with donning and doffing surgical sling for proper pain management . Met 8/12/19  3.  Pt to increase PROM as tolerated at left shoulder to 90 deg shoulder flexion and 80 deg abduction without pain  4.  Pt to decrease overall pain at left shoulder to less than 4/10 after session. Met 8/12/19     Long Term Goals: 12 weeks   1.  Pt to been independent with discharge HEP without cues and proper form.  2.  Pt return to independent ADLs to include dressing,  grooming with BUEs without compensation.  3.  Pt to increased AROM at elbow WNL and Left shoulder to 170 deg flexion and 160 deg abduction, 70 deg ER without pain or compensation  4.  Pt to increased L shoulder strength to 4+/5 into flexion extension, ER, IR   5  Pt to increase tolerance to perform 1 hour of household chores, ie cleaning without increased L shoulder pain  6. Pt to decrease pain at left shoulder to 3/10 after session for improved functional activities        Plan   Plan of care Certification: 7/26/2019 to 10/31/19    Cont with PROM, measure progress next visit.    Shanel Carvalho, PT

## 2019-08-16 NOTE — TELEPHONE ENCOUNTER
Spoke to pt and informed her that her medication was sent to her pharmacy. Pt voiced understanding and call ended.

## 2019-08-16 NOTE — TELEPHONE ENCOUNTER
----- Message from Anson Ellison MD sent at 8/16/2019 12:10 PM CDT -----  Adjust called in Bactrim double strength  ----- Message -----  From: Grace Shoemaker LPN  Sent: 8/16/2019  11:51 AM  To: Anson Ellison MD    Pt has a UTI Intermediate sensitivity to Macrobid. Would you like to send in a different Rx?

## 2019-08-19 ENCOUNTER — CLINICAL SUPPORT (OUTPATIENT)
Dept: REHABILITATION | Facility: HOSPITAL | Age: 59
End: 2019-08-19
Payer: COMMERCIAL

## 2019-08-19 ENCOUNTER — TELEPHONE (OUTPATIENT)
Dept: SPORTS MEDICINE | Facility: CLINIC | Age: 59
End: 2019-08-19

## 2019-08-19 DIAGNOSIS — M25.612 DECREASED ROM OF LEFT SHOULDER: ICD-10-CM

## 2019-08-19 DIAGNOSIS — M75.112 INCOMPLETE TEAR OF LEFT ROTATOR CUFF: ICD-10-CM

## 2019-08-19 PROCEDURE — 97110 THERAPEUTIC EXERCISES: CPT | Mod: PO

## 2019-08-19 PROCEDURE — 97010 HOT OR COLD PACKS THERAPY: CPT | Mod: PO

## 2019-08-19 PROCEDURE — 97140 MANUAL THERAPY 1/> REGIONS: CPT | Mod: PO

## 2019-08-19 NOTE — TELEPHONE ENCOUNTER
Left a v/m that her forms were faxed to [employer] on 8/13.    Elizabeth George MA  [facility]

## 2019-08-19 NOTE — PROGRESS NOTES
[facility]  Physical Therapy Note     Name: Rosamaria Agosto  Clinic Number: [id number]     Therapy Diagnosis: decreased ROM and pain at the shoulder s/p RTC  Physician: Arina Corea PA-C     Physician Orders: PT Eval and Treat   Medical Diagnosis from Referral: s/p RTC repair and biceps tenodesis  Evaluation Date: 7/26/2019  Authorization Period Expiration: 12/31/19  Plan of Care Expiration: 10/31/19  Visit # / Visits authorized: 8/30     Time In:  805 am  Time Out: 910 pm  Total Billable Time: 30 minutes (TE-1, MT 1) + 10 min ice pack     Precautions: Standard, RTC protocol NO AROM at elbow joint s/p biceps tenodesis, PROM at elbow and shoulder but no excessive ER stopping at initial end feel per phase I protocol and follow up with MD for protocol     Subjective     Pt reports: no issues over the weekend decreased use of pain meds but a little tight this early in the morning.       Pain : 0/10 at beginning and end of session  Location: Left shoulder    TREATMENT     Rosamaria received therapeutic exercises to develop ROM for 40 minutes including:   - shoulder pendulum and middle range  - PROM at the elbow with limited strain to bicep at ext  - Prom progressing to AAROM shoulder abd, scaption, flexion and extension stopping at first end feel to avoid biceps stress within protocol in supine and in 60 deg incline supported position 4 x10 reps total per movement  - wrist AROM flex/ext in pronated than supinated position  And then AROM supinate/pronate  Total per movement 2x15  - Pendulums 3 min  - scap retraction/depression 3x15 ea with min manual cues     PROM at L shoulder 8/16/19  70 deg abd,  25 deg ext stopping at initial end feel  60 deg flex with cues to avoid shoulder hike, compensation.  0 deg with ER stopping at initial end feel  And   Elbow extension at  0 deg    Manual therapy 15 min with STM./ MFR to pect minor, triceps medial head, teres minor, and rhomboids and levator, GH  mobs grade I/II AP and lateral distraction for pain relief into empty end feel before restrictions    Education provided:   Sleeping position, sling positioning and shoulder pendulums, donning strategy for sling, review post surgery restrictions. Pt issued putty for , decreased edema pooling at elbow     Written Home Exercises Provided: yes.  Exercises were reviewed and Rosamaria was able to demonstrate them prior to the end of the session.  Rosamaria demonstrated good  understanding of the education provided.      See EMR under Patient Instructions for exercises provided 7/26/2019.     Assessment     Pt exhibited a slightly increased tolerance with PROM today and able to assist with flexion/ scaption with contralateral arm for support.  Pt with min cues for decrease shoulder elevation and hiking which remain equal and level from arrival and throughout session.  Pt with good pain control and independent donning and doffing of arm sling with good positioning.  Pt progressive stretch in session without pain.  Pt tolerated session without pain.  Slight improvements in pts passive elbow extension full and abduction to 85 deg and scaption flexion and ER the same with no improvment  . Pt remains guarded and limited with shoulder flexion PROM. Pt  Needed cues for shoulder retraction during ROM.  Overall pain is well controlled and mild increased PROM at the shoulder and elbow.    Goals:  Short Term Goals: 3 weeks   1.  Pt independent with HEP for s/p RTC repair and biceps tenodesis with return demonstration  2.  Pt to be independent with donning and doffing surgical sling for proper pain management . Met 8/12/19  3.  Pt to increase PROM as tolerated at left shoulder to 90 deg shoulder flexion and 80 deg abduction without pain  4.  Pt to decrease overall pain at left shoulder to less than 4/10 after session. Met 8/12/19     Long Term Goals: 12 weeks   1.  Pt to been independent with discharge HEP without cues and proper  form.  2.  Pt return to independent ADLs to include dressing, grooming with BUEs without compensation.  3.  Pt to increased AROM at elbow WNL and Left shoulder to 170 deg flexion and 160 deg abduction, 70 deg ER without pain or compensation  4.  Pt to increased L shoulder strength to 4+/5 into flexion extension, ER, IR   5  Pt to increase tolerance to perform 1 hour of household chores, ie cleaning without increased L shoulder pain  6. Pt to decrease pain at left shoulder to 3/10 after session for improved functional activities        Plan   Plan of care Certification: 7/26/2019 to 10/31/19    Cont with PROM, measure progress next visit.    Shanel Carvalho, PT

## 2019-08-21 ENCOUNTER — CLINICAL SUPPORT (OUTPATIENT)
Dept: REHABILITATION | Facility: HOSPITAL | Age: 59
End: 2019-08-21
Payer: COMMERCIAL

## 2019-08-21 DIAGNOSIS — M75.112 INCOMPLETE TEAR OF LEFT ROTATOR CUFF: ICD-10-CM

## 2019-08-21 DIAGNOSIS — M25.612 DECREASED ROM OF LEFT SHOULDER: ICD-10-CM

## 2019-08-21 PROCEDURE — 97110 THERAPEUTIC EXERCISES: CPT | Mod: PO

## 2019-08-21 PROCEDURE — 97010 HOT OR COLD PACKS THERAPY: CPT | Mod: PO

## 2019-08-21 PROCEDURE — 97140 MANUAL THERAPY 1/> REGIONS: CPT | Mod: PO

## 2019-08-21 NOTE — PROGRESS NOTES
[facility]  Physical Therapy Note     Name: Rosamaria Agosto  Clinic Number: [id number]     Therapy Diagnosis: decreased ROM and pain at the shoulder s/p RTC  Physician: Arina Corea PA-C     Physician Orders: PT Eval and Treat   Medical Diagnosis from Referral: s/p RTC repair and biceps tenodesis  Evaluation Date: 7/26/2019  Authorization Period Expiration: 12/31/19  Plan of Care Expiration: 10/31/19  Visit # / Visits authorized: 9/30     Time In:  100 pm  Time Out: 210 pm  Total Billable Time: 30 minutes (TE-1, MT 1) + 10 min ice pack     Precautions: Standard, RTC protocol NO AROM at elbow joint s/p biceps tenodesis, PROM at elbow and shoulder but no excessive ER stopping at initial end feel per phase I protocol and follow up with MD for protocol     Subjective     Pt reports: no issues over the weekend decreased use of pain meds but a little tight this early in the morning.       Pain : 0/10 at beginning and end of session  Location: Left shoulder    TREATMENT     Rosamaria received therapeutic exercises to develop ROM for 40 minutes including:     - PROM at the elbow with limited strain to bicep at ext  - Prom progressing to AAROM shoulder abd, scaption, flexion and extension stopping at first end feel to avoid biceps stress within protocol in supine and in 60 deg incline supported position 4 x10 reps total per movement  - wrist AROM flex/ext in pronated than supinated position  And then AROM supinate/pronate  Total per movement 2x15  - Pendulums 3 min  - scapular depression 3x15 wth 2 sec hold  Scapular retraction 3 x15 with 2 sec hold    PROM at L shoulder 8/21/19  75 deg abd,  30 deg ext stopping at initial end feel  60 deg flex with cues to avoid shoulder hike, compensation.  5 deg with ER stopping at initial end feel  And   Elbow extension at  0 deg    Manual therapy 15 min with STM./ MFR to pect minor, triceps medial head, teres minor, and rhomboids and levator, GH mobs  grade I/II AP and lateral distraction for pain relief into empty end feel before restrictions    Education provided:   Sleeping position, sling positioning and shoulder pendulums, donning strategy for sling, review post surgery restrictions. Pt issued putty for , decreased edema pooling at elbow     Written Home Exercises Provided: yes.  Exercises were reviewed and Rosamaria was able to demonstrate them prior to the end of the session.  Rosamaria demonstrated good  understanding of the education provided.      See EMR under Patient Instructions for exercises provided 7/26/2019.     Assessment     Pt exhibited a slightly increased tolerance with PROM today and able to assist with flexion/ scaption with contralateral arm for support.  Pt with min cues for decrease shoulder elevation and hiking overall increased AAROM with use of other arm and use of equipment like a table or belt  to decrease gravity on AAROM with self support.  Pt with good insight and awareness no pain with session no stress thru shoulder.  Pt. With good biceps relaxation along with pect minor and teres minor improvement with ROM today.    Goals:  Short Term Goals: 3 weeks   1.  Pt independent with HEP for s/p RTC repair and biceps tenodesis with return demonstration  2.  Pt to be independent with donning and doffing surgical sling for proper pain management . Met 8/12/19  3.  Pt to increase PROM as tolerated at left shoulder to 90 deg shoulder flexion and 80 deg abduction without pain  4.  Pt to decrease overall pain at left shoulder to less than 4/10 after session. Met 8/12/19     Long Term Goals: 12 weeks   1.  Pt to been independent with discharge HEP without cues and proper form.  2.  Pt return to independent ADLs to include dressing, grooming with BUEs without compensation.  3.  Pt to increased AROM at elbow WNL and Left shoulder to 170 deg flexion and 160 deg abduction, 70 deg ER without pain or compensation  4.  Pt to increased L shoulder  strength to 4+/5 into flexion extension, ER, IR   5  Pt to increase tolerance to perform 1 hour of household chores, ie cleaning without increased L shoulder pain  6. Pt to decrease pain at left shoulder to 3/10 after session for improved functional activities        Plan   Plan of care Certification: 7/26/2019 to 10/31/19    Cont with PROM, measure progress next visit.    Shanel Carvalho, PT

## 2019-08-22 ENCOUNTER — TELEPHONE (OUTPATIENT)
Dept: UROGYNECOLOGY | Facility: CLINIC | Age: 59
End: 2019-08-22

## 2019-08-23 ENCOUNTER — OFFICE VISIT (OUTPATIENT)
Dept: UROGYNECOLOGY | Facility: CLINIC | Age: 59
End: 2019-08-23
Payer: COMMERCIAL

## 2019-08-23 VITALS — BODY MASS INDEX: 28.06 KG/M2 | HEIGHT: 69 IN

## 2019-08-23 DIAGNOSIS — N30.00 ACUTE CYSTITIS WITHOUT HEMATURIA: Primary | ICD-10-CM

## 2019-08-23 DIAGNOSIS — Z09 POSTOP CHECK: ICD-10-CM

## 2019-08-23 PROCEDURE — 99024 PR POST-OP FOLLOW-UP VISIT: ICD-10-PCS | Mod: S$GLB,,, | Performed by: OBSTETRICS & GYNECOLOGY

## 2019-08-23 PROCEDURE — 87086 URINE CULTURE/COLONY COUNT: CPT

## 2019-08-23 PROCEDURE — 99999 PR PBB SHADOW E&M-EST. PATIENT-LVL III: ICD-10-PCS | Mod: PBBFAC,,, | Performed by: OBSTETRICS & GYNECOLOGY

## 2019-08-23 PROCEDURE — 99024 POSTOP FOLLOW-UP VISIT: CPT | Mod: S$GLB,,, | Performed by: OBSTETRICS & GYNECOLOGY

## 2019-08-23 PROCEDURE — 99999 PR PBB SHADOW E&M-EST. PATIENT-LVL III: CPT | Mod: PBBFAC,,, | Performed by: OBSTETRICS & GYNECOLOGY

## 2019-08-23 PROCEDURE — 51701 INSERT BLADDER CATHETER: CPT | Mod: 79,S$GLB,, | Performed by: OBSTETRICS & GYNECOLOGY

## 2019-08-23 PROCEDURE — 51701 PR INSERTION OF NON-INDWELLING BLADDER CATHETERIZATION FOR RESIDUAL UR: ICD-10-PCS | Mod: 79,S$GLB,, | Performed by: OBSTETRICS & GYNECOLOGY

## 2019-08-23 RX ORDER — FLAVOXATE HYDROCHLORIDE 100 MG/1
100 TABLET ORAL 3 TIMES DAILY PRN
Qty: 90 TABLET | Refills: 3 | Status: SHIPPED | OUTPATIENT
Start: 2019-08-23 | End: 2022-03-11 | Stop reason: ALTCHOICE

## 2019-08-23 NOTE — PROGRESS NOTES
Subjective:      Patient ID: Rosamaria Agosto is a 59 y.o. female.    Chief Complaint:  Post-op Evaluation (pelvic pressure)      History of Present Illness  Six weeks postop no issues no complaints some slight pressure no burning on urination no incontinence          Past Medical History:   Diagnosis Date    Abnormal Pap smear of cervix yrs ago    Arthritis     History of uterine fibroid     Hyperlipidemia     Hypertension     Shoulder injury     lt    Sinus problem     Sinusitis        Past Surgical History:   Procedure Laterality Date    ARTHROSCOPY, SHOULDER, WITH SUBACROMIAL SPACE DECOMPRESSION Left 2019    Performed by [facility] Soto MD at [facility] OR 2ND FLR    BICEPS TENODESIS Left 2019    [facility]    BREAST BIOPSY  24-25 years old    BREAST SURGERY      CYSTOSCOPY N/A 2019    [facility] at [facility]    ENCEPHALOCELE REPAIR  46    HYSTERECTOMY  93'-95'    ovaries remain    NASAL SINUS SURGERY      REPAIR, ROTATOR CUFF, ARTHROSCOPIC Left 2019    [facility]    RESECTION-TURBINATES (SMR) Bilateral 2017    [facility]    SEPTOPLASTY Bilateral 2017    [facility]    SINUS SURGERY FUNCTIONAL ENDOSCOPIC WITH NAVIGATION stealth and propel needed Bilateral 2017    [facility]    TUBAL LIGATION      VAGINAL DELIVERY      XI ROBOTIC SACROCOLPOPEXY, ABDOMINAL N/A 2019    [facility] OR       GYN & OB History  Patient's last menstrual period was 10/19/1993 (approximate).   Date of Last Pap: No result found    OB History    Para Term  AB Living   2 2 0     2   SAB TAB Ectopic Multiple Live Births           2      # Outcome Date GA Lbr Aleksey/2nd Weight Sex Delivery Anes PTL Lv   2 Para     M  Vag-Spont   JESU   1 Para     F Vag-Spont   JESU       Health Maintenance       Date Due Completion Date    Hepatitis C Screening 1960 ---    TETANUS VACCINE 08/19/1979 ---    Shingles Vaccine (1 of 2) 08/19/2011 ---    Influenza Vaccine (1) 09/01/2019 12/17/2018    Mammogram 06/23/2020 6/23/2018    Colonoscopy 05/16/2024 5/16/2014 (Done)    Override on 5/16/2014: Done    Lipid Panel 06/21/2024 6/21/2019          Family History   Problem Relation Age of Onset    Stroke Maternal Grandmother     Diabetes Mother     Hypertension Mother     Diabetes Sister     Diabetes Sister     Breast cancer Neg Hx     Colon cancer Neg Hx     Ovarian cancer Neg Hx        Social History     Socioeconomic History    Marital status: Single     Spouse name: Not on file    Number of children: Not on file    Years of education: Not on file    Highest education level: Not on file   Occupational History    Not on file   Social Needs    Financial resource strain: Not on file    Food insecurity:     Worry: Not on file     Inability: Not on file    Transportation needs:     Medical: Not on file     Non-medical: Not on file   Tobacco Use    Smoking status: Never Smoker    Smokeless tobacco: Never Used   Substance and Sexual Activity    Alcohol use: Yes     Alcohol/week: 0.6 oz     Types: 1 Shots of liquor per week     Comment: seldom    Drug use: No    Sexual activity: Yes     Partners: Male     Birth control/protection: Post-menopausal, See Surgical Hx     Comment: hysterectomy 20 yrs ago   Lifestyle    Physical activity:     Days per week: Not on file     Minutes per session: Not on file    Stress: Not on file   Relationships    Social connections:     Talks on phone: Not on file     Gets together: Not on file     Attends Oriental orthodox service: Not on file     Active member of club or organization: Not on file     Attends meetings of clubs or organizations: Not on file     Relationship status: Not on file   Other Topics  "Concern    Not on file   Social History Narrative    Not on file       Review of Systems  Review of Systems   Genitourinary: Pelvic pain: Pelvic pressure pain.   All other systems reviewed and are negative.         Objective:   Ht 5' 9" (1.753 m)   LMP 10/19/1993 (Approximate)   BMI 28.06 kg/m²     Physical Exam   Genitourinary: Pelvic exam was performed with patient supine. Skenes normal, bartholins normal and vaginal cuff normal. Right labia normal and left labia normal. Urethra exhibits no urethral caruncle, no urethral diverticulum, no urethral mass and no hypermobility. No rectocele, cystocele or unspecified prolapse of vaginal walls in the vagina.   Empty cough stress test: Negative.    POP-Q  Aa: -3 Ba:  C: -10   GH:  PB:  TVL:    Ap: -3 Bp:  D:                       Patient doing very well no excellent healing     Assessment:     1. Acute cystitis without hematuria    2. Postop check            Plan:     1. Acute cystitis without hematuria    2. Postop check        Please not catheterize patient there I did not see any evidence of UTI UA clear patient is decompressing bladder well less than 30 cc after 2 voids today times going to tell patient to continue use the Urispas as an if the the urine that I am sending off a culture returns any type of abnormality we will of course utilize proper antibiotic treatment sec way into prophylaxis patient noted understanding appreciative  There are no Patient Instructions on file for this visit.      "

## 2019-08-25 LAB — BACTERIA UR CULT: NO GROWTH

## 2019-08-26 ENCOUNTER — CLINICAL SUPPORT (OUTPATIENT)
Dept: REHABILITATION | Facility: HOSPITAL | Age: 59
End: 2019-08-26
Payer: COMMERCIAL

## 2019-08-26 ENCOUNTER — TELEPHONE (OUTPATIENT)
Dept: UROGYNECOLOGY | Facility: CLINIC | Age: 59
End: 2019-08-26

## 2019-08-26 DIAGNOSIS — M75.112 INCOMPLETE TEAR OF LEFT ROTATOR CUFF: ICD-10-CM

## 2019-08-26 DIAGNOSIS — M25.612 DECREASED ROM OF LEFT SHOULDER: ICD-10-CM

## 2019-08-26 PROCEDURE — 97010 HOT OR COLD PACKS THERAPY: CPT | Mod: PO

## 2019-08-26 PROCEDURE — 97140 MANUAL THERAPY 1/> REGIONS: CPT | Mod: PO

## 2019-08-26 PROCEDURE — 97110 THERAPEUTIC EXERCISES: CPT | Mod: PO

## 2019-08-26 NOTE — PROGRESS NOTES
[facility]  Physical Therapy Note     Name: Rosamaria Agosto  Clinic Number: [id number]     Therapy Diagnosis: decreased ROM and pain at the shoulder s/p RTC  Physician: Arina Corea PA-C     Physician Orders: PT Eval and Treat   Medical Diagnosis from Referral: s/p RTC repair and biceps tenodesis  Evaluation Date: 7/26/2019  Authorization Period Expiration: 12/31/19  Plan of Care Expiration: 10/31/19  Visit # / Visits authorized: 10/30     Time In:  820am  Time Out: 915am  Total Billable Time: 25 minutes (TE-1, MT 1) + 10 min ice pack     Precautions: Standard, RTC protocol NO AROM at elbow joint s/p biceps tenodesis, PROM at elbow and shoulder but no excessive ER stopping at initial end feel per phase I protocol and follow up with MD for protocol     Subjective     Pt reports:  issues over the weekend due to increased upright sitting with [place] and [employer] the same day       Pain : 0/10 at beginning and end of session  Location: Left shoulder    TREATMENT     Rosamaria received therapeutic exercises to develop ROM for 35 minutes including:     - PROM at the elbow with limited strain to bicep at ext  - Prom progressing to AAROM shoulder abd, scaption, flexion and extension stopping at first end feel to avoid biceps stress within protocol in supine and in 60 deg incline supported position 4 x10 reps total per movement  - wrist AROM flex/ext in pronated than supinated position  And then AROM supinate/pronate  Total per movement 2x15  - Pendulums 3 min  - scapular depression 3x15 wth 2 sec hold  Scapular retraction 3 x15 with 2 sec hold    PROM at L shoulder 8/26/19  80 deg abd,  30 deg ext stopping at initial end feel  60 deg flex with cues to avoid shoulder hike, compensation.  15 deg with ER stopping at initial end feel  And   Elbow extension at  0 deg    Manual therapy 10 min with STM./ MFR to pect minor, triceps medial head, teres minor, and rhomboids and levator, GH mobs grade  I/II AP and lateral distraction for pain relief into empty end feel before restrictions    Education provided:   Sleeping position, sling positioning and shoulder pendulums, donning strategy for sling, review post surgery restrictions. Pt issued putty for , decreased edema pooling at elbow     Written Home Exercises Provided: yes.  Exercises were reviewed and Rosamaria was able to demonstrate them prior to the end of the session.  Rosamaria demonstrated good  understanding of the education provided.      See EMR under Patient Instructions for exercises provided 7/26/2019.     Assessment     Pt exhibited a slightly increased tolerance with PROM today and able to assist with flexion/ scaption with contralateral arm for support in supine attempted abd in standing with use of cane for PROM to AAROM but increased shoulder hikling at 45 thru 60 abd so ceased.  Pt with min cues for decrease shoulder elevation and hiking overall increased AAROM with use of other arm and use of equipment like a table   Pt with good insight and awareness no pain with session no stress thru shoulder.  Pt. Had increased guarding today with weekend over extended upright sitting during [place] and getting her hair done leads to increase pect minor and levator and upper traps and teres minor tightness      Goals:  Short Term Goals: 3 weeks   1.  Pt independent with HEP for s/p RTC repair and biceps tenodesis with return demonstration  2.  Pt to be independent with donning and doffing surgical sling for proper pain management . Met 8/12/19  3.  Pt to increase PROM as tolerated at left shoulder to 90 deg shoulder flexion and 80 deg abduction without pain  4.  Pt to decrease overall pain at left shoulder to less than 4/10 after session. Met 8/12/19     Long Term Goals: 12 weeks   1.  Pt to been independent with discharge HEP without cues and proper form.  2.  Pt return to independent ADLs to include dressing, grooming with BUEs without compensation.  3.   Pt to increased AROM at elbow WNL and Left shoulder to 170 deg flexion and 160 deg abduction, 70 deg ER without pain or compensation  4.  Pt to increased L shoulder strength to 4+/5 into flexion extension, ER, IR   5  Pt to increase tolerance to perform 1 hour of household chores, ie cleaning without increased L shoulder pain  6. Pt to decrease pain at left shoulder to 3/10 after session for improved functional activities        Plan   Plan of care Certification: 7/26/2019 to 10/31/19    Cont with PROM, measure progress next visit.    Shanel Carvalho, PT

## 2019-08-27 NOTE — TELEPHONE ENCOUNTER
Left voice message for pt to give the office a call back at [phone]. Called to relay negative UC results.

## 2019-08-27 NOTE — TELEPHONE ENCOUNTER
----- Message from Lucina Travis, Patient Care Assistant sent at 8/27/2019  4:56 PM CDT -----  Contact: MARGIE FERNANDEZ [[id number]]  Name of Who is Calling: MARGIE FERNANDEZ [[id number]]    What is the request in detail: Patient is requesting a call back in regards to urine test.Please contact to further discuss and advise      Can the clinic reply by [facility]: No    [facility]   [phone]

## 2019-08-28 ENCOUNTER — CLINICAL SUPPORT (OUTPATIENT)
Dept: REHABILITATION | Facility: HOSPITAL | Age: 59
End: 2019-08-28
Payer: COMMERCIAL

## 2019-08-28 DIAGNOSIS — M25.612 DECREASED ROM OF LEFT SHOULDER: ICD-10-CM

## 2019-08-28 DIAGNOSIS — M75.112 INCOMPLETE TEAR OF LEFT ROTATOR CUFF: ICD-10-CM

## 2019-08-28 PROCEDURE — 97110 THERAPEUTIC EXERCISES: CPT | Mod: PO

## 2019-08-28 PROCEDURE — 97010 HOT OR COLD PACKS THERAPY: CPT | Mod: PO

## 2019-08-28 PROCEDURE — 97140 MANUAL THERAPY 1/> REGIONS: CPT | Mod: PO

## 2019-08-28 NOTE — PROGRESS NOTES
[facility]  Physical Therapy Note     Name: Rosamaria Agosto  Clinic Number: [id number]     Therapy Diagnosis: decreased ROM and pain at the shoulder s/p RTC  Physician: Arina Corea PA-C     Physician Orders: PT Eval and Treat   Medical Diagnosis from Referral: s/p RTC repair and biceps tenodesis  Evaluation Date: 7/26/2019  Authorization Period Expiration: 12/31/19  Plan of Care Expiration: 10/31/19  Visit # / Visits authorized: 10/30     Time In:  900 am  Time Out: 1015am  Total Billable Time: 25 minutes (TE-1, MT 1) + 15 min ice pack     Precautions: Standard, RTC protocol NO AROM at elbow joint s/p biceps tenodesis, PROM at elbow and shoulder but no excessive ER stopping at initial end feel per phase I protocol and follow up with MD for protocol     Subjective     Pt reports:  No complaints since last session       Pain : 0/10 at beginning and end of session  Location: Left shoulder    TREATMENT     Rosamaria received therapeutic exercises to develop ROM for 35 minutes including:     - PROM at the elbow with limited strain to bicep at ext  - Prom progressing to AAROM shoulder abd, scaption, flexion,  Horizontal abd/ adduction, and extension stopping at first end feel to avoid biceps stress within protocol in supine and in 60 deg incline supported position 4 x10 reps total per movement  - wrist AROM flex/ext in pronated than supinated position  And then AROM supinate/pronate  Total per movement 2x15  - Pendulums 3 min  - scapular depression 3x15 wth 2 sec hold  Scapular retraction 3 x15 with 2 sec hold    PROM at L shoulder 8/28/19  80 deg abd,  30 deg ext stopping at initial end feel  70 deg flex with cues to avoid shoulder hike, compensation.  15 deg with ER stopping at initial end feel  And   Elbow extension at  0 deg    Manual therapy 10 min with STM./ MFR to pect minor, triceps medial head, teres minor, and rhomboids and levator, GH mobs grade I/II AP and lateral distraction  for pain relief into empty end feel before restrictions    Education provided:   Sleeping position, sling positioning and shoulder pendulums, donning strategy for sling, review post surgery restrictions. Pt issued putty for , decreased edema pooling at elbow     Written Home Exercises Provided: yes.  Exercises were reviewed and Rosamaria was able to demonstrate them prior to the end of the session.  Rosamaria demonstrated good  understanding of the education provided.      See EMR under Patient Instructions for exercises provided 7/26/2019.     Assessment     Pt exhibited a slightly increased tolerance with PROM today and able to assist with flexion/ scaption with contralateral arm for support in supine with good form and improved motion.   There was increased shoulder hiking with horizontal abd/add and flex/ext with table support so needed mod cues for that exercise on table slides.  Pt with overall increase self scaption flexion supine with use of RUE for support.  Pt with no pain only tightness at end ROM overall and continues to slowly improve during 5th week post- op.  PT to progress with GH mobs as tolerated into minimal resistance for mobs that may stress the biceps tendonesis.        Goals:  Short Term Goals: 3 weeks   1.  Pt independent with HEP for s/p RTC repair and biceps tenodesis with return demonstration  2.  Pt to be independent with donning and doffing surgical sling for proper pain management . Met 8/12/19  3.  Pt to increase PROM as tolerated at left shoulder to 90 deg shoulder flexion and 80 deg abduction without pain  4.  Pt to decrease overall pain at left shoulder to less than 4/10 after session. Met 8/12/19     Long Term Goals: 12 weeks   1.  Pt to been independent with discharge HEP without cues and proper form.  2.  Pt return to independent ADLs to include dressing, grooming with BUEs without compensation.  3.  Pt to increased AROM at elbow WNL and Left shoulder to 170 deg flexion and 160 deg  abduction, 70 deg ER without pain or compensation  4.  Pt to increased L shoulder strength to 4+/5 into flexion extension, ER, IR   5  Pt to increase tolerance to perform 1 hour of household chores, ie cleaning without increased L shoulder pain  6. Pt to decrease pain at left shoulder to 3/10 after session for improved functional activities        Plan   Plan of care Certification: 7/26/2019 to 10/31/19    Cont with PROM, measure progress next visit.    Shanel Carvalho, PT

## 2019-09-04 ENCOUNTER — CLINICAL SUPPORT (OUTPATIENT)
Dept: REHABILITATION | Facility: HOSPITAL | Age: 59
End: 2019-09-04
Payer: COMMERCIAL

## 2019-09-04 DIAGNOSIS — M75.112 INCOMPLETE TEAR OF LEFT ROTATOR CUFF: ICD-10-CM

## 2019-09-04 DIAGNOSIS — M25.612 DECREASED ROM OF LEFT SHOULDER: ICD-10-CM

## 2019-09-04 PROCEDURE — 97140 MANUAL THERAPY 1/> REGIONS: CPT | Mod: PO

## 2019-09-04 PROCEDURE — 97110 THERAPEUTIC EXERCISES: CPT | Mod: PO

## 2019-09-04 PROCEDURE — 97010 HOT OR COLD PACKS THERAPY: CPT | Mod: PO

## 2019-09-04 NOTE — PROGRESS NOTES
S:Rosamaria Agosto presents for post-operative evaluation.     DATE OF PROCEDURE: 7/24/2019   OPERATION:   Left  1. Shoulder arthroscopic rotator cuff repair, transosseous equivalent double row   2. Shoulder arthroscopic biceps tenodesis   3. Shoulder arthroscopic extensive debridement (anterior, posterior glenohumeral joint, subacromial space)    4. Shoulder arthroscopic subacromial decompression      Rosamaria Agosto reports to be doing very well 6 wk s/p the above mentioned procedure. Presents today in sling which she has been wearing full time. Going to PT 2xWeek at the [facility] location. Seeing good progress daily. Pain today is 0/10 and is feeling better.    O: LUE - The incisions are well healed. No signs of infection. No significant pain or unusual tenderness. Passive FE to 90, ER at side to 30. Biceps is well tensioned. Full elbow ROM. NVI.    A/P: Arthroscopic pictures were reviewed with the patient. Plan to follow the rehab plan as previously outlined- follow the <3 cm repair rehab protocol.  The patient does have some underlying chondromalacia and will be at risk for stiffness. AROM begins in 1 week. Continue to work on normalizing range of motion. No cuff resistive activity x 6 more weeks. No biceps resistive activity x 2 more weeks post op. RTC in 6 weeks all questions answered.

## 2019-09-04 NOTE — PROGRESS NOTES
[facility]  Physical Therapy Note     Name: Rosamaria Agosto  Clinic Number: [id number]     Therapy Diagnosis: decreased ROM and pain at the shoulder s/p RTC  Physician: Arina Corea PA-C     Physician Orders: PT Eval and Treat   Medical Diagnosis from Referral: s/p RTC repair and biceps tenodesis  Evaluation Date: 7/26/2019  Authorization Period Expiration: 12/31/19  Plan of Care Expiration: 10/31/19  Visit # / Visits authorized: 11/30     Time In:  1100 am  Time Out: 1210 pm  Total Billable Time: 30 minutes (TE-1, MT 1) + 10 min ice pack     Precautions: Standard, RTC protocol NO AROM at elbow joint s/p biceps tenodesis, PROM at elbow and shoulder but no excessive ER stopping at initial end feel per phase I protocol and follow up with MD for protocol     Subjective     Pt reports:  No issues but noted increased tightness with sleep       Pain : 1-2/10 at beginning and end of session 0/10  Location: Left shoulder    TREATMENT     Rosamaria received therapeutic exercises to develop ROM for 40 minutes including:     - PROM at the elbow with limited strain to bicep at ext  - Prom progressing to AAROM shoulder abd, scaption, flexion,  Horizontal abd/ adduction, and extension stopping at first end feel to avoid biceps stress within protocol in supine and in 60 deg incline supported position 4 x10 reps total per movement  - wrist AROM flex/ext in pronated than supinated position  And then AROM supinate/pronate  Total per movement 2x15  - Pendulums 3 min  - scapular depression 3x15 wth 2 sec hold  Scapular retraction 3 x15 with 2 sec hold  Shoulder rows 3 x10 with YTB  Shoulder extension 3 x10 with YTB    PROM at L shoulder 9/4/19  90 deg abd,  30 deg ext stopping at initial end feel  100 deg flex with cues to avoid shoulder hike, compensation.  15 deg with ER stopping at initial end feel  And   Elbow extension at  0 deg    Manual therapy 20 min with STM./ MFR to pect minor, triceps medial  head, teres minor, and rhomboids and levator, GH mobs grade I/II AP and lateral distraction for pain relief into empty end feel before restrictions    Education provided:   Sleeping position, sling positioning and shoulder pendulums, donning strategy for sling, review post surgery restrictions. Pt issued putty for , decreased edema pooling at elbow     Written Home Exercises Provided: yes.  Exercises were reviewed and Rosamaria was able to demonstrate them prior to the end of the session.  Rosamaria demonstrated good  understanding of the education provided.      See EMR under Patient Instructions for exercises provided 7/26/2019.     Assessment     Pt exhibited a slightly increased tension with week long break with holidays and increased tightness and shoulder hiking during session but with manual therapy and cues during session was able to return to baseline with ROM. Pt cues were moderate due to increased shoulder hiking during scaption exercise and she even exhibited it during demonstrate on her good unaffected R arm.  Pt was overly aggressive trying to help with AAROM leading to poor mechanicis and early shoulder hiking.         Goals:  Short Term Goals: 3 weeks   1.  Pt independent with HEP for s/p RTC repair and biceps tenodesis with return demonstration  2.  Pt to be independent with donning and doffing surgical sling for proper pain management . Met 8/12/19  3.  Pt to increase PROM as tolerated at left shoulder to 90 deg shoulder flexion and 80 deg abduction without pain  4.  Pt to decrease overall pain at left shoulder to less than 4/10 after session. Met 8/12/19     Long Term Goals: 12 weeks   1.  Pt to been independent with discharge HEP without cues and proper form.  2.  Pt return to independent ADLs to include dressing, grooming with BUEs without compensation.  3.  Pt to increased AROM at elbow WNL and Left shoulder to 170 deg flexion and 160 deg abduction, 70 deg ER without pain or compensation  4.  Pt to  increased L shoulder strength to 4+/5 into flexion extension, ER, IR   5  Pt to increase tolerance to perform 1 hour of household chores, ie cleaning without increased L shoulder pain  6. Pt to decrease pain at left shoulder to 3/10 after session for improved functional activities        Plan   Plan of care Certification: 7/26/2019 to 10/31/19    Cont with PROM, measure progress next visit.    Shanel Carvalho, PT

## 2019-09-05 ENCOUNTER — OFFICE VISIT (OUTPATIENT)
Dept: SPORTS MEDICINE | Facility: CLINIC | Age: 59
End: 2019-09-05
Payer: COMMERCIAL

## 2019-09-05 VITALS
DIASTOLIC BLOOD PRESSURE: 83 MMHG | BODY MASS INDEX: 28.14 KG/M2 | SYSTOLIC BLOOD PRESSURE: 125 MMHG | HEIGHT: 69 IN | WEIGHT: 190 LBS | HEART RATE: 75 BPM

## 2019-09-05 DIAGNOSIS — Z47.89 SURGICAL AFTERCARE, MUSCULOSKELETAL SYSTEM: ICD-10-CM

## 2019-09-05 DIAGNOSIS — Z98.890 S/P ARTHROSCOPY OF LEFT SHOULDER: Primary | ICD-10-CM

## 2019-09-05 PROCEDURE — 99999 PR PBB SHADOW E&M-EST. PATIENT-LVL III: CPT | Mod: PBBFAC,,, | Performed by: ORTHOPAEDIC SURGERY

## 2019-09-05 PROCEDURE — 99024 PR POST-OP FOLLOW-UP VISIT: ICD-10-PCS | Mod: S$GLB,,, | Performed by: ORTHOPAEDIC SURGERY

## 2019-09-05 PROCEDURE — 99999 PR PBB SHADOW E&M-EST. PATIENT-LVL III: ICD-10-PCS | Mod: PBBFAC,,, | Performed by: ORTHOPAEDIC SURGERY

## 2019-09-05 PROCEDURE — 99024 POSTOP FOLLOW-UP VISIT: CPT | Mod: S$GLB,,, | Performed by: ORTHOPAEDIC SURGERY

## 2019-09-05 RX ORDER — CHLORZOXAZONE 500 MG/1
TABLET ORAL
Qty: 60 TABLET | Refills: 0 | Status: SHIPPED | OUTPATIENT
Start: 2019-09-05 | End: 2019-09-26 | Stop reason: SDUPTHER

## 2019-09-05 RX ORDER — NAPROXEN 500 MG/1
500 TABLET ORAL 2 TIMES DAILY
Qty: 60 TABLET | Refills: 2 | Status: SHIPPED | OUTPATIENT
Start: 2019-09-05 | End: 2019-12-31

## 2019-09-06 ENCOUNTER — CLINICAL SUPPORT (OUTPATIENT)
Dept: REHABILITATION | Facility: HOSPITAL | Age: 59
End: 2019-09-06
Payer: COMMERCIAL

## 2019-09-06 DIAGNOSIS — M75.112 INCOMPLETE TEAR OF LEFT ROTATOR CUFF: ICD-10-CM

## 2019-09-06 DIAGNOSIS — M25.612 DECREASED ROM OF LEFT SHOULDER: ICD-10-CM

## 2019-09-06 PROCEDURE — 97140 MANUAL THERAPY 1/> REGIONS: CPT | Mod: PO

## 2019-09-06 PROCEDURE — 97010 HOT OR COLD PACKS THERAPY: CPT | Mod: PO

## 2019-09-06 PROCEDURE — 97110 THERAPEUTIC EXERCISES: CPT | Mod: PO

## 2019-09-06 NOTE — PROGRESS NOTES
[facility]  Physical Therapy Note     Name: Rosamaria Agosto  Clinic Number: [id number]     Therapy Diagnosis: decreased ROM and pain at the shoulder s/p RTC  Physician: Arina Corea PA-C     Physician Orders: PT Eval and Treat   Medical Diagnosis from Referral: s/p RTC repair and biceps tenodesis  Evaluation Date: 7/26/2019  Authorization Period Expiration: 12/31/19  Plan of Care Expiration: 10/31/19  Visit # / Visits authorized: 11/30     Time In:  1010 am  Time Out: 1115 am  Total Billable Time: 25 minutes (TE-1, MT 1) + 15 min ice pack     Precautions: Standard, RTC protocol NO AROM at elbow joint s/p biceps tenodesis, PROM at elbow and shoulder but no excessive ER stopping at initial end feel per phase I protocol and follow up with MD for protocol     Subjective     Pt reports:  Reported increase pain with MD visit as he cranked and forced the shoulder in ROM leading to decreased measured ROM below ROM reported in therapy sessions       Pain : 1-2/10 at beginning and end of session 0/10  Location: Left shoulder    TREATMENT     Rosamaria received therapeutic exercises to develop ROM for 40 minutes including:     - PROM at the elbow with limited strain to bicep at ext  - Prom progressing to AAROM shoulder abd, scaption, flexion,  Horizontal abd/ adduction, and extension stopping at first end feel to avoid biceps stress within protocol in supine and in 60 deg incline supported position 6 x10 reps total per movement  [place] with cues into scaption 3 x2 min  - scapular depression 3x15 wth 2 sec hold  Scapular retraction 3 x15 with 2 sec hold  Shoulder rows 3 x10 with YTB  Shoulder extension 3 x10 with YTB    PROM at L shoulder 9/6/19  95 deg abd,  30 deg ext stopping at initial end feel  100 deg flex with cues to avoid shoulder hike, compensation.  25 deg with ER stopping at initial end feel  And   Elbow extension at  0 deg    Manual therapy 15 min with STM./ MFR to pect minor,  triceps medial head, teres minor, and rhomboids and levator, GH mobs grade I/II AP and lateral distraction for pain relief into empty end feel before restrictions    Education provided:   Sleeping position, sling positioning and shoulder pendulums, donning strategy for sling, review post surgery restrictions. Pt issued putty for , decreased edema pooling at elbow     Written Home Exercises Provided: yes.  Exercises were reviewed and Rosamaria was able to demonstrate them prior to the end of the session.  Rosamaria demonstrated good  understanding of the education provided.      See EMR under Patient Instructions for exercises provided 7/26/2019.     Assessment     Pt exhibited a slightly increased tension initially from MD visit with increased guarding with forced ROM and quick assessment.  PT able to reduce guarding with MT and shoulder hiking and poor mechanics during session.  Pt continues to progress with PROM during session with increased ROM in all 3 planes especially ER.  Pt continues with good pain control and to continue with PROM and AAROM.    Noted recent assessment at MD visit:  Arthroscopic pictures were reviewed with the patient. Plan to follow the rehab plan as previously outlined- follow the <3 cm repair rehab protocol.  The patient does have some underlying chondromalacia and will be at risk for stiffness. AROM begins in 1 week. No cuff resistive activity x 12 weeks post op. No biceps resistive activity x 8 weeks post op. RTC in 6 weeks all questions answered.          Goals:  Short Term Goals: 3 weeks   1.  Pt independent with HEP for s/p RTC repair and biceps tenodesis with return demonstration  2.  Pt to be independent with donning and doffing surgical sling for proper pain management . Met 8/12/19  3.  Pt to increase PROM as tolerated at left shoulder to 90 deg shoulder flexion and 80 deg abduction without pain  4.  Pt to decrease overall pain at left shoulder to less than 4/10 after session. Met  8/12/19     Long Term Goals: 12 weeks   1.  Pt to been independent with discharge HEP without cues and proper form.  2.  Pt return to independent ADLs to include dressing, grooming with BUEs without compensation.  3.  Pt to increased AROM at elbow WNL and Left shoulder to 170 deg flexion and 160 deg abduction, 70 deg ER without pain or compensation  4.  Pt to increased L shoulder strength to 4+/5 into flexion extension, ER, IR   5  Pt to increase tolerance to perform 1 hour of household chores, ie cleaning without increased L shoulder pain  6. Pt to decrease pain at left shoulder to 3/10 after session for improved functional activities        Plan   Plan of care Certification: 7/26/2019 to 10/31/19    Cont with PROM and JAX Carvalho, PT

## 2019-09-09 ENCOUNTER — TELEPHONE (OUTPATIENT)
Dept: SPORTS MEDICINE | Facility: CLINIC | Age: 59
End: 2019-09-09

## 2019-09-09 ENCOUNTER — CLINICAL SUPPORT (OUTPATIENT)
Dept: REHABILITATION | Facility: HOSPITAL | Age: 59
End: 2019-09-09
Payer: COMMERCIAL

## 2019-09-09 DIAGNOSIS — M75.112 INCOMPLETE TEAR OF LEFT ROTATOR CUFF: ICD-10-CM

## 2019-09-09 DIAGNOSIS — M25.612 DECREASED ROM OF LEFT SHOULDER: ICD-10-CM

## 2019-09-09 PROCEDURE — 97010 HOT OR COLD PACKS THERAPY: CPT | Mod: PO

## 2019-09-09 PROCEDURE — 97140 MANUAL THERAPY 1/> REGIONS: CPT | Mod: PO

## 2019-09-09 PROCEDURE — 97110 THERAPEUTIC EXERCISES: CPT | Mod: PO

## 2019-09-09 NOTE — TELEPHONE ENCOUNTER
Medical Condtion/Body Part Injured: L Shoulder  Estimated Date of Onset of Condition or Injury: 6/24/19 and 7/24/19  Last Day of Work: 6/21/19    Employer Name: [employer]  Employee Job Title: [employer]/  Job Functions/Tasks: refill drinks, sweep and mop, restock store supplies, cash out customers, remove coffee pot, etc    When do you plan to return to work: After Oct 24  Are you trying to return to work: NoRestrictions/Restrictions: NO Restrictions    How do you want the forms returned: Mail  [address], [place] [address]    [facility]

## 2019-09-09 NOTE — PROGRESS NOTES
[facility]  Physical Therapy Note     Name: Rosamaria Agosto  Clinic Number: [id number]     Therapy Diagnosis: decreased ROM and pain at the shoulder s/p RTC  Physician: Arina Corea PA-C     Physician Orders: PT Eval and Treat   Medical Diagnosis from Referral: s/p RTC repair and biceps tenodesis  Evaluation Date: 7/26/2019  Authorization Period Expiration: 12/31/19  Plan of Care Expiration: 10/31/19  Visit # / Visits authorized: 14/30     Time In:  910 am  Time Out: 1015 am  Total Billable Time: 25 minutes (TE-1, MT 1) + 15 min ice pack     Precautions: Standard, RTC protocol NO AROM at elbow joint s/p biceps tenodesis, PROM at elbow and shoulder but no excessive ER stopping at initial end feel per phase I protocol and follow up with MD for protocol     Subjective     Pt reports:  No issues with left shoulder and has compliant with exercise    Pain : 1-2/10 at beginning and end of session 1/10 mostly tightness  Location: Left shoulder    TREATMENT     Rosamaria received therapeutic exercises to develop ROM for 35 minutes including:       - Prom progressing to AAROM shoulder abd, scaption, flexion,  Horizontal abd/ adduction, and extension stopping at first end feel to avoid biceps stress within protocol in supine and in 60 deg incline supported position 6 x10 reps total per movement  Pulley with cues into scaption 3 x2 min  scapular depression 3x15 wth 2 sec hold  Scapular retraction 3 x15 with 2 sec hold  Shoulder rows 3 x10 with YTB  Shoulder extension 3 x10 with YTB  On the table horiz abd and add 3 x15  On the table IR/ER 3 x15  On the table bilateral shoulder flexion 2x10  AAROM with use of cane in scaption and ABD each 2 x10    PROM at L shoulder 9/6/19  95 deg abd,  30 deg ext stopping at initial end feel  100 deg flex with cues to avoid shoulder hike, compensation.  25 deg with ER stopping at initial end feel  And   Elbow extension at  0 deg    Manual therapy 15 min with  STM./ MFR to pect minor, triceps medial head, teres minor, and rhomboids and levator, GH mobs grade I/II AP and lateral distraction for pain relief into empty end feel before restrictions    Education provided:   Sleeping position, sling positioning and shoulder pendulums, donning strategy for sling, review post surgery restrictions. Pt issued putty for , decreased edema pooling at elbow     Written Home Exercises Provided: yes.  Exercises were reviewed and Rosamaria was able to demonstrate them prior to the end of the session.  Rosamaria demonstrated good  understanding of the education provided.      See EMR under Patient Instructions for exercises provided 7/26/2019.     Assessment     Pt exhibited a slightly increased tension after session and needed mod cues for early shoulder elevation during session as pt tried to hard to raise shoulder in scaption and Abd with better ROM in the beginning of session and progressively decreased ROM during session with increased tension. Pt used visual cues/mirrors, tactile and verbal cues to signal early elevation but pt still attempted to initiate with shoulder hike in standing and in elevated supine.        Goals:  Short Term Goals: 3 weeks   1.  Pt independent with HEP for s/p RTC repair and biceps tenodesis with return demonstration  2.  Pt to be independent with donning and doffing surgical sling for proper pain management . Met 8/12/19  3.  Pt to increase PROM as tolerated at left shoulder to 90 deg shoulder flexion and 80 deg abduction without pain  4.  Pt to decrease overall pain at left shoulder to less than 4/10 after session. Met 8/12/19     Long Term Goals: 12 weeks   1.  Pt to been independent with discharge HEP without cues and proper form.  2.  Pt return to independent ADLs to include dressing, grooming with BUEs without compensation.  3.  Pt to increased AROM at elbow WNL and Left shoulder to 170 deg flexion and 160 deg abduction, 70 deg ER without pain or  compensation  4.  Pt to increased L shoulder strength to 4+/5 into flexion extension, ER, IR   5  Pt to increase tolerance to perform 1 hour of household chores, ie cleaning without increased L shoulder pain  6. Pt to decrease pain at left shoulder to 3/10 after session for improved functional activities        Plan   Plan of care Certification: 7/26/2019 to 10/31/19    Cont with PROM and JAX Carvalho, PT

## 2019-09-11 ENCOUNTER — CLINICAL SUPPORT (OUTPATIENT)
Dept: REHABILITATION | Facility: HOSPITAL | Age: 59
End: 2019-09-11
Payer: COMMERCIAL

## 2019-09-11 ENCOUNTER — TELEPHONE (OUTPATIENT)
Dept: SPORTS MEDICINE | Facility: CLINIC | Age: 59
End: 2019-09-11

## 2019-09-11 DIAGNOSIS — M75.112 INCOMPLETE TEAR OF LEFT ROTATOR CUFF: ICD-10-CM

## 2019-09-11 DIAGNOSIS — M25.612 DECREASED ROM OF LEFT SHOULDER: ICD-10-CM

## 2019-09-11 PROCEDURE — 97010 HOT OR COLD PACKS THERAPY: CPT | Mod: PO

## 2019-09-11 PROCEDURE — 97110 THERAPEUTIC EXERCISES: CPT | Mod: PO

## 2019-09-11 PROCEDURE — 97140 MANUAL THERAPY 1/> REGIONS: CPT | Mod: PO

## 2019-09-11 NOTE — TELEPHONE ENCOUNTER
Received FMLA forms from patient. Forms completed. [employer] mailed the the patient per her request on the FMLA form.    Received STD forms from [facility]. Forms completed and faxed to them at [phone]. [employer] mailed to the patient.    [facility]

## 2019-09-11 NOTE — PROGRESS NOTES
[facility]  Physical Therapy Note     Name: Rosamaria Agosto  Clinic Number: [id number]     Therapy Diagnosis: decreased ROM and pain at the shoulder s/p RTC  Physician: Arina Corea PA-C     Physician Orders: PT Eval and Treat   Medical Diagnosis from Referral: s/p RTC repair and biceps tenodesis  Evaluation Date: 7/26/2019  Authorization Period Expiration: 12/31/19  Plan of Care Expiration: 10/31/19  Visit # / Visits authorized: 15/30     Time In:  910 am  Time Out: 1030 am  Total Billable Time: 70 minutes (TE-3, MT 2) + 10 min ice pack     Precautions: Standard, RTC protocol NO AROM at elbow joint s/p biceps tenodesis, PROM at elbow and shoulder but no excessive ER stopping at initial end feel per phase I protocol and follow up with MD for protocol     Subjective     Pt reports:  No issues with left shoulder and has compliant with exercise    Pain : 1-2/10 at beginning and end of session 1/10 mostly tightness  Location: Left shoulder    TREATMENT     Rosamaria received therapeutic exercises to develop ROM for 40 minutes including:       - Prom progressing to AAROM shoulder abd, scaption, flexion,  Horizontal abd/ adduction, and extension stopping at first end feel to avoid biceps stress within protocol in supine and in 60 deg incline supported position 6 x10 reps total per movement  Pulley with cues into scaption 3 x2 min  scapular depression 3x15 wth 2 sec hold  Scapular retraction 3 x15 with 2 sec hold  Shoulder rows 3 x10 with YTB  Shoulder extension 3 x10 with YTB  On the table horiz abd and add 3 x15  On the table IR/ER 3 x15  On the table bilateral shoulder flexion 2x10  AAROM with use of cane in scaption and ABD each 2 x10    PROM at L shoulder 9/11/19  100 deg abd,  30 deg ext stopping at initial end feel  110 deg flex with cues to avoid shoulder hike, compensation.  25 deg with ER stopping at initial end feel  And   Elbow extension at  0 deg    Manual therapy 30 min with  STM./ MFR to pect minor, triceps medial head, teres minor, and rhomboids and levator, GH mobs grade I/II AP and lateral distraction for pain relief into empty end feel before restrictions    Education provided:   Sleeping position, sling positioning and shoulder pendulums, donning strategy for sling, review post surgery restrictions. Pt issued putty for , decreased edema pooling at elbow     Written Home Exercises Provided: yes.  Exercises were reviewed and Rosamaria was able to demonstrate them prior to the end of the session.  Rosamaria demonstrated good  understanding of the education provided.      See EMR under Patient Instructions for exercises provided 7/26/2019.     Assessment     Pt exhibited a slightly increased tension after session and needed mod cues for early shoulder elevation during session as pt tried to hard to raise shoulder in scaption and Abd with better ROM in the beginning of session and progressively decreased ROM during session with increased tension but was able to decrease guarding with manual therapy and ROM overall increased at end of session. Pt used visual cues/mirrors, tactile and verbal cues to signal early elevation but pt still attempted to initiate with shoulder hike in standing and even elevated right shoulder with B shoulder exercise        Goals:  Short Term Goals: 3 weeks   1.  Pt independent with HEP for s/p RTC repair and biceps tenodesis with return demonstration  2.  Pt to be independent with donning and doffing surgical sling for proper pain management . Met 8/12/19  3.  Pt to increase PROM as tolerated at left shoulder to 90 deg shoulder flexion and 80 deg abduction without pain  4.  Pt to decrease overall pain at left shoulder to less than 4/10 after session. Met 8/12/19     Long Term Goals: 12 weeks   1.  Pt to been independent with discharge HEP without cues and proper form.  2.  Pt return to independent ADLs to include dressing, grooming with BUEs without  compensation.  3.  Pt to increased AROM at elbow WNL and Left shoulder to 170 deg flexion and 160 deg abduction, 70 deg ER without pain or compensation  4.  Pt to increased L shoulder strength to 4+/5 into flexion extension, ER, IR   5  Pt to increase tolerance to perform 1 hour of household chores, ie cleaning without increased L shoulder pain  6. Pt to decrease pain at left shoulder to 3/10 after session for improved functional activities        Plan   Plan of care Certification: 7/26/2019 to 10/31/19    Cont with PROM and JAX Carvalho, PT

## 2019-09-16 ENCOUNTER — CLINICAL SUPPORT (OUTPATIENT)
Dept: REHABILITATION | Facility: HOSPITAL | Age: 59
End: 2019-09-16
Payer: COMMERCIAL

## 2019-09-16 DIAGNOSIS — M25.612 DECREASED ROM OF LEFT SHOULDER: ICD-10-CM

## 2019-09-16 DIAGNOSIS — M75.112 INCOMPLETE TEAR OF LEFT ROTATOR CUFF: ICD-10-CM

## 2019-09-16 PROCEDURE — 97110 THERAPEUTIC EXERCISES: CPT | Mod: PO

## 2019-09-16 PROCEDURE — 97140 MANUAL THERAPY 1/> REGIONS: CPT | Mod: PO

## 2019-09-16 PROCEDURE — 97010 HOT OR COLD PACKS THERAPY: CPT | Mod: PO

## 2019-09-16 NOTE — PROGRESS NOTES
[facility]  Physical Therapy Note     Name: Rosamaria Agosto  Clinic Number: [id number]     Therapy Diagnosis: decreased ROM and pain at the shoulder s/p RTC  Physician: Arina Corea PA-C     Physician Orders: PT Eval and Treat   Medical Diagnosis from Referral: s/p RTC repair and biceps tenodesis  Evaluation Date: 7/26/2019  Authorization Period Expiration: 12/31/19  Plan of Care Expiration: 10/31/19  Visit # / Visits authorized: 15/30     Time In:  815 am  Time Out: 1025 am  Total Billable Time: 30 minutes (TE-1, MT 1) + 10 min ice pack     Precautions: Standard, RTC protocol NO AROM at elbow joint s/p biceps tenodesis, PROM at elbow and shoulder but no excessive ER stopping at initial end feel per phase I protocol and follow up with MD for protocol     Subjective     Pt reports:  No issues with left shoulder and has compliant with exercise    Pain : 1-2/10 at beginning and end of session 1/10 mostly tightness  Location: Left shoulder    TREATMENT     Rosamaria received therapeutic exercises to develop ROM for 40 minutes including:       Prom progressing to AAROM shoulder abd, scaption, flexion,  Horizontal abd/ adduction, and extension stopping at first end feel to avoid biceps stress within protocol in supine and in 60 deg incline supported position 3 x10 reps total per movement  Pulley with cues into scaption 2 x2 min NP due to early shoulder hiking even with max cues  scapular depression 3x15 wth 2 sec hold  Scapular retraction 3 x15 with 2 sec hold  Shoulder rows 3 x10 with YTB  Shoulder extension 3 x10 with YTB  On the table horiz abd and add 3 x15  On the table IR/ER 3 x15  On the table L shoulder flexion 2x10  AAROM with use of cane in scaption and ABD each 2 x10 needed max cues tactile and verbal.     PROM at L shoulder 9/16/19  90 deg abd,  30 deg ext stopping at initial end feel  100 deg flex with cues to avoid shoulder hike, compensation.  20 deg with ER stopping at  initial end feel  And   Elbow extension at  0 deg    Manual therapy 20 min with STM./ MFR to pect minor, triceps medial head, teres minor, and rhomboids, upper traps and levator, GH mobs grade I/II AP and lateral distraction for pain relief into empty end feel before restrictions and GH mobs grade III with distraction  in all direction for increased ROM    Education provided:   Sleeping position, sling positioning and shoulder pendulums, donning strategy for sling, review post surgery restrictions. Pt issued putty for , decreased edema pooling at elbow     Written Home Exercises Provided: yes.  Exercises were reviewed and Rosamaria was able to demonstrate them prior to the end of the session.  Rosamaria demonstrated good  understanding of the education provided.      See EMR under Patient Instructions for exercises provided 7/26/2019.     Assessment     Pt exhibited a slightly increased tension after session and needed max cues for early shoulder elevation during session as pt tried too hard to raise shoulder in scaption and Abd even with support on table or PROM in supine.  Pt initiated elevation on R shoulder during exercises noted along with early L shoulder elevation on shoulder rows out of habit. L shoulder increased tension with upright exercise and this flows into supine, supported and PROM leading to a decrease in all ROM in all directions.  PT to attempt to schedule for increased 1:1 session with PT and increased to 3 x per week to allow for more progression with ROM.  Pt initial muscle tension and decreased ROM on arrival likely shows overuse and poor mechanics with HEP.        Goals:  Short Term Goals: 3 weeks   1.  Pt independent with HEP for s/p RTC repair and biceps tenodesis with return demonstration  2.  Pt to be independent with donning and doffing surgical sling for proper pain management . Met 8/12/19  3.  Pt to increase PROM as tolerated at left shoulder to 90 deg shoulder flexion and 80 deg  abduction without pain  4.  Pt to decrease overall pain at left shoulder to less than 4/10 after session. Met 8/12/19     Long Term Goals: 12 weeks   1.  Pt to been independent with discharge HEP without cues and proper form.  2.  Pt return to independent ADLs to include dressing, grooming with BUEs without compensation.  3.  Pt to increased AROM at elbow WNL and Left shoulder to 170 deg flexion and 160 deg abduction, 70 deg ER without pain or compensation  4.  Pt to increased L shoulder strength to 4+/5 into flexion extension, ER, IR   5  Pt to increase tolerance to perform 1 hour of household chores, ie cleaning without increased L shoulder pain  6. Pt to decrease pain at left shoulder to 3/10 after session for improved functional activities        Plan   Plan of care Certification: 7/26/2019 to 10/31/19    Cont with PROM and AAROM with change to 3x per week and attempt to schedule for 1:1 sessions with PT    Shanel Carvalho, PT

## 2019-09-17 ENCOUNTER — TELEPHONE (OUTPATIENT)
Dept: SPORTS MEDICINE | Facility: CLINIC | Age: 59
End: 2019-09-17

## 2019-09-17 ENCOUNTER — CLINICAL SUPPORT (OUTPATIENT)
Dept: INTERNAL MEDICINE | Facility: CLINIC | Age: 59
End: 2019-09-17
Payer: COMMERCIAL

## 2019-09-17 DIAGNOSIS — I10 ESSENTIAL HYPERTENSION: ICD-10-CM

## 2019-09-17 DIAGNOSIS — M25.512 ACUTE PAIN OF LEFT SHOULDER: ICD-10-CM

## 2019-09-17 DIAGNOSIS — Z23 FLU VACCINE NEED: Primary | ICD-10-CM

## 2019-09-17 PROCEDURE — 90471 FLU VACCINE (QUAD) GREATER THAN OR EQUAL TO 3YO PRESERVATIVE FREE IM: ICD-10-PCS | Mod: S$GLB,,, | Performed by: FAMILY MEDICINE

## 2019-09-17 PROCEDURE — 90686 IIV4 VACC NO PRSV 0.5 ML IM: CPT | Mod: S$GLB,,, | Performed by: FAMILY MEDICINE

## 2019-09-17 PROCEDURE — 90471 IMMUNIZATION ADMIN: CPT | Mod: S$GLB,,, | Performed by: FAMILY MEDICINE

## 2019-09-17 PROCEDURE — 90686 FLU VACCINE (QUAD) GREATER THAN OR EQUAL TO 3YO PRESERVATIVE FREE IM: ICD-10-PCS | Mod: S$GLB,,, | Performed by: FAMILY MEDICINE

## 2019-09-17 RX ORDER — MELOXICAM 15 MG/1
15 TABLET ORAL DAILY
Qty: 90 TABLET | Refills: 3 | Status: SHIPPED | OUTPATIENT
Start: 2019-09-17 | End: 2020-11-04 | Stop reason: SDUPTHER

## 2019-09-17 RX ORDER — HYDROCHLOROTHIAZIDE 25 MG/1
25 TABLET ORAL DAILY
Qty: 90 TABLET | Refills: 3 | Status: SHIPPED | OUTPATIENT
Start: 2019-09-17 | End: 2020-07-16 | Stop reason: SDUPTHER

## 2019-09-17 NOTE — TELEPHONE ENCOUNTER
----- Message from Linda Huerta sent at 9/17/2019  8:34 AM CDT -----  Contact: self  Do you have these?    ----- Message -----  From: Kelsi Goyal  Sent: 9/17/2019   8:29 AM  To: Brittany Herrera Staff    Pt ask for a call in regards to her disability forms and her extend leave forms     Contact info; [phone]

## 2019-09-17 NOTE — TELEPHONE ENCOUNTER
Returned patients call and informed her of the information below:    Received FMLA forms from patient. Forms completed. [employer] mailed the the patient per her request on the FMLA form.     Received STD forms from [employer]. Forms completed and faxed to them at [phone]. [employer] mailed to the patient.    Elizabeth George MA  [facility]

## 2019-09-19 ENCOUNTER — CLINICAL SUPPORT (OUTPATIENT)
Dept: REHABILITATION | Facility: HOSPITAL | Age: 59
End: 2019-09-19
Payer: COMMERCIAL

## 2019-09-19 ENCOUNTER — TELEPHONE (OUTPATIENT)
Dept: INTERNAL MEDICINE | Facility: CLINIC | Age: 59
End: 2019-09-19

## 2019-09-19 DIAGNOSIS — M75.112 INCOMPLETE TEAR OF LEFT ROTATOR CUFF: ICD-10-CM

## 2019-09-19 DIAGNOSIS — M25.612 DECREASED ROM OF LEFT SHOULDER: ICD-10-CM

## 2019-09-19 PROCEDURE — 97140 MANUAL THERAPY 1/> REGIONS: CPT | Mod: PO

## 2019-09-19 PROCEDURE — 97110 THERAPEUTIC EXERCISES: CPT | Mod: PO

## 2019-09-19 NOTE — TELEPHONE ENCOUNTER
----- Message from Sharron Crawford sent at 9/19/2019 12:03 PM CDT -----  Contact: [phone]  Type: Rx    Name of medication(s): traMADol (ULTRAM) 50 mg tablet, meloxicam (MOBIC) 15 MG tablet    Is this a refill? New rx?  refill    Who prescribed medication? Bill Burns    Pharmacy Name, Phone, & Location: [address]     Comments:  Please advise, thank you

## 2019-09-19 NOTE — TELEPHONE ENCOUNTER
LMOR for pt re: tramadol came from physical medicine , they have to refill that one. The mobic was refilled on 9-17-19 to [facility] pharmacy.

## 2019-09-19 NOTE — PROGRESS NOTES
[facility]  Physical Therapy Note     Name: Rosamaria Agosto  Clinic Number: [id number]     Therapy Diagnosis: decreased ROM and pain at the shoulder s/p RTC  Physician: Arina Corea PA-C     Physician Orders: PT Eval and Treat   Medical Diagnosis from Referral: s/p RTC repair and biceps tenodesis  Evaluation Date: 7/26/2019  Authorization Period Expiration: 12/31/19  Plan of Care Expiration: 10/31/19  Visit # / Visits authorized: 15/30     Time In:  805 am  Time Out: 915 am  Total Billable Time: 45 minutes (TE-1, MT 1) + 10 min ice pack     Precautions: Standard, RTC protocol NO AROM at elbow joint s/p biceps tenodesis, PROM at elbow and shoulder but no excessive ER stopping at initial end feel per phase I protocol and follow up with MD for protocol     Subjective     Pt reports:  No issues with left shoulder and has compliant with exercise, has tried to work on not elevating shoulder but feels she has no control over motion.      Pain : 1-2/10 at beginning and end of session 1/10 mostly tightness  Location: Left shoulder    TREATMENT     Rosamaria received therapeutic exercises to develop ROM for 15 minutes including:       Prom progressing to AAROM shoulder abd, scaption, flexion,  Horizontal abd/ adduction, and extension stopping at first end feel to avoid biceps stress within protocol in supine and in 60 deg incline supported position 3 x10 reps total per movement, TC for decreased UT activity  Pulley with cues into scaption 2 x2 min NP due to early shoulder hiking even with max cues NP  scapular depression 3x15 wth 2 sec hold  Scapular retraction 3 x15 with 2 sec hold  Shoulder rows 3 x10 with YTB - NP  Shoulder extension 3 x10 with YTB- NP  On the table horiz abd and add 3 x15- NP  On the table IR/ER 3 x15  On the table L shoulder flexion 2x10-  PROM shoulder scaption seated rolling ball, cues for decreased UT activity  AAROM scaption with pt seated arm on PT, PT apply humeral  inferior glide with scaption motion - held static 3x 20 sec    PROM at L shoulder 9/16/19  90 deg abd,  30 deg ext stopping at initial end feel  100 deg flex with cues to avoid shoulder hike, compensation.  20 deg with ER stopping at initial end feel  And   Elbow extension at  0 deg    Manual therapy 30 min with STM./ MFR to pect minor, triceps medial head, teres minor, and rhomboids, upper traps and levator, GH mobs grade I/II AP and lateral distraction for pain relief into empty end feel before restrictions and GH mobs grade III with distraction  in all direction for increased ROM  - manual stretch pec minor  -subscap release with ER motion  - inferior humeral glide with inferior mobilization Grade II-III    Education provided:   Sleeping position, sling positioning and shoulder pendulums, donning strategy for sling, review post surgery restrictions. Pt issued putty for , decreased edema pooling at elbow     Written Home Exercises Provided: yes.  Exercises were reviewed and Rosamaria was able to demonstrate them prior to the end of the session.  Rosamaria demonstrated good  understanding of the education provided.      See EMR under Patient Instructions for exercises provided 7/26/2019.     Assessment     Pt showed improved mobility and decreased UT activation post treatment, all below 70 deg scaption.  Required multiple cues for decreased UT activity, held static position until UT released with multiple stretches and mobs.  Pt respond really well to AAROM with inferior humeral glide.  Pt also respond well to subscap release with ER, but throughout therapy did require cues for decreased UT use.  Pt initial muscle tension and decreased ROM on arrival likely shows overuse and poor mechanics with HEP.        Goals:  Short Term Goals: 3 weeks   1.  Pt independent with HEP for s/p RTC repair and biceps tenodesis with return demonstration  2.  Pt to be independent with donning and doffing surgical sling for proper pain  management . Met 8/12/19  3.  Pt to increase PROM as tolerated at left shoulder to 90 deg shoulder flexion and 80 deg abduction without pain  4.  Pt to decrease overall pain at left shoulder to less than 4/10 after session. Met 8/12/19     Long Term Goals: 12 weeks   1.  Pt to been independent with discharge HEP without cues and proper form.  2.  Pt return to independent ADLs to include dressing, grooming with BUEs without compensation.  3.  Pt to increased AROM at elbow WNL and Left shoulder to 170 deg flexion and 160 deg abduction, 70 deg ER without pain or compensation  4.  Pt to increased L shoulder strength to 4+/5 into flexion extension, ER, IR   5  Pt to increase tolerance to perform 1 hour of household chores, ie cleaning without increased L shoulder pain  6. Pt to decrease pain at left shoulder to 3/10 after session for improved functional activities        Plan   Plan of care Certification: 7/26/2019 to 10/31/19    Cont with PROM and AAROM with change to 3x per week and attempt to schedule for 1:1 sessions with PT    Gerson Hubbard, PT

## 2019-09-20 ENCOUNTER — CLINICAL SUPPORT (OUTPATIENT)
Dept: REHABILITATION | Facility: HOSPITAL | Age: 59
End: 2019-09-20
Payer: COMMERCIAL

## 2019-09-20 DIAGNOSIS — M75.112 INCOMPLETE TEAR OF LEFT ROTATOR CUFF: ICD-10-CM

## 2019-09-20 DIAGNOSIS — M25.612 DECREASED ROM OF LEFT SHOULDER: ICD-10-CM

## 2019-09-20 PROCEDURE — 97140 MANUAL THERAPY 1/> REGIONS: CPT | Mod: PO

## 2019-09-20 PROCEDURE — 97110 THERAPEUTIC EXERCISES: CPT | Mod: PO

## 2019-09-20 PROCEDURE — 97010 HOT OR COLD PACKS THERAPY: CPT | Mod: PO

## 2019-09-20 NOTE — PROGRESS NOTES
[facility]  Physical Therapy Note     Name: Rosamaria Agosto  Clinic Number: [id number]     Therapy Diagnosis: decreased ROM and pain at the shoulder s/p RTC  Physician: Arina Corea PA-C     Physician Orders: PT Eval and Treat   Medical Diagnosis from Referral: s/p RTC repair and biceps tenodesis  Evaluation Date: 7/26/2019  Authorization Period Expiration: 12/31/19  Plan of Care Expiration: 10/31/19  Visit # / Visits authorized: 15/30     Time In:  1100 am  Time Out: 1205 pm  Total Billable Time: 25 minutes (TE-1, MT 1) + 10 min ice pack     Precautions: Standard, RTC protocol NO AROM at elbow joint s/p biceps tenodesis, PROM at elbow and shoulder but no excessive ER stopping at initial end feel per phase I protocol and follow up with MD for protocol     Subjective     Pt reports:  No issues with left shoulder and has compliant with exercise, has tried to work on not elevating shoulder but feels its still difficult but has been insight and feel for her early elevation.    Pain : 1-2/10 at beginning and end of session 0/10 mostly tightness  Location: Left shoulder    TREATMENT     Rosamaria received therapeutic exercises to develop ROM for 15 minutes including:       Prom progressing to AAROM shoulder abd, scaption, flexion,  Horizontal abd/ adduction, and extension stopping at first end feel to avoid biceps stress within protocol in supine and in 60 deg incline supported position 3 x10 reps total per movement, TC for decreased UT activity  Pulley with cues into scaption 2 x2 min NP due to early shoulder hiking even with max cues NP  scapular depression 3x15 wth 2 sec hold  Scapular retraction 3 x15 with 2 sec hold  Shoulder rows 3 x10 with YTB - NP  Shoulder extension 3 x10 with YTB- NP  On the table horiz abd and add 3 x15- NP  On the table IR/ER 3 x15  On the table L shoulder flexion 2x10-  PROM shoulder scaption seated rolling ball, cues for decreased UT activity  AAROM scaption  with pt seated arm on PT, PT apply humeral inferior glide with scaption motion - held static 3x 20 sec    PROM at L shoulder 9/20/19  95 deg abd,  30 deg ext stopping at initial end feel  100 deg flex with cues to avoid shoulder hike, compensation.  20 deg with ER stopping at initial end feel  And   Elbow extension at  0 deg    Manual therapy 25 min with STM./ MFR to pect minor, triceps medial head, teres minor, and rhomboids, upper traps and levator, GH mobs grade I/II AP and lateral distraction for pain relief into empty end feel before restrictions and GH mobs grade III with distraction  in all direction for increased ROM  - manual stretch pec minor  -subscap release with ER motion  - inferior humeral glide with inferior mobilization Grade II-III    Education provided:   Sleeping position, sling positioning and shoulder pendulums, donning strategy for sling, review post surgery restrictions. Pt issued putty for , decreased edema pooling at elbow     Written Home Exercises Provided: yes.  Exercises were reviewed and Rosamaria was able to demonstrate them prior to the end of the session.  Rosamaria demonstrated good  understanding of the education provided.      See EMR under Patient Instructions for exercises provided 7/26/2019.     Assessment     Pt showed improved mobility and decreased UT activation post treatment, all below 70 deg scaption.  Required multiple cues for decreased UT activity, held static position until UT released with multiple stretches and mobs. Improved with decreased elevation with only mod cues overall. Pt did not increased guarding and tension with session.  Pt. Returned to highest level of range rom achieved in previous session.        Goals:  Short Term Goals: 3 weeks   1.  Pt independent with [facility] for s/p RTC repair and biceps tenodesis with return demonstration  2.  Pt to be independent with donning and doffing surgical sling for proper pain management . Met 8/12/19  3.  Pt to increase PROM  as tolerated at left shoulder to 90 deg shoulder flexion and 80 deg abduction without pain  4.  Pt to decrease overall pain at left shoulder to less than 4/10 after session. Met 8/12/19     Long Term Goals: 12 weeks   1.  Pt to been independent with discharge HEP without cues and proper form.  2.  Pt return to independent ADLs to include dressing, grooming with BUEs without compensation.  3.  Pt to increased AROM at elbow WNL and Left shoulder to 170 deg flexion and 160 deg abduction, 70 deg ER without pain or compensation  4.  Pt to increased L shoulder strength to 4+/5 into flexion extension, ER, IR   5  Pt to increase tolerance to perform 1 hour of household chores, ie cleaning without increased L shoulder pain  6. Pt to decrease pain at left shoulder to 3/10 after session for improved functional activities        Plan   Plan of care Certification: 7/26/2019 to 10/31/19    Cont with PROM and AAROM with change to 3x per week and attempt to schedule for 1:1 sessions with PT    Shanel Carvalho, PT

## 2019-09-23 ENCOUNTER — CLINICAL SUPPORT (OUTPATIENT)
Dept: REHABILITATION | Facility: HOSPITAL | Age: 59
End: 2019-09-23
Payer: COMMERCIAL

## 2019-09-23 DIAGNOSIS — M75.112 INCOMPLETE TEAR OF LEFT ROTATOR CUFF: ICD-10-CM

## 2019-09-23 DIAGNOSIS — M25.612 DECREASED ROM OF LEFT SHOULDER: ICD-10-CM

## 2019-09-23 PROCEDURE — 97140 MANUAL THERAPY 1/> REGIONS: CPT | Mod: PO

## 2019-09-23 PROCEDURE — 97010 HOT OR COLD PACKS THERAPY: CPT | Mod: PO

## 2019-09-23 PROCEDURE — 97110 THERAPEUTIC EXERCISES: CPT | Mod: PO

## 2019-09-23 NOTE — PROGRESS NOTES
[facility]  Physical Therapy Note     Name: Rosamaria Agosto  Clinic Number: [id number]     Therapy Diagnosis: decreased ROM and pain at the shoulder s/p RTC  Physician: Arina Corea PA-C     Physician Orders: PT Eval and Treat   Medical Diagnosis from Referral: s/p RTC repair and biceps tenodesis  Evaluation Date: 7/26/2019  Authorization Period Expiration: 12/31/19  Plan of Care Expiration: 10/31/19  Visit # / Visits authorized: 16/30     Time In:  900 am  Time Out: 1015 pm  Total Billable Time: 60 minutes (TE-2, MT 2) + 15 min ice pack     Precautions: Standard, RTC protocol NO AROM at elbow joint s/p biceps tenodesis, PROM at elbow and shoulder but no excessive ER stopping at initial end feel per phase I protocol and follow up with MD for protocol     Subjective     Pt reports:  No issues with left shoulder and has compliant with exercise, has tried to work on not elevating shoulder at home and took Sunday off to help relax shoulder 24 hrs before am session.    Pain : 1-2/10 at beginning and end of session 0/10 mostly tightness  Location: Left shoulder    TREATMENT     Rosamaria received therapeutic exercises to develop ROM for 35 minutes including:       Prom progressing to AAROM shoulder abd, scaption, flexion,  Horizontal abd/ adduction, and extension stopping at first end feel to avoid biceps stress within protocol in supine and in 60 deg incline supported position 3 x10 reps total per movement, TC for decreased UT activity    Pulley with cues into scaption 2 x2 min NP due to early shoulder hiking even with max cues NP  scapular depression with ER 3x15 wth 2 sec hold on edge of mat and pushing down into the mat  Scapular retraction 3 x15 with 2 sec hold  Shoulder rows 3 x10 with YTB -   Shoulder extension 3 x10 with YTB-   On the table horiz abd and add 3 x15- NP  On the table IR/ER 3 x15 NP  On the table L shoulder flexion 2x10- NP  PROM shoulder scaption seated rolling ball,  cues for decreased UT activity 2 x1 min  PROM shoulder scaption on [employer] machine with bar above shoulder height with cues for decreased UT activity 2 x1min  AAROM scaption with pt seated arm on PT, PT apply humeral inferior glide with scaption motion - held static 3x 20 sec    PROM at L shoulder 9/23/19  100 deg abd,  30 deg ext stopping at initial end feel  105 deg flex with cues to avoid shoulder hike, compensation.  20 deg with ER stopping at initial end feel  And   Elbow extension at  0 deg    Manual therapy 25 min with STM./ MFR to pect minor, triceps medial head, teres minor, and rhomboids, upper traps and levator, GH mobs grade I/II AP and lateral distraction for pain relief into empty end feel before restrictions and GH mobs grade III with distraction  in all direction for increased ROM  - manual stretch pec minor  -subscap release with ER motion  - inferior humeral glide with inferior mobilization Grade II-III    Education provided:   Sleeping position, sling positioning and shoulder pendulums, donning strategy for sling, review post surgery restrictions. Pt issued putty for , decreased edema pooling at elbow     Written Home Exercises Provided: yes.  Exercises were reviewed and Rosamaria was able to demonstrate them prior to the end of the session.  Rosamaria demonstrated good  understanding of the education provided.      See EMR under Patient Instructions for exercises provided 7/26/2019.     Assessment     Pt showed improved mobility in passive ROM and AAROM in supine.  Pt continues to elevate when allowed to perform AROM, AAROM and PROM without cues.  Overall muscle guarding is decreased during session allowing for increased PROM.  Pt overall needed mod cues and tolerated increased scapular thoracic release of subscapularis.        Goals:  Short Term Goals: 3 weeks   1.  Pt independent with [facility] for s/p RTC repair and biceps tenodesis with return demonstration  2.  Pt to be independent with donning and  doffing surgical sling for proper pain management . Met 8/12/19  3.  Pt to increase PROM as tolerated at left shoulder to 90 deg shoulder flexion and 80 deg abduction without pain  4.  Pt to decrease overall pain at left shoulder to less than 4/10 after session. Met 8/12/19     Long Term Goals: 12 weeks   1.  Pt to been independent with discharge HEP without cues and proper form.  2.  Pt return to independent ADLs to include dressing, grooming with BUEs without compensation.  3.  Pt to increased AROM at elbow WNL and Left shoulder to 170 deg flexion and 160 deg abduction, 70 deg ER without pain or compensation  4.  Pt to increased L shoulder strength to 4+/5 into flexion extension, ER, IR   5  Pt to increase tolerance to perform 1 hour of household chores, ie cleaning without increased L shoulder pain  6. Pt to decrease pain at left shoulder to 3/10 after session for improved functional activities        Plan   Plan of care Certification: 7/26/2019 to 10/31/19    Cont with progressive and more aggressive PROM and GH mobs to stretch GH capsule.    Shanel Carvalho, PT

## 2019-09-25 ENCOUNTER — CLINICAL SUPPORT (OUTPATIENT)
Dept: REHABILITATION | Facility: HOSPITAL | Age: 59
End: 2019-09-25
Payer: COMMERCIAL

## 2019-09-25 DIAGNOSIS — M25.612 DECREASED ROM OF LEFT SHOULDER: ICD-10-CM

## 2019-09-25 DIAGNOSIS — M75.112 INCOMPLETE TEAR OF LEFT ROTATOR CUFF: ICD-10-CM

## 2019-09-25 PROCEDURE — 97140 MANUAL THERAPY 1/> REGIONS: CPT | Mod: PO

## 2019-09-25 PROCEDURE — 97110 THERAPEUTIC EXERCISES: CPT | Mod: PO

## 2019-09-25 PROCEDURE — 97010 HOT OR COLD PACKS THERAPY: CPT | Mod: PO

## 2019-09-25 NOTE — PROGRESS NOTES
[facility]  Physical Therapy Note     Name: Rosamaria Agosto  Clinic Number: [id number]     Therapy Diagnosis: decreased ROM and pain at the shoulder s/p RTC  Physician: Arina Corea PA-C     Physician Orders: PT Eval and Treat   Medical Diagnosis from Referral: s/p RTC repair and biceps tenodesis  Evaluation Date: 7/26/2019  Authorization Period Expiration: 12/31/19  Plan of Care Expiration: 10/31/19  Visit # / Visits authorized: 16/30     Time In:  900 am  Time Out: 1015 pm  Total Billable Time: 30 minutes (TE-1, MT 1) + 15 min ice pack     Precautions: Standard, RTC protocol NO AROM at elbow joint s/p biceps tenodesis, PROM at elbow and shoulder but no excessive ER stopping at initial end feel per phase I protocol and follow up with MD for protocol     Subjective     Pt reports:  No issues with left shoulder and has compliant with exercise, still trying more muscle relaxers from MD and with good compliance with HEP with emphasis on shoulder hiking.    Pain : 1/10 at beginning and end of session 0/10 mostly tightness  Location: Left shoulder    TREATMENT     Rosamaria received therapeutic exercises to develop ROM for 40 minutes including:       Prom progressing to AAROM shoulder abd, scaption, flexion,  Horizontal abd/ adduction, and extension stopping at first end feel to avoid biceps stress within protocol in supine and in 60 deg incline supported position 3 x10 reps total per movement, TC for decreased UT activity    Pulley with cues into scaption 2 x2 min NP due to early shoulder hiking even with max cues NP  scapular depression with ER 3x15 wth 2 sec hold on edge of mat and pushing down into the mat  Scapular retraction 3 x15 with 2 sec hold  Shoulder rows 3 x10 with YTB - NP  Shoulder extension 3 x10 with YTB-  NP  On the table horiz abd and add 3 x15- NP  On the table IR/ER 3 x15 NP  On the table L shoulder flexion 2x10- NP  PROM shoulder scaption seated rolling ball, cues for  decreased UT activity 3 x1 min  PROM shoulder scaption on [employer] machine with bar above shoulder height with cues for decreased UT activity 2 x1min NP  scaption passive prolonged with use of inclined mat and hi lo elevation. 5 x 30-60 secs as tolerated from 100 -120 deg  AAROM scaption with pt seated arm on PT, PT apply humeral inferior glide with scaption motion - held static 3x 20 sec  Self AAROM in scaption 6 x 10 reps and hold for 15 secs with every set.    PROM at L shoulder 9/23/19  115 deg abd,  30 deg ext stopping at initial end feel  115 deg flex with cues to avoid shoulder hike, compensation.  20 deg with ER stopping at initial end feel  And   Elbow extension at  0 deg    Manual therapy 20 min with STM./ MFR to pect minor, triceps medial head, teres minor, and rhomboids, upper traps and levator, GH mobs grade I/II AP and lateral distraction for pain relief into empty end feel before restrictions and GH mobs grade III with distraction  in all direction for increased ROM  - manual stretch pec minor  -subscap release with ER motion  - inferior humeral glide with inferior mobilization Grade II-III    Education provided:   Sleeping position, sling positioning and shoulder pendulums, donning strategy for sling, review post surgery restrictions. Pt issued putty for , decreased edema pooling at elbow     Written Home Exercises Provided: yes.  Exercises were reviewed and Rosamaria was able to demonstrate them prior to the end of the session.  Rosamaria demonstrated good  understanding of the education provided.      See EMR under Patient Instructions for exercises provided 7/26/2019.     Assessment     Pt showed improved mobility in passive ROM and AAROM in supine.  Pt continues to elevate when allowed to perform AROM, AAROM and PROM without cues.  Overall muscle guarding is decreased during session allowing for increased PROM.  Pt overall needed mod cues and tolerated increased scapular thoracic release of  subscapularis and GH mobs in general.  Pt with decreased guarding and hiking reported increased ease of AAROM during session and even with PROM for increased ROM especially into abduction.  Pt with increased PROM allow for ease of motion with increased spacing in GH joint.        Goals:  Short Term Goals: 3 weeks   1.  Pt independent with HEP for s/p RTC repair and biceps tenodesis with return demonstration  2.  Pt to be independent with donning and doffing surgical sling for proper pain management . Met 8/12/19  3.  Pt to increase PROM as tolerated at left shoulder to 90 deg shoulder flexion and 80 deg abduction without pain  4.  Pt to decrease overall pain at left shoulder to less than 4/10 after session. Met 8/12/19     Long Term Goals: 12 weeks   1.  Pt to been independent with discharge HEP without cues and proper form.  2.  Pt return to independent ADLs to include dressing, grooming with BUEs without compensation.  3.  Pt to increased AROM at elbow WNL and Left shoulder to 170 deg flexion and 160 deg abduction, 70 deg ER without pain or compensation  4.  Pt to increased L shoulder strength to 4+/5 into flexion extension, ER, IR   5  Pt to increase tolerance to perform 1 hour of household chores, ie cleaning without increased L shoulder pain  6. Pt to decrease pain at left shoulder to 3/10 after session for improved functional activities        Plan   Plan of care Certification: 7/26/2019 to 10/31/19    Cont with progressive and more aggressive PROM and GH mobs to stretch GH capsule.    Shanel Carvalho, PT

## 2019-09-26 DIAGNOSIS — Z47.89 SURGICAL AFTERCARE, MUSCULOSKELETAL SYSTEM: ICD-10-CM

## 2019-09-26 DIAGNOSIS — Z98.890 S/P ARTHROSCOPY OF LEFT SHOULDER: ICD-10-CM

## 2019-09-26 RX ORDER — CHLORZOXAZONE 500 MG/1
TABLET ORAL
Qty: 60 TABLET | Refills: 0 | Status: SHIPPED | OUTPATIENT
Start: 2019-09-26 | End: 2019-10-17

## 2019-09-27 ENCOUNTER — CLINICAL SUPPORT (OUTPATIENT)
Dept: REHABILITATION | Facility: HOSPITAL | Age: 59
End: 2019-09-27
Payer: COMMERCIAL

## 2019-09-27 DIAGNOSIS — M25.612 DECREASED ROM OF LEFT SHOULDER: ICD-10-CM

## 2019-09-27 DIAGNOSIS — M75.112 INCOMPLETE TEAR OF LEFT ROTATOR CUFF: ICD-10-CM

## 2019-09-27 PROCEDURE — 97110 THERAPEUTIC EXERCISES: CPT | Mod: PO

## 2019-09-27 PROCEDURE — 97010 HOT OR COLD PACKS THERAPY: CPT | Mod: PO

## 2019-09-27 PROCEDURE — 97140 MANUAL THERAPY 1/> REGIONS: CPT | Mod: PO

## 2019-09-27 NOTE — PROGRESS NOTES
[facility]  Physical Therapy Note     Name: Rosamaria Agosto  Clinic Number: [id number]     Therapy Diagnosis: decreased ROM and pain at the shoulder s/p RTC  Physician: Arina Corea PA-C     Physician Orders: PT Eval and Treat   Medical Diagnosis from Referral: s/p RTC repair and biceps tenodesis  Evaluation Date: 7/26/2019  Authorization Period Expiration: 12/31/19  Plan of Care Expiration: 10/31/19  Visit # / Visits authorized: 16/30     Time In:  810 am  Time Out: 915 pm  Total Billable Time: 25 minutes (TE-1, MT 1) + 15 min ice pack     Precautions: Standard, RTC protocol NO AROM at elbow joint s/p biceps tenodesis, PROM at elbow and shoulder but no excessive ER stopping at initial end feel per phase I protocol and follow up with MD for protocol     Subjective     Pt reports:  No issues with left shoulder and has compliant with exercise, still trying more muscle relaxers from MD and with good compliance with HEP with emphasis on shoulder hiking.    Pain : 1/10 at beginning and end of session 0/10 mostly tightness  Location: Left shoulder    TREATMENT     Rosamaria received therapeutic exercises to develop ROM for 35 minutes including:       Prom progressing to AAROM shoulder abd, scaption, flexion,  Horizontal abd/ adduction, and extension stopping at first end feel to avoid biceps stress within protocol in supine and in 60 deg incline supported position 3 x10 reps total per movement, TC for decreased UT activity    Pulley with cues into scaption 2 x2 min NP due to early shoulder hiking even with max cues NP  scapular depression with ER 3x15 wth 2 sec hold on edge of mat and pushing down into the mat  Scapular retraction 3 x15 with 2 sec hold  Shoulder rows 3 x10 with YTB - NP  Shoulder extension 3 x10 with YTB-  NP  On the table horiz abd and add 3 x15- NP  On the table IR/ER 3 x15 NP  On the table L shoulder flexion 2x10- NP  PROM shoulder scaption seated rolling ball, cues for  decreased UT activity 3 x1 min  PROM shoulder scaption on [employer] machine with bar above shoulder height with cues for decreased UT activity 2 x1min NP  scaption passive prolonged with use of inclined mat and hi lo elevation. 5 x 30-60 secs as tolerated from 100 -120 deg  AAROM scaption with pt seated arm on PT, PT apply humeral inferior glide with scaption motion - held static 3x 20 sec  Self AAROM in scaption 6 x 10 reps and hold for 15 secs with every set.    PROM at L shoulder 9/23/19  115 deg abd,  30 deg ext stopping at initial end feel  115 deg flex with cues to avoid shoulder hike, compensation.  20 deg with ER stopping at initial end feel  And   Elbow extension at  0 deg    Manual therapy 15 min with STM./ MFR to pect minor, triceps medial head, teres minor, and rhomboids, upper traps and levator, GH mobs grade I/II AP and lateral distraction for pain relief into empty end feel before restrictions and GH mobs grade III with distraction  in all direction for increased ROM  - manual stretch pec minor  -subscap release with ER motion  - inferior humeral glide with inferior mobilization Grade II-III    Education provided:   Sleeping position, sling positioning and shoulder pendulums, donning strategy for sling, review post surgery restrictions. Pt issued putty for , decreased edema pooling at elbow     Written Home Exercises Provided: yes.  Exercises were reviewed and Rosamaria was able to demonstrate them prior to the end of the session.  Rosamaria demonstrated good  understanding of the education provided.      See EMR under Patient Instructions for exercises provided 7/26/2019.     Assessment     Pt showed improved mobility in passive ROM and AAROM in supine.  Pt continues to elevate when allowed to perform AROM, AAROM and PROM without cues but better overall.  Overall muscle guarding is decreased during session allowing for increased PROM.  Pt overall needed mod cues and tolerated increased scapular thoracic  release of subscapularis and GH mobs in general.  Pt with decreased guarding and hiking reported increased ease of AAROM during session and even with PROM for increased ROM especially into abduction.  Pt with increased PROM allow for ease of motion with increased spacing in GH joint.  Pt slowly increased PROM to prior level on last visit and will continue to progress with good tolerance and compliance.  Pt needed more cues initially but able to self regulate with session        Goals:  Short Term Goals: 3 weeks   1.  Pt independent with HEP for s/p RTC repair and biceps tenodesis with return demonstration  2.  Pt to be independent with donning and doffing surgical sling for proper pain management . Met 8/12/19  3.  Pt to increase PROM as tolerated at left shoulder to 90 deg shoulder flexion and 80 deg abduction without pain  4.  Pt to decrease overall pain at left shoulder to less than 4/10 after session. Met 8/12/19     Long Term Goals: 12 weeks   1.  Pt to been independent with discharge HEP without cues and proper form.  2.  Pt return to independent ADLs to include dressing, grooming with BUEs without compensation.  3.  Pt to increased AROM at elbow WNL and Left shoulder to 170 deg flexion and 160 deg abduction, 70 deg ER without pain or compensation  4.  Pt to increased L shoulder strength to 4+/5 into flexion extension, ER, IR   5  Pt to increase tolerance to perform 1 hour of household chores, ie cleaning without increased L shoulder pain  6. Pt to decrease pain at left shoulder to 3/10 after session for improved functional activities        Plan   Plan of care Certification: 7/26/2019 to 10/31/19    Cont with progressive and more aggressive PROM and GH mobs to stretch GH capsule.    Shanel Carvalho, PT

## 2019-09-30 ENCOUNTER — CLINICAL SUPPORT (OUTPATIENT)
Dept: REHABILITATION | Facility: HOSPITAL | Age: 59
End: 2019-09-30
Payer: COMMERCIAL

## 2019-09-30 DIAGNOSIS — M75.112 INCOMPLETE TEAR OF LEFT ROTATOR CUFF: ICD-10-CM

## 2019-09-30 DIAGNOSIS — M25.612 DECREASED ROM OF LEFT SHOULDER: ICD-10-CM

## 2019-09-30 PROCEDURE — 97110 THERAPEUTIC EXERCISES: CPT | Mod: PO

## 2019-09-30 PROCEDURE — 97010 HOT OR COLD PACKS THERAPY: CPT | Mod: PO

## 2019-09-30 PROCEDURE — 97140 MANUAL THERAPY 1/> REGIONS: CPT | Mod: PO

## 2019-09-30 NOTE — PROGRESS NOTES
[facility]  Physical Therapy Note     Name: Rosamaria Agosto  Clinic Number: [id number]     Therapy Diagnosis: decreased ROM and pain at the shoulder s/p RTC  Physician: Arina Corea PA-C     Physician Orders: PT Eval and Treat   Medical Diagnosis from Referral: s/p RTC repair and biceps tenodesis  Evaluation Date: 7/26/2019  Authorization Period Expiration: 12/31/19  Plan of Care Expiration: 10/31/19  Visit # / Visits authorized: 17/30     Time In:  905 am  Time Out: 1015 pm  Total Billable Time: 55 minutes (TE-3, MT 1) + 15 min ice pack     Precautions: Standard, RTC protocol NO AROM at elbow joint s/p biceps tenodesis, PROM at elbow and shoulder but no excessive ER stopping at initial end feel per phase I protocol and follow up with MD for protocol     Subjective     Pt reports:  No issues with left shoulder and has compliant with exercise, able to get more muscle relaxers from MD and with good compliance with HEP with emphasis on shoulder hiking but started to have lateral deltoid pain    Pain : 2/10 at beginning and end of session 1/10 mostly tightness  Location: Left shoulder    TREATMENT     Rosamaria received therapeutic exercises to develop ROM for 40 minutes including:       Prom progressing to AAROM shoulder abd, scaption, flexion,  Horizontal abd/ adduction, and extension stopping at first end feel to avoid biceps stress within protocol in supine and in 60 deg incline supported position 3 x10 reps total per movement, TC for decreased UT activity    Pulley with cues into scaption 2 x2 min NP due to early shoulder hiking even with max cues NP  scapular depression with ER 3x15 wth 2 sec hold on edge of mat and pushing down into the mat  Scapular retraction 3 x15 with 2 sec hold  Shoulder rows 3 x10 with YTB -   Shoulder extension 3 x10 with YTB-    On the table horiz abd and add 3 x15- NP  On the table IR/ER 3 x15 NP  On the table L shoulder flexion 2x10- NP  PROM shoulder  scaption seated rolling ball, cues for decreased UT activity 3 x1 min  PROM shoulder scaption on Placed machine with bar above shoulder height with cues for decreased UT activity 2 x1min NP  scaption passive prolonged with use of inclined mat and hi lo elevation. 5 x 30-60 secs as tolerated from 100 -120 deg  AAROM scaption and  with pt seated arm on PT, PT apply humeral inferior glide with scaption motion - held static 3x 20 sec    AAROM into scaption and then in abduction with cues for shoulder depression in supine and in tolerated incline position 45-90 deg 4 x10 each    Self AAROM in scaption and  4 x 10 reps and hold for 15 secs with every set.    PROM at L shoulder 9/30/19  120 deg abd,  30 deg ext stopping at initial end feel  130 deg flex with cues to avoid shoulder hike, compensation.  20 deg with ER stopping at initial end feel  And   Elbow extension at  0 deg    Manual therapy 15 min with STM./ MFR to pect minor, triceps medial head, teres minor, and rhomboids, upper traps and levator, GH mobs grade I/II AP and lateral distraction for pain relief into empty end feel before restrictions and GH mobs grade III with distraction  in all direction for increased ROM  - manual stretch pec minor  -subscap release with ER motion  - inferior humeral glide with inferior mobilization Grade II-III    Education provided:   Sleeping position, sling positioning and shoulder pendulums, donning strategy for sling, review post surgery restrictions. Pt issued putty for , decreased edema pooling at elbow     Written Home Exercises Provided: yes.  Exercises were reviewed and Rosamaria was able to demonstrate them prior to the end of the session.  Rosamaria demonstrated good  understanding of the education provided.      See EMR under Patient Instructions for exercises provided 7/26/2019.     Assessment     Pt showed improved mobility in passive ROM and AAROM in supine.  Pt continues to elevate when allowed to perform AROM, AAROM and  PROM  In standing or upright without cues but better overall.  Overall muscle guarding is decreased during session allowing for increased PROM.  Pt overall needed mod cues and tolerated increased scapular thoracic release of subscapularis and GH mobs in general.  Pt with decreased guarding and hiking initially but better as session continued.  AROM on the table/mat in the incline position allow greatest amount of scaption up to 130.  Pt with decreased insight at time with decreased awareness of shoulder hiking even when in front of a mirror for feedback.  Lateral deltoid pain due to impingement of supraspinatus with shoulder hiking during attempted scaption and abduction.      Goals:  Short Term Goals: 3 weeks   1.  Pt independent with HEP for s/p RTC repair and biceps tenodesis with return demonstration  2.  Pt to be independent with donning and doffing surgical sling for proper pain management . Met 8/12/19  3.  Pt to increase PROM as tolerated at left shoulder to 90 deg shoulder flexion and 80 deg abduction without pain  4.  Pt to decrease overall pain at left shoulder to less than 4/10 after session. Met 8/12/19     Long Term Goals: 12 weeks   1.  Pt to been independent with discharge HEP without cues and proper form.  2.  Pt return to independent ADLs to include dressing, grooming with BUEs without compensation.  3.  Pt to increased AROM at elbow WNL and Left shoulder to 170 deg flexion and 160 deg abduction, 70 deg ER without pain or compensation  4.  Pt to increased L shoulder strength to 4+/5 into flexion extension, ER, IR   5  Pt to increase tolerance to perform 1 hour of household chores, ie cleaning without increased L shoulder pain  6. Pt to decrease pain at left shoulder to 3/10 after session for improved functional activities        Plan   Plan of care Certification: 7/26/2019 to 10/31/19    Cont with progressive and more aggressive PROM and GH mobs to stretch GH capsule.    Shanel Carvalho, PT

## 2019-10-02 ENCOUNTER — CLINICAL SUPPORT (OUTPATIENT)
Dept: REHABILITATION | Facility: HOSPITAL | Age: 59
End: 2019-10-02
Payer: COMMERCIAL

## 2019-10-02 DIAGNOSIS — M25.612 DECREASED ROM OF LEFT SHOULDER: ICD-10-CM

## 2019-10-02 DIAGNOSIS — M75.112 INCOMPLETE TEAR OF LEFT ROTATOR CUFF: ICD-10-CM

## 2019-10-02 PROCEDURE — 97010 HOT OR COLD PACKS THERAPY: CPT | Mod: PO

## 2019-10-02 PROCEDURE — 97110 THERAPEUTIC EXERCISES: CPT | Mod: PO

## 2019-10-02 PROCEDURE — 97140 MANUAL THERAPY 1/> REGIONS: CPT | Mod: PO

## 2019-10-02 NOTE — PROGRESS NOTES
[facility]  Physical Therapy Note     Name: Rosamaria Agosto  Clinic Number: [id number]     Therapy Diagnosis: decreased ROM and pain at the shoulder s/p RTC  Physician: Arina Corea PA-C     Physician Orders: PT Eval and Treat   Medical Diagnosis from Referral: s/p RTC repair and biceps tenodesis  Evaluation Date: 7/26/2019  Authorization Period Expiration: 12/31/19  Plan of Care Expiration: 10/31/19  Visit # / Visits authorized: 17/30     Time In:  840 am  Time Out: 1005 am  Total Billable Time: 35 minutes (TE-1, MT 1) + 15 min ice pack     Precautions: Standard, RTC protocol NO AROM at elbow joint s/p biceps tenodesis, PROM at elbow and shoulder but no excessive ER stopping at initial end feel per phase I protocol and follow up with MD for protocol     Subjective     Pt reports:  No issues with left shoulder and has compliant with exercise, able to get more muscle relaxers from MD and with good compliance with HEP with emphasis on shoulder hiking but started to have lateral deltoid pain    Pain : 2/10 at beginning and end of session 1/10 mostly tightness  Location: Left shoulder    TREATMENT     Rosamaria received therapeutic exercises to develop ROM for 50 minutes including:       Prom progressing to AAROM shoulder abd, scaption, flexion,  Horizontal abd/ adduction, and extension stopping at first end feel to avoid biceps stress within protocol in supine and in 60 deg incline supported position 3 x10 reps total per movement, TC for decreased UT activity    Pulley with cues into scaption x 1 min discontinued due to shoulder hiking even with cues.  scapular depression with ER 3x15 wth 2 sec hold on edge of mat and pushing down into the mat  Scapular retraction 3 x15 with 2 sec hold  Shoulder rows 3 x10 with YTB -   Shoulder extension 3 x10 with YTB-    On the table horiz abd and add 3 x15- NP  On the table IR/ER 3 x15 NP  On the table L shoulder flexion 2x10- NP  PROM shoulder  scaption seated rolling ball, cues for decreased UT activity 3 x1 min  PROM shoulder scaption on [employer] machine with bar above shoulder height with cues for decreased UT activity 2 x1min NP   scaption passive prolonged with use of inclined mat and hi lo elevation. 5 x 30-60 secs as tolerated from 100 -120 deg  AAROM scaption and  with pt seated arm on PT, PT apply humeral inferior glide with scaption motion - held static 3x 20 sec    AAROM into scaption and then in abduction with cues for shoulder depression in supine and in tolerated incline position 45-90 deg 4 x10 each    Self AAROM in supine scaption with elbow flexed toward full extension at end rom of scaption - 5 x 10 reps and hold for 15 secs with every 5 reps    Self AAROM in supine scaption with elbow extending for full ROM 3 x10 with 15 second hold at end ROM every 5 reps    PROM at L shoulder 10/2/19  110 deg abd,  30 deg ext stopping at initial end feel  120 deg flex with cues to avoid shoulder hike, compensation.  20 deg with ER stopping at initial end feel  And   Elbow extension at  0 deg    Manual therapy 20 min with STM./ MFR to pect minor, teres minor, and rhomboids, upper traps and levator, GH mobs grade I/II AP and lateral distraction for pain relief into empty end feel before restrictions and GH mobs grade III with distraction  in all direction for increased ROM  - manual stretch pec minor  -subscap release with ER motion  - inferior humeral glide with inferior mobilization Grade II-III    Education provided:   Sleeping position, sling positioning and shoulder pendulums, donning strategy for sling, review post surgery restrictions. Pt issued putty for , decreased edema pooling at elbow     Written Home Exercises Provided: yes.  Exercises were reviewed and Rosamaria was able to demonstrate them prior to the end of the session.  Rosamaria demonstrated good  understanding of the education provided.      See EMR under Patient Instructions for exercises  provided 7/26/2019.     Assessment     Pt with increased resting tension overall initially and improved with session but intial abd PROM 80 and scaption 90 but improved with session.  Pt improved with decreased shoulder hiking during scaption along elevated mat up incline and with during theraball roll out with need for less cues. Pt with good mechanics during self AAROM with use of R UE to assist LUE      Goals:  Short Term Goals: 3 weeks   1.  Pt independent with HEP for s/p RTC repair and biceps tenodesis with return demonstration  2.  Pt to be independent with donning and doffing surgical sling for proper pain management . Met 8/12/19  3.  Pt to increase PROM as tolerated at left shoulder to 90 deg shoulder flexion and 80 deg abduction without pain  4.  Pt to decrease overall pain at left shoulder to less than 4/10 after session. Met 8/12/19     Long Term Goals: 12 weeks   1.  Pt to been independent with discharge HEP without cues and proper form.  2.  Pt return to independent ADLs to include dressing, grooming with BUEs without compensation.  3.  Pt to increased AROM at elbow WNL and Left shoulder to 170 deg flexion and 160 deg abduction, 70 deg ER without pain or compensation  4.  Pt to increased L shoulder strength to 4+/5 into flexion extension, ER, IR   5  Pt to increase tolerance to perform 1 hour of household chores, ie cleaning without increased L shoulder pain  6. Pt to decrease pain at left shoulder to 3/10 after session for improved functional activities        Plan   Plan of care Certification: 7/26/2019 to 10/31/19    Cont with progressive and more aggressive PROM and GH mobs to stretch GH capsule.    Shanel Carvalho, PT

## 2019-10-04 ENCOUNTER — CLINICAL SUPPORT (OUTPATIENT)
Dept: REHABILITATION | Facility: HOSPITAL | Age: 59
End: 2019-10-04
Payer: COMMERCIAL

## 2019-10-04 DIAGNOSIS — M75.112 INCOMPLETE TEAR OF LEFT ROTATOR CUFF: ICD-10-CM

## 2019-10-04 DIAGNOSIS — M25.612 DECREASED ROM OF LEFT SHOULDER: ICD-10-CM

## 2019-10-04 PROCEDURE — 97110 THERAPEUTIC EXERCISES: CPT | Mod: PO

## 2019-10-04 PROCEDURE — 97010 HOT OR COLD PACKS THERAPY: CPT | Mod: PO

## 2019-10-04 PROCEDURE — 97140 MANUAL THERAPY 1/> REGIONS: CPT | Mod: PO

## 2019-10-04 NOTE — PROGRESS NOTES
[facility]  Physical Therapy Note     Name: Rosamaria Agosto  Clinic Number: [id number]     Therapy Diagnosis: decreased ROM and pain at the shoulder s/p RTC  Physician: Arina Corea PA-C     Physician Orders: PT Eval and Treat   Medical Diagnosis from Referral: s/p RTC repair and biceps tenodesis  Evaluation Date: 7/26/2019  Authorization Period Expiration: 12/31/19  Plan of Care Expiration: 10/31/19  Visit # / Visits authorized: 17/30     Time In:  820 am  Time Out: 925 am  Total Billable Time: 35 minutes (TE-1, MT 1) + 10 min ice pack     Precautions: Standard, RTC protocol NO AROM at elbow joint s/p biceps tenodesis, PROM at elbow and shoulder but no excessive ER stopping at initial end feel per phase I protocol and follow up with MD for protocol     Subjective     Pt reports:  No issues with left shoulder and has compliant with exercise reports less tightness and deltoid pain    Pain : 2/10 at beginning and end of session 1/10 mostly tightness  Location: Left shoulder    TREATMENT     Rosamaria received therapeutic exercises to develop ROM for 40 minutes including:       Prom progressing to AAROM shoulder abd, scaption, flexion,  Horizontal abd/ adduction, and extension stopping at first end feel to avoid biceps stress within protocol in supine and in 60 deg incline supported position 3 x10 reps total per movement, TC for decreased UT activity    Pulley with cues into scaption x 1 min discontinued due to shoulder hiking even with cues. NP    Shoulder rows 3 x10 with YTB - NP  Shoulder extension 3 x10 with YTB-  NP  On the table horiz abd and add 3 x15- NP  On the table IR/ER 3 x15 NP  On the table L shoulder flexion 2x10- NP  PROM shoulder scaption seated rolling ball, cues for decreased UT activity 3 x1 min  PROM shoulder scaption on solo machine with bar above shoulder height with cues for decreased UT activity 2 x1min NP     scapular depression with ER 3x15 wth 2 sec hold on  edge of mat and pushing down into the mat  Scapular retraction 3 x15 with 2 sec hold  scaption passive prolonged with use of inclined mat and hi lo elevation. 5 x 30-60 secs as tolerated from 100 -120 deg  AAROM scaption and  with pt seated arm on PT, PT apply humeral inferior glide with scaption motion - held static 3x 20 sec    AAROM into scaption and then in abduction with cues for shoulder depression in supine and in tolerated incline position 45-90 deg 4 x10 each    Self AAROM in supine scaption with elbow flexed toward full extension at end rom of scaption - 5 x 10 reps and hold for 15 secs with every 5 reps    Self AAROM in supine scaption with elbow extending for full ROM 3 x10 with 15 second hold at end ROM every 5 reps    PROM at L shoulder 10/4/19  110 deg abd,  30 deg ext stopping at initial end feel  130 deg flex with cues to avoid shoulder hike, compensation.  30 deg with ER stopping at initial end feel  And   Elbow extension at  0 deg    Manual therapy 15 min with STM./ MFR to pect minor, teres minor, and rhomboids, upper traps and levator, GH mobs grade I/II AP and lateral distraction for pain relief into empty end feel before restrictions and GH mobs grade III with distraction  in all direction for increased ROM  - manual stretch pec minor  -subscap release with ER motion  - inferior humeral glide with inferior mobilization Grade II-III    10 min ice to left shoulder    Education provided:   Sleeping position, sling positioning and shoulder pendulums, donning strategy for sling, review post surgery restrictions. Pt issued putty for , decreased edema pooling at elbow     Written Home Exercises Provided: yes.  Exercises were reviewed and Rosamaria was able to demonstrate them prior to the end of the session.  Rosamaria demonstrated good  understanding of the education provided.      See EMR under Patient Instructions for exercises provided 7/26/2019.     Assessment     Pt with decreased resting tension  overall initially and improved with session and with good increased ROM during session with self ROM achieving 130 deg scaption and PROM ER 30 deg without pain increased. Pt with better control with decreased shoulder hiking. Pt with good progress today with session.      Goals:  Short Term Goals: 3 weeks   1.  Pt independent with HEP for s/p RTC repair and biceps tenodesis with return demonstration  2.  Pt to be independent with donning and doffing surgical sling for proper pain management . Met 8/12/19  3.  Pt to increase PROM as tolerated at left shoulder to 90 deg shoulder flexion and 80 deg abduction without pain  4.  Pt to decrease overall pain at left shoulder to less than 4/10 after session. Met 8/12/19     Long Term Goals: 12 weeks   1.  Pt to been independent with discharge HEP without cues and proper form.  2.  Pt return to independent ADLs to include dressing, grooming with BUEs without compensation.  3.  Pt to increased AROM at elbow WNL and Left shoulder to 170 deg flexion and 160 deg abduction, 70 deg ER without pain or compensation  4.  Pt to increased L shoulder strength to 4+/5 into flexion extension, ER, IR   5  Pt to increase tolerance to perform 1 hour of household chores, ie cleaning without increased L shoulder pain  6. Pt to decrease pain at left shoulder to 3/10 after session for improved functional activities        Plan   Plan of care Certification: 7/26/2019 to 10/31/19    Cont with progressive and more aggressive PROM and GH mobs to stretch GH capsule.    Shanel Carvalho, PT

## 2019-10-07 ENCOUNTER — CLINICAL SUPPORT (OUTPATIENT)
Dept: REHABILITATION | Facility: HOSPITAL | Age: 59
End: 2019-10-07
Payer: COMMERCIAL

## 2019-10-07 ENCOUNTER — TELEPHONE (OUTPATIENT)
Dept: CARDIOLOGY | Facility: CLINIC | Age: 59
End: 2019-10-07

## 2019-10-07 DIAGNOSIS — M75.112 INCOMPLETE TEAR OF LEFT ROTATOR CUFF: ICD-10-CM

## 2019-10-07 DIAGNOSIS — M25.612 DECREASED ROM OF LEFT SHOULDER: ICD-10-CM

## 2019-10-07 PROCEDURE — 97140 MANUAL THERAPY 1/> REGIONS: CPT | Mod: PO

## 2019-10-07 PROCEDURE — 97110 THERAPEUTIC EXERCISES: CPT | Mod: PO

## 2019-10-07 PROCEDURE — 97010 HOT OR COLD PACKS THERAPY: CPT | Mod: PO

## 2019-10-07 NOTE — PROGRESS NOTES
[facility]  Physical Therapy Note     Name: Rosamaria Agosto  Clinic Number: [id number]     Therapy Diagnosis: decreased ROM and pain at the shoulder s/p RTC  Physician: Arina Corea PA-C     Physician Orders: PT Eval and Treat   Medical Diagnosis from Referral: s/p RTC repair and biceps tenodesis  Evaluation Date: 7/26/2019  Authorization Period Expiration: 12/31/19  Plan of Care Expiration: 10/31/19  Visit # / Visits authorized: 25/30     Time In:  855 am  Time Out: 1010 am  Total Billable Time: 65 minutes (TE-2, MT 2) + 10 min ice pack     Precautions: Standard, RTC protocol NO AROM at elbow joint s/p biceps tenodesis, PROM at elbow and shoulder but no excessive ER stopping at initial end feel per phase I protocol and follow up with MD for protocol     Subjective     Pt reports:  No issues with left shoulder and has compliant with exercise reports less tightness and  less lateral deltoid pain    Pain : 1- 2/10 at beginning and end of session 1-2/10 mostly tightness  Location: Left shoulder    TREATMENT     Rosamaria received therapeutic exercises to develop ROM for 40 minutes including:       Prom progressing to AAROM shoulder abd, scaption, flexion,  Horizontal abd/ adduction, and extension stopping at first end feel to avoid biceps stress within protocol in supine and in 60 deg incline supported position 3 x10 reps total per movement, TC for decreased UT activity    Pulley with cues into scaption x 1 min discontinued due to shoulder hiking even with cues. NP    Shoulder rows 3 x10 with YTB - NP  Shoulder extension 3 x10 with YTB-  NP      PROM shoulder scaption seated rolling ball, cues for decreased UT activity 3 x1 min  PROM shoulder scaption on solo machine with bar above shoulder height with cues for decreased UT activity 2 x1min NP     scapular depression with ER 3x15 wth 2 sec hold on edge of mat and pushing down into the mat  Scapular retraction 3 x15 with 2 sec  hold    scaption AROM 3 x10 along hand rail of stairs followed by PROM stretch at end ROM 15-20 secs as tolerated on every 5th and 10th reps.  Same as above in abduction    AAROM scaption and  with pt seated arm on PT, PT apply humeral inferior glide with scaption motion - held static 3x 20 sec    Self AAROM in supine with cane into ER 3 x10 with PROM after every 5th and 10th rep as tolerated for 15-20 secs    AAROM with PT into scaption and then in abduction with cues for shoulder depression in supine and in tolerated incline position 0-90 deg 6 x10 each as tolerated towards progressive upright posture.    Self AAROM with use cane or PVC pipe and RUE in supine scaption with elbow flexed toward full extension at end rom of scaption - 5 x 10 reps and hold for 15 secs with every 5 reps    Self AAROM in supine scaption with elbow extending for full ROM 3 x10 with 15 second hold at end ROM every 5 reps    PROM at L shoulder 10/7/19  120 deg abd,  30 deg ext stopping at initial end feel  135 deg flex with cues to avoid shoulder hike, compensation.  35 deg with ER stopping at initial end feel  And   Elbow extension at  0 deg    Manual therapy 25 min with STM./ MFR to pect minor, teres minor, and rhomboids, upper traps and levator, GH mobs grade I/II AP and lateral distraction for pain relief into empty end feel before restrictions and GH mobs grade III with distraction  in all direction for increased ROM  - manual stretch pec minor  -subscap release with ER motion  - inferior humeral glide with inferior mobilization Grade II-III    10 min ice to left shoulder    Education provided:   Sleeping position, sling positioning and shoulder pendulums, donning strategy for sling, review post surgery restrictions. Pt issued putty for , decreased edema pooling at elbow     Written Home Exercises Provided: yes.  Exercises were reviewed and Rosamaria was able to demonstrate them prior to the end of the session.  Rosamaria demonstrated  good  understanding of the education provided.      See EMR under Patient Instructions for exercises provided 7/26/2019.     Assessment     Pt with decreased resting tension overall initially and improved with session but needed mod cues overall.  Pt with good improvement with self AAROM and PROM with blue theraball and along with handrail of stairs with good support.  Pt still initiates early shoulder hiking as she get more upright but does have decreased overall muscle and shoulder capsule tightness and less of the lateral deltoid pain from compression.  AAROM in supine and slightly incline position continues to improve but mechanics in upright position and AROM still grossly hindered my habitual early shoulder hike and IR.        Goals:  Short Term Goals: 3 weeks   1.  Pt independent with HEP for s/p RTC repair and biceps tenodesis with return demonstration  2.  Pt to be independent with donning and doffing surgical sling for proper pain management . Met 8/12/19  3.  Pt to increase PROM as tolerated at left shoulder to 90 deg shoulder flexion and 80 deg abduction without pain  4.  Pt to decrease overall pain at left shoulder to less than 4/10 after session. Met 8/12/19     Long Term Goals: 12 weeks   1.  Pt to been independent with discharge HEP without cues and proper form.  2.  Pt return to independent ADLs to include dressing, grooming with BUEs without compensation.  3.  Pt to increased AROM at elbow WNL and Left shoulder to 170 deg flexion and 160 deg abduction, 70 deg ER without pain or compensation  4.  Pt to increased L shoulder strength to 4+/5 into flexion extension, ER, IR   5  Pt to increase tolerance to perform 1 hour of household chores, ie cleaning without increased L shoulder pain  6. Pt to decrease pain at left shoulder to 3/10 after session for improved functional activities        Plan   Plan of care Certification: 7/26/2019 to 10/31/19    Cont with progressive and more aggressive PROM and GH  mobs to stretch GH capsule.    Shanel Carvalho, PT

## 2019-10-09 ENCOUNTER — CLINICAL SUPPORT (OUTPATIENT)
Dept: REHABILITATION | Facility: HOSPITAL | Age: 59
End: 2019-10-09
Payer: COMMERCIAL

## 2019-10-09 DIAGNOSIS — M25.612 DECREASED ROM OF LEFT SHOULDER: ICD-10-CM

## 2019-10-09 DIAGNOSIS — M75.112 INCOMPLETE TEAR OF LEFT ROTATOR CUFF: ICD-10-CM

## 2019-10-09 PROCEDURE — 97140 MANUAL THERAPY 1/> REGIONS: CPT | Mod: PO

## 2019-10-09 PROCEDURE — 97010 HOT OR COLD PACKS THERAPY: CPT | Mod: PO

## 2019-10-09 PROCEDURE — 97110 THERAPEUTIC EXERCISES: CPT | Mod: PO

## 2019-10-09 NOTE — PROGRESS NOTES
[facility]  Physical Therapy Note     Name: Rosamaria Agosto  Clinic Number: [id number]     Therapy Diagnosis: decreased ROM and pain at the shoulder s/p RTC  Physician: Arina Corea PA-C     Physician Orders: PT Eval and Treat   Medical Diagnosis from Referral: s/p RTC repair and biceps tenodesis  Evaluation Date: 7/26/2019  Authorization Period Expiration: 12/31/19  Plan of Care Expiration: 10/31/19  Visit # / Visits authorized: 25/30     Time In:  855 am  Time Out: 1015 am  Total Billable Time: 30 minutes (TE-1, MT 1) + 10 min ice pack     Precautions: Standard, RTC protocol NO AROM at elbow joint s/p biceps tenodesis, PROM at elbow and shoulder but no excessive ER stopping at initial end feel per phase I protocol and follow up with MD for protocol     Subjective     Pt reports:  No issues with left shoulder and has compliant with exercise reports less tightness and  less lateral deltoid pain    Pain : 1- 2/10 at beginning and end of session 1-2/10 mostly tightness  Location: Left shoulder    TREATMENT     Rosamaria received therapeutic exercises to develop ROM for 50 minutes including:       Prom progressing to AAROM shoulder abd, scaption, flexion,  Horizontal abd/ adduction, and extension stopping at first end feel to avoid biceps stress within protocol in supine and in 60 deg incline supported position 3 x10 reps total per movement, TC for decreased UT activity    Pulley with cues into scaption x 1 min discontinued due to shoulder hiking even with cues. NP    Shoulder rows 3 x10 with YTB - NP  Shoulder extension 3 x10 with YTB-  NP      PROM shoulder scaption seated rolling ball, cues for decreased UT activity 3 x1 min  PROM shoulder scaption on solo machine with bar above shoulder height with cues for decreased UT activity 2 x1min NP     scapular depression with ER 3x15 wth 2 sec hold on edge of mat and pushing down into the mat  Scapular retraction 3 x15 with 2 sec  hold    scaption AROM 3 x10 along hand rail of stairs followed by PROM stretch at end ROM 15-20 secs as tolerated on every 5th and 10th reps.  Same as above in abduction    AAROM scaption and  with pt seated arm on PT, PT apply humeral inferior glide with scaption motion - held static 3x 20 sec    Self AAROM in supine with cane into ER 3 x10 with PROM after every 5th and 10th rep as tolerated for 15-20 secs    AAROM with PT into scaption and then in abduction with cues for shoulder depression in supine and in tolerated incline position 0-90 deg 6 x10 each as tolerated towards progressive upright posture.    Self AAROM with use cane or PVC pipe and RUE in supine scaption with elbow flexed toward full extension at end rom of scaption - 5 x 10 reps and hold for 15 secs with every 5 reps    Self AAROM in supine scaption with elbow extending for full ROM 3 x10 with 15 second hold at end ROM every 5 reps    PROM at L shoulder 10/7/19  120 deg abd,  30 deg ext stopping at initial end feel  135 deg flex with cues to avoid shoulder hike, compensation.  35 deg with ER stopping at initial end feel  And   Elbow extension at  0 deg    Manual therapy 20 min with STM./ MFR to pect minor, teres minor, and rhomboids, upper traps and levator, GH mobs grade I/II AP and lateral distraction for pain relief into empty end feel before restrictions and GH mobs grade III with distraction  in all direction for increased ROM  - manual stretch pec minor  -subscap release with ER motion  - inferior humeral glide with inferior mobilization Grade II-III    10 min ice to left shoulder    Education provided:   Sleeping position, sling positioning and shoulder pendulums, donning strategy for sling, review post surgery restrictions. Pt issued putty for , decreased edema pooling at elbow     Written Home Exercises Provided: yes.  Exercises were reviewed and Rosamaria was able to demonstrate them prior to the end of the session.  Rosamaria demonstrated  good  understanding of the education provided.      See EMR under Patient Instructions for exercises provided 7/26/2019.     Assessment     Pt with decreased resting tension overall initially and improved with session but needed mod cues overall.  Pt with good improvement with self AAROM and PROM with blue theraball and along with handrail of stairs with good support. Pt has improved ROM with self stretch versus PT assisted stretch and with increased upright position up to 60 deg upright sitting during the session with initial fully supine and progressive to more upright without great loss of ROM.  Grossly strength at 3-/5 able to achieve 80 deg abd and scaption upright sitting.        Goals:  Short Term Goals: 3 weeks   1.  Pt independent with HEP for s/p RTC repair and biceps tenodesis with return demonstration  2.  Pt to be independent with donning and doffing surgical sling for proper pain management . Met 8/12/19  3.  Pt to increase PROM as tolerated at left shoulder to 90 deg shoulder flexion and 80 deg abduction without pain  4.  Pt to decrease overall pain at left shoulder to less than 4/10 after session. Met 8/12/19     Long Term Goals: 12 weeks   1.  Pt to been independent with discharge HEP without cues and proper form.  2.  Pt return to independent ADLs to include dressing, grooming with BUEs without compensation.  3.  Pt to increased AROM at elbow WNL and Left shoulder to 170 deg flexion and 160 deg abduction, 70 deg ER without pain or compensation  4.  Pt to increased L shoulder strength to 4+/5 into flexion extension, ER, IR   5  Pt to increase tolerance to perform 1 hour of household chores, ie cleaning without increased L shoulder pain  6. Pt to decrease pain at left shoulder to 3/10 after session for improved functional activities        Plan   Plan of care Certification: 7/26/2019 to 10/31/19    Cont with progressive and more aggressive PROM and GH mobs to stretch GH capsule.    Shanel Carvalho,  PT

## 2019-10-10 ENCOUNTER — CLINICAL SUPPORT (OUTPATIENT)
Dept: REHABILITATION | Facility: HOSPITAL | Age: 59
End: 2019-10-10
Payer: COMMERCIAL

## 2019-10-10 DIAGNOSIS — M25.612 DECREASED ROM OF LEFT SHOULDER: ICD-10-CM

## 2019-10-10 DIAGNOSIS — M75.112 INCOMPLETE TEAR OF LEFT ROTATOR CUFF, UNSPECIFIED WHETHER TRAUMATIC: ICD-10-CM

## 2019-10-10 PROCEDURE — 97140 MANUAL THERAPY 1/> REGIONS: CPT | Mod: PO

## 2019-10-10 PROCEDURE — 97110 THERAPEUTIC EXERCISES: CPT | Mod: PO

## 2019-10-14 ENCOUNTER — CLINICAL SUPPORT (OUTPATIENT)
Dept: REHABILITATION | Facility: HOSPITAL | Age: 59
End: 2019-10-14
Payer: COMMERCIAL

## 2019-10-14 DIAGNOSIS — M25.612 DECREASED ROM OF LEFT SHOULDER: ICD-10-CM

## 2019-10-14 PROCEDURE — 97140 MANUAL THERAPY 1/> REGIONS: CPT | Mod: PO

## 2019-10-14 PROCEDURE — 97110 THERAPEUTIC EXERCISES: CPT | Mod: PO

## 2019-10-14 PROCEDURE — 97010 HOT OR COLD PACKS THERAPY: CPT | Mod: PO

## 2019-10-14 NOTE — PROGRESS NOTES
H&P- Kenna Mcelroy 1941, 69 y o  male MRN: [id number]    Unit/Bed#: 742-20 Encounter: [id number]    Primary Care Provider: Celi Rangel DO   Date and time admitted to hospital: 7/1/2019  5:32 AM        * Episode of unresponsiveness  Assessment & Plan  As reported by wife, she had called EMS  The patient was found to be febrile of 101 3 and hypotensive with SBP in the 80's responded to IVF bolus  Patient alert and at baseline on admission  - admitted to [facility] with telemetry  - Obtain CT brain  - Monitor VS  - PT/OT  - Infectious workup - Treatment for possible postobstructive pneumonia in setting of SOB, fever, enlarging pulmonary nodule  - Due to elevated D-dimer DVT/PE workup  - Gentle IVF    Acute on chronic respiratory failure with hypoxia [facility])  Assessment & Plan  Patient reported by wife to have intermittent hypoxia in low 80's, appears to her more SOB than baseline  Patient with normal O2 sats since admission  Continue close monitoring  Pulmonology consult    Leukocytosis  Assessment & Plan  Possible SIRS - Prior to admission EMS reported fever of 101 3, associated with leukocytosis - not observed  Patient received cefepime and vancomycin for suspected postobstructive pneumonia in setting of inlarging pulmonary nodule  Will continue cefepime   Obtain MRSA cultures  Monitor CBC/VS  Check and monitor procalcitonin      D-dimer, elevated  Assessment & Plan  Markedly elevated around 9,000 but same value also on 12/30/2019  Patient had a CT chest, abd, pelvis (not PE angiogram) on admission, no evidence of PE based on this study, unable to repeat contrast study especially due to ANAND  Obtain venous duplex  VQ scan      Coronary artery disease involving native coronary artery of native heart without angina pectoris  Assessment & Plan  Continue home regimen  Patient denies chest pain    Pulmonary nodule  Assessment & Plan  Per CT chest report, "increase in size of right lower lobe nodule, concerning [facility]  Physical Therapy Note     Name: Rosamaria Agosto  Clinic Number: [id number]     Therapy Diagnosis: decreased ROM and pain at the shoulder s/p RTC  Physician: Arina Corea PA-C     Physician Orders: PT Eval and Treat   Medical Diagnosis from Referral: s/p RTC repair and biceps tenodesis  Evaluation Date: 7/26/2019  Authorization Period Expiration: 12/31/19  Plan of Care Expiration: 10/31/19  Visit # / Visits authorized: 26/30     Time In:  905 am  Time Out: 1020 am  Total Billable Time: 30 minutes (TE-1, MT 1) + 10 min ice pack     Precautions: Standard, RTC protocol NO AROM at elbow joint s/p biceps tenodesis, PROM at elbow and shoulder but no excessive ER stopping at initial end feel per phase I protocol and follow up with MD for protocol     Subjective     Pt reports:  No issues with left shoulder and has compliant with exercise reports more tightness and  less lateral deltoid pain due to mild overuse with cooking this weekend.    Pain : 1- 2/10 at beginning and end of session 1-2/10 mostly tightness  Location: Left shoulder    TREATMENT     Rosamaria received therapeutic exercises to develop ROM for 50 minutes including:       Prom progressing to AAROM shoulder abd, scaption, flexion,  Horizontal abd/ adduction, and extension stopping at first end feel to avoid biceps stress within protocol in supine and in 60 deg incline supported position 3 x10 reps total per movement, TC for decreased UT activity    Pulley with cues into scaption x 1 min discontinued due to shoulder hiking even with cues. NP    Shoulder rows 3 x10 with YTB - NP  Shoulder extension 3 x10 with YTB-  NP      PROM shoulder scaption seated rolling ball, cues for decreased UT activity 3 x1 min  PROM shoulder scaption on solo machine with bar above shoulder height with cues for decreased UT activity 2 x1min NP     scapular depression with ER 3x15 wth 2 sec hold on edge of mat and pushing down into the  for neoplasm   This now measures 1 4 x 1 4 cm "  Pulmonology consulted for additional recommendations  Plan for outpatient PET scan      Dementia  Assessment & Plan  Patient limited historian  History obtained from wife  Continue home regimen    Abdominal aortic aneurysm [facility])  Assessment & Plan  CT chest, abd and pelvis with contrast reviewed  Continue outpatient surveillance     Acute kidney injury ([facility] [address] )  Assessment & Plan  Baseline Cr around 0 9-1, current 1 34  Per wife, the patient had been with decreased PO intake  Monitor BMP  Gentle IVF - the patient did receive 2 L IV NS      COPD (chronic obstructive pulmonary disease) ([facility] [address] )  Assessment & Plan  Per wife, the patient has been noted to occasionally have oxygen saturations in low 80's  Patient at this time well-appearing with oxygen saturations in normal range  Continue home inhalers  Respiratory protocol  Will consult Pulmonology      VTE Prophylaxis: Heparin  / sequential compression device   Code Status: Full  POLST: POLST form is not discussed and not completed at this time  Discussion with family: wife    Anticipated Length of Stay:  Patient will be admitted on an Inpatient basis with an anticipated length of stay of longer than 2 midnights  Justification for Hospital Stay: need for close monitoring, IV Abx    Total Time for Visit, including Counseling / Coordination of Care: 1 hour  Greater than 50% of this total time spent on direct patient counseling and coordination of care  Chief Complaint:   unresponsiveness    History of Present Illness:    Lana Hernandez is a 69 y o  male with history AAA, COPD, dementia and coronary artery disease as well as a pulmonary nodule, recently hospitalized for pneumonia, who presents with 4 day history of progressively worsening shortness of breath and looking "not like himself" per wife    Patient's wife stated that early this morning the patient had an episode of responsiveness and would not respond when she mat  Scapular retraction 3 x15 with 2 sec hold    scaption AROM 3 x10 along hand rail of stairs followed by PROM stretch at end ROM 15-20 secs as tolerated on every 5th and 10th reps.  Same as above in abduction    AAROM scaption and  with pt seated arm on PT, PT apply humeral inferior glide with scaption motion - held static 3x 20 sec    Self AAROM in supine with cane into ER 3 x10 with PROM after every 5th and 10th rep as tolerated for 15-20 secs    AAROM with PT into scaption and then in abduction with cues for shoulder depression in supine and in tolerated incline position 0-90 deg 6 x10 each as tolerated towards progressive upright posture.    Self AAROM with use cane or PVC pipe and RUE in supine scaption with elbow flexed toward full extension at end rom of scaption - 5 x 10 reps and hold for 15 secs with every 5 reps    Self AAROM in supine scaption with elbow extending for full ROM 3 x10 with 15 second hold at end ROM every 5 reps    PROM at L shoulder 10/14/19  125 deg abd,  30 deg ext stopping at initial end feel  135 deg flex with cues to avoid shoulder hike, compensation.  35 deg with ER stopping at initial end feel  And   Elbow extension at  0 deg    AROM 10/14  80 deg in sitting scaption   70 deg abduction in sitting    Manual therapy 15 min with STM./ MFR to pect minor, teres minor, and rhomboids, upper traps and levator, GH mobs grade I/II AP and lateral distraction for pain relief into empty end feel before restrictions and GH mobs grade III with distraction  in all direction for increased ROM  - manual stretch pec minor  -subscap release with ER motion  - inferior humeral glide with inferior mobilization Grade II-III    10 min ice to left shoulder    Education provided:   Sleeping position, sling positioning and shoulder pendulums, donning strategy for sling, review post surgery restrictions. Pt issued putty for , decreased edema pooling at elbow     Written Home Exercises Provided:  was calling out his name, she stated that that prompted her to call EMS  On EMS arrival the patient was found to be hypotensive with systolic blood pressure in 80s and febrile with fever of 101 3  The patient received an IV fluid bolus with his blood pressure normalizing  Patient's wife states that the patient appeared and well overall, he was noted for decreased appetite and some mental status changes  The wife stated that she has been keeping a log of patient's blood pressure and oxygen saturations and at times he was noted to have oxygen saturations in low 80s  Patient's wife also stated that his blood pressure appeared low with systolic in the 95J at times  Patient's wife stated she was not aware fevers in the patient  Review of Systems:    Review of Systems   Constitutional: Negative for chills  HENT: Negative for congestion and rhinorrhea  Eyes: Negative for visual disturbance  Respiratory: Positive for shortness of breath  Cardiovascular: Negative for chest pain, palpitations and leg swelling  Gastrointestinal: Negative for abdominal pain, constipation, diarrhea, nausea and vomiting  Genitourinary: Negative for dysuria  Musculoskeletal: Negative for gait problem  Skin: Negative for rash  Neurological: Positive for dizziness  Hematological: Negative for adenopathy  Psychiatric/Behavioral: Positive for confusion         Past Medical and Surgical History:     Past Medical History:   Diagnosis Date    AAA (abdominal aortic aneurysm) (HCC)     Acid reflux     Acute serous otitis media of left ear     recurrence not specified     Anesthesia     "always has mental changes /lingers and lingers after anesthesia"    Anxiety     Aortic aneurysm without rupture (HCC)     Arm bruise     right inner forearm "recent IV"    At risk for falls     BPH without urinary obstruction     Cancer (HCC)     skin CA on nose    CAP (community acquired pneumonia)     Cataracts, bilateral     yes.  Exercises were reviewed and Rosamaria was able to demonstrate them prior to the end of the session.  Rosamaria demonstrated good  understanding of the education provided.      See EMR under Patient Instructions for exercises provided 7/26/2019.     Assessment     Pt with increased resting tension overall initially and improved with session but needed mod cues overall.  Pt with good improvement with self AAROM and PROM with blue theraball and along with handrail of stairs with good support. Pt has improved ROM with self stretch versus PT assisted stretch and with increased upright position up to 70 deg upright sitting during the session with initial fully supine and progressive to more upright without great loss of ROM.  Grossly strength at 3-/5 able to achieve 80 deg abd and scaption upright sitting. Pt with good AROM when using PVC for bilateral active ROM but then compensate with allowed to perform scaption or abduction on single UE.  Pt will even shoulder hike bilaterally to initiate single UE abduction or scaption. Biomechanically she has old sequence that emerges when she tries to hard and is not paying attention to task initially during session.  Overall PROM improving and AROM     Goals:  Short Term Goals: 3 weeks   1.  Pt independent with HEP for s/p RTC repair and biceps tenodesis with return demonstration  2.  Pt to be independent with donning and doffing surgical sling for proper pain management . Met 8/12/19  3.  Pt to increase PROM as tolerated at left shoulder to 90 deg shoulder flexion and 80 deg abduction without pain  4.  Pt to decrease overall pain at left shoulder to less than 4/10 after session. Met 8/12/19     Long Term Goals: 12 weeks   1.  Pt to been independent with discharge HEP without cues and proper form.  2.  Pt return to independent ADLs to include dressing, grooming with BUEs without compensation.  3.  Pt to increased AROM at elbow WNL and Left shoulder to 170 deg flexion and 160 deg  Change in bowel function     Clubbing of fingers     Colon polyps     Common cold     Contusion of elbow, left     initial encounter     COPD (chronic obstructive pulmonary disease) (HCC)     Coronary artery disease     Cough     Dementia     Depression     Dizziness     upon "standing quickly on occas"    Exercise counseling     Fatigue     Full dentures     "doesn't wear them"    Glaucoma screening     High cholesterol     History of abdominal aortic aneurysm (AAA) repair 08/2017    History of bacteremia     History of chronic obstructive lung disease     History of epistaxis     History of influenza vaccination     History of kidney stones     History of pneumonia     "many times" "at least 5 times" almost always goes to sepsis"    History of sepsis 10/2017    History of sinusitis     History of skin cancer     History of sleep apnea     History of transfusion     History of urinary tract infection     [place] (hard of hearing)     Hydronephrosis with obstructing calculus     Influenza vaccine needed     Jock itch     left/saw doctor 11/8 and started on antifungal cream    Kidney stones     Left hip pain     Need for pneumococcal vaccination     Need for prophylactic vaccination and inoculation against influenza     Other emphysema ([address] )     Pancreatitis, chronic ([address] )     pt and wife can't confirm 11/10/17    Pneumonia 10/26/2017    admitted [facility]    Pulmonary emphysema ([address] )     Screening for genitourinary condition     Screening for neurological condition     Sepsis ([address] )     due to unspecified organism     Short-term memory loss     Sleep apnea     does not use CPAP      Special screening examination for neoplasm of prostate     TIA involving carotid artery     "before carotid surgery"    Ulcer     stomach, "years ago"    Unsteady gait     Use of cane as ambulatory aid     sometimes    Vitamin D deficiency     Wears glasses        Past Surgical History:   Procedure abduction, 70 deg ER without pain or compensation  4.  Pt to increased L shoulder strength to 4+/5 into flexion extension, ER, IR   5  Pt to increase tolerance to perform 1 hour of household chores, ie cleaning without increased L shoulder pain  6. Pt to decrease pain at left shoulder to 3/10 after session for improved functional activities        Plan   Plan of care Certification: 7/26/2019 to 10/31/19    Cont with progressive and more aggressive PROM and GH mobs to stretch GH capsule.    Shanel Carvalho, PT       Laterality Date    ABDOMINAL AORTIC ANEURYSM REPAIR  08/23/2017    ABDOMINAL AORTIC ANEURYSM REPAIR, ENDOVASCULAR      BACK SURGERY      spinal stenosis    CARDIAC SURGERY      CABG x3    CAROTID ENDARTARECTOMY Left 11/1996    CATARACT EXTRACTION Bilateral     CORONARY ARTERY BYPASS GRAFT  01/2003    x3    CYSTOSCOPY      with insertion of ureteral stent     CYSTOSCOPY      with ureteroscopy with lithotripsy     EXCISIONAL HEMORRHOIDECTOMY      EYE SURGERY Bilateral     "for a wrinkle" after cataract surgery    HERNIA REPAIR      umbilical    LITHOTRIPSY      renal    MOUTH SURGERY      full mouth extraction     NC COLONOSCOPY FLX DX W/COLLJ SPEC WHEN PFRMD N/A 5/16/2017    Procedure: COLONOSCOPY with polypectomies/ hot snare and tattoo;  Surgeon: Therese Arredondo MD;  Location: [facility]; Service: Gastroenterology    NC CYSTOURETHROSCOPY N/A 11/30/2017    Procedure: Sally Espino;  Surgeon: Jourdan Diaz MD;  [facility];  Service: Urology    NC ESOPHAGOGASTRODUODENOSCOPY TRANSORAL DIAGNOSTIC N/A 8/18/2016    Procedure: ESOPHAGOGASTRODUODENOSCOPY (EGD); Surgeon: Therese Arredondo MD;  [facility]: [facility]; Service: Gastroenterology    NC REMOVE BLADDER STONE,<2 5 CM N/A 11/30/2017    Procedure: Jerson Rodriguez;  Surgeon: Jourdan Diaz MD;  [facility];  Service: Urology    SKIN BIOPSY      TONSILLECTOMY      URETERAL STENT PLACEMENT      and removal       Meds/Allergies:    Prior to Admission medications    Medication Sig Start Date End Date Taking?  Authorizing Provider   acetaminophen (TYLENOL) 500 mg tablet Take 650 mg by mouth every 6 (six) hours as needed for headaches  7/10/18  Yes Historical Provider, MD   aspirin 81 MG tablet Take 81 mg by mouth daily Took within 24 hours    Yes Historical Provider, MD   atenolol (TENORMIN) 25 mg tablet Take 1 tablet (25 mg total) by mouth daily at bedtime 11/28/18  Yes Mark Storm DO   atorvastatin (LIPITOR) 20 mg tablet Take 1 tablet (20 mg total) by mouth daily at bedtime 8/1/18  Yes Mark Storm,    Bacillus Coagulans-Inulin (PROBIOTIC FORMULA) 1-250 BILLION-MG CAPS Take 2 capsules by mouth daily Record states dose is currently 4 billion   Yes Historical Provider, MD   Cholecalciferol (VITAMIN D-3 PO) Take 2,000 Units by mouth daily  Yes Historical Provider, MD   cyanocobalamin (VITAMIN B-12) 1,000 mcg tablet Take 1,000 mcg by mouth 3 (three) times a week MWF   Yes Historical Provider, MD   donepezil (ARICEPT) 10 mg tablet Take 1 tablet (10 mg total) by mouth daily at bedtime  Patient taking differently: Take 10 mg by mouth daily  1/25/19  Yes Kiana Nieto PA-C   escitalopram (LEXAPRO) 20 mg tablet Take 1 tablet (20 mg total) by mouth daily 2/13/19  Yes Camila Desai DO   Fluticasone-Salmeterol (AIRDUO RESPICLICK 79/63) 72-47 MCG/ACT AEPB Inhale 1 puff 2 (two) times a day AM & PM   Yes Historical Provider, MD   KRILL OIL PO Take 500 mg by mouth daily     Yes Historical Provider, MD   Melatonin 10 MG TABS Take 1 tablet by mouth daily at bedtime    Yes Historical Provider, MD   memantine (NAMENDA) 5 mg tablet Take 5 mg by mouth 2 (two) times a day In the evening    Yes Historical Provider, MD   montelukast (SINGULAIR) 10 mg tablet Take 1 tablet (10 mg total) by mouth daily 4/12/19  Yes Camila Desai,    Multiple Vitamins-Minerals (CENTRUM SILVER ADULT 50+) TABS Take 1 tablet by mouth daily   Yes Historical Provider, MD   Potassium Citrate ER 15 MEQ (1620 MG) TBCR Take 1 tablet by mouth 2 (two) times a day for 90 days 6/6/19 9/4/19 Yes Fouzia Isaac PA-C   QUEtiapine (SEROquel) 50 mg tablet Take 1 tablet (50 mg total) by mouth daily at bedtime 12/20/18  Yes Camila Desai DO   ranitidine (ZANTAC) 150 mg tablet Take 1 tablet (150 mg total) by mouth 2 (two) times a day 4/2/19  Yes Camila Cordial, DO   tamsulosin (FLOMAX) 0 4 mg Take 1 capsule (0 4 mg total) by mouth daily for 90 days 6/6/19 9/4/19 Yes Branden Hickey TAYE Guevara   tiotropium (SPIRIVA HANDIHALER) 18 mcg inhalation capsule Place 18 mcg into inhaler and inhale daily  Yes Historical Provider, MD   Probiotic Product (PROBIOTIC ACIDOPHILUS BEADS PO) Take by mouth  19 Yes Historical Provider, MD   Lactobacillus (ACIDOPHILUS) 100 MG CAPS Take 4 each by mouth  19  Historical Provider, MD   predniSONE 20 mg tablet Take 2 tabs daily and decrease by 1/2 tab every 2 days  Patient taking differently: 10 mg Take 2 tabs daily and decrease by 1/2 tab every 2 days 19  Karlee Meredith MD   ranitidine (ZANTAC) 150 mg tablet take 1 tablet by mouth twice a day 19  Betty Flower DO     I have reviewed home medications with a medical source (PCP, Pharmacy, other)  Allergies:    Allergies   Allergen Reactions    Augmentin [Amoxicillin-Pot Clavulanate] Diarrhea    Ciprofloxacin Hives    Morphine And Related Other (See Comments)     Change in mental status    Wellbutrin [Bupropion]        Social History:     Marital Status: /Civil Union   Occupation:  Retired  Patient Pre-hospital Living Situation:  Lives with wife  Patient Pre-hospital Level of Mobility:  Independent  Patient Pre-hospital Diet Restrictions:  None  Substance Use History:   Social History     Substance and Sexual Activity   Alcohol Use Not Currently    Alcohol/week: 0 0 standard drinks    Frequency: Never    Drinks per session: 1 or 2    Binge frequency: Never    Comment: quit 25 yrs ago     Social History     Tobacco Use   Smoking Status Former Smoker    Packs/day: 1 50    Years: 60 00    Pack years: 90 00    Last attempt to quit:     Years since quittin 4   Smokeless Tobacco Never Used   Tobacco Comment    quit 3 yrs ago, used to be a 1-1 5 ppd smoker     Social History     Substance and Sexual Activity   Drug Use No    Comment: No illicit drug use        Family History:    Family History   Problem Relation Age of Onset    Diabetes type II Mother mellitus    Kidney failure Father     Diabetes type II Maternal Grandmother         mellitus     Hypertension Paternal Grandmother         benign essential        Physical Exam:     Vitals:   Blood Pressure: 154/70 (07/01/19 1519)  Pulse: 76 (07/01/19 1535)  Temperature: 97 7 °F (36 5 °C) (07/01/19 1519)  Temp Source: Temporal (07/01/19 1519)  Respirations: 18 (07/01/19 1535)  Height: 5' 9" (175 3 cm) (07/01/19 1100)  Weight - Scale: 79 kg (174 lb 2 6 oz) (07/01/19 1100)  SpO2: 92 % (07/01/19 1535)    Physical Exam   HENT:   Head: Normocephalic and atraumatic  Eyes: Conjunctivae are normal    Neck: No JVD present  Cardiovascular: Normal rate and regular rhythm  No murmur heard  Pulmonary/Chest: Effort normal  No respiratory distress  He has decreased breath sounds  He has no wheezes  He has no rales  Abdominal: Soft  He exhibits no distension  There is no guarding  Musculoskeletal: He exhibits no edema  Neurological: He is alert  Skin: Skin is warm and dry  Psychiatric: Cognition and memory are impaired  Additional Data:     Lab Results: I have personally reviewed pertinent reports  Results from last 7 days   Lab Units 07/01/19  0554   WBC Thousand/uL 12 14*   HEMOGLOBIN g/dL 16 1   HEMATOCRIT % 49 9*   PLATELETS Thousands/uL 138*   NEUTROS PCT % 81*   LYMPHS PCT % 9*   MONOS PCT % 9   EOS PCT % 0     Results from last 7 days   Lab Units 07/01/19  0554   SODIUM mmol/L 137   POTASSIUM mmol/L 5 1   CHLORIDE mmol/L 105   CO2 mmol/L 25   BUN mg/dL 13   CREATININE mg/dL 1 37*   ANION GAP mmol/L 7   CALCIUM mg/dL 8 2*   ALBUMIN g/dL 3 4*   TOTAL BILIRUBIN mg/dL 1 60*   ALK PHOS U/L 114   ALT U/L 45   AST U/L 38   GLUCOSE RANDOM mg/dL 116                 Results from last 7 days   Lab Units 07/01/19  0554   LACTIC ACID mmol/L 1 9       Imaging: I have personally reviewed pertinent reports        US gallbladder   Final Result by Rosa Moore MD (07/01 9393)      Unremarkable exam   Normal gallbladder  Workstation performed: [id number]         CT chest abdomen pelvis w contrast   Final Result by Kaylyn Urrutia MD (07/01 0815)         1  Continued increase in size of right lower lobe nodule, concerning for neoplasm  This now measures 1 4 x 1 4 cm  Per Epic note 6/18/2019, the patient is having a PET CT as an outpatient  2   5 mm polypoid lesion within the gallbladder, not evident on prior imaging  Differential considerations include focal sludge, noncalcified calculus, or polyp  Further evaluation right upper quadrant ultrasound recommended  The study was marked in EPIC for significant notification  Workstation performed: [id number]         XR chest 1 view portable   ED Interpretation by Issa Wolfe MD (07/01 1132)   Read by me; Radiologist to provide formal interpretation  Vs 6/14/19 RUL infiltrate improves      Final Result by Wendi Alicea MD (63/06 1833)      1  Chronic air trapping, emphysematous changes, and right apical scarring without acute cardiopulmonary findings  2   Recently seen right upper lobe pneumonia has resolved  3   Right lower lobe nodule better demonstrated on subsequently performed chest CT  Report from that exam recommendations PET/CT  Note: I agree with the preliminary interpretation by the ED care provider documented in Epic  Workstation performed: [id number]         VAS lower limb venous duplex study, complete bilateral    (Results Pending)   NM inpatient order    (Results Pending)   CT head wo contrast    (Results Pending)       EKG, Pathology, and Other Studies Reviewed on Admission:   · EKG: reviewed    Allscripts / Epic Records Reviewed: Yes     ** Please Note: This note has been constructed using a voice recognition system   **

## 2019-10-15 NOTE — PROGRESS NOTES
[facility]  Physical Therapy Note     Name: Rosamaria Agosto  Clinic Number: [id number]     Therapy Diagnosis: decreased ROM and pain at the shoulder s/p RTC  Physician: Arina Corea PA-C     Physician Orders: PT Eval and Treat   Medical Diagnosis from Referral: s/p RTC repair and biceps tenodesis  Evaluation Date: 7/26/2019  Authorization Period Expiration: 12/31/19  Plan of Care Expiration: 10/31/19  Visit # / Visits authorized: 26/30     Time In:  1105 am  Time Out: 1215 am  Total Billable Time: 30 minutes (TE-1, MT 1) + 10 min ice pack     Precautions: Standard, RTC protocol NO AROM at elbow joint s/p biceps tenodesis, PROM at elbow and shoulder but no excessive ER stopping at initial end feel per phase I protocol and follow up with MD for protocol     Subjective     Pt reports:  No issues with left shoulder, sleeping a little better, just feels stiff over all.    Pain : 1- 2/10 at beginning and end of session 1-2/10 mostly tightness  Location: Left shoulder    TREATMENT     Rosamaria received therapeutic exercises to develop ROM for 35 minutes including:       Prom progressing to AAROM shoulder abd, scaption, flexion,  Horizontal abd/ adduction, and extension stopping at first end feel to avoid biceps stress within protocol in supine and in 60 deg incline supported position 3 x10 reps total per movement, TC for decreased UT activity    Pulley with cues into scaption x 1 min discontinued due to shoulder hiking even with cues. NP    Shoulder rows 3 x10 with YTB - NP  Shoulder extension 3 x10 with YTB-  NP      PROM shoulder scaption seated rolling ball, cues for decreased UT activity 3 x1 min  PROM shoulder scaption on solo machine with bar above shoulder height with cues for decreased UT activity 2 x1min NP     scapular depression with ER 3x15 wth 2 sec hold on edge of mat and pushing down into the mat  Scapular retraction 3 x15 with 2 sec hold    scaption AROM 3 x10 along hand  rail of stairs followed by PROM stretch at end ROM 15-20 secs as tolerated on every 5th and 10th reps.  Same as above in abduction    AAROM scaption and  with pt seated arm on PT, PT apply humeral inferior glide with scaption motion - held static 3x 20 sec    Self AAROM in supine with cane into ER 3 x10 with PROM after every 5th and 10th rep as tolerated for 15-20 secs    AAROM with PT into scaption and then in abduction with cues for shoulder depression in supine and in tolerated incline position 0-90 deg 6 x10 each as tolerated towards progressive upright posture.    Self AAROM with use cane or PVC pipe and RUE in supine scaption with elbow flexed toward full extension at end rom of scaption - 5 x 10 reps and hold for 15 secs with every 5 reps    Self AAROM in supine scaption with elbow extending for full ROM 3 x10 with 15 second hold at end ROM every 5 reps      Manual therapy 20 min with STM./ MFR to pect minor, teres minor, and rhomboids, upper traps and levator, GH mobs grade I/II AP and lateral distraction for pain relief into empty end feel before restrictions and GH mobs grade III with distraction  in all direction for increased ROM  - manual stretch pec minor  -subscap release with ER motion  - inferior humeral glide with inferior mobilization Grade II-III  - inferior glide coupled with scaption pt in seated position   - Inferior glide coupled with ER pt supine and elevated  -PA GH grade III  - inferior glide coupled with abd grade III pt supine  Scapular scouring, emphasis on subscap    10 min ice to left shoulder    Education provided:   Sleeping position, sling positioning and shoulder pendulums, donning strategy for sling, review post surgery restrictions. Pt issued putty for , decreased edema pooling at elbow     Written Home Exercises Provided: yes.  Exercises were reviewed and Rosamarai was able to demonstrate them prior to the end of the session.  Rosamaria demonstrated good  understanding of the  education provided.      See EMR under Patient Instructions for exercises provided 7/26/2019.     Assessment     Pt with decreased over all tension below 100 deg scaption and abd, with cues improved scapular position post manual treatment.  Cont tightness to IR/Scaption/Abd, improved ER.  Aggressive stretching was tolerated extremely well today, pt report more freedom of movement.          Goals:  Short Term Goals: 3 weeks   1.  Pt independent with HEP for s/p RTC repair and biceps tenodesis with return demonstration  2.  Pt to be independent with donning and doffing surgical sling for proper pain management . Met 8/12/19  3.  Pt to increase PROM as tolerated at left shoulder to 90 deg shoulder flexion and 80 deg abduction without pain  4.  Pt to decrease overall pain at left shoulder to less than 4/10 after session. Met 8/12/19     Long Term Goals: 12 weeks   1.  Pt to been independent with discharge HEP without cues and proper form.  2.  Pt return to independent ADLs to include dressing, grooming with BUEs without compensation.  3.  Pt to increased AROM at elbow WNL and Left shoulder to 170 deg flexion and 160 deg abduction, 70 deg ER without pain or compensation  4.  Pt to increased L shoulder strength to 4+/5 into flexion extension, ER, IR   5  Pt to increase tolerance to perform 1 hour of household chores, ie cleaning without increased L shoulder pain  6. Pt to decrease pain at left shoulder to 3/10 after session for improved functional activities        Plan   Plan of care Certification: 7/26/2019 to 10/31/19    Cont with progressive and more aggressive PROM and GH mobs to stretch GH capsule.    Gerson Hubbard, PT

## 2019-10-16 ENCOUNTER — CLINICAL SUPPORT (OUTPATIENT)
Dept: REHABILITATION | Facility: HOSPITAL | Age: 59
End: 2019-10-16
Payer: COMMERCIAL

## 2019-10-16 DIAGNOSIS — M25.612 DECREASED ROM OF LEFT SHOULDER: ICD-10-CM

## 2019-10-16 DIAGNOSIS — M75.112 INCOMPLETE TEAR OF LEFT ROTATOR CUFF, UNSPECIFIED WHETHER TRAUMATIC: ICD-10-CM

## 2019-10-16 PROCEDURE — 97010 HOT OR COLD PACKS THERAPY: CPT | Mod: PO

## 2019-10-16 PROCEDURE — 97110 THERAPEUTIC EXERCISES: CPT | Mod: PO

## 2019-10-16 PROCEDURE — 97140 MANUAL THERAPY 1/> REGIONS: CPT | Mod: PO

## 2019-10-16 NOTE — PROGRESS NOTES
X  S:Rosamaria Agosto presents for post-operative evaluation.     DATE OF PROCEDURE: 7/24/2019   OPERATION:   Left  1. Shoulder arthroscopic rotator cuff repair, transosseous equivalent double row   2. Shoulder arthroscopic biceps tenodesis   3. Shoulder arthroscopic extensive debridement (anterior, posterior glenohumeral joint, subacromial space)    4. Shoulder arthroscopic subacromial decompression      Rosamaria Agosto reports to be doing well 12 wk s/p the above mentioned procedure. Presents today in sling which she has been wearing in public. Going to PT 2xWeek at the [facility] location, reports slow but steady progress. Typical pre-op complaint now gone. Reports some muscular soreness with associated stiffness. Taking flexeril which has been helping in therapy. Taking naproxen PRN.      O: LUE - The incisions are well healed. No signs of infection. No significant pain or unusual tenderness. Active ER to 100. Passive FE to 150 with some stiffness, passive ER at side to 40. Biceps is well tensioned. Full elbow ROM. NVI.    A/P: Arthroscopic pictures were again reviewed with the patient. Plan to follow the rehab plan as previously outlined- follow the <3 cm repair rehab protocol. Discussed importance of continuing therapy 2-3xWeek with a focus on regaining terminal motion. Discussed case with her therapist Gerson. Flexeril sent to the pharmacy. Continue naproxen. The patient does have some underlying chondromalacia and will be at risk for stiffness. RTC in 6 weeks. All questions answered.

## 2019-10-16 NOTE — PROGRESS NOTES
[facility]  Physical Therapy Note     Name: Rosamaria Agosto  Clinic Number: [id number]     Therapy Diagnosis: decreased ROM and pain at the shoulder s/p RTC  Physician: Arina Corea PA-C     Physician Orders: PT Eval and Treat   Medical Diagnosis from Referral: s/p RTC repair and biceps tenodesis  Evaluation Date: 7/26/2019  Authorization Period Expiration: 12/31/19  Plan of Care Expiration: 10/31/19  Visit # / Visits authorized: 29/30     Time In:  900 am  Time Out: 1020 am  Total Billable Time: 30 minutes (TE-1, MT 1) + 10 min ice pack     Precautions: Standard, RTC protocol NO AROM at elbow joint s/p biceps tenodesis, PROM at elbow and shoulder but no excessive ER stopping at initial end feel per phase I protocol and follow up with MD for protocol     Subjective     Pt reports:  Use of flexeril in the last 2 days instead of prescribed meds and has shown marked reduction in muscle tension on arrival.  Pt reported prescribed was from previous back injury because she was frustrated with tightness. Pt was advised to discontinue and discuss with MD before further use due to presently prescribed muscle relaxant.     Pain : 1- 2/10 at beginning and end of session 1-2/10 mostly tightness  Location: Left shoulder    TREATMENT     Rosamaria received therapeutic exercises to develop ROM for 50 minutes including:       Prom progressing to AAROM shoulder abd, scaption, flexion,  Horizontal abd/ adduction, and extension stopping at first end feel to avoid biceps stress within protocol in supine and in 60 deg incline supported position 3 x10 reps total per movement, TC for decreased UT activity    Pulley with cues into scaption x 1 min discontinued due to shoulder hiking even with cues. NP    Shoulder rows 3 x10 with YTB - NP  Shoulder extension 3 x10 with YTB-  NP      PROM shoulder scaption seated rolling ball, cues for decreased UT activity 3 x1 min  PROM shoulder scaption on [employer] machine  with bar above shoulder height with cues for decreased UT activity 2 x1min NP     scapular depression with ER 3x15 wth 2 sec hold on edge of mat and pushing down into the mat  Scapular retraction 3 x15 with 2 sec hold    scaption AROM 3 x10 along hand rail of stairs followed by PROM stretch at end ROM 15-20 secs as tolerated on every 5th and 10th reps.  Same as above in abduction    AAROM scaption and  with pt seated arm on PT, PT apply humeral inferior glide with scaption motion - held static 3x 20 sec    Self AAROM in supine with cane into ER 3 x10 with PROM after every 5th and 10th rep as tolerated for 15-20 secs    AAROM with PT into scaption and then in abduction with cues for shoulder depression in supine and in tolerated incline position 0-90 deg 6 x10 each as tolerated towards progressive upright posture.    Self AAROM with use cane or PVC pipe and RUE in standing and then in supine scaption with elbow flexed toward full extension at end rom of scaption - 3 x 10 reps and hold for 15 secs with every 5 reps    Self AAROM in supine scaption with elbow extending for full ROM 3 x10 with 15 second hold at end ROM every 5 reps    PROM at L shoulder 10/16/19  130 deg abd,  30 deg ext stopping at initial end feel  140 deg flex with cues to avoid shoulder hike, compensation.  40 deg with ER stopping at initial end feel  And   Elbow extension at  0 deg    AROM 10/16  80 deg in sitting scaption   70 deg abduction in sitting  25 deg ER in sitting     Strength at left shoulder  3-/5 in scaption and abduction and ER  3+/5  Extension and IR    Manual therapy 20 min with STM./ MFR to pect minor, teres minor, and rhomboids, upper traps and levator, GH mobs grade I/II AP and lateral distraction for pain relief into empty end feel before restrictions and GH mobs grade III with distraction  in all direction for increased ROM  - manual stretch pec minor  -subscap release with ER motion  - inferior humeral glide with inferior  mobilization Grade II-III  Grade III inferior glide with scaption in supine and 45 deg elevation  Grade III inferior glider with ER in supine and 45 deg elevation   Teres minor stretch in end ROM in scaption while    10 min ice to left shoulder    Education provided:   Sleeping position, sling positioning and shoulder pendulums, donning strategy for sling, review post surgery restrictions. Pt issued putty for , decreased edema pooling at elbow     Written Home Exercises Provided: yes.  Exercises were reviewed and Rosamaria was able to demonstrate them prior to the end of the session.  Rosamaria demonstrated good  understanding of the education provided.      See EMR under Patient Instructions for exercises provided 7/26/2019.     Assessment     Pt with marked improvement of resting muscle tone and tension on arrival and easily achieved 110 abd and 120 scaption on AAROM in supine prior to mobs.  Pt. With good shoulder depression with improved control and improved PROM in supine and up to 60 deg upright position.  Pt. Return to shoulder hiking tendency with initiation of abduction and scaption when fully upright.  Pt did show marked improvement in muscle tone reduction with use of previous prescribed flexeril but was instructed to discontinue and speak with MD as she already currently prescribed other meds for same purpose.  Patient has continued to show slow and steady progress and slow improvement with shoulder mechanics and would benefit from further ROM and progressive strengthening in new ROM to achieve an optimal functional outcome and avoid shoulder impingement and other shoulder issues that can evolve from failed mechanics.  PT to request an additional 20 visit upon completion of this current prescription now at 29/30.    Goals:  Short Term Goals: 3 weeks   1.  Pt independent with [facility] for s/p RTC repair and biceps tenodesis with return demonstration  2.  Pt to be independent with donning and doffing surgical sling  for proper pain management . Met 8/12/19  3.  Pt to increase PROM as tolerated at left shoulder to 90 deg shoulder flexion and 80 deg abduction without pain  4.  Pt to decrease overall pain at left shoulder to less than 4/10 after session. Met 8/12/19     Long Term Goals: 12 weeks   1.  Pt to been independent with discharge HEP without cues and proper form.  2.  Pt return to independent ADLs to include dressing, grooming with BUEs without compensation.  3.  Pt to increased AROM at elbow WNL and Left shoulder to 170 deg flexion and 160 deg abduction, 70 deg ER without pain or compensation  4.  Pt to increased L shoulder strength to 4+/5 into flexion extension, ER, IR   5  Pt to increase tolerance to perform 1 hour of household chores, ie cleaning without increased L shoulder pain  6. Pt to decrease pain at left shoulder to 3/10 after session for improved functional activities        Plan   Plan of care Certification: 7/26/2019 to 10/31/19    Request additional 20 visits 2 per week for 10 weeks 11/1/19 to 12/31/19 for shoulder rehab for rotator cuff.  Shanel Carvalho, PT

## 2019-10-17 ENCOUNTER — OFFICE VISIT (OUTPATIENT)
Dept: SPORTS MEDICINE | Facility: CLINIC | Age: 59
End: 2019-10-17
Payer: COMMERCIAL

## 2019-10-17 VITALS
DIASTOLIC BLOOD PRESSURE: 87 MMHG | SYSTOLIC BLOOD PRESSURE: 148 MMHG | WEIGHT: 190 LBS | BODY MASS INDEX: 28.14 KG/M2 | HEIGHT: 69 IN | HEART RATE: 69 BPM

## 2019-10-17 DIAGNOSIS — Z47.89 SURGICAL AFTERCARE, MUSCULOSKELETAL SYSTEM: Primary | ICD-10-CM

## 2019-10-17 DIAGNOSIS — Z98.890 S/P ARTHROSCOPY OF LEFT SHOULDER: ICD-10-CM

## 2019-10-17 PROCEDURE — 99999 PR PBB SHADOW E&M-EST. PATIENT-LVL III: ICD-10-PCS | Mod: PBBFAC,,, | Performed by: ORTHOPAEDIC SURGERY

## 2019-10-17 PROCEDURE — 99024 POSTOP FOLLOW-UP VISIT: CPT | Mod: S$GLB,,, | Performed by: ORTHOPAEDIC SURGERY

## 2019-10-17 PROCEDURE — 99024 PR POST-OP FOLLOW-UP VISIT: ICD-10-PCS | Mod: S$GLB,,, | Performed by: ORTHOPAEDIC SURGERY

## 2019-10-17 PROCEDURE — 99999 PR PBB SHADOW E&M-EST. PATIENT-LVL III: CPT | Mod: PBBFAC,,, | Performed by: ORTHOPAEDIC SURGERY

## 2019-10-17 RX ORDER — CYCLOBENZAPRINE HCL 10 MG
10 TABLET ORAL 3 TIMES DAILY PRN
Qty: 30 TABLET | Refills: 1 | Status: SHIPPED | OUTPATIENT
Start: 2019-10-17 | End: 2019-10-27

## 2019-10-18 ENCOUNTER — PATIENT OUTREACH (OUTPATIENT)
Dept: ADMINISTRATIVE | Facility: OTHER | Age: 59
End: 2019-10-18

## 2019-10-21 ENCOUNTER — CLINICAL SUPPORT (OUTPATIENT)
Dept: REHABILITATION | Facility: HOSPITAL | Age: 59
End: 2019-10-21
Payer: COMMERCIAL

## 2019-10-21 DIAGNOSIS — M25.612 DECREASED ROM OF LEFT SHOULDER: ICD-10-CM

## 2019-10-21 DIAGNOSIS — M75.112 INCOMPLETE TEAR OF LEFT ROTATOR CUFF, UNSPECIFIED WHETHER TRAUMATIC: ICD-10-CM

## 2019-10-21 PROCEDURE — 97110 THERAPEUTIC EXERCISES: CPT | Mod: PO

## 2019-10-21 PROCEDURE — 97010 HOT OR COLD PACKS THERAPY: CPT | Mod: PO

## 2019-10-21 PROCEDURE — 97140 MANUAL THERAPY 1/> REGIONS: CPT | Mod: PO

## 2019-10-21 NOTE — PROGRESS NOTES
[facility]  Physical Therapy Note     Name: Rosamaria Agosto  Clinic Number: [id number]     Therapy Diagnosis: decreased ROM and pain at the shoulder s/p RTC  Physician: Arina Corea PA-C     Physician Orders: PT Eval and Treat   Medical Diagnosis from Referral: s/p RTC repair and biceps tenodesis  Evaluation Date: 7/26/2019  Authorization Period Expiration: 12/31/19  Plan of Care Expiration: 10/31/19  Visit # / Visits authorized: 30/30     Time In:  755 am  Time Out: 1030 am  Total Billable Time: 55 minutes (TE-2, MT 2) + 10 min ice pack     Precautions: Standard, RTC protocol NO AROM at elbow joint s/p biceps tenodesis, PROM at elbow and shoulder but no excessive ER stopping at initial end feel per phase I protocol and follow up with MD for protocol     Subjective     Pt reports:  No issues on arrival with change in muscle relaxers med to preferred Flexeril per MD visit last Thursday and reported continued marked improved decrease of muscle tension.    Pain : 1- 2/10 at beginning and end of session 1-2/10 mostly tightness  Location: Left shoulder    TREATMENT     Rosamaria received therapeutic exercises to develop ROM for 55 minutes including:       Prom progressing to AAROM shoulder abd, scaption, flexion,  Horizontal abd/ adduction, and extension stopping at first end feel to avoid biceps stress within protocol in supine and in 60 deg incline supported position 3 x10 reps total per movement, TC for decreased UT activity    Pulley with cues into scaption x 1 min discontinued due to shoulder hiking even with cues. NP    Shoulder rows 3 x10 with YTB - NP  Shoulder extension 3 x10 with YTB-  NP      PROM shoulder scaption seated rolling ball, cues for decreased UT activity 3 x1 min  PROM shoulder scaption on solo machine with bar above shoulder height with cues for decreased UT activity 2 x1min NP     scapular depression with ER 3x15 wth 2 sec hold on edge of mat and pushing down into  the mat  Scapular retraction 3 x15 with 2 sec hold    Shoulder depression with 13# pulley with use of RUE to assist LUE to perform lat pulldown and LUE to hold in bottom position shoulder depression RUE assist to return to overhead position and relax at top of the range 2 x 15 with 10 sec stretch at top position for PROM    scaption AROM 3 x10 along hand rail of stairs followed by PROM stretch at end ROM 15-20 secs as tolerated on every 5th and 10th reps.  Same as above in abduction    AAROM scaption and  with pt seated arm on PT, PT apply humeral inferior glide with scaption motion - held static 3x 20 sec    Self AAROM in supine with cane into ER 3 x10 with PROM after every 5th and 10th rep as tolerated for 15-20 secs    AAROM with PT into scaption and then in abduction with cues for shoulder depression in supine and in tolerated incline position 0-90 deg 6 x10 each as tolerated towards progressive upright posture.    Self AAROM with use cane or PVC pipe and RUE in supine scaption with elbow flexed toward full extension at end rom of scaption - 5 x 10 reps and hold for 15 secs with every 5 reps    Self AAROM in supine scaption with elbow extending for full ROM 3 x10 with 15 second hold at end ROM every 5 reps    PROM at L shoulder 10/21/19  135 deg abd,  30 deg ext stopping at initial end feel  145 deg flex with cues to avoid shoulder hike, compensation.  45 deg with ER stopping at initial end feel  And   Elbow extension at  0 deg    AROM 10/21  90 deg in sitting scaption   70 deg abduction in sitting    Strength 10/21  scaption and abduction of left shoulder 3-/5  ER 3+/5    Manual therapy 25 min with STM./ MFR to pect minor, teres minor, and rhomboids, upper traps and levator, GH mobs grade I/II AP and lateral distraction for pain relief into empty end feel before restrictions and GH mobs grade III with distraction  in all direction for increased ROM  - manual stretch pec minor  -subscap release with ER motion  -  inferior humeral glide with inferior mobilization Grade II-III    15 min ice to left shoulder    Education provided:   Sleeping position, sling positioning and shoulder pendulums, donning strategy for sling, review post surgery restrictions. Pt issued putty for , decreased edema pooling at elbow     Written Home Exercises Provided: yes.  Exercises were reviewed and Rosamaria was able to demonstrate them prior to the end of the session.  Rosamaria demonstrated good  understanding of the education provided.      See EMR under Patient Instructions for exercises provided 7/26/2019.     Assessment     Pt with second straight session of decreased resting tension since starting new muscle relaxer med.  Pt with good mechanics thru available range with with self ROM able to maintain good AROM from supine to 60 deg upright positioning for AROM.  Pt. With continued subscapular tightness and teres minor leading to difficulty to disassociate GH and scapulothoracic motion during ROM below 90 deg.  Pt. Continues to be aggressive with self ROM and able to self correct early shoulder hiking with only minor cues.  PT to progressively strengthen to 3/5 thru available ROM toward full upright sitting.      Goals:  Short Term Goals: 3 weeks   1.  Pt independent with HEP for s/p RTC repair and biceps tenodesis with return demonstration  2.  Pt to be independent with donning and doffing surgical sling for proper pain management . Met 8/12/19  3.  Pt to increase PROM as tolerated at left shoulder to 90 deg shoulder flexion and 80 deg abduction without pain  4.  Pt to decrease overall pain at left shoulder to less than 4/10 after session. Met 8/12/19     Long Term Goals: 12 weeks   1.  Pt to been independent with discharge HEP without cues and proper form.  2.  Pt return to independent ADLs to include dressing, grooming with BUEs without compensation.  3.  Pt to increased AROM at elbow WNL and Left shoulder to 170 deg flexion and 160 deg  abduction, 70 deg ER without pain or compensation  4.  Pt to increased L shoulder strength to 4+/5 into flexion extension, ER, IR   5  Pt to increase tolerance to perform 1 hour of household chores, ie cleaning without increased L shoulder pain  6. Pt to decrease pain at left shoulder to 3/10 after session for improved functional activities        Plan   Plan of care Certification: 7/26/2019 to 10/31/19    Cont with  New POC per request of new prescription of 20 additional visits with the completion of first prescription today 30/30.    Shanel Carvalho, PT

## 2019-10-22 ENCOUNTER — OFFICE VISIT (OUTPATIENT)
Dept: CARDIOLOGY | Facility: CLINIC | Age: 59
End: 2019-10-22
Payer: COMMERCIAL

## 2019-10-22 VITALS
WEIGHT: 201.75 LBS | OXYGEN SATURATION: 98 % | SYSTOLIC BLOOD PRESSURE: 119 MMHG | HEIGHT: 69 IN | DIASTOLIC BLOOD PRESSURE: 67 MMHG | HEART RATE: 65 BPM | BODY MASS INDEX: 29.88 KG/M2

## 2019-10-22 DIAGNOSIS — I83.813 VARICOSE VEINS OF BILATERAL LOWER EXTREMITIES WITH PAIN: Primary | ICD-10-CM

## 2019-10-22 DIAGNOSIS — I10 ESSENTIAL HYPERTENSION: ICD-10-CM

## 2019-10-22 DIAGNOSIS — E78.2 MIXED HYPERLIPIDEMIA: ICD-10-CM

## 2019-10-22 PROCEDURE — 3074F SYST BP LT 130 MM HG: CPT | Mod: CPTII,S$GLB,, | Performed by: INTERNAL MEDICINE

## 2019-10-22 PROCEDURE — 3078F DIAST BP <80 MM HG: CPT | Mod: CPTII,S$GLB,, | Performed by: INTERNAL MEDICINE

## 2019-10-22 PROCEDURE — 3008F PR BODY MASS INDEX (BMI) DOCUMENTED: ICD-10-PCS | Mod: CPTII,S$GLB,, | Performed by: INTERNAL MEDICINE

## 2019-10-22 PROCEDURE — 3074F PR MOST RECENT SYSTOLIC BLOOD PRESSURE < 130 MM HG: ICD-10-PCS | Mod: CPTII,S$GLB,, | Performed by: INTERNAL MEDICINE

## 2019-10-22 PROCEDURE — 99999 PR PBB SHADOW E&M-EST. PATIENT-LVL III: ICD-10-PCS | Mod: PBBFAC,,, | Performed by: INTERNAL MEDICINE

## 2019-10-22 PROCEDURE — 3078F PR MOST RECENT DIASTOLIC BLOOD PRESSURE < 80 MM HG: ICD-10-PCS | Mod: CPTII,S$GLB,, | Performed by: INTERNAL MEDICINE

## 2019-10-22 PROCEDURE — 99215 OFFICE O/P EST HI 40 MIN: CPT | Mod: S$GLB,,, | Performed by: INTERNAL MEDICINE

## 2019-10-22 PROCEDURE — 3008F BODY MASS INDEX DOCD: CPT | Mod: CPTII,S$GLB,, | Performed by: INTERNAL MEDICINE

## 2019-10-22 PROCEDURE — 99215 PR OFFICE/OUTPT VISIT, EST, LEVL V, 40-54 MIN: ICD-10-PCS | Mod: S$GLB,,, | Performed by: INTERNAL MEDICINE

## 2019-10-22 PROCEDURE — 99999 PR PBB SHADOW E&M-EST. PATIENT-LVL III: CPT | Mod: PBBFAC,,, | Performed by: INTERNAL MEDICINE

## 2019-10-22 NOTE — PROGRESS NOTES
Interventional Cardiology Clinic Note  Reason for Visit: Venous insufficiency     HPI:   Rosamaria Agosto is a 59 y.o. female who presents for venous insufficiency     She had a laser about 4-5 years ago by Dr. Camacho after the procedure her leg swelling improved however for about the past 1-2 years she has noticed bilateral leg swelling that gets worse throughout the day and seems to improve after sleeping. She has associated pain in both lower extremities.     She underwent lower extremity venous ultrasound on 4/26/19 which showed b/l GSV reflux. She has worn compressions stockings daily for the past 6 months. Despite compliance with compression stockings, she still had bilateral thigh pain and leg swelling by the end of the day. The worst of her pain is in her left groin. Her symptoms are relieved with elevating her legs. She has a job that requires her to stand on her feet for 8 hours. She states the compression stockings did help with her symptoms but did not eliminate them. She has no other cardiovascular complaints today.     ROS:    Constitution: Negative for diaphoresis and malaise/fatigue.   HENT: Negative for nosebleeds.  Cardiovascular: Negative for chest pain, claudication, dyspnea on exertion, near-syncope, orthopnea, palpitations, paroxysmal nocturnal dyspnea and syncope. Positive for leg swelling and leg pain.   Respiratory: Negative for shortness of breath, snoring and wheezing.    Hematologic/Lymphatic: Negative for bleeding problem. Does not bruise/bleed easily.   Musculoskeletal: Negative for muscle cramps.   Gastrointestinal: Negative for bloating, abdominal pain, nausea and vomiting.   Neurological: Negative for dizziness, headaches and light-headedness.   PMH:     Past Medical History:   Diagnosis Date    Abnormal Pap smear of cervix yrs ago    Arthritis     History of uterine fibroid     Hyperlipidemia     Hypertension     Shoulder injury     lt    Sinus problem     Sinusitis       Past Surgical History:   Procedure Laterality Date    ARTHROSCOPIC REPAIR OF ROTATOR CUFF OF SHOULDER Left 7/24/2019    Procedure: REPAIR, ROTATOR CUFF, ARTHROSCOPIC;  Surgeon: ASMITA Soto MD;  [facility];  Service: Orthopedics;  Laterality: Left;    ARTHROSCOPY OF SHOULDER WITH DECOMPRESSION OF SUBACROMIAL SPACE Left 7/24/2019    Procedure: ARTHROSCOPY, SHOULDER, WITH SUBACROMIAL SPACE DECOMPRESSION;  Surgeon: ASMITA Soto MD;  [facility];  Service: Orthopedics;  Laterality: Left;    BREAST BIOPSY  24-25 years old    BREAST SURGERY      CYSTOSCOPY N/A 6/24/2019    Procedure: CYSTOSCOPY;  Surgeon: Anson Ellison MD;  Location: [facility] [place];  Service: OB/GYN;  Laterality: N/A;    ENCEPHALOCELE REPAIR  46    HYSTERECTOMY  93'-95'    ovaries remain    NASAL SINUS SURGERY      ROBOT-ASSISTED LAPAROSCOPIC ABDOMINAL SACROCOLPOPEXY USING DA MAC XI N/A 6/24/2019    Procedure: XI ROBOTIC SACROCOLPOPEXY, ABDOMINAL;  Surgeon: Anson Ellison MD;  Location: [facility] [place] [facility];  Service: OB/GYN;  Laterality: N/A;    SHOULDER ARTHROSCOPY Left 7/24/2019    Procedure: BICEPS TENODESIS;  Surgeon: ASMITA Soto MD;  [facility];  Service: Orthopedics;  Laterality: Left;    TUBAL LIGATION      VAGINAL DELIVERY       Allergies:   Review of patient's allergies indicates:  No Known Allergies  Medications:     Current Outpatient Medications on File Prior to Visit   Medication Sig Dispense Refill    acetaminophen (TYLENOL) 650 MG TbSR Take 1,300 mg by mouth as needed.      aspirin (ECOTRIN) 81 MG EC tablet Take 1 tablet (81 mg total) by mouth 2 (two) times daily. for 14 days (Patient taking differently: Take 81 mg by mouth once daily. ) 28 tablet 0    atorvastatin (LIPITOR) 80 MG tablet Take 1 tablet (80 mg total) by mouth nightly. 30 tablet 11    budesonide (PULMICORT) 0.5 mg/2 mL nebulizer solution inhale 1 nebules per nebulizer twice daily, for use in saline irrigation  as directed  1    cyclobenzaprine (FLEXERIL) 10 MG tablet Take 1 tablet (10 mg total) by mouth 3 (three) times daily as needed for Muscle spasms. 30 tablet 1    estradiol (ESTRACE) 0.01 % (0.1 mg/gram) vaginal cream Place 1 g vaginally every Mon, Wed, Fri. 1 Tube 11    flavoxATE (URISPAS) 100 mg Tab Take 1 tablet (100 mg total) by mouth 3 (three) times daily as needed. 90 tablet 3    flavoxATE (URISPAS) 100 mg Tab Take 1 tablet (100 mg total) by mouth 3 (three) times daily as needed. 90 tablet 3    hydroCHLOROthiazide (HYDRODIURIL) 25 MG tablet Take 1 tablet (25 mg total) by mouth once daily. 90 tablet 3    HYDROcodone-acetaminophen (NORCO) 5-325 mg per tablet Take 1 tablet by mouth every 6 (six) hours as needed for Pain. 28 tablet 0    ibuprofen (ADVIL,MOTRIN) 600 MG tablet Take 1 tablet (600 mg total) by mouth every 6 (six) hours as needed for Pain. 30 tablet 0    meloxicam (MOBIC) 15 MG tablet Take 1 tablet (15 mg total) by mouth once daily. 90 tablet 3    naproxen (NAPROSYN) 500 MG tablet Take 1 tablet (500 mg total) by mouth 2 (two) times daily. 60 tablet 2    oxyCODONE (ROXICODONE) 5 MG immediate release tablet Take 1-2 tablets (5-10 mg total) by mouth every 4 to 6 hours as needed for Pain. 28 tablet 0    oxyCODONE-acetaminophen (PERCOCET) 5-325 mg per tablet Take 1 tablet by mouth every 4 (four) hours as needed. 20 tablet 0    potassium chloride SA (K-DUR,KLOR-CON) 20 MEQ tablet Take 1 tablet (20 mEq total) by mouth 2 (two) times daily. 90 tablet 3    traMADol (ULTRAM) 50 mg tablet Take 1 tablet (50 mg total) by mouth daily as needed (severe pain). 30 tablet 2    VITAMIN D2 50,000 unit capsule Take 1 capsule (50,000 Units total) by mouth every 7 days. 4 capsule 2    azelastine (ASTELIN) 137 mcg (0.1 %) nasal spray 1 spray (137 mcg total) by Nasal route 2 (two) times daily. 30 mL 2     No current facility-administered medications on file prior to visit.      Social History:     Social History  "    Tobacco Use    Smoking status: Never Smoker    Smokeless tobacco: Never Used   Substance Use Topics    Alcohol use: Yes     Alcohol/week: 1.0 standard drinks     Types: 1 Shots of liquor per week     Comment: seldom     Family History:     Family History   Problem Relation Age of Onset    Stroke Maternal Grandmother     Diabetes Mother     Hypertension Mother     Diabetes Sister     Diabetes Sister     Breast cancer Neg Hx     Colon cancer Neg Hx     Ovarian cancer Neg Hx      Physical Exam:   /67 (BP Location: Right arm, Patient Position: Sitting, BP Method: Large (Automatic))   Pulse 65   Ht 5' 9" (1.753 m)   Wt 91.5 kg (201 lb 11.5 oz)   LMP 10/19/1993 (Approximate)   SpO2 98%   BMI 29.79 kg/m²      Constitutional: NAD, conversant  HEENT: Sclera anicteric, PERRLA, EOMI  Neck: No JVD, no carotid bruits  CV: RRR, no murmur, normal S1/S2  Pulm: CTAB, no wheezes, rales, or ronchi  GI: Abdomen soft, NTND, +BS  Extremities: Trace LE edema, warm and well perfused  Skin: No ecchymosis, erythema, or ulcers  Psych: AOx3, appropriate affect  Neuro: No gross deficits    Labs:     Lab Results   Component Value Date     2019    K 4.6 2019    CL 99 2019    CO2 25 2019    BUN 14 2019    CREATININE 0.7 2019    ANIONGAP 13 2019     Lab Results   Component Value Date    HGBA1C 5.6 2018     No results found for: BNP, BNPTRIAGEBLO Lab Results   Component Value Date    WBC 9.19 2019    HGB 12.5 2019    HCT 38.0 2019     2019    GRAN 7.4 2019    GRAN 81.1 (H) 2019     Lab Results   Component Value Date    CHOL 234 (H) 2019    HDL 62 2019    LDLCALC 152.8 2019    TRIG 96 2019          Imagin. Venous lower extremity ultrasound (19)  No evidence of lower extremity DVT bilaterally.  R GSV branches mid thigh, reflux noted in tortuous vessels distal to branching.  L GSV branches prox " thigh, reflux noted in tortuous vessels distal to branching below knee.    Assessment:     1. Varicose veins of bilateral lower extremities with pain    2. Essential hypertension    3. Mixed hyperlipidemia      Plan:   60 y/o female with presents with symptomatic venous insufficiency (LLE>RLE) despite 6 months of compression stockings. Given that patient still has pain and swelling in b/l lower extremities and has now failed medical management, she will be scheduled for venous radiofrequency ablation of LLE and then RLE if needed.     Varicose veins of bilateral lower extremities with pain  -- The risk, benefits, and alternatives of the scheduled procedure have been discussed in detail with the patient. All questions have been answered, the patient understands, and informed consent has been obtained and signed.  -- Continue Aspirin 81 mg daily     Essential hypertension  -- Well controlled.  -- Continue HCTZ 25 mg daily    Hyperlipidemia  -- Continue Lipitor 80 mg qhs      Signed:  Mere Tejada PA-C  Interventional Cardiology   [id number]  10/22/2019 12:03 PM  I have personally taken the history and examined this patient. I have discussed and agree with the resident's findings and plan as documented in the resident's note.  Bilateral lower extremity symptoms of reflux post compression stockings for 3 months with no significant improvement.  Recommend review of films and bilateral lower extremity venous ablation.  Douglas Barrow

## 2019-10-23 ENCOUNTER — CLINICAL SUPPORT (OUTPATIENT)
Dept: REHABILITATION | Facility: HOSPITAL | Age: 59
End: 2019-10-23
Payer: COMMERCIAL

## 2019-10-23 DIAGNOSIS — M25.612 DECREASED ROM OF LEFT SHOULDER: ICD-10-CM

## 2019-10-23 DIAGNOSIS — M75.112 INCOMPLETE TEAR OF LEFT ROTATOR CUFF, UNSPECIFIED WHETHER TRAUMATIC: ICD-10-CM

## 2019-10-23 PROCEDURE — 97110 THERAPEUTIC EXERCISES: CPT | Mod: PO

## 2019-10-23 PROCEDURE — 97140 MANUAL THERAPY 1/> REGIONS: CPT | Mod: PO

## 2019-10-23 PROCEDURE — 97010 HOT OR COLD PACKS THERAPY: CPT | Mod: PO

## 2019-10-23 NOTE — PROGRESS NOTES
[facility]  Physical Therapy Note     Name: Rosamaria Agosto  Clinic Number: [id number]     Therapy Diagnosis: decreased ROM and pain at the shoulder s/p RTC  Physician: Arina Corea PA-C     Physician Orders: PT Eval and Treat   Medical Diagnosis from Referral: s/p RTC repair and biceps tenodesis  Evaluation Date: 7/26/2019  Authorization Period Expiration: 12/31/19  Plan of Care Expiration: 10/31/19  Visit # / Visits authorized: new script 1/20;   30/30 completed     Time In:  815 am  Time Out: 930 am  Total Billable Time: 25 minutes (TE-1, MT 1) + 15 min ice pack     Precautions: Standard, RTC protocol NO AROM at elbow joint s/p biceps tenodesis, PROM at elbow and shoulder but no excessive ER stopping at initial end feel per phase I protocol and follow up with MD for protocol     Subjective     Pt reports:  No issues on arrival said she felt the best and loosest she had for 2 days and overall since last session.  She  feeling better muscle relaxers med with preferred Flexeril per MD visit last Thursday and reported continued marked improved decrease of muscle tension.    Pain : 0/10 at beginning and end of session 1-2/10 mostly tightness  Location: Left shoulder    TREATMENT     Rosamaria received therapeutic exercises to develop ROM for 40 minutes including:       Prom progressing to AAROM shoulder abd, scaption, flexion,  Horizontal abd/ adduction, and extension stopping at first end feel to avoid biceps stress within protocol in supine and in 60 deg incline supported position 3 x10 reps total per movement, TC for decreased UT activity    Pulley with cues into scaption x 1 min discontinued due to shoulder hiking even with cues. NP    Shoulder rows 3 x10 with YTB - NP  Shoulder extension 3 x10 with YTB-  NP      PROM shoulder scaption seated rolling ball, cues for decreased UT activity 3 x1 min  PROM shoulder scaption on solo machine with bar above shoulder height with cues for  decreased UT activity 2 x1min NP     scapular depression with ER 3x15 wth 2 sec hold on edge of mat and pushing down into the mat  Scapular retraction 3 x15 with 2 sec hold    Shoulder depression with 13# pulley with use of RUE to assist LUE to perform lat pulldown and LUE to hold in bottom position shoulder depression RUE assist to return to overhead position and relax at top of the range 2 x 15 with 10 sec stretch at top position for PROM    scaption AROM 3 x10 along hand rail of stairs followed by PROM stretch at end ROM 15-20 secs as tolerated on every 5th and 10th reps.  Same as above in abduction    AAROM scaption and  with pt seated arm on PT, PT apply humeral inferior glide with scaption motion - held static 3x 20 sec    Self AAROM in supine with cane into ER 3 x10 with PROM after every 5th and 10th rep as tolerated for 15-20 secs    AAROM with PT into scaption and then in abduction with cues for shoulder depression in supine and in tolerated incline position 0-90 deg 6 x10 each as tolerated towards progressive upright posture.    Self AAROM with use cane or PVC pipe and RUE in supine and then in standing: scaption with elbow flexed toward full extension at end rom of scaption - 3x 10 reps and hold for 15 secs with every 5 reps    Self AAROM in supine scaption with elbow extending for full ROM 3 x10 with 15 second hold at end ROM every 5 reps    PROM at L shoulder 10/21/19  135 deg abd,  30 deg ext stopping at initial end feel  145 deg flex with cues to avoid shoulder hike, compensation.  45 deg with ER stopping at initial end feel  And   Elbow extension at  0 deg    AROM 10/21  90 deg in sitting scaption   70 deg abduction in sitting    Strength 10/21  scaption and abduction of left shoulder 3-/5  ER 3+/5    Manual therapy 20 min with STM./ MFR to pect minor, teres minor, and rhomboids, upper traps and levator, GH mobs grade I/II AP and lateral distraction for pain relief into empty end feel before  restrictions and GH mobs grade III with distraction  in all direction for increased ROM  - manual stretch pec minor  -subscap release with ER motion  - inferior humeral glide with inferior mobilization Grade II-III    15 min ice to left shoulder    Education provided:   Sleeping position, sling positioning and shoulder pendulums, donning strategy for sling, review post surgery restrictions. Pt issued putty for , decreased edema pooling at elbow     Written Home Exercises Provided: yes.  Exercises were reviewed and Rosamaria was able to demonstrate them prior to the end of the session.  Rosamaria demonstrated good  understanding of the education provided.      See EMR under Patient Instructions for exercises provided 7/26/2019.     Assessment     Pt with third straight session of decreased resting tension since starting new muscle relaxer med.  Pt with good mechanics thru available range with with self ROM able to maintain good AROM from supine to 60 deg upright positioning for AROM.  Pt. With continued subscapular tightness and teres minor leading to difficulty to disassociate GH and scapulothoracic motion during ROM below 90 deg. But better overall with initial tightness and thru session  Pt. Continues to be aggressive with self ROM and able to self correct early shoulder hiking with only minor cues.      Goals:  Short Term Goals: 3 weeks   1.  Pt independent with HEP for s/p RTC repair and biceps tenodesis with return demonstration  2.  Pt to be independent with donning and doffing surgical sling for proper pain management . Met 8/12/19  3.  Pt to increase PROM as tolerated at left shoulder to 90 deg shoulder flexion and 80 deg abduction without pain  4.  Pt to decrease overall pain at left shoulder to less than 4/10 after session. Met 8/12/19     Long Term Goals: 12 weeks   1.  Pt to been independent with discharge HEP without cues and proper form.  2.  Pt return to independent ADLs to include dressing, grooming with  BUEs without compensation.  3.  Pt to increased AROM at elbow WNL and Left shoulder to 170 deg flexion and 160 deg abduction, 70 deg ER without pain or compensation  4.  Pt to increased L shoulder strength to 4+/5 into flexion extension, ER, IR   5  Pt to increase tolerance to perform 1 hour of household chores, ie cleaning without increased L shoulder pain  6. Pt to decrease pain at left shoulder to 3/10 after session for improved functional activities        Plan   Plan of care Certification: 7/26/2019 to 10/31/19 extend new script to 12/31/19    Cont with  New POC per request of new prescription of 20 additional visits with the completion of first prescription today 30/30.    Shanel Carvalho, PT

## 2019-10-24 ENCOUNTER — TELEPHONE (OUTPATIENT)
Dept: SPORTS MEDICINE | Facility: CLINIC | Age: 59
End: 2019-10-24

## 2019-10-24 ENCOUNTER — CLINICAL SUPPORT (OUTPATIENT)
Dept: REHABILITATION | Facility: HOSPITAL | Age: 59
End: 2019-10-24
Payer: COMMERCIAL

## 2019-10-24 DIAGNOSIS — M25.612 DECREASED ROM OF LEFT SHOULDER: ICD-10-CM

## 2019-10-24 DIAGNOSIS — S46.012D TRAUMATIC INCOMPLETE TEAR OF LEFT ROTATOR CUFF, SUBSEQUENT ENCOUNTER: ICD-10-CM

## 2019-10-24 PROCEDURE — 97110 THERAPEUTIC EXERCISES: CPT | Mod: PO

## 2019-10-24 PROCEDURE — 97140 MANUAL THERAPY 1/> REGIONS: CPT | Mod: PO

## 2019-10-24 NOTE — TELEPHONE ENCOUNTER
----- Message from Aleah Hi MA sent at 10/24/2019 10:29 AM CDT -----  Contact: Xena Kohler  Please see below.     Thank you!  ----- Message -----  From: Kelsi Goyal  Sent: 10/24/2019  10:11 AM CDT  To: Brittany Herrera Staff    Xena Cueva ask for a call to verify if an authorization is on file since received records previously in Sept 2019 ask for a call      Contact [facility]  [phone] ext [phone]      Please reference # [id number] when calling

## 2019-10-24 NOTE — TELEPHONE ENCOUNTER
Attempted to return Harper's phone call but number left is for a fax machine.    We have not received any new forms to fill out for the patient since 09/2019.    [facility]

## 2019-10-28 ENCOUNTER — CLINICAL SUPPORT (OUTPATIENT)
Dept: REHABILITATION | Facility: HOSPITAL | Age: 59
End: 2019-10-28
Payer: COMMERCIAL

## 2019-10-28 DIAGNOSIS — M25.612 DECREASED ROM OF LEFT SHOULDER: ICD-10-CM

## 2019-10-28 DIAGNOSIS — M75.112 INCOMPLETE TEAR OF LEFT ROTATOR CUFF, UNSPECIFIED WHETHER TRAUMATIC: ICD-10-CM

## 2019-10-28 PROCEDURE — 97110 THERAPEUTIC EXERCISES: CPT | Mod: PO

## 2019-10-28 PROCEDURE — 97010 HOT OR COLD PACKS THERAPY: CPT | Mod: PO

## 2019-10-28 PROCEDURE — 97140 MANUAL THERAPY 1/> REGIONS: CPT | Mod: PO

## 2019-10-28 NOTE — PROGRESS NOTES
[facility]  Physical Therapy Note     Name: Rosamaria Agosto  Clinic Number: [id number]     Therapy Diagnosis: decreased ROM and pain at the shoulder s/p RTC  Physician: Arina Corea PA-C     Physician Orders: PT Eval and Treat   Medical Diagnosis from Referral: s/p RTC repair and biceps tenodesis  Evaluation Date: 7/26/2019  Authorization Period Expiration: 12/31/19  Plan of Care Expiration: 10/31/19  Visit # / Visits authorized: new script 2/20;   30/30 completed     Time In:  815 am  Time Out: 930 am  Total Billable Time: 35 minutes (TE-1, MT 1) + 15 min ice pack     Precautions: Standard, RTC protocol NO AROM at elbow joint s/p biceps tenodesis, PROM at elbow and shoulder but no excessive ER stopping at initial end feel per phase I protocol and follow up with MD for protocol     Subjective     Pt reports:  Continues to feel good mobility with decreased tightness and less use of sling.    Pain : 0/10 at beginning and end of session 1-2/10 mostly tightness  Location: Left shoulder    TREATMENT     Rosamaria received therapeutic exercises to develop ROM for 40 minutes including:       Prom progressing to AAROM shoulder abd, scaption, flexion,  Horizontal abd/ adduction, and extension stopping at first end feel to avoid biceps stress within protocol in supine and in 60 deg incline supported position 3 x10 reps total per movement, TC for decreased UT activity    Pulley with cues into scaption x 1 min discontinued due to shoulder hiking even with cues. NP    Shoulder rows 3 x10 with YTB - NP  Shoulder extension 3 x10 with YTB-  NP      PROM shoulder scaption seated rolling ball, cues for decreased UT activity 3 x1 min  PROM shoulder scaption on solo machine with bar above shoulder height with cues for decreased UT activity 2 x1min NP     scapular depression with ER 3x15 wth 2 sec hold on edge of mat and pushing down into the mat  Scapular retraction 3 x15 with 2 sec hold    Shoulder  depression with 13# pulley with use of RUE to assist LUE to perform lat pulldown and LUE to hold in bottom position shoulder depression RUE assist to return to overhead position and relax at top of the range 2 x 15 with 10 sec stretch at top position for PROM    scaption AROM 3 x10 along hand rail of stairs followed by PROM stretch at end ROM 15-20 secs as tolerated on every 5th and 10th reps.  Same as above in abduction    AAROM scaption and  with pt seated arm on PT, PT apply humeral inferior glide with scaption motion - held static 3x 20 sec    Self AAROM in supine with cane into ER 3 x10 with PROM after every 5th and 10th rep as tolerated for 15-20 secs    AAROM with PT into scaption and then in abduction with cues for shoulder depression in supine and in tolerated incline position 0-90 deg 6 x10 each as tolerated towards progressive upright posture.from 10/28/19 scaption and serratus punches in supine with 4# dowel and PVC in upright positions      Self AAROM with use cane or PVC pipe and RUE in supine and then in standing: scaption with elbow flexed toward full extension at end rom of scaption - 3x 10 reps and hold for 15 secs with every 5 reps:   Self AAROM in supine scaption with elbow extending for full ROM 3 x10 with 15 second hold at end ROM every 5 reps    PROM at L shoulder 10/21/19  135 deg abd,  30 deg ext stopping at initial end feel  145 deg flex with cues to avoid shoulder hike, compensation.  45 deg with ER stopping at initial end feel  And   Elbow extension at  0 deg    AROM 10/21  90 deg in sitting scaption   70 deg abduction in sitting    Strength 10/21  scaption and abduction of left shoulder 3-/5  ER 3+/5    Manual therapy 20 min with STM./ MFR to pect minor, teres minor, and rhomboids, upper traps and levator, GH mobs grade I/II AP and lateral distraction for pain relief into empty end feel before restrictions and GH mobs grade III with distraction  in all direction for increased ROM  -  manual stretch pec minor  -subscap release with ER motion  - inferior humeral glide with inferior mobilization Grade II-III    15 min ice to left shoulder    Education provided:   Sleeping position, sling positioning and shoulder pendulums, donning strategy for sling, review post surgery restrictions. Pt issued putty for , decreased edema pooling at elbow     Written Home Exercises Provided: yes.  Exercises were reviewed and Rosamaria was able to demonstrate them prior to the end of the session.  Rosamaria demonstrated good  understanding of the education provided.      See EMR under Patient Instructions for exercises provided 7/26/2019.     Assessment     Pt with limited by subscapular and overall limited scapulothoracic motion to assist with scaption and abduction.  Pt continues to do well with self AROM and AAROM with use of PVC pipe and progress with use of 4# dowel to allow further strengthening and then self stretch with weighted dowel.  Pt able to work in full upright seating to improve scaption and abd.  Pt continues to progress with self stretch exercises and decreased overall tone on arrival.     Goals:  Short Term Goals: 3 weeks   1.  Pt independent with HEP for s/p RTC repair and biceps tenodesis with return demonstration  2.  Pt to be independent with donning and doffing surgical sling for proper pain management . Met 8/12/19  3.  Pt to increase PROM as tolerated at left shoulder to 90 deg shoulder flexion and 80 deg abduction without pain  4.  Pt to decrease overall pain at left shoulder to less than 4/10 after session. Met 8/12/19     Long Term Goals: 12 weeks   1.  Pt to been independent with discharge HEP without cues and proper form.  2.  Pt return to independent ADLs to include dressing, grooming with BUEs without compensation.  3.  Pt to increased AROM at elbow WNL and Left shoulder to 170 deg flexion and 160 deg abduction, 70 deg ER without pain or compensation  4.  Pt to increased L shoulder  strength to 4+/5 into flexion extension, ER, IR   5  Pt to increase tolerance to perform 1 hour of household chores, ie cleaning without increased L shoulder pain  6. Pt to decrease pain at left shoulder to 3/10 after session for improved functional activities        Plan   Plan of care Certification: 7/26/2019 to 10/31/19 extend new script to 12/31/19    Cont with  New POC per request of new prescription of 20 additional visits   Shanel Carvalho, PT

## 2019-10-30 ENCOUNTER — TELEPHONE (OUTPATIENT)
Dept: SPORTS MEDICINE | Facility: CLINIC | Age: 59
End: 2019-10-30

## 2019-10-30 NOTE — TELEPHONE ENCOUNTER
----- Message from Kelsi Goyal sent at 10/30/2019 10:22 AM CDT -----  Contact: self  Pt states that need recent office notes send to Xena Kohler  Pt ask for a call      Contact info  [phone]

## 2019-10-30 NOTE — TELEPHONE ENCOUNTER
Left a v/m that we have not received any forms from [employer] to complete since 09/2019. Asked that she please contact them to have them send us a fax requesting the information that they need.    [facility]

## 2019-10-30 NOTE — TELEPHONE ENCOUNTER
----- Message from Linda Huerta sent at 10/29/2019 11:20 AM CDT -----  Contact: Self   Based on your prior note, we have not received any new forms to fill out for the patient since 09/2019.    ----- Message -----  From: Katy Lemus  Sent: 10/29/2019  11:18 AM CDT  To: Brittany Herrera Staff    Pt would like to know if info was sent back to [facility] for disability.     [phone]

## 2019-10-31 NOTE — PROGRESS NOTES
[facility]  Physical Therapy Note     Name: Rosamaria Agosto  Clinic Number: [id number]     Therapy Diagnosis: decreased ROM and pain at the shoulder s/p RTC  Physician: Arina Corea PA-C     Physician Orders: PT Eval and Treat   Medical Diagnosis from Referral: s/p RTC repair and biceps tenodesis  Evaluation Date: 7/26/2019  Authorization Period Expiration: 12/31/19  Plan of Care Expiration: 10/31/19  Visit # / Visits authorized: 2/20 new script     Time In:  9 am  Time Out: 1020 am  Total Billable Time: 45 minutes (TE-1, MT 2) + 10 min ice pack     Precautions: Standard, RTC protocol NO AROM at elbow joint s/p biceps tenodesis, PROM at elbow and shoulder but no excessive ER stopping at initial end feel per phase I protocol and follow up with MD for protocol     Subjective     Pt reports:  No issues with left shoulder, was able to sleep on it without onset of pain, feels she is slowly getting a little looser    Pain : 1- 2/10 at beginning and end of session 1-2/10 mostly tightness  Location: Left shoulder    TREATMENT     Rosamaria received therapeutic exercises to develop ROM for 35 minutes including:       Prom progressing to AAROM shoulder abd, scaption, flexion,  Horizontal abd/ adduction, and extension stopping at first end feel to avoid biceps stress within protocol in supine and in 60 deg incline supported position 3 x10 reps total per movement, TC for decreased UT activity    Pulley with cues into scaption x 1 min discontinued due to shoulder hiking even with cues. NP    Shoulder rows 3 x10 with YTB - NP  Shoulder extension 3 x10 with YTB-  NP      PROM shoulder scaption seated rolling ball, cues for decreased UT activity 3 x1 min  PROM shoulder scaption on solo machine with bar above shoulder height with cues for decreased UT activity 2 x1min NP     scapular depression with ER 3x15 wth 2 sec hold on edge of mat and pushing down into the mat  Scapular retraction 3 x15 with 2  sec hold    scaption AROM 3 x10 along hand rail of stairs followed by PROM stretch at end ROM 15-20 secs as tolerated on every 5th and 10th reps.  Same as above in abduction    AAROM scaption and  with pt seated arm on PT, PT apply humeral inferior glide with scaption motion - held static 3x 20 sec    Self AAROM in supine with cane into ER 3 x10 with PROM after every 5th and 10th rep as tolerated for 15-20 secs    AAROM with PT into scaption and then in abduction with cues for shoulder depression in supine and in tolerated incline position 0-90 deg 6 x10 each as tolerated towards progressive upright posture.    Self AAROM with use cane or PVC pipe and RUE in supine scaption with elbow flexed toward full extension at end rom of scaption - 5 x 10 reps and hold for 15 secs with every 5 reps    Self AAROM in supine scaption with elbow extending for full ROM 3 x10 with 15 second hold at end ROM every 5 reps      Manual therapy 20 min with STM./ MFR to pect minor, teres minor, and rhomboids, upper traps and levator, GH mobs grade I/II AP and lateral distraction for pain relief into empty end feel before restrictions and GH mobs grade III with distraction  in all direction for increased ROM  - manual stretch pec minor  -subscap release with ER motion  - inferior humeral glide with inferior mobilization Grade II-III  - inferior glide coupled with scaption pt in seated position   - Inferior glide coupled with ER pt supine and elevated  -PA GH grade III  - inferior glide coupled with abd grade III pt supine  Scapular scouring, emphasis on subscap  - Scapular lift grade II    10 min ice to left shoulder    Education provided:   Sleeping position, sling positioning and shoulder pendulums, donning strategy for sling, review post surgery restrictions. Pt issued putty for , decreased edema pooling at elbow     Written Home Exercises Provided: yes.  Exercises were reviewed and Rosamaria was able to demonstrate them prior to the end  of the session.  Rosamaria demonstrated good  understanding of the education provided.      See EMR under Patient Instructions for exercises provided 7/26/2019.     Assessment     Pt with decreased over all tension below 110 deg, improved mobility following scapula rlift.  Cont tightness to IR/Scaption/Abd, improved ER.  Aggressive stretching was tolerated extremely well today, most discomfort noted with IR.  Was able to maintain tension longer that previous visit.  pt report more freedom of movement post treatment        Goals:  Short Term Goals: 3 weeks   1.  Pt independent with HEP for s/p RTC repair and biceps tenodesis with return demonstration  2.  Pt to be independent with donning and doffing surgical sling for proper pain management . Met 8/12/19  3.  Pt to increase PROM as tolerated at left shoulder to 90 deg shoulder flexion and 80 deg abduction without pain  4.  Pt to decrease overall pain at left shoulder to less than 4/10 after session. Met 8/12/19     Long Term Goals: 12 weeks   1.  Pt to been independent with discharge HEP without cues and proper form.  2.  Pt return to independent ADLs to include dressing, grooming with BUEs without compensation.  3.  Pt to increased AROM at elbow WNL and Left shoulder to 170 deg flexion and 160 deg abduction, 70 deg ER without pain or compensation  4.  Pt to increased L shoulder strength to 4+/5 into flexion extension, ER, IR   5  Pt to increase tolerance to perform 1 hour of household chores, ie cleaning without increased L shoulder pain  6. Pt to decrease pain at left shoulder to 3/10 after session for improved functional activities        Plan   Plan of care Certification: 7/26/2019 to 10/31/19    Cont with progressive and more aggressive PROM and GH mobs to stretch GH capsule.    Gerson Hubbard, PT

## 2019-11-04 ENCOUNTER — CLINICAL SUPPORT (OUTPATIENT)
Dept: REHABILITATION | Facility: HOSPITAL | Age: 59
End: 2019-11-04
Payer: COMMERCIAL

## 2019-11-04 DIAGNOSIS — I83.813 VARICOSE VEINS OF BILATERAL LOWER EXTREMITIES WITH PAIN: Primary | ICD-10-CM

## 2019-11-04 DIAGNOSIS — S46.012D TRAUMATIC INCOMPLETE TEAR OF LEFT ROTATOR CUFF, SUBSEQUENT ENCOUNTER: ICD-10-CM

## 2019-11-04 DIAGNOSIS — M25.612 DECREASED ROM OF LEFT SHOULDER: ICD-10-CM

## 2019-11-04 PROCEDURE — 97140 MANUAL THERAPY 1/> REGIONS: CPT | Mod: PO

## 2019-11-04 PROCEDURE — 97110 THERAPEUTIC EXERCISES: CPT | Mod: PO

## 2019-11-04 NOTE — PROGRESS NOTES
[facility]  Physical Therapy Note     Name: Rosamaria Agosto  Clinic Number: [id number]     Therapy Diagnosis: decreased ROM and pain at the shoulder s/p RTC  Physician: Arina Corea PA-C     Physician Orders: PT Eval and Treat   Medical Diagnosis from Referral: s/p RTC repair and biceps tenodesis  Evaluation Date: 7/26/2019  Authorization Period Expiration: 12/31/19  Plan of Care Expiration: 10/31/19  Visit # / Visits authorized: new script 2/20;   30/30 completed     Time In:  900 am  Time Out: 1030 am  Total Billable Time:  35minutes (TE-1, MT 1) + 15 min ice pack     Precautions: Standard, RTC protocol NO AROM at elbow joint s/p biceps tenodesis, PROM at elbow and shoulder but no excessive ER stopping at initial end feel per phase I protocol and follow up with MD for protocol     Subjective      Pt reports:  Continues to feel good mobility with decreased tightness and less use of sling. No issues with weekend.    Pain : 0/10 at beginning and end of session 1-2/10 mostly tightness  Location: Left shoulder    TREATMENT     Rosamaria received therapeutic exercises to develop ROM for 40 minutes including:       Prom progressing to AAROM shoulder abd, scaption, flexion,  Horizontal abd/ adduction, and extension stopping at first end feel to avoid biceps stress within protocol in supine and in 60 deg incline supported position 3 x10 reps total per movement, TC for decreased UT activity    Pulley with cues into scaption 2  x 1 min with mod cues for shoulder hiking.    Shoulder rows 3 x10 with YTB - NP  Shoulder extension 3 x10 with YTB-  NP      PROM shoulder scaption seated rolling ball, cues for decreased UT activity 3 x1 min  PROM shoulder scaption on solo machine with bar above shoulder height with cues for decreased UT activity 2 x1min NP     scapular depression with ER 3x15 wth 2 sec hold on edge of mat and pushing down into the mat  Scapular retraction 3 x15 with 2 sec  hold    Shoulder depression with 13# pulley with use of RUE to assist LUE to perform lat pulldown and LUE to hold in bottom position shoulder depression RUE assist to return to overhead position and relax at top of the range 2 x 15 with 10 sec stretch at top position for PROM    scaption AROM 3 x10 along hand rail of stairs followed by PROM stretch at end ROM 15-20 secs as tolerated on every 5th and 10th reps.  Same as above in abduction    AAROM scaption and  with pt seated arm on PT, PT apply humeral inferior glide with scaption motion - held static 3x 20 sec    Self AAROM in supine with cane into ER 3 x10 with PROM after every 5th and 10th rep as tolerated for 15-20 secs    AAROM with PT into scaption and then in abduction with cues for shoulder depression in supine and in tolerated incline position 0-90 deg 6 x10 each as tolerated towards progressive upright posture.from 10/28/19 scaption and serratus punches in supine with 4# dowel and PVC in upright positions      Self AAROM with use cane or PVC pipe and RUE in supine and then in standing: scaption with elbow flexed toward full extension at end rom of scaption - 3x 10 reps and hold for 15 secs with every 5 reps:   Self AAROM in supine scaption with elbow extending for full ROM 3 x10 with 15 second hold at end ROM every 5 reps    PROM at L shoulder 11/4/19  135 deg abd,  30 deg ext stopping at initial end feel  150 deg flex with cues to avoid shoulder hike, compensation.  50 deg with ER stopping at initial end feel  And   Elbow extension at  0 deg    AROM 11/4/19  100 deg in sitting scaption   90 deg abduction in sitting    Strength 10/21  scaption and abduction of left shoulder 3-/5  ER 3+/5    Manual therapy 20 min with STM./ MFR to pect minor, teres minor, and rhomboids, upper traps and levator, GH mobs grade I/II AP and lateral distraction for pain relief into empty end feel before restrictions and GH mobs grade III with distraction  in all direction for  increased ROM  - manual stretch pec minor  -subscap release with ER motion  - inferior humeral glide with inferior mobilization Grade II-III    15 min ice to left shoulder    Education provided:   Sleeping position, sling positioning and shoulder pendulums, donning strategy for sling, review post surgery restrictions. Pt issued putty for , decreased edema pooling at elbow     Written Home Exercises Provided: yes.  Exercises were reviewed and Rosamaria was able to demonstrate them prior to the end of the session.  Rosamaria demonstrated good  understanding of the education provided.      See EMR under Patient Instructions for exercises provided 7/26/2019.     Assessment     Pt with limited by subscapular and overall limited scapulothoracic motion to assist with scaption and abduction.  Pt continues to do well with self AROM and AAROM with use of PVC pipe and progress with use of 4# dowel and further strengthening up 60 deg of sitting and full upright sitting.  At 60 deg.  She is able to scaption thru 135 and abd 100 with use of PVC to assist in abd.  Pt continues with shoulder hiking during session include AROM and Self AAROM and with pulley today.  Pt with good progress still limited by hiking and scapulothoracic and teres minor and pect minor limiting GH mobility. Pt with progress increased in shoulder strength to 3-/5 with motion thru 75% of available ROM.    Goals:  Short Term Goals: 3 weeks   1.  Pt independent with HEP for s/p RTC repair and biceps tenodesis with return demonstration  2.  Pt to be independent with donning and doffing surgical sling for proper pain management . Met 8/12/19  3.  Pt to increase PROM as tolerated at left shoulder to 90 deg shoulder flexion and 80 deg abduction without pain  4.  Pt to decrease overall pain at left shoulder to less than 4/10 after session. Met 8/12/19     Long Term Goals: 12 weeks   1.  Pt to been independent with discharge HEP without cues and proper form.  2.  Pt  return to independent ADLs to include dressing, grooming with BUEs without compensation.  3.  Pt to increased AROM at elbow WNL and Left shoulder to 170 deg flexion and 160 deg abduction, 70 deg ER without pain or compensation  4.  Pt to increased L shoulder strength to 4+/5 into flexion extension, ER, IR   5  Pt to increase tolerance to perform 1 hour of household chores, ie cleaning without increased L shoulder pain  6. Pt to decrease pain at left shoulder to 3/10 after session for improved functional activities        Plan   Plan of care Certification: 7/26/2019 to 10/31/19 extend new script to 12/31/19    Cont with  New POC per request of new prescription of 20 additional visits   Shanel Carvalho, PT

## 2019-11-06 ENCOUNTER — CLINICAL SUPPORT (OUTPATIENT)
Dept: REHABILITATION | Facility: HOSPITAL | Age: 59
End: 2019-11-06
Payer: COMMERCIAL

## 2019-11-06 ENCOUNTER — TELEPHONE (OUTPATIENT)
Dept: SPORTS MEDICINE | Facility: CLINIC | Age: 59
End: 2019-11-06

## 2019-11-06 DIAGNOSIS — S46.012D TRAUMATIC INCOMPLETE TEAR OF LEFT ROTATOR CUFF, SUBSEQUENT ENCOUNTER: ICD-10-CM

## 2019-11-06 DIAGNOSIS — M25.612 DECREASED ROM OF LEFT SHOULDER: ICD-10-CM

## 2019-11-06 PROCEDURE — 97110 THERAPEUTIC EXERCISES: CPT | Mod: PO

## 2019-11-06 PROCEDURE — 97140 MANUAL THERAPY 1/> REGIONS: CPT | Mod: PO

## 2019-11-06 PROCEDURE — 97010 HOT OR COLD PACKS THERAPY: CPT | Mod: PO

## 2019-11-06 NOTE — TELEPHONE ENCOUNTER
Received STD form to fill out from [employer]. Form completed and faxed to them at [phone].    [facility]

## 2019-11-13 ENCOUNTER — CLINICAL SUPPORT (OUTPATIENT)
Dept: REHABILITATION | Facility: HOSPITAL | Age: 59
End: 2019-11-13
Payer: COMMERCIAL

## 2019-11-13 DIAGNOSIS — S46.012D TRAUMATIC INCOMPLETE TEAR OF LEFT ROTATOR CUFF, SUBSEQUENT ENCOUNTER: ICD-10-CM

## 2019-11-13 DIAGNOSIS — M25.612 DECREASED ROM OF LEFT SHOULDER: ICD-10-CM

## 2019-11-13 PROCEDURE — 97110 THERAPEUTIC EXERCISES: CPT | Mod: PO

## 2019-11-13 PROCEDURE — 97140 MANUAL THERAPY 1/> REGIONS: CPT | Mod: PO

## 2019-11-13 PROCEDURE — 97010 HOT OR COLD PACKS THERAPY: CPT | Mod: PO

## 2019-11-13 NOTE — PROGRESS NOTES
[facility]  Physical Therapy Note     Name: Rosamaria Agosto  Clinic Number: [id number]     Therapy Diagnosis: decreased ROM and pain at the shoulder s/p RTC  Physician: Arina Corea PA-C     Physician Orders: PT Eval and Treat   Medical Diagnosis from Referral: s/p RTC repair and biceps tenodesis  Evaluation Date: 7/26/2019  Authorization Period Expiration: 12/31/19  Plan of Care Expiration: 10/31/19  Visit # / Visits authorized: new script 2/20;   30/30 completed     Time In:  900 am  Time Out: 1030 am  Total Billable Time:  35minutes (TE-1, MT 1) + 15 min ice pack     Precautions: Standard, RTC protocol NO AROM at elbow joint s/p biceps tenodesis, PROM at elbow and shoulder but no excessive ER stopping at initial end feel per phase I protocol and follow up with MD for protocol     Subjective      Pt reports:  Continues to feel good mobility with decreased tightness and exercises are going good at home with increased motion.  Pain : 0/10 at beginning and end of session 1-2/10 mostly tightness  Location: Left shoulder    TREATMENT     Rosamaria received therapeutic exercises to develop ROM for 40 minutes including:       Prom progressing to AAROM shoulder abd, scaption, flexion,  Horizontal abd/ adduction, and extension stopping at first end feel to avoid biceps stress within protocol in supine and in 60 deg incline supported position 3 x10 reps total per movement, TC for decreased UT activity    Pulley with cues into scaption 2  x 1 min with mod cues for shoulder hiking.    Shoulder rows 3 x10 with YTB - NP  Shoulder extension 3 x10 with YTB-  NP      PROM shoulder scaption seated rolling ball, cues for decreased UT activity 3 x1 min  PROM shoulder scaption on solo machine with bar above shoulder height with cues for decreased UT activity 2 x1min NP     scapular depression with ER 3x15 wth 2 sec hold on edge of mat and pushing down into the mat  Scapular retraction 3 x15 with 2 sec  hold    Shoulder depression with 13# pulley with use of RUE to assist LUE to perform lat pulldown and LUE to hold in bottom position shoulder depression RUE assist to return to overhead position and relax at top of the range 2 x 15 with 10 sec stretch at top position for PROM    scaption  In seating position AROM 3 x10 along hand rail of stairs followed by PROM stretch at end ROM 15-20 secs as tolerated on every 5th and 10th reps.  Same as above in abduction    AAROM scaption and  with pt seated arm on PT, PT apply humeral inferior glide with scaption motion - held static 3x 20 sec    Self AAROM in supine with cane into ER 3 x10 with PROM after every 5th and 10th rep as tolerated for 15-20 secs    AAROM with PT into scaption and then in abduction with cues for shoulder depression in supine and in tolerated incline position 0-90 deg 6 x10 each as tolerated towards progressive upright posture.from 10/28/19 scaption and serratus punches in supine with 4# dowel and PVC in upright positions      Self AAROM with use cane or PVC pipe and RUE in supine and then in standing: scaption with elbow flexed toward full extension at end rom of scaption - 3x 10 reps and hold for 15 secs with every 5 reps:   Self AAROM in supine scaption with elbow extending for full ROM 3 x10 with 15 second hold at end ROM every 5 reps    PROM at L shoulder 11/13/19  135 deg abd,  30 deg ext stopping at initial end feel  150 deg flex with cues to avoid shoulder hike, compensation.  50 deg with ER stopping at initial end feel  And   Elbow extension at  0 deg    AROM 11/13/19  100 deg in sitting scaption   90 deg abduction in sitting    Strength 11/13/19  scaption and abduction of left shoulder 3-/5  ER 3+/5    Manual therapy 20 min with STM./ MFR to pect minor, teres minor, and rhomboids, upper traps and levator, GH mobs grade I/II AP and lateral distraction for pain relief into empty end feel before restrictions and GH mobs grade III with  distraction  in all direction for increased ROM  - manual stretch pec minor  -subscap release with ER motion  - inferior humeral glide with inferior mobilization Grade II-III    15 min ice to left shoulder    Education provided:   Sleeping position, sling positioning and shoulder pendulums, donning strategy for sling, review post surgery restrictions. Pt issued putty for , decreased edema pooling at elbow     Written Home Exercises Provided: yes.  Exercises were reviewed and Rosamaria was able to demonstrate them prior to the end of the session.  Rosamaria demonstrated good  understanding of the education provided.      See EMR under Patient Instructions for exercises provided 7/26/2019.     Assessment     Pt with limited most by subscapular and overall limited scapulothoracic motion to assist with scaption and abduction.  Pt continues to do well with self AROM and AAROM in all position but still has issues with abd more than scaption.  Pt with improved control of shoulder hiking during session.  Pt with good insight in plan of care and continued HEP.  PT increased aggressiveness with manual and she continues to maintain good ROM on arrival.    Goals:  Short Term Goals: 3 weeks   1.  Pt independent with HEP for s/p RTC repair and biceps tenodesis with return demonstration  2.  Pt to be independent with donning and doffing surgical sling for proper pain management . Met 8/12/19  3.  Pt to increase PROM as tolerated at left shoulder to 90 deg shoulder flexion and 80 deg abduction without pain  4.  Pt to decrease overall pain at left shoulder to less than 4/10 after session. Met 8/12/19     Long Term Goals: 12 weeks   1.  Pt to been independent with discharge HEP without cues and proper form.  2.  Pt return to independent ADLs to include dressing, grooming with BUEs without compensation.  3.  Pt to increased AROM at elbow WNL and Left shoulder to 170 deg flexion and 160 deg abduction, 70 deg ER without pain or  compensation  4.  Pt to increased L shoulder strength to 4+/5 into flexion extension, ER, IR   5  Pt to increase tolerance to perform 1 hour of household chores, ie cleaning without increased L shoulder pain  6. Pt to decrease pain at left shoulder to 3/10 after session for improved functional activities        Plan   Plan of care Certification: 7/26/2019 to 10/31/19 extend new script to 12/31/19    Cont with  New POC per request of new prescription of 20 additional visits   Shanel Carvalho, PT

## 2019-11-18 ENCOUNTER — CLINICAL SUPPORT (OUTPATIENT)
Dept: REHABILITATION | Facility: HOSPITAL | Age: 59
End: 2019-11-18
Payer: COMMERCIAL

## 2019-11-18 DIAGNOSIS — M25.612 DECREASED ROM OF LEFT SHOULDER: ICD-10-CM

## 2019-11-18 DIAGNOSIS — S46.012D TRAUMATIC INCOMPLETE TEAR OF LEFT ROTATOR CUFF, SUBSEQUENT ENCOUNTER: ICD-10-CM

## 2019-11-18 PROCEDURE — 97110 THERAPEUTIC EXERCISES: CPT | Mod: PO

## 2019-11-20 ENCOUNTER — CLINICAL SUPPORT (OUTPATIENT)
Dept: REHABILITATION | Facility: HOSPITAL | Age: 59
End: 2019-11-20
Payer: COMMERCIAL

## 2019-11-20 DIAGNOSIS — S46.012D TRAUMATIC INCOMPLETE TEAR OF LEFT ROTATOR CUFF, SUBSEQUENT ENCOUNTER: ICD-10-CM

## 2019-11-20 DIAGNOSIS — M25.612 DECREASED ROM OF LEFT SHOULDER: ICD-10-CM

## 2019-11-20 PROCEDURE — 97140 MANUAL THERAPY 1/> REGIONS: CPT | Mod: PO

## 2019-11-20 PROCEDURE — 97010 HOT OR COLD PACKS THERAPY: CPT | Mod: PO

## 2019-11-20 NOTE — PROGRESS NOTES
[facility]  Physical Therapy Note     Name: Rosamaria Agosto  Clinic Number: [id number]     Therapy Diagnosis: decreased ROM and pain at the shoulder s/p RTC  Physician: Arina Corea PA-C     Physician Orders: PT Eval and Treat   Medical Diagnosis from Referral: s/p RTC repair and biceps tenodesis  Evaluation Date: 7/26/2019  Authorization Period Expiration: 12/31/19  Plan of Care Expiration: 10/31/19  Visit # / Visits authorized: new script 3/20;   30/30 completed     Time In:  900 am  Time Out: 1030 am  Total Billable Time:  35minutes ( MT 2) + 15 min ice pack     Precautions: Standard, RTC protocol NO AROM at elbow joint s/p biceps tenodesis, PROM at elbow and shoulder but no excessive ER stopping at initial end feel per phase I protocol and follow up with MD for protocol     Subjective      Pt reports:  Continues to feel good mobility with decreased tightness and a little sore with mobilization last session but feels like to   Pain : 0/10 at beginning and end of session 1-2/10 mostly tightness  Location: Left shoulder    TREATMENT     Rosamaria received therapeutic exercises to develop ROM for 45 minutes including:       Prom progressing to AAROM shoulder abd, scaption, flexion,  Horizontal abd/ adduction, and extension stopping at first end feel to avoid biceps stress within protocol in supine and in 60 deg incline supported position 3 x10 reps total per movement, TC for decreased UT activity    Pulley with cues into scaption 2  x 1 min with mod cues for shoulder hiking.    Shoulder rows 3 x10 with YTB - NP  Shoulder extension 3 x10 with YTB-  NP      PROM shoulder scaption seated rolling ball, cues for decreased UT activity 3 x1 min  PROM shoulder scaption on solo machine with bar above shoulder height with cues for decreased UT activity 2 x1min NP     scapular depression with ER 3x15 wth 2 sec hold on edge of mat and pushing down into the mat  Scapular retraction 3 x15 with 2  sec hold    Shoulder depression with 13# pulley with use of RUE to assist LUE to perform lat pulldown and LUE to hold in bottom position shoulder depression RUE assist to return to overhead position and relax at top of the range 2 x 15 with 10 sec stretch at top position for PROM    scaption  In seating position AROM 3 x10 along hand rail of stairs followed by PROM stretch at end ROM 15-20 secs as tolerated on every 5th and 10th reps.  Same as above in abduction    AAROM scaption and  with pt seated arm on PT, PT apply humeral inferior glide with scaption motion - held static 3x 20 sec    Self AAROM in supine with cane into ER 3 x10 with PROM after every 5th and 10th rep as tolerated for 15-20 secs    AAROM with PT into scaption and then in abduction with cues for shoulder depression in supine and in tolerated incline position 0-90 deg 6 x10 each as tolerated towards progressive upright posture.from 10/28/19 scaption and serratus punches in supine with 4# dowel and PVC in upright positions      Self AAROM with use cane or PVC pipe and RUE in supine and then in standing: scaption with elbow flexed toward full extension at end rom of scaption - 3x 10 reps and hold for 15 secs with every 5 reps:   Self AAROM in supine scaption with elbow extending for full ROM 3 x10 with 15 second hold at end ROM every 5 reps    PROM at L shoulder 11/20/19  160 deg abd,  30 deg ext stopping at initial end feel  165 deg flex with cues to avoid shoulder hike, compensation.  60 deg with ER stopping at initial end feel  And   Elbow extension at  0 deg    AROM 11/20/19  120 deg in sitting scaption   100 deg abduction in sitting    Strength 11/20/19  scaption and abduction of left shoulder 3-/5  ER 3+/5    Manual therapy 30 min with STM./ MFR to pect minor, teres minor, and rhomboids, upper traps and levator, GH mobs grade I/II AP and lateral distraction for pain relief into empty end feel before restrictions and GH mobs grade Iv with  distraction  in all direction for increased ROM  - manual stretch pec minor  -subscap release with ER motion  - inferior humeral glide with inferior mobilization Grade IV    15 min ice to left shoulder    Education provided:   Sleeping position, sling positioning and shoulder pendulums, donning strategy for sling, review post surgery restrictions. Pt issued putty for , decreased edema pooling at elbow     Written Home Exercises Provided: yes.  Exercises were reviewed and Rosamaria was able to demonstrate them prior to the end of the session.  Rosamaria demonstrated good  understanding of the education provided.      See EMR under Patient Instructions for exercises provided 7/26/2019.     Assessment     Pt with limited most by subscapular and overall limited scapulothoracic motion to assist with scaption and abduction.  Pt continues to do well with self AROM and AAROM need cues for increase aggression with AAROM with PVC and pulley to work thru available ROM.  Pt with good carryover with shoulder ROM on arrival with last session and minimal guarding.  Pt with improved form and cues for increased AROM with only minimal assist needed during AAROM to complete thru available ROM.    Goals:  Short Term Goals: 3 weeks   1.  Pt independent with HEP for s/p RTC repair and biceps tenodesis with return demonstration. MET  2.  Pt to be independent with donning and doffing surgical sling for proper pain management . Met 8/12/19  3.  Pt to increase PROM as tolerated at left shoulder to 90 deg shoulder flexion and 80 deg abduction without pain. MET  4.  Pt to decrease overall pain at left shoulder to less than 4/10 after session. Met 8/12/19     Long Term Goals: 12 weeks   1.  Pt to been independent with discharge HEP without cues and proper form.  2.  Pt return to independent ADLs to include dressing, grooming with BUEs without compensation.  3.  Pt to increased AROM at elbow WNL and Left shoulder to 170 deg flexion and 160 deg  abduction, 70 deg ER without pain or compensation  4.  Pt to increased L shoulder strength to 4+/5 into flexion extension, ER, IR   5  Pt to increase tolerance to perform 1 hour of household chores, ie cleaning without increased L shoulder pain  6. Pt to decrease pain at left shoulder to 3/10 after session for improved functional activities        Plan   Plan of care Certification: 7/26/2019 to 10/31/19 extend new script to 12/31/19    Cont with  New POC.  Shanel Carvalho, PT

## 2019-11-21 NOTE — PROGRESS NOTES
[facility]  Physical Therapy Note     Name: Rosamaria Agosto  Clinic Number: [id number]     Therapy Diagnosis: decreased ROM and pain at the shoulder s/p RTC  Physician: Arina Corea PA-C     Physician Orders: PT Eval and Treat   Medical Diagnosis from Referral: s/p RTC repair and biceps tenodesis  Evaluation Date: 7/26/2019  Authorization Period Expiration: 12/31/19  Plan of Care Expiration: 10/31/19  Visit # / Visits authorized: 7/20 new script     Time In:  9 am  Time Out: 1020 am  Total Billable Time: 45 minutes (TE-1, MT 2) + 10 min ice pack     Precautions: Standard, RTC protocol NO AROM at elbow joint s/p biceps tenodesis, PROM at elbow and shoulder but no excessive ER stopping at initial end feel per phase I protocol and follow up with MD for protocol     Subjective     Pt reports:  No issues with left shoulder, was able to sleep on it without onset of pain, feels she is slowly getting a little looser    Pain : 1- 2/10 at beginning and end of session 1-2/10 mostly tightness  Location: Left shoulder    Objective: Post treatment 179 deg scaption, pain noted to 8/10  ER PROM to 80 deg with 8/10 pain    TREATMENT     Rosamaria received therapeutic exercises to develop ROM for 35 minutes including:       Prom progressing to AAROM shoulder abd, scaption, flexion,  Horizontal abd/ adduction, and extension stopping at first end feel to avoid biceps stress within protocol in supine and in 60 deg incline supported position 3 x10 reps total per movement, TC for decreased UT activity    Pulley with cues into scaption x 1 min discontinued due to shoulder hiking even with cues. NP    Shoulder rows 3 x10 with YTB - NP  Shoulder extension 3 x10 with YTB-  NP      PROM shoulder scaption seated rolling ball, cues for decreased UT activity 3 x1 min  PROM shoulder scaption on solo machine with bar above shoulder height with cues for decreased UT activity 2 x1min NP     scapular depression with ER  3x15 wth 2 sec hold on edge of mat and pushing down into the mat  Scapular retraction 3 x15 with 2 sec hold    scaption AROM 3 x10 along hand rail of stairs followed by PROM stretch at end ROM 15-20 secs as tolerated on every 5th and 10th reps.  Same as above in abduction    AAROM scaption and  with pt seated arm on PT, PT apply humeral inferior glide with scaption motion - held static 3x 20 sec    Self AAROM in supine with cane into ER 3 x10 with PROM after every 5th and 10th rep as tolerated for 15-20 secs    AAROM with PT into scaption and then in abduction with cues for shoulder depression in supine and in tolerated incline position 0-90 deg 6 x10 each as tolerated towards progressive upright posture.    Self AAROM with use cane or PVC pipe and RUE in supine scaption with elbow flexed toward full extension at end rom of scaption - 5 x 10 reps and hold for 15 secs with every 5 reps    Self AAROM in supine scaption with elbow extending for full ROM 3 x10 with 15 second hold at end ROM every 5 reps      Manual therapy 20 min with STM./ MFR to pect minor, teres minor, and rhomboids, upper traps and levator, GH mobs grade I/II AP and lateral distraction for pain relief into empty end feel before restrictions and GH mobs grade III with distraction  in all direction for increased ROM  - manual stretch pec minor  -subscap release with ER motion  - inferior humeral glide with inferior mobilization Grade II-III  - inferior glide coupled with scaption pt in seated position   - Inferior glide coupled with ER pt supine and elevated  -PA GH grade III  - inferior glide coupled with abd grade III pt supine  Scapular scouring, emphasis on subscap  - Scapular lift grade II    10 min ice to left shoulder    Education provided:   Sleeping position, sling positioning and shoulder pendulums, donning strategy for sling, review post surgery restrictions. Pt issued putty for , decreased edema pooling at elbow     Written Home  Exercises Provided: yes.  Exercises were reviewed and Rosamaria was able to demonstrate them prior to the end of the session.  Rosamaria demonstrated good  understanding of the education provided.      See EMR under Patient Instructions for exercises provided 7/26/2019.     Assessment     Pt with decreased over all tension below 121 deg, improved mobility following scapular lift, subscap release.  Cont tightness to IR/Scaption/Abd, improved ER.  Aggressive stretching was tolerated extremely well today, most discomfort noted with ER, able to increase range consirably to scaption.  Was able to maintain tension longer that previous visit.  pt report more freedom of movement post treatment        Goals:  Short Term Goals: 3 weeks   1.  Pt independent with HEP for s/p RTC repair and biceps tenodesis with return demonstration  2.  Pt to be independent with donning and doffing surgical sling for proper pain management . Met 8/12/19  3.  Pt to increase PROM as tolerated at left shoulder to 90 deg shoulder flexion and 80 deg abduction without pain  4.  Pt to decrease overall pain at left shoulder to less than 4/10 after session. Met 8/12/19     Long Term Goals: 12 weeks   1.  Pt to been independent with discharge HEP without cues and proper form.  2.  Pt return to independent ADLs to include dressing, grooming with BUEs without compensation.  3.  Pt to increased AROM at elbow WNL and Left shoulder to 170 deg flexion and 160 deg abduction, 70 deg ER without pain or compensation  4.  Pt to increased L shoulder strength to 4+/5 into flexion extension, ER, IR   5  Pt to increase tolerance to perform 1 hour of household chores, ie cleaning without increased L shoulder pain  6. Pt to decrease pain at left shoulder to 3/10 after session for improved functional activities        Plan   Plan of care Certification: 7/26/2019 to 10/31/19    Cont with progressive and more aggressive PROM and GH mobs to stretch GH capsule.    Gerson Hubbard,  PT

## 2019-11-25 ENCOUNTER — CLINICAL SUPPORT (OUTPATIENT)
Dept: REHABILITATION | Facility: HOSPITAL | Age: 59
End: 2019-11-25
Payer: COMMERCIAL

## 2019-11-25 DIAGNOSIS — M25.612 DECREASED ROM OF LEFT SHOULDER: ICD-10-CM

## 2019-11-25 DIAGNOSIS — S46.012D TRAUMATIC INCOMPLETE TEAR OF LEFT ROTATOR CUFF, SUBSEQUENT ENCOUNTER: ICD-10-CM

## 2019-11-25 PROCEDURE — 97140 MANUAL THERAPY 1/> REGIONS: CPT | Mod: PO

## 2019-11-25 PROCEDURE — 97010 HOT OR COLD PACKS THERAPY: CPT | Mod: PO

## 2019-11-25 NOTE — PROGRESS NOTES
[facility]  Physical Therapy Note     Name: Rosamaria Agosto  Clinic Number: [id number]     Therapy Diagnosis: decreased ROM and pain at the shoulder s/p RTC  Physician: Arina Corea PA-C     Physician Orders: PT Eval and Treat   Medical Diagnosis from Referral: s/p RTC repair and biceps tenodesis  Evaluation Date: 7/26/2019  Authorization Period Expiration: 12/31/19  Plan of Care Expiration: 10/31/19  Visit # / Visits authorized: new script 3/20;   30/30 completed     Time In:  916 am  Time Out: 1030 am  Total Billable Time:  35minutes ( MT 2) + 15 min ice pack     Precautions: Standard, RTC protocol NO AROM at elbow joint s/p biceps tenodesis, PROM at elbow and shoulder but no excessive ER stopping at initial end feel per phase I protocol and follow up with MD for protocol     Subjective      Pt reports:  Continues to feel good mobility with decreased tightness and a little sore with mobilization last session but feels like to   Pain : 0/10 at beginning and end of session 1-2/10 mostly tightness  Location: Left shoulder    TREATMENT     Rosamaria received therapeutic exercises to develop ROM for 29 minutes including:       Prom progressing to AAROM shoulder abd, scaption, flexion,  Horizontal abd/ adduction, and extension stopping at first end feel to avoid biceps stress within protocol in supine and in 60 deg incline supported position 3 x10 reps total per movement, TC for decreased UT activity    Pulley with cues into scaption 2  x 1 min with mod cues for shoulder hiking nd proper plane of motion..    Shoulder rows 3 x10 with YTB - NP  Shoulder extension 3 x10 with YTB-  NP      PROM shoulder scaption seated rolling ball, cues for decreased UT activity 3 x1 min  PROM shoulder scaption on solo machine with bar above shoulder height with cues for decreased UT activity 2 x1min NP     scapular depression with ER 3x15 wth 2 sec hold on edge of mat and pushing down into the  mat  Scapular retraction 3 x15 with 2 sec hold    Shoulder depression with 13# pulley with use of RUE to assist LUE to perform lat pulldown and LUE to hold in bottom position shoulder depression RUE assist to return to overhead position and relax at top of the range 2 x 15 with 10 sec stretch at top position for PROM    scaption  In seating position AROM 3 x10 along hand rail of stairs followed by PROM stretch at end ROM 15-20 secs as tolerated on every 5th and 10th reps.  Same as above in abduction    AAROM scaption and  with pt seated arm on PT, PT apply humeral inferior glide with scaption motion - held static 3x 20 sec    AAROM with PT into scaption and then in abduction with cues for shoulder depression in supine and in tolerated incline position 0-90 deg 3 x10 each as tolerated towards progressive upright posture.from 10/28/19 scaption and serratus punches in supine with 4# dowel and PVC in upright positions    Self AAROM with use cane or PVC pipe and RUE in supine and then in standing: scaption with elbow flexed toward full extension at end rom of scaption - 2x 10 reps:   Self AAROM in supine scaption with elbow extending for full ROM 2 x10     PROM at L shoulder 11/20/19  160 deg abd,  30 deg ext stopping at initial end feel  165 deg flex with cues to avoid shoulder hike, compensation.  60 deg with ER stopping at initial end feel  And   Elbow extension at  0 deg    AROM 11/25/19 at 75 deg upright sitting  130 deg in sitting scaption   120 deg abduction in sitting    Strength 11/20/19  scaption and abduction of left shoulder 3-/5  ER 3+/5    Manual therapy 30 min with STM./ MFR to pect minor, teres minor, and rhomboids, upper traps and levator, GH mobs grade I/II AP and lateral distraction for pain relief into empty end feel before restrictions and GH mobs grade Iv with distraction  in all direction for increased ROM  - manual stretch pec minor  -subscap release with ER motion  - inferior humeral glide with  inferior mobilization Grade IV    15 min ice to left shoulder    Education provided:   Sleeping position, sling positioning and shoulder pendulums, donning strategy for sling, review post surgery restrictions. Pt issued putty for , decreased edema pooling at elbow     Written Home Exercises Provided: yes.  Exercises were reviewed and Rosamaria was able to demonstrate them prior to the end of the session.  Rosamaria demonstrated good  understanding of the education provided.      See EMR under Patient Instructions for exercises provided 7/26/2019.     Assessment     Pt with improved  subscapular andl limited GH motion today.. Pt with good mechanics with manual cues for scaption and abduction on AAROM with pulley and PVC with improved motion thru the plane of motion.  Pt only needs min assist to achieve AAROM from 90 to 160 deg with good form and minimal hiking.  She is able to achieve    Goals:  Short Term Goals: 3 weeks   1.  Pt independent with HEP for s/p RTC repair and biceps tenodesis with return demonstration. MET  2.  Pt to be independent with donning and doffing surgical sling for proper pain management . Met 8/12/19  3.  Pt to increase PROM as tolerated at left shoulder to 90 deg shoulder flexion and 80 deg abduction without pain. MET  4.  Pt to decrease overall pain at left shoulder to less than 4/10 after session. Met 8/12/19     Long Term Goals: 12 weeks   1.  Pt to been independent with discharge HEP without cues and proper form.  2.  Pt return to independent ADLs to include dressing, grooming with BUEs without compensation.  3.  Pt to increased AROM at elbow WNL and Left shoulder to 170 deg flexion and 160 deg abduction, 70 deg ER without pain or compensation  4.  Pt to increased L shoulder strength to 4+/5 into flexion extension, ER, IR   5  Pt to increase tolerance to perform 1 hour of household chores, ie cleaning without increased L shoulder pain  6. Pt to decrease pain at left shoulder to 3/10 after  session for improved functional activities        Plan   Plan of care Certification: 7/26/2019 to 10/31/19 extend new script to 12/31/19    Cont with  New POC.  Shanel Carvalho, PT

## 2019-11-27 ENCOUNTER — CLINICAL SUPPORT (OUTPATIENT)
Dept: REHABILITATION | Facility: HOSPITAL | Age: 59
End: 2019-11-27
Payer: COMMERCIAL

## 2019-11-27 DIAGNOSIS — M25.612 DECREASED ROM OF LEFT SHOULDER: ICD-10-CM

## 2019-11-27 DIAGNOSIS — S46.012D TRAUMATIC INCOMPLETE TEAR OF LEFT ROTATOR CUFF, SUBSEQUENT ENCOUNTER: ICD-10-CM

## 2019-11-27 PROCEDURE — 97110 THERAPEUTIC EXERCISES: CPT | Mod: PO

## 2019-11-27 PROCEDURE — 97010 HOT OR COLD PACKS THERAPY: CPT | Mod: PO

## 2019-11-27 PROCEDURE — 97140 MANUAL THERAPY 1/> REGIONS: CPT | Mod: PO

## 2019-11-27 NOTE — PROGRESS NOTES
[facility]  Physical Therapy Note     Name: Rosamaria Agosto  Clinic Number: [id number]     Therapy Diagnosis: decreased ROM and pain at the shoulder s/p RTC  Physician: Arina Corea PA-C     Physician Orders: PT Eval and Treat   Medical Diagnosis from Referral: s/p RTC repair and biceps tenodesis  Evaluation Date: 7/26/2019  Authorization Period Expiration: 12/31/19  Plan of Care Expiration: 10/31/19  Visit # / Visits authorized: new script 4/20;   30/30 completed     Time In:  900 am  Time Out: 1030 am  Total Billable Time:  45minutes ( MT 2, TE1) + 15 min ice pack     Precautions: Standard, RTC protocol NO AROM at elbow joint s/p biceps tenodesis, PROM at elbow and shoulder but no excessive ER stopping at initial end feel per phase I protocol and follow up with MD for protocol     Subjective      Pt reports:  Continues to feel good mobility with decreased tightness and no soreness with mobilization last session.  Pain : 0/10 at beginning and end of session 0-1/10 mostly tightness  Location: Left shoulder    TREATMENT     Rosamaria received therapeutic exercises to develop ROM for 50 minutes including:       Prom progressing to AAROM shoulder abd, scaption, flexion,  Horizontal abd/ adduction, and extension stopping at first end feel to avoid biceps stress within protocol in supine and in 60 deg incline supported position 3 x10 reps total per movement, TC for decreased UT activity    Pulley with cues into scaption 2  x 1 min with mod cues for shoulder hiking nd proper plane of motion..    Shoulder rows 3 x10 with YTB - NP  Shoulder extension 3 x10 with YTB-  NP      PROM shoulder scaption seated rolling ball, cues for decreased UT activity 3 x1 min  PROM shoulder scaption on solo machine with bar above shoulder height with cues for decreased UT activity 2 x1min NP     scapular depression with ER 3x15 wth 2 sec hold on edge of mat and pushing down into the mat  Scapular retraction 3  x15 with 2 sec hold    Shoulder depression with 13# pulley with use of RUE to assist LUE to perform lat pulldown and LUE to hold in bottom position shoulder depression RUE assist to return to overhead position and relax at top of the range 2 x 15 with 10 sec stretch at top position for PROM    scaption  In seating position AROM 3 x10 along hand rail of stairs followed by PROM stretch at end ROM 15-20 secs as tolerated on every 5th and 10th reps.  Same as above in abduction    AAROM scaption and  with pt seated arm on PT, PT apply humeral inferior glide with scaption motion - held static 3x 20 sec    AAROM with PT into scaption and then in abduction with cues for shoulder depression in supine and in tolerated incline position 0-90 deg 3 x10 each as tolerated towards progressive upright posture.from 10/28/19 scaption and serratus punches in supine with 4# dowel and PVC in upright positions    Self AAROM with use cane or PVC pipe and RUE in supine and then in standing: scaption with elbow flexed toward full extension at end rom of scaption - 2x 10 reps:   Self AAROM in supine scaption with elbow extending for full ROM 2 x10     PROM at L shoulder 11/20/19  160 deg abd,  30 deg ext stopping at initial end feel  165 deg flex with cues to avoid shoulder hike, compensation.  60 deg with ER stopping at initial end feel  And   Elbow extension at  0 deg    AROM 11/25/19 at 75 deg upright sitting  130 deg in sitting scaption   120 deg abduction in sitting    Strength 11/20/19  scaption and abduction of left shoulder 3-/5  ER 3+/5    Manual therapy 25 min with STM./ MFR to pect minor, teres minor, and rhomboids, upper traps and levator, GH mobs grade I/II AP and lateral distraction for pain relief into empty end feel before restrictions and GH mobs grade Iv with distraction  in all direction for increased ROM  - manual stretch pec minor  -subscap release with ER motion  - inferior humeral glide with inferior mobilization Grade  IV    15 min ice to left shoulder    Education provided:   Sleeping position, sling positioning and shoulder pendulums, donning strategy for sling, review post surgery restrictions. Pt issued putty for , decreased edema pooling at elbow     Written Home Exercises Provided: yes.  Exercises were reviewed and Rosamaria was able to demonstrate them prior to the end of the session.  Rosamaria demonstrated good  understanding of the education provided.      See EMR under Patient Instructions for exercises provided 7/26/2019.     Assessment     Pt with improved  subscapular andl limited GH motion today and good carryover with decreased tightness and guarding of teres minor and subscapularis overall with continued decreased guarding and resting tone.. Pt with good mechanics with mod manual cues for scaption and abduction on AAROM with pulley and PVC with improved motion thru the plane of motion in 70 deg of upright sitting near full upright during session.  Pt with good aggressiveness with AAROM during session with PVC with mild improvement with shoulder hiking overall.      Goals:  Short Term Goals: 3 weeks   1.  Pt independent with HEP for s/p RTC repair and biceps tenodesis with return demonstration. MET  2.  Pt to be independent with donning and doffing surgical sling for proper pain management . Met 8/12/19  3.  Pt to increase PROM as tolerated at left shoulder to 90 deg shoulder flexion and 80 deg abduction without pain. MET  4.  Pt to decrease overall pain at left shoulder to less than 4/10 after session. Met 8/12/19     Long Term Goals: 12 weeks   1.  Pt to been independent with discharge HEP without cues and proper form.  2.  Pt return to independent ADLs to include dressing, grooming with BUEs without compensation.  3.  Pt to increased AROM at elbow WNL and Left shoulder to 170 deg flexion and 160 deg abduction, 70 deg ER without pain or compensation  4.  Pt to increased L shoulder strength to 4+/5 into flexion  extension, ER, IR   5  Pt to increase tolerance to perform 1 hour of household chores, ie cleaning without increased L shoulder pain  6. Pt to decrease pain at left shoulder to 3/10 after session for improved functional activities        Plan   Plan of care Certification: 7/26/2019 to 10/31/19 extend new script to 12/31/19    Cont with  New POC.  Sahnel Carvalho, PT

## 2019-12-02 ENCOUNTER — CLINICAL SUPPORT (OUTPATIENT)
Dept: REHABILITATION | Facility: HOSPITAL | Age: 59
End: 2019-12-02
Payer: COMMERCIAL

## 2019-12-02 ENCOUNTER — PATIENT OUTREACH (OUTPATIENT)
Dept: ADMINISTRATIVE | Facility: OTHER | Age: 59
End: 2019-12-02

## 2019-12-02 DIAGNOSIS — M25.612 DECREASED ROM OF LEFT SHOULDER: ICD-10-CM

## 2019-12-02 DIAGNOSIS — S46.012D TRAUMATIC INCOMPLETE TEAR OF LEFT ROTATOR CUFF, SUBSEQUENT ENCOUNTER: ICD-10-CM

## 2019-12-02 PROCEDURE — 97010 HOT OR COLD PACKS THERAPY: CPT | Mod: PO

## 2019-12-02 PROCEDURE — 97110 THERAPEUTIC EXERCISES: CPT | Mod: PO

## 2019-12-02 PROCEDURE — 97140 MANUAL THERAPY 1/> REGIONS: CPT | Mod: PO

## 2019-12-02 NOTE — PROGRESS NOTES
[facility]  Physical Therapy Note     Name: Rosamaria Agosto  Clinic Number: [id number]     Therapy Diagnosis: decreased ROM and pain at the shoulder s/p RTC  Physician: Arina Corea PA-C     Physician Orders: PT Eval and Treat   Medical Diagnosis from Referral: s/p RTC repair and biceps tenodesis  Evaluation Date: 7/26/2019  Authorization Period Expiration: 12/31/19  Plan of Care Expiration: 10/31/19 New plan of care extended to 12/31/19  Visit # / Visits authorized: new script 5/20;   30/30 completed     Time In:  910 am  Time Out: 1020 am  Total Billable Time:  30 minutes ( MT 1, TE1) + 10 min ice pack     Precautions: Standard, RTC protocol NO AROM at elbow joint s/p biceps tenodesis, PROM at elbow and shoulder but no excessive ER stopping at initial end feel per phase I protocol and follow up with MD for protocol     Subjective      Pt reports:  Continues to feel good mobility with just tightness and no soreness this morning from the weekend  .  Pain : 0/10 at beginning and end of session 0-1/10 mostly tightness  Location: Left shoulder    TREATMENT     Rosamaria received therapeutic exercises to develop ROM for 40 minutes including:       Prom progressing to AAROM shoulder abd, scaption, flexion,  Horizontal abd/ adduction, and extension stopping at first end feel to avoid biceps stress within protocol in supine and in 60 deg incline supported position 3 x10 reps total per movement, TC for decreased UT activity    Pulley with cues into scaption 2  x 1 min with mod cues for shoulder hiking nd proper plane of motion..    Shoulder rows 3 x10 with YTB - NP  Shoulder extension 3 x10 with YTB-  NP      PROM shoulder scaption seated rolling ball, cues for decreased UT activity 3 x1 min  PROM shoulder scaption on solo machine with bar above shoulder height with cues for decreased UT activity 2 x1min NP     scapular depression with ER 3x15 wth 2 sec hold on edge of mat and pushing down  into the mat  Scapular retraction 3 x15 with 2 sec hold    Shoulder depression with 13# pulley with use of RUE to assist LUE to perform lat pulldown and LUE to hold in bottom position shoulder depression RUE assist to return to overhead position and relax at top of the range 2 x 15 with 10 sec stretch at top position for PROM    scaption  In seating position AROM 3 x10 along hand rail of stairs followed by PROM stretch at end ROM 15-20 secs as tolerated on every 5th and 10th reps.  Same as above in abduction    AAROM scaption and  with pt seated arm on PT, PT apply humeral inferior glide with scaption motion - held static 3x 20 sec    AAROM with PT into scaption and then in abduction with cues for shoulder depression in supine and in tolerated incline position 0-90 deg 3 x10 each as tolerated towards progressive upright posture.from 10/28/19 scaption and serratus punches in supine with 4# dowel and PVC in upright positions    Self AAROM with use cane or PVC pipe and RUE in supine and then in standing: scaption with elbow flexed toward full extension at end rom of scaption - 2x 10 reps:   Self AAROM in supine scaption with elbow extending for full ROM 2 x10     AA/PROM at L shoulder 12/2/19  165 deg abd,  30 deg ext stopping at initial end feel  170 deg flex with cues to avoid shoulder hike, compensation.  60 deg with ER stopping at initial end feel  And   Elbow extension at  0 deg    AROM 12/2/19 at 75 deg upright sitting/ standing  130 deg in sitting scaption / 110  120 deg abduction in sitting/ 100    Strength 11/20/19  scaption and abduction of left shoulder 3-/5  ER 3+/5    Manual therapy 25 min with STM./ MFR to pect minor, teres minor, and rhomboids, upper traps and levator, GH mobs grade I/II AP and lateral distraction for pain relief into empty end feel before restrictions and GH mobs grade Iv with distraction  in all direction for increased ROM  - manual stretch pec minor  -subscap release with ER motion  -  inferior humeral glide with inferior mobilization Grade IV    15 min ice to left shoulder    Education provided:   Sleeping position, sling positioning and shoulder pendulums, donning strategy for sling, review post surgery restrictions. Pt issued putty for , decreased edema pooling at elbow     Written Home Exercises Provided: yes.  Exercises were reviewed and Rosamaria was able to demonstrate them prior to the end of the session.  Rosamaria demonstrated good  understanding of the education provided.      See EMR under Patient Instructions for exercises provided 7/26/2019.     Assessment     Pt with improved subscapular andl limited GH motion today and good carryover with decreased tightness as session continues.  Pt cued for simba and shoulder hiking with exercise.  Pt cued for AAROM thru available ROM with manual cueing and assist achieving the last 10 deg of motion scaption and abduction.  Pt with improved motion in proper planes of motion and decreased overall compensation with trunk rotation and or side flexion. Pt with 3-/5 thru  deg of motion in scaption and abduction and 3/5 thru end/inner ROM for scaption and abduction.  Early shoulder hiking still derails GH motion with tightened shoulder compartment. Pt with minimal progression with ADLs and independence with dressing but improved with cooking and cleaning in the home.     Goals:  Short Term Goals: 3 weeks   1.  Pt independent with HEP for s/p RTC repair and biceps tenodesis with return demonstration. MET  2.  Pt to be independent with donning and doffing surgical sling for proper pain management . Met 8/12/19  3.  Pt to increase PROM as tolerated at left shoulder to 90 deg shoulder flexion and 80 deg abduction without pain. MET  4.  Pt to decrease overall pain at left shoulder to less than 4/10 after session. Met 8/12/19     Long Term Goals: 12 weeks   1.  Pt to been independent with discharge HEP without cues and proper form.  2.  Pt return to  independent ADLs to include dressing, grooming with BUEs without compensation.  3.  Pt to increased AROM at elbow WNL and Left shoulder to 170 deg flexion and 160 deg abduction, 70 deg ER without pain or compensation  4.  Pt to increased L shoulder strength to 4+/5 into flexion extension, ER, IR   5  Pt to increase tolerance to perform 1 hour of household chores, ie cleaning without increased L shoulder pain  6. Pt to decrease pain at left shoulder to 3/10 after session for improved functional activities. MET        Plan   Plan of care Certification: 7/26/2019 to 10/31/19 extend new script to 12/31/19    Cont with  New POC.  Shanel Carvalho, PT

## 2019-12-03 ENCOUNTER — OFFICE VISIT (OUTPATIENT)
Dept: SPORTS MEDICINE | Facility: CLINIC | Age: 59
End: 2019-12-03
Payer: COMMERCIAL

## 2019-12-03 VITALS
WEIGHT: 201 LBS | BODY MASS INDEX: 29.77 KG/M2 | DIASTOLIC BLOOD PRESSURE: 81 MMHG | HEART RATE: 72 BPM | SYSTOLIC BLOOD PRESSURE: 124 MMHG | HEIGHT: 69 IN

## 2019-12-03 DIAGNOSIS — M25.612 SHOULDER STIFFNESS, LEFT: Primary | ICD-10-CM

## 2019-12-03 PROCEDURE — 3079F DIAST BP 80-89 MM HG: CPT | Mod: CPTII,S$GLB,, | Performed by: ORTHOPAEDIC SURGERY

## 2019-12-03 PROCEDURE — 3074F SYST BP LT 130 MM HG: CPT | Mod: CPTII,S$GLB,, | Performed by: ORTHOPAEDIC SURGERY

## 2019-12-03 PROCEDURE — 99213 OFFICE O/P EST LOW 20 MIN: CPT | Mod: S$GLB,,, | Performed by: ORTHOPAEDIC SURGERY

## 2019-12-03 PROCEDURE — 99999 PR PBB SHADOW E&M-EST. PATIENT-LVL III: ICD-10-PCS | Mod: PBBFAC,,, | Performed by: ORTHOPAEDIC SURGERY

## 2019-12-03 PROCEDURE — 3074F PR MOST RECENT SYSTOLIC BLOOD PRESSURE < 130 MM HG: ICD-10-PCS | Mod: CPTII,S$GLB,, | Performed by: ORTHOPAEDIC SURGERY

## 2019-12-03 PROCEDURE — 99999 PR PBB SHADOW E&M-EST. PATIENT-LVL III: CPT | Mod: PBBFAC,,, | Performed by: ORTHOPAEDIC SURGERY

## 2019-12-03 PROCEDURE — 3079F PR MOST RECENT DIASTOLIC BLOOD PRESSURE 80-89 MM HG: ICD-10-PCS | Mod: CPTII,S$GLB,, | Performed by: ORTHOPAEDIC SURGERY

## 2019-12-03 PROCEDURE — 3008F BODY MASS INDEX DOCD: CPT | Mod: CPTII,S$GLB,, | Performed by: ORTHOPAEDIC SURGERY

## 2019-12-03 PROCEDURE — 99213 PR OFFICE/OUTPT VISIT, EST, LEVL III, 20-29 MIN: ICD-10-PCS | Mod: S$GLB,,, | Performed by: ORTHOPAEDIC SURGERY

## 2019-12-03 PROCEDURE — 3008F PR BODY MASS INDEX (BMI) DOCUMENTED: ICD-10-PCS | Mod: CPTII,S$GLB,, | Performed by: ORTHOPAEDIC SURGERY

## 2019-12-03 NOTE — PROGRESS NOTES
CC: F/u L Shoulder    DATE OF PROCEDURE: 7/24/2019   OPERATION:   Left  1. Shoulder arthroscopic rotator cuff repair, transosseous equivalent double row   2. Shoulder arthroscopic biceps tenodesis   3. Shoulder arthroscopic extensive debridement (anterior, posterior glenohumeral joint, subacromial space)    4. Shoulder arthroscopic subacromial decompression      Rosamaria Agosto reports to be doing well 4 mos s/p the above mentioned procedure. Going to PT 2xWeek at the [facility] location, reports slow but steady progress. Typical pre-op complaint now gone. Reports some muscular soreness with associated stiffness. Taking flexeril which has been helping in therapy. Taking naproxen PRN.      PAST MEDICAL HISTORY:   Past Medical History:   Diagnosis Date    Abnormal Pap smear of cervix yrs ago    Arthritis     History of uterine fibroid     Hyperlipidemia     Hypertension     Shoulder injury     lt    Sinus problem     Sinusitis        PAST SURGICAL HISTORY:  Past Surgical History:   Procedure Laterality Date    ARTHROSCOPIC REPAIR OF ROTATOR CUFF OF SHOULDER Left 7/24/2019    Procedure: REPAIR, ROTATOR CUFF, ARTHROSCOPIC;  Surgeon: ASMITA Soto MD;  Location: [address];  Service: Orthopedics;  Laterality: Left;    ARTHROSCOPY OF SHOULDER WITH DECOMPRESSION OF SUBACROMIAL SPACE Left 7/24/2019    Procedure: ARTHROSCOPY, SHOULDER, WITH SUBACROMIAL SPACE DECOMPRESSION;  Surgeon: ASMITA Soto MD;  Location: [address];  Service: Orthopedics;  Laterality: Left;    BREAST BIOPSY  24-25 years old    BREAST SURGERY      CYSTOSCOPY N/A 6/24/2019    Procedure: CYSTOSCOPY;  Surgeon: Anson Ellison MD;  [facility];  Service: OB/GYN;  Laterality: N/A;    ENCEPHALOCELE REPAIR  46    HYSTERECTOMY  93'-95'    ovaries remain    NASAL SINUS SURGERY      ROBOT-ASSISTED LAPAROSCOPIC ABDOMINAL SACROCOLPOPEXY USING DA MAC XI N/A 6/24/2019    Procedure: XI ROBOTIC SACROCOLPOPEXY,  ABDOMINAL;  Surgeon: Anson Ellison MD;  Location: [facility] OR;  Service: OB/GYN;  Laterality: N/A;    SHOULDER ARTHROSCOPY Left 7/24/2019    Procedure: BICEPS TENODESIS;  Surgeon: ASMITA Soto MD;  Location: [facility] OR 2ND FLR;  Service: Orthopedics;  Laterality: Left;    TUBAL LIGATION      VAGINAL DELIVERY         FAMILY HISTORY:  Family History   Problem Relation Age of Onset    Stroke Maternal Grandmother     Diabetes Mother     Hypertension Mother     Diabetes Sister     Diabetes Sister     Breast cancer Neg Hx     Colon cancer Neg Hx     Ovarian cancer Neg Hx        MEDICATIONS:    Current Outpatient Medications:     acetaminophen (TYLENOL) 650 MG TbSR, Take 1,300 mg by mouth as needed., Disp: , Rfl:     atorvastatin (LIPITOR) 80 MG tablet, Take 1 tablet (80 mg total) by mouth nightly., Disp: 30 tablet, Rfl: 11    budesonide (PULMICORT) 0.5 mg/2 mL nebulizer solution, inhale 1 nebules per nebulizer twice daily, for use in saline irrigation as directed, Disp: , Rfl: 1    estradiol (ESTRACE) 0.01 % (0.1 mg/gram) vaginal cream, Place 1 g vaginally every Mon, Wed, Fri., Disp: 1 Tube, Rfl: 11    flavoxATE (URISPAS) 100 mg Tab, Take 1 tablet (100 mg total) by mouth 3 (three) times daily as needed., Disp: 90 tablet, Rfl: 3    flavoxATE (URISPAS) 100 mg Tab, Take 1 tablet (100 mg total) by mouth 3 (three) times daily as needed., Disp: 90 tablet, Rfl: 3    hydroCHLOROthiazide (HYDRODIURIL) 25 MG tablet, Take 1 tablet (25 mg total) by mouth once daily., Disp: 90 tablet, Rfl: 3    HYDROcodone-acetaminophen (NORCO) 5-325 mg per tablet, Take 1 tablet by mouth every 6 (six) hours as needed for Pain., Disp: 28 tablet, Rfl: 0    ibuprofen (ADVIL,MOTRIN) 600 MG tablet, Take 1 tablet (600 mg total) by mouth every 6 (six) hours as needed for Pain., Disp: 30 tablet, Rfl: 0    meloxicam (MOBIC) 15 MG tablet, Take 1 tablet (15 mg total) by mouth once daily., Disp: 90 tablet, Rfl: 3    naproxen (NAPROSYN) 500  "MG tablet, Take 1 tablet (500 mg total) by mouth 2 (two) times daily., Disp: 60 tablet, Rfl: 2    oxyCODONE (ROXICODONE) 5 MG immediate release tablet, Take 1-2 tablets (5-10 mg total) by mouth every 4 to 6 hours as needed for Pain., Disp: 28 tablet, Rfl: 0    oxyCODONE-acetaminophen (PERCOCET) 5-325 mg per tablet, Take 1 tablet by mouth every 4 (four) hours as needed., Disp: 20 tablet, Rfl: 0    potassium chloride SA (K-DUR,KLOR-CON) 20 MEQ tablet, Take 1 tablet (20 mEq total) by mouth 2 (two) times daily., Disp: 90 tablet, Rfl: 3    traMADol (ULTRAM) 50 mg tablet, Take 1 tablet (50 mg total) by mouth daily as needed (severe pain)., Disp: 30 tablet, Rfl: 2    VITAMIN D2 50,000 unit capsule, Take 1 capsule (50,000 Units total) by mouth every 7 days., Disp: 4 capsule, Rfl: 2    aspirin (ECOTRIN) 81 MG EC tablet, Take 1 tablet (81 mg total) by mouth 2 (two) times daily. for 14 days (Patient taking differently: Take 81 mg by mouth once daily. ), Disp: 28 tablet, Rfl: 0    azelastine (ASTELIN) 137 mcg (0.1 %) nasal spray, 1 spray (137 mcg total) by Nasal route 2 (two) times daily., Disp: 30 mL, Rfl: 2    ALLERGIES:  Review of patient's allergies indicates:  No Known Allergies     REVIEW OF SYSTEMS:  Constitution: Negative. Negative for chills, fever and night sweats.    Hematologic/Lymphatic: Negative for bleeding problem. Does not bruise/bleed easily.   Skin: Negative for dry skin, itching and rash.   Musculoskeletal: Negative for falls. Neg for left shoulder pain, +for stiffness and muscle weakness.     All other review of symptoms were reviewed and found to be noncontributory.    PHYSICAL EXAMINATION:  Vitals:  /81   Pulse 72   Ht 5' 9" (1.753 m)   Wt 91.2 kg (201 lb)   LMP 10/19/1993 (Approximate)   BMI 29.68 kg/m²    General: Well-developed well-nourished 59 y.o. femalein no acute distress   Cardiovascular: Regular rhythm by palpation of distal pulse, normal color and temperature, no concerning " varicosities on symptomatic side   Lungs: No labored breathing or wheezing appreciated   Neuro: Alert and oriented ×3   Psychiatric: well oriented to person, place and time, demonstrates normal mood and affect   Skin: No rashes, lesions or ulcers, normal temperature, turgor, and texture on uninvolved extremity    Examination of the left shoulder demonstrates well healed incisions. No signs of infection. No significant pain or unusual tenderness. Active . Active ER at side to 45. Passive FE to 150 with some stiffness, passive ER at side to 60.  Supine forward elevation 145°.  Biceps is well tensioned. Full elbow ROM. NVI    IMAGING:  No new imaging    ASSESSMENT:      ICD-10-CM ICD-9-CM   1. Shoulder stiffness, left M25.612 719.51       PLAN:     The patient has been slow to regain motion but did have some underlying chondromalacia intraoperatively and some stiffness preoperatively.  Plan to continue current physical therapy to maximize range of motion and cuff strength.  Advised against any heavy lifting until cleared by me to do so.  The patient denies any significant pain as experienced preop and from that standpoint I think is healing her cuff repair. Return to clinic in 2 months.      Procedures

## 2019-12-05 ENCOUNTER — CLINICAL SUPPORT (OUTPATIENT)
Dept: REHABILITATION | Facility: HOSPITAL | Age: 59
End: 2019-12-05
Payer: COMMERCIAL

## 2019-12-05 ENCOUNTER — TELEPHONE (OUTPATIENT)
Dept: SPORTS MEDICINE | Facility: CLINIC | Age: 59
End: 2019-12-05

## 2019-12-05 DIAGNOSIS — S46.012D TRAUMATIC INCOMPLETE TEAR OF LEFT ROTATOR CUFF, SUBSEQUENT ENCOUNTER: ICD-10-CM

## 2019-12-05 DIAGNOSIS — M25.612 DECREASED ROM OF LEFT SHOULDER: ICD-10-CM

## 2019-12-05 PROCEDURE — 97140 MANUAL THERAPY 1/> REGIONS: CPT | Mod: PO

## 2019-12-05 NOTE — TELEPHONE ENCOUNTER
Spoke to the patient. She asked that I fax the [employer] forms. Forms faxed to them at [phone].    Patient states her FMLA will end on 12/31 and her job wants to know what her restrictions would be at that time. They will place her in a job that allows her to work at those restrictions. Forms completed. She will pick them up from us. Forms are at the  for her to .    Elizabeth George MA  [facility]

## 2019-12-05 NOTE — TELEPHONE ENCOUNTER
"----- Message from Aleah Hi MA sent at 2019  9:21 AM CST -----  Contact: Rosamaria   Please see below.  ----- Message -----  From: Lisa Parker  Sent: 2019   8:53 AM CST  To: Brittany Herrera Staff    Consult/Advisory:    Name Of Caller: Rosamaria   Provider Name: Brittany Herrera MD  Does patient feel the need to be seen today? No   Relationship to the Pt?: self   Contact Preference?: [phone]  What is the nature of the call?:    -- Pt left documents with the RN during her last clinic visit 12/3. Documents are for the insurance provider and employer that needed the MD signature and completion,. Pt called in today requesting that the paper work be sent only to [employer] but not to [employer] yet. She will be calling them today. Her leave   and she is in the process of getting things straightened out with the employer. Please call her if there are any questions/clarification.    Additional Notes:  "Thank you for all that you do for our patients'"        "

## 2019-12-09 ENCOUNTER — CLINICAL SUPPORT (OUTPATIENT)
Dept: REHABILITATION | Facility: HOSPITAL | Age: 59
End: 2019-12-09
Payer: COMMERCIAL

## 2019-12-09 DIAGNOSIS — M25.612 DECREASED ROM OF LEFT SHOULDER: ICD-10-CM

## 2019-12-09 DIAGNOSIS — S46.012D TRAUMATIC INCOMPLETE TEAR OF LEFT ROTATOR CUFF, SUBSEQUENT ENCOUNTER: ICD-10-CM

## 2019-12-09 PROCEDURE — 97010 HOT OR COLD PACKS THERAPY: CPT | Mod: PO

## 2019-12-09 PROCEDURE — 97140 MANUAL THERAPY 1/> REGIONS: CPT | Mod: PO

## 2019-12-09 NOTE — PROGRESS NOTES
[facility]  Physical Therapy Note     Name: Rosamaria Agosto  Clinic Number: [id number]     Therapy Diagnosis: decreased ROM and pain at the shoulder s/p RTC  Physician: Arina Corea PA-C     Physician Orders: PT Eval and Treat   Medical Diagnosis from Referral: s/p RTC repair and biceps tenodesis  Evaluation Date: 7/26/2019  Authorization Period Expiration: 12/31/19  Plan of Care Expiration: 10/31/19 New plan of care extended to 12/31/19  Visit # / Visits authorized: new script 6/20;   30/30 completed     Time In:  915 am  Time Out: 1030 am  Total Billable Time:  30 minutes ( MT 2,) + 10 min ice pack     Precautions: Standard, RTC protocol NO AROM at elbow joint s/p biceps tenodesis, PROM at elbow and shoulder but no excessive ER stopping at initial end feel per phase I protocol and follow up with MD for protocol     Subjective      Pt reports:  Continues to feel good mobility with just tightness and no soreness this morning from the weekend  .  Pain : 0/10 at beginning and end of session 0-1/10 mostly tightness  Location: Left shoulder    TREATMENT     Rosamaria received therapeutic exercises to develop ROM for 40 minutes including:       Prom progressing to AAROM shoulder abd, scaption, flexion,  Horizontal abd/ adduction, and extension stopping at first end feel to avoid biceps stress within protocol in supine and in 60 deg incline supported position 3 x10 reps total per movement, TC for decreased UT activity    Pulley with cues into scaption 2  x 1 min with mod cues for shoulder hiking nd proper plane of motion..    Shoulder rows 3 x10 with YTB - NP  Shoulder extension 3 x10 with YTB-  NP      PROM shoulder scaption seated rolling ball, cues for decreased UT activity 3 x1 min  PROM shoulder scaption on solo machine with bar above shoulder height with cues for decreased UT activity 2 x1min NP     scapular depression with ER 3x15 wth 2 sec hold on edge of mat and pushing down into  the mat  Scapular retraction 3 x15 with 2 sec hold    Shoulder depression with 13# pulley with use of RUE to assist LUE to perform lat pulldown and LUE to hold in bottom position shoulder depression RUE assist to return to overhead position and relax at top of the range 2 x 15 with 10 sec stretch at top position for PROM    scaption  In seating position AROM 3 x10 along hand rail of stairs followed by PROM stretch at end ROM 15-20 secs as tolerated on every 5th and 10th reps.  Same as above in abduction    AAROM scaption and  with pt seated arm on PT, PT apply humeral inferior glide with scaption motion - held static 3x 20 sec    AAROM with PT into scaption and then in abduction with cues for shoulder depression in supine and in tolerated incline position 0-90 deg 3 x10 each as tolerated towards progressive upright posture.from 10/28/19 scaption and serratus punches in supine with 4# dowel and PVC in upright positions    Self AAROM with use cane or PVC pipe and RUE in supine and then in standing: scaption with elbow flexed toward full extension at end rom of scaption - 2x 10 reps:   Self AAROM in supine scaption with elbow extending for full ROM 2 x10     AA/PROM at L shoulder 12/2/19  165 deg abd,  30 deg ext stopping at initial end feel  170 deg flex with cues to avoid shoulder hike, compensation.  60 deg with ER stopping at initial end feel  And   Elbow extension at  0 deg    AROM 12/2/19 at 75 deg upright sitting/ standing  130 deg in sitting scaption / 110  120 deg abduction in sitting/ 100    Strength 11/20/19  scaption and abduction of left shoulder 3-/5  ER 3+/5    Manual therapy 25 min with STM./ MFR to pect minor, teres minor, and rhomboids, upper traps and levator, GH mobs grade I/II AP and lateral distraction for pain relief into empty end feel before restrictions and GH mobs grade Iv with distraction  in all direction for increased ROM  - manual stretch pec minor  -subscap release with ER motion  -  inferior humeral glide with inferior mobilization Grade IV    15 min ice to left shoulder    Education provided:   Sleeping position, sling positioning and shoulder pendulums, donning strategy for sling, review post surgery restrictions. Pt issued putty for , decreased edema pooling at elbow     Written Home Exercises Provided: yes.  Exercises were reviewed and Rosamaria was able to demonstrate them prior to the end of the session.  Rosamaria demonstrated good  understanding of the education provided.      See EMR under Patient Instructions for exercises provided 7/26/2019.     Assessment     Pt with improved subscapular andl limited GH motion today but improved with manual able to achieve PROM in scaption 170 deg and abduction at 160 with ER stuck at 60 with inferior glide at upper end of end ROM help with continued GH.  Pt with mild improvement of mechanics and decreased early elevation. Pt with cues achieve full available ROM with only trace tightness but with early elevation loss of 20 deg of elevation in each plane.  Pt with able to perform scaption at 80 deg of upright sitting due to improved mechanics for 2 x 10 with mild resistance 2# but in full upright standing pt achieves only 120 deg without resistance due to early elevation.  Pt superior gliding of humeral head obstructs that GH space for mobility.  Pt presents a weakness but greater deficit is a weakness of scaption and abduction that needs to overcome GH tightness and tension on top of the weakness due to early elevation.      Goals:  Short Term Goals: 3 weeks   1.  Pt independent with [facility] for s/p RTC repair and biceps tenodesis with return demonstration. MET  2.  Pt to be independent with donning and doffing surgical sling for proper pain management . Met 8/12/19  3.  Pt to increase PROM as tolerated at left shoulder to 90 deg shoulder flexion and 80 deg abduction without pain. MET  4.  Pt to decrease overall pain at left shoulder to less than 4/10 after  session. Met 8/12/19     Long Term Goals: 12 weeks   1.  Pt to been independent with discharge HEP without cues and proper form.  2.  Pt return to independent ADLs to include dressing, grooming with BUEs without compensation.  3.  Pt to increased AROM at elbow WNL and Left shoulder to 170 deg flexion and 160 deg abduction, 70 deg ER without pain or compensation  4.  Pt to increased L shoulder strength to 4+/5 into flexion extension, ER, IR   5  Pt to increase tolerance to perform 1 hour of household chores, ie cleaning without increased L shoulder pain  6. Pt to decrease pain at left shoulder to 3/10 after session for improved functional activities. MET        Plan   Plan of care Certification: 7/26/2019 to 10/31/19 extend new script to 12/31/19    Cont with  New POC.  Shanel Carvalho, PT

## 2019-12-11 ENCOUNTER — TELEPHONE (OUTPATIENT)
Dept: CARDIOLOGY | Facility: CLINIC | Age: 59
End: 2019-12-11

## 2019-12-11 DIAGNOSIS — I83.813 VARICOSE VEINS OF BILATERAL LOWER EXTREMITIES WITH PAIN: Primary | ICD-10-CM

## 2019-12-11 NOTE — TELEPHONE ENCOUNTER
Called patient and changed her appointment for sclerotherapy from 12/19 to 12/12. Patient will come in for 1pm and bring a 10mg Valium pill with her. She will sign consents for sclerotherapy and then take the valium. Procedure is scheduled for 2pm. She will also bring her stockings with her. Patient verbalized understanding of all instructions.

## 2019-12-12 ENCOUNTER — DOCUMENTATION ONLY (OUTPATIENT)
Dept: CARDIOLOGY | Facility: CLINIC | Age: 59
End: 2019-12-12
Payer: COMMERCIAL

## 2019-12-12 ENCOUNTER — DOCUMENTATION ONLY (OUTPATIENT)
Dept: CARDIOLOGY | Facility: CLINIC | Age: 59
End: 2019-12-12

## 2019-12-12 ENCOUNTER — CLINICAL SUPPORT (OUTPATIENT)
Dept: CARDIOLOGY | Facility: CLINIC | Age: 59
End: 2019-12-12
Attending: INTERNAL MEDICINE
Payer: COMMERCIAL

## 2019-12-12 DIAGNOSIS — I83.813 VARICOSE VEINS OF BILATERAL LOWER EXTREMITIES WITH PAIN: ICD-10-CM

## 2019-12-12 PROCEDURE — 93010 ELECTROCARDIOGRAM REPORT: CPT | Mod: S$GLB,,, | Performed by: INTERNAL MEDICINE

## 2019-12-12 PROCEDURE — 96372 THER/PROPH/DIAG INJ SC/IM: CPT | Mod: S$GLB,,, | Performed by: INTERNAL MEDICINE

## 2019-12-12 PROCEDURE — 36470 NJX SCLRSNT 1 INCMPTNT VEIN: CPT | Mod: S$GLB,,, | Performed by: INTERNAL MEDICINE

## 2019-12-12 PROCEDURE — 36470 CV US INJ SCLEROSING SOLUTION SINGLE VEIN: ICD-10-PCS | Mod: S$GLB,,, | Performed by: INTERNAL MEDICINE

## 2019-12-12 PROCEDURE — 93010 EKG 12-LEAD: ICD-10-PCS | Mod: S$GLB,,, | Performed by: INTERNAL MEDICINE

## 2019-12-12 PROCEDURE — 93005 ELECTROCARDIOGRAM TRACING: CPT | Mod: S$GLB,,, | Performed by: INTERNAL MEDICINE

## 2019-12-12 PROCEDURE — 96372 PR INJECTION,THERAP/PROPH/DIAG2ST, IM OR SUBCUT: ICD-10-PCS | Mod: S$GLB,,, | Performed by: INTERNAL MEDICINE

## 2019-12-12 PROCEDURE — 93005 EKG 12-LEAD: ICD-10-PCS | Mod: S$GLB,,, | Performed by: INTERNAL MEDICINE

## 2019-12-12 RX ORDER — SODIUM CHLORIDE 9 MG/ML
INJECTION, SOLUTION INTRAVENOUS
Status: COMPLETED | OUTPATIENT
Start: 2019-12-12 | End: 2019-12-12

## 2019-12-12 RX ORDER — DIPHENHYDRAMINE HYDROCHLORIDE 50 MG/ML
50 INJECTION INTRAMUSCULAR; INTRAVENOUS
Status: DISCONTINUED | OUTPATIENT
Start: 2019-12-12 | End: 2020-01-02 | Stop reason: HOSPADM

## 2019-12-12 RX ORDER — DIPHENHYDRAMINE HYDROCHLORIDE 50 MG/ML
25 INJECTION INTRAMUSCULAR; INTRAVENOUS
Status: COMPLETED | OUTPATIENT
Start: 2019-12-12 | End: 2019-12-12

## 2019-12-12 RX ADMIN — DIPHENHYDRAMINE HYDROCHLORIDE 25 MG: 50 INJECTION INTRAMUSCULAR; INTRAVENOUS at 03:12

## 2019-12-12 RX ADMIN — SODIUM CHLORIDE 500 ML: 9 INJECTION, SOLUTION INTRAVENOUS at 03:12

## 2019-12-12 RX ADMIN — SODIUM CHLORIDE 250 ML: 9 INJECTION, SOLUTION INTRAVENOUS at 03:12

## 2019-12-12 NOTE — PROGRESS NOTES
Patient here for sclerotherapy of varicose veins, she received 10 mg PO valium (13:20) which she had never taken before and then underwent sclerotherapy with sotradecol (10 cc of 1.5% solution) at 14:30. Around 14:45 patient began complaining of itchiness and feeling warm/perspiring. She became light headed and erythematous. BP at its lowest was 81/56. Prior to receiving any medications the itching slowly resolved and BP began to improve (101/64). She was given 50 mg IV benadryl and 125 mg IV solumedrol with 500 cc NS bolus. She continues to improve and we will continue to monitor. She reported subxiphoid discomfort, she never felt this before and does not have symptoms of angina at baseline. EKG with possible biatrial enlargement, otherwise unremarkable.    GEN: diaphoretic, resting comfortably  HEENT: PERRLA, NCAT  CVS: RRR, no m/r/g, normal s1/s2  Resp: CTAB  Ext: No edema  Skin: erythema of the face, chest and upper extremities, gradually improving    It is uncertain if she is allergic to valium or sotradecol. Will continue to monitor.    Cruz Martini MD  PGY-V

## 2019-12-12 NOTE — PROGRESS NOTES
Called into room by tech for pt having allergic reaction to medications used for her procedure.  Pt AAOx3, but itching, hypotensive, difficulty swallowing, diaphoretic w/ reddish appearance to skin.  Mani Martini & Simone in room along w/ procedure nurse SHANTELLE Gasca RN.  18g iV placed to Rt AC.  Benadryl 50mg IVP & Solumedrol 125ng IVP given along w/ 500cc NS Bolus followed by 250cc NS bolus.  EKG also obtained.  Pt monitored continuously w/ BP & PO.  BP improved w/ IVF's, PO stable on RA & pt stated that her itching & breathing improved after the Benadryl & Solumedrol.  MD's back in room to reassess pt & OK for pt to be D/C;ed w/ her family member.  IV D/C'ed by procedure nurse.  Pt D/C'ed to home with family member by procedure nurse.

## 2019-12-13 ENCOUNTER — TELEPHONE (OUTPATIENT)
Dept: SPORTS MEDICINE | Facility: CLINIC | Age: 59
End: 2019-12-13

## 2019-12-13 ENCOUNTER — TELEPHONE (OUTPATIENT)
Dept: UROGYNECOLOGY | Facility: CLINIC | Age: 59
End: 2019-12-13

## 2019-12-13 NOTE — TELEPHONE ENCOUNTER
----- Message from Antonio Melgoza sent at 12/13/2019  1:01 PM CST -----  Contact: Patient @ [phone]  Patient requesting a return call regarding the return to work notice, pls call

## 2019-12-13 NOTE — TELEPHONE ENCOUNTER
"Pt. Requesting return to work letter stating no lifting greater than 10lbs. restrictions . I informed pt. That the restrictions were only in place the 6weeks preceding surgery. Pt. States she was told "indefinitely" by . Pt. Offered to come in and see  again appt. Scheduled 12/16/19 @ 9;30a  "

## 2019-12-13 NOTE — TELEPHONE ENCOUNTER
----- Message from Luis E Oneill sent at 12/13/2019  1:35 PM CST -----  Please call pt she needs a back to work note and it needs to say how much she can  [phone]

## 2019-12-13 NOTE — TELEPHONE ENCOUNTER
----- Message from Luis E Oneill sent at 12/13/2019  2:13 PM CST -----  Pt missed your call [phone]

## 2019-12-13 NOTE — TELEPHONE ENCOUNTER
When trying to call the patient back, I got a message saying my call could not be completed at this time. VM option not available.

## 2019-12-16 ENCOUNTER — OFFICE VISIT (OUTPATIENT)
Dept: UROGYNECOLOGY | Facility: CLINIC | Age: 59
End: 2019-12-16
Payer: COMMERCIAL

## 2019-12-16 VITALS
BODY MASS INDEX: 30.96 KG/M2 | HEIGHT: 69 IN | SYSTOLIC BLOOD PRESSURE: 136 MMHG | DIASTOLIC BLOOD PRESSURE: 70 MMHG | WEIGHT: 209 LBS

## 2019-12-16 DIAGNOSIS — N39.3 SUI (STRESS URINARY INCONTINENCE, FEMALE): Primary | ICD-10-CM

## 2019-12-16 PROCEDURE — 99213 PR OFFICE/OUTPT VISIT, EST, LEVL III, 20-29 MIN: ICD-10-PCS | Mod: 25,S$GLB,, | Performed by: OBSTETRICS & GYNECOLOGY

## 2019-12-16 PROCEDURE — 3078F PR MOST RECENT DIASTOLIC BLOOD PRESSURE < 80 MM HG: ICD-10-PCS | Mod: CPTII,S$GLB,, | Performed by: OBSTETRICS & GYNECOLOGY

## 2019-12-16 PROCEDURE — 3008F BODY MASS INDEX DOCD: CPT | Mod: CPTII,S$GLB,, | Performed by: OBSTETRICS & GYNECOLOGY

## 2019-12-16 PROCEDURE — 3008F PR BODY MASS INDEX (BMI) DOCUMENTED: ICD-10-PCS | Mod: CPTII,S$GLB,, | Performed by: OBSTETRICS & GYNECOLOGY

## 2019-12-16 PROCEDURE — 3075F SYST BP GE 130 - 139MM HG: CPT | Mod: CPTII,S$GLB,, | Performed by: OBSTETRICS & GYNECOLOGY

## 2019-12-16 PROCEDURE — 3075F PR MOST RECENT SYSTOLIC BLOOD PRESS GE 130-139MM HG: ICD-10-PCS | Mod: CPTII,S$GLB,, | Performed by: OBSTETRICS & GYNECOLOGY

## 2019-12-16 PROCEDURE — 99999 PR PBB SHADOW E&M-EST. PATIENT-LVL IV: ICD-10-PCS | Mod: PBBFAC,,, | Performed by: OBSTETRICS & GYNECOLOGY

## 2019-12-16 PROCEDURE — 99213 OFFICE O/P EST LOW 20 MIN: CPT | Mod: 25,S$GLB,, | Performed by: OBSTETRICS & GYNECOLOGY

## 2019-12-16 PROCEDURE — 99999 PR PBB SHADOW E&M-EST. PATIENT-LVL IV: CPT | Mod: PBBFAC,,, | Performed by: OBSTETRICS & GYNECOLOGY

## 2019-12-16 PROCEDURE — 3078F DIAST BP <80 MM HG: CPT | Mod: CPTII,S$GLB,, | Performed by: OBSTETRICS & GYNECOLOGY

## 2019-12-16 NOTE — LETTER
December 16, 2019    Rosamaria Agosto  [address] [facility] [place] [address]             [facility]   [address] [address]  Phone: [phone] Dear Mrs. Agosto:    Patient is not complete the postoperative.  She is released to resume all activity in life.  Patient may return to work as.      If you have any questions or concerns, please don't hesitate to call.    Sincerely,        Anson Ellison MD

## 2019-12-16 NOTE — PROGRESS NOTES
Subjective:      Patient ID: Rosamaria Agosto is a 59 y.o. female.    Chief Complaint:  Other (discuss restrictions )      History of Present Illness  Patient presents stating she had needs paperwork to no be allowed to lift heavy things is related to her work.  We had a long discussion regarding postoperative.  Six weeks patient can forward day-to-day activity.  Patient understanding addition patient mention that there is occasional leakage of urine review of the operative note did not show concomitant M U.S.          Past Medical History:   Diagnosis Date    Abnormal Pap smear of cervix yrs ago    Arthritis     History of uterine fibroid     Hyperlipidemia     Hypertension     Shoulder injury     lt    Sinus problem     Sinusitis        Past Surgical History:   Procedure Laterality Date    ARTHROSCOPIC REPAIR OF ROTATOR CUFF OF SHOULDER Left 7/24/2019    Procedure: REPAIR, ROTATOR CUFF, ARTHROSCOPIC;  Surgeon: ASMITA Soto MD;  Location: [address];  Service: Orthopedics;  Laterality: Left;    ARTHROSCOPY OF SHOULDER WITH DECOMPRESSION OF SUBACROMIAL SPACE Left 7/24/2019    Procedure: ARTHROSCOPY, SHOULDER, WITH SUBACROMIAL SPACE DECOMPRESSION;  Surgeon: ASMITA Soto MD;  Location: [facility] OR [address] [place] [address];  Service: Orthopedics;  Laterality: Left;    BREAST BIOPSY  24-25 years old    BREAST SURGERY      CYSTOSCOPY N/A 6/24/2019    Procedure: CYSTOSCOPY;  Surgeon: Anson Ellison MD;  Location: [facility];  Service: OB/GYN;  Laterality: N/A;    ENCEPHALOCELE REPAIR  46    HYSTERECTOMY  93'-95'    ovaries remain    NASAL SINUS SURGERY      ROBOT-ASSISTED LAPAROSCOPIC ABDOMINAL SACROCOLPOPEXY USING DA MAC XI N/A 6/24/2019    Procedure: XI ROBOTIC SACROCOLPOPEXY, ABDOMINAL;  Surgeon: Anson Ellison MD;  Location: [facility] OR;  Service: OB/GYN;  Laterality: N/A;    SHOULDER ARTHROSCOPY Left 7/24/2019    Procedure: BICEPS TENODESIS;  Surgeon: ASMITA Soto MD;  Location: [address];   Service: Orthopedics;  Laterality: Left;    TUBAL LIGATION      VAGINAL DELIVERY         GYN & OB History  Patient's last menstrual period was 10/19/1993 (approximate).   Date of Last Pap: No result found    OB History    Para Term  AB Living   2 2 0     2   SAB TAB Ectopic Multiple Live Births           2      # Outcome Date GA Lbr Aleksey/2nd Weight Sex Delivery Anes PTL Lv   2 Para     M Vag-Spont   JESU   1 Para     F Vag-Spont   JESU       Health Maintenance       Date Due Completion Date    Hepatitis C Screening 1961 ---    TETANUS VACCINE 1979 ---    Shingles Vaccine (1 of 2) 2011 ---    Mammogram 2020    Colonoscopy 2024 (Done)    Override on 2014: Done    Lipid Panel 2024          Family History   Problem Relation Age of Onset    Stroke Maternal Grandmother     Diabetes Mother     Hypertension Mother     Diabetes Sister     Diabetes Sister     Breast cancer Neg Hx     Colon cancer Neg Hx     Ovarian cancer Neg Hx        Social History     Socioeconomic History    Marital status: Single     Spouse name: Not on file    Number of children: Not on file    Years of education: Not on file    Highest education level: Not on file   Occupational History    Not on file   Social Needs    Financial resource strain: Not on file    Food insecurity:     Worry: Not on file     Inability: Not on file    Transportation needs:     Medical: Not on file     Non-medical: Not on file   Tobacco Use    Smoking status: Never Smoker    Smokeless tobacco: Never Used   Substance and Sexual Activity    Alcohol use: Yes     Alcohol/week: 1.0 standard drinks     Types: 1 Shots of liquor per week     Comment: seldom    Drug use: No    Sexual activity: Yes     Partners: Male     Birth control/protection: Post-menopausal, See Surgical Hx     Comment: hysterectomy 20 yrs ago   Lifestyle    Physical activity:     Days per week: Not on file     " Minutes per session: Not on file    Stress: Not on file   Relationships    Social connections:     Talks on phone: Not on file     Gets together: Not on file     Attends Nondenominational service: Not on file     Active member of club or organization: Not on file     Attends meetings of clubs or organizations: Not on file     Relationship status: Not on file   Other Topics Concern    Not on file   Social History Narrative    Not on file       Review of Systems  Review of Systems     Occasional leakage of urine with coughing  Objective:   /70 (BP Location: Right arm, Patient Position: Sitting)   Ht 5' 9" (1.753 m)   Wt 94.8 kg (209 lb)   LMP 10/19/1993 (Approximate)   BMI 30.86 kg/m²     Physical Exam   Deferred  Assessment:     1. KEN (stress urinary incontinence, female)            Plan:     1. KEN (stress urinary incontinence, female)      Talked at length about stress urinary incontinence I used admission on the [employer] interactive web site patient appreciated the patient will let us know she wishes to move forward patient with questions as stated all concerns med letter given to the patient. Tota time 15 min with all that spent in direct discussion with the patient  Patient Instructions                   "

## 2019-12-16 NOTE — PROGRESS NOTES
[facility]  Physical Therapy Note     Name: Rosamaria Agosto  Clinic Number: [id number]     Therapy Diagnosis: decreased ROM and pain at the shoulder s/p RTC  Physician: Arina Corea PA-C     Physician Orders: PT Eval and Treat   Medical Diagnosis from Referral: s/p RTC repair and biceps tenodesis  Evaluation Date: 7/26/2019  Authorization Period Expiration: 12/31/19  Plan of Care Expiration: 10/31/19  Visit # / Visits authorized: 6/20 new script     Time In:  9 am  Time Out: 1020 am  Total Billable Time: 45 minutes (TE-1, MT 2) + 10 min ice pack     Precautions: Standard, RTC protocol NO AROM at elbow joint s/p biceps tenodesis, PROM at elbow and shoulder but no excessive ER stopping at initial end feel per phase I protocol and follow up with MD for protocol     Subjective     Pt reports:  No issues with left shoulder, was able to sleep on it without onset of pain, did have a visit with the MD and was told to keep pushing as hard as she can to get that ROM.    Pain : 1- 2/10 at beginning and end of session 1-2/10 mostly tightness        Manual therapy 45 min with STM./ MFR to pect minor, teres minor, and rhomboids, upper traps and levator, GH mobs grade I/II AP and lateral distraction for pain relief into empty end feel before restrictions and GH mobs grade III with distraction  in all direction for increased ROM  - manual stretch pec minor  -subscap release with ER motion  - inferior humeral glide with inferior mobilization Grade II-III  - inferior glide coupled with scaption pt in seated position   - Inferior glide coupled with ER pt supine and elevated  -PA GH grade III  - inferior glide coupled with abd grade III pt supine  Scapular scouring, emphasis on subscap  - Scapular lift grade II    10 min ice to left shoulder    Education provided:   Sleeping position, sling positioning and shoulder pendulums, donning strategy for sling, review post surgery restrictions. Pt issued  putty for , decreased edema pooling at elbow     Written Home Exercises Provided: yes.  Exercises were reviewed and Rosamaria was able to demonstrate them prior to the end of the session.  Rosamaria demonstrated good  understanding of the education provided.      See EMR under Patient Instructions for exercises provided 7/26/2019.     Assessment     Pt with decreased over all tension below 121 deg, improved mobility following scapular lift, subscap release.  Cont tightness to IR/Scaption/Abd, improved ER.  Aggressive stretching was tolerated extremely well today, most discomfort noted with ER, able to increase range consirably to scaption.  Was able to maintain tension longer that previous visit.  pt report more freedom of movement post treatment        Goals:  Short Term Goals: 3 weeks   1.  Pt independent with HEP for s/p RTC repair and biceps tenodesis with return demonstration  2.  Pt to be independent with donning and doffing surgical sling for proper pain management . Met 8/12/19  3.  Pt to increase PROM as tolerated at left shoulder to 90 deg shoulder flexion and 80 deg abduction without pain  4.  Pt to decrease overall pain at left shoulder to less than 4/10 after session. Met 8/12/19     Long Term Goals: 12 weeks   1.  Pt to been independent with discharge HEP without cues and proper form.  2.  Pt return to independent ADLs to include dressing, grooming with BUEs without compensation.  3.  Pt to increased AROM at elbow WNL and Left shoulder to 170 deg flexion and 160 deg abduction, 70 deg ER without pain or compensation  4.  Pt to increased L shoulder strength to 4+/5 into flexion extension, ER, IR   5  Pt to increase tolerance to perform 1 hour of household chores, ie cleaning without increased L shoulder pain  6. Pt to decrease pain at left shoulder to 3/10 after session for improved functional activities        Plan   Plan of care Certification: 7/26/2019 to 10/31/19    Cont with progressive and more  aggressive PROM and GH mobs to stretch GH capsule.    Gerson Hubbard, PT

## 2019-12-18 ENCOUNTER — CLINICAL SUPPORT (OUTPATIENT)
Dept: REHABILITATION | Facility: HOSPITAL | Age: 59
End: 2019-12-18
Payer: COMMERCIAL

## 2019-12-18 ENCOUNTER — TELEPHONE (OUTPATIENT)
Dept: SPORTS MEDICINE | Facility: CLINIC | Age: 59
End: 2019-12-18

## 2019-12-18 DIAGNOSIS — S46.012D TRAUMATIC INCOMPLETE TEAR OF LEFT ROTATOR CUFF, SUBSEQUENT ENCOUNTER: ICD-10-CM

## 2019-12-18 DIAGNOSIS — M25.612 DECREASED ROM OF LEFT SHOULDER: ICD-10-CM

## 2019-12-18 PROCEDURE — 97140 MANUAL THERAPY 1/> REGIONS: CPT | Mod: PO

## 2019-12-18 PROCEDURE — 97110 THERAPEUTIC EXERCISES: CPT | Mod: PO

## 2019-12-18 PROCEDURE — 97010 HOT OR COLD PACKS THERAPY: CPT | Mod: PO

## 2019-12-18 NOTE — PROGRESS NOTES
[facility]  Physical Therapy Note     Name: Rosamaria Agosto  Clinic Number: [id number]     Therapy Diagnosis: decreased ROM and pain at the shoulder s/p RTC  Physician: Arina Corea PA-C     Physician Orders: PT Eval and Treat   Medical Diagnosis from Referral: s/p RTC repair and biceps tenodesis  Evaluation Date: 7/26/2019  Authorization Period Expiration: 12/31/19  Plan of Care Expiration: 10/31/19 New plan of care extended to 12/31/19  Visit # / Visits authorized: new script 7/20;   30/30 completed     Time In:  900 am  Time Out: 1030 am  Total Billable Time:  55 minutes ( MT 2, TE -1,) + 10 min ice pack     Precautions: Standard, RTC protocol NO AROM at elbow joint s/p biceps tenodesis, PROM at elbow and shoulder but no excessive ER stopping at initial end feel per phase I protocol and follow up with MD for protocol     Subjective      Pt reports:  Continues to feel good mobility with just tightness and no soreness this morning from the weekend  .  Pain : 0/10 at beginning and end of session 0-1/10 mostly tightness  Location: Left shoulder    TREATMENT     Rosamaria received therapeutic exercises to develop ROM for 55 minutes including:           Pulley with cues into scaption 2  x 1 min with mod cues for shoulder hiking nd proper plane of motion..NP    Shoulder rows 3 x10 with YTB - NP  Shoulder extension 3 x10 with YTB-  NP    D2 flexion and then D2 extension pattern with min to mod resistance in supine    PROM shoulder scaption  And then in abduction each seated rolling ball, cues for decreased UT activity 3 x1 min  PROM shoulder scaption on solo machine with bar above shoulder height with cues for decreased UT activity 2 x1min NP     scapular depression with ER 3x15 wth 2 sec hold on edge of mat and pushing down into the mat NP  Scapular retraction 3 x15 with 2 sec hold NP    Shoulder depression with 13# pulley with use of RUE to assist LUE to perform lat pulldown and LUE to  hold in bottom position shoulder depression RUE assist to return to overhead position and relax at top of the range 2 x 15 with 10 sec stretch at top position for PROM NP    scaption  In seating position AROM 3 x10 along hand rail of stairs followed by PROM stretch at end ROM 15-20 secs as tolerated on every 5th and 10th reps. NP  Same as above in abduction    AAROM scaption and  with pt seated arm on PT, PT apply humeral inferior glide with scaption motion - held static 3x 20 sec    Pulley 3 x 1min in scaption and then in abduction    AROM with PT into scaption and then in abduction in sidelying with cues for shoulder depression in supine and in tolerated incline position 45 and 70 deg 3 x10 each as tolerated towards progressive upright posture.from 10/28/19 scaption and serratus punches with 2#    Self AAROM with use cane or PVC pipe and RUE in supine and then in standing: scaption with elbow flexed toward full extension at end rom of scaption - 2x 10 reps:   Self AAROM in supine scaption with elbow extending for full ROM 2 x10     AROM at L shoulder 12/18/19  165 deg abd, in sidelying in 75 degrees elevation in head of mat    170 deg flex in supine with 75 deg elevation in head of mat  with cues to avoid shoulder hike, compensation.  60 deg with ER stopping at initial end feel in standing  And   Elbow extension at  0 deg    AROM 12/2/19 at 75 deg upright sitting/ standing  130 deg in sitting scaption / 110  120 deg abduction in sitting/ 100    Strength 11/20/19  scaption and abduction of left shoulder 3-/5  ER 3+/5    Manual therapy 25 min with STM./ MFR to pect minor, teres minor, and rhomboids, upper traps and levator, GH mobs grade I/II AP and lateral distraction for pain relief into empty end feel before restrictions and GH mobs grade Iv with distraction  in all direction for increased ROM  - manual stretch pec minor  -subscap release with ER motion  - inferior humeral glide with inferior mobilization Grade  IV    10 min ice to left shoulder    Education provided:   Sleeping position, sling positioning and shoulder pendulums, donning strategy for sling, review post surgery restrictions. Pt issued putty for , decreased edema pooling at elbow     Written Home Exercises Provided: yes.  Exercises were reviewed and Rosamaria was able to demonstrate them prior to the end of the session.  Rosamaria demonstrated good  understanding of the education provided.      See EMR under Patient Instructions for exercises provided 7/26/2019.     Assessment     Pt with improved subscapular andl limited GH motion today but improved with manual able to achieve PROM in scaption 170 deg and abduction at 165 nearly fully elevated seated position with ER stuck at 60 with inferior glide at upper end of end ROM help with continued GH.  Pt with mild improvement of mechanics but need tactile cues to eliminate compensation and proper tracking in plane of motion to avoid compensation and shoulder elevation and overcome GH tightness and tension on top of the weakness due to early elevation which impairs AROM thru available ROM.  Overall required mod to max cues still to achieve proper motion and mechanics.     Goals:  Short Term Goals: 3 weeks   1.  Pt independent with HEP for s/p RTC repair and biceps tenodesis with return demonstration. MET  2.  Pt to be independent with donning and doffing surgical sling for proper pain management . Met 8/12/19  3.  Pt to increase PROM as tolerated at left shoulder to 90 deg shoulder flexion and 80 deg abduction without pain. MET  4.  Pt to decrease overall pain at left shoulder to less than 4/10 after session. Met 8/12/19     Long Term Goals: 12 weeks   1.  Pt to been independent with discharge HEP without cues and proper form.  2.  Pt return to independent ADLs to include dressing, grooming with BUEs without compensation.  3.  Pt to increased AROM at elbow WNL and Left shoulder to 170 deg flexion and 160 deg  abduction, 70 deg ER without pain or compensation  4.  Pt to increased L shoulder strength to 4+/5 into flexion extension, ER, IR   5  Pt to increase tolerance to perform 1 hour of household chores, ie cleaning without increased L shoulder pain  6. Pt to decrease pain at left shoulder to 3/10 after session for improved functional activities. MET        Plan   Plan of care Certification: 7/26/2019 to 10/31/19 extend new script to 12/31/19    Cont with  New POC.  Shanel Carvalho, PT

## 2019-12-18 NOTE — TELEPHONE ENCOUNTER
Received a request from  [facility] for patients medical records. The request was sent to our medical record department.    Elizabeth George MA  [facility]

## 2019-12-20 ENCOUNTER — OFFICE VISIT (OUTPATIENT)
Dept: URGENT CARE | Facility: CLINIC | Age: 59
End: 2019-12-20
Payer: COMMERCIAL

## 2019-12-20 VITALS
DIASTOLIC BLOOD PRESSURE: 89 MMHG | OXYGEN SATURATION: 100 % | WEIGHT: 209 LBS | HEART RATE: 73 BPM | TEMPERATURE: 98 F | BODY MASS INDEX: 30.96 KG/M2 | SYSTOLIC BLOOD PRESSURE: 145 MMHG | HEIGHT: 69 IN

## 2019-12-20 DIAGNOSIS — L25.9 CONTACT DERMATITIS, UNSPECIFIED CONTACT DERMATITIS TYPE, UNSPECIFIED TRIGGER: Primary | ICD-10-CM

## 2019-12-20 PROCEDURE — 96372 THER/PROPH/DIAG INJ SC/IM: CPT | Mod: S$GLB,,, | Performed by: FAMILY MEDICINE

## 2019-12-20 PROCEDURE — 99214 PR OFFICE/OUTPT VISIT, EST, LEVL IV, 30-39 MIN: ICD-10-PCS | Mod: 25,S$GLB,, | Performed by: PHYSICIAN ASSISTANT

## 2019-12-20 PROCEDURE — 99214 OFFICE O/P EST MOD 30 MIN: CPT | Mod: 25,S$GLB,, | Performed by: PHYSICIAN ASSISTANT

## 2019-12-20 PROCEDURE — 96372 PR INJECTION,THERAP/PROPH/DIAG2ST, IM OR SUBCUT: ICD-10-PCS | Mod: S$GLB,,, | Performed by: FAMILY MEDICINE

## 2019-12-20 RX ORDER — PREDNISONE 20 MG/1
TABLET ORAL
Qty: 6 TABLET | Refills: 0 | Status: SHIPPED | OUTPATIENT
Start: 2019-12-20 | End: 2019-12-22

## 2019-12-20 RX ORDER — BETAMETHASONE SODIUM PHOSPHATE AND BETAMETHASONE ACETATE 3; 3 MG/ML; MG/ML
6 INJECTION, SUSPENSION INTRA-ARTICULAR; INTRALESIONAL; INTRAMUSCULAR; SOFT TISSUE
Status: COMPLETED | OUTPATIENT
Start: 2019-12-20 | End: 2019-12-20

## 2019-12-20 RX ADMIN — BETAMETHASONE SODIUM PHOSPHATE AND BETAMETHASONE ACETATE 6 MG: 3; 3 INJECTION, SUSPENSION INTRA-ARTICULAR; INTRALESIONAL; INTRAMUSCULAR; SOFT TISSUE at 01:12

## 2019-12-20 NOTE — PROGRESS NOTES
"Subjective:       Patient ID: Rosamaria Agosto is a 59 y.o. female.    Vitals:  height is 5' 9" (1.753 m) and weight is 94.8 kg (208 lb 15.9 oz). Her oral temperature is 98 °F (36.7 °C). Her blood pressure is 145/89 (abnormal) and her pulse is 73. Her oxygen saturation is 100%.     Chief Complaint: Allergic Reaction    Patient presents for allergic reaction/rash.  Patient used "Just for men" hair color to nights ago and yesterday afternoon she started hving scalp tightness/swelling, itching, with blistering rash.  She has swelling of scalp/hairline extending towards left side of face and ear.  Denies oral mucosal swelling.  No dyspnea, stridor, wheezing.  No sensation of throat closing.  She states that this has happened before, and she is allergic to with the chemicals in some hair dyes.  States it has resolved in the past with steroids.    Allergic Reaction   This is a new problem. The current episode started today. The problem has been gradually worsening since onset. The problem is moderate. Associated with: hair dye. The exposure occurred at home. Associated symptoms include itching and a rash. Pertinent negatives include no abdominal pain, chest pain, chest pressure, coughing, diarrhea, difficulty breathing, drooling, eye itching, eye redness, eye watering, globus sensation, hyperventilation, skin blistering, stridor, trouble swallowing, vomiting or wheezing. Past treatments include nothing. The treatment provided no relief. There is no history of asthma, atopic dermatitis, food allergies, medication allergies or seasonal allergies. Swelling is present on the face.       Constitution: Negative for chills, sweating, fatigue, fever and unexpected weight change.   HENT: Negative for ear pain, drooling, congestion, sore throat and trouble swallowing.    Neck: Negative for painful lymph nodes.   Cardiovascular: Negative for chest pain and leg swelling.   Eyes: Negative for eye itching, eye pain, eye redness, " double vision and blurred vision.   Respiratory: Negative for cough, shortness of breath, stridor and wheezing.    Gastrointestinal: Negative for abdominal pain, nausea, vomiting and diarrhea.   Genitourinary: Negative for dysuria, frequency, urgency and history of kidney stones.   Musculoskeletal: Negative for pain, trauma, joint pain, joint swelling, abnormal ROM of joint, muscle cramps and muscle ache.   Skin: Positive for color change, rash and erythema. Negative for pale and bruising.   Allergic/Immunologic: Positive for itching. Negative for seasonal allergies and food allergies.   Neurological: Negative for dizziness, history of vertigo, light-headedness, passing out and headaches.   Hematologic/Lymphatic: Negative for swollen lymph nodes.   Psychiatric/Behavioral: Negative for nervous/anxious, sleep disturbance and depression. The patient is not nervous/anxious.        Objective:      Physical Exam   Constitutional: She is oriented to person, place, and time. She appears well-developed and well-nourished. She is cooperative.  Non-toxic appearance. She does not have a sickly appearance. She does not appear ill. No distress.   HENT:   Head: Normocephalic and atraumatic. Head is without abrasion, without contusion and without laceration.       Right Ear: Hearing, tympanic membrane, external ear and ear canal normal.   Left Ear: Hearing, tympanic membrane, external ear and ear canal normal.   Nose: Nose normal. No mucosal edema, rhinorrhea or nasal deformity. No epistaxis. Right sinus exhibits no maxillary sinus tenderness and no frontal sinus tenderness. Left sinus exhibits no maxillary sinus tenderness and no frontal sinus tenderness.   Mouth/Throat: Uvula is midline, oropharynx is clear and moist and mucous membranes are normal. No trismus in the jaw. Normal dentition. No uvula swelling. No oropharyngeal exudate, posterior oropharyngeal edema or posterior oropharyngeal erythema.   No oral mucosal swelling. No  evidence of respiratory distress.   Eyes: Pupils are equal, round, and reactive to light. Conjunctivae, EOM and lids are normal. No scleral icterus.   Neck: Trachea normal, full passive range of motion without pain and phonation normal. Neck supple. No neck rigidity. No edema and no erythema present.   Cardiovascular: Normal rate, regular rhythm, normal heart sounds, intact distal pulses and normal pulses.   Pulmonary/Chest: Effort normal and breath sounds normal. No accessory muscle usage or stridor. No tachypnea and no bradypnea. No respiratory distress. She has no decreased breath sounds. She has no wheezes. She has no rhonchi. She has no rales.   Abdominal: Normal appearance.   Musculoskeletal: Normal range of motion. She exhibits no edema or deformity.   Neurological: She is alert and oriented to person, place, and time. She exhibits normal muscle tone. Coordination normal.   Skin: Skin is warm, dry, intact, not diaphoretic, not pale and no rash. Capillary refill takes less than 2 seconds. Lesions:  erythemaabrasion, burn, bruising and ecchymosis  Psychiatric: She has a normal mood and affect. Her speech is normal and behavior is normal. Judgment and thought content normal. Cognition and memory are normal.   Nursing note and vitals reviewed.        Assessment:       1. Contact dermatitis, unspecified contact dermatitis type, unspecified trigger        Plan:         Contact dermatitis, unspecified contact dermatitis type, unspecified trigger  -     betamethasone acetate-betamethasone sodium phosphate injection 6 mg  -     predniSONE (DELTASONE) 20 MG tablet; Take 2 tablets (40 mg total) by mouth once daily for 2 days, THEN 1 tablet (20 mg total) once daily for 2 days.  Dispense: 6 tablet; Refill: 0      Patient Instructions     - Rest.    - Drink plenty of fluids.    - Acetaminophen (tylenol) or Ibuprofen (advil,motrin) as directed as needed for fever/pain. Avoid tylenol if you have a history of liver disease.  Do not take ibuprofen if you have a history of GI bleeding, kidney disease, or if you take blood thinners.     - Take over-the-counter claritin, zyrtec, allegra, or xyzal as directed for itching/rash/allergic reaction.    - you can take Benadryl over-the-counter as directed as well for itching/rash/allergic reaction.    - You received a steroid shot today.  This can elevate your blood pressure, elevate your blood sugar, water weight gain, nervous energy, redness to the face and dimpling of the skin where the shot goes in.   - Do not use steroids more than 3 times per year.   - If you have diabetes, please check you blood sugar frequently.  - If you have high blood pressure, please check your blood pressure frequently.   -DO NOT BEGIN ORAL PREDNISONE PILLS UNTIL TOMORROW BECAUSE YOU RECEIVED THE SHOT IN CLINIC TODAY.    - Ice or cool compresses for 15-20 minutes at a time for the next 24-48 hours.      - Follow up with your PCP or specialty clinic as directed in the next 1-2 weeks if not improved or as needed.  You can call [phone] to schedule an appointment with the appropriate provider.    - Go to the ER or seek medical attention immediately if you develop new or worsening symptoms.    - You must understand that you have received an Urgent Care treatment only and that you may be released before all of your medical problems are known or treated.   - You, the patient, will arrange for follow up care as instructed.   - If your condition worsens or fails to improve we recommend that you receive another evaluation at the ER immediately or contact your PCP to discuss your concerns or return here.           Understanding Contact Dermatitis     A cool, moist compress can help reduce itching.     Contact dermatitis is a common type of skin rash. Its caused by something that touches the skin and makes it irritated and inflamed. It can occur on skin on any part of the body, such as the face, neck, hands, arms, and  legs. Contact dermatitis is not spread from person to person.  Often, the reaction of contact dermatitis occurs 1 to 2 days after contact with the offending agent.  How to say it  SHELLEY-tact [id number]   What causes contact dermatitis?  Its caused by something that irritates the skin, or that creates an allergic reaction on the skin. People can get contact dermatitis from many kinds of things. These include:  · Plant oils in poison ivy, oak, and sumac  · Chemicals in household , solvents, and glue  · Chemicals in makeup, soap, laundry detergent, perfume, acne cream, and hair products  · Certain medicines, such as neomycin, bacitracin, benzocaine, and thimerosal  · Metals such as nickel, found in some jewelry and watch bands   · The sticky material on the back of bandages and tape (adhesive)  · Things that can cause tiny breaks in the skin, such as wood, fiberglass, metal tools, and plant thorns  · Rubber latex in surgical gloves and other medical supplies  Dermatitis can also be caused by the skin being damp for long periods of time. This can happen from washing your hands too often, or working with wet materials.  Symptoms of contact dermatitis  Symptoms can include skin that is:  · Blistered  · Burning  · Cracked  · Dry  · Itchy  · Painful  · Red  · Rough, thickened, and leathery  · Swollen  · Warm  The blisters may ooze fluid and form crusts.  Treatment for contact dermatitis  Treatment is done to help relieve itching and reduce inflammation. The rash should go away in a few days to a few weeks. Treatments include:  · Cool, moist compress. Use a clean damp cloth. Put it on the area for 20 to 30 minutes, 5 to 6 times a day for the first 3 days.  · Steroid cream or ointment. You can apply this medicine several times a day on clean skin.  · Oral corticosteroid. Your healthcare provider may prescribe this medicine if you have severe skin symptoms on a large part of your body.  Your healthcare provider may  give you a steroid injection instead of pills.  · Oral antihistamine. This medicine can help reduce itching.  · Colloidal oatmeal bath. Soaking in water with colloidal oatmeal can help soothe skin.  · Plain cream, lotion, or ointment. Cream, lotion, or ointment without medicine can help to soothe and protect your skin.  Living with contact dermatitis  Talk with your healthcare provider about what may have caused your contact dermatitis. Patch testing may help you figure out what caused the rash so you can avoid further contact with it. Once you learn what caused your rash, make sure to avoid that substance. If your skin comes into contact with it again, make sure to wash your skin right away. If you cant avoid the substance, wear gloves or other protective clothing before you touch it. Or use a cream, lotion, or ointment to protect your skin.  When to call your healthcare provider  Call your healthcare provider right away if you have any of these:  · Fever of 100.4°F (38°C) or higher, or as directed  · Symptoms that dont get better, or get worse  · New symptoms   Date Last Reviewed: 5/1/2016  © 7570-1226 [employer]. [address] [place] [address]. All rights reserved. This information is not intended as a substitute for professional medical care. Always follow your healthcare professional's instructions.        Contact Dermatitis  Contact dermatitis is a skin rash caused by something that touches the skin and makes it irritated and inflamed. Your skin may be red, swollen, dry, and may be cracked. Blisters may form and ooze. The rash will itch.  Contact dermatitis can form on the face and neck, backs of hands, forearms, genitals, and lower legs.  People can get contact dermatitis from lots of sources. These include:  · Plants such as poison ivy, oak, or sumac  · Chemicals in hair dyes and rinses, soaps, solvents, waxes, fingernail polish, and deodorants   · Jewelry or watchbands made of  "nickel  Contact dermatitis is not passed from person to person.  Talk with your healthcare provider about what may have caused the rash. A type of allergy testing called "patch testing" may be used to discover what you are allergic to. You will need to avoid the source of your rash in the future to prevent it from coming back.  Treatment is done to relieve itching and prevent the rash from coming back. The rash should go away in a few days to a few weeks.  Home care  Your healthcare provider may prescribe medicine to relieve swelling and itching. Follow all instructions when using these medicines.  General care:  · Avoid anything that heats up your skin, such as hot showers or baths, or direct sunlight. This can make itching worse.  · Apply cold compresses to soothe your sores to help relieve your symptoms. Do this for 30 minutes 3 to 4 times a day. You can make a cold compress by soaking a cloth in cold water. Squeeze out excess water. You can add colloidal oatmeal to the water to help reduce itching. For severe itching in a small area, apply an ice pack wrapped in a thin towel. Do this for 20 minutes 3 to 4 times a day.  · You can also try wet dressings. One way to do this is to wear a wet piece of clothing under a dry one. Wear a damp shirt under a dry shirt if your upper body is affected. This can relieve itching and prevent you from scratching the affected area.  · You can also help relieve large areas of itching by taking a lukewarm bath with colloidal oatmeal added to the water.  · Use hydrocortisone cream for redness and irritation, unless another medicine was prescribed. You can also use benzocaine anesthetic cream or spray. Calamine lotion can also relieve mild symptoms.  · Use oral diphenhydramine to help reduce itching. You can buy this antihistamine at drug and grocery stores. It can make you sleepy, so use lower doses during the daytime. Or you can use loratadine. This is an antihistamine that will not " make you sleepy. Do not use diphenhydramine if you have glaucoma or have trouble urinating due to an enlarged prostate.  · If a plant causes your rash, make sure to wash your skin and the clothes you were wearing when you came into contact with the plant. This is to wash away the plant oils that gave you the rash and prevent more or worse symptoms.  · Stay away from the substance or object that causes your symptoms. If you cant avoid it, wear gloves or some other type of protection.  Follow-up care  Follow up with your healthcare provider, or as advised.  When to seek medical advice  Call your healthcare provider right away if any of these occur:  · Spreading of the rash to other parts of your body  · Severe swelling of your face, eyelids, mouth, throat or tongue  · Trouble urinating due to swelling in the genital area  · Fever of 100.4°F (38°C) or higher  · Redness or swelling that gets worse  · Pain that gets worse  · Foul-smelling fluid leaking from the skin  · Yellow-brown crusts on the open blisters  Date Last Reviewed: 9/1/2016  © 7060-6525 [employer]. [address] [place] [address]. All rights reserved. This information is not intended as a substitute for professional medical care. Always follow your healthcare professional's instructions.        Self-Care for Skin Rashes     Pat your skin dry. Do not rub.     When your skin reacts to a substance your body is sensitive to, it can cause a rash. You can treat most rashes at home by keeping the skin clean and dry. Many rashes may get better on their own within 2 to 3 days. You may need medical attention if your rash itches, drains, or hurts, particularly if the rash is getting worse.  What can cause a skin rash?  · Sun poisoning, caused by too much exposure to the sun  · An irritant or allergic reaction to a certain type of food, plant, or chemical, such as  shellfish, poison ivy, and or cleaning products  · An infection caused by a  fungus (ringworm), virus (chickenpox), or bacteria (strep)  · Bites or infestation caused by insects or pests, such as ticks, lice, or mites  · Dry skin, which is often seen during the winter months and in older people  How can I control itching and skin damage?  · Take soothing lukewarm baths in a colloidal oatmeal product. You can buy this at the [employer].  · Do your best not to scratch. Clip fingernails short, especially in young children, to reduce skin damage if scratching does occur.  · Use moisturizing skin lotion instead of scratching your dry skin.  · Use sunscreen whenever going out into direct sun.  · Use only mild cleansing agents whenever possible.  · Wash with mild, nonirritating soap and warm water.  · Wear clothing that breathes, such as cotton shirts or canvas shoes.  · If fluid is seeping from the rash, cover it loosely with clean gauze to absorb the discharge.  · Many rashes are contagious. Prevent the rash from spreading to others by washing your hands often before or after touching others with any skin rash.  Use medicine  · Antihistamines such as diphenhydramine can help control itching. But use with caution because they can make you drowsy.  · Using over-the-counter hydrocortisone cream on small rashes may help reduce swelling and itching  · Most over-the-counter antifungal medicines can treat athletes foot and many other fungal infections of the skin.  Check with your healthcare provider  Call your healthcare provider if:  · You were told that you have a fungal infection on your skin to make sure you have the correct type of medicine.  · You have questions or concerns about medicines or their side effects.     Call 911  Call 911 if either of these occur:  · Your tongue or lips start to swell  · You have difficulty breathing      Call your healthcare provider  Call your healthcare provider if any of these occur:  · Temperature of more than 101.0°F (38.3°C), or as directed  · Sore throat, a  cough, or unusual fatigue  · Red, oozy, or painful rash gets worse. These are signs of infection.  · Rash covers your face, genitals, or most of your body  · Crusty sores or red rings that begin to spread  · You were exposed to someone who has a contagious rash, such as scabies or lice.  · Red bulls-eye rash with a white center (a sign of Lyme disease)  · You were told that you have resistant bacteria (MRSA) on your skin.   Date Last Reviewed: 5/12/2015  © 8704-9189 [employer]. [address] [place] [address]. All rights reserved. This information is not intended as a substitute for professional medical care. Always follow your healthcare professional's instructions.

## 2019-12-20 NOTE — PATIENT INSTRUCTIONS
- Rest.    - Drink plenty of fluids.    - Acetaminophen (tylenol) or Ibuprofen (advil,motrin) as directed as needed for fever/pain. Avoid tylenol if you have a history of liver disease. Do not take ibuprofen if you have a history of GI bleeding, kidney disease, or if you take blood thinners.     - Take over-the-counter claritin, zyrtec, allegra, or xyzal as directed for itching/rash/allergic reaction.    - you can take Benadryl over-the-counter as directed as well for itching/rash/allergic reaction.    - You received a steroid shot today.  This can elevate your blood pressure, elevate your blood sugar, water weight gain, nervous energy, redness to the face and dimpling of the skin where the shot goes in.   - Do not use steroids more than 3 times per year.   - If you have diabetes, please check you blood sugar frequently.  - If you have high blood pressure, please check your blood pressure frequently.   -DO NOT BEGIN ORAL PREDNISONE PILLS UNTIL TOMORROW BECAUSE YOU RECEIVED THE SHOT IN CLINIC TODAY.    - Ice or cool compresses for 15-20 minutes at a time for the next 24-48 hours.      - Follow up with your PCP or specialty clinic as directed in the next 1-2 weeks if not improved or as needed.  You can call [phone] to schedule an appointment with the appropriate provider.    - Go to the ER or seek medical attention immediately if you develop new or worsening symptoms.    - You must understand that you have received an Urgent Care treatment only and that you may be released before all of your medical problems are known or treated.   - You, the patient, will arrange for follow up care as instructed.   - If your condition worsens or fails to improve we recommend that you receive another evaluation at the ER immediately or contact your PCP to discuss your concerns or return here.           Understanding Contact Dermatitis     A cool, moist compress can help reduce itching.     Contact dermatitis is a common type of  skin rash. Its caused by something that touches the skin and makes it irritated and inflamed. It can occur on skin on any part of the body, such as the face, neck, hands, arms, and legs. Contact dermatitis is not spread from person to person.  Often, the reaction of contact dermatitis occurs 1 to 2 days after contact with the offending agent.  How to say it  SHELLEY-tact [id number]   What causes contact dermatitis?  Its caused by something that irritates the skin, or that creates an allergic reaction on the skin. People can get contact dermatitis from many kinds of things. These include:  · Plant oils in poison ivy, oak, and sumac  · Chemicals in household , solvents, and glue  · Chemicals in makeup, soap, laundry detergent, perfume, acne cream, and hair products  · Certain medicines, such as neomycin, bacitracin, benzocaine, and thimerosal  · Metals such as nickel, found in some jewelry and watch bands   · The sticky material on the back of bandages and tape (adhesive)  · Things that can cause tiny breaks in the skin, such as wood, fiberglass, metal tools, and plant thorns  · Rubber latex in surgical gloves and other medical supplies  Dermatitis can also be caused by the skin being damp for long periods of time. This can happen from washing your hands too often, or working with wet materials.  Symptoms of contact dermatitis  Symptoms can include skin that is:  · Blistered  · Burning  · Cracked  · Dry  · Itchy  · Painful  · Red  · Rough, thickened, and leathery  · Swollen  · Warm  The blisters may ooze fluid and form crusts.  Treatment for contact dermatitis  Treatment is done to help relieve itching and reduce inflammation. The rash should go away in a few days to a few weeks. Treatments include:  · Cool, moist compress. Use a clean damp cloth. Put it on the area for 20 to 30 minutes, 5 to 6 times a day for the first 3 days.  · Steroid cream or ointment. You can apply this medicine several times a day on  clean skin.  · Oral corticosteroid. Your healthcare provider may prescribe this medicine if you have severe skin symptoms on a large part of your body.  Your healthcare provider may give you a steroid injection instead of pills.  · Oral antihistamine. This medicine can help reduce itching.  · Colloidal oatmeal bath. Soaking in water with colloidal oatmeal can help soothe skin.  · Plain cream, lotion, or ointment. Cream, lotion, or ointment without medicine can help to soothe and protect your skin.  Living with contact dermatitis  Talk with your healthcare provider about what may have caused your contact dermatitis. Patch testing may help you figure out what caused the rash so you can avoid further contact with it. Once you learn what caused your rash, make sure to avoid that substance. If your skin comes into contact with it again, make sure to wash your skin right away. If you cant avoid the substance, wear gloves or other protective clothing before you touch it. Or use a cream, lotion, or ointment to protect your skin.  When to call your healthcare provider  Call your healthcare provider right away if you have any of these:  · Fever of 100.4°F (38°C) or higher, or as directed  · Symptoms that dont get better, or get worse  · New symptoms   Date Last Reviewed: 5/1/2016 © 2000-2017 [employer]. [address] [place] [address], [place] [address]. All rights reserved. This information is not intended as a substitute for professional medical care. Always follow your healthcare professional's instructions.        Contact Dermatitis  Contact dermatitis is a skin rash caused by something that touches the skin and makes it irritated and inflamed. Your skin may be red, swollen, dry, and may be cracked. Blisters may form and ooze. The rash will itch.  Contact dermatitis can form on the face and neck, backs of hands, forearms, genitals, and lower legs.  People can get contact dermatitis from lots of sources. These  "include:  · Plants such as poison ivy, oak, or sumac  · Chemicals in hair dyes and rinses, soaps, solvents, waxes, fingernail polish, and deodorants   · Jewelry or watchbands made of nickel  Contact dermatitis is not passed from person to person.  Talk with your healthcare provider about what may have caused the rash. A type of allergy testing called "patch testing" may be used to discover what you are allergic to. You will need to avoid the source of your rash in the future to prevent it from coming back.  Treatment is done to relieve itching and prevent the rash from coming back. The rash should go away in a few days to a few weeks.  Home care  Your healthcare provider may prescribe medicine to relieve swelling and itching. Follow all instructions when using these medicines.  General care:  · Avoid anything that heats up your skin, such as hot showers or baths, or direct sunlight. This can make itching worse.  · Apply cold compresses to soothe your sores to help relieve your symptoms. Do this for 30 minutes 3 to 4 times a day. You can make a cold compress by soaking a cloth in cold water. Squeeze out excess water. You can add colloidal oatmeal to the water to help reduce itching. For severe itching in a small area, apply an ice pack wrapped in a thin towel. Do this for 20 minutes 3 to 4 times a day.  · You can also try wet dressings. One way to do this is to wear a wet piece of clothing under a dry one. Wear a damp shirt under a dry shirt if your upper body is affected. This can relieve itching and prevent you from scratching the affected area.  · You can also help relieve large areas of itching by taking a lukewarm bath with colloidal oatmeal added to the water.  · Use hydrocortisone cream for redness and irritation, unless another medicine was prescribed. You can also use benzocaine anesthetic cream or spray. Calamine lotion can also relieve mild symptoms.  · Use oral diphenhydramine to help reduce itching. You " can buy this antihistamine at drug and grocery stores. It can make you sleepy, so use lower doses during the daytime. Or you can use loratadine. This is an antihistamine that will not make you sleepy. Do not use diphenhydramine if you have glaucoma or have trouble urinating due to an enlarged prostate.  · If a plant causes your rash, make sure to wash your skin and the clothes you were wearing when you came into contact with the plant. This is to wash away the plant oils that gave you the rash and prevent more or worse symptoms.  · Stay away from the substance or object that causes your symptoms. If you cant avoid it, wear gloves or some other type of protection.  Follow-up care  Follow up with your healthcare provider, or as advised.  When to seek medical advice  Call your healthcare provider right away if any of these occur:  · Spreading of the rash to other parts of your body  · Severe swelling of your face, eyelids, mouth, throat or tongue  · Trouble urinating due to swelling in the genital area  · Fever of 100.4°F (38°C) or higher  · Redness or swelling that gets worse  · Pain that gets worse  · Foul-smelling fluid leaking from the skin  · Yellow-brown crusts on the open blisters  Date Last Reviewed: 9/1/2016  © 5133-5004 [employer]. [address] [place] [address], [place] [id number]. All rights reserved. This information is not intended as a substitute for professional medical care. Always follow your healthcare professional's instructions.        Self-Care for Skin Rashes     Pat your skin dry. Do not rub.     When your skin reacts to a substance your body is sensitive to, it can cause a rash. You can treat most rashes at home by keeping the skin clean and dry. Many rashes may get better on their own within 2 to 3 days. You may need medical attention if your rash itches, drains, or hurts, particularly if the rash is getting worse.  What can cause a skin rash?  · Sun poisoning, caused by too much  exposure to the sun  · An irritant or allergic reaction to a certain type of food, plant, or chemical, such as  shellfish, poison ivy, and or cleaning products  · An infection caused by a fungus (ringworm), virus (chickenpox), or bacteria (strep)  · Bites or infestation caused by insects or pests, such as ticks, lice, or mites  · Dry skin, which is often seen during the winter months and in older people  How can I control itching and skin damage?  · Take soothing lukewarm baths in a colloidal oatmeal product. You can buy this at the [employer].  · Do your best not to scratch. Clip fingernails short, especially in young children, to reduce skin damage if scratching does occur.  · Use moisturizing skin lotion instead of scratching your dry skin.  · Use sunscreen whenever going out into direct sun.  · Use only mild cleansing agents whenever possible.  · Wash with mild, nonirritating soap and warm water.  · Wear clothing that breathes, such as cotton shirts or canvas shoes.  · If fluid is seeping from the rash, cover it loosely with clean gauze to absorb the discharge.  · Many rashes are contagious. Prevent the rash from spreading to others by washing your hands often before or after touching others with any skin rash.  Use medicine  · Antihistamines such as diphenhydramine can help control itching. But use with caution because they can make you drowsy.  · Using over-the-counter hydrocortisone cream on small rashes may help reduce swelling and itching  · Most over-the-counter antifungal medicines can treat athletes foot and many other fungal infections of the skin.  Check with your healthcare provider  Call your healthcare provider if:  · You were told that you have a fungal infection on your skin to make sure you have the correct type of medicine.  · You have questions or concerns about medicines or their side effects.     Call 911  Call 911 if either of these occur:  · Your tongue or lips start to swell  · You have  difficulty breathing      Call your healthcare provider  Call your healthcare provider if any of these occur:  · Temperature of more than 101.0°F (38.3°C), or as directed  · Sore throat, a cough, or unusual fatigue  · Red, oozy, or painful rash gets worse. These are signs of infection.  · Rash covers your face, genitals, or most of your body  · Crusty sores or red rings that begin to spread  · You were exposed to someone who has a contagious rash, such as scabies or lice.  · Red bulls-eye rash with a white center (a sign of Lyme disease)  · You were told that you have resistant bacteria (MRSA) on your skin.   Date Last Reviewed: 5/12/2015  © 6969-9184 [employer]. [address] [place] [address], [place] [address]. All rights reserved. This information is not intended as a substitute for professional medical care. Always follow your healthcare professional's instructions.

## 2019-12-22 ENCOUNTER — HOSPITAL ENCOUNTER (EMERGENCY)
Facility: OTHER | Age: 59
Discharge: HOME | End: 2019-12-22
Attending: EMERGENCY MEDICINE
Payer: COMMERCIAL

## 2019-12-22 VITALS
SYSTOLIC BLOOD PRESSURE: 138 MMHG | HEIGHT: 69 IN | TEMPERATURE: 99 F | DIASTOLIC BLOOD PRESSURE: 81 MMHG | HEART RATE: 78 BPM | WEIGHT: 209 LBS | OXYGEN SATURATION: 98 % | RESPIRATION RATE: 18 BRPM | BODY MASS INDEX: 30.96 KG/M2

## 2019-12-22 DIAGNOSIS — T78.40XA RASH DUE TO ALLERGY: ICD-10-CM

## 2019-12-22 DIAGNOSIS — T65.91XA ALLERGIC REACTION TO CHEMICAL SUBSTANCE, ACCIDENTAL OR UNINTENTIONAL, INITIAL ENCOUNTER: Primary | ICD-10-CM

## 2019-12-22 DIAGNOSIS — R21 RASH DUE TO ALLERGY: ICD-10-CM

## 2019-12-22 DIAGNOSIS — R60.0 PERIORBITAL EDEMA OF LEFT EYE: ICD-10-CM

## 2019-12-22 PROCEDURE — 96375 TX/PRO/DX INJ NEW DRUG ADDON: CPT

## 2019-12-22 PROCEDURE — 99284 EMERGENCY DEPT VISIT MOD MDM: CPT | Mod: 25

## 2019-12-22 PROCEDURE — S0028 INJECTION, FAMOTIDINE, 20 MG: HCPCS | Performed by: PHYSICIAN ASSISTANT

## 2019-12-22 PROCEDURE — 96374 THER/PROPH/DIAG INJ IV PUSH: CPT

## 2019-12-22 PROCEDURE — 63600175 PHARM REV CODE 636 W HCPCS: Performed by: PHYSICIAN ASSISTANT

## 2019-12-22 PROCEDURE — 25000003 PHARM REV CODE 250: Performed by: PHYSICIAN ASSISTANT

## 2019-12-22 RX ORDER — FAMOTIDINE 20 MG/1
20 TABLET, FILM COATED ORAL 2 TIMES DAILY
Qty: 14 TABLET | Refills: 0 | Status: ON HOLD | OUTPATIENT
Start: 2019-12-22 | End: 2020-01-02

## 2019-12-22 RX ORDER — PREDNISONE 20 MG/1
60 TABLET ORAL
Status: COMPLETED | OUTPATIENT
Start: 2019-12-22 | End: 2019-12-22

## 2019-12-22 RX ORDER — PREDNISONE 20 MG/1
60 TABLET ORAL DAILY
Qty: 12 TABLET | Refills: 0 | Status: SHIPPED | OUTPATIENT
Start: 2019-12-23 | End: 2019-12-27

## 2019-12-22 RX ORDER — CETIRIZINE HYDROCHLORIDE 10 MG/1
10 TABLET ORAL DAILY
Qty: 7 TABLET | Refills: 0 | Status: ON HOLD | OUTPATIENT
Start: 2019-12-22 | End: 2020-01-02

## 2019-12-22 RX ORDER — DIPHENHYDRAMINE HYDROCHLORIDE 50 MG/ML
25 INJECTION INTRAMUSCULAR; INTRAVENOUS
Status: COMPLETED | OUTPATIENT
Start: 2019-12-22 | End: 2019-12-22

## 2019-12-22 RX ORDER — FAMOTIDINE 10 MG/ML
20 INJECTION INTRAVENOUS
Status: COMPLETED | OUTPATIENT
Start: 2019-12-22 | End: 2019-12-22

## 2019-12-22 RX ORDER — MUPIROCIN 20 MG/G
OINTMENT TOPICAL 3 TIMES DAILY
Qty: 15 G | Refills: 0 | Status: SHIPPED | OUTPATIENT
Start: 2019-12-22 | End: 2019-12-31

## 2019-12-22 RX ORDER — CETIRIZINE HYDROCHLORIDE 5 MG/1
10 TABLET ORAL
Status: COMPLETED | OUTPATIENT
Start: 2019-12-22 | End: 2019-12-22

## 2019-12-22 RX ADMIN — PREDNISONE 60 MG: 20 TABLET ORAL at 08:12

## 2019-12-22 RX ADMIN — CETIRIZINE HYDROCHLORIDE 10 MG: 5 TABLET ORAL at 07:12

## 2019-12-22 RX ADMIN — DIPHENHYDRAMINE HYDROCHLORIDE 25 MG: 50 INJECTION, SOLUTION INTRAMUSCULAR; INTRAVENOUS at 08:12

## 2019-12-22 RX ADMIN — FAMOTIDINE 20 MG: 10 INJECTION, SOLUTION INTRAVENOUS at 08:12

## 2019-12-23 ENCOUNTER — TELEPHONE (OUTPATIENT)
Dept: SPORTS MEDICINE | Facility: CLINIC | Age: 59
End: 2019-12-23

## 2019-12-23 NOTE — TELEPHONE ENCOUNTER
Returned pt call to let her know notes were faxed. She stated she had already got in touch with nithin and requested her message with us be cancelled, but appreciated me calling back anyway.

## 2019-12-23 NOTE — ED NOTES
Pt reporting swelling around L eye and blisters to scalp. Pt AAOx4 and appropriate at this time. Respirations even and unlabored. No acute distress noted. Speaking in clear fluent sentences. Denies difficulty breathing.

## 2019-12-23 NOTE — TELEPHONE ENCOUNTER
----- Message from Rafaela Sexton sent at 12/23/2019  9:43 AM CST -----  Contact: pt   Please call pt at [phone]    Patient would like to know if her last office notes have been sent to [facility]?    Thank you

## 2019-12-23 NOTE — ED PROVIDER NOTES
Encounter Date: 12/22/2019       History     Chief Complaint   Patient presents with    Allergic Reaction     since Friday after apply hair dye. Left eye swelling as well left face, and blistering to scalp. Denied SOB/difficulty swallowing. Went to Urgent Care Friday and was treated with IM and oral steroids.      Patient is a 59-year-old female with hypertension and hyperlipidemia presents to the emergency department with a rash to her scalp and eye swelling. Patient reports using a hair dye 4 days ago.  She states she developed a rash to her scalp with swelling, tightness and a blistering rash. She states she has had a similar rash in the past when she used a hair dye approximately 5 years ago.  Patient states she was seen at urgent care 2 days ago and was given a steroid shot and sent home with steroids.  She states she has been taking Benadryl as needed for itching.  She denies trouble breathing or swallowing.  Patient states today she developed swelling around her left eye.  She denies eye pain or vision changes.  She states this area around her eye feels tight .    The history is provided by the patient.     Review of patient's allergies indicates:   Allergen Reactions    Sotradecol [sodium tetradecyl sulfate]      Hives and itching that resolved with Benadryl and Solumedrol.     Past Medical History:   Diagnosis Date    Abnormal Pap smear of cervix yrs ago    Arthritis     History of uterine fibroid     Hyperlipidemia     Hypertension     Shoulder injury     lt    Sinus problem     Sinusitis      Past Surgical History:   Procedure Laterality Date    ARTHROSCOPIC REPAIR OF ROTATOR CUFF OF SHOULDER Left 7/24/2019    Procedure: REPAIR, ROTATOR CUFF, ARTHROSCOPIC;  Surgeon: ASMITA Soto MD;  Location: [facility] OR [address] [place] [address];  Service: Orthopedics;  Laterality: Left;    ARTHROSCOPY OF SHOULDER WITH DECOMPRESSION OF SUBACROMIAL SPACE Left 7/24/2019    Procedure: ARTHROSCOPY, SHOULDER, WITH SUBACROMIAL  SPACE DECOMPRESSION;  Surgeon: ASMITA Soto MD;  Location: [facility] OR 2ND FLR;  Service: Orthopedics;  Laterality: Left;    BREAST BIOPSY  24-25 years old    BREAST SURGERY      CYSTOSCOPY N/A 6/24/2019    Procedure: CYSTOSCOPY;  Surgeon: Anson Ellison MD;  Location: [facility];  Service: OB/GYN;  Laterality: N/A;    ENCEPHALOCELE REPAIR  46    HYSTERECTOMY  93'-95'    ovaries remain    NASAL SINUS SURGERY      ROBOT-ASSISTED LAPAROSCOPIC ABDOMINAL SACROCOLPOPEXY USING DA MAC XI N/A 6/24/2019    Procedure: XI ROBOTIC SACROCOLPOPEXY, ABDOMINAL;  Surgeon: Anson Ellison MD;  Location: [facility];  Service: OB/GYN;  Laterality: N/A;    SHOULDER ARTHROSCOPY Left 7/24/2019    Procedure: BICEPS TENODESIS;  Surgeon: ASMITA Soto MD;  [facility];  Service: Orthopedics;  Laterality: Left;    TUBAL LIGATION      VAGINAL DELIVERY       Family History   Problem Relation Age of Onset    Stroke Maternal Grandmother     Diabetes Mother     Hypertension Mother     Diabetes Sister     Diabetes Sister     Breast cancer Neg Hx     Colon cancer Neg Hx     Ovarian cancer Neg Hx      Social History     Tobacco Use    Smoking status: Never Smoker    Smokeless tobacco: Never Used   Substance Use Topics    Alcohol use: Yes     Alcohol/week: 1.0 standard drinks     Types: 1 Shots of liquor per week     Comment: seldom    Drug use: No     Review of Systems   Constitutional: Negative for chills and fever.   HENT: Positive for facial swelling. Negative for congestion, sore throat, trouble swallowing and voice change.    Eyes: Negative for photophobia, pain, redness, itching and visual disturbance.   Respiratory: Negative for shortness of breath.    Cardiovascular: Negative for chest pain.   Gastrointestinal: Negative for abdominal pain, diarrhea, nausea and vomiting.   Musculoskeletal: Negative for myalgias.   Skin: Positive for rash.   Allergic/Immunologic: Negative for immunocompromised state.    Neurological: Negative for headaches.       Physical Exam     Initial Vitals [12/22/19 1931]   BP Pulse Resp Temp SpO2   (!) 142/77 89 18 98.7 °F (37.1 °C) 98 %      MAP       --         Physical Exam    Vitals reviewed.  Constitutional: She is Obese . She is cooperative. No distress.   HENT:   Head: Normocephalic and atraumatic.   Uvula is normal size and midline. No tongue swelling or lip swelling.   Eyes: Conjunctivae and EOM are normal. Pupils are equal, round, and reactive to light. Left eye exhibits no discharge.   Left eye has periorbital puffiness.  No overlying erythema or warmth.  No tenderness to palpation.   Neck: Normal range of motion. Neck supple.   Cardiovascular: Normal rate, regular rhythm and intact distal pulses.   Pulmonary/Chest: Breath sounds normal. She has no wheezes. She has no rhonchi. She has no rales.   Musculoskeletal: Normal range of motion.   Neurological: She is alert and oriented to person, place, and time. GCS eye subscore is 4. GCS verbal subscore is 5. GCS motor subscore is 6.   Skin: Skin is warm and dry. Rash (Vesicular to the hairline and scalp with yellow using and erythematous base) noted.   Psychiatric: She has a normal mood and affect. Her behavior is normal.         ED Course   Procedures  Labs Reviewed - No data to display       Imaging Results    None          Medical Decision Making:   Initial Assessment:   Urgent evaluation of a 59 y.o. female presenting to the emergency department complaining of rash to scalp and swelling to left eye since applying hair dye 4 days ago. Patient is afebrile, nontoxic appearing and hemodynamically stable.  Patient has contact dermatitis rash to scalp, no significant signs of bacterial infection.  There is some oozing.  -patient has no signs of anaphylaxis.  No angioedema or respiratory problems.  -patient was given 6 mg Celestone injection and low-dose prednisone taper for 3 days.  ED Management:  Patient was given higher dose  prednisone, IV Pepcid, IV Benadryl and oral Zyrtec for her symptoms.  -did explain to patient that her symptoms will take time to improve.  -she will be sent home with a higher dose prednisone taper and antihistamines and mupirocin for her rash.  -she is advised follow up with an allergist return to the emergency department for new worsening symptoms.  -patient had no other complaints and was stable at discharge.  Other:   I have discussed this case with another health care provider.                                 Clinical Impression:     1. Allergic reaction to chemical substance, accidental or unintentional, initial encounter    2. Periorbital edema of left eye    3. Rash due to allergy                            Marco Tello PA-C  12/22/19 [id number]

## 2019-12-30 ENCOUNTER — CLINICAL SUPPORT (OUTPATIENT)
Dept: REHABILITATION | Facility: HOSPITAL | Age: 59
End: 2019-12-30
Payer: COMMERCIAL

## 2019-12-30 ENCOUNTER — HOSPITAL ENCOUNTER (EMERGENCY)
Facility: OTHER | Age: 59
Discharge: HOME | End: 2019-12-30
Attending: EMERGENCY MEDICINE
Payer: COMMERCIAL

## 2019-12-30 VITALS
OXYGEN SATURATION: 99 % | WEIGHT: 209 LBS | SYSTOLIC BLOOD PRESSURE: 142 MMHG | HEART RATE: 87 BPM | HEIGHT: 69 IN | TEMPERATURE: 99 F | DIASTOLIC BLOOD PRESSURE: 82 MMHG | BODY MASS INDEX: 30.96 KG/M2 | RESPIRATION RATE: 18 BRPM

## 2019-12-30 DIAGNOSIS — L23.9 ALLERGIC DERMATITIS: ICD-10-CM

## 2019-12-30 DIAGNOSIS — M25.612 DECREASED ROM OF LEFT SHOULDER: ICD-10-CM

## 2019-12-30 DIAGNOSIS — R21 BLISTERING RASH: ICD-10-CM

## 2019-12-30 DIAGNOSIS — B99.9 SUPERIMPOSED INFECTION: Primary | ICD-10-CM

## 2019-12-30 DIAGNOSIS — S46.012D TRAUMATIC INCOMPLETE TEAR OF LEFT ROTATOR CUFF, SUBSEQUENT ENCOUNTER: ICD-10-CM

## 2019-12-30 PROCEDURE — 97140 MANUAL THERAPY 1/> REGIONS: CPT | Mod: PO

## 2019-12-30 PROCEDURE — 99283 EMERGENCY DEPT VISIT LOW MDM: CPT

## 2019-12-30 PROCEDURE — 97110 THERAPEUTIC EXERCISES: CPT | Mod: PO

## 2019-12-30 PROCEDURE — 97010 HOT OR COLD PACKS THERAPY: CPT | Mod: PO

## 2019-12-30 PROCEDURE — 25000003 PHARM REV CODE 250: Performed by: PHYSICIAN ASSISTANT

## 2019-12-30 RX ORDER — SULFAMETHOXAZOLE AND TRIMETHOPRIM 800; 160 MG/1; MG/1
1 TABLET ORAL 2 TIMES DAILY
Qty: 14 TABLET | Refills: 0 | Status: ON HOLD | OUTPATIENT
Start: 2019-12-30 | End: 2020-01-02 | Stop reason: HOSPADM

## 2019-12-30 RX ORDER — HYDROXYZINE PAMOATE 25 MG/1
25 CAPSULE ORAL
Status: COMPLETED | OUTPATIENT
Start: 2019-12-30 | End: 2019-12-30

## 2019-12-30 RX ORDER — HYDROXYZINE PAMOATE 25 MG/1
25 CAPSULE ORAL EVERY 6 HOURS PRN
Qty: 20 CAPSULE | Refills: 0 | Status: SHIPPED | OUTPATIENT
Start: 2019-12-30 | End: 2021-08-20

## 2019-12-30 RX ADMIN — HYDROXYZINE PAMOATE 25 MG: 25 CAPSULE ORAL at 06:12

## 2019-12-31 ENCOUNTER — HOSPITAL ENCOUNTER (OUTPATIENT)
Facility: OTHER | Age: 59
Discharge: HOME | End: 2020-01-02
Attending: EMERGENCY MEDICINE | Admitting: HOSPITALIST
Payer: COMMERCIAL

## 2019-12-31 DIAGNOSIS — E78.2 MIXED HYPERLIPIDEMIA: ICD-10-CM

## 2019-12-31 DIAGNOSIS — L03.811 CELLULITIS OF SCALP: ICD-10-CM

## 2019-12-31 DIAGNOSIS — R60.0 FACIAL EDEMA: ICD-10-CM

## 2019-12-31 DIAGNOSIS — I10 ESSENTIAL HYPERTENSION: ICD-10-CM

## 2019-12-31 DIAGNOSIS — T50.995A ALLERGIC REACTION TO DYE, INITIAL ENCOUNTER: Primary | ICD-10-CM

## 2019-12-31 LAB
ALBUMIN SERPL BCP-MCNC: 3.7 G/DL (ref 3.5–5.2)
ALP SERPL-CCNC: 88 U/L (ref 55–135)
ALT SERPL W/O P-5'-P-CCNC: 27 U/L (ref 10–44)
ANION GAP SERPL CALC-SCNC: 11 MMOL/L (ref 8–16)
AST SERPL-CCNC: 19 U/L (ref 10–40)
BASOPHILS # BLD AUTO: 0.04 K/UL (ref 0–0.2)
BASOPHILS NFR BLD: 0.4 % (ref 0–1.9)
BILIRUB SERPL-MCNC: 0.3 MG/DL (ref 0.1–1)
BUN SERPL-MCNC: 8 MG/DL (ref 6–20)
CALCIUM SERPL-MCNC: 9.5 MG/DL (ref 8.7–10.5)
CHLORIDE SERPL-SCNC: 104 MMOL/L (ref 95–110)
CO2 SERPL-SCNC: 26 MMOL/L (ref 23–29)
CREAT SERPL-MCNC: 0.8 MG/DL (ref 0.5–1.4)
CRP SERPL-MCNC: 35 MG/L (ref 0–8.2)
DIFFERENTIAL METHOD: ABNORMAL
EOSINOPHIL # BLD AUTO: 0.6 K/UL (ref 0–0.5)
EOSINOPHIL NFR BLD: 5.7 % (ref 0–8)
ERYTHROCYTE [DISTWIDTH] IN BLOOD BY AUTOMATED COUNT: 14.6 % (ref 11.5–14.5)
ERYTHROCYTE [SEDIMENTATION RATE] IN BLOOD: 24 MM/HR (ref 0–20)
EST. GFR  (AFRICAN AMERICAN): >60 ML/MIN/1.73 M^2
EST. GFR  (NON AFRICAN AMERICAN): >60 ML/MIN/1.73 M^2
GLUCOSE SERPL-MCNC: 113 MG/DL (ref 70–110)
HCT VFR BLD AUTO: 43.1 % (ref 37–48.5)
HGB BLD-MCNC: 14.1 G/DL (ref 12–16)
IMM GRANULOCYTES # BLD AUTO: 0.07 K/UL (ref 0–0.04)
IMM GRANULOCYTES NFR BLD AUTO: 0.7 % (ref 0–0.5)
LYMPHOCYTES # BLD AUTO: 2.3 K/UL (ref 1–4.8)
LYMPHOCYTES NFR BLD: 22.1 % (ref 18–48)
MCH RBC QN AUTO: 30.1 PG (ref 27–31)
MCHC RBC AUTO-ENTMCNC: 32.7 G/DL (ref 32–36)
MCV RBC AUTO: 92 FL (ref 82–98)
MONOCYTES # BLD AUTO: 0.8 K/UL (ref 0.3–1)
MONOCYTES NFR BLD: 7.7 % (ref 4–15)
NEUTROPHILS # BLD AUTO: 6.6 K/UL (ref 1.8–7.7)
NEUTROPHILS NFR BLD: 63.4 % (ref 38–73)
NRBC BLD-RTO: 0 /100 WBC
PLATELET # BLD AUTO: 349 K/UL (ref 150–350)
PMV BLD AUTO: 9.3 FL (ref 9.2–12.9)
POTASSIUM SERPL-SCNC: 4 MMOL/L (ref 3.5–5.1)
PROT SERPL-MCNC: 7.1 G/DL (ref 6–8.4)
RBC # BLD AUTO: 4.69 M/UL (ref 4–5.4)
SODIUM SERPL-SCNC: 141 MMOL/L (ref 136–145)
WBC # BLD AUTO: 10.44 K/UL (ref 3.9–12.7)

## 2019-12-31 PROCEDURE — 99220 PR INITIAL OBSERVATION CARE,LEVL III: CPT | Mod: ,,, | Performed by: NURSE PRACTITIONER

## 2019-12-31 PROCEDURE — G0378 HOSPITAL OBSERVATION PER HR: HCPCS

## 2019-12-31 PROCEDURE — 25000003 PHARM REV CODE 250: Performed by: NURSE PRACTITIONER

## 2019-12-31 PROCEDURE — 96375 TX/PRO/DX INJ NEW DRUG ADDON: CPT

## 2019-12-31 PROCEDURE — 25000003 PHARM REV CODE 250: Performed by: PHYSICIAN ASSISTANT

## 2019-12-31 PROCEDURE — 63600175 PHARM REV CODE 636 W HCPCS: Performed by: NURSE PRACTITIONER

## 2019-12-31 PROCEDURE — 96365 THER/PROPH/DIAG IV INF INIT: CPT

## 2019-12-31 PROCEDURE — 85651 RBC SED RATE NONAUTOMATED: CPT

## 2019-12-31 PROCEDURE — 63600175 PHARM REV CODE 636 W HCPCS: Performed by: PHYSICIAN ASSISTANT

## 2019-12-31 PROCEDURE — 87040 BLOOD CULTURE FOR BACTERIA: CPT

## 2019-12-31 PROCEDURE — 85025 COMPLETE CBC W/AUTO DIFF WBC: CPT

## 2019-12-31 PROCEDURE — S0028 INJECTION, FAMOTIDINE, 20 MG: HCPCS | Performed by: PHYSICIAN ASSISTANT

## 2019-12-31 PROCEDURE — 96366 THER/PROPH/DIAG IV INF ADDON: CPT

## 2019-12-31 PROCEDURE — 99220 PR INITIAL OBSERVATION CARE,LEVL III: ICD-10-PCS | Mod: ,,, | Performed by: NURSE PRACTITIONER

## 2019-12-31 PROCEDURE — 80053 COMPREHEN METABOLIC PANEL: CPT

## 2019-12-31 PROCEDURE — 96372 THER/PROPH/DIAG INJ SC/IM: CPT | Mod: 59

## 2019-12-31 PROCEDURE — 36415 COLL VENOUS BLD VENIPUNCTURE: CPT

## 2019-12-31 PROCEDURE — 99285 EMERGENCY DEPT VISIT HI MDM: CPT | Mod: 25

## 2019-12-31 PROCEDURE — 86140 C-REACTIVE PROTEIN: CPT

## 2019-12-31 RX ORDER — ENOXAPARIN SODIUM 100 MG/ML
40 INJECTION SUBCUTANEOUS EVERY 24 HOURS
Status: DISCONTINUED | OUTPATIENT
Start: 2019-12-31 | End: 2020-01-02 | Stop reason: HOSPADM

## 2019-12-31 RX ORDER — METHYLPREDNISOLONE SOD SUCC 125 MG
125 VIAL (EA) INJECTION
Status: COMPLETED | OUTPATIENT
Start: 2019-12-31 | End: 2019-12-31

## 2019-12-31 RX ORDER — HYDROXYZINE PAMOATE 25 MG/1
25 CAPSULE ORAL EVERY 6 HOURS PRN
Status: DISCONTINUED | OUTPATIENT
Start: 2019-12-31 | End: 2020-01-02 | Stop reason: HOSPADM

## 2019-12-31 RX ORDER — ATORVASTATIN CALCIUM 20 MG/1
80 TABLET, FILM COATED ORAL NIGHTLY
Status: DISCONTINUED | OUTPATIENT
Start: 2019-12-31 | End: 2020-01-02 | Stop reason: HOSPADM

## 2019-12-31 RX ORDER — SODIUM CHLORIDE 0.9 % (FLUSH) 0.9 %
10 SYRINGE (ML) INJECTION
Status: DISCONTINUED | OUTPATIENT
Start: 2019-12-31 | End: 2020-01-01

## 2019-12-31 RX ORDER — DIPHENHYDRAMINE HYDROCHLORIDE 50 MG/ML
25 INJECTION INTRAMUSCULAR; INTRAVENOUS
Status: COMPLETED | OUTPATIENT
Start: 2019-12-31 | End: 2019-12-31

## 2019-12-31 RX ORDER — HYDROCHLOROTHIAZIDE 25 MG/1
25 TABLET ORAL DAILY
Status: DISCONTINUED | OUTPATIENT
Start: 2020-01-01 | End: 2020-01-02 | Stop reason: HOSPADM

## 2019-12-31 RX ORDER — ASPIRIN 81 MG/1
81 TABLET ORAL DAILY
Status: DISCONTINUED | OUTPATIENT
Start: 2020-01-01 | End: 2020-01-02 | Stop reason: HOSPADM

## 2019-12-31 RX ORDER — VANCOMYCIN HCL IN 5 % DEXTROSE 1.5G/250ML
1500 PLASTIC BAG, INJECTION (ML) INTRAVENOUS
Status: DISCONTINUED | OUTPATIENT
Start: 2020-01-01 | End: 2020-01-01

## 2019-12-31 RX ORDER — CEFEPIME HYDROCHLORIDE 1 G/50ML
1 INJECTION, SOLUTION INTRAVENOUS
Status: DISCONTINUED | OUTPATIENT
Start: 2019-12-31 | End: 2020-01-02

## 2019-12-31 RX ORDER — PREDNISONE 50 MG/1
50 TABLET ORAL DAILY
Status: DISCONTINUED | OUTPATIENT
Start: 2020-01-01 | End: 2020-01-01

## 2019-12-31 RX ORDER — ACETAMINOPHEN 325 MG/1
650 TABLET ORAL EVERY 4 HOURS PRN
Status: DISCONTINUED | OUTPATIENT
Start: 2019-12-31 | End: 2020-01-02 | Stop reason: HOSPADM

## 2019-12-31 RX ORDER — FAMOTIDINE 10 MG/ML
20 INJECTION INTRAVENOUS
Status: COMPLETED | OUTPATIENT
Start: 2019-12-31 | End: 2019-12-31

## 2019-12-31 RX ORDER — ONDANSETRON 8 MG/1
8 TABLET, ORALLY DISINTEGRATING ORAL EVERY 8 HOURS PRN
Status: DISCONTINUED | OUTPATIENT
Start: 2019-12-31 | End: 2020-01-02 | Stop reason: HOSPADM

## 2019-12-31 RX ORDER — SODIUM CHLORIDE 0.9 % (FLUSH) 0.9 %
10 SYRINGE (ML) INJECTION
Status: DISCONTINUED | OUTPATIENT
Start: 2019-12-31 | End: 2020-01-02 | Stop reason: HOSPADM

## 2019-12-31 RX ADMIN — VANCOMYCIN HYDROCHLORIDE 2000 MG: 100 INJECTION, POWDER, LYOPHILIZED, FOR SOLUTION INTRAVENOUS at 09:12

## 2019-12-31 RX ADMIN — DIPHENHYDRAMINE HYDROCHLORIDE 25 MG: 50 INJECTION, SOLUTION INTRAMUSCULAR; INTRAVENOUS at 07:12

## 2019-12-31 RX ADMIN — METHYLPREDNISOLONE SODIUM SUCCINATE 125 MG: 125 INJECTION, POWDER, FOR SOLUTION INTRAMUSCULAR; INTRAVENOUS at 07:12

## 2019-12-31 RX ADMIN — FAMOTIDINE 20 MG: 10 INJECTION, SOLUTION INTRAVENOUS at 07:12

## 2019-12-31 RX ADMIN — ATORVASTATIN CALCIUM 80 MG: 20 TABLET, FILM COATED ORAL at 11:12

## 2019-12-31 RX ADMIN — ENOXAPARIN SODIUM 40 MG: 100 INJECTION SUBCUTANEOUS at 11:12

## 2019-12-31 NOTE — ED PROVIDER NOTES
Encounter Date: 12/30/2019       History     Chief Complaint   Patient presents with    Allergic Reaction     +Pt reporting scalp itching with blistering and drainage. Pt reorts she was seen/evaulated in ED for possible reaction to hair dye. Pt c/o continued s/s despite taking medications. No respiratory distress noted.      Patient is a 59-year-old female with hypertension and hyperlipidemia who presents to the emergency department with a blistering rash. Patient was seen here in the emergency department by myself 8 days ago.  She was sent home with medications for allergic dermatitis and topical mupirocin for the rash on her scalp.  She reports swelling to her left eye has improved but she is still experiencing swelling her scalp, blistering rash with using and pus draining near her right ear.  She states this area is pruritic.  She denies trouble swallowing or breathing.    The history is provided by the patient.     Review of patient's allergies indicates:   Allergen Reactions    Sotradecol [sodium tetradecyl sulfate]      Hives and itching that resolved with Benadryl and Solumedrol.     Past Medical History:   Diagnosis Date    Abnormal Pap smear of cervix yrs ago    Arthritis     History of uterine fibroid     Hyperlipidemia     Hypertension     Shoulder injury     lt    Sinus problem     Sinusitis      Past Surgical History:   Procedure Laterality Date    ARTHROSCOPIC REPAIR OF ROTATOR CUFF OF SHOULDER Left 7/24/2019    Procedure: REPAIR, ROTATOR CUFF, ARTHROSCOPIC;  Surgeon: ASMITA Soto MD;  Location: [facility] OR [address] [place] [address];  Service: Orthopedics;  Laterality: Left;    ARTHROSCOPY OF SHOULDER WITH DECOMPRESSION OF SUBACROMIAL SPACE Left 7/24/2019    Procedure: ARTHROSCOPY, SHOULDER, WITH SUBACROMIAL SPACE DECOMPRESSION;  Surgeon: ASMITA Soto MD;  Location: [facility] OR [address] [place] [address];  Service: Orthopedics;  Laterality: Left;    BREAST BIOPSY  24-25 years old    BREAST SURGERY      CYSTOSCOPY N/A  6/24/2019    Procedure: CYSTOSCOPY;  Surgeon: Anson Ellison MD;  Location: [facility] OR;  Service: OB/GYN;  Laterality: N/A;    ENCEPHALOCELE REPAIR  46    HYSTERECTOMY  93'-95'    ovaries remain    NASAL SINUS SURGERY      ROBOT-ASSISTED LAPAROSCOPIC ABDOMINAL SACROCOLPOPEXY USING DA MAC XI N/A 6/24/2019    Procedure: XI ROBOTIC SACROCOLPOPEXY, ABDOMINAL;  Surgeon: Anson Ellison MD;  Location: [facility] OR;  Service: OB/GYN;  Laterality: N/A;    SHOULDER ARTHROSCOPY Left 7/24/2019    Procedure: BICEPS TENODESIS;  Surgeon: ASMITA Soto MD;  Location: [facility] OR [address] [place] [address];  Service: Orthopedics;  Laterality: Left;    TUBAL LIGATION      VAGINAL DELIVERY       Family History   Problem Relation Age of Onset    Stroke Maternal Grandmother     Diabetes Mother     Hypertension Mother     Diabetes Sister     Diabetes Sister     Breast cancer Neg Hx     Colon cancer Neg Hx     Ovarian cancer Neg Hx      Social History     Tobacco Use    Smoking status: Never Smoker    Smokeless tobacco: Never Used   Substance Use Topics    Alcohol use: Yes     Alcohol/week: 1.0 standard drinks     Types: 1 Shots of liquor per week     Comment: seldom    Drug use: No     Review of Systems   Constitutional: Negative for chills and fever.   HENT: Positive for facial swelling. Negative for congestion and trouble swallowing.    Eyes:        Improving periorbital swelling to left eye   Respiratory: Negative for shortness of breath.    Cardiovascular: Negative for chest pain.   Gastrointestinal: Negative for abdominal pain, diarrhea, nausea and vomiting.   Musculoskeletal: Negative for arthralgias.   Skin: Positive for rash.   Allergic/Immunologic: Negative for immunocompromised state.   Neurological: Negative for dizziness and headaches.       Physical Exam     Initial Vitals [12/30/19 1722]   BP Pulse Resp Temp SpO2   (!) 142/82 87 18 98.9 °F (37.2 °C) 99 %      MAP       --         Physical Exam    Constitutional: Vital signs  are normal. She is cooperative.   HENT:   Head: Normocephalic and atraumatic.   Eyes: Conjunctivae and EOM are normal.   Mild periorbital swelling to left eye.  No overlying skin changes.   Neck: Normal range of motion. Neck supple.   Cardiovascular: Normal rate, regular rhythm and intact distal pulses.   Pulmonary/Chest: Breath sounds normal. She has no wheezes. She has no rhonchi. She has no rales.   Neurological: She is alert and oriented to person, place, and time. GCS eye subscore is 4. GCS verbal subscore is 5. GCS motor subscore is 6.   Skin: Skin is warm and dry. Rash noted.   Mild edema noted to patient's hairline.  There is a diffuse blistering rash with yellow oozing material to patient's scalp.  No purulence.  No vesicles.   Psychiatric: She has a normal mood and affect. Her behavior is normal.         ED Course   Procedures  Labs Reviewed - No data to display       Imaging Results    None          Medical Decision Making:   Initial Assessment:   Urgent evaluation of a 59 y.o. female presenting to the emergency department complaining of persistent rash to scalp after allergic reaction from hair dye. Patient is afebrile, nontoxic appearing and hemodynamically stable.  Patient attempted topical mupirocin and antihistamines, has had improvement to swelling but rash remains losing and patient reports purulent rash as well. No evidence of purulence on exam.  Yellow oozing rash noted.  Will treat for superimposed bacterial infection with Bactrim.  Patient is strongly encouraged follow-up with Dermatology.  She had no other complaints today and was stable at discharge.                                 Clinical Impression:     1. Superimposed infection    2. Blistering rash    3. Allergic dermatitis                            Marco Tello PA-C  12/30/19 [id number]

## 2019-12-31 NOTE — PROGRESS NOTES
[facility]  Physical Therapy Note     Name: Rosamaria Agosto  Clinic Number: [id number]     Therapy Diagnosis: decreased ROM and pain at the shoulder s/p RTC  Physician: Arina Corea PA-C     Physician Orders: PT Eval and Treat   Medical Diagnosis from Referral: s/p RTC repair and biceps tenodesis  Evaluation Date: 7/26/2019  Authorization Period Expiration: 12/31/19  Plan of Care Expiration: 10/31/19 New plan of care extended to 2/29/20 with approved certifiction extension  Visit # / Visits authorized: new script 8/30;   30/30 completed     Time In:  1100 am  Time Out: 1210 pm  Total Billable Time:  70 minutes ( MT 1, TE -3,) + 10 min ice pack     Precautions: Standard, RTC protocol NO AROM at elbow joint s/p biceps tenodesis, PROM at elbow and shoulder but no excessive ER stopping at initial end feel per phase I protocol and follow up with MD for protocol     Subjective      Pt reports:  Continues to feel good mobility with just tightness and no soreness this morning from the weekend  .  Pain : 0/10 at beginning and end of session 0-1/10 mostly tightness  Location: Left shoulder    TREATMENT     Rosamaria received therapeutic exercises to develop ROM for 40 minutes including:           Pulley with cues into scaption 2  x 1 min with mod cues for shoulder hiking nd proper plane of motion..NP    Shoulder rows 3 x10 with YTB - NP  Shoulder extension 3 x10 with YTB-  NP    D2 flexion and then D2 extension pattern with min to mod resistance in supine    PROM shoulder scaption then scaption to left and then to right  And then in abduction each seated rolling ball, cues for decreased UT activity 2x1 min each direction with 20 sec hold at the end of each set to stretch GH joint  AROM shoulder scaption and then abduction 2 x10 with stretch at end ROM 20-30 secs after each set against the wall    scapular depression with ER 3x15 wth 2 sec hold on edge of mat and pushing down into the mat  NP  Scapular retraction 3 x15 with 2 sec hold NP    Shoulder depression with 13# pulley with use of RUE to assist LUE to perform lat pulldown and LUE to hold in bottom position shoulder depression RUE assist to return to overhead position and relax at top of the range 2 x 15 with 10 sec stretch at top position for PROM NP      AAROM  3 x 10 reps scaption and again in abduction in midrange to keep smooth AAROM and  with pt in standng arm on PT, PT apply humeral inferior glide with scaption motion teres minor stretch- held static 3x 20 sec    Pulley 3 x 1min in scaption and then in abduction NP    AROM with PT into scaption and then in abduction in sidelying with cues for shoulder depression in supine and in tolerated incline position 45 and 70 deg 3 x10 each as tolerated towards progressive upright posture.from 10/28/19 scaption and serratus punches with 2#    Self AAROM with use cane or PVC pipe and RUE in supine and then in standing: scaption with elbow flexed toward full extension at end rom of scaption - 2x 10 reps:   Self AAROM in supine scaption with elbow extending for full ROM 2 x10     AROM at L shoulder 12/31/19  165 deg abd, in sidelying in 75 degrees elevation in head of mat    170 deg flex in supine with 75 deg elevation in head of mat  with cues to avoid shoulder hike, compensation.  60 deg with ER stopping at initial end feel in standing  And   Elbow extension at  0 deg    AAROM/ AROM 12/31/19  upright standing on min A  in midrange for AAROM and at end ROM  170/150 deg scaption  165/130 deg abuction  70 ER      Strength 12/31/19  scaption and abduction of left shoulder 3-/5  ER 3+/5    Manual therapy 20 min with STM./ MFR to pect minor, teres minor, and rhomboids, upper traps and levator, GH mobs grade I/II AP and lateral distraction for pain relief into empty end feel before restrictions and GH mobs grade Iv with distraction  in all direction for increased ROM  - teres minor stretch at end ROM in  scaption and abduction with inferior humeral glide  - manual stretch pec minor  -subscap release with ER motion  - inferior humeral glide with inferior mobilization Grade IV    10 min ice to left shoulder    Education provided:   Sleeping position, sling positioning and shoulder pendulums, donning strategy for sling, review post surgery restrictions. Pt issued putty for , decreased edema pooling at elbow     Written Home Exercises Provided: yes.  Exercises were reviewed and Rosamaria was able to demonstrate them prior to the end of the session.  Rosamaria demonstrated good  understanding of the education provided.      See EMR under Patient Instructions for exercises provided 7/26/2019.     Assessment     Pt with improved subscapular and GH motion today but improved with manual able to achieve PROM in scaption 170 deg and abduction at 165 in standing position with cane to assist with ER  at 70 with inferior glide at upper end of end ROM help with continued GH.  Pt with mild improvement of mechanics but need tactile cues to eliminate compensation and proper tracking in plane of motion to avoid compensation and shoulder elevation and overcome GH tightness and tension on top of the weakness due to early elevation which impairs AROM thru available ROM. Pt able to achieve AAROM thru full available ROM against wall and with use of cane with only min A to help through AROM as mechanics still continues to improve to limited hindrance of early scapular elevation.  Pt continues to progress with functional mobility and strength at 4-/5 thru 75% of available ROM and 3-/5 grossly at last 25% of available ROM.  Pt will continue to progress with strengthening in next 3-4 weeks thru available ROM and with return to work.  With 22 approved visits on last prescription PT to amend POC to extend to 2/29/20 2x per week for 8 weeks to achieve functional goals with return to work and combined ROM    Goals:  Short Term Goals: 3 weeks   1.  Pt  "independent with HEP for s/p RTC repair and biceps tenodesis with return demonstration. MET  2.  Pt to be independent with donning and doffing surgical sling for proper pain management . Met 19  3.  Pt to increase PROM as tolerated at left shoulder to 90 deg shoulder flexion and 80 deg abduction without pain. MET  4.  Pt to decrease overall pain at left shoulder to less than 4/10 after session. Met 19     Long Term Goals: 12 weeks   1.  Pt to been independent with discharge HEP without cues and proper form.  2.  Pt return to independent ADLs to include dressing, grooming with BUEs without compensation.  3.  Pt to increased AROM at elbow WNL and Left shoulder to 170 deg flexion and 160 deg abduction, 70 deg ER without pain or compensation  4.  Pt to increased L shoulder strength to 4+/5 into flexion extension, ER, IR   5  Pt to increase tolerance to perform 1 hour of household chores, ie cleaning without increased L shoulder pain  6. Pt to decrease pain at left shoulder to 3/10 after session for improved functional activities. MET    NEW LT. Return to work without limitation and with no increased pain at end of the day less than 2/10 pain grossly for increased tolerance  8. Combined ROM into flex/ER and ext/IR left shoulder within 4" of R shoulder with reach up and down thoracic spine.          Plan   Plan of care Certification: 2019 to 10/31/19 extend new script to 20 with approval of certification period    Cont with  New POC request extend POC to 20   Shnael Carvalho, PT      "

## 2020-01-01 PROBLEM — T50.995A ALLERGIC REACTION TO DYE: Status: ACTIVE | Noted: 2019-12-31

## 2020-01-01 PROBLEM — R60.0 FACIAL EDEMA: Status: ACTIVE | Noted: 2020-01-01

## 2020-01-01 LAB
ALBUMIN SERPL BCP-MCNC: 3.7 G/DL (ref 3.5–5.2)
ALP SERPL-CCNC: 89 U/L (ref 55–135)
ALT SERPL W/O P-5'-P-CCNC: 27 U/L (ref 10–44)
ANION GAP SERPL CALC-SCNC: 12 MMOL/L (ref 8–16)
AST SERPL-CCNC: 16 U/L (ref 10–40)
BASOPHILS NFR BLD: 0 % (ref 0–1.9)
BILIRUB SERPL-MCNC: 0.3 MG/DL (ref 0.1–1)
BUN SERPL-MCNC: 8 MG/DL (ref 6–20)
CALCIUM SERPL-MCNC: 10 MG/DL (ref 8.7–10.5)
CHLORIDE SERPL-SCNC: 104 MMOL/L (ref 95–110)
CHOLEST SERPL-MCNC: 325 MG/DL (ref 120–199)
CHOLEST/HDLC SERPL: 4.9 {RATIO} (ref 2–5)
CO2 SERPL-SCNC: 22 MMOL/L (ref 23–29)
CREAT SERPL-MCNC: 0.7 MG/DL (ref 0.5–1.4)
DIFFERENTIAL METHOD: ABNORMAL
EOSINOPHIL NFR BLD: 0 % (ref 0–8)
ERYTHROCYTE [DISTWIDTH] IN BLOOD BY AUTOMATED COUNT: 14.6 % (ref 11.5–14.5)
EST. GFR  (AFRICAN AMERICAN): >60 ML/MIN/1.73 M^2
EST. GFR  (NON AFRICAN AMERICAN): >60 ML/MIN/1.73 M^2
GIANT PLATELETS BLD QL SMEAR: PRESENT
GLUCOSE SERPL-MCNC: 215 MG/DL (ref 70–110)
HCT VFR BLD AUTO: 45.4 % (ref 37–48.5)
HDLC SERPL-MCNC: 67 MG/DL (ref 40–75)
HDLC SERPL: 20.6 % (ref 20–50)
HGB BLD-MCNC: 14.5 G/DL (ref 12–16)
IMM GRANULOCYTES # BLD AUTO: ABNORMAL K/UL (ref 0–0.04)
IMM GRANULOCYTES NFR BLD AUTO: ABNORMAL % (ref 0–0.5)
LDLC SERPL CALC-MCNC: 240.8 MG/DL (ref 63–159)
LYMPHOCYTES NFR BLD: 9 % (ref 18–48)
MAGNESIUM SERPL-MCNC: 1.9 MG/DL (ref 1.6–2.6)
MCH RBC QN AUTO: 29.2 PG (ref 27–31)
MCHC RBC AUTO-ENTMCNC: 31.9 G/DL (ref 32–36)
MCV RBC AUTO: 92 FL (ref 82–98)
MONOCYTES NFR BLD: 1 % (ref 4–15)
NEUTROPHILS NFR BLD: 83 % (ref 38–73)
NEUTS BAND NFR BLD MANUAL: 7 %
NONHDLC SERPL-MCNC: 258 MG/DL
NRBC BLD-RTO: 0 /100 WBC
PHOSPHATE SERPL-MCNC: 1.9 MG/DL (ref 2.7–4.5)
PLATELET # BLD AUTO: 363 K/UL (ref 150–350)
PMV BLD AUTO: 9.6 FL (ref 9.2–12.9)
POTASSIUM SERPL-SCNC: 4.6 MMOL/L (ref 3.5–5.1)
PROT SERPL-MCNC: 7.6 G/DL (ref 6–8.4)
RBC # BLD AUTO: 4.96 M/UL (ref 4–5.4)
SODIUM SERPL-SCNC: 138 MMOL/L (ref 136–145)
TRIGL SERPL-MCNC: 86 MG/DL (ref 30–150)
WBC # BLD AUTO: 11.82 K/UL (ref 3.9–12.7)

## 2020-01-01 PROCEDURE — 94761 N-INVAS EAR/PLS OXIMETRY MLT: CPT

## 2020-01-01 PROCEDURE — 96376 TX/PRO/DX INJ SAME DRUG ADON: CPT

## 2020-01-01 PROCEDURE — 85027 COMPLETE CBC AUTOMATED: CPT

## 2020-01-01 PROCEDURE — 25000003 PHARM REV CODE 250: Performed by: NURSE PRACTITIONER

## 2020-01-01 PROCEDURE — 99226 PR SUBSEQUENT OBSERVATION CARE,LEVEL III: CPT | Mod: ,,, | Performed by: NURSE PRACTITIONER

## 2020-01-01 PROCEDURE — 80053 COMPREHEN METABOLIC PANEL: CPT

## 2020-01-01 PROCEDURE — 96375 TX/PRO/DX INJ NEW DRUG ADDON: CPT

## 2020-01-01 PROCEDURE — 84100 ASSAY OF PHOSPHORUS: CPT

## 2020-01-01 PROCEDURE — 99226 PR SUBSEQUENT OBSERVATION CARE,LEVEL III: ICD-10-PCS | Mod: ,,, | Performed by: NURSE PRACTITIONER

## 2020-01-01 PROCEDURE — 96366 THER/PROPH/DIAG IV INF ADDON: CPT

## 2020-01-01 PROCEDURE — 85007 BL SMEAR W/DIFF WBC COUNT: CPT | Mod: NCS

## 2020-01-01 PROCEDURE — G0378 HOSPITAL OBSERVATION PER HR: HCPCS

## 2020-01-01 PROCEDURE — 36415 COLL VENOUS BLD VENIPUNCTURE: CPT

## 2020-01-01 PROCEDURE — 63600175 PHARM REV CODE 636 W HCPCS: Performed by: NURSE PRACTITIONER

## 2020-01-01 PROCEDURE — 80061 LIPID PANEL: CPT

## 2020-01-01 PROCEDURE — 83735 ASSAY OF MAGNESIUM: CPT

## 2020-01-01 PROCEDURE — 63600175 PHARM REV CODE 636 W HCPCS

## 2020-01-01 PROCEDURE — 96372 THER/PROPH/DIAG INJ SC/IM: CPT

## 2020-01-01 RX ORDER — PREDNISONE 20 MG/1
40 TABLET ORAL DAILY
Status: DISCONTINUED | OUTPATIENT
Start: 2020-01-02 | End: 2020-01-02 | Stop reason: HOSPADM

## 2020-01-01 RX ORDER — FAMOTIDINE 20 MG/1
20 TABLET, FILM COATED ORAL 2 TIMES DAILY
Status: DISCONTINUED | OUTPATIENT
Start: 2020-01-01 | End: 2020-01-02 | Stop reason: HOSPADM

## 2020-01-01 RX ORDER — CETIRIZINE HYDROCHLORIDE 5 MG/1
10 TABLET ORAL DAILY
Status: DISCONTINUED | OUTPATIENT
Start: 2020-01-01 | End: 2020-01-02 | Stop reason: HOSPADM

## 2020-01-01 RX ADMIN — HYDROCHLOROTHIAZIDE 25 MG: 25 TABLET ORAL at 09:01

## 2020-01-01 RX ADMIN — ENOXAPARIN SODIUM 40 MG: 100 INJECTION SUBCUTANEOUS at 05:01

## 2020-01-01 RX ADMIN — ASPIRIN 81 MG: 81 TABLET ORAL at 09:01

## 2020-01-01 RX ADMIN — CETIRIZINE HYDROCHLORIDE 10 MG: 5 TABLET ORAL at 06:01

## 2020-01-01 RX ADMIN — ATORVASTATIN CALCIUM 80 MG: 20 TABLET, FILM COATED ORAL at 09:01

## 2020-01-01 RX ADMIN — VANCOMYCIN HYDROCHLORIDE 1500 MG: 100 INJECTION, POWDER, LYOPHILIZED, FOR SOLUTION INTRAVENOUS at 10:01

## 2020-01-01 RX ADMIN — CEFEPIME HYDROCHLORIDE 1 G: 1 INJECTION, SOLUTION INTRAVENOUS at 12:01

## 2020-01-01 RX ADMIN — FAMOTIDINE 20 MG: 20 TABLET, FILM COATED ORAL at 06:01

## 2020-01-01 RX ADMIN — CEFEPIME HYDROCHLORIDE 1 G: 1 INJECTION, SOLUTION INTRAVENOUS at 01:01

## 2020-01-01 RX ADMIN — PREDNISONE 50 MG: 50 TABLET ORAL at 09:01

## 2020-01-01 NOTE — SUBJECTIVE & OBJECTIVE
Past Medical History:   Diagnosis Date    Abnormal Pap smear of cervix yrs ago    Arthritis     History of uterine fibroid     Hyperlipidemia     Hypertension     Shoulder injury     lt    Sinus problem     Sinusitis        Past Surgical History:   Procedure Laterality Date    ARTHROSCOPIC REPAIR OF ROTATOR CUFF OF SHOULDER Left 7/24/2019    Procedure: REPAIR, ROTATOR CUFF, ARTHROSCOPIC;  Surgeon: ASMITA Soto MD;  Location: [address];  Service: Orthopedics;  Laterality: Left;    ARTHROSCOPY OF SHOULDER WITH DECOMPRESSION OF SUBACROMIAL SPACE Left 7/24/2019    Procedure: ARTHROSCOPY, SHOULDER, WITH SUBACROMIAL SPACE DECOMPRESSION;  Surgeon: ASMITA Soto MD;  Location: [address];  Service: Orthopedics;  Laterality: Left;    BREAST BIOPSY  24-25 years old    BREAST SURGERY      CYSTOSCOPY N/A 6/24/2019    Procedure: CYSTOSCOPY;  Surgeon: Anson Ellison MD;  Location: [facility];  Service: OB/GYN;  Laterality: N/A;    ENCEPHALOCELE REPAIR  46    HYSTERECTOMY  93'-95'    ovaries remain    NASAL SINUS SURGERY      ROBOT-ASSISTED LAPAROSCOPIC ABDOMINAL SACROCOLPOPEXY USING DA MAC XI N/A 6/24/2019    Procedure: XI ROBOTIC SACROCOLPOPEXY, ABDOMINAL;  Surgeon: Anson Ellison MD;  Location: [facility];  Service: OB/GYN;  Laterality: N/A;    SHOULDER ARTHROSCOPY Left 7/24/2019    Procedure: BICEPS TENODESIS;  Surgeon: ASMITA Soto MD;  Location: [address];  Service: Orthopedics;  Laterality: Left;    TUBAL LIGATION      VAGINAL DELIVERY         Review of patient's allergies indicates:   Allergen Reactions    Sotradecol [sodium tetradecyl sulfate]      Hives and itching that resolved with Benadryl and Solumedrol.       No current facility-administered medications on file prior to encounter.      Current Outpatient Medications on File Prior to Encounter   Medication Sig    acetaminophen (TYLENOL) 650 MG TbSR Take 1,300 mg by mouth as needed.    atorvastatin (LIPITOR) 80 MG  tablet Take 1 tablet (80 mg total) by mouth nightly.    hydroCHLOROthiazide (HYDRODIURIL) 25 MG tablet Take 1 tablet (25 mg total) by mouth once daily.    hydrOXYzine pamoate (VISTARIL) 25 MG Cap Take 1 capsule (25 mg total) by mouth every 6 (six) hours as needed (itching).    sulfamethoxazole-trimethoprim 800-160mg (BACTRIM DS) 800-160 mg Tab Take 1 tablet by mouth 2 (two) times daily. for 7 days    aspirin (ECOTRIN) 81 MG EC tablet Take 1 tablet (81 mg total) by mouth 2 (two) times daily. for 14 days (Patient taking differently: Take 81 mg by mouth once daily. )    azelastine (ASTELIN) 137 mcg (0.1 %) nasal spray 1 spray (137 mcg total) by Nasal route 2 (two) times daily.    cetirizine (ZYRTEC) 10 MG tablet Take 1 tablet (10 mg total) by mouth once daily. for 7 days    famotidine (PEPCID) 20 MG tablet Take 1 tablet (20 mg total) by mouth 2 (two) times daily. for 7 days    flavoxATE (URISPAS) 100 mg Tab Take 1 tablet (100 mg total) by mouth 3 (three) times daily as needed.    HYDROcodone-acetaminophen (NORCO) 5-325 mg per tablet Take 1 tablet by mouth every 6 (six) hours as needed for Pain.    meloxicam (MOBIC) 15 MG tablet Take 1 tablet (15 mg total) by mouth once daily.    potassium chloride SA (K-DUR,KLOR-CON) 20 MEQ tablet Take 1 tablet (20 mEq total) by mouth 2 (two) times daily.    traMADol (ULTRAM) 50 mg tablet Take 1 tablet (50 mg total) by mouth daily as needed (severe pain).    [DISCONTINUED] budesonide (PULMICORT) 0.5 mg/2 mL nebulizer solution inhale 1 nebules per nebulizer twice daily, for use in saline irrigation as directed    [DISCONTINUED] estradiol (ESTRACE) 0.01 % (0.1 mg/gram) vaginal cream Place 1 g vaginally every Mon, Wed, Fri.    [DISCONTINUED] flavoxATE (URISPAS) 100 mg Tab Take 1 tablet (100 mg total) by mouth 3 (three) times daily as needed.    [DISCONTINUED] ibuprofen (ADVIL,MOTRIN) 600 MG tablet Take 1 tablet (600 mg total) by mouth every 6 (six) hours as needed for  Pain.    [DISCONTINUED] mupirocin (BACTROBAN) 2 % ointment Apply topically 3 (three) times daily.    [DISCONTINUED] naproxen (NAPROSYN) 500 MG tablet Take 1 tablet (500 mg total) by mouth 2 (two) times daily.    [DISCONTINUED] oxyCODONE (ROXICODONE) 5 MG immediate release tablet Take 1-2 tablets (5-10 mg total) by mouth every 4 to 6 hours as needed for Pain.    [DISCONTINUED] oxyCODONE-acetaminophen (PERCOCET) 5-325 mg per tablet Take 1 tablet by mouth every 4 (four) hours as needed.    [DISCONTINUED] VITAMIN D2 50,000 unit capsule Take 1 capsule (50,000 Units total) by mouth every 7 days.     Family History     Problem Relation (Age of Onset)    Diabetes Mother, Sister, Sister    Hypertension Mother    Stroke Maternal Grandmother        Tobacco Use    Smoking status: Never Smoker    Smokeless tobacco: Never Used   Substance and Sexual Activity    Alcohol use: Yes     Alcohol/week: 1.0 standard drinks     Types: 1 Shots of liquor per week     Comment: seldom    Drug use: No    Sexual activity: Yes     Partners: Male     Birth control/protection: Post-menopausal, See Surgical Hx     Comment: hysterectomy 20 yrs ago     Review of Systems   Constitutional: Positive for activity change. Negative for appetite change and fever.   HENT: Negative for congestion, ear pain, rhinorrhea and sinus pressure.    Eyes: Negative for pain and discharge.   Respiratory: Negative for cough, chest tightness, shortness of breath and wheezing.    Cardiovascular: Negative for chest pain and leg swelling.   Gastrointestinal: Negative for abdominal distention, abdominal pain, diarrhea, nausea and vomiting.   Endocrine: Negative for cold intolerance and heat intolerance.   Genitourinary: Negative for difficulty urinating, flank pain, frequency, hematuria and urgency.   Musculoskeletal: Positive for myalgias. Negative for arthralgias and joint swelling.   Skin: Positive for rash and wound.   Allergic/Immunologic: Negative for  environmental allergies and food allergies.   Neurological: Negative for dizziness, weakness, light-headedness and headaches.   Hematological: Does not bruise/bleed easily.   Psychiatric/Behavioral: Negative for agitation, behavioral problems and decreased concentration.     Objective:     Vital Signs (Most Recent):  Temp: 98.4 °F (36.9 °C) (12/31/19 2243)  Pulse: 79 (12/31/19 2243)  Resp: 17 (12/31/19 2243)  BP: (!) 176/86 (12/31/19 2243)  SpO2: 97 % (12/31/19 2243) Vital Signs (24h Range):  Temp:  [98.4 °F (36.9 °C)-99.9 °F (37.7 °C)] 98.4 °F (36.9 °C)  Pulse:  [79-84] 79  Resp:  [16-17] 17  SpO2:  [97 %-99 %] 97 %  BP: (151-176)/(85-95) 176/86     Weight: 94.8 kg (209 lb)  Body mass index is 30.86 kg/m².    Physical Exam   Constitutional: She is oriented to person, place, and time. She appears well-developed and well-nourished.   HENT:   Head: Normocephalic.   Eyes: Conjunctivae are normal. Right eye exhibits no discharge. Left eye exhibits no discharge.   Neck: Normal range of motion. Neck supple.   Cardiovascular: Normal rate, regular rhythm, normal heart sounds and intact distal pulses.   Pulses:       Radial pulses are 2+ on the right side, and 2+ on the left side.        Dorsalis pedis pulses are 1+ on the right side, and 1+ on the left side.   Pulmonary/Chest: Effort normal. No respiratory distress. She has decreased breath sounds in the right lower field.   Abdominal: Soft. Bowel sounds are normal. She exhibits no distension. There is no tenderness.   Musculoskeletal: Normal range of motion.   Neurological: She is alert and oriented to person, place, and time. She has normal strength. GCS eye subscore is 4. GCS verbal subscore is 5. GCS motor subscore is 6.   Skin: Skin is warm and dry.   Scalp is red, edematous. Serosanguinous drainage noted.   Psychiatric: She has a normal mood and affect. Her speech is normal and behavior is normal.           Significant Labs:   CBC:   Recent Labs   Lab 12/31/19  1950    WBC 10.44   HGB 14.1   HCT 43.1        CMP:   Recent Labs   Lab 12/31/19  1950      K 4.0      CO2 26   *   BUN 8   CREATININE 0.8   CALCIUM 9.5   PROT 7.1   ALBUMIN 3.7   BILITOT 0.3   ALKPHOS 88   AST 19   ALT 27   ANIONGAP 11   EGFRNONAA >60       Significant Imaging: I have reviewed all pertinent imaging results/findings within the past 24 hours.

## 2020-01-01 NOTE — ED PROVIDER NOTES
Encounter Date: 12/31/2019       History     Chief Complaint   Patient presents with    Allergic Reaction     since dying her hair 1 week ago. Pt says her eyes are swollen and her scalp is peeling. Denies sob, dysphagia     Patient is a 59-year-old female with history of hypertension and hyperlipidemia who presents to the emergency department with rash to scalp.  Patient states on December 20th, she had phillip and hair dye placed in her hair.  She states the following day she woke up with facial swelling and scalp swelling. She states she was seen in the emergency department and treated for allergic reaction.  She states she was sent home with steroids.  She states she was given topical antibiotics.  She states after finishing the steroids, the swelling presented again.  She states she was seen in the emergency department yesterday and treated for allergic reaction with a secondary infection.  She states she has taken a couple of doses of her Bactrim.  She states the swelling is progressively worsening.  She states her left eye is swelling more more.  She states she feels tightness.  She states she has purulent drainage from the scalp.  She reports body aches and chills. She states she has 10/10 throbbing pain in her scalp.    The history is provided by the patient.     Review of patient's allergies indicates:   Allergen Reactions    Sotradecol [sodium tetradecyl sulfate]      Hives and itching that resolved with Benadryl and Solumedrol.     Past Medical History:   Diagnosis Date    Abnormal Pap smear of cervix yrs ago    Arthritis     History of uterine fibroid     Hyperlipidemia     Hypertension     Shoulder injury     lt    Sinus problem     Sinusitis      Past Surgical History:   Procedure Laterality Date    ARTHROSCOPIC REPAIR OF ROTATOR CUFF OF SHOULDER Left 7/24/2019    Procedure: REPAIR, ROTATOR CUFF, ARTHROSCOPIC;  Surgeon: ASMITA Soto MD;  Location: [facility] OR [address] [place] [address];  Service: Orthopedics;   Laterality: Left;    ARTHROSCOPY OF SHOULDER WITH DECOMPRESSION OF SUBACROMIAL SPACE Left 7/24/2019    Procedure: ARTHROSCOPY, SHOULDER, WITH SUBACROMIAL SPACE DECOMPRESSION;  Surgeon: ASMITA Soto MD;  [facility];  Service: Orthopedics;  Laterality: Left;    BREAST BIOPSY  24-25 years old    BREAST SURGERY      CYSTOSCOPY N/A 6/24/2019    Procedure: CYSTOSCOPY;  Surgeon: Anson Ellison MD;  Location: [facility] OR;  Service: OB/GYN;  Laterality: N/A;    ENCEPHALOCELE REPAIR  46    HYSTERECTOMY  93'-95'    ovaries remain    NASAL SINUS SURGERY      ROBOT-ASSISTED LAPAROSCOPIC ABDOMINAL SACROCOLPOPEXY USING DA MAC XI N/A 6/24/2019    Procedure: XI ROBOTIC SACROCOLPOPEXY, ABDOMINAL;  Surgeon: Anson Ellison MD;  Location: [facility] OR;  Service: OB/GYN;  Laterality: N/A;    SHOULDER ARTHROSCOPY Left 7/24/2019    Procedure: BICEPS TENODESIS;  Surgeon: ASMITA Soto MD;  [facility];  Service: Orthopedics;  Laterality: Left;    TUBAL LIGATION      VAGINAL DELIVERY       Family History   Problem Relation Age of Onset    Stroke Maternal Grandmother     Diabetes Mother     Hypertension Mother     Diabetes Sister     Diabetes Sister     Breast cancer Neg Hx     Colon cancer Neg Hx     Ovarian cancer Neg Hx      Social History     Tobacco Use    Smoking status: Never Smoker    Smokeless tobacco: Never Used   Substance Use Topics    Alcohol use: Yes     Alcohol/week: 1.0 standard drinks     Types: 1 Shots of liquor per week     Comment: seldom    Drug use: No     Review of Systems   Constitutional: Positive for chills. Negative for activity change, appetite change, fatigue and fever.   HENT: Positive for facial swelling. Negative for congestion, ear discharge, ear pain, postnasal drip, rhinorrhea, sore throat and trouble swallowing.    Respiratory: Negative for cough and shortness of breath.    Cardiovascular: Negative for chest pain.   Gastrointestinal: Negative for  abdominal pain, blood in stool, constipation, diarrhea, nausea and vomiting.   Genitourinary: Negative for dysuria, flank pain and hematuria.   Musculoskeletal: Positive for myalgias. Negative for back pain, neck pain and neck stiffness.   Skin: Positive for rash.   Neurological: Negative for dizziness, weakness and light-headedness.       Physical Exam     Initial Vitals [12/31/19 1918]   BP Pulse Resp Temp SpO2   (!) 154/95 82 16 99.9 °F (37.7 °C) 97 %      MAP       --         Physical Exam    Nursing note and vitals reviewed.  Constitutional: She appears well-developed and well-nourished. She is not diaphoretic.  Non-toxic appearance. No distress.   HENT:   Head: Normocephalic. Head is with right periorbital erythema and with left periorbital erythema (Significant edema).   Right Ear: External ear normal.   Left Ear: External ear normal.   Nose: Nose normal.   Mouth/Throat: Oropharynx is clear and moist. No oropharyngeal exudate.   Eyes: Conjunctivae are normal. Pupils are equal, round, and reactive to light.   Neck: Normal range of motion.   Cardiovascular: Normal rate and regular rhythm.   Pulmonary/Chest: Breath sounds normal.   Abdominal: Soft. Bowel sounds are normal. There is no tenderness.   Neurological: She is alert and oriented to person, place, and time.   Skin: Skin is warm and dry. Capillary refill takes less than 2 seconds.   Frontal scalp and bilateral, anterior parietal scalp: erythema and edema with serous sanguinous drainage and multiple papules and bullous lesions.    Preauricular lymphadenopathy.  Edema of superior portion of bilateral pinna.   Psychiatric: She has a normal mood and affect.         ED Course   Procedures  Labs Reviewed   CBC W/ AUTO DIFFERENTIAL - Abnormal; Notable for the following components:       Result Value    RDW 14.6 (*)     Immature Granulocytes 0.7 (*)     Immature Grans (Abs) 0.07 (*)     Eos # 0.6 (*)     All other components within normal limits   COMPREHENSIVE  METABOLIC PANEL - Abnormal; Notable for the following components:    Glucose 113 (*)     All other components within normal limits   C-REACTIVE PROTEIN - Abnormal; Notable for the following components:    CRP 35.0 (*)     All other components within normal limits   CULTURE, BLOOD   CULTURE, BLOOD   SEDIMENTATION RATE          Imaging Results    None          Medical Decision Making:   Initial Assessment:   Urgent evaluation of a 59-year-old female with history of hypertension and hyperlipidemia who presents to the emergency department with rash to scalp.  Patient is borderline febrile.  She is nontoxic appearing and hemodynamically stable.  Patient has erythematous and edematous scalp with multiple papules and do list lesions.  Serosanguineous drainage. Reactive lymphadenopathy.  This is 3rd ER visit for same complaint.  Patient has also been seen at urgent care.  She has been treated with topical antibiotics and started on oral antibiotics yesterday.  With severe swelling and presentation, I do feel that inpatient management is warranted.  Will discuss with case management and hospital medicine at this time.  Will start on vanc.  Will also give steroids, Pepcid, and Benadryl.  ED Management:  9:08 PM  Discussed case with hospital medicine.  Pt will be admitted to observation for IV abx.                                 Clinical Impression:       ICD-10-CM ICD-9-CM   1. Cellulitis of scalp L03.811 682.8                             Erin Guzman PA-C  12/31/19 2108

## 2020-01-01 NOTE — H&P
[facility] Hospital Medicine  History & Physical    Patient Name: Rosamaria Agosto  MRN: [id number]  Admission Date: 12/31/2019  Attending Physician: Nikkie Gillespie MD   Primary Care Provider: Bill Burns MD         Patient information was obtained from patient, past medical records and ER records.     Subjective:     Principal Problem:Cellulitis of scalp    Chief Complaint:   Chief Complaint   Patient presents with    Allergic Reaction     since dying her hair 1 week ago. Pt says her eyes are swollen and her scalp is peeling. Denies sob, dysphagia        HPI: The patient is a 59-year-old female with history of hypertension and hyperlipidemia who presents to the emergency department with rash to scalp.  Patient states on December 20th, she had phillip and hair dye placed in her hair.  She states the following day she woke up with facial swelling and scalp swelling. She states she was seen in the emergency department and treated for allergic reaction.  She states she was sent home with steroids.  She states she was given topical antibiotics.  She states after finishing the steroids, the swelling presented again.  She states she was seen in the emergency department yesterday and treated for allergic reaction with a secondary infection.  She states she has taken a couple of doses of her Bactrim.  She states the swelling is progressively worsening.  She states her left eye is swelling more more.  She states she feels tightness.  She states she has purulent drainage from the scalp.  She reports body aches and chills. She states she has 10/10 throbbing pain in her scalp.    Past Medical History:   Diagnosis Date    Abnormal Pap smear of cervix yrs ago    Arthritis     History of uterine fibroid     Hyperlipidemia     Hypertension     Shoulder injury     lt    Sinus problem     Sinusitis        Past Surgical History:   Procedure Laterality Date    ARTHROSCOPIC REPAIR OF ROTATOR CUFF  OF SHOULDER Left 7/24/2019    Procedure: REPAIR, ROTATOR CUFF, ARTHROSCOPIC;  Surgeon: ASMITA Soto MD;  Location: [facility] OR 2ND FLR;  Service: Orthopedics;  Laterality: Left;    ARTHROSCOPY OF SHOULDER WITH DECOMPRESSION OF SUBACROMIAL SPACE Left 7/24/2019    Procedure: ARTHROSCOPY, SHOULDER, WITH SUBACROMIAL SPACE DECOMPRESSION;  Surgeon: ASMITA Soto MD;  Location: [facility] OR 2ND FLR;  Service: Orthopedics;  Laterality: Left;    BREAST BIOPSY  24-25 years old    BREAST SURGERY      CYSTOSCOPY N/A 6/24/2019    Procedure: CYSTOSCOPY;  Surgeon: Anson Ellison MD;  Location: [facility] OR;  Service: OB/GYN;  Laterality: N/A;    ENCEPHALOCELE REPAIR  46    HYSTERECTOMY  93'-95'    ovaries remain    NASAL SINUS SURGERY      ROBOT-ASSISTED LAPAROSCOPIC ABDOMINAL SACROCOLPOPEXY USING DA MAC XI N/A 6/24/2019    Procedure: XI ROBOTIC SACROCOLPOPEXY, ABDOMINAL;  Surgeon: Anson Ellison MD;  Location: [facility] OR;  Service: OB/GYN;  Laterality: N/A;    SHOULDER ARTHROSCOPY Left 7/24/2019    Procedure: BICEPS TENODESIS;  Surgeon: ASMITA Soto MD;  Location: [facility] OR 2ND FLR;  Service: Orthopedics;  Laterality: Left;    TUBAL LIGATION      VAGINAL DELIVERY         Review of patient's allergies indicates:   Allergen Reactions    Sotradecol [sodium tetradecyl sulfate]      Hives and itching that resolved with Benadryl and Solumedrol.       No current facility-administered medications on file prior to encounter.      Current Outpatient Medications on File Prior to Encounter   Medication Sig    acetaminophen (TYLENOL) 650 MG TbSR Take 1,300 mg by mouth as needed.    atorvastatin (LIPITOR) 80 MG tablet Take 1 tablet (80 mg total) by mouth nightly.    hydroCHLOROthiazide (HYDRODIURIL) 25 MG tablet Take 1 tablet (25 mg total) by mouth once daily.    hydrOXYzine pamoate (VISTARIL) 25 MG Cap Take 1 capsule (25 mg total) by mouth every 6 (six) hours as needed (itching).    sulfamethoxazole-trimethoprim 800-160mg  (BACTRIM DS) 800-160 mg Tab Take 1 tablet by mouth 2 (two) times daily. for 7 days    aspirin (ECOTRIN) 81 MG EC tablet Take 1 tablet (81 mg total) by mouth 2 (two) times daily. for 14 days (Patient taking differently: Take 81 mg by mouth once daily. )    azelastine (ASTELIN) 137 mcg (0.1 %) nasal spray 1 spray (137 mcg total) by Nasal route 2 (two) times daily.    cetirizine (ZYRTEC) 10 MG tablet Take 1 tablet (10 mg total) by mouth once daily. for 7 days    famotidine (PEPCID) 20 MG tablet Take 1 tablet (20 mg total) by mouth 2 (two) times daily. for 7 days    flavoxATE (URISPAS) 100 mg Tab Take 1 tablet (100 mg total) by mouth 3 (three) times daily as needed.    HYDROcodone-acetaminophen (NORCO) 5-325 mg per tablet Take 1 tablet by mouth every 6 (six) hours as needed for Pain.    meloxicam (MOBIC) 15 MG tablet Take 1 tablet (15 mg total) by mouth once daily.    potassium chloride SA (K-DUR,KLOR-CON) 20 MEQ tablet Take 1 tablet (20 mEq total) by mouth 2 (two) times daily.    traMADol (ULTRAM) 50 mg tablet Take 1 tablet (50 mg total) by mouth daily as needed (severe pain).    [DISCONTINUED] budesonide (PULMICORT) 0.5 mg/2 mL nebulizer solution inhale 1 nebules per nebulizer twice daily, for use in saline irrigation as directed    [DISCONTINUED] estradiol (ESTRACE) 0.01 % (0.1 mg/gram) vaginal cream Place 1 g vaginally every Mon, Wed, Fri.    [DISCONTINUED] flavoxATE (URISPAS) 100 mg Tab Take 1 tablet (100 mg total) by mouth 3 (three) times daily as needed.    [DISCONTINUED] ibuprofen (ADVIL,MOTRIN) 600 MG tablet Take 1 tablet (600 mg total) by mouth every 6 (six) hours as needed for Pain.    [DISCONTINUED] mupirocin (BACTROBAN) 2 % ointment Apply topically 3 (three) times daily.    [DISCONTINUED] naproxen (NAPROSYN) 500 MG tablet Take 1 tablet (500 mg total) by mouth 2 (two) times daily.    [DISCONTINUED] oxyCODONE (ROXICODONE) 5 MG immediate release tablet Take 1-2 tablets (5-10 mg total) by mouth  every 4 to 6 hours as needed for Pain.    [DISCONTINUED] oxyCODONE-acetaminophen (PERCOCET) 5-325 mg per tablet Take 1 tablet by mouth every 4 (four) hours as needed.    [DISCONTINUED] VITAMIN D2 50,000 unit capsule Take 1 capsule (50,000 Units total) by mouth every 7 days.     Family History     Problem Relation (Age of Onset)    Diabetes Mother, Sister, Sister    Hypertension Mother    Stroke Maternal Grandmother        Tobacco Use    Smoking status: Never Smoker    Smokeless tobacco: Never Used   Substance and Sexual Activity    Alcohol use: Yes     Alcohol/week: 1.0 standard drinks     Types: 1 Shots of liquor per week     Comment: seldom    Drug use: No    Sexual activity: Yes     Partners: Male     Birth control/protection: Post-menopausal, See Surgical Hx     Comment: hysterectomy 20 yrs ago     Review of Systems   Constitutional: Positive for activity change. Negative for appetite change and fever.   HENT: Negative for congestion, ear pain, rhinorrhea and sinus pressure.    Eyes: Negative for pain and discharge.   Respiratory: Negative for cough, chest tightness, shortness of breath and wheezing.    Cardiovascular: Negative for chest pain and leg swelling.   Gastrointestinal: Negative for abdominal distention, abdominal pain, diarrhea, nausea and vomiting.   Endocrine: Negative for cold intolerance and heat intolerance.   Genitourinary: Negative for difficulty urinating, flank pain, frequency, hematuria and urgency.   Musculoskeletal: Positive for myalgias. Negative for arthralgias and joint swelling.   Skin: Positive for rash and wound.   Allergic/Immunologic: Negative for environmental allergies and food allergies.   Neurological: Negative for dizziness, weakness, light-headedness and headaches.   Hematological: Does not bruise/bleed easily.   Psychiatric/Behavioral: Negative for agitation, behavioral problems and decreased concentration.     Objective:     Vital Signs (Most Recent):  Temp: 98.4 °F  (36.9 °C) (12/31/19 2243)  Pulse: 79 (12/31/19 2243)  Resp: 17 (12/31/19 2243)  BP: (!) 176/86 (12/31/19 2243)  SpO2: 97 % (12/31/19 2243) Vital Signs (24h Range):  Temp:  [98.4 °F (36.9 °C)-99.9 °F (37.7 °C)] 98.4 °F (36.9 °C)  Pulse:  [79-84] 79  Resp:  [16-17] 17  SpO2:  [97 %-99 %] 97 %  BP: (151-176)/(85-95) 176/86     Weight: 94.8 kg (209 lb)  Body mass index is 30.86 kg/m².    Physical Exam   Constitutional: She is oriented to person, place, and time. She appears well-developed and well-nourished.   HENT:   Head: Normocephalic.   Eyes: Conjunctivae are normal. Right eye exhibits no discharge. Left eye exhibits no discharge.   Neck: Normal range of motion. Neck supple.   Cardiovascular: Normal rate, regular rhythm, normal heart sounds and intact distal pulses.   Pulses:       Radial pulses are 2+ on the right side, and 2+ on the left side.        Dorsalis pedis pulses are 1+ on the right side, and 1+ on the left side.   Pulmonary/Chest: Effort normal. No respiratory distress. She has decreased breath sounds in the right lower field.   Abdominal: Soft. Bowel sounds are normal. She exhibits no distension. There is no tenderness.   Musculoskeletal: Normal range of motion.   Neurological: She is alert and oriented to person, place, and time. She has normal strength. GCS eye subscore is 4. GCS verbal subscore is 5. GCS motor subscore is 6.   Skin: Skin is warm and dry.   Scalp is red, edematous. Serosanguinous drainage noted.   Psychiatric: She has a normal mood and affect. Her speech is normal and behavior is normal.           Significant Labs:   CBC:   Recent Labs   Lab 12/31/19 1950   WBC 10.44   HGB 14.1   HCT 43.1        CMP:   Recent Labs   Lab 12/31/19 1950      K 4.0      CO2 26   *   BUN 8   CREATININE 0.8   CALCIUM 9.5   PROT 7.1   ALBUMIN 3.7   BILITOT 0.3   ALKPHOS 88   AST 19   ALT 27   ANIONGAP 11   EGFRNONAA >60       Significant Imaging: I have reviewed all pertinent  imaging results/findings within the past 24 hours.    Assessment/Plan:     * Cellulitis of scalp  Patient had allergic reaction to hair dye a week ago. Continued progression of swelling with drainage now.  Appears secondary infection. ESR- 24. CRP- 35    Prednisone  Vanc/cefepime        Essential hypertension  Normotensive currently    Continue hctz      Hyperlipidemia  Lipid panel pending    Continue Lipitor        VTE Risk Mitigation (From admission, onward)         Ordered     enoxaparin injection 40 mg  Daily      12/31/19 2240     Place ARNULFO hose  Until discontinued      12/31/19 2240     IP VTE HIGH RISK PATIENT  Once      12/31/19 2240                   Yousif Carson NP  Department of Hospital Medicine   [facility]

## 2020-01-01 NOTE — HPI
Andrea Carson NP:  The patient is a 59-year-old female with history of hypertension and hyperlipidemia who presents to the emergency department with rash to scalp.  Patient states on December 20th, she had phillip and hair dye placed in her hair.  She states the following day she woke up with facial swelling and scalp swelling. She states she was seen in the emergency department and treated for allergic reaction.  She states she was sent home with steroids.  She states she was given topical antibiotics.  She states after finishing the steroids, the swelling presented again.  She states she was seen in the emergency department yesterday and treated for allergic reaction with a secondary infection.  She states she has taken a couple of doses of her Bactrim.  She states the swelling is progressively worsening.  She states her left eye is swelling more more.  She states she feels tightness.  She states she has purulent drainage from the scalp.  She reports body aches and chills. She states she has 10/10 throbbing pain in her scalp.

## 2020-01-01 NOTE — PROGRESS NOTES
Pharmacokinetic Initial Assessment: IV Vancomycin    Assessment/Plan:    Initiate intravenous vancomycin with loading dose of 2000 mg once followed by a maintenance dose of vancomycin 1500mg IV every 12 hours  Desired empiric serum trough concentration is 10 to 20 mcg/mL  Draw vancomycin trough level 30 min prior to fourth dose on 1/2/20 at approximately 0930   Pharmacy will continue to follow and monitor vancomycin.      Please contact pharmacy at extension [phone] with any questions regarding this assessment.     Thank you for the consult,   Owen Magaña       Patient brief summary:  Rosamaria Agosto is a 59 y.o. female initiated on antimicrobial therapy with IV Vancomycin for treatment of suspected skin & soft tissue infection    Drug Allergies:   Review of patient's allergies indicates:   Allergen Reactions    Sotradecol [sodium tetradecyl sulfate]      Hives and itching that resolved with Benadryl and Solumedrol.       Actual Body Weight:   94.8kg    Renal Function:   Estimated Creatinine Clearance: 93.9 mL/min (based on SCr of 0.8 mg/dL).,     Dialysis Method (if applicable):  N/A    CBC (last 72 hours):  Recent Labs   Lab Result Units 12/31/19  1950   WBC K/uL 10.44   Hemoglobin g/dL 14.1   Hematocrit % 43.1   Platelets K/uL 349   Gran% % 63.4   Lymph% % 22.1   Mono% % 7.7   Eosinophil% % 5.7   Basophil% % 0.4   Differential Method  Automated       Metabolic Panel (last 72 hours):  Recent Labs   Lab Result Units 12/31/19  1950   Sodium mmol/L 141   Potassium mmol/L 4.0   Chloride mmol/L 104   CO2 mmol/L 26   Glucose mg/dL 113*   BUN, Bld mg/dL 8   Creatinine mg/dL 0.8   Albumin g/dL 3.7   Total Bilirubin mg/dL 0.3   Alkaline Phosphatase U/L 88   AST U/L 19   ALT U/L 27       Drug levels (last 3 results):  No results for input(s): VANCOMYCINRA, VANCOMYCINPE, VANCOMYCINTR in the last 72 hours.    Microbiologic Results:  Microbiology Results (last 7 days)       Procedure Component Value Units Date/Time     Blood Culture #2 **CANNOT BE ORDERED STAT** [[id number]] Collected:  12/31/19 2016    Order Status:  Sent Specimen:  Blood Updated:  12/31/19 2145    Blood Culture #1 **CANNOT BE ORDERED STAT** [[id number]] Collected:  12/31/19 1950    Order Status:  Sent Specimen:  Blood from Peripheral, Antecubital, Right Updated:  12/31/19 2145

## 2020-01-01 NOTE — ASSESSMENT & PLAN NOTE
Patient had allergic reaction to hair dye a week ago. Continued progression of swelling with drainage now.  Appears secondary infection. ESR- 24. CRP- 35    Prednisone  Vanc/cefepime

## 2020-01-01 NOTE — ED TRIAGE NOTES
"Pt states she had an allergic reaction after a vascular procedure on her LLE on 12/18/19.  States she was lightheaded, dizzy, and her body turned red. States her BP also dropped.  States she was able to bed discharged after they treated her.    Pt also reports "put dye in her hair" on the 12/20/19 and had an allergic reaction to it.  Was seen at urgent care and given steroid in 12/21/19.  States she was seen yesterday for swelling and itching.  Was rx'd bactrim and vistaril.  Reports stinging to her scalp and swelling to her eyes and ears.  Reports yellow discharge to scalp.    aao x 4.  Speaking in full sentences, respirations even and unlabored.  "

## 2020-01-01 NOTE — NURSING
"Pt AOX4. NAD.  accompanied and staying in room. Pt denies CP, SOB, NVD. Pt states he scalp is throbbing with minimal pain and "nothing I can't tolerate". Iv antibiotics infused without complication. VSS. Bed low and locked. Call bell within reach. Will continue to monitor.   "

## 2020-01-02 VITALS
SYSTOLIC BLOOD PRESSURE: 133 MMHG | DIASTOLIC BLOOD PRESSURE: 77 MMHG | BODY MASS INDEX: 30.96 KG/M2 | HEART RATE: 80 BPM | HEIGHT: 69 IN | RESPIRATION RATE: 18 BRPM | TEMPERATURE: 99 F | WEIGHT: 209 LBS | OXYGEN SATURATION: 100 %

## 2020-01-02 LAB
ALBUMIN SERPL BCP-MCNC: 3.6 G/DL (ref 3.5–5.2)
ALP SERPL-CCNC: 84 U/L (ref 55–135)
ALT SERPL W/O P-5'-P-CCNC: 25 U/L (ref 10–44)
ANION GAP SERPL CALC-SCNC: 12 MMOL/L (ref 8–16)
AST SERPL-CCNC: 14 U/L (ref 10–40)
BASOPHILS # BLD AUTO: 0.1 K/UL (ref 0–0.2)
BASOPHILS NFR BLD: 0.4 % (ref 0–1.9)
BILIRUB SERPL-MCNC: 0.2 MG/DL (ref 0.1–1)
BUN SERPL-MCNC: 15 MG/DL (ref 6–20)
CALCIUM SERPL-MCNC: 9.8 MG/DL (ref 8.7–10.5)
CHLORIDE SERPL-SCNC: 101 MMOL/L (ref 95–110)
CO2 SERPL-SCNC: 27 MMOL/L (ref 23–29)
CREAT SERPL-MCNC: 0.8 MG/DL (ref 0.5–1.4)
DIFFERENTIAL METHOD: ABNORMAL
EOSINOPHIL # BLD AUTO: 0.1 K/UL (ref 0–0.5)
EOSINOPHIL NFR BLD: 0.2 % (ref 0–8)
ERYTHROCYTE [DISTWIDTH] IN BLOOD BY AUTOMATED COUNT: 14.3 % (ref 11.5–14.5)
EST. GFR  (AFRICAN AMERICAN): >60 ML/MIN/1.73 M^2
EST. GFR  (NON AFRICAN AMERICAN): >60 ML/MIN/1.73 M^2
GLUCOSE SERPL-MCNC: 157 MG/DL (ref 70–110)
HCT VFR BLD AUTO: 44 % (ref 37–48.5)
HGB BLD-MCNC: 14.1 G/DL (ref 12–16)
IMM GRANULOCYTES # BLD AUTO: 0.18 K/UL (ref 0–0.04)
IMM GRANULOCYTES NFR BLD AUTO: 0.8 % (ref 0–0.5)
LYMPHOCYTES # BLD AUTO: 3.1 K/UL (ref 1–4.8)
LYMPHOCYTES NFR BLD: 13 % (ref 18–48)
MCH RBC QN AUTO: 29.6 PG (ref 27–31)
MCHC RBC AUTO-ENTMCNC: 32 G/DL (ref 32–36)
MCV RBC AUTO: 92 FL (ref 82–98)
MONOCYTES # BLD AUTO: 1.7 K/UL (ref 0.3–1)
MONOCYTES NFR BLD: 7.1 % (ref 4–15)
NEUTROPHILS # BLD AUTO: 18.8 K/UL (ref 1.8–7.7)
NEUTROPHILS NFR BLD: 78.5 % (ref 38–73)
NRBC BLD-RTO: 0 /100 WBC
PLATELET # BLD AUTO: 384 K/UL (ref 150–350)
PMV BLD AUTO: 9.6 FL (ref 9.2–12.9)
POTASSIUM SERPL-SCNC: 3.7 MMOL/L (ref 3.5–5.1)
PROT SERPL-MCNC: 7.3 G/DL (ref 6–8.4)
RBC # BLD AUTO: 4.76 M/UL (ref 4–5.4)
SODIUM SERPL-SCNC: 140 MMOL/L (ref 136–145)
WBC # BLD AUTO: 23.97 K/UL (ref 3.9–12.7)

## 2020-01-02 PROCEDURE — 96376 TX/PRO/DX INJ SAME DRUG ADON: CPT

## 2020-01-02 PROCEDURE — 80053 COMPREHEN METABOLIC PANEL: CPT

## 2020-01-02 PROCEDURE — G0378 HOSPITAL OBSERVATION PER HR: HCPCS

## 2020-01-02 PROCEDURE — 63600175 PHARM REV CODE 636 W HCPCS: Performed by: NURSE PRACTITIONER

## 2020-01-02 PROCEDURE — 99217 PR OBSERVATION CARE DISCHARGE: ICD-10-PCS | Mod: ,,, | Performed by: NURSE PRACTITIONER

## 2020-01-02 PROCEDURE — 99217 PR OBSERVATION CARE DISCHARGE: CPT | Mod: ,,, | Performed by: NURSE PRACTITIONER

## 2020-01-02 PROCEDURE — 85025 COMPLETE CBC W/AUTO DIFF WBC: CPT

## 2020-01-02 PROCEDURE — 25000003 PHARM REV CODE 250: Performed by: NURSE PRACTITIONER

## 2020-01-02 PROCEDURE — 36415 COLL VENOUS BLD VENIPUNCTURE: CPT

## 2020-01-02 RX ORDER — CETIRIZINE HYDROCHLORIDE 10 MG/1
10 TABLET ORAL DAILY
Qty: 14 TABLET | Refills: 0 | Status: SHIPPED | OUTPATIENT
Start: 2020-01-02 | End: 2022-11-30 | Stop reason: SDUPTHER

## 2020-01-02 RX ORDER — CEPHALEXIN 500 MG/1
500 CAPSULE ORAL EVERY 6 HOURS
Qty: 12 CAPSULE | Refills: 0 | Status: SHIPPED | OUTPATIENT
Start: 2020-01-02 | End: 2020-01-05

## 2020-01-02 RX ORDER — FAMOTIDINE 20 MG/1
20 TABLET, FILM COATED ORAL 2 TIMES DAILY
Qty: 14 TABLET | Refills: 0 | Status: SHIPPED | OUTPATIENT
Start: 2020-01-02 | End: 2021-08-20

## 2020-01-02 RX ORDER — CEPHALEXIN 500 MG/1
500 CAPSULE ORAL EVERY 6 HOURS
Status: DISCONTINUED | OUTPATIENT
Start: 2020-01-02 | End: 2020-01-02 | Stop reason: HOSPADM

## 2020-01-02 RX ORDER — PREDNISONE 20 MG/1
40 TABLET ORAL DAILY
Qty: 8 TABLET | Refills: 0 | Status: SHIPPED | OUTPATIENT
Start: 2020-01-03 | End: 2020-01-07

## 2020-01-02 RX ORDER — CETIRIZINE HYDROCHLORIDE 10 MG/1
10 TABLET ORAL DAILY
Qty: 14 TABLET | Refills: 0 | Status: SHIPPED | OUTPATIENT
Start: 2020-01-02 | End: 2020-01-02

## 2020-01-02 RX ORDER — PREDNISONE 20 MG/1
40 TABLET ORAL DAILY
Qty: 8 TABLET | Refills: 0 | Status: SHIPPED | OUTPATIENT
Start: 2020-01-03 | End: 2020-01-02

## 2020-01-02 RX ORDER — FAMOTIDINE 20 MG/1
20 TABLET, FILM COATED ORAL 2 TIMES DAILY
Qty: 14 TABLET | Refills: 0 | Status: SHIPPED | OUTPATIENT
Start: 2020-01-02 | End: 2020-01-02

## 2020-01-02 RX ORDER — CEPHALEXIN 500 MG/1
500 CAPSULE ORAL EVERY 6 HOURS
Qty: 12 CAPSULE | Refills: 0 | Status: SHIPPED | OUTPATIENT
Start: 2020-01-02 | End: 2020-01-02

## 2020-01-02 RX ADMIN — CETIRIZINE HYDROCHLORIDE 10 MG: 5 TABLET ORAL at 09:01

## 2020-01-02 RX ADMIN — PREDNISONE 40 MG: 20 TABLET ORAL at 09:01

## 2020-01-02 RX ADMIN — ASPIRIN 81 MG: 81 TABLET ORAL at 09:01

## 2020-01-02 RX ADMIN — FAMOTIDINE 20 MG: 20 TABLET, FILM COATED ORAL at 09:01

## 2020-01-02 RX ADMIN — CEFEPIME HYDROCHLORIDE 1 G: 1 INJECTION, SOLUTION INTRAVENOUS at 12:01

## 2020-01-02 RX ADMIN — HYDROCHLOROTHIAZIDE 25 MG: 25 TABLET ORAL at 09:01

## 2020-01-02 NOTE — NURSING
Pt AAOx4, NAD noted. Pt denied pain, SOB, N/V/D. Pt said her swelling is much better than yesterday, and she feels much better with only some tingling on her scalp. Vitals monitored and stable. Pt able to ambulate without difficulty, remained free from falls. IV discontinued per order, cath intact. Discharge instructions reviewed with pt, pt verbalized understanding. Pt transported off of unit via wheelchair.

## 2020-01-02 NOTE — PROGRESS NOTES
Therapy with vancoymcin complete and/or consult discontinued by provider.  Pharmacy will sign off, please re-consult as needed.

## 2020-01-02 NOTE — ASSESSMENT & PLAN NOTE
- elevated total cholesterol on admission  - suspect non-compliance with home dose of atorvastatin  - will continue atorvastatin 80 mg daily  - will need follow up with primary care provider for monitoring as outpatient

## 2020-01-02 NOTE — PLAN OF CARE
Discharge Planning:  Patient admitted on 12-31-19  LOS-day 2  Chart reviewed, Care plan discussed with Ms Agosto  Discussed care plan with treatment team,  attending Dr Fry/Ольга  Current dispo:  Home tomorrow ?  See allergist ?   Resume care with PCP  Case management to follow       01/02/20 1027   Final Note   Assessment Type Final Discharge Note   Anticipated Discharge Disposition Home   Hospital Follow Up  Appt(s) scheduled? Yes   Discharge plans and expectations educations in teach back method with documentation complete? Yes   Right Care Referral Info   Post Acute Recommendation Other   Referral Type allergist

## 2020-01-02 NOTE — ASSESSMENT & PLAN NOTE
Patient had allergic reaction to hair dye a week ago. Continued progression of swelling with drainage prior to admission  - was started on empiric vancomycin and cefepime and treated with oral prednisone and benadryl in ED  - after admission, noticeable improvement to scalp erythema and no evidence of infection  - will discontinue vancomycin and continue cefepime for now  - given history of allergic reaction in past to hair dye and facial edema, will continue oral prednisone 40 mg daily and add ceterizine and famotidine   - plan to transition to oral cephalexin in morning for total five days

## 2020-01-02 NOTE — ASSESSMENT & PLAN NOTE
"- suspect allergic reaction to dye, "Just for [employer]"  - left sided facial edema particularly around left eye, gradual improvement since admission  - continue oral prednisone, add cetirizine and famotidine  - discussed importance of avoiding hair dyes in future; consider outpatient allergy testing    "

## 2020-01-02 NOTE — ASSESSMENT & PLAN NOTE
- Normotensive currently  - Continue home dose of hydrochlorothiazide     weight-bearing as tolerated

## 2020-01-02 NOTE — PLAN OF CARE
Discharge Planning:  Patient admitted on 12-31-19  LOS-day 1  Chart reviewed, Care plan discussed with Ms Agosto  Discussed care plan with treatment team,  attending Dr Fry/Ольга  Consults following are: case mgt  PCP updated in Epic: yes  Insurance: bc-bs  DME at home: n/a  Current dispo:  Home tomorrow ?  See allergist ?  Resume care with PCP  Transportation: has reliable   Case management to follow       01/01/20 7556   Discharge Assessment   Assessment Type Discharge Planning Assessment   Confirmed/corrected address and phone number on facesheet? Yes   Assessment information obtained from? Patient;Caregiver;Medical Record   Communicated expected length of stay with patient/caregiver yes   Prior to hospitilization cognitive status: Alert/Oriented   Prior to hospitalization functional status: Independent   Current cognitive status: Alert/Oriented   Current Functional Status: Independent   Lives With child([place]), adult   Able to Return to Prior Arrangements yes   Is patient able to care for self after discharge? Yes   Patient currently receives any other outside agency services? No   Equipment Currently Used at Home none   Do you have any problems affording any of your prescribed medications? No   Is the patient taking medications as prescribed? yes   Does the patient have transportation home? Yes   Transportation Anticipated family or friend will provide   Discharge Plan A Home   DME Needed Upon Discharge  none   Patient/Family in Agreement with Plan yes

## 2020-01-02 NOTE — PLAN OF CARE
Plan of care reviewed with pt. Pt AAOx4, NAD. Pt reports mild discomfort to scalp, denies SOB, n/v, dizziness or lightheadedness. Facial and left periorbital swelling noted. Erythema noted to hairline. Pt tolerating PO prednisone. VS stable. Pt remains afebrile. IV abx therapy in progress. No adverse reaction noted. Pt remains free from falls or injury. Safety measures implemented. Bed is lowered and locked. Call light is within reach. Will continue to monitor pt.

## 2020-01-02 NOTE — SUBJECTIVE & OBJECTIVE
Interval History: continues with left facial swelling, but much improved since admission. No evidence of infection to scalp, but with mild erythema at hairline. Discontinue vancomycin and continue cefepime.  Will transition to oral cephalexin in morning for total five day course.  Suspect allergic reaction to hair dye and will continue oral prednisone, add cetirizine and famotidine.      Review of Systems   Constitutional: Negative for chills and fever.   HENT: Positive for facial swelling (left orbital swelling, resolving). Negative for congestion and trouble swallowing.    Eyes: Negative for photophobia, discharge, redness and visual disturbance.   Respiratory: Negative for cough, shortness of breath and wheezing.    Cardiovascular: Negative for chest pain and palpitations.   Gastrointestinal: Negative for abdominal pain, diarrhea, nausea and vomiting.   Endocrine: Negative for cold intolerance and heat intolerance.   Genitourinary: Negative for difficulty urinating, flank pain, frequency, hematuria and urgency.   Musculoskeletal: Negative for arthralgias and joint swelling.   Skin: Negative for rash and wound.        Minor flaking to scalp at hairline   Allergic/Immunologic: Positive for environmental allergies and food allergies.   Neurological: Negative for weakness and headaches.   Hematological: Negative for adenopathy.   Psychiatric/Behavioral: Negative.      Objective:     Vital Signs (Most Recent):  Temp: 98.4 °F (36.9 °C) (01/01/20 1557)  Pulse: 91 (01/01/20 1557)  Resp: 18 (01/01/20 1557)  BP: 133/78 (01/01/20 1557)  SpO2: 97 % (01/01/20 1557) Vital Signs (24h Range):  Temp:  [97.9 °F (36.6 °C)-99.9 °F (37.7 °C)] 98.4 °F (36.9 °C)  Pulse:  [79-91] 91  Resp:  [16-18] 18  SpO2:  [95 %-99 %] 97 %  BP: (131-176)/(76-97) 133/78     Weight: 94.8 kg (209 lb)  Body mass index is 30.86 kg/m².  No intake or output data in the 24 hours ending 01/01/20 1807   Physical Exam   Constitutional: She is oriented to  person, place, and time. She appears well-developed and well-nourished. No distress.   HENT:   Head: Normocephalic and atraumatic.   Mouth/Throat: Oropharynx is clear and moist.   Eyes: Pupils are equal, round, and reactive to light. Conjunctivae and EOM are normal.   Neck: Normal range of motion. Neck supple. No JVD present.   Cardiovascular: Normal rate, regular rhythm, normal heart sounds and intact distal pulses.   Pulses:       Radial pulses are 2+ on the right side, and 2+ on the left side.        Dorsalis pedis pulses are 1+ on the right side, and 1+ on the left side.   Pulmonary/Chest: Effort normal and breath sounds normal. She has no wheezes.   Abdominal: Soft. Bowel sounds are normal. There is no tenderness.   Musculoskeletal: Normal range of motion.   Neurological: She is alert and oriented to person, place, and time. She has normal strength. No sensory deficit.   Skin: Skin is warm and dry. No rash noted. There is erythema (noted to hairline on forehead and behind right ear).       Psychiatric: She has a normal mood and affect. Her speech is normal and behavior is normal. Thought content normal.       Significant Labs:   CBC:   Recent Labs   Lab 12/31/19  1950 01/01/20  0433   WBC 10.44 11.82   HGB 14.1 14.5   HCT 43.1 45.4    363*     CMP:   Recent Labs   Lab 12/31/19  1950 01/01/20  0433    138   K 4.0 4.6    104   CO2 26 22*   * 215*   BUN 8 8   CREATININE 0.8 0.7   CALCIUM 9.5 10.0   PROT 7.1 7.6   ALBUMIN 3.7 3.7   BILITOT 0.3 0.3   ALKPHOS 88 89   AST 19 16   ALT 27 27   ANIONGAP 11 12   EGFRNONAA >60 >60     All pertinent labs within the past 24 hours have been reviewed.    Significant Imaging: I have reviewed all pertinent imaging results/findings within the past 24 hours.

## 2020-01-02 NOTE — PROGRESS NOTES
[facility] Hospital Medicine  Progress Note    Patient Name: Rosamaria Agosto  MRN: [id number]  Patient Class: OP- Observation   Admission Date: 12/31/2019  Length of Stay: 0 days  Attending Physician: Aleah Fry MD  Primary Care Provider: Bill Burns MD        Subjective:     Principal Problem:Allergic reaction to dye        HPI:  Andrea Carson NP:  The patient is a 59-year-old female with history of hypertension and hyperlipidemia who presents to the emergency department with rash to scalp.  Patient states on December 20th, she had phillip and hair dye placed in her hair.  She states the following day she woke up with facial swelling and scalp swelling. She states she was seen in the emergency department and treated for allergic reaction.  She states she was sent home with steroids.  She states she was given topical antibiotics.  She states after finishing the steroids, the swelling presented again.  She states she was seen in the emergency department yesterday and treated for allergic reaction with a secondary infection.  She states she has taken a couple of doses of her Bactrim.  She states the swelling is progressively worsening.  She states her left eye is swelling more more.  She states she feels tightness.  She states she has purulent drainage from the scalp.  She reports body aches and chills. She states she has 10/10 throbbing pain in her scalp.       Interval History: continues with left facial swelling, but much improved since admission. No evidence of infection to scalp, but with mild erythema at hairline. Discontinue vancomycin and continue cefepime.  Will transition to oral cephalexin in morning for total five day course.  Suspect allergic reaction to hair dye and will continue oral prednisone, add cetirizine and famotidine.      Review of Systems   Constitutional: Negative for chills and fever.   HENT: Positive for facial swelling (left orbital swelling, resolving).  Negative for congestion and trouble swallowing.    Eyes: Negative for photophobia, discharge, redness and visual disturbance.   Respiratory: Negative for cough, shortness of breath and wheezing.    Cardiovascular: Negative for chest pain and palpitations.   Gastrointestinal: Negative for abdominal pain, diarrhea, nausea and vomiting.   Endocrine: Negative for cold intolerance and heat intolerance.   Genitourinary: Negative for difficulty urinating, flank pain, frequency, hematuria and urgency.   Musculoskeletal: Negative for arthralgias and joint swelling.   Skin: Negative for rash and wound.        Minor flaking to scalp at hairline   Allergic/Immunologic: Positive for environmental allergies and food allergies.   Neurological: Negative for weakness and headaches.   Hematological: Negative for adenopathy.   Psychiatric/Behavioral: Negative.      Objective:     Vital Signs (Most Recent):  Temp: 98.4 °F (36.9 °C) (01/01/20 1557)  Pulse: 91 (01/01/20 1557)  Resp: 18 (01/01/20 1557)  BP: 133/78 (01/01/20 1557)  SpO2: 97 % (01/01/20 1557) Vital Signs (24h Range):  Temp:  [97.9 °F (36.6 °C)-99.9 °F (37.7 °C)] 98.4 °F (36.9 °C)  Pulse:  [79-91] 91  Resp:  [16-18] 18  SpO2:  [95 %-99 %] 97 %  BP: (131-176)/(76-97) 133/78     Weight: 94.8 kg (209 lb)  Body mass index is 30.86 kg/m².  No intake or output data in the 24 hours ending 01/01/20 1807   Physical Exam   Constitutional: She is oriented to person, place, and time. She appears well-developed and well-nourished. No distress.   HENT:   Head: Normocephalic and atraumatic.   Mouth/Throat: Oropharynx is clear and moist.   Eyes: Pupils are equal, round, and reactive to light. Conjunctivae and EOM are normal.   Neck: Normal range of motion. Neck supple. No JVD present.   Cardiovascular: Normal rate, regular rhythm, normal heart sounds and intact distal pulses.   Pulses:       Radial pulses are 2+ on the right side, and 2+ on the left side.        Dorsalis pedis pulses are 1+  "on the right side, and 1+ on the left side.   Pulmonary/Chest: Effort normal and breath sounds normal. She has no wheezes.   Abdominal: Soft. Bowel sounds are normal. There is no tenderness.   Musculoskeletal: Normal range of motion.   Neurological: She is alert and oriented to person, place, and time. She has normal strength. No sensory deficit.   Skin: Skin is warm and dry. No rash noted. There is erythema (noted to hairline on forehead and behind right ear).       Psychiatric: She has a normal mood and affect. Her speech is normal and behavior is normal. Thought content normal.       Significant Labs:   CBC:   Recent Labs   Lab 12/31/19  1950 01/01/20  0433   WBC 10.44 11.82   HGB 14.1 14.5   HCT 43.1 45.4    363*     CMP:   Recent Labs   Lab 12/31/19  1950 01/01/20  0433    138   K 4.0 4.6    104   CO2 26 22*   * 215*   BUN 8 8   CREATININE 0.8 0.7   CALCIUM 9.5 10.0   PROT 7.1 7.6   ALBUMIN 3.7 3.7   BILITOT 0.3 0.3   ALKPHOS 88 89   AST 19 16   ALT 27 27   ANIONGAP 11 12   EGFRNONAA >60 >60     All pertinent labs within the past 24 hours have been reviewed.    Significant Imaging: I have reviewed all pertinent imaging results/findings within the past 24 hours.      Assessment/Plan:      * Allergic reaction to dye  Patient had allergic reaction to hair dye a week ago. Continued progression of swelling with drainage prior to admission  - was started on empiric vancomycin and cefepime and treated with oral prednisone and benadryl in ED  - after admission, noticeable improvement to scalp erythema and no evidence of infection  - will discontinue vancomycin and continue cefepime for now  - given history of allergic reaction in past to hair dye and facial edema, will continue oral prednisone 40 mg daily and add ceterizine and famotidine   - plan to transition to oral cephalexin in morning for total five days        Facial edema  - suspect allergic reaction to dye, "[facility] for [facility]"  - left " sided facial edema particularly around left eye, gradual improvement since admission  - continue oral prednisone, add cetirizine and famotidine  - discussed importance of avoiding hair dyes in future; consider outpatient allergy testing      Essential hypertension  - Normotensive currently  - Continue home dose of hydrochlorothiazide      Hyperlipidemia  - elevated total cholesterol on admission  - suspect non-compliance with home dose of atorvastatin  - will continue atorvastatin 80 mg daily  - will need follow up with primary care provider for monitoring as outpatient           VTE Risk Mitigation (From admission, onward)         Ordered     enoxaparin injection 40 mg  Daily      12/31/19 2240     Place ARNULFO hose  Until discontinued      12/31/19 2240     IP VTE HIGH RISK PATIENT  Once      12/31/19 2240                      Ольга Meyer NP  Department of Hospital Medicine   [facility]

## 2020-01-05 LAB
BACTERIA BLD CULT: NORMAL
BACTERIA BLD CULT: NORMAL

## 2020-01-06 ENCOUNTER — CLINICAL SUPPORT (OUTPATIENT)
Dept: REHABILITATION | Facility: HOSPITAL | Age: 60
End: 2020-01-06
Payer: COMMERCIAL

## 2020-01-06 DIAGNOSIS — M25.612 DECREASED ROM OF LEFT SHOULDER: ICD-10-CM

## 2020-01-06 DIAGNOSIS — S46.012D TRAUMATIC INCOMPLETE TEAR OF LEFT ROTATOR CUFF, SUBSEQUENT ENCOUNTER: ICD-10-CM

## 2020-01-06 PROCEDURE — 97140 MANUAL THERAPY 1/> REGIONS: CPT | Mod: PO

## 2020-01-06 PROCEDURE — 97110 THERAPEUTIC EXERCISES: CPT | Mod: PO

## 2020-01-07 NOTE — PROGRESS NOTES
[facility]  Physical Therapy Note     Name: Rosamaria Agosto  Clinic Number: [id number]     Therapy Diagnosis: decreased ROM and pain at the shoulder s/p RTC  Physician: Arina Corea PA-C     Physician Orders: PT Eval and Treat   Medical Diagnosis from Referral: s/p RTC repair and biceps tenodesis  Evaluation Date: 7/26/2019  Authorization Period Expiration: 12/31/19  Plan of Care Expiration: 10/31/19 New plan of care extended to 2/29/20 with approved certifiction extension  Visit # / Visits authorized: new script 8/30;   30/30 completed     Time In:  400 pm  Time Out: 505 pm  Total Billable Time:  65 minutes ( MT 1, TE -3,)     Precautions: Standard, RTC protocol NO AROM at elbow joint s/p biceps tenodesis, PROM at elbow and shoulder but no excessive ER stopping at initial end feel per phase I protocol and follow up with MD for protocol     Subjective      Pt reports:  Continues to feel good mobility with just tightness and no soreness today with first day on return to work  .  Pain : 0/10 at beginning and end of session 0-1/10 mostly tightness  Location: Left shoulder    TREATMENT     Rosamaria received therapeutic exercises to develop ROM for 45 minutes including:           Pulley with cues into scaption 2  x 1 min with mod cues for shoulder hiking nd proper plane of motion..NP    Shoulder rows 3 x10 with YTB - NP  Shoulder extension 3 x10 with YTB-  NP    D2 flexion and then D2 extension pattern with min to mod resistance in supine    PROM shoulder scaption then scaption to left and then to right  And then in abduction each seated rolling ball, cues for decreased UT activity 2x1 min each direction with 20 sec hold at the end of each set to stretch GH joint NP    AROM shoulder scaption and then abduction 2 x10 with stretch at end ROM 20-30 secs after each set against the wall      Shoulder depression with 13# pulley with use of RUE to assist LUE to perform lat pulldown and LUE to  hold in bottom position shoulder depression RUE assist to return to overhead position and relax at top of the range 2 x 15 with 10 sec stretch at top position for PROM       AAROM  3 x 10 reps scaption and again in abduction in midrange to keep smooth AAROM and  with pt in standng with arm on PT, PT apply humeral inferior glide with scaption motion teres minor stretch- held static 3x 20 sec    Pulley 3 x 1min in scaption and then in abduction NP    AROM with PT into scaption and then in abduction in sidelying with cues for shoulder depression in supine and in tolerated incline position 45 and 70 deg 3 x10 each as tolerated towards progressive upright posture.from 10/28/19 scaption and serratus punches with 2#    Self AAROM with use cane or PVC pipe and RUE in supine and then in standing: scaption with elbow flexed toward full extension at end rom of scaption - 2x 10 reps:   Self AAROM in supine scaption with elbow extending for full ROM 2 x10     AROM at L shoulder 12/31/19  165 deg abd, in sidelying in 75 degrees elevation in head of mat    170 deg flex in supine with 75 deg elevation in head of mat  with cues to avoid shoulder hike, compensation.  60 deg with ER stopping at initial end feel in standing  And   Elbow extension at  0 deg    AAROM/ AROM 1/6/20  upright standing on min A  in midrange for AAROM and at end ROM  170/160 deg scaption  165/140 deg abuction  70 ER      Strength 12/31/19  scaption and abduction of left shoulder 3-/5  ER 3+/5    Manual therapy 20 min with STM./ MFR to pect minor, teres minor, and rhomboids, upper traps and levator, GH mobs grade I/II AP and lateral distraction for pain relief into empty end feel before restrictions and GH mobs grade Iv with distraction  in all direction for increased ROM  - teres minor stretch at end ROM in scaption and abduction with inferior humeral glide  - manual stretch pec minor  -subscap release with ER motion  - inferior humeral glide with inferior  mobilization Grade IV    10 min ice to left shoulder     Education provided:   Sleeping position, sling positioning and shoulder pendulums, donning strategy for sling, review post surgery restrictions. Pt issued putty for , decreased edema pooling at elbow     Written Home Exercises Provided: yes.  Exercises were reviewed and Rosamaria was able to demonstrate them prior to the end of the session.  Rosamaria demonstrated good  understanding of the education provided.      See EMR under Patient Instructions for exercises provided 7/26/2019.     Assessment     Pt with increased AROM initially but quickly fatigued with session with continued increased compensation with shoulder hiking.  Pt with good PROM 170 in both scaption and abduction with wall slides and without pain at end ROM.  Pt with good insight and continues with strength through midrange.  Pain management was good with first day back to work but limited physical activity today with mostly desk work and meeting so will continue to monitor activity tolerance and pain control with full day of work.      Goals:  Short Term Goals: 3 weeks   1.  Pt independent with HEP for s/p RTC repair and biceps tenodesis with return demonstration. MET  2.  Pt to be independent with donning and doffing surgical sling for proper pain management . Met 8/12/19  3.  Pt to increase PROM as tolerated at left shoulder to 90 deg shoulder flexion and 80 deg abduction without pain. MET  4.  Pt to decrease overall pain at left shoulder to less than 4/10 after session. Met 8/12/19     Long Term Goals: 12 weeks   1.  Pt to been independent with discharge HEP without cues and proper form.  2.  Pt return to independent ADLs to include dressing, grooming with BUEs without compensation.  3.  Pt to increased AROM at elbow WNL and Left shoulder to 170 deg flexion and 160 deg abduction, 70 deg ER without pain or compensation  4.  Pt to increased L shoulder strength to 4+/5 into flexion extension, ER,  "IR   5  Pt to increase tolerance to perform 1 hour of household chores, ie cleaning without increased L shoulder pain  6. Pt to decrease pain at left shoulder to 3/10 after session for improved functional activities. MET    NEW LT. Return to work without limitation and with no increased pain at end of the day less than 2/10 pain grossly for increased tolerance  8. Combined ROM into flex/ER and ext/IR left shoulder within 4" of R shoulder with reach up and down thoracic spine.          Plan   Plan of care Certification: 2019 to 10/31/19 extend new script to 20 with approval of certification period    Cont with  New POC request extend POC to 20   Shanel Carvalho, PT      "

## 2020-01-07 NOTE — DISCHARGE SUMMARY
[facility] Hospital Medicine  Discharge Summary      Patient Name: Rosamaria Agosto  MRN: [id number]  Admission Date: 12/31/2019  Hospital Length of Stay: 0 days  Discharge Date and Time: 1/2/2020  2:14 PM  Attending Physician: Aleah Fry MD   Discharging Provider: Ольга Meyer NP  Primary Care Provider: Bill Burns MD      HPI:   Andrea Carson NP:  The patient is a 59-year-old female with history of hypertension and hyperlipidemia who presents to the emergency department with rash to scalp.  Patient states on December 20th, she had phillip and hair dye placed in her hair.  She states the following day she woke up with facial swelling and scalp swelling. She states she was seen in the emergency department and treated for allergic reaction.  She states she was sent home with steroids.  She states she was given topical antibiotics.  She states after finishing the steroids, the swelling presented again.  She states she was seen in the emergency department yesterday and treated for allergic reaction with a secondary infection.  She states she has taken a couple of doses of her Bactrim.  She states the swelling is progressively worsening.  She states her left eye is swelling more more.  She states she feels tightness.  She states she has purulent drainage from the scalp.  She reports body aches and chills. She states she has 10/10 throbbing pain in her scalp.    Hospital Course:   After admission, no evidence of secondary infection to scalp, but she did have mild erythema at hairline. She was treated in the ED with intravenous vancomycin and cefepime.  The patient reported previous similar reaction to hair dye in the past.  Given report of symptoms starting after using [employer] hair dye, I suspect allergic reaction  to hair dye.  She was treated with oral prednisone in the ED and was subsequently started on cetirizine and famotidine after admission.  Prior to discharge, she showed  significant improvement to left facial swelling and decrease in erythema to scalp.  She will be discharged on oral prednisone, cetirizine and famotidine as well as oral cephalexin to complete five day course.  I recommend she follow up with her primary care provider and to establish care with Allergist for allergy testing given report of history of contact dermatitis. Diagnosis, plan of care and management were discussed with the patient who agreed with plan and was eager for discharge.      Service: Hospital Medicine    Final Active Diagnoses:    Diagnosis Date Noted POA    PRINCIPAL PROBLEM:  Allergic reaction to dye [T50.995A] 12/31/2019 Yes    Facial edema [R60.0] 01/01/2020 Yes    Essential hypertension [I10] 05/05/2017 Yes    Hyperlipidemia [E78.5] 02/17/2017 Yes      Problems Resolved During this Admission:       Discharged Condition: good    Disposition: Home or Self Care    Follow Up:  Follow-up Information     Bill Burns MD. Schedule an appointment as soon as possible for a visit in 1 week.    Specialty:  Family Medicine  Why:  follow up after hospital discharge  Contact information:  [address] [facility] [address]  [phone]             [facility].    Why:  They will call you with scheduled appointment time for possible allergy testing               Patient Instructions:      Ambulatory referral to ENT   Referral Priority: Routine Referral Type: Consultation   Referral Reason: Specialty Services Required   Requested Specialty: Allergy and Immunology   Number of Visits Requested: 1     Diet Adult Regular     Notify your health care provider if you experience any of the following:  temperature >100.4     Notify your health care provider if you experience any of the following:  worsening rash     Activity as tolerated       Significant Diagnostic Studies: Labs: All labs within the past 24 hours have been reviewed       Medications:  Reconciled Home Medications:       Medication List      START taking these medications    predniSONE 20 MG tablet  Commonly known as:  DELTASONE  Take 2 tablets (40 mg total) by mouth once daily. for 4 days        CHANGE how you take these medications    aspirin 81 MG EC tablet  Commonly known as:  ECOTRIN  Take 1 tablet (81 mg total) by mouth 2 (two) times daily. for 14 days  What changed:  when to take this        CONTINUE taking these medications    acetaminophen 650 MG Tbsr  Commonly known as:  TYLENOL  Take 1,300 mg by mouth as needed.     atorvastatin 80 MG tablet  Commonly known as:  LIPITOR  Take 1 tablet (80 mg total) by mouth nightly.     azelastine 137 mcg (0.1 %) nasal spray  Commonly known as:  ASTELIN  1 spray (137 mcg total) by Nasal route 2 (two) times daily.     cetirizine 10 MG tablet  Commonly known as:  ZYRTEC  Take 1 tablet (10 mg total) by mouth once daily. for 14 days     famotidine 20 MG tablet  Commonly known as:  PEPCID  Take 1 tablet (20 mg total) by mouth 2 (two) times daily. for 14 days     flavoxATE 100 mg Tab  Commonly known as:  URISPAS  Take 1 tablet (100 mg total) by mouth 3 (three) times daily as needed.     hydroCHLOROthiazide 25 MG tablet  Commonly known as:  HYDRODIURIL  Take 1 tablet (25 mg total) by mouth once daily.     HYDROcodone-acetaminophen 5-325 mg per tablet  Commonly known as:  NORCO  Take 1 tablet by mouth every 6 (six) hours as needed for Pain.     hydrOXYzine pamoate 25 MG Cap  Commonly known as:  VISTARIL  Take 1 capsule (25 mg total) by mouth every 6 (six) hours as needed (itching).     meloxicam 15 MG tablet  Commonly known as:  MOBIC  Take 1 tablet (15 mg total) by mouth once daily.     potassium chloride SA 20 MEQ tablet  Commonly known as:  K-DUR,KLOR-CON  Take 1 tablet (20 mEq total) by mouth 2 (two) times daily.     traMADol 50 mg tablet  Commonly known as:  ULTRAM  Take 1 tablet (50 mg total) by mouth daily as needed (severe pain).        STOP taking these medications     sulfamethoxazole-trimethoprim 800-160mg 800-160 mg Tab  Commonly known as:  BACTRIM DS        ASK your doctor about these medications    cephALEXin 500 MG capsule  Commonly known as:  KEFLEX  Take 1 capsule (500 mg total) by mouth every 6 (six) hours. for 3 days  Ask about: Should I take this medication?            Time spent on the discharge of patient: >30 minutes  Patient was seen and examined on the date of discharge and determined to be suitable for discharge.         Ольга Meyer NP  Department of Hospital Medicine  [facility]

## 2020-01-07 NOTE — HOSPITAL COURSE
After admission, no evidence of secondary infection to scalp, but she did have mild erythema at hairline. She was treated in the ED with intravenous vancomycin and cefepime.  The patient reported previous similar reaction to hair dye in the past.  Given report of symptoms starting after using [employer] hair dye, I suspect allergic reaction  to hair dye.  She was treated with oral prednisone in the ED and was subsequently started on cetirizine and famotidine after admission.  Prior to discharge, she showed significant improvement to left facial swelling and decrease in erythema to scalp.  She will be discharged on oral prednisone, cetirizine and famotidine as well as oral cephalexin to complete five day course.  I recommend she follow up with her primary care provider and to establish care with Allergist for allergy testing given report of history of contact dermatitis. Diagnosis, plan of care and management were discussed with the patient who agreed with plan and was eager for discharge.

## 2020-01-09 ENCOUNTER — PATIENT OUTREACH (OUTPATIENT)
Dept: ADMINISTRATIVE | Facility: OTHER | Age: 60
End: 2020-01-09

## 2020-01-13 ENCOUNTER — CLINICAL SUPPORT (OUTPATIENT)
Dept: CARDIOLOGY | Facility: CLINIC | Age: 60
End: 2020-01-13
Attending: INTERNAL MEDICINE
Payer: COMMERCIAL

## 2020-01-13 ENCOUNTER — TELEPHONE (OUTPATIENT)
Dept: INTERNAL MEDICINE | Facility: CLINIC | Age: 60
End: 2020-01-13

## 2020-01-13 ENCOUNTER — OFFICE VISIT (OUTPATIENT)
Dept: CARDIOLOGY | Facility: CLINIC | Age: 60
End: 2020-01-13
Payer: COMMERCIAL

## 2020-01-13 VITALS
OXYGEN SATURATION: 100 % | WEIGHT: 209.69 LBS | BODY MASS INDEX: 31.06 KG/M2 | SYSTOLIC BLOOD PRESSURE: 131 MMHG | DIASTOLIC BLOOD PRESSURE: 78 MMHG | HEIGHT: 69 IN | HEART RATE: 74 BPM

## 2020-01-13 DIAGNOSIS — E78.2 MIXED HYPERLIPIDEMIA: ICD-10-CM

## 2020-01-13 DIAGNOSIS — I10 ESSENTIAL HYPERTENSION: ICD-10-CM

## 2020-01-13 DIAGNOSIS — I83.813 VARICOSE VEINS OF BILATERAL LOWER EXTREMITIES WITH PAIN: ICD-10-CM

## 2020-01-13 DIAGNOSIS — I83.813 VARICOSE VEINS OF BILATERAL LOWER EXTREMITIES WITH PAIN: Primary | ICD-10-CM

## 2020-01-13 LAB
LEFT GREAT SAPHENOUS DISTAL THIGH DIA: 0.18 CM
LEFT GREAT SAPHENOUS JUNCTION DIA: 0.63 CM
LEFT GREAT SAPHENOUS KNEE DIA: 0.48 CM
LEFT GREAT SAPHENOUS MIDDLE THIGH DIA: 0.44 CM
LEFT GREAT SAPHENOUS PROXIMAL CALF DIA: 0.28 CM
LEFT SMALL SAPHENOUS KNEE DIA: 0.16 CM
LEFT SMALL SAPHENOUS SPJ DIA: 0.1 CM

## 2020-01-13 PROCEDURE — 3075F PR MOST RECENT SYSTOLIC BLOOD PRESS GE 130-139MM HG: ICD-10-PCS | Mod: CPTII,S$GLB,, | Performed by: PHYSICIAN ASSISTANT

## 2020-01-13 PROCEDURE — 99214 PR OFFICE/OUTPT VISIT, EST, LEVL IV, 30-39 MIN: ICD-10-PCS | Mod: S$GLB,,, | Performed by: PHYSICIAN ASSISTANT

## 2020-01-13 PROCEDURE — 3075F SYST BP GE 130 - 139MM HG: CPT | Mod: CPTII,S$GLB,, | Performed by: PHYSICIAN ASSISTANT

## 2020-01-13 PROCEDURE — 99999 PR PBB SHADOW E&M-EST. PATIENT-LVL III: ICD-10-PCS | Mod: PBBFAC,,, | Performed by: PHYSICIAN ASSISTANT

## 2020-01-13 PROCEDURE — 93971 CV US LOWER VENOUS INSUFFICIENCY LEFT (CUPID ONLY): ICD-10-PCS | Mod: LT,S$GLB,, | Performed by: INTERNAL MEDICINE

## 2020-01-13 PROCEDURE — 99214 OFFICE O/P EST MOD 30 MIN: CPT | Mod: S$GLB,,, | Performed by: PHYSICIAN ASSISTANT

## 2020-01-13 PROCEDURE — 3078F DIAST BP <80 MM HG: CPT | Mod: CPTII,S$GLB,, | Performed by: PHYSICIAN ASSISTANT

## 2020-01-13 PROCEDURE — 3008F BODY MASS INDEX DOCD: CPT | Mod: CPTII,S$GLB,, | Performed by: PHYSICIAN ASSISTANT

## 2020-01-13 PROCEDURE — 3078F PR MOST RECENT DIASTOLIC BLOOD PRESSURE < 80 MM HG: ICD-10-PCS | Mod: CPTII,S$GLB,, | Performed by: PHYSICIAN ASSISTANT

## 2020-01-13 PROCEDURE — 99999 PR PBB SHADOW E&M-EST. PATIENT-LVL III: CPT | Mod: PBBFAC,,, | Performed by: PHYSICIAN ASSISTANT

## 2020-01-13 PROCEDURE — 3008F PR BODY MASS INDEX (BMI) DOCUMENTED: ICD-10-PCS | Mod: CPTII,S$GLB,, | Performed by: PHYSICIAN ASSISTANT

## 2020-01-13 PROCEDURE — 93971 EXTREMITY STUDY: CPT | Mod: LT,S$GLB,, | Performed by: INTERNAL MEDICINE

## 2020-01-13 RX ORDER — CYCLOBENZAPRINE HCL 5 MG
5 TABLET ORAL 3 TIMES DAILY PRN
COMMUNITY
End: 2022-02-16

## 2020-01-13 NOTE — PROGRESS NOTES
Interventional Cardiology Clinic Note  Reason for Visit: Varicose Veins    HPI:   Rosamaria Agosto is a 59 y.o. female who presents for Varicose Veins    Ms. Agosto has a hx of b/l lower extremity varicose veins and previously had laser ablation about 4-5 years ago by Dr. Camacho. She was last seen in this clinic on 10/22/19 for worsening LE pain (mostly in L groin) and swelling x 2 years despite compliance with daily compression stockings. A lower extremity venous ultrasound from April of last year showed b/l GSV reflux. She underwent LLE sclerotherapy on 12/12/19. After the procedure, patient became diaphoretic, hypotensive, and pruritic. 18g IV started and patient received Benadryl 50mg IV and Solumedrol 125mg IV. Blood pressure increased and patient stabilized with complete resolution of symptoms. Patient was monitored and was discharged in stable condition and Sotradecol was added to her allergy list.    Since the procedure, she has some tenderness just at the injection sites, but otherwise her LLE sxs have improved and she no longer gets the pain in her groin. She has a job that requires her to stand on her feet for 8 hours, and she does develop swelling over the course of the day. She has been wearing the thigh high compression stocking on the left daily and a knee high on the right leg daily. She underwent LLE venous ultrasound this AM which showed treated varicosities above the left knee and treated GSV and associated varicosities below the knee appear thrombosed.    Of note, over the past month she has had 3 ED visits and one admission for an allergic reaction on her scalp with superimposed bacterial infection after dying her hair. She has been referred to an allergist.     ROS:    Constitution: Negative for diaphoresis and malaise/fatigue.   HENT: Negative for nosebleeds.  Cardiovascular: Negative for chest pain, claudication, dyspnea on exertion,  near-syncope, orthopnea, palpitations, paroxysmal  nocturnal dyspnea and syncope. Positive for leg swelling.   Respiratory: Negative for shortness of breath, snoring and wheezing.    Hematologic/Lymphatic: Negative for bleeding problem. Does not bruise/bleed easily.   Musculoskeletal: Negative for muscle cramps and myalgias.   Gastrointestinal: Negative for bloating, abdominal pain, nausea and vomiting.   Neurological: Negative for dizziness, headaches and light-headedness.   PMH:     Past Medical History:   Diagnosis Date    Abnormal Pap smear of cervix yrs ago    Arthritis     History of uterine fibroid     Hyperlipidemia     Hypertension     Shoulder injury     lt    Sinus problem     Sinusitis      Past Surgical History:   Procedure Laterality Date    ARTHROSCOPIC REPAIR OF ROTATOR CUFF OF SHOULDER Left 7/24/2019    Procedure: REPAIR, ROTATOR CUFF, ARTHROSCOPIC;  Surgeon: ASMITA Soto MD;  Location: [facility] OR [address] [place] [address];  Service: Orthopedics;  Laterality: Left;    ARTHROSCOPY OF SHOULDER WITH DECOMPRESSION OF SUBACROMIAL SPACE Left 7/24/2019    Procedure: ARTHROSCOPY, SHOULDER, WITH SUBACROMIAL SPACE DECOMPRESSION;  Surgeon: ASMITA Soto MD;  Location: [facility] OR [address] [place] [address];  Service: Orthopedics;  Laterality: Left;    BREAST BIOPSY  24-25 years old    BREAST SURGERY      CYSTOSCOPY N/A 6/24/2019    Procedure: CYSTOSCOPY;  Surgeon: Anson Ellison MD;  Location: [facility];  Service: OB/GYN;  Laterality: N/A;    ENCEPHALOCELE REPAIR  46    HYSTERECTOMY  93'-95'    ovaries remain    NASAL SINUS SURGERY      ROBOT-ASSISTED LAPAROSCOPIC ABDOMINAL SACROCOLPOPEXY USING DA MAC XI N/A 6/24/2019    Procedure: XI ROBOTIC SACROCOLPOPEXY, ABDOMINAL;  Surgeon: Anson Ellison MD;  Location: [facility] OR;  Service: OB/GYN;  Laterality: N/A;    SHOULDER ARTHROSCOPY Left 7/24/2019    Procedure: BICEPS TENODESIS;  Surgeon: ASMITA Soto MD;  Location: [facility] OR [address] [place] [address];  Service: Orthopedics;  Laterality: Left;    TUBAL LIGATION      VAGINAL DELIVERY        Allergies:     Review of patient's allergies indicates:   Allergen Reactions    Sotradecol [sodium tetradecyl sulfate]      Hives and itching that resolved with Benadryl and Solumedrol.     Medications:     Current Outpatient Medications on File Prior to Visit   Medication Sig Dispense Refill    atorvastatin (LIPITOR) 80 MG tablet Take 1 tablet (80 mg total) by mouth nightly. 30 tablet 11    cetirizine (ZYRTEC) 10 MG tablet Take 1 tablet (10 mg total) by mouth once daily. for 14 days 14 tablet 0    cyclobenzaprine (FLEXERIL) 5 MG tablet Take 5 mg by mouth 3 (three) times daily as needed for Muscle spasms.      famotidine (PEPCID) 20 MG tablet Take 1 tablet (20 mg total) by mouth 2 (two) times daily. for 14 days 14 tablet 0    hydroCHLOROthiazide (HYDRODIURIL) 25 MG tablet Take 1 tablet (25 mg total) by mouth once daily. 90 tablet 3    acetaminophen (TYLENOL) 650 MG TbSR Take 1,300 mg by mouth as needed.      aspirin (ECOTRIN) 81 MG EC tablet Take 1 tablet (81 mg total) by mouth 2 (two) times daily. for 14 days (Patient taking differently: Take 81 mg by mouth once daily. ) 28 tablet 0    azelastine (ASTELIN) 137 mcg (0.1 %) nasal spray 1 spray (137 mcg total) by Nasal route 2 (two) times daily. 30 mL 2    flavoxATE (URISPAS) 100 mg Tab Take 1 tablet (100 mg total) by mouth 3 (three) times daily as needed. (Patient not taking: Reported on 1/13/2020) 90 tablet 3    HYDROcodone-acetaminophen (NORCO) 5-325 mg per tablet Take 1 tablet by mouth every 6 (six) hours as needed for Pain. (Patient not taking: Reported on 1/13/2020) 28 tablet 0    hydrOXYzine pamoate (VISTARIL) 25 MG Cap Take 1 capsule (25 mg total) by mouth every 6 (six) hours as needed (itching). (Patient not taking: Reported on 1/13/2020) 20 capsule 0    meloxicam (MOBIC) 15 MG tablet Take 1 tablet (15 mg total) by mouth once daily. (Patient not taking: Reported on 1/13/2020) 90 tablet 3    potassium chloride SA (K-DUR,KLOR-CON) 20 MEQ  "tablet Take 1 tablet (20 mEq total) by mouth 2 (two) times daily. (Patient not taking: Reported on 1/13/2020) 90 tablet 3    traMADol (ULTRAM) 50 mg tablet Take 1 tablet (50 mg total) by mouth daily as needed (severe pain). (Patient not taking: Reported on 1/13/2020) 30 tablet 2     No current facility-administered medications on file prior to visit.      Social History:     Social History     Tobacco Use    Smoking status: Never Smoker    Smokeless tobacco: Never Used   Substance Use Topics    Alcohol use: Yes     Alcohol/week: 1.0 standard drinks     Types: 1 Shots of liquor per week     Comment: seldom     Family History:     Family History   Problem Relation Age of Onset    Stroke Maternal Grandmother     Diabetes Mother     Hypertension Mother     Diabetes Sister     Diabetes Sister     Breast cancer Neg Hx     Colon cancer Neg Hx     Ovarian cancer Neg Hx      Physical Exam:   /78 (BP Location: Left arm, Patient Position: Sitting, BP Method: Large (Automatic))   Pulse 74   Ht 5' 9" (1.753 m)   Wt 95.1 kg (209 lb 10.5 oz)   LMP 10/19/1993 (Approximate)   SpO2 100%   BMI 30.96 kg/m²      Constitutional: NAD, conversant  HEENT: Sclera anicteric, PERRLA, EOMI  Neck: No JVD, no carotid bruits  CV: RRR, no murmur, normal S1/S2  Pulm: CTAB, no wheezes, rales, or ronchi  GI: Abdomen soft, NTND, +BS  Extremities: 1+ LE edema, warm and well perfused, wearing compression stockings  Skin: No ecchymosis, erythema, or ulcers  Psych: AOx3, appropriate affect  Neuro: No gross deficits    Labs:     Lab Results   Component Value Date     01/02/2020    K 3.7 01/02/2020     01/02/2020    CO2 27 01/02/2020    BUN 15 01/02/2020    CREATININE 0.8 01/02/2020    ANIONGAP 12 01/02/2020     Lab Results   Component Value Date    HGBA1C 5.6 05/28/2018     No results found for: BNP, BNPTRIAGEBLO Lab Results   Component Value Date    WBC 23.97 (H) 01/02/2020    HGB 14.1 01/02/2020    HCT 44.0 01/02/2020 "     (H) 2020    GRAN 18.8 (H) 2020    GRAN 78.5 (H) 2020     Lab Results   Component Value Date    CHOL 325 (H) 2020    HDL 67 2020    LDLCALC 240.8 (H) 2020    TRIG 86 2020          Imagin. Left lower extremity venous ultrasound (20)  · Left LE no DVT.  · Treated varicosities above the left knee and treated GSV and associated varicosities below the knee appear thrombosed.    Assessment:     1. Varicose veins of bilateral lower extremities with pain    2. Mixed hyperlipidemia    3. Essential hypertension      Plan:     Varicose veins of bilateral lower extremities with pain  -- Sxs significantly improved following LLE sclerotherapy. Still has mild LE swelling and pain at end of the day. Recommended continuing daily compression stocking use and elevating legs when sitting. Gave her work note to be able to sit and elevate legs for 15-20 min if she develops symptoms while standing.   -- LE venous US shows successfully treated L GSV and varicosities above the knee.  -- Follow up PRN.      Signed:  Mere Tejada PA-C  Interventional Cardiology   [id number]  2020 12:34 PM

## 2020-01-15 ENCOUNTER — CLINICAL SUPPORT (OUTPATIENT)
Dept: REHABILITATION | Facility: HOSPITAL | Age: 60
End: 2020-01-15
Payer: COMMERCIAL

## 2020-01-15 ENCOUNTER — PATIENT OUTREACH (OUTPATIENT)
Dept: ADMINISTRATIVE | Facility: OTHER | Age: 60
End: 2020-01-15

## 2020-01-15 ENCOUNTER — OFFICE VISIT (OUTPATIENT)
Dept: BARIATRICS | Facility: CLINIC | Age: 60
End: 2020-01-15
Payer: COMMERCIAL

## 2020-01-15 VITALS
BODY MASS INDEX: 31.15 KG/M2 | HEIGHT: 69 IN | SYSTOLIC BLOOD PRESSURE: 138 MMHG | DIASTOLIC BLOOD PRESSURE: 74 MMHG | HEART RATE: 84 BPM | WEIGHT: 210.31 LBS

## 2020-01-15 DIAGNOSIS — E66.9 OBESITY, CLASS I, BMI 30.0-34.9 (SEE ACTUAL BMI): Primary | ICD-10-CM

## 2020-01-15 DIAGNOSIS — M25.612 DECREASED ROM OF LEFT SHOULDER: ICD-10-CM

## 2020-01-15 DIAGNOSIS — S46.012D TRAUMATIC INCOMPLETE TEAR OF LEFT ROTATOR CUFF, SUBSEQUENT ENCOUNTER: ICD-10-CM

## 2020-01-15 PROCEDURE — 3008F PR BODY MASS INDEX (BMI) DOCUMENTED: ICD-10-PCS | Mod: CPTII,S$GLB,, | Performed by: INTERNAL MEDICINE

## 2020-01-15 PROCEDURE — 3075F PR MOST RECENT SYSTOLIC BLOOD PRESS GE 130-139MM HG: ICD-10-PCS | Mod: CPTII,S$GLB,, | Performed by: INTERNAL MEDICINE

## 2020-01-15 PROCEDURE — 99999 PR PBB SHADOW E&M-EST. PATIENT-LVL III: CPT | Mod: PBBFAC,,, | Performed by: INTERNAL MEDICINE

## 2020-01-15 PROCEDURE — 3075F SYST BP GE 130 - 139MM HG: CPT | Mod: CPTII,S$GLB,, | Performed by: INTERNAL MEDICINE

## 2020-01-15 PROCEDURE — 99213 PR OFFICE/OUTPT VISIT, EST, LEVL III, 20-29 MIN: ICD-10-PCS | Mod: S$GLB,,, | Performed by: INTERNAL MEDICINE

## 2020-01-15 PROCEDURE — 97110 THERAPEUTIC EXERCISES: CPT | Mod: PO

## 2020-01-15 PROCEDURE — 97140 MANUAL THERAPY 1/> REGIONS: CPT | Mod: PO

## 2020-01-15 PROCEDURE — 3008F BODY MASS INDEX DOCD: CPT | Mod: CPTII,S$GLB,, | Performed by: INTERNAL MEDICINE

## 2020-01-15 PROCEDURE — 3078F DIAST BP <80 MM HG: CPT | Mod: CPTII,S$GLB,, | Performed by: INTERNAL MEDICINE

## 2020-01-15 PROCEDURE — 99213 OFFICE O/P EST LOW 20 MIN: CPT | Mod: S$GLB,,, | Performed by: INTERNAL MEDICINE

## 2020-01-15 PROCEDURE — 3078F PR MOST RECENT DIASTOLIC BLOOD PRESSURE < 80 MM HG: ICD-10-PCS | Mod: CPTII,S$GLB,, | Performed by: INTERNAL MEDICINE

## 2020-01-15 PROCEDURE — 99999 PR PBB SHADOW E&M-EST. PATIENT-LVL III: ICD-10-PCS | Mod: PBBFAC,,, | Performed by: INTERNAL MEDICINE

## 2020-01-15 RX ORDER — TOPIRAMATE 25 MG/1
25 TABLET ORAL 2 TIMES DAILY
Qty: 60 TABLET | Refills: 3 | Status: SHIPPED | OUTPATIENT
Start: 2020-01-15 | End: 2020-05-18 | Stop reason: SDUPTHER

## 2020-01-15 NOTE — PROGRESS NOTES
"Subjective:       Patient ID: Rosamaria Agosto is a 59 y.o. female.    Chief Complaint: Follow-up    Pt here today for follow-up. Has gained 10 lbs since last in over 1 year ago. Net pos 5  lbs. Had a 1 year lapse in follow up before that.  BP is ok today. Resumed LCHF diet with phentermine last OV. States she has been at home for the past 6 months on medical leave and eating more. Eating sweets. Going to PT for shoulder. .           Follow-up   Associated symptoms include arthralgias. Pertinent negatives include no chest pain, chills or fever.     Review of Systems   Constitutional: Negative for chills and fever.   Respiratory: Positive for shortness of breath.         + snores. Not as bad since sinus surgery   Cardiovascular: Positive for leg swelling. Negative for chest pain.   Gastrointestinal: Negative for constipation and diarrhea.        Some GERD   Genitourinary: Positive for urgency. Negative for difficulty urinating and dyspareunia.   Musculoskeletal: Positive for arthralgias and back pain.        Shoulders   Neurological: Negative for dizziness and light-headedness.   Psychiatric/Behavioral: Negative for dysphoric mood. The patient is not nervous/anxious.        Objective:     /74   Pulse 84   Ht 5' 9" (1.753 m)   Wt 95.4 kg (210 lb 5.1 oz)   LMP 10/19/1993 (Approximate)   BMI 31.06 kg/m²     Physical Exam   Constitutional: She is oriented to person, place, and time. She appears well-developed and well-nourished. No distress.   HENT:   Head: Normocephalic and atraumatic.   Eyes: Pupils are equal, round, and reactive to light. EOM are normal. No scleral icterus.   Neck: Normal range of motion. Neck supple.   Cardiovascular: Normal rate.   Pulmonary/Chest: Effort normal.   Musculoskeletal: Normal range of motion. She exhibits no edema.   Neurological: She is alert and oriented to person, place, and time. No cranial nerve deficit.   Skin: Skin is warm and dry. No erythema.   Psychiatric: She has " a normal mood and affect. Her behavior is normal. Judgment normal.   Vitals reviewed.      Assessment:       1. Obesity, Class I, BMI 30.0-34.9 (see actual BMI)        Plan:       Rosamaria was seen today for follow-up.    Diagnoses and all orders for this visit:    Obesity, Class I, BMI 30.0-34.9 (see actual BMI)    Other orders  -     topiramate (TOPAMAX) 25 MG tablet; Take 1 tablet (25 mg total) by mouth 2 (two) times daily.      Patient was informed that topiramate is used for migraine prevention and seizures. Weight loss is a common side effect that is well documented. S/he understands this. S/he was informed of the potential side effects such as serious and possibly fatal rash in which case the medication should be discontinued immediately. Paresthesias, forgetfulness, fatigue, kidney stones, GI symptoms, and changes in lab values such as electrolytes, blood counts and kidney function.      Start topiramate  in the evening for 1 week, then morning and evening.       Continue with PT. Advance activity per them.       3 meals a day made up of the following:  Unlimited green vegetables, tomatoes, mushrooms, spaghetti squash, cauliflower,   3-4 oz serving Meat, poultry, seafood, eggs hard cheeses or plant based protein with each meal.    Milk and plain yogurt  Dressings, seasonings, condiments, etc should have less than 2 g sugars.   Beans (1-1.5 cups) or nuts (1/4 cup) can have 1 x a day.   1-2 servings of citrus fruits, berries, pineapple or melon a day (1/2 cup)  Avoid fried foods    Avoid/limit grains, rice, pasta, potatoes, bread, corn, peas, oatmeal, grits, tortillas, crackers, chips    No soda, sweet tea, juices or lemonade    [email or web address].dietdoctor.com for recipes. Moderate carb intake    30 min recipes given

## 2020-01-15 NOTE — PATIENT INSTRUCTIONS
Patient was informed that topiramate is used for migraine prevention and seizures. Weight loss is a common side effect that is well documented. S/he understands this. S/he was informed of the potential side effects such as serious and possibly fatal rash in which case the medication should be discontinued immediately. Paresthesias, forgetfulness, fatigue, kidney stones, GI symptoms, and changes in lab values such as electrolytes, blood counts and kidney function.      Start topiramate  in the evening for 1 week, then morning and evening.       Continue with PT. Advance activity per them.       3 meals a day made up of the following:  Unlimited green vegetables, tomatoes, mushrooms, spaghetti squash, cauliflower,   3-4 oz serving Meat, poultry, seafood, eggs hard cheeses or plant based protein with each meal.    Milk and plain yogurt  Dressings, seasonings, condiments, etc should have less than 2 g sugars.   Beans (1-1.5 cups) or nuts (1/4 cup) can have 1 x a day.   1-2 servings of citrus fruits, berries, pineapple or melon a day (1/2 cup)  Avoid fried foods    Avoid/limit grains, rice, pasta, potatoes, bread, corn, peas, oatmeal, grits, tortillas, crackers, chips    No soda, sweet tea, juices or lemonade    [email or web address] for recipes. Moderate carb intake        Dinner in Under 30 minutes and 400 calories.     Baked Chicken breast with Broccoli Cheese Casserole  2-3 chicken breast (approximately 6-8 oz each)    2 tsp each garlic and herb Mrs. Dash seasoning   2 tsp your favorite creole seasoning  2 tablespoons of balsamic vinegar  2 - 10 oz packages of frozen broccoli  1 egg   ½ cup skim milk  ½ tsp paprika   ½ tsp cayenne pepper   1 can fat free cream of mushroom soup.   1 .5 cups of shredded cheddar cheese    Pre-heat oven to 450 degrees. Toss 2-3 chicken breast (approximately 6-8 oz each) in 2 tsp each garlic and herb Mrs. Dash seasoning, 2 tsp your favorite creole seasoning, and 2 tablespoons of balsamic  vinegar. This can also be done up to 24 hours ahead of time, and the chicken can be left in the refrigerator to marinate. Place on a baking sheet sprayed with non-stick cooking spray and bake on 450 degrees for 10 min, then reduce heat to 350 degrees and cook an addition 10-15 minutes until chicken is cooked through.   While chicken is baking, microwave safe dish, thaw 2 - 10 oz packages of frozen broccoli. Drain any excess water, season with salt, pepper, all-purpose seasoning of your choice. In another bowl, whisk together 1 egg, ½ cup skim milk, ½ tsp paprika, ½ tsp cayenne pepper and 1 can fat free cream of mushroom soup. Combine broccoli, soup mixture, 1 cup of shredded cheddar cheese in baking dish sprayed with cooking spray. Top with remaining cheese. Bake in the oven with chicken for about 20 min or until set cheese is melted and [place].     Makes 4 servings. 389 calories per serving.10 g fat, 13 g carbs, 54g protein     Sheet Pan [place] Shrimp  1 pound large shrimp, shelled, peeled and deveined.   2 tablespoon olive oil  The zest and the juice of 1 lime  ½ tsp chili powder  ½-1 tsp cayenne pepper  1 tsp cumin  ½ tsp dry oregano  1 red bell pepper  1 green bell pepper  1 red onion  2 cups mushrooms, quartered  1 avocado  Whole kapil lettuce leaves  Preheat oven to 375 degrees.  In a bowl combine shrimp, lime juice, lime zest, cumin, cayenne pepper, chili powder, and a pinch of salt. Set aside.   Slice peppers and onions into thin strips. Toss in 1 tablespoon of olive oil. Sprinkle with salt and pepper and arrange in a single layer over 1/3 of a large sheet pan  Toss mushrooms with remaining olive oil, oregano, salt and pepper. Arrange in single layer on sheet pan. Place sheet pan in oven for about 15-20 minutes until vegetables are softened, cooked about ¾ of the way through. Remove the sheet pan from oven.  Change setting on oven to low broil.  If needed, rearrange vegetables to make room for shrimp.  Arrange the shrimp in a single layer on the sheet pan and return pan to oven. After 5 minutes, flip shrimp. Cook 3-5 additional minutes, or until  Shrimp are opaque.   Serve shrimp and cooked vegetables on lettuce leaves with sliced avocado.   Makes 4 servings. Calories per servin; 23g fat, 19 g carbohydrates, 27g protein.    Zoodles [place] with Seasoned Ricotta  8 cups zucchini noodles. These can be purchased already prepared, or you can make them on your own with whole zucchini and a vegetable spiralizer.   1.5 cups cherry tomatoes, quartered  2 cups sliced mushrooms  1 cup chopped fresh broccoli  1 cup frozen peas and carrots  2 cloves garlic, finely chopped  16 oz part skim ricotta cheese  2 oz [place] cream cream cheese, cut into small cubes  Juice and zest of 1 lemon  1 pinch red pepper flakes    2 tsp Italian seasoning  4-5 leaves fresh basil, thinly sliced  1 tablespoon of olive oil  Parmesan cheese for topping (optional)     In a bowl, combine ricotta cheese, 1 tsp Italian seasoning, ½ teaspoon lemon zest. Season with salt and pepper to taste. Mix well. Fold in half of fresh basil. Set aside.   Heat a large skillet to medium high. Add olive oil. Sautee mushrooms until starting to become tender. Season them with salt and pepper while cooking.  Add broccoli and peas and carrots. Reduce heat to medium. Cover pan and cook for 4-5 minutes until broccoli is tender and peas and carrots are warmed through. Add [place] cheese, Italian seasoning, red pepper flakes, 1 tsp lemon zest and lemon juice. Stir until a smooth sauce is formed. Add zucchini noodles (zoodles) to skillet and toss in sauce, then add cherry tomatoes.  Separate zoodle and vegetables into 4 equal portions. Serve each with a ¼ cup serving of seasoned ricotta cheese. Sprinkle with grated parmesan cheese or fresh basil,  if desired.   Makes 4 servings. Calories per servin calories, 32 g carbs, 33 g protein, 18 g fat

## 2020-01-17 ENCOUNTER — CLINICAL SUPPORT (OUTPATIENT)
Dept: REHABILITATION | Facility: HOSPITAL | Age: 60
End: 2020-01-17
Payer: COMMERCIAL

## 2020-01-17 DIAGNOSIS — S46.012D TRAUMATIC INCOMPLETE TEAR OF LEFT ROTATOR CUFF, SUBSEQUENT ENCOUNTER: ICD-10-CM

## 2020-01-17 DIAGNOSIS — M25.612 DECREASED ROM OF LEFT SHOULDER: ICD-10-CM

## 2020-01-17 PROCEDURE — 97110 THERAPEUTIC EXERCISES: CPT | Mod: PO

## 2020-01-17 PROCEDURE — 97140 MANUAL THERAPY 1/> REGIONS: CPT | Mod: PO

## 2020-01-17 NOTE — PROGRESS NOTES
[facility]  Physical Therapy Note     Name: Rosamaria Agosto  Clinic Number: [id number]     Therapy Diagnosis: decreased ROM and pain at the shoulder s/p RTC  Physician: Arina Corea PA-C     Physician Orders: PT Eval and Treat   Medical Diagnosis from Referral: s/p RTC repair and biceps tenodesis  Evaluation Date: 7/26/2019  Authorization Period Expiration: 12/31/20  Plan of Care Expiration:  2/29/20 with approved certifiction extension  Visit # / Visits authorized: new script 3/20     Time In:  400 pm  Time Out: 505 pm  Total Billable Time:  35 minutes ( MT 1, TE -1)     Precautions: Standard, RTC protocol NO AROM at elbow joint s/p biceps tenodesis, PROM at elbow and shoulder but no excessive ER stopping at initial end feel per phase I protocol and follow up with MD for protocol     Subjective      Pt reports:  Continues to feel good mobility with just tightness and no soreness today with work.  .  Pain : 0/10 at beginning and end of session 0/10 mostly tightness  Location: Left shoulder    TREATMENT     Rosamaria received therapeutic exercises to develop ROM for 45 minutes including:         Shoulder rows 3 x10 with YTB -   Shoulder extension 3 x10 with YTB-      D2 flexion and then D2 extension pattern with min to mod resistance in supine      AROM shoulder scaption and then abduction 2 x10 with stretch at end ROM 20-30 secs after each set against the wall      Shoulder depression with 13# pulley with use of RUE to assist LUE to perform lat pulldown and LUE to hold in bottom position shoulder depression RUE assist to return to overhead position and relax at top of the range 2 x 15 with 10 sec stretch at top position for PROM       AAROM  3 x 10 reps scaption and again in abduction in midrange to keep smooth AAROM and  with pt in standng with arm on PT, PT apply humeral inferior glide with scaption motion teres minor stretch- held static 3x 20 sec    Pulley 2 x 1min in scaption and  then in abduction     AROM with PT into scaption and then in abduction in sidelying with cues for shoulder depression in supine and in tolerated incline position 45 and 70 deg 3 x10 each as tolerated towards progressive upright posture.from 10/28/19 scaption and serratus punches with 2#    Self AAROM with use cane or PVC pipe and RUE in supine and then in standing: scaption with elbow flexed toward full extension at end rom of scaption - 2x 10 reps:   Self AAROM in supine scaption with elbow extending for full ROM 2 x10     AROM at L shoulder 12/31/19  165 deg abd, in sidelying in 75 degrees elevation in head of mat    170 deg flex in supine with 75 deg elevation in head of mat  with cues to avoid shoulder hike, compensation.  60 deg with ER stopping at initial end feel in standing  And   Elbow extension at  0 deg    AAROM/ AROM 1/6/20  upright standing on min A  in midrange for AAROM and at end ROM  170/160 deg scaption  165/140 deg abuction  70 ER      Strength 12/31/19  scaption and abduction of left shoulder 3-/5  ER 3+/5    Manual therapy 20 min with STM./ MFR to pect minor, teres minor, and rhomboids, upper traps and levator, GH mobs grade I/II AP and lateral distraction for pain relief into empty end feel before restrictions and GH mobs grade Iv with distraction  in all direction for increased ROM  - teres minor stretch at end ROM in scaption and abduction with inferior humeral glide  - manual stretch pec minor  -subscap release with ER motion  - inferior humeral glide with inferior mobilization Grade IV    0 min ice to left shoulder     Education provided:   Sleeping position, sling positioning and shoulder pendulums, donning strategy for sling, review post surgery restrictions. Pt issued putty for , decreased edema pooling at elbow     Written Home Exercises Provided: yes.  Exercises were reviewed and Rosamaria was able to demonstrate them prior to the end of the session.  Rosamaria demonstrated good   understanding of the education provided.      See EMR under Patient Instructions for exercises provided 7/26/2019.     Assessment     Pt with decreased shoulder hiking and guarding on arrival with no reported pain with increased workload at work.  Pt with improved mechanics and strength through 120 to end ROM of scaption and abduction.  Pt with improved mechanics and AROM in supine and standing as session progress.  Pt with increased tolerance today with session and decreased pain with session.  Pt needed  min cues for mechanics today and equal combined ROM of IR + Ext L = R but still decreased ER +flexion L vs R.  Pain has been inconsistent with return to work dependent on workload but decreased today and well managed pain control with work staying 0-2/10 most of the day so progressing on pain goal with relations to return to work.      Goals:  Short Term Goals: 3 weeks   1.  Pt independent with HEP for s/p RTC repair and biceps tenodesis with return demonstration. MET  2.  Pt to be independent with donning and doffing surgical sling for proper pain management . Met 8/12/19  3.  Pt to increase PROM as tolerated at left shoulder to 90 deg shoulder flexion and 80 deg abduction without pain. MET  4.  Pt to decrease overall pain at left shoulder to less than 4/10 after session. Met 8/12/19     Long Term Goals: 12 weeks   1.  Pt to been independent with discharge HEP without cues and proper form.  2.  Pt return to independent ADLs to include dressing, grooming with BUEs without compensation.  3.  Pt to increased AROM at elbow WNL and Left shoulder to 170 deg flexion and 160 deg abduction, 70 deg ER without pain or compensation  4.  Pt to increased L shoulder strength to 4+/5 into flexion extension, ER, IR   5  Pt to increase tolerance to perform 1 hour of household chores, ie cleaning without increased L shoulder pain  6. Pt to decrease pain at left shoulder to 3/10 after session for improved functional activities.  "MET    NEW LT. Return to work without limitation and with no increased pain at end of the day less than 2/10 pain grossly for increased tolerance. Progressing  8. Combined ROM into flex/ER and ext/IR left shoulder within 4" of R shoulder with reach up and down thoracic spine. Progressing.          Plan   Plan of care Certification: 20 new script to 20 with approval of certification period      Cont with  New POC   Shanel Carvalho PT      "

## 2020-01-27 ENCOUNTER — CLINICAL SUPPORT (OUTPATIENT)
Dept: REHABILITATION | Facility: HOSPITAL | Age: 60
End: 2020-01-27
Payer: COMMERCIAL

## 2020-01-27 DIAGNOSIS — M25.612 DECREASED ROM OF LEFT SHOULDER: ICD-10-CM

## 2020-01-27 DIAGNOSIS — S46.012D TRAUMATIC INCOMPLETE TEAR OF LEFT ROTATOR CUFF, SUBSEQUENT ENCOUNTER: ICD-10-CM

## 2020-01-27 PROCEDURE — 97140 MANUAL THERAPY 1/> REGIONS: CPT | Mod: PO

## 2020-01-27 PROCEDURE — 97110 THERAPEUTIC EXERCISES: CPT | Mod: PO,CQ

## 2020-01-27 NOTE — PROGRESS NOTES
[facility]  Physical Therapy Note     Name: Rosamaria Agosto  Clinic Number: [id number]     Therapy Diagnosis: decreased ROM and pain at the shoulder s/p RTC  Physician: Arina Corea PA-C     Physician Orders: PT Eval and Treat   Medical Diagnosis from Referral: s/p RTC repair and biceps tenodesis  Evaluation Date: 7/26/2019  Authorization Period Expiration: 12/31/20  Plan of Care Expiration:  2/29/20 with approved certifiction extension  Visit # / Visits authorized: new script 4/20     Time In:  3:50  pm  Time Out: 5:00 pm   Total Billable Time: 50 minutes (TE-3)     Precautions: Standard, RTC protocol NO AROM at elbow joint s/p biceps tenodesis, PROM at elbow and shoulder but no excessive ER stopping at initial end feel per phase I protocol and follow up with MD for protocol     Subjective      Pt reports:  She did her exercises yesterday and might have overdone it because she feels more soreness/fatigue today.  .  Pain : 0/10 at beginning and end of session 0/10 mostly tightness  Location: Left shoulder    TREATMENT     MHP to left shoulder for 10' prior to session.     Rosamaria received therapeutic exercises to develop ROM for 50 minutes including:     Shoulder rows 3 x10 with YTB - NP  Shoulder extension 3 x10 with YTB-  NP    D2 flexion and then D2 extension pattern with min to mod resistance in supine       AROM shoulder scaption and then abduction 2 x10 with stretch at end ROM 20-30 secs after each set against the wall      Shoulder depression with 13# pulley with use of RUE to assist LUE to perform lat pulldown and LUE to hold in bottom position shoulder depression RUE assist to return to overhead position and relax at top of the range 2 x 15 with 10 sec stretch at top position for PROM       AAROM  2 x 10 reps scaption and again in abduction in midrange to keep smooth AAROM   (and with pt in standng with arm on PT, PT apply humeral inferior glide with scaption motion teres  minor stretch- held static 3x 20 sec)- NP    Bindu 2 x 1min in scaption and then in abduction     AROM with PT into scaption and then in abduction in sidelying with cues for shoulder depression in supine and in tolerated incline position 45 and 70 deg 3 x10 each as tolerated towards progressive upright posture.from 10/28/19 scaption and serratus punches with 2# - Not performed    Self AAROM with use cane or PVC pipe and RUE in supine and then in standing: scaption with elbow flexed toward full extension at end rom of scaption - 2x 10 reps: - NP  Self AAROM in supine scaption with elbow extending for full ROM 2 x10 - NP    AROM at L shoulder 12/31/19  165 deg abd, in sidelying in 75 degrees elevation in head of mat    170 deg flex in supine with 75 deg elevation in head of mat  with cues to avoid shoulder hike, compensation.  60 deg with ER stopping at initial end feel in standing  And   Elbow extension at  0 deg    AAROM/ AROM 1/6/20  upright standing on min A  in midrange for AAROM and at end ROM  170/160 deg scaption  165/140 deg abuction  70 ER    Strength 12/31/19  scaption and abduction of left shoulder 3-/5  ER 3+/5    Manual therapy (performed by Shanel Carvalho, PT) : 10 min with STM./ MFR to pect minor, teres minor, and rhomboids, upper traps and levator, GH mobs grade I/II AP and lateral distraction for pain relief into empty end feel before restrictions and GH mobs grade Iv with distraction  in all direction for increased ROM  - teres minor stretch at end ROM in scaption and abduction with inferior humeral glide  - manual stretch pec minor  -subscap release with ER motion  - inferior humeral glide with inferior mobilization Grade IV    0 min ice to left shoulder     Education provided:   Sleeping position, sling positioning and shoulder pendulums, donning strategy for sling, review post surgery restrictions. Pt issued putty for , decreased edema pooling at elbow     Written Home Exercises Provided:  yes.  Exercises were reviewed and Rosamaria was able to demonstrate them prior to the end of the session.  Rosamaria demonstrated good  understanding of the education provided.      See EMR under Patient Instructions for exercises provided 7/26/2019.     Assessment     Pt presents with increased soreness/stiffness reported today and slightly shortened session performed due to this. Pt with constant cues provided throughout session to help reduce upper trap compensations . Pt had difficulty performing wall slides and pulleys without compensating. . She demonstrated good awareness of when she hiking her shoulder and was able to self correct although did have difficulty today achieving smooth motion without UT firing.     Shanel:  Pt seen and mobilized prior to starting her exercise session to addressed increased stiffness and limited mobility due to overuse this weekend with prolonged overhead use trying to fix her hair this weekend.  She had also discontinued use of prescribed muscle relaxers which led to further limited ROM this morning and pain and guarding in session. Pt with mild improved PROM after MT but has limited mobility in session versus prior visits.    Goals:  Short Term Goals: 3 weeks   1.  Pt independent with HEP for s/p RTC repair and biceps tenodesis with return demonstration. MET  2.  Pt to be independent with donning and doffing surgical sling for proper pain management . Met 8/12/19  3.  Pt to increase PROM as tolerated at left shoulder to 90 deg shoulder flexion and 80 deg abduction without pain. MET  4.  Pt to decrease overall pain at left shoulder to less than 4/10 after session. Met 8/12/19     Long Term Goals: 12 weeks   1.  Pt to been independent with discharge HEP without cues and proper form.  2.  Pt return to independent ADLs to include dressing, grooming with BUEs without compensation.  3.  Pt to increased AROM at elbow WNL and Left shoulder to 170 deg flexion and 160 deg abduction, 70 deg ER  "without pain or compensation  4.  Pt to increased L shoulder strength to 4+/5 into flexion extension, ER, IR   5  Pt to increase tolerance to perform 1 hour of household chores, ie cleaning without increased L shoulder pain  6. Pt to decrease pain at left shoulder to 3/10 after session for improved functional activities. MET    NEW LT. Return to work without limitation and with no increased pain at end of the day less than 2/10 pain grossly for increased tolerance. Progressing  8. Combined ROM into flex/ER and ext/IR left shoulder within 4" of R shoulder with reach up and down thoracic spine. Progressing.          Plan   Plan of care Certification: 20 new script to 20 with approval of certification period      Cont with  New POC   Trista Iraheta, PTA      "

## 2020-01-29 ENCOUNTER — CLINICAL SUPPORT (OUTPATIENT)
Dept: REHABILITATION | Facility: HOSPITAL | Age: 60
End: 2020-01-29
Payer: COMMERCIAL

## 2020-01-29 DIAGNOSIS — M25.612 DECREASED ROM OF LEFT SHOULDER: ICD-10-CM

## 2020-01-29 DIAGNOSIS — S46.012D TRAUMATIC INCOMPLETE TEAR OF LEFT ROTATOR CUFF, SUBSEQUENT ENCOUNTER: ICD-10-CM

## 2020-01-29 PROCEDURE — 97140 MANUAL THERAPY 1/> REGIONS: CPT | Mod: PO

## 2020-01-29 PROCEDURE — 97110 THERAPEUTIC EXERCISES: CPT | Mod: PO

## 2020-01-30 NOTE — PROGRESS NOTES
[facility]  Physical Therapy Note     Name: Rosamaria Agosto  Clinic Number: [id number]     Therapy Diagnosis: decreased ROM and pain at the shoulder s/p RTC  Physician: Arina Corea PA-C     Physician Orders: PT Eval and Treat   Medical Diagnosis from Referral: s/p RTC repair and biceps tenodesis  Evaluation Date: 7/26/2019  Authorization Period Expiration: 12/31/20  Plan of Care Expiration:  2/29/20 with approved certifiction extension  Visit # / Visits authorized: new script 4/20     Time In:  3:00  pm  Time Out: 410 pm   Total Billable Time: 30 minutes (TE-1, MT -1)     Precautions: Standard, RTC protocol NO AROM at elbow joint s/p biceps tenodesis, PROM at elbow and shoulder but no excessive ER stopping at initial end feel per phase I protocol and follow up with MD for protocol     Subjective      Pt reports:  She recalled doing her hair for almost an hour last week that led to increased tightness loss of ROM and also return to use of muscle relaxers for improved ROM  .  Pain : 0/10 at beginning and end of session 0/10 mostly tightness  Location: Left shoulder    TREATMENT       Rosamaria received therapeutic exercises to develop ROM for 45 minutes including:     Shoulder rows 3 x10 with YTB - NP  Shoulder extension 3 x10 with YTB-  NP    D2 flexion and then D2 extension pattern with min to mod resistance in supine  NP      AROM shoulder scaption and then abduction 2 x10 with stretch at end ROM 20-30 secs after each set against the wall      Shoulder depression with 13# pulley with use of RUE to assist LUE to perform lat pulldown and LUE to hold in bottom position shoulder depression RUE assist to return to overhead position and relax at top of the range 2 x 15 with 10 sec stretch at top position for PROM       AAROM  2 x 10 reps scaption and again in abduction in midrange to keep smooth AAROM   (and with pt in standng with arm on PT, PT apply humeral inferior glide with scaption  motion teres minor stretch- held static 3x 20 sec)- NP    Bindu 2 x 1min in scaption and then in abduction     AROM with PT into scaption and then in abduction in sidelying with cues for shoulder depression in supine and in tolerated incline position 45 and 70 deg 3 x10 each as tolerated towards progressive upright posture.from 10/28/19 scaption and serratus punches with 2# - Not performed    Self AAROM with use cane or PVC pipe and RUE in supine and then in standing: scaption with elbow flexed toward full extension at end rom of scaption - 2x 10 reps: - NP  Self AAROM in supine scaption with elbow extending for full ROM 2 x10 - NP    AROM at L shoulder 1/29/20  165 deg abd, in sidelying in 75 degrees elevation in head of mat    170 deg flex in supine with 75 deg elevation in head of mat  with cues to avoid shoulder hike, compensation.  60 deg with ER stopping at initial end feel in standing  And   Elbow extension at  0 deg    AAROM/ AROM 1/29/20  upright standing on min A  in midrange for AAROM and at end ROM  170/160 deg scaption  165/140 deg abuction  65 deg ER    Strength 1/29/20  scaption and abduction of left shoulder 3-/5 but has 4-/5 0-90 and 120 to 150 deg of scaption and abduction only needing min A thru midrange.  ER 3+/5    Manual therapy  15 min with STM./ MFR to pect minor, teres minor, and rhomboids, upper traps and levator, GH mobs grade I/II AP and lateral distraction for pain relief into empty end feel before restrictions and GH mobs grade Iv with distraction  in all direction for increased ROM  - teres minor stretch at end ROM in scaption and abduction with inferior humeral glide  - manual stretch pec minor  -subscap release with ER motion  - inferior humeral glide with inferior mobilization Grade IV    0 min ice to left shoulder     Education provided:   Sleeping position, sling positioning and shoulder pendulums, donning strategy for sling, review post surgery restrictions. Pt issued  putty for , decreased edema pooling at elbow     Written Home Exercises Provided: yes.  Exercises were reviewed and Rosamaria was able to demonstrate them prior to the end of the session.  Rosamaria demonstrated good  understanding of the education provided.      See EMR under Patient Instructions for exercises provided 7/26/2019.     Assessment     Pt presents with increased soreness/stiffness improving with mobs today and return to use of muscle relaxers and no overuse in last few days has allowed return to WFL AAROM of left shoulder with combined ROM WFL.  Pt improved with shoulder hiking and this month as had inconsistent performance due to decreased management leading to compensation and guarding.  Pt with progressing of strengthening through range but still with difficulty with mid range strength which leads to initiation of shoulder hiking.  Pt continues to benefit from skilled PT to address deficits. 7/18 goals met and progressing to all goals.     Goals:  Short Term Goals: 3 weeks   1.  Pt independent with HEP for s/p RTC repair and biceps tenodesis with return demonstration. MET  2.  Pt to be independent with donning and doffing surgical sling for proper pain management . Met 8/12/19  3.  Pt to increase PROM as tolerated at left shoulder to 90 deg shoulder flexion and 80 deg abduction without pain. MET  4.  Pt to decrease overall pain at left shoulder to less than 4/10 after session. Met 8/12/19     Long Term Goals: 12 weeks   1.  Pt to been independent with discharge HEP without cues and proper form. MET  2.  Pt return to independent ADLs to include dressing, grooming with BUEs without compensation. Progressing 1/29/20  3.  Pt to increased AROM at elbow WNL and Left shoulder to 170 deg flexion and 160 deg abduction, 70 deg ER without pain or compensation. Progressing 1/29/20  4.  Pt to increased L shoulder strength to 4+/5 into flexion extension, ER, IR. Progressing 1/29/20  5  Pt to increase tolerance to  "perform 1 hour of household chores, ie cleaning without increased L shoulder pain. Progressing 20.   6. Pt to decrease pain at left shoulder to 3/10 after session for improved functional activities. MET    NEW LT. Return to work without limitation and with no increased pain at end of the day less than 2/10 pain grossly for increased tolerance. Progressing 20  8. Combined ROM into flex/ER and ext/IR left shoulder within 4" of R shoulder with reach up and down thoracic spine. MET 20          Plan   Plan of care Certification: 20 new script to 20       Cont with  New POC       Shanel Carvalho, PT      "

## 2020-02-03 ENCOUNTER — CLINICAL SUPPORT (OUTPATIENT)
Dept: REHABILITATION | Facility: HOSPITAL | Age: 60
End: 2020-02-03
Payer: COMMERCIAL

## 2020-02-03 DIAGNOSIS — M25.612 DECREASED ROM OF LEFT SHOULDER: ICD-10-CM

## 2020-02-03 DIAGNOSIS — S46.012D TRAUMATIC INCOMPLETE TEAR OF LEFT ROTATOR CUFF, SUBSEQUENT ENCOUNTER: ICD-10-CM

## 2020-02-03 PROCEDURE — 97140 MANUAL THERAPY 1/> REGIONS: CPT | Mod: PO

## 2020-02-03 PROCEDURE — 97110 THERAPEUTIC EXERCISES: CPT | Mod: PO

## 2020-02-03 PROCEDURE — 97010 HOT OR COLD PACKS THERAPY: CPT | Mod: PO

## 2020-02-03 NOTE — PROGRESS NOTES
[facility]  Physical Therapy Note     Name: Rosamaria Agosto  Clinic Number: [id number]     Therapy Diagnosis: decreased ROM and pain at the shoulder s/p RTC  Physician: Arina Corea PA-C     Physician Orders: PT Eval and Treat   Medical Diagnosis from Referral: s/p RTC repair and biceps tenodesis  Evaluation Date: 7/26/2019  Authorization Period Expiration: 12/31/20  Plan of Care Expiration:  2/29/20 with approved certifiction extension  Visit # / Visits authorized: new script 5/20     Time In:  3:00  pm  Time Out: 400 pm   Total Billable Time: 60 minutes (TE-2, MT -1)  Ice 10 min     Precautions: Standard, RTC protocol NO AROM at elbow joint s/p biceps tenodesis, PROM at elbow and shoulder but no excessive ER stopping at initial end feel per phase I protocol and follow up with MD for protocol     Subjective      Pt reports:  She recalled doing her hair for almost an hour last week that led to increased tightness loss of ROM and also return to use of muscle relaxers for improved ROM  .  Pain : 0/10 at beginning and end of session 0/10 mostly tightness  Location: Left shoulder    TREATMENT       Rosamaria received therapeutic exercises to develop ROM for 35 minutes including:     Shoulder rows 3 x10 with YTB - NP  Shoulder extension 3 x10 with YTB-  NP    D2 flexion and then D2 extension pattern with min to mod resistance in supine  2 x10      AROM shoulder scaption and then abduction 2 x10 with stretch at end ROM 20-30 secs after each set against the wall      Shoulder depression with 13# pulley with use of RUE to assist LUE to perform lat pulldown and LUE to hold in bottom position shoulder depression RUE assist to return to overhead position and relax at top of the range 2 x 15 with 10 sec stretch at top position for PROM       AAROM  2 x 10 reps scaption and again in abduction in midrange to keep smooth AAROM   (and with pt in standng with arm on PT, PT apply humeral inferior glide  with scaption motion teres minor stretch- held static 3x 20 sec)- NP    Bindu 2 x 1min in scaption and then in abduction     AROM with PT into scaption and then in abduction in sidelying with cues for shoulder depression in supine and in tolerated incline position 45 and 70 deg 3 x10 each as tolerated towards progressive upright posture.from 10/28/19 scaption and serratus punches with 2# - Not performed    Self AAROM with use cane or PVC pipe and RUE in supine and then in standing: scaption with elbow flexed toward full extension at end rom of scaption - 2x 10 reps: - NP  Self AAROM in supine scaption with elbow extending for full ROM 2 x10 - NP    AROM at L shoulder 1/29/20  165 deg abd, in sidelying in 75 degrees elevation in head of mat    170 deg flex in supine with 75 deg elevation in head of mat  with cues to avoid shoulder hike, compensation.  60 deg with ER stopping at initial end feel in standing  And   Elbow extension at  0 deg    AAROM/ AROM 2/3/20  upright standing on min A  in midrange for AAROM and at end ROM  170/165 deg scaption  165/160 deg abuction  65 deg ER    Strength 2/3/20  scaption and abduction of left shoulder 3/5 but has 4-/5 0-90 and 120 to 165 deg of scaption and  160 deg abduction only needing min A thru midrange.  ER 3+/5    Manual therapy  15 min with STM./ MFR to pect minor, teres minor, and rhomboids, upper traps and levator, GH mobs grade I/II AP and lateral distraction for pain relief into empty end feel before restrictions and GH mobs grade Iv with distraction  in all direction for increased ROM  - teres minor stretch at end ROM in scaption and abduction with inferior humeral glide  - manual stretch pec minor  -subscap release with ER motion  - inferior humeral glide with inferior mobilization Grade IV    10 min ice to left shoulder     Education provided:   Sleeping position, sling positioning and shoulder pendulums, donning strategy for sling, review post surgery  restrictions. Pt issued putty for , decreased edema pooling at elbow     Written Home Exercises Provided: yes.  Exercises were reviewed and Rosamaria was able to demonstrate them prior to the end of the session.  Rosamaria demonstrated good  understanding of the education provided.      See EMR under Patient Instructions for exercises provided 7/26/2019.     Assessment     Pt presents with increased stiffness in scaption but good AAROM in abduction.  Pt increased AROM in standing able to achieve 160 in abduction and 165 in scaption but inconsistent with recruitment and activation vs supine and gravity lessen with less AROM during session.  Pt needed mod cues for simba and AROM before use of assist via pulley or contact with wall to continue through available ROM.  Pt with cues to complete exercise through available ROM.  Pt increased compliance with use of muscle relaxers for increased ROM with decreased guarding overall .  Pt continues to make small progress with strength through mid range of motion but inconsistent with performance and execution. 8/12 goals achieved with recent increased workload + 1 hrs of housework without increased pain.    Goals:  Short Term Goals: 3 weeks   1.  Pt independent with HEP for s/p RTC repair and biceps tenodesis with return demonstration. MET  2.  Pt to be independent with donning and doffing surgical sling for proper pain management . Met 8/12/19  3.  Pt to increase PROM as tolerated at left shoulder to 90 deg shoulder flexion and 80 deg abduction without pain. MET  4.  Pt to decrease overall pain at left shoulder to less than 4/10 after session. Met 8/12/19     Long Term Goals: 12 weeks   1.  Pt to been independent with discharge HEP without cues and proper form. MET  2.  Pt return to independent ADLs to include dressing, grooming with BUEs without compensation. Progressing 1/29/20  3.  Pt to increased AROM at elbow WNL and Left shoulder to 170 deg flexion and 160 deg abduction,  "70 deg ER without pain or compensation. Progressing 20  4.  Pt to increased L shoulder strength to 4+/5 into flexion extension, ER, IR. Progressing 20  5  Pt to increase tolerance to perform 1 hour of household chores, ie cleaning without increased L shoulder pain. MET 2/3/20  6. Pt to decrease pain at left shoulder to 3/10 after session for improved functional activities. MET    NEW LT. Return to work without limitation and with no increased pain at end of the day less than 2/10 pain grossly for increased tolerance. Progressing 20  8. Combined ROM into flex/ER and ext/IR left shoulder within 4" of R shoulder with reach up and down thoracic spine. MET 20          Plan   Plan of care Certification: 20 new script to 20       Cont with  New POC       Shanel Carvalho, PT      "

## 2020-02-05 ENCOUNTER — PATIENT OUTREACH (OUTPATIENT)
Dept: ADMINISTRATIVE | Facility: OTHER | Age: 60
End: 2020-02-05

## 2020-02-05 ENCOUNTER — CLINICAL SUPPORT (OUTPATIENT)
Dept: REHABILITATION | Facility: HOSPITAL | Age: 60
End: 2020-02-05
Payer: COMMERCIAL

## 2020-02-05 DIAGNOSIS — S46.012D TRAUMATIC INCOMPLETE TEAR OF LEFT ROTATOR CUFF, SUBSEQUENT ENCOUNTER: ICD-10-CM

## 2020-02-05 DIAGNOSIS — M25.612 DECREASED ROM OF LEFT SHOULDER: ICD-10-CM

## 2020-02-05 PROCEDURE — 97110 THERAPEUTIC EXERCISES: CPT | Mod: PO

## 2020-02-05 PROCEDURE — 97140 MANUAL THERAPY 1/> REGIONS: CPT | Mod: PO

## 2020-02-05 NOTE — PROGRESS NOTES
CC: F/u L Shoulder    DATE OF PROCEDURE: 7/24/2019   OPERATION:   Left  1. Shoulder arthroscopic rotator cuff repair, transosseous equivalent double row   2. Shoulder arthroscopic biceps tenodesis   3. Shoulder arthroscopic extensive debridement (anterior, posterior glenohumeral joint, subacromial space)    4. Shoulder arthroscopic subacromial decompression      Rosamaria Agosto reports to be doing well 6.5 mos s/p the above mentioned procedure. Going to PT 2xWeek at the [facility] location, reports slow but steady progress.  Patient reports that her physical therapist thinks that she is not making any progress.  She would therefore like to work with Gerson, who is a physical therapist she worked with prior.  Reports pain, which is associated with stiffness.  This pain is different type of pain she had prior to surgery.    PAST MEDICAL HISTORY:   Past Medical History:   Diagnosis Date    Abnormal Pap smear of cervix yrs ago    Arthritis     History of uterine fibroid     Hyperlipidemia     Hypertension     Shoulder injury     lt    Sinus problem     Sinusitis        PAST SURGICAL HISTORY:  Past Surgical History:   Procedure Laterality Date    ARTHROSCOPIC REPAIR OF ROTATOR CUFF OF SHOULDER Left 7/24/2019    Procedure: REPAIR, ROTATOR CUFF, ARTHROSCOPIC;  Surgeon: ASMITA Soto MD;  Location: [address];  Service: Orthopedics;  Laterality: Left;    ARTHROSCOPY OF SHOULDER WITH DECOMPRESSION OF SUBACROMIAL SPACE Left 7/24/2019    Procedure: ARTHROSCOPY, SHOULDER, WITH SUBACROMIAL SPACE DECOMPRESSION;  Surgeon: ASMITA Soto MD;  Location: [address];  Service: Orthopedics;  Laterality: Left;    BREAST BIOPSY  24-25 years old    BREAST SURGERY      CYSTOSCOPY N/A 6/24/2019    Procedure: CYSTOSCOPY;  Surgeon: Anson Ellison MD;  Location: [facility];  Service: OB/GYN;  Laterality: N/A;    ENCEPHALOCELE REPAIR  46    HYSTERECTOMY  93'-95'    ovaries remain    NASAL SINUS SURGERY       ROBOT-ASSISTED LAPAROSCOPIC ABDOMINAL SACROCOLPOPEXY USING DA MAC XI N/A 6/24/2019    Procedure: XI ROBOTIC SACROCOLPOPEXY, ABDOMINAL;  Surgeon: Anson Ellison MD;  Location: [facility] [place], operated by [facility];  Service: OB/GYN;  Laterality: N/A;    SHOULDER ARTHROSCOPY Left 7/24/2019    Procedure: BICEPS TENODESIS;  Surgeon: ASMITA Soto MD;  Location: [facility] OR 2ND FLR;  Service: Orthopedics;  Laterality: Left;    TUBAL LIGATION      VAGINAL DELIVERY       FAMILY HISTORY:  Family History   Problem Relation Age of Onset    Stroke Maternal Grandmother     Diabetes Mother     Hypertension Mother     Diabetes Sister     Diabetes Sister     Breast cancer Neg Hx     Colon cancer Neg Hx     Ovarian cancer Neg Hx      MEDICATIONS:    Current Outpatient Medications:     acetaminophen (TYLENOL) 650 MG TbSR, Take 1,300 mg by mouth as needed., Disp: , Rfl:     aspirin (ECOTRIN) 81 MG EC tablet, Take 1 tablet (81 mg total) by mouth 2 (two) times daily. for 14 days (Patient not taking: Reported on 1/15/2020), Disp: 28 tablet, Rfl: 0    atorvastatin (LIPITOR) 80 MG tablet, Take 1 tablet (80 mg total) by mouth nightly., Disp: 30 tablet, Rfl: 11    azelastine (ASTELIN) 137 mcg (0.1 %) nasal spray, 1 spray (137 mcg total) by Nasal route 2 (two) times daily., Disp: 30 mL, Rfl: 2    cetirizine (ZYRTEC) 10 MG tablet, Take 1 tablet (10 mg total) by mouth once daily. for 14 days, Disp: 14 tablet, Rfl: 0    cyclobenzaprine (FLEXERIL) 5 MG tablet, Take 5 mg by mouth 3 (three) times daily as needed for Muscle spasms., Disp: , Rfl:     famotidine (PEPCID) 20 MG tablet, Take 1 tablet (20 mg total) by mouth 2 (two) times daily. for 14 days, Disp: 14 tablet, Rfl: 0    flavoxATE (URISPAS) 100 mg Tab, Take 1 tablet (100 mg total) by mouth 3 (three) times daily as needed., Disp: 90 tablet, Rfl: 3    hydroCHLOROthiazide (HYDRODIURIL) 25 MG tablet, Take 1 tablet (25 mg total) by mouth once daily., Disp: 90 tablet, Rfl: 3     HYDROcodone-acetaminophen (NORCO) 5-325 mg per tablet, Take 1 tablet by mouth every 6 (six) hours as needed for Pain. (Patient not taking: Reported on 1/13/2020), Disp: 28 tablet, Rfl: 0    hydrOXYzine pamoate (VISTARIL) 25 MG Cap, Take 1 capsule (25 mg total) by mouth every 6 (six) hours as needed (itching). (Patient not taking: Reported on 1/13/2020), Disp: 20 capsule, Rfl: 0    meloxicam (MOBIC) 15 MG tablet, Take 1 tablet (15 mg total) by mouth once daily. (Patient not taking: Reported on 1/13/2020), Disp: 90 tablet, Rfl: 3    potassium chloride SA (K-DUR,KLOR-CON) 20 MEQ tablet, Take 1 tablet (20 mEq total) by mouth 2 (two) times daily. (Patient not taking: Reported on 1/13/2020), Disp: 90 tablet, Rfl: 3    topiramate (TOPAMAX) 25 MG tablet, Take 1 tablet (25 mg total) by mouth 2 (two) times daily., Disp: 60 tablet, Rfl: 3    traMADol (ULTRAM) 50 mg tablet, Take 1 tablet (50 mg total) by mouth daily as needed (severe pain). (Patient not taking: Reported on 1/13/2020), Disp: 30 tablet, Rfl: 2    ALLERGIES:  Review of patient's allergies indicates:   Allergen Reactions    Sotradecol [sodium tetradecyl sulfate]      Hives and itching that resolved with Benadryl and Solumedrol.     REVIEW OF SYSTEMS:  Constitution: Negative. Negative for chills, fever and night sweats.    Hematologic/Lymphatic: Negative for bleeding problem. Does not bruise/bleed easily.   Skin: Negative for dry skin, itching and rash.   Musculoskeletal: Negative for falls. Neg for left shoulder pain, +for stiffness and muscle weakness.     All other review of symptoms were reviewed and found to be noncontributory.    PHYSICAL EXAMINATION:  Vitals:  [facility] 10/19/1993 (Approximate)    General: Well-developed well-nourished 59 y.o. femalein no acute distress   Cardiovascular: Regular rhythm by palpation of distal pulse, normal color and temperature, no concerning varicosities on symptomatic side   Lungs: No labored breathing or wheezing  appreciated   Neuro: Alert and oriented ×3   Psychiatric: well oriented to person, place and time, demonstrates normal mood and affect   Skin: No rashes, lesions or ulcers, normal temperature, turgor, and texture on uninvolved extremity    Examination of the left shoulder demonstrates well healed incisions. No signs of infection. No significant pain or tenderness. Active  with scapular dyskenesia. Active ER at side to 30. Forward elevation passively to 160°.  External rotation passively to 50°.  Supine forward elevation to 145°. 4+/5 resisted scaption and ER at side. Full elbow ROM. NVI.      IMAGING:  No new imaging    ASSESSMENT:      ICD-10-CM ICD-9-CM   1. Shoulder stiffness, left M25.612 719.51     PLAN:     I had a long discussion with the patient today. Generally speaking her shoulder feels much better compared to preop.  She does have some significant stiffness which is residual.  She also has weakness as well. This has limited her overall functional recovery.  Patient does have some intermittent soreness in the shoulder but this is stiffness associated by her account.  Options at this point were discussed. We did discuss the potential benefit of manipulation under anesthesia with repeat arthroscopy and capsular releases.  The patient would like to avoid this and states that she is ok currently with her overall level function. Plan to continue physical therapy to maximize terminal range of motion and strength. I expect her active overhead motion should improve with stretching and strengthening.  Return to clinic in 6-8 weeks.  We will see how she looks at that time.      Procedures

## 2020-02-05 NOTE — PROGRESS NOTES
[facility]  Physical Therapy Note     Name: Rosamaria Agosto  Clinic Number: [id number]     Therapy Diagnosis: decreased ROM and pain at the shoulder s/p RTC  Physician: Arina Corea PA-C     Physician Orders: PT Eval and Treat   Medical Diagnosis from Referral: s/p RTC repair and biceps tenodesis  Evaluation Date: 7/26/2019  Authorization Period Expiration: 12/31/20  Plan of Care Expiration:  2/29/20 with approved certifiction extension  Visit # / Visits authorized: new script 5/20     Time In:  4:00  pm  Time Out: 5:00 pm   Total Billable Time: 30 minutes (TE-1, MT -1)       Precautions: Standard, RTC protocol NO AROM at elbow joint s/p biceps tenodesis, PROM at elbow and shoulder but no excessive ER stopping at initial end feel per phase I protocol and follow up with MD for protocol     Subjective      Pt reports:  No events since last session  .  Pain : 0/10 at beginning and end of session 0/10 mostly tightness  Location: Left shoulder    TREATMENT       Rosamaria received therapeutic exercises to develop ROM for 35 minutes including:     Shoulder rows 2 x10 with OTB -   Shoulder extension 2 x10 with OTB-      D2 flexion and then D2 extension pattern with min to mod resistance in supine  2 x10      AROM shoulder scaption and then abduction 2 x10 with stretch at end ROM 20-30 secs after each set against the wall      Shoulder depression with 13# pulley with use of RUE to assist LUE to perform lat pulldown and LUE to hold in bottom position shoulder depression RUE assist to return to overhead position and relax at top of the range 2 x 15 with 10 sec stretch at top position for PROM       AAROM  2 x 10 reps scaption and again in abduction in midrange to keep smooth AAROM   (and with pt in standng with arm on PT, PT apply humeral inferior glide with scaption motion teres minor stretch- held static 3x 20 sec)- NP    Bindu 2 x 1min in scaption and then in abduction     AROM with PT into  scaption and then in abduction in sidelying with cues for shoulder depression in supine and in tolerated incline position 45 and 70 deg 3 x10 each as tolerated towards progressive upright posture.from 10/28/19 scaption and serratus punches with 2# - Not performed    Self AAROM with use cane or PVC pipe and RUE in supine and then in standing: scaption with elbow flexed toward full extension at end rom of scaption - 2x 10 reps: - NP  Self AAROM in supine scaption with elbow extending for full ROM 2 x10 - NP    AROM at L shoulder 1/29/20  165 deg abd, in sidelying in 75 degrees elevation in head of mat    170 deg flex in supine with 75 deg elevation in head of mat  with cues to avoid shoulder hike, compensation.  60 deg with ER stopping at initial end feel in standing  And   Elbow extension at  0 deg    AAROM/ AROM 2/3/20  upright standing on min A  in midrange for AAROM and at end ROM  170/165 deg scaption  165/160 deg abuction  65 deg ER    Strength 2/3/20  scaption and abduction of left shoulder 3/5 but has 4-/5 0-90 and 120 to 165 deg of scaption and  160 deg abduction only needing min A thru midrange.  ER 3+/5    Manual therapy  15 min with STM./ MFR to pect minor, teres minor, and rhomboids, upper traps and levator, GH mobs grade I/II AP and lateral distraction for pain relief into empty end feel before restrictions and GH mobs grade Iv with distraction  in all direction for increased ROM  - teres minor stretch at end ROM in scaption and abduction with inferior humeral glide  - manual stretch pec minor  -subscap release with ER motion  - inferior humeral glide with inferior mobilization Grade IV    10 min ice to left shoulder     Education provided:   Sleeping position, sling positioning and shoulder pendulums, donning strategy for sling, review post surgery restrictions. Pt issued putty for , decreased edema pooling at elbow     Written Home Exercises Provided: yes.  Exercises were reviewed and Rosamaria  was able to demonstrate them prior to the end of the session.  Rosamaria demonstrated good  understanding of the education provided.      See EMR under Patient Instructions for exercises provided 2019.     Assessment     Pt presents with increased stiffness in scaption but good AAROM in abduction.  Pt with progressive guarding with session with decreased AROM with each set as her mechanics failed with more fatigue leading to more shoulder hiking and progressive decline in AROM in session. Pt with minimal progress today overall but did arrive with decreased guarding overall and good AROM on arrival.    Goals:  Short Term Goals: 3 weeks   1.  Pt independent with HEP for s/p RTC repair and biceps tenodesis with return demonstration. MET  2.  Pt to be independent with donning and doffing surgical sling for proper pain management . Met 19  3.  Pt to increase PROM as tolerated at left shoulder to 90 deg shoulder flexion and 80 deg abduction without pain. MET  4.  Pt to decrease overall pain at left shoulder to less than 4/10 after session. Met 19     Long Term Goals: 12 weeks   1.  Pt to been independent with discharge HEP without cues and proper form. MET  2.  Pt return to independent ADLs to include dressing, grooming with BUEs without compensation. Progressing 20  3.  Pt to increased AROM at elbow WNL and Left shoulder to 170 deg flexion and 160 deg abduction, 70 deg ER without pain or compensation. Progressing 20  4.  Pt to increased L shoulder strength to 4+/5 into flexion extension, ER, IR. Progressing 20  5  Pt to increase tolerance to perform 1 hour of household chores, ie cleaning without increased L shoulder pain. MET 2/3/20  6. Pt to decrease pain at left shoulder to 3/10 after session for improved functional activities. MET    NEW LT. Return to work without limitation and with no increased pain at end of the day less than 2/10 pain grossly for increased tolerance. Progressing  "1/29/20  8. Combined ROM into flex/ER and ext/IR left shoulder within 4" of R shoulder with reach up and down thoracic spine. MET 1/29/20          Plan   Plan of care Certification: 1/1/20 new script to 2/29/20       Cont with  New POC       Shanel Carvalho PT      "

## 2020-02-06 ENCOUNTER — OFFICE VISIT (OUTPATIENT)
Dept: SPORTS MEDICINE | Facility: CLINIC | Age: 60
End: 2020-02-06
Payer: COMMERCIAL

## 2020-02-06 VITALS
BODY MASS INDEX: 31.1 KG/M2 | SYSTOLIC BLOOD PRESSURE: 133 MMHG | DIASTOLIC BLOOD PRESSURE: 85 MMHG | WEIGHT: 210 LBS | HEART RATE: 80 BPM | HEIGHT: 69 IN

## 2020-02-06 DIAGNOSIS — M25.612 SHOULDER STIFFNESS, LEFT: Primary | ICD-10-CM

## 2020-02-06 PROCEDURE — 3008F PR BODY MASS INDEX (BMI) DOCUMENTED: ICD-10-PCS | Mod: CPTII,S$GLB,, | Performed by: ORTHOPAEDIC SURGERY

## 2020-02-06 PROCEDURE — 3008F BODY MASS INDEX DOCD: CPT | Mod: CPTII,S$GLB,, | Performed by: ORTHOPAEDIC SURGERY

## 2020-02-06 PROCEDURE — 99999 PR PBB SHADOW E&M-EST. PATIENT-LVL III: CPT | Mod: PBBFAC,,, | Performed by: ORTHOPAEDIC SURGERY

## 2020-02-06 PROCEDURE — 3075F PR MOST RECENT SYSTOLIC BLOOD PRESS GE 130-139MM HG: ICD-10-PCS | Mod: CPTII,S$GLB,, | Performed by: ORTHOPAEDIC SURGERY

## 2020-02-06 PROCEDURE — 3079F DIAST BP 80-89 MM HG: CPT | Mod: CPTII,S$GLB,, | Performed by: ORTHOPAEDIC SURGERY

## 2020-02-06 PROCEDURE — 3075F SYST BP GE 130 - 139MM HG: CPT | Mod: CPTII,S$GLB,, | Performed by: ORTHOPAEDIC SURGERY

## 2020-02-06 PROCEDURE — 99213 OFFICE O/P EST LOW 20 MIN: CPT | Mod: S$GLB,,, | Performed by: ORTHOPAEDIC SURGERY

## 2020-02-06 PROCEDURE — 99999 PR PBB SHADOW E&M-EST. PATIENT-LVL III: ICD-10-PCS | Mod: PBBFAC,,, | Performed by: ORTHOPAEDIC SURGERY

## 2020-02-06 PROCEDURE — 3079F PR MOST RECENT DIASTOLIC BLOOD PRESSURE 80-89 MM HG: ICD-10-PCS | Mod: CPTII,S$GLB,, | Performed by: ORTHOPAEDIC SURGERY

## 2020-02-06 PROCEDURE — 99213 PR OFFICE/OUTPT VISIT, EST, LEVL III, 20-29 MIN: ICD-10-PCS | Mod: S$GLB,,, | Performed by: ORTHOPAEDIC SURGERY

## 2020-02-06 NOTE — PROGRESS NOTES
Care Everywhere: n/a  Immunization: updated  Health Maintenance: updated  Media Review: n/a  Legacy Review:n/a  Order placed: n/a  Upcoming appts:n/a

## 2020-02-17 ENCOUNTER — CLINICAL SUPPORT (OUTPATIENT)
Dept: REHABILITATION | Facility: HOSPITAL | Age: 60
End: 2020-02-17
Payer: COMMERCIAL

## 2020-02-17 DIAGNOSIS — M25.612 DECREASED ROM OF LEFT SHOULDER: ICD-10-CM

## 2020-02-17 DIAGNOSIS — S46.012S TRAUMATIC INCOMPLETE TEAR OF LEFT ROTATOR CUFF, SEQUELA: ICD-10-CM

## 2020-02-17 PROCEDURE — 97140 MANUAL THERAPY 1/> REGIONS: CPT

## 2020-02-17 NOTE — PROGRESS NOTES
[facility]  Physical Therapy Note     Name: Rosamaria Agosto  Clinic Number: [id number]     Therapy Diagnosis: decreased ROM and pain at the shoulder s/p RTC  Physician: Arina Corea PA-C     Physician Orders: PT Eval and Treat   Medical Diagnosis from Referral: s/p RTC repair and biceps tenodesis  Evaluation Date: 7/26/2019  Authorization Period Expiration: 12/31/19  Plan of Care Expiration: 10/31/19  Visit # / Visits authorized: 5/20 new script     Time In:  8 am  Time Out: 910 am  Total Billable Time: 60 minutes      Precautions: Standard, RTC protocol NO AROM at elbow joint s/p biceps tenodesis, PROM at elbow and shoulder but no excessive ER stopping at initial end feel per phase I protocol and follow up with MD for protocol     Subjective     Pt reports:  Having had a good visit with MD, discussed possibility of further surgery to remove adhesion, provide more motion.  Pt would like to just cont with ROM and manual work for ROM in PT.    Pain : 1- 2/10 at beginning and end of session 1-2/10 mostly tightness    Objective:  AAROM/ AROM 2/17/20  upright standing on min A  in midrange for AAROM and at end ROM  170/165 deg scaption  165/160 deg abuction  65 deg ER    Strength 2/3/20  scaption and abduction of left shoulder 3/5 but has 4-/5 0-90 and 120 to 165 deg of scaption and  160 deg abduction only needing min A thru midrange.  ER 3+/5        MHP to shoulder prior to stretching    Manual therapy 55 min with STM./ MFR to pect minor, teres minor, and rhomboids, upper traps and levator, GH mobs grade I/II AP and lateral distraction for pain relief into empty end feel before restrictions and GH mobs grade III with distraction  in all direction for increased ROM  - manual stretch pec minor  -subscap release with ER motion  - inferior humeral glide with inferior mobilization Grade II-III  - inferior glide coupled with scaption pt in seated position   - Inferior glide coupled  with ER pt supine and elevated  -PA GH grade III  - inferior glide coupled with abd grade III pt supine  Scapular scouring, emphasis on subscap  - Scapular lift grade II    Pt perform reach/Roll/Lift 15x, showed improved post scapular musculature engagement, less shoulder hike following activity.    10 min ice to left shoulder    Education provided:   Sleeping position, sling positioning and shoulder pendulums, donning strategy for sling, review post surgery restrictions. Pt issued putty for , decreased edema pooling at elbow     Written Home Exercises Provided: yes.  Exercises were reviewed and Rosamaria was able to demonstrate them prior to the end of the session.  Rosamaria demonstrated good  understanding of the education provided.      See EMR under Patient Instructions for exercises provided 7/26/2019.     Assessment     Pt with decreased over all tension below 121 deg, improved mobility following scapular lift, subscap release.  Cont tightness to IR/Scaption/Abd, improved ER.  Aggressive stretching was tolerated extremely well today, most discomfort noted with ER, able to increase range consirably to scaption.  Was able to maintain tension longer that previous visit.  pt report more freedom of movement post treatment        Goals:       Long Term Goals: 12 weeks   1.  Pt to been independent with discharge HEP without cues and proper form.  2.  Pt return to independent ADLs to include dressing, grooming with BUEs without compensation.  3.  Pt to increased AROM at elbow WNL and Left shoulder to 170 deg flexion and 160 deg abduction, 70 deg ER without pain or compensation  4.  Pt to increased L shoulder strength to 4+/5 into flexion extension, ER, IR   5  Pt to increase tolerance to perform 1 hour of household chores, ie cleaning without increased L shoulder pain  6. Pt to decrease pain at left shoulder to 3/10 after session for improved functional activities        Plan   Plan of care Certification: 7/26/2019 to  10/31/19    Cont with progressive and more aggressive PROM and GH mobs to stretch GH capsule.    Gerson Hubbard, PT

## 2020-02-26 ENCOUNTER — PATIENT OUTREACH (OUTPATIENT)
Dept: ADMINISTRATIVE | Facility: OTHER | Age: 60
End: 2020-02-26

## 2020-02-27 ENCOUNTER — OFFICE VISIT (OUTPATIENT)
Dept: DERMATOLOGY | Facility: CLINIC | Age: 60
End: 2020-02-27
Payer: COMMERCIAL

## 2020-02-27 DIAGNOSIS — L23.9 ALLERGIC CONTACT DERMATITIS, UNSPECIFIED TRIGGER: Primary | ICD-10-CM

## 2020-02-27 PROCEDURE — 99202 PR OFFICE/OUTPT VISIT, NEW, LEVL II, 15-29 MIN: ICD-10-PCS | Mod: S$GLB,,, | Performed by: DERMATOLOGY

## 2020-02-27 PROCEDURE — 99999 PR PBB SHADOW E&M-EST. PATIENT-LVL II: ICD-10-PCS | Mod: PBBFAC,,, | Performed by: DERMATOLOGY

## 2020-02-27 PROCEDURE — 99999 PR PBB SHADOW E&M-EST. PATIENT-LVL II: CPT | Mod: PBBFAC,,, | Performed by: DERMATOLOGY

## 2020-02-27 PROCEDURE — 99202 OFFICE O/P NEW SF 15 MIN: CPT | Mod: S$GLB,,, | Performed by: DERMATOLOGY

## 2020-02-27 RX ORDER — GABAPENTIN 300 MG/1
300 CAPSULE ORAL 2 TIMES DAILY
Qty: 60 CAPSULE | Refills: 1 | Status: SHIPPED | OUTPATIENT
Start: 2020-02-27 | End: 2020-07-16 | Stop reason: SDUPTHER

## 2020-02-27 RX ORDER — TRIAMCINOLONE ACETONIDE 1 MG/G
CREAM TOPICAL
Qty: 80 G | Refills: 3 | Status: SHIPPED | OUTPATIENT
Start: 2020-02-27 | End: 2021-08-20

## 2020-02-27 RX ORDER — PROMETHAZINE HYDROCHLORIDE AND DEXTROMETHORPHAN HYDROBROMIDE 6.25; 15 MG/5ML; MG/5ML
SYRUP ORAL
COMMUNITY
Start: 2020-02-06 | End: 2021-06-22

## 2020-02-27 RX ORDER — AMOXICILLIN AND CLAVULANATE POTASSIUM 875; 125 MG/1; MG/1
1 TABLET, FILM COATED ORAL 2 TIMES DAILY
COMMUNITY
Start: 2020-02-06 | End: 2020-05-27 | Stop reason: ALTCHOICE

## 2020-02-27 RX ORDER — FLUOCINOLONE ACETONIDE 0.11 MG/ML
OIL TOPICAL
Qty: 1 BOTTLE | Refills: 3 | Status: SHIPPED | OUTPATIENT
Start: 2020-02-27

## 2020-02-27 NOTE — PROGRESS NOTES
"  Subjective:       Patient ID:  Rosamaria Agosto is a 59 y.o. female who presents for   Chief Complaint   Patient presents with    Rash     Still with itching. Feels like "bugs crawling on scalp"    Washes hair qday with medicated shampoo    Rash  - Initial  Affected locations: scalp and face  Duration: mid dec.  Signs / symptoms: itching, scaling and redness (started with facial and ear swelling and blistering)  Severity: severe  Timing: constant  Exacerbated by: started few days after dyeing hair with "just for [employer]". similar rxn to hair dye years ago.  Treatments tried: seen in  who gave her shot and pills - improved but then reflared. went back to  and gave her "pills" 3 - 4 days later head swelled up again with oozing. went to [facility], [place], operated by [facility] ER 12/20 improved then got worse again - got admitted 12/31         Review of Systems   Skin: Positive for itching and rash.   Allergic/Immunologic: Positive for environmental allergies.        Objective:    Physical Exam   Constitutional: She appears well-developed and well-nourished. She is obese.  No distress.   Neurological: She is alert and oriented to person, place, and time. She is not disoriented.   Psychiatric: She has a normal mood and affect.   Skin:   Areas Examined (abnormalities noted in diagram):   Scalp / Hair Palpated and Inspected  Head / Face Inspection Performed  Neck Inspection Performed  RUE Inspected                   Diagram Legend     Erythematous scaling macule/papule c/w actinic keratosis       Vascular papule c/w angioma      Pigmented verrucoid papule/plaque c/w seborrheic keratosis      Yellow umbilicated papule c/w sebaceous hyperplasia      Irregularly shaped tan macule c/w lentigo     1-2 mm smooth white papules consistent with Milia      Movable subcutaneous cyst with punctum c/w epidermal inclusion cyst      Subcutaneous movable cyst c/w pilar cyst      Firm pink to brown papule c/w dermatofibroma      Pedunculated fleshy papule(s) c/w " skin tag(s)      Evenly pigmented macule c/w junctional nevus     Mildly variegated pigmented, slightly irregular-bordered macule c/w mildly atypical nevus      Flesh colored to evenly pigmented papule c/w intradermal nevus       Pink pearly papule/plaque c/w basal cell carcinoma      Erythematous hyperkeratotic cursted plaque c/w SCC      Surgical scar with no sign of skin cancer recurrence      Open and closed comedones      Inflammatory papules and pustules      Verrucoid papule consistent consistent with wart     Erythematous eczematous patches and plaques     Dystrophic onycholytic nail with subungual debris c/w onychomycosis     Umbilicated papule    Erythematous-base heme-crusted tan verrucoid plaque consistent with inflamed seborrheic keratosis     Erythematous Silvery Scaling Plaque c/w Psoriasis     See annotation      Assessment / Plan:        Allergic contact dermatitis, unspecified trigger - 2/2 PPD  Allergen sheet given  Avoid hair dye  -     fluocinolone and shower cap (DERMA-SMOOTHE/FS SCALP OIL) 0.01 % Oil; Apply oil to damp scalp nightly and cover with shower cap.  Dispense: 1 Bottle; Refill: 3  -     triamcinolone acetonide 0.1% (KENALOG) 0.1 % cream; AAA wrist bid  Dispense: 80 g; Refill: 3  Gabapentin 300mg qhs - can increase to bid if tolerate           No follow-ups on file.

## 2020-03-02 ENCOUNTER — CLINICAL SUPPORT (OUTPATIENT)
Dept: REHABILITATION | Facility: HOSPITAL | Age: 60
End: 2020-03-02
Payer: COMMERCIAL

## 2020-03-02 DIAGNOSIS — M25.612 DECREASED ROM OF LEFT SHOULDER: ICD-10-CM

## 2020-03-02 DIAGNOSIS — S46.012D TRAUMATIC INCOMPLETE TEAR OF LEFT ROTATOR CUFF, SUBSEQUENT ENCOUNTER: ICD-10-CM

## 2020-03-02 PROCEDURE — 97110 THERAPEUTIC EXERCISES: CPT

## 2020-03-02 PROCEDURE — 97140 MANUAL THERAPY 1/> REGIONS: CPT

## 2020-03-02 NOTE — PROGRESS NOTES
[facility]  Physical Therapy Note/UPOC     Name: Rosamaria Agosto  Clinic Number: [id number]     Therapy Diagnosis: decreased ROM and pain at the shoulder s/p RTC  Physician: Arina Corea PA-C     Physician Orders: PT Eval and Treat   Medical Diagnosis from Referral: s/p RTC repair and biceps tenodesis  Evaluation Date: 7/26/2019  Authorization Period Expiration: 12/31/20  Plan of Care Expiration: 4/29/20  Visit # / Visits authorized: 6/20 new script     Time In:  805 am  Time Out: 910 am  Total Billable Time: 60 minutes      Precautions: Standard, RTC protocol NO AROM at elbow joint s/p biceps tenodesis, PROM at elbow and shoulder but no excessive ER stopping at initial end feel per phase I protocol and follow up with MD for protocol     Subjective     Pt reports:  Having done as much as she could at home, was sore after the last session, but only lasted 1 day.  Worried that the scar tissue will not go away    Pain : 1- 2/10 at beginning and end of session 1-2/10 mostly tightness    Objective:  AAROM/ AROM 2/17/20  upright standing on min A  in midrange for AAROM and at end ROM    170/165 deg scaption  165/160 deg abuction  65 deg ER    Strength 2/3/20  scaption and abduction of left shoulder 3/5 but has 4-/5 0-90 and 120 to 165 deg of scaption and  160 deg abduction only needing min A thru midrange.  ER 3+/5      MHP to shoulder prior to stretching    Manual therapy 55 min with STM./ MFR to pect minor, teres minor, and rhomboids, upper traps and levator, GH mobs grade I/II AP and lateral distraction for pain relief into empty end feel before restrictions and GH mobs grade III with distraction  in all direction for increased ROM  - manual stretch pec minor  -subscap release with ER motion  - inferior humeral glide with inferior mobilization Grade II-III  - inferior glide coupled with scaption pt in seated position   - Inferior glide coupled with ER pt supine and  elevated  -PA GH grade III  - inferior glide coupled with abd grade III pt supine  Scapular scouring, emphasis on subscap  - Scapular lift grade II    Pt perform reach/Roll/Lift 15x, showed improved post scapular musculature engagement, less shoulder hike following activity.    10 min ice to left shoulder    Education provided:   Sleeping position, sling positioning and shoulder pendulums, donning strategy for sling, review post surgery restrictions. Pt issued putty for , decreased edema pooling at elbow     Written Home Exercises Provided: yes.  Exercises were reviewed and Rosamaria was able to demonstrate them prior to the end of the session.  Rosamaria demonstrated good  understanding of the education provided.      See EMR under Patient Instructions for exercises provided 7/26/2019.     Assessment     Pt with decreased over all tension below 121 deg, improved mobility following scapular lift, subscap release.  Cont tightness to IR/Scaption/Abd, improved ER.  Aggressive stretching was tolerated extremely well today, most discomfort noted with ER, able to increase range consirably to scaption.  Was able to maintain tension longer that previous visit.  pt report more freedom of movement post treatment    Goals:       Long Term Goals: 12 weeks   1.  Pt to been independent with discharge HEP without cues and proper form.  2.  Pt return to independent ADLs to include dressing, grooming with BUEs without compensation.  3.  Pt to increased AROM at elbow WNL and Left shoulder to 170 deg flexion and 160 deg abduction, 70 deg ER without pain or compensation  4.  Pt to increased L shoulder strength to 4+/5 into flexion extension, ER, IR   5  Pt to increase tolerance to perform 1 hour of household chores, ie cleaning without increased L shoulder pain  6. Pt to decrease pain at left shoulder to 3/10 after session for improved functional activities        Plan   Plan of care Certification: 2/29/20-4/29/20    Cont with progressive  and more aggressive PROM and GH mobs to stretch GH capsule.    Gerson Hubbard, PT

## 2020-03-06 ENCOUNTER — CLINICAL SUPPORT (OUTPATIENT)
Dept: REHABILITATION | Facility: HOSPITAL | Age: 60
End: 2020-03-06
Payer: COMMERCIAL

## 2020-03-06 DIAGNOSIS — S46.012D TRAUMATIC INCOMPLETE TEAR OF LEFT ROTATOR CUFF, SUBSEQUENT ENCOUNTER: ICD-10-CM

## 2020-03-06 DIAGNOSIS — M25.612 DECREASED ROM OF LEFT SHOULDER: ICD-10-CM

## 2020-03-06 PROCEDURE — 97110 THERAPEUTIC EXERCISES: CPT

## 2020-03-06 PROCEDURE — 97140 MANUAL THERAPY 1/> REGIONS: CPT

## 2020-03-06 NOTE — PROGRESS NOTES
[facility]  Physical Therapy Note     Name: Rosamaria Agosto  Clinic Number: [id number]     Therapy Diagnosis: decreased ROM and pain at the shoulder s/p RTC  Physician: Arina Corea PA-C     Physician Orders: PT Eval and Treat   Medical Diagnosis from Referral: s/p RTC repair and biceps tenodesis  Evaluation Date: 7/26/2019  Authorization Period Expiration: 12/31/20  Plan of Care Expiration: 4/29/20  Visit # / Visits authorized: 6/20 new script     Time In:  705 am  Time Out: 8 am  Total Billable Time: 45 minutes      Precautions: Standard, RTC protocol NO AROM at elbow joint s/p biceps tenodesis, PROM at elbow and shoulder but no excessive ER stopping at initial end feel per phase I protocol and follow up with MD for protocol     Subjective     Pt reports:  Still feels that her shoulder moves quite well in the shower, feels it is getting better.  Has been using her muscle relaxers with good effect.      Pain : 1- 2/10 at beginning and end of session 1-2/10 mostly tightness    Objective:  AAROM/ AROM 2/17/20  upright standing on min A  in midrange for AAROM and at end ROM    170/165 deg scaption  165/160 deg abuction  65 deg ER    Strength 2/3/20  scaption and abduction of left shoulder 3/5 but has 4-/5 0-90 and 120 to 165 deg of scaption and  160 deg abduction only needing min A thru midrange.  ER 3+/5      MHP to shoulder prior to stretching pt seated with shoulder in absd    Manual therapy 55 min with STM./ MFR to pect minor, teres minor, and rhomboids, upper traps and levator, GH mobs grade I/II AP and lateral distraction for pain relief into empty end feel before restrictions and GH mobs grade III with distraction  in all direction for increased ROM  - manual stretch pec minor  -subscap release with ER motion  - inferior humeral glide with inferior mobilization Grade II-III  - inferior glide coupled with scaption pt in seated position   - Inferior glide coupled with ER  "pt supine and elevated  -PA GH grade III  - inferior glide coupled with abd grade III pt supine  Scapular scouring, emphasis on subscap  - Scapular lift grade II    Pt perform reach/Roll/Lift 15x, showed improved post scapular musculature engagement, less shoulder hike following activity.    10 min ice to left shoulder    Education provided:   Sleeping position, sling positioning and shoulder pendulums, donning strategy for sling, review post surgery restrictions. Pt issued putty for , decreased edema pooling at elbow     Written Home Exercises Provided: yes.  Exercises were reviewed and Rosamaria was able to demonstrate them prior to the end of the session.  Rosamaria demonstrated good  understanding of the education provided.      See EMR under Patient Instructions for exercises provided 7/26/2019.     Assessment     Pt with decreased over all tension below, time spent with scapular retraction prior to any scaption and flexion  Work, Allowed her to achieve a greater range prior to elevation of shoulder.  No pan noted with manual work, some "pinching felt with end range abd .  Cont tightness to IR/Scaption/Abd, improved ER.  Aggressive stretching was tolerated extremely well today, most discomfort noted with ABD today, able to increase range consirably to scaption.  Was able to maintain tension longer that previous visit.  pt report more freedom of movement post treatment    Goals:       Long Term Goals: 12 weeks   1.  Pt to been independent with discharge HEP without cues and proper form.  2.  Pt return to independent ADLs to include dressing, grooming with BUEs without compensation.  3.  Pt to increased AROM at elbow WNL and Left shoulder to 170 deg flexion and 160 deg abduction, 70 deg ER without pain or compensation  4.  Pt to increased L shoulder strength to 4+/5 into flexion extension, ER, IR   5  Pt to increase tolerance to perform 1 hour of household chores, ie cleaning without increased L shoulder pain  6. Pt " to decrease pain at left shoulder to 3/10 after session for improved functional activities        Plan   Plan of care Certification: 2/29/20-4/29/20    Cont with progressive and more aggressive PROM and GH mobs to stretch GH capsule.    Gerson Hubbard, PT

## 2020-03-13 ENCOUNTER — CLINICAL SUPPORT (OUTPATIENT)
Dept: REHABILITATION | Facility: HOSPITAL | Age: 60
End: 2020-03-13
Payer: COMMERCIAL

## 2020-03-13 DIAGNOSIS — M25.612 DECREASED ROM OF LEFT SHOULDER: ICD-10-CM

## 2020-03-13 DIAGNOSIS — S46.012D TRAUMATIC INCOMPLETE TEAR OF LEFT ROTATOR CUFF, SUBSEQUENT ENCOUNTER: ICD-10-CM

## 2020-03-13 PROCEDURE — 97110 THERAPEUTIC EXERCISES: CPT

## 2020-03-13 PROCEDURE — 97140 MANUAL THERAPY 1/> REGIONS: CPT

## 2020-03-13 NOTE — PROGRESS NOTES
[facility]  Physical Therapy Note     Name: Rosamaria Agosto  Clinic Number: [id number]     Therapy Diagnosis: decreased ROM and pain at the shoulder s/p RTC  Physician: Arina Corea PA-C     Physician Orders: PT Eval and Treat   Medical Diagnosis from Referral: s/p RTC repair and biceps tenodesis  Evaluation Date: 7/26/2019  Authorization Period Expiration: 12/31/20  Plan of Care Expiration: 4/29/20  Visit # / Visits authorized: 6/20 new script     Time In:  820 am  Time Out: 905 am  Total Billable Time: 35 minutes      Precautions: Standard, RTC protocol NO AROM at elbow joint s/p biceps tenodesis, PROM at elbow and shoulder but no excessive ER stopping at initial end feel per phase I protocol and follow up with MD for protocol     Subjective     Pt reports:     Pain : 1- 2/10 at beginning and end of session 1-2/10 mostly tightness    Objective:  AAROM/ AROM 2/17/20  upright standing on min A  in midrange for AAROM and at end ROM    170/165 deg scaption  165/160 deg abuction  65 deg ER    Strength 2/3/20  scaption and abduction of left shoulder 3/5 but has 4-/5 0-90 and 120 to 165 deg of scaption and  160 deg abduction only needing min A thru midrange.  ER 3+/5      MHP to shoulder prior to stretching pt seated with shoulder in absd    Manual therapy 20 min with STM./ MFR to pect minor, teres minor, and rhomboids, upper traps and levator, GH mobs grade I/II AP and lateral distraction for pain relief into empty end feel before restrictions and GH mobs grade III with distraction  in all direction for increased ROM  - manual stretch pec minor  -subscap release with ER motion  - inferior humeral glide with inferior mobilization Grade II-III  - inferior glide coupled with scaption pt in seated position   - Inferior glide coupled with ER pt supine and elevated  -PA GH grade III  - inferior glide coupled with abd grade III pt supine  Scapular scouring, emphasis on subscap  -  "Scapular lift grade II    Pt performed TE on left shoulder for 15 min to improve strength, ROM, pain decrease, and improved muscle synergy for total of 15 min  Pt perform reach/Roll/Lift 15x, showed improved post scapular musculature engagement, less shoulder hike following activity.  Pulley to scaption with TC for decreased shoulder hike 2x 2 min  Wall slides with pillow case 3x 15  AAROM abd with PT apply inferior glide to decrease supraspinatus pinch 2x15    10 min ice to left shoulder    Education provided:   Sleeping position, sling positioning and shoulder pendulums, donning strategy for sling, review post surgery restrictions. Pt issued putty for , decreased edema pooling at elbow     Written Home Exercises Provided: yes.  Exercises were reviewed and Rosamaria was able to demonstrate them prior to the end of the session.  Rosamaria demonstrated good  understanding of the education provided.      See EMR under Patient Instructions for exercises provided 7/26/2019.     Assessment     Pt with decreased over all tension below, time spent with scapular retraction prior to any scaption and flexion  Work, Allowed her to achieve a greater range prior to elevation of shoulder.  No pain noted with manual work, some "pinching felt with end range abd .  Cont tightness to IR/Scaption/Abd, improved ER.  Aggressive stretching was tolerated extremely well today, most discomfort noted with ABD today, able to increase range consirably to scaption.  Was able to maintain tension longer that previous visit.  pt report more freedom of movement post treatment    Goals:       Long Term Goals: 12 weeks   1.  Pt to been independent with discharge HEP without cues and proper form.  2.  Pt return to independent ADLs to include dressing, grooming with BUEs without compensation.  3.  Pt to increased AROM at elbow WNL and Left shoulder to 170 deg flexion and 160 deg abduction, 70 deg ER without pain or compensation  4.  Pt to increased L " shoulder strength to 4+/5 into flexion extension, ER, IR   5  Pt to increase tolerance to perform 1 hour of household chores, ie cleaning without increased L shoulder pain  6. Pt to decrease pain at left shoulder to 3/10 after session for improved functional activities        Plan   Plan of care Certification: 2/29/20-4/29/20    Cont with progressive and more aggressive PROM and GH mobs to stretch GH capsule.    Gerson Hubbard, PT

## 2020-03-16 ENCOUNTER — CLINICAL SUPPORT (OUTPATIENT)
Dept: REHABILITATION | Facility: HOSPITAL | Age: 60
End: 2020-03-16
Payer: COMMERCIAL

## 2020-03-16 DIAGNOSIS — S46.012D TRAUMATIC INCOMPLETE TEAR OF LEFT ROTATOR CUFF, SUBSEQUENT ENCOUNTER: ICD-10-CM

## 2020-03-16 DIAGNOSIS — M25.612 DECREASED ROM OF LEFT SHOULDER: ICD-10-CM

## 2020-03-16 PROCEDURE — 97140 MANUAL THERAPY 1/> REGIONS: CPT

## 2020-03-16 PROCEDURE — 97110 THERAPEUTIC EXERCISES: CPT

## 2020-03-16 NOTE — PROGRESS NOTES
"[facility]  Physical Therapy Note     Name: Rosamaria Agosto  Clinic Number: [id number]     Therapy Diagnosis: decreased ROM and pain at the shoulder s/p RTC  Physician: Arina Corea PA-C     Physician Orders: PT Eval and Treat   Medical Diagnosis from Referral: s/p RTC repair and biceps tenodesis  Evaluation Date: 7/26/2019  Authorization Period Expiration: 12/31/20  Plan of Care Expiration: 4/29/20  Visit # / Visits authorized: 6/20 new script     Time In:  800 am  Time Out:855  am  Total Billable Time: 45 minutes      Precautions: Standard, RTC protocol NO AROM at elbow joint s/p biceps tenodesis, PROM at elbow and shoulder but no excessive ER stopping at initial end feel per phase I protocol and follow up with MD for protocol     Subjective     Pt reports: Being able to reach behind the back a little more, cont to feel much looser in the shower.  Slight "catch" at the top of the shoulder.    Pain : 1- 2/10 at beginning and end of session 1-2/10 mostly tightness    Objective:  AAROM/ AROM 3/16/20  upright standing on min A  in midrange for AAROM and at end ROM Post MT    175/169 deg scaption  171/160 deg abuction  71 deg ER    MHP to shoulder prior to stretching pt seated with shoulder in absd    Manual therapy 35 min with STM./ MFR to pect minor, teres minor, and rhomboids, upper traps and levator, GH mobs grade I/II AP and lateral distraction for pain relief into empty end feel before restrictions and GH mobs grade III with distraction  in all direction for increased ROM  - manual stretch pec minor  -subscap release with ER motion  - inferior humeral glide with inferior mobilization Grade II-III  - inferior glide coupled with scaption pt in seated position   - Inferior glide coupled with ER pt supine and elevated  -PA GH grade III  - inferior glide coupled with abd grade III pt supine  Scapular scouring, emphasis on subscap  - Scapular lift grade II    Pt performed TE on left " "shoulder for 10 min to improve strength, ROM, pain decrease, and improved muscle synergy for total of 15 min  Pt perform reach/Roll/Lift 15x, showed improved post scapular musculature engagement, less shoulder hike following activity.  Pulley to scaption with TC for decreased shoulder hike 2x 2 min  Wall slides with pillow case 3x 15  AAROM abd with PT apply inferior glide to decrease supraspinatus pinch 2x15    10 min ice to left shoulder    Education provided:   Sleeping position, sling positioning and shoulder pendulums, donning strategy for sling, review post surgery restrictions. Pt issued putty for , decreased edema pooling at elbow     Written Home Exercises Provided: yes.  Exercises were reviewed and Rosamaria was able to demonstrate them prior to the end of the session.  Rosamaria demonstrated good  understanding of the education provided.      See EMR under Patient Instructions for exercises provided 7/26/2019.     Assessment     Pt with decreased over all tension below, time spent with scapular retraction prior to any scaption and flexion  Work, Allowed her to achieve a greater range prior to elevation of shoulder.  No pain noted with manual work, some "pinching felt with end range abd .  Cont tightness to IR/Scaption/Abd, improved ER.  Aggressive stretching was tolerated extremely well today, most discomfort noted with ABD today, able to increase range consirably to scaption.  Was able to maintain tension longer that previous visit.  pt report more freedom of movement post treatment    Goals:       Long Term Goals: 12 weeks   1.  Pt to been independent with discharge HEP without cues and proper form.  2.  Pt return to independent ADLs to include dressing, grooming with BUEs without compensation.  3.  Pt to increased AROM at elbow WNL and Left shoulder to 170 deg flexion and 160 deg abduction, 70 deg ER without pain or compensation  4.  Pt to increased L shoulder strength to 4+/5 into flexion extension, ER, " IR   5  Pt to increase tolerance to perform 1 hour of household chores, ie cleaning without increased L shoulder pain  6. Pt to decrease pain at left shoulder to 3/10 after session for improved functional activities        Plan   Plan of care Certification: 2/29/20-4/29/20    Cont with progressive and more aggressive PROM and GH mobs to stretch GH capsule.    Gerson Hubbard, PT

## 2020-03-17 ENCOUNTER — PATIENT OUTREACH (OUTPATIENT)
Dept: ADMINISTRATIVE | Facility: OTHER | Age: 60
End: 2020-03-17

## 2020-03-18 ENCOUNTER — TELEPHONE (OUTPATIENT)
Dept: SPORTS MEDICINE | Facility: CLINIC | Age: 60
End: 2020-03-18

## 2020-03-18 RX ORDER — DICLOFENAC SODIUM 75 MG/1
75 TABLET, DELAYED RELEASE ORAL 2 TIMES DAILY
Qty: 60 TABLET | Refills: 11 | Status: SHIPPED | OUTPATIENT
Start: 2020-03-18 | End: 2020-11-04

## 2020-03-18 NOTE — TELEPHONE ENCOUNTER
Reaching out to patient regarding appointment with Dr. Soto on 3/19.  Calling to discuss if patient feels they need to be seen. Patient states she is doing well, good progress from PT. Advised pt that all non emergent appointments are being postponed. Patient verbalized understanding.  AD

## 2020-03-23 ENCOUNTER — TELEPHONE (OUTPATIENT)
Dept: INTERNAL MEDICINE | Facility: CLINIC | Age: 60
End: 2020-03-23

## 2020-03-23 ENCOUNTER — PATIENT MESSAGE (OUTPATIENT)
Dept: OTOLARYNGOLOGY | Facility: CLINIC | Age: 60
End: 2020-03-23

## 2020-03-23 NOTE — LETTER
March 23, 2020    Rosamaria Agosto  [address] [address]             [place] [facility]                                                                                                                                                       [address] [place] [address]  Phone: [phone]  Fax: [phone] To whom it May concern:    Ms. Agosto should be allowed to work from home as she is a high risk patient secondary to her chronic medical conditions.  If you have any further questions or concerns, please do not hesitate to call.    Sincerely,        Bill Burns MD

## 2020-04-02 ENCOUNTER — TELEPHONE (OUTPATIENT)
Dept: INTERNAL MEDICINE | Facility: CLINIC | Age: 60
End: 2020-04-02

## 2020-04-02 DIAGNOSIS — E78.2 MIXED HYPERLIPIDEMIA: ICD-10-CM

## 2020-04-02 DIAGNOSIS — Z00.00 WELL ADULT EXAM: Primary | ICD-10-CM

## 2020-04-02 DIAGNOSIS — E55.9 VITAMIN D DEFICIENCY: ICD-10-CM

## 2020-04-02 NOTE — TELEPHONE ENCOUNTER
----- Message from Alethea Garvey sent at 4/2/2020  2:10 PM CDT -----  Contact: Pt  Doctor appointment and lab have been scheduled.  Please link lab orders to the lab appointment.  Date of doctor appointment:  7/16/20  Date of lab appointment:  7/9/20  Physical or EP: EPP

## 2020-05-15 ENCOUNTER — PATIENT OUTREACH (OUTPATIENT)
Dept: ADMINISTRATIVE | Facility: OTHER | Age: 60
End: 2020-05-15

## 2020-05-15 ENCOUNTER — TELEPHONE (OUTPATIENT)
Dept: BARIATRICS | Facility: CLINIC | Age: 60
End: 2020-05-15

## 2020-05-15 NOTE — TELEPHONE ENCOUNTER
Phoned patient and converted upcoming appointment to televisit. Educated patient on how to download [employer] meg and log in for visit. Patient verbalized understanding.

## 2020-05-17 NOTE — PROGRESS NOTES
Subjective:       Patient ID: Rosamaria Agosto is a 59 y.o. female.    Chief Complaint: No chief complaint on file.    The patient location is:  [place], [place]  The chief complaint leading to consultation is: Patient presents with:  Follow-up     Visit type: audiovisual  Total time spent with patient: 10 min  Each patient to whom he or she provides medical services by telemedicine is:  (1) informed of the relationship between the physician and patient and the respective role of any other health care provider with respect to management of the patient; and (2) notified that he or she may decline to receive medical services by telemedicine and may withdraw from such care at any time.    Notes:   Pt here today for follow-up. Has gained 10 lbs since last in. Net pos 5  lbs. Resumed LCHF diet with starting topiramate last OV.  States she did see a little decrease in appetite and cravings. Denies SE.        Does find she has been eating more lately 2/2 to being bored. She  Has been increasing exercise with a stationary.   Prev 210# Current home weight 205#    Follow-up   Associated symptoms include arthralgias. Pertinent negatives include no chest pain, chills or fever.     Review of Systems   Constitutional: Negative for chills and fever.   Respiratory: Positive for shortness of breath.         + snores. Not as bad since sinus surgery   Cardiovascular: Positive for leg swelling. Negative for chest pain.   Gastrointestinal: Negative for constipation and diarrhea.        Some GERD   Genitourinary: Positive for urgency. Negative for difficulty urinating and dyspareunia.   Musculoskeletal: Positive for arthralgias and back pain.        Shoulders   Neurological: Negative for dizziness and light-headedness.   Psychiatric/Behavioral: Negative for dysphoric mood. The patient is not nervous/anxious.        Objective:     LMP 10/19/1993 (Approximate)     Physical Exam   Constitutional: She is oriented to person, place, and time.  She appears well-developed and well-nourished. No distress.   HENT:   Head: Normocephalic and atraumatic.   Eyes: No scleral icterus.   Pulmonary/Chest: Effort normal.   Neurological: She is alert and oriented to person, place, and time.   Psychiatric: She has a normal mood and affect. Her behavior is normal. Judgment normal.   Vitals reviewed.      Assessment:       1. Obesity, Class I, BMI 30.0-34.9 (see actual BMI)        Plan:       Rosamaria was seen today for follow-up.    Diagnoses and all orders for this visit:    Obesity, Class I, BMI 30.0-34.9 (see actual BMI)    Other orders  -     topiramate (TOPAMAX) 50 MG tablet; Take 1 tablet (50 mg total) by mouth 2 (two) times daily.      Patient was informed that topiramate is used for migraine prevention and seizures. Weight loss is a common side effect that is well documented. S/he understands this. S/he was informed of the potential side effects such as serious and possibly fatal rash in which case the medication should be discontinued immediately. Paresthesias, forgetfulness, fatigue, kidney stones, GI symptoms, and changes in lab values such as electrolytes, blood counts and kidney function.      Start topiramate   50 mg morning and evening.       3 meals a day made up of the following:  Unlimited green vegetables, tomatoes, mushrooms, spaghetti squash, cauliflower,   3-4 oz serving Meat, poultry, seafood, eggs hard cheeses or plant based protein with each meal.    Milk and plain yogurt  Dressings, seasonings, condiments, etc should have less than 2 g sugars.   Beans (1-1.5 cups) or nuts (1/4 cup) can have 1 x a day.   1-2 servings of citrus fruits, berries, pineapple or melon a day (1/2 cup)  Avoid fried foods    Avoid/limit grains, rice, pasta, potatoes, bread, corn, peas, oatmeal, grits, tortillas, crackers, chips    No soda, sweet tea, juices or lemonade    [email or web address].[email or web address][employer] for recipes. Moderate carb intake    Quarantine tips given

## 2020-05-18 ENCOUNTER — OFFICE VISIT (OUTPATIENT)
Dept: BARIATRICS | Facility: CLINIC | Age: 60
End: 2020-05-18
Payer: COMMERCIAL

## 2020-05-18 DIAGNOSIS — E66.9 OBESITY, CLASS I, BMI 30.0-34.9 (SEE ACTUAL BMI): Primary | ICD-10-CM

## 2020-05-18 PROCEDURE — 99213 OFFICE O/P EST LOW 20 MIN: CPT | Mod: 95,,, | Performed by: INTERNAL MEDICINE

## 2020-05-18 PROCEDURE — 99213 PR OFFICE/OUTPT VISIT, EST, LEVL III, 20-29 MIN: ICD-10-PCS | Mod: 95,,, | Performed by: INTERNAL MEDICINE

## 2020-05-18 RX ORDER — ATORVASTATIN CALCIUM 80 MG/1
80 TABLET, FILM COATED ORAL NIGHTLY
Qty: 30 TABLET | Refills: 11 | Status: SHIPPED | OUTPATIENT
Start: 2020-05-18 | End: 2020-07-16 | Stop reason: SDUPTHER

## 2020-05-18 RX ORDER — TOPIRAMATE 50 MG/1
50 TABLET, FILM COATED ORAL 2 TIMES DAILY
Qty: 60 TABLET | Refills: 3 | Status: SHIPPED | OUTPATIENT
Start: 2020-05-18 | End: 2020-11-03 | Stop reason: SDUPTHER

## 2020-05-18 NOTE — PATIENT INSTRUCTIONS
Patient was informed that topiramate is used for migraine prevention and seizures. Weight loss is a common side effect that is well documented. S/he understands this. S/he was informed of the potential side effects such as serious and possibly fatal rash in which case the medication should be discontinued immediately. Paresthesias, forgetfulness, fatigue, kidney stones, GI symptoms, and changes in lab values such as electrolytes, blood counts and kidney function.     Start topiramate  50 mg then morning and evening.       3 meals a day made up of the following:  Unlimited green vegetables, tomatoes, mushrooms, spaghetti squash, cauliflower,   3-4 oz serving Meat, poultry, seafood, eggs hard cheeses or plant based protein with each meal.    Milk and plain yogurt  Dressings, seasonings, condiments, etc should have less than 2 g sugars.   Beans (1-1.5 cups) or nuts (1/4 cup) can have 1 x a day.   1-2 servings of citrus fruits, berries, pineapple or melon a day (1/2 cup)  Avoid fried foods    Avoid/limit grains, rice, pasta, potatoes, bread, corn, peas, oatmeal, grits, tortillas, crackers, chips    No soda, sweet tea, juices or lemonade    [email or web address].dietdoctor.Invidio for recipes. Moderate carb intake        [facility]  Tips to Stay on Track During COVID-19      DO DON'T   Keep up with your food log  Maintaining your caloric intake and diet quality is critical for achieving your health and weight loss goals.  Download the [employer] and use [facility] Code 86259 to track food and fluid intake Feel Discouraged - You can do this!  There are a lot of changes happening in our world but don't let them discourage you. Focus on the future and remind yourself of all the work and effort you've put in so far.     Keep protein-rich foods stocked up  buy chicken and turkey in bulk and freeze them, keep dry or canned beans on hand, get the largest quantity of eggs available. Make sure you are getting  your required protein intake (between  grams EACH DAY).   Drink fluid during meals or 30 minutes before/after eating  Your regular routine may have changed which may have caused some of your meal or snack times to change.  keep track of when you consume any liquid and time your meals accordingly. This will also help you make sure you are staying hydrated throughout the day   Continue with your protein drinks or bars  Order online or use grocery delivery service. Always make sure you order more WELL BEFORE you are running low, especially now when deliveries may take longer to arrive.  Remember to check to make sure your protein shakes or bars have 4 gms of sugar or less.     Keep table sugar around  You may be more tempted to add it to your drinks or food if it is visible and easily accessible.   Try new recipes  Use this time to experiment in the kitchen and find some different healthy dishes you enjoy - you can use the nutrition booklet to help guide you.  If you cannot find your copy, please download it from our website @ [email or web address]  Click here to download [facility]'s Nutrition Binder.   Add tough or crunchy foods back into your diet too quickly  Raw veggies are great snack foods but adding them in too quickly after surgery will cause pain and discomfort   Eat your meals slowly and intentionally  You shouldn't have to rush out to be anywhere so really take your time and aim for each meal to last around 20-30 minutes.   Drink sugary/bubbly drinks or alcohol    It may be tempting especially if you are surrounded by others without these limitations, but these beverages will prevent weight loss and cause gastric  pain/discomfort     Listen to your body - try to recognize when you feel full.  Learning your body's signals can be difficult but it is a key step in your weight loss journey. While you are is this process of working on this step, be  sure portion out the recommended quantities of foods and meals/snacks to prevent overeating.   Eat your meals using electronics  This is especially difficult at home where your use of computers, phones, and TVs are pretty much unregulated. Designate a meal spot or spots where there is limited distractions and you can focus on your food - maybe your kitchen table or on an outside porch or deck.   Continue taking vitamins and minerals  Take them at the same time each day and keep a log of when you take your supplements to make sure you don't miss any. These supplements are essential for preventing malnutrition and other health problems that will deter your progress.   'Save' your appetite   You may feel like holding out from food as long as possible so you can eat a large meal later on, but your body needs energy throughout the day in order to properly fuel itself and keep you alert.  Try eating small meals throughout the day - use these meals as mini breaks from work or projects you are doing at home.   Stay active!  It is so important for both your physical and mental health that you get regular physical activity. Even if you can no longer physically go to the gym or to workout classes there are tons of online resources to keep you moving. Incorporate a specific exercise time into your daily home routine and keep a journal of your activity.   Order food delivery or take out   Most places are now offering delivery services, but it can still be difficult to find and choose healthy options. Choose to do a grocery store  or delivery instead- cooking food at home is less expensive then eating out!   Review the resources you've been provided and keep in touch with your healthcare team.  Let them know if you are struggling or experiencing any problems - they are here to help!  Call us to schedule a telehealth visit at [phone] Isolate yourself   It may be called 'social distancing' but that does not mean we  can't still connect with one another. Phone calls or group video chats are great ways to keep in touch while staying at home. Any method of getting regular social time with friends and family will help to remind you that you're not in this alone.     Grocery List    Items to Keep Stocked in Your Kitchen  PROTEINS (Lean)    Eggs  Beans (canned and/or dried)  Skinless chicken/turkey   Tuna/[place] (canned and/or pouch)  Tofu or Tempeh  Jose Luis Veggie Burgers  Fish or shrimp (fresh or frozen)  Ground Beef (90% lean)  Steaks  Chobani Greek Yogurt  Cabot Cottage Cheese  Hummus  Low-fat cheeses (Laughing Cow, Baby Bell, mozzarella string cheese)  Fairlife Non-fat Milk    Vegetables (non-starchy)  *veggies can be fresh or frozen    Broccoli   Cauliflower   Carrots   Onions   Cabbage   Radishes   Zucchini   Okra   Greens   Peppers   Spinach   Turnips   Mushrooms   Tomatoes   Celery   Lettuce   Asparagus  Eggplant   Green Onions   Kale    Fruits  *Fruits can be fresh or frozen    Apples  Oranges  Pears  Kiwi  Melon  Berries  Peaches  Unsweetened Applesauce    *Avoid fruits canned In syrup  *Stick to 1-2 servings of fruit/day   Vitamins    Flintstones Complete  Chewables    Super B-Complex tablets with 50 mg Thiamine    Nature's Way liquid Calcium Citrate + Vit D     Sublingual Vitamin B12   Other    Sugar-free Popsicles    Sugar-free Jello    Crystal Lite    Low Fat Condiments     Decaf Coffee/Tea           Foods to Avoid Having Around the House  Butter/Margarine Cookies Candy Chips  Pretzels Grits  Granola Popcorn Ashraf Corn  Bread Alcohol Soda  Pasta  Rice  Cake/Pie Sausage Potatoes Ice Cream                 Tricks to Prevent Emotional Eating During Quarantine      Keep 'trigger foods' out of the house   Keep yourself distracted with work, games, music, or whatever hobby you enjoy. This may be the time to try a new activity!   Try fighting stress with breathing techniques, yoga, meditation, or prayer   Mix up your  meals with a variety of dishes   Keep up with your food diary   Call or video chat with a friend or family   Plan your meals for specific time and try for smaller meals throughout the day   Pre-portion all meals and snacks    Step outside for some fresh air or do a quick exercise activity to reset yourself    *Avoid negative thoughts about yourself - if you have a slip-up, you are not a failure. Forgive yourself and focus on learning from it so you can prevent it for the future              Ways to Stay Active While Staying Inside      Youtube Videos - free exercise and gym classes at any fitness level   Apps -tons of fitness apps are currently offering free trial periods and offer workouts that don't require equipment   Chores around the house, such as cleaning or gardening   Walk up and down the stairs   Video-chat with your regular workout adrian and do an online class together   Make a playlist of your favorite fun songs and dance around - no need to worry about knowing any serious dance moves, just jump around get your heart rate up!    While you are limited to working out at home, you may find it easier to do short, mini workouts multiple times a day instead of all at once. Try to still get at least 30 minutes of physical activity in each day!   Physical Health = Mental Health    The health of both your mind and body are equally important -be sure you are taking the time to care for both. It is likely that the current health concerns and quarantine mandates caused significant changes to your normal routine. Although this can feel overwhelming and seem difficult to manage, there are ways you can take to manage these feelings and keep your mental and physical health journey on track. Here are some tips for self-care during quarantine:     Meditate, take deep breaths - find any practice that will help you center yourself.   Move around your house throughout the day. Avoid staying in the same seat or  room for too long and try to work in an area of your house that get lots of sunlight if possible.   Get fresh air for a bit every day - being outside is a great way to improve your mood! You don't necessarily have to go far from your house. You could even just hang out on your porch for a while or take a walk around the block.    Get good sleep and maintain a regular sleep schedule   Connect with others. While we aren't able to physically be with others right now it is still so important to socialize and interact with other people, even if it is being done remotely.    Keep yourself busy. Make a list of tasks that you want to complete around the house, start a new book, do some art projects, try journaling.

## 2020-05-19 ENCOUNTER — TELEPHONE (OUTPATIENT)
Dept: BARIATRICS | Facility: CLINIC | Age: 60
End: 2020-05-19

## 2020-05-19 NOTE — TELEPHONE ENCOUNTER
Attempted to reach pt in regards to scheduling 3 month appt. No answer, LVM requesting a call back

## 2020-05-26 ENCOUNTER — NURSE TRIAGE (OUTPATIENT)
Dept: ADMINISTRATIVE | Facility: CLINIC | Age: 60
End: 2020-05-26

## 2020-05-27 ENCOUNTER — OFFICE VISIT (OUTPATIENT)
Dept: URGENT CARE | Facility: CLINIC | Age: 60
End: 2020-05-27
Payer: COMMERCIAL

## 2020-05-27 VITALS
WEIGHT: 210 LBS | OXYGEN SATURATION: 99 % | SYSTOLIC BLOOD PRESSURE: 137 MMHG | RESPIRATION RATE: 19 BRPM | HEIGHT: 69 IN | BODY MASS INDEX: 31.1 KG/M2 | TEMPERATURE: 99 F | HEART RATE: 85 BPM | DIASTOLIC BLOOD PRESSURE: 80 MMHG

## 2020-05-27 DIAGNOSIS — M54.9 BACK PAIN, UNSPECIFIED BACK LOCATION, UNSPECIFIED BACK PAIN LATERALITY, UNSPECIFIED CHRONICITY: ICD-10-CM

## 2020-05-27 DIAGNOSIS — N30.00 ACUTE CYSTITIS WITHOUT HEMATURIA: Primary | ICD-10-CM

## 2020-05-27 DIAGNOSIS — R30.0 DYSURIA: ICD-10-CM

## 2020-05-27 LAB
BILIRUB UR QL STRIP: NEGATIVE
GLUCOSE UR QL STRIP: NEGATIVE
KETONES UR QL STRIP: NEGATIVE
LEUKOCYTE ESTERASE UR QL STRIP: NEGATIVE
PH, POC UA: 6 (ref 5–8)
POC BLOOD, URINE: NEGATIVE
POC NITRATES, URINE: NEGATIVE
PROT UR QL STRIP: NEGATIVE
SP GR UR STRIP: 1.02 (ref 1–1.03)
UROBILINOGEN UR STRIP-ACNC: NORMAL (ref 0.1–1.1)

## 2020-05-27 PROCEDURE — 99213 OFFICE O/P EST LOW 20 MIN: CPT | Mod: 25,S$GLB,, | Performed by: NURSE PRACTITIONER

## 2020-05-27 PROCEDURE — 87086 URINE CULTURE/COLONY COUNT: CPT

## 2020-05-27 PROCEDURE — 81003 POCT URINALYSIS, DIPSTICK, AUTOMATED, W/O SCOPE: ICD-10-PCS | Mod: QW,S$GLB,, | Performed by: NURSE PRACTITIONER

## 2020-05-27 PROCEDURE — 81003 URINALYSIS AUTO W/O SCOPE: CPT | Mod: QW,S$GLB,, | Performed by: NURSE PRACTITIONER

## 2020-05-27 PROCEDURE — 99213 PR OFFICE/OUTPT VISIT, EST, LEVL III, 20-29 MIN: ICD-10-PCS | Mod: 25,S$GLB,, | Performed by: NURSE PRACTITIONER

## 2020-05-27 RX ORDER — CEPHALEXIN 500 MG/1
500 CAPSULE ORAL EVERY 6 HOURS
Qty: 20 CAPSULE | Refills: 0 | Status: SHIPPED | OUTPATIENT
Start: 2020-05-27 | End: 2020-06-01

## 2020-05-27 RX ORDER — CEPHALEXIN 500 MG/1
500 CAPSULE ORAL EVERY 6 HOURS
Qty: 20 CAPSULE | Refills: 0 | Status: SHIPPED | OUTPATIENT
Start: 2020-05-27 | End: 2020-05-27

## 2020-05-27 NOTE — TELEPHONE ENCOUNTER
Pt reports pain to right flank that radiates to lower part of abdomen. Pt took otc meds (AZO) for possible bladder infection. Describes pain as squeezing/pulling. Pt denies burning when urination and blood in urine. Pt denies nausea. Pt took acetaminophen earlier that helped with pain and now pain returning.    Rate ofelia 8 out of 10- ongoing since this morning  Pt denies any heavy lifting    Reason for Disposition   [1] SEVERE pain (e.g., excruciating, scale 8-10) AND [2] present > 1 hour    Additional Information   Negative: Passed out (i.e., lost consciousness, collapsed and was not responding)   Negative: Shock suspected (e.g., cold/pale/clammy skin, too weak to stand, low BP, rapid pulse)   Negative: Difficult to awaken or acting confused (e.g., disoriented, slurred speech)   Negative: Sounds like a life-threatening emergency to the triager    Protocols used: FLANK PAIN-A-AH

## 2020-05-27 NOTE — PROGRESS NOTES
"Subjective:       Patient ID: Rosamaria Agosto is a 59 y.o. female.    Vitals:  height is 5' 9" (1.753 m) and weight is 95.3 kg (210 lb). Her temperature is 99 °F (37.2 °C). Her blood pressure is 137/80 and her pulse is 85. Her respiration is 19 and oxygen saturation is 99%.     Chief Complaint: Back Pain    Pt states on yesterday she began having back pain that radiates to her right side and lower abdomen. She does admit to not drinking a lot of water lately.    Back Pain   This is a new problem. The current episode started yesterday. The problem is unchanged. Pertinent negatives include no chest pain, dysuria, fever, headaches or weakness.       Constitution: Negative for chills, fatigue and fever.   HENT: Negative for congestion and sore throat.    Neck: Negative for painful lymph nodes.   Cardiovascular: Negative for chest pain and leg swelling.   Eyes: Negative for double vision and blurred vision.   Respiratory: Negative for cough and shortness of breath.    Gastrointestinal: Negative for nausea, vomiting and diarrhea.   Genitourinary: Negative for dysuria, frequency, urgency and history of kidney stones.   Musculoskeletal: Positive for back pain. Negative for joint pain, joint swelling, muscle cramps and muscle ache.   Skin: Negative for color change, pale, rash and bruising.   Allergic/Immunologic: Negative for seasonal allergies.   Neurological: Negative for dizziness, history of vertigo, light-headedness, passing out and headaches.   Hematologic/Lymphatic: Negative for swollen lymph nodes.   Psychiatric/Behavioral: Negative for nervous/anxious, sleep disturbance and depression. The patient is not nervous/anxious.        Objective:      Physical Exam   Constitutional: She is oriented to person, place, and time. She appears well-developed and well-nourished.   HENT:   Head: Normocephalic and atraumatic.   Right Ear: External ear normal.   Left Ear: External ear normal.   Nose: Nose normal. No nasal " deformity. No epistaxis.   Mouth/Throat: Oropharynx is clear and moist and mucous membranes are normal.   Eyes: Lids are normal.   Neck: Trachea normal, normal range of motion and phonation normal. Neck supple.   Cardiovascular: Normal pulses.   Pulmonary/Chest: Effort normal.   Abdominal: Soft. Normal appearance. She exhibits no distension. There is no tenderness. There is no rigidity, no rebound, no guarding and no CVA tenderness.   Neurological: She is alert and oriented to person, place, and time.   Skin: Skin is warm, dry and intact.   Psychiatric: She has a normal mood and affect. Her speech is normal and behavior is normal. Cognition and memory are normal.   Nursing note and vitals reviewed.        Results for orders placed or performed in visit on 05/27/20   POCT Urinalysis, Dipstick, Automated, W/O Scope   Result Value Ref Range    POC Blood, Urine Negative Negative    POC Bilirubin, Urine Negative Negative    POC Urobilinogen, Urine normal 0.1 - 1.1    POC Ketones, Urine Negative Negative    POC Protein, Urine Negative Negative    POC Nitrates, Urine Negative Negative    POC Glucose, Urine Negative Negative    pH, UA 6.0 5 - 8    POC Specific Gravity, Urine 1.020 1.003 - 1.029    POC Leukocytes, Urine Negative Negative       Assessment:       1. Acute cystitis without hematuria    2. Back pain, unspecified back location, unspecified back pain laterality, unspecified chronicity    3. Dysuria        Plan:         Acute cystitis without hematuria  -     cephALEXin (KEFLEX) 500 MG capsule; Take 1 capsule (500 mg total) by mouth every 6 (six) hours. for 5 days  Dispense: 20 capsule; Refill: 0    Back pain, unspecified back location, unspecified back pain laterality, unspecified chronicity  -     POCT Urinalysis, Dipstick, Automated, W/O Scope  -     Culture, Urine  -     Discontinue: cephALEXin (KEFLEX) 500 MG capsule; Take 1 capsule (500 mg total) by mouth every 6 (six) hours. for 5 days  Dispense: 20 capsule;  Refill: 0  -     cephALEXin (KEFLEX) 500 MG capsule; Take 1 capsule (500 mg total) by mouth every 6 (six) hours. for 5 days  Dispense: 20 capsule; Refill: 0    Dysuria  -     Discontinue: cephALEXin (KEFLEX) 500 MG capsule; Take 1 capsule (500 mg total) by mouth every 6 (six) hours. for 5 days  Dispense: 20 capsule; Refill: 0  -     cephALEXin (KEFLEX) 500 MG capsule; Take 1 capsule (500 mg total) by mouth every 6 (six) hours. for 5 days  Dispense: 20 capsule; Refill: 0

## 2020-05-27 NOTE — PATIENT INSTRUCTIONS
"Return to Urgent Care or go to ER if symptoms worsen or fail to improve.  Follow up with PCP as recommended for further management.   We will contact you in 3-5 days with your urine culture result.       Bladder Infection, Female (Adult)    Urine is normally doesn't have any bacteria in it. But bacteria can get into the urinary tract from the skin around the rectum. Or they can travel in the blood from elsewhere in the body. Once they are in your urinary tract, they can cause infection in the urethra (urethritis), the bladder (cystitis), or the kidneys (pyelonephritis).  The most common place for an infection is in the bladder. This is called a bladder infection. This is one of the most common infections in women. Most bladder infections are easily treated. They are not serious unless the infection spreads to the kidney.  The phrases "bladder infection," "UTI," and "cystitis" are often used to describe the same thing. But they are not always the same. Cystitis is an inflammation of the bladder. The most common cause of cystitis is an infection.  Symptoms  The infection causes inflammation in the urethra and bladder. This causes many of the symptoms. The most common symptoms of a bladder infection are:  · Pain or burning when urinating  · Having to urinate more often than usual  · Urgent need to urinate  · Only a small amount of urine comes out  · Blood in urine  · Abdominal discomfort. This is usually in the lower abdomen above the pubic bone.  · Cloudy urine  · Strong- or bad-smelling urine  · Unable to urinate (urinary retention)  · Unable to hold urine in (urinary incontinence)  · Fever  · Loss of appetite  · Confusion (in older adults)  Causes  Bladder infections are not contagious. You can't get one from someone else, from a toilet seat, or from sharing a bath.  The most common cause of bladder infections is bacteria from the bowels. The bacteria get onto the skin around the opening of the urethra. From there, " they can get into the urine and travel up to the bladder, causing inflammation and infection. This usually happens because of:  · Wiping improperly after urinating. Always wipe from front to back.  · Bowel incontinence  · Pregnancy  · Procedures such as having a catheter inserted  · Older age  · Not emptying your bladder. This can allow bacteria a chance to grow in your urine.  · Dehydration  · Constipation  · Sex  · Use of a diaphragm for birth control   Treatment  Bladder infections are diagnosed by a urine test. They are treated with antibiotics and usually clear up quickly without complications. Treatment helps prevent a more serious kidney infection.  Medicines  Medicines can help in the treatment of a bladder infection:  · Take antibiotics until they are used up, even if you feel better. It is important to finish them to make sure the infection has cleared.  · You can use acetaminophen or ibuprofen for pain, fever, or discomfort, unless another medicine was prescribed. If you have chronic liver or kidney disease, talk with your healthcare provider before using these medicines. Also talk with your provider if you've ever had a stomach ulcer or gastrointestinal bleeding, or are taking blood-thinner medicines.  · If you are given phenazopydridine to reduce burning with urination, it will cause your urine to become a bright orange color. This can stain clothing.  Care and prevention  These self-care steps can help prevent future infections:  · Drink plenty of fluids to prevent dehydration and flush out your bladder. Do this unless you must restrict fluids for other health reasons, or your doctor told you not to.  · Proper cleaning after going to the bathroom is important. Wipe from front to back after using the toilet to prevent the spread of bacteria.  · Urinate more often. Don't try to hold urine in for a long time.  · Wear loose-fitting clothes and cotton underwear. Avoid tight-fitting pants.  · Improve your  diet and prevent constipation. Eat more fresh fruit and vegetables, and fiber, and less junk and fatty foods.  · Avoid sex until your symptoms are gone.  · Avoid caffeine, alcohol, and spicy foods. These can irritate your bladder.  · Urinate right after intercourse to flush out your bladder.  · If you use birth control pills and have frequent bladder infections, discuss it with your doctor.  Follow-up care  Call your healthcare provider if all symptoms are not gone after 3 days of treatment. This is especially important if you have repeat infections.  If a culture was done, you will be told if your treatment needs to be changed. If directed, you can call to find out the results.  If X-rays were done, you will be told if the results will affect your treatment.  Call 911  Call 911 if any of the following occur:  · Trouble breathing  · Hard to wake up or confusion  · Fainting or loss of consciousness  · Rapid heart rate  When to seek medical advice  Call your healthcare provider right away if any of these occur:  · Fever of 100.4ºF (38.0ºC) or higher, or as directed by your healthcare provider  · Symptoms are not better by the third day of treatment  · Back or belly (abdominal) pain that gets worse  · Repeated vomiting, or unable to keep medicine down  · Weakness or dizziness  · Vaginal discharge  · Pain, redness, or swelling in the outer vaginal area (labia)  Date Last Reviewed: 10/1/2016  © 3700-1683 [employer]. [address] [place] [address], [place] [address]. All rights reserved. This information is not intended as a substitute for professional medical care. Always follow your healthcare professional's instructions.

## 2020-05-28 LAB — BACTERIA UR CULT: NO GROWTH

## 2020-05-30 ENCOUNTER — TELEPHONE (OUTPATIENT)
Dept: URGENT CARE | Facility: CLINIC | Age: 60
End: 2020-05-30

## 2020-05-30 NOTE — TELEPHONE ENCOUNTER
Left message for patient to call clinic in regards to test results    ----- Message from Hetal Penny MD sent at 5/29/2020  8:02 PM CDT -----  Please notify patient that urine culture showed no growth.  Continue antibiotic as prescribed.  Recheck if not improving.

## 2020-06-01 ENCOUNTER — TELEPHONE (OUTPATIENT)
Dept: URGENT CARE | Facility: CLINIC | Age: 60
End: 2020-06-01

## 2020-06-26 DIAGNOSIS — Z12.39 BREAST CANCER SCREENING: ICD-10-CM

## 2020-07-09 ENCOUNTER — LAB VISIT (OUTPATIENT)
Dept: LAB | Facility: HOSPITAL | Age: 60
End: 2020-07-09
Attending: FAMILY MEDICINE
Payer: COMMERCIAL

## 2020-07-09 DIAGNOSIS — Z00.00 WELL ADULT EXAM: ICD-10-CM

## 2020-07-09 DIAGNOSIS — E55.9 VITAMIN D DEFICIENCY: ICD-10-CM

## 2020-07-09 DIAGNOSIS — E78.2 MIXED HYPERLIPIDEMIA: ICD-10-CM

## 2020-07-09 LAB
25(OH)D3+25(OH)D2 SERPL-MCNC: 19 NG/ML (ref 30–96)
ALBUMIN SERPL BCP-MCNC: 4.1 G/DL (ref 3.5–5.2)
ALP SERPL-CCNC: 101 U/L (ref 55–135)
ALT SERPL W/O P-5'-P-CCNC: 17 U/L (ref 10–44)
ANION GAP SERPL CALC-SCNC: 9 MMOL/L (ref 8–16)
AST SERPL-CCNC: 16 U/L (ref 10–40)
BASOPHILS # BLD AUTO: 0.07 K/UL (ref 0–0.2)
BASOPHILS NFR BLD: 0.9 % (ref 0–1.9)
BILIRUB SERPL-MCNC: 0.5 MG/DL (ref 0.1–1)
BUN SERPL-MCNC: 12 MG/DL (ref 6–20)
CALCIUM SERPL-MCNC: 9.9 MG/DL (ref 8.7–10.5)
CHLORIDE SERPL-SCNC: 101 MMOL/L (ref 95–110)
CHOLEST SERPL-MCNC: 280 MG/DL (ref 120–199)
CHOLEST/HDLC SERPL: 5.6 {RATIO} (ref 2–5)
CO2 SERPL-SCNC: 28 MMOL/L (ref 23–29)
CREAT SERPL-MCNC: 0.8 MG/DL (ref 0.5–1.4)
DIFFERENTIAL METHOD: ABNORMAL
EOSINOPHIL # BLD AUTO: 0.3 K/UL (ref 0–0.5)
EOSINOPHIL NFR BLD: 3.5 % (ref 0–8)
ERYTHROCYTE [DISTWIDTH] IN BLOOD BY AUTOMATED COUNT: 14 % (ref 11.5–14.5)
EST. GFR  (AFRICAN AMERICAN): >60 ML/MIN/1.73 M^2
EST. GFR  (NON AFRICAN AMERICAN): >60 ML/MIN/1.73 M^2
ESTIMATED AVG GLUCOSE: 111 MG/DL (ref 68–131)
GLUCOSE SERPL-MCNC: 116 MG/DL (ref 70–110)
HBA1C MFR BLD HPLC: 5.5 % (ref 4–5.6)
HCT VFR BLD AUTO: 44.5 % (ref 37–48.5)
HDLC SERPL-MCNC: 50 MG/DL (ref 40–75)
HDLC SERPL: 17.9 % (ref 20–50)
HGB BLD-MCNC: 14 G/DL (ref 12–16)
IMM GRANULOCYTES # BLD AUTO: 0.04 K/UL (ref 0–0.04)
IMM GRANULOCYTES NFR BLD AUTO: 0.5 % (ref 0–0.5)
LDLC SERPL CALC-MCNC: 206.4 MG/DL (ref 63–159)
LYMPHOCYTES # BLD AUTO: 2.3 K/UL (ref 1–4.8)
LYMPHOCYTES NFR BLD: 29.8 % (ref 18–48)
MCH RBC QN AUTO: 29.5 PG (ref 27–31)
MCHC RBC AUTO-ENTMCNC: 31.5 G/DL (ref 32–36)
MCV RBC AUTO: 94 FL (ref 82–98)
MONOCYTES # BLD AUTO: 0.7 K/UL (ref 0.3–1)
MONOCYTES NFR BLD: 9 % (ref 4–15)
NEUTROPHILS # BLD AUTO: 4.4 K/UL (ref 1.8–7.7)
NEUTROPHILS NFR BLD: 56.3 % (ref 38–73)
NONHDLC SERPL-MCNC: 230 MG/DL
NRBC BLD-RTO: 0 /100 WBC
PLATELET # BLD AUTO: 405 K/UL (ref 150–350)
PMV BLD AUTO: 10.1 FL (ref 9.2–12.9)
POTASSIUM SERPL-SCNC: 4.2 MMOL/L (ref 3.5–5.1)
PROT SERPL-MCNC: 7.9 G/DL (ref 6–8.4)
RBC # BLD AUTO: 4.74 M/UL (ref 4–5.4)
SODIUM SERPL-SCNC: 138 MMOL/L (ref 136–145)
T4 FREE SERPL-MCNC: 0.93 NG/DL (ref 0.71–1.51)
TRIGL SERPL-MCNC: 118 MG/DL (ref 30–150)
TSH SERPL DL<=0.005 MIU/L-ACNC: 1.68 UIU/ML (ref 0.4–4)
WBC # BLD AUTO: 7.76 K/UL (ref 3.9–12.7)

## 2020-07-09 PROCEDURE — 83036 HEMOGLOBIN GLYCOSYLATED A1C: CPT

## 2020-07-09 PROCEDURE — 84443 ASSAY THYROID STIM HORMONE: CPT

## 2020-07-09 PROCEDURE — 85025 COMPLETE CBC W/AUTO DIFF WBC: CPT

## 2020-07-09 PROCEDURE — 84439 ASSAY OF FREE THYROXINE: CPT

## 2020-07-09 PROCEDURE — 36415 COLL VENOUS BLD VENIPUNCTURE: CPT | Mod: PO

## 2020-07-09 PROCEDURE — 80053 COMPREHEN METABOLIC PANEL: CPT

## 2020-07-09 PROCEDURE — 82306 VITAMIN D 25 HYDROXY: CPT

## 2020-07-09 PROCEDURE — 80061 LIPID PANEL: CPT

## 2020-07-12 ENCOUNTER — PATIENT MESSAGE (OUTPATIENT)
Dept: INTERNAL MEDICINE | Facility: CLINIC | Age: 60
End: 2020-07-12

## 2020-07-12 DIAGNOSIS — E78.2 MIXED HYPERLIPIDEMIA: ICD-10-CM

## 2020-07-12 DIAGNOSIS — E55.9 VITAMIN D DEFICIENCY: Primary | ICD-10-CM

## 2020-07-12 RX ORDER — ERGOCALCIFEROL 1.25 MG/1
50000 CAPSULE ORAL
Qty: 4 CAPSULE | Refills: 2 | Status: SHIPPED | OUTPATIENT
Start: 2020-07-12 | End: 2023-10-04

## 2020-07-16 ENCOUNTER — OFFICE VISIT (OUTPATIENT)
Dept: INTERNAL MEDICINE | Facility: CLINIC | Age: 60
End: 2020-07-16
Payer: COMMERCIAL

## 2020-07-16 VITALS
DIASTOLIC BLOOD PRESSURE: 80 MMHG | SYSTOLIC BLOOD PRESSURE: 116 MMHG | HEIGHT: 69 IN | BODY MASS INDEX: 30.47 KG/M2 | WEIGHT: 205.69 LBS | HEART RATE: 72 BPM | TEMPERATURE: 98 F

## 2020-07-16 DIAGNOSIS — Z00.00 WELL ADULT EXAM: Primary | ICD-10-CM

## 2020-07-16 DIAGNOSIS — L23.9 ALLERGIC CONTACT DERMATITIS, UNSPECIFIED TRIGGER: ICD-10-CM

## 2020-07-16 DIAGNOSIS — E78.2 MIXED HYPERLIPIDEMIA: ICD-10-CM

## 2020-07-16 DIAGNOSIS — E66.9 OBESITY, UNSPECIFIED CLASSIFICATION, UNSPECIFIED OBESITY TYPE, UNSPECIFIED WHETHER SERIOUS COMORBIDITY PRESENT: ICD-10-CM

## 2020-07-16 DIAGNOSIS — E55.9 VITAMIN D DEFICIENCY: ICD-10-CM

## 2020-07-16 DIAGNOSIS — J31.0 CHRONIC RHINITIS: ICD-10-CM

## 2020-07-16 DIAGNOSIS — I10 ESSENTIAL HYPERTENSION: ICD-10-CM

## 2020-07-16 PROCEDURE — 99396 PREV VISIT EST AGE 40-64: CPT | Mod: S$GLB,,, | Performed by: FAMILY MEDICINE

## 2020-07-16 PROCEDURE — 3074F SYST BP LT 130 MM HG: CPT | Mod: CPTII,S$GLB,, | Performed by: FAMILY MEDICINE

## 2020-07-16 PROCEDURE — 3008F PR BODY MASS INDEX (BMI) DOCUMENTED: ICD-10-PCS | Mod: CPTII,S$GLB,, | Performed by: FAMILY MEDICINE

## 2020-07-16 PROCEDURE — 3079F PR MOST RECENT DIASTOLIC BLOOD PRESSURE 80-89 MM HG: ICD-10-PCS | Mod: CPTII,S$GLB,, | Performed by: FAMILY MEDICINE

## 2020-07-16 PROCEDURE — 3074F PR MOST RECENT SYSTOLIC BLOOD PRESSURE < 130 MM HG: ICD-10-PCS | Mod: CPTII,S$GLB,, | Performed by: FAMILY MEDICINE

## 2020-07-16 PROCEDURE — 99999 PR PBB SHADOW E&M-EST. PATIENT-LVL IV: ICD-10-PCS | Mod: PBBFAC,,, | Performed by: FAMILY MEDICINE

## 2020-07-16 PROCEDURE — 3008F BODY MASS INDEX DOCD: CPT | Mod: CPTII,S$GLB,, | Performed by: FAMILY MEDICINE

## 2020-07-16 PROCEDURE — 99999 PR PBB SHADOW E&M-EST. PATIENT-LVL IV: CPT | Mod: PBBFAC,,, | Performed by: FAMILY MEDICINE

## 2020-07-16 PROCEDURE — 99396 PR PREVENTIVE VISIT,EST,40-64: ICD-10-PCS | Mod: S$GLB,,, | Performed by: FAMILY MEDICINE

## 2020-07-16 PROCEDURE — 3079F DIAST BP 80-89 MM HG: CPT | Mod: CPTII,S$GLB,, | Performed by: FAMILY MEDICINE

## 2020-07-16 RX ORDER — ATORVASTATIN CALCIUM 80 MG/1
80 TABLET, FILM COATED ORAL NIGHTLY
Qty: 90 TABLET | Refills: 3 | Status: SHIPPED | OUTPATIENT
Start: 2020-07-16 | End: 2021-01-12 | Stop reason: SDUPTHER

## 2020-07-16 RX ORDER — GABAPENTIN 300 MG/1
300 CAPSULE ORAL 2 TIMES DAILY
Qty: 60 CAPSULE | Refills: 11 | Status: SHIPPED | OUTPATIENT
Start: 2020-07-16 | End: 2023-10-04

## 2020-07-16 RX ORDER — HYDROCHLOROTHIAZIDE 25 MG/1
25 TABLET ORAL DAILY
Qty: 90 TABLET | Refills: 3 | Status: SHIPPED | OUTPATIENT
Start: 2020-07-16 | End: 2021-08-04

## 2020-07-16 RX ORDER — TRAZODONE HYDROCHLORIDE 50 MG/1
25-50 TABLET ORAL NIGHTLY PRN
Qty: 30 TABLET | Refills: 11 | Status: SHIPPED | OUTPATIENT
Start: 2020-07-16 | End: 2021-08-20

## 2020-07-16 NOTE — PROGRESS NOTES
Subjective:       Patient ID: Rosamaria Agosto is a 59 y.o. female.    Chief Complaint: Annual Exam    HPI 59-year-old  female presents to clinic today for annual physical exam.  She continues to have well-controlled hypertension on hydrochlorothiazide 25 mg daily.  She has been treated for hyperlipidemia with Lipitor 80 mg daily but reports that she has been out of the medication for approximately 1 month.  She has been followed by ENT for chronic rhinitis which remains stable on as needed Flonase nasal spray and Zyrtec.  She continues to be followed by physical medicine secondary to osteoarthritis which remains stable on diclofenac and tramadol for severe pain.  She has a past surgical history of hysterectomy, bilateral tubal ligation, breast biopsy, encephalocele repair, nasal sinus surgery, and bladder lift surgery.  She has a family history of diabetes and hypertension.  She is up to date with all screening exams and vaccinations.  Finally, she reports occasional difficulty sleeping secondary to racing thoughts.  She has tried over-the-counter Benadryl and melatonin with minimal relief.  Review of Systems   Constitutional: Negative for appetite change, chills, fatigue and fever.   HENT: Negative for nasal congestion, ear pain, hearing loss, postnasal drip, rhinorrhea, sinus pressure/congestion, sore throat and tinnitus.    Eyes: Negative for redness, itching and visual disturbance.   Respiratory: Negative for cough, chest tightness and shortness of breath.    Cardiovascular: Negative for chest pain and palpitations.   Gastrointestinal: Negative for abdominal pain, constipation, diarrhea, nausea and vomiting.   Genitourinary: Negative for decreased urine volume, difficulty urinating, dysuria, frequency, hematuria and urgency.   Musculoskeletal: Negative for back pain, myalgias, neck pain and neck stiffness.   Integumentary:  Negative for rash.   Neurological: Negative for dizziness,  light-headedness and headaches.   Psychiatric/Behavioral: Positive for sleep disturbance.         Objective:      Physical Exam  Vitals signs and nursing note reviewed.   Constitutional:       General: She is not in acute distress.     Appearance: She is well-developed. She is not diaphoretic.   HENT:      Head: Normocephalic and atraumatic.      Right Ear: External ear normal.      Left Ear: External ear normal.      Nose: Nose normal.      Mouth/Throat:      Pharynx: No oropharyngeal exudate.   Eyes:      General: No scleral icterus.        Right eye: No discharge.         Left eye: No discharge.      Conjunctiva/sclera: Conjunctivae normal.      Pupils: Pupils are equal, round, and reactive to light.   Neck:      Musculoskeletal: Normal range of motion and neck supple.      Thyroid: No thyromegaly.      Vascular: No JVD.      Trachea: No tracheal deviation.   Cardiovascular:      Rate and Rhythm: Normal rate and regular rhythm.      Heart sounds: Normal heart sounds. No murmur. No friction rub. No gallop.    Pulmonary:      Effort: Pulmonary effort is normal. No respiratory distress.      Breath sounds: Normal breath sounds. No stridor. No wheezing or rales.   Abdominal:      General: Bowel sounds are normal. There is no distension.      Palpations: Abdomen is soft. There is no mass.      Tenderness: There is no abdominal tenderness. There is no guarding or rebound.   Musculoskeletal: Normal range of motion.         General: No tenderness.   Lymphadenopathy:      Cervical: No cervical adenopathy.   Skin:     General: Skin is warm and dry.      Coloration: Skin is not pale.      Findings: No erythema or rash.   Neurological:      Mental Status: She is alert and oriented to person, place, and time.   Psychiatric:         Behavior: Behavior normal.         Thought Content: Thought content normal.         Judgment: Judgment normal.         Assessment:       1. Well adult exam    2. Essential hypertension    3. Mixed  hyperlipidemia    4. Vitamin D deficiency    5. Chronic rhinitis    6. Allergic contact dermatitis, unspecified trigger    7. Obesity, unspecified classification, unspecified obesity type, unspecified whether serious comorbidity present        Plan:         1.  Labs have been reviewed and are overall within normal limits except for continued hyperlipidemia and vitamin-D deficiency.  2.  Continue hydrochlorothiazide 25 mg daily.  Hypertension is well controlled.  3.  Continue Lipitor 80 mg daily.  Encourage low-fat diet.  Repeat lipid panel in 3 months.  4.  Start vitamin-D 66220 units once weekly for 3 months and repeat vitamin-D level in 3 months.  5.  Continue Flonase nasal spray and Zyrtec as needed.  Chronic rhinitis and allergic dermatitis remains stable.  6.  Continue diet and exercise and follow-up with Bariatric Medicine as scheduled.  7.  Return to clinic as needed or in 1 year for annual exam.

## 2020-08-11 ENCOUNTER — PATIENT MESSAGE (OUTPATIENT)
Dept: INTERNAL MEDICINE | Facility: CLINIC | Age: 60
End: 2020-08-11

## 2020-08-11 NOTE — LETTER
August 12, 2020    Rosamaria Agosto  [address] [place] [address]             [facility]  [address].  [place] [address]  Phone: [phone]  Fax: [phone] To whom it May concern:    Secondary to her chronic medical conditions, Ms. Agosto is in a high risk population if she were to contract COVID-19.  Secondary to this high risk, please allow her to continue working from home until further notice.  If you have any further questions or concerns, please do not hesitate to call.      Sincerely,    Bill Burns MD

## 2020-08-18 ENCOUNTER — PATIENT OUTREACH (OUTPATIENT)
Dept: ADMINISTRATIVE | Facility: OTHER | Age: 60
End: 2020-08-18

## 2020-08-18 NOTE — PROGRESS NOTES
LINKS immunization registry updated  Care Everywhere updated  Health Maintenance updated  DIS/Chart reviewed for overdue Proactive [facility] Encounters (AMRY) health maintenance testing (CRS, Breast Ca, Diabetic Eye Exam)   Orders entered:N/A  Portal message sent to patient with scheduling link for mammogram 6/26/20

## 2020-09-26 ENCOUNTER — HOSPITAL ENCOUNTER (OUTPATIENT)
Dept: RADIOLOGY | Facility: HOSPITAL | Age: 60
Discharge: HOME | End: 2020-09-26
Attending: FAMILY MEDICINE
Payer: COMMERCIAL

## 2020-09-26 DIAGNOSIS — Z12.39 BREAST CANCER SCREENING: ICD-10-CM

## 2020-09-26 PROCEDURE — 77067 MAMMO DIGITAL SCREENING BILAT WITH TOMO: ICD-10-PCS | Mod: 26,,, | Performed by: RADIOLOGY

## 2020-09-26 PROCEDURE — 77063 BREAST TOMOSYNTHESIS BI: CPT | Mod: 26,,, | Performed by: RADIOLOGY

## 2020-09-26 PROCEDURE — 77063 MAMMO DIGITAL SCREENING BILAT WITH TOMO: ICD-10-PCS | Mod: 26,,, | Performed by: RADIOLOGY

## 2020-09-26 PROCEDURE — 77067 SCR MAMMO BI INCL CAD: CPT | Mod: TC

## 2020-09-26 PROCEDURE — 77067 SCR MAMMO BI INCL CAD: CPT | Mod: 26,,, | Performed by: RADIOLOGY

## 2020-10-19 ENCOUNTER — PATIENT MESSAGE (OUTPATIENT)
Dept: INTERNAL MEDICINE | Facility: CLINIC | Age: 60
End: 2020-10-19

## 2020-10-24 ENCOUNTER — CLINICAL SUPPORT (OUTPATIENT)
Dept: INTERNAL MEDICINE | Facility: CLINIC | Age: 60
End: 2020-10-24
Payer: COMMERCIAL

## 2020-10-24 PROCEDURE — 90471 IMMUNIZATION ADMIN: CPT | Mod: S$GLB,,, | Performed by: FAMILY MEDICINE

## 2020-10-24 PROCEDURE — 90686 FLU VACCINE (QUAD) GREATER THAN OR EQUAL TO 3YO PRESERVATIVE FREE IM: ICD-10-PCS | Mod: S$GLB,,, | Performed by: FAMILY MEDICINE

## 2020-10-24 PROCEDURE — 90471 FLU VACCINE (QUAD) GREATER THAN OR EQUAL TO 3YO PRESERVATIVE FREE IM: ICD-10-PCS | Mod: S$GLB,,, | Performed by: FAMILY MEDICINE

## 2020-10-24 PROCEDURE — 90686 IIV4 VACC NO PRSV 0.5 ML IM: CPT | Mod: S$GLB,,, | Performed by: FAMILY MEDICINE

## 2020-10-24 NOTE — PROGRESS NOTES
1012  Pt administered flu vaccine IM to the LD per standing orders. Pt denies any complaints. Pt to wait 15 minutes in lobby.

## 2020-10-26 ENCOUNTER — TELEPHONE (OUTPATIENT)
Dept: BARIATRICS | Facility: CLINIC | Age: 60
End: 2020-10-26

## 2020-10-26 NOTE — TELEPHONE ENCOUNTER
I tried to schedule the Pt a follow up appointment because she haven't been in since May, but she had no availability. She wants to know if you can change her medication.

## 2020-10-26 NOTE — TELEPHONE ENCOUNTER
----- Message from Bob Martínez sent at 10/26/2020 12:34 PM CDT -----  Regarding: different Rx        The Pt states that the Rx you just ordered for her isn't working and would like a call back about trying a different one that would work.    PHARMACY- [facility] - [place], [place] - [address] [place] [address] [phone] (Phone)  [phone] (Fax)      Phone # [phone]

## 2020-10-27 ENCOUNTER — TELEPHONE (OUTPATIENT)
Dept: ORTHOPEDICS | Facility: CLINIC | Age: 60
End: 2020-10-27

## 2020-10-27 NOTE — TELEPHONE ENCOUNTER
Spoke with patient who is aware that we can see her for the ankle pain that she's having but will have to be scheduled with [facility] for her back pain.

## 2020-10-28 ENCOUNTER — PATIENT OUTREACH (OUTPATIENT)
Dept: ADMINISTRATIVE | Facility: OTHER | Age: 60
End: 2020-10-28

## 2020-10-29 ENCOUNTER — TELEPHONE (OUTPATIENT)
Dept: ORTHOPEDICS | Facility: CLINIC | Age: 60
End: 2020-10-29
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
## 2020-11-03 ENCOUNTER — OFFICE VISIT (OUTPATIENT)
Dept: ORTHOPEDICS | Facility: CLINIC | Age: 60
End: 2020-11-03
Payer: COMMERCIAL

## 2020-11-03 ENCOUNTER — HOSPITAL ENCOUNTER (OUTPATIENT)
Dept: RADIOLOGY | Facility: HOSPITAL | Age: 60
Discharge: HOME | End: 2020-11-03
Attending: PHYSICIAN ASSISTANT
Payer: COMMERCIAL

## 2020-11-03 ENCOUNTER — OFFICE VISIT (OUTPATIENT)
Dept: BARIATRICS | Facility: CLINIC | Age: 60
End: 2020-11-03
Payer: COMMERCIAL

## 2020-11-03 VITALS
BODY MASS INDEX: 29.87 KG/M2 | HEIGHT: 69 IN | DIASTOLIC BLOOD PRESSURE: 84 MMHG | HEART RATE: 67 BPM | WEIGHT: 201.69 LBS | SYSTOLIC BLOOD PRESSURE: 126 MMHG | OXYGEN SATURATION: 99 %

## 2020-11-03 VITALS — WEIGHT: 205.69 LBS | HEIGHT: 69 IN | BODY MASS INDEX: 30.47 KG/M2

## 2020-11-03 DIAGNOSIS — M17.0 PRIMARY OSTEOARTHRITIS OF BOTH KNEES: ICD-10-CM

## 2020-11-03 DIAGNOSIS — M25.561 PAIN IN BOTH KNEES, UNSPECIFIED CHRONICITY: ICD-10-CM

## 2020-11-03 DIAGNOSIS — E78.2 MIXED HYPERLIPIDEMIA: ICD-10-CM

## 2020-11-03 DIAGNOSIS — M76.822 POSTERIOR TIBIAL TENDINITIS OF LEFT LOWER EXTREMITY: ICD-10-CM

## 2020-11-03 DIAGNOSIS — E66.3 OVERWEIGHT (BMI 25.0-29.9): Primary | ICD-10-CM

## 2020-11-03 DIAGNOSIS — I10 ESSENTIAL HYPERTENSION: ICD-10-CM

## 2020-11-03 DIAGNOSIS — M25.562 PAIN IN BOTH KNEES, UNSPECIFIED CHRONICITY: ICD-10-CM

## 2020-11-03 DIAGNOSIS — M25.572 LEFT ANKLE PAIN, UNSPECIFIED CHRONICITY: ICD-10-CM

## 2020-11-03 DIAGNOSIS — E55.9 VITAMIN D DEFICIENCY: ICD-10-CM

## 2020-11-03 DIAGNOSIS — M25.572 LEFT ANKLE PAIN, UNSPECIFIED CHRONICITY: Primary | ICD-10-CM

## 2020-11-03 PROCEDURE — 20610 DRAIN/INJ JOINT/BURSA W/O US: CPT | Mod: 50,S$GLB,, | Performed by: PHYSICIAN ASSISTANT

## 2020-11-03 PROCEDURE — 3074F PR MOST RECENT SYSTOLIC BLOOD PRESSURE < 130 MM HG: ICD-10-PCS | Mod: CPTII,S$GLB,, | Performed by: PHYSICIAN ASSISTANT

## 2020-11-03 PROCEDURE — 3008F BODY MASS INDEX DOCD: CPT | Mod: CPTII,S$GLB,, | Performed by: STUDENT IN AN ORGANIZED HEALTH CARE EDUCATION/TRAINING PROGRAM

## 2020-11-03 PROCEDURE — 3008F PR BODY MASS INDEX (BMI) DOCUMENTED: ICD-10-PCS | Mod: CPTII,S$GLB,, | Performed by: PHYSICIAN ASSISTANT

## 2020-11-03 PROCEDURE — 73610 X-RAY EXAM OF ANKLE: CPT | Mod: TC,LT

## 2020-11-03 PROCEDURE — 3074F SYST BP LT 130 MM HG: CPT | Mod: CPTII,S$GLB,, | Performed by: STUDENT IN AN ORGANIZED HEALTH CARE EDUCATION/TRAINING PROGRAM

## 2020-11-03 PROCEDURE — 99999 PR PBB SHADOW E&M-EST. PATIENT-LVL IV: CPT | Mod: PBBFAC,,, | Performed by: PHYSICIAN ASSISTANT

## 2020-11-03 PROCEDURE — 3074F PR MOST RECENT SYSTOLIC BLOOD PRESSURE < 130 MM HG: ICD-10-PCS | Mod: CPTII,S$GLB,, | Performed by: STUDENT IN AN ORGANIZED HEALTH CARE EDUCATION/TRAINING PROGRAM

## 2020-11-03 PROCEDURE — 73610 XR ANKLE COMPLETE 3 VIEW LEFT: ICD-10-PCS | Mod: 26,LT,, | Performed by: RADIOLOGY

## 2020-11-03 PROCEDURE — 99999 PR PBB SHADOW E&M-EST. PATIENT-LVL V: ICD-10-PCS | Mod: PBBFAC,,, | Performed by: STUDENT IN AN ORGANIZED HEALTH CARE EDUCATION/TRAINING PROGRAM

## 2020-11-03 PROCEDURE — 73564 X-RAY EXAM KNEE 4 OR MORE: CPT | Mod: 26,,, | Performed by: RADIOLOGY

## 2020-11-03 PROCEDURE — 3008F PR BODY MASS INDEX (BMI) DOCUMENTED: ICD-10-PCS | Mod: CPTII,S$GLB,, | Performed by: STUDENT IN AN ORGANIZED HEALTH CARE EDUCATION/TRAINING PROGRAM

## 2020-11-03 PROCEDURE — 99999 PR PBB SHADOW E&M-EST. PATIENT-LVL IV: ICD-10-PCS | Mod: PBBFAC,,, | Performed by: PHYSICIAN ASSISTANT

## 2020-11-03 PROCEDURE — 99213 OFFICE O/P EST LOW 20 MIN: CPT | Mod: 25,S$GLB,, | Performed by: PHYSICIAN ASSISTANT

## 2020-11-03 PROCEDURE — 99999 PR PBB SHADOW E&M-EST. PATIENT-LVL V: CPT | Mod: PBBFAC,,, | Performed by: STUDENT IN AN ORGANIZED HEALTH CARE EDUCATION/TRAINING PROGRAM

## 2020-11-03 PROCEDURE — 3079F PR MOST RECENT DIASTOLIC BLOOD PRESSURE 80-89 MM HG: ICD-10-PCS | Mod: CPTII,S$GLB,, | Performed by: STUDENT IN AN ORGANIZED HEALTH CARE EDUCATION/TRAINING PROGRAM

## 2020-11-03 PROCEDURE — 73610 X-RAY EXAM OF ANKLE: CPT | Mod: 26,LT,, | Performed by: RADIOLOGY

## 2020-11-03 PROCEDURE — 99213 PR OFFICE/OUTPT VISIT, EST, LEVL III, 20-29 MIN: ICD-10-PCS | Mod: S$GLB,,, | Performed by: STUDENT IN AN ORGANIZED HEALTH CARE EDUCATION/TRAINING PROGRAM

## 2020-11-03 PROCEDURE — 73564 X-RAY EXAM KNEE 4 OR MORE: CPT | Mod: TC,50

## 2020-11-03 PROCEDURE — 99213 OFFICE O/P EST LOW 20 MIN: CPT | Mod: S$GLB,,, | Performed by: STUDENT IN AN ORGANIZED HEALTH CARE EDUCATION/TRAINING PROGRAM

## 2020-11-03 PROCEDURE — 3078F DIAST BP <80 MM HG: CPT | Mod: CPTII,S$GLB,, | Performed by: PHYSICIAN ASSISTANT

## 2020-11-03 PROCEDURE — 3078F PR MOST RECENT DIASTOLIC BLOOD PRESSURE < 80 MM HG: ICD-10-PCS | Mod: CPTII,S$GLB,, | Performed by: PHYSICIAN ASSISTANT

## 2020-11-03 PROCEDURE — 99213 PR OFFICE/OUTPT VISIT, EST, LEVL III, 20-29 MIN: ICD-10-PCS | Mod: 25,S$GLB,, | Performed by: PHYSICIAN ASSISTANT

## 2020-11-03 PROCEDURE — 3079F DIAST BP 80-89 MM HG: CPT | Mod: CPTII,S$GLB,, | Performed by: STUDENT IN AN ORGANIZED HEALTH CARE EDUCATION/TRAINING PROGRAM

## 2020-11-03 PROCEDURE — 3074F SYST BP LT 130 MM HG: CPT | Mod: CPTII,S$GLB,, | Performed by: PHYSICIAN ASSISTANT

## 2020-11-03 PROCEDURE — 20610 PR DRAIN/INJECT LARGE JOINT/BURSA: ICD-10-PCS | Mod: 50,S$GLB,, | Performed by: PHYSICIAN ASSISTANT

## 2020-11-03 PROCEDURE — 73564 XR KNEE ORTHO BILAT WITH FLEXION: ICD-10-PCS | Mod: 26,,, | Performed by: RADIOLOGY

## 2020-11-03 PROCEDURE — 3008F BODY MASS INDEX DOCD: CPT | Mod: CPTII,S$GLB,, | Performed by: PHYSICIAN ASSISTANT

## 2020-11-03 RX ORDER — PHENTERMINE HYDROCHLORIDE 37.5 MG/1
37.5 TABLET ORAL
Qty: 30 TABLET | Refills: 0 | Status: SHIPPED | OUTPATIENT
Start: 2020-11-03 | End: 2020-11-03 | Stop reason: CLARIF

## 2020-11-03 RX ORDER — METHYLPREDNISOLONE ACETATE 80 MG/ML
80 INJECTION, SUSPENSION INTRA-ARTICULAR; INTRALESIONAL; INTRAMUSCULAR; SOFT TISSUE
Status: COMPLETED | OUTPATIENT
Start: 2020-11-03 | End: 2020-11-03

## 2020-11-03 RX ORDER — TOPIRAMATE 50 MG/1
50 TABLET, FILM COATED ORAL 2 TIMES DAILY
Qty: 70 TABLET | Refills: 0 | Status: SHIPPED | OUTPATIENT
Start: 2020-11-03 | End: 2020-12-01 | Stop reason: SDUPTHER

## 2020-11-03 RX ADMIN — METHYLPREDNISOLONE ACETATE 80 MG: 80 INJECTION, SUSPENSION INTRA-ARTICULAR; INTRALESIONAL; INTRAMUSCULAR; SOFT TISSUE at 04:11

## 2020-11-03 NOTE — PROGRESS NOTES
Subjective:       Patient ID: Rosamaria Agosto is a 60 y.o. female.    Chief Complaint: weight gain, follow-up    Patient presents for follow-up. She has previousely been seen by Dr. Kamara, last appointment in May 2020 (virtual visit) at which time patient weighed 205 lbs. She has been prescribed phentermine, diethylpropion, and topiramate in the past.    Physical Activity: Exercise bike and treadmill at home but limited due to bilateral knee pain    Diet Recall:  Using air fryer to cook chicken   Crabs, shrimp  Soft drinks (about 2 per day)    Co-Morbidities:  HTN  HLD  Osteoarthritis  Vitamin D Deficency    Review of Systems   Constitutional: Negative for chills and fever.   HENT: Negative for sore throat and trouble swallowing.    Eyes: Negative for visual disturbance.   Respiratory: Negative for shortness of breath.    Cardiovascular: Negative for chest pain and palpitations.   Gastrointestinal: Negative for abdominal pain, nausea and vomiting.   Integumentary:  Negative for rash.   Neurological: Negative for dizziness and light-headedness.   Psychiatric/Behavioral: The patient is not nervous/anxious.          Objective:      Ref. Range 7/9/2020 09:14   WBC Latest Ref Range: 3.90 - 12.70 K/uL 7.76   RBC Latest Ref Range: 4.00 - 5.40 M/uL 4.74   Hemoglobin Latest Ref Range: 12.0 - 16.0 g/dL 14.0   Hematocrit Latest Ref Range: 37.0 - 48.5 % 44.5   MCV Latest Ref Range: 82 - 98 fL 94   MCH Latest Ref Range: 27.0 - 31.0 pg 29.5   MCHC Latest Ref Range: 32.0 - 36.0 g/dL 31.5 (L)   RDW Latest Ref Range: 11.5 - 14.5 % 14.0   Platelets Latest Ref Range: 150 - 350 K/uL 405 (H)   MPV Latest Ref Range: 9.2 - 12.9 fL 10.1   Gran % Latest Ref Range: 38.0 - 73.0 % 56.3   Lymph % Latest Ref Range: 18.0 - 48.0 % 29.8   Mono % Latest Ref Range: 4.0 - 15.0 % 9.0   Eosinophil % Latest Ref Range: 0.0 - 8.0 % 3.5   Basophil % Latest Ref Range: 0.0 - 1.9 % 0.9   Immature Granulocytes Latest Ref Range: 0.0 - 0.5 % 0.5   Gran #  (ANC) Latest Ref Range: 1.8 - 7.7 K/uL 4.4   Lymph # Latest Ref Range: 1.0 - 4.8 K/uL 2.3   Mono # Latest Ref Range: 0.3 - 1.0 K/uL 0.7   Eos # Latest Ref Range: 0.0 - 0.5 K/uL 0.3   Baso # Latest Ref Range: 0.00 - 0.20 K/uL 0.07   Immature Grans (Abs) Latest Ref Range: 0.00 - 0.04 K/uL 0.04   nRBC Latest Ref Range: 0 /100 WBC 0   Differential Method Unknown Automated   Sodium Latest Ref Range: 136 - 145 mmol/L 138   Potassium Latest Ref Range: 3.5 - 5.1 mmol/L 4.2   Chloride Latest Ref Range: 95 - 110 mmol/L 101   CO2 Latest Ref Range: 23 - 29 mmol/L 28   Anion Gap Latest Ref Range: 8 - 16 mmol/L 9   BUN Latest Ref Range: 6 - 20 mg/dL 12   Creatinine Latest Ref Range: 0.5 - 1.4 mg/dL 0.8   eGFR if non African American Latest Ref Range: >60 mL/min/1.73 m^2 >60.0   eGFR if African American Latest Ref Range: >60 mL/min/1.73 m^2 >60.0   Glucose Latest Ref Range: 70 - 110 mg/dL 116 (H)   Calcium Latest Ref Range: 8.7 - 10.5 mg/dL 9.9   Alkaline Phosphatase Latest Ref Range: 55 - 135 U/L 101   PROTEIN TOTAL Latest Ref Range: 6.0 - 8.4 g/dL 7.9   Albumin Latest Ref Range: 3.5 - 5.2 g/dL 4.1   BILIRUBIN TOTAL Latest Ref Range: 0.1 - 1.0 mg/dL 0.5   AST Latest Ref Range: 10 - 40 U/L 16   ALT Latest Ref Range: 10 - 44 U/L 17   Triglycerides Latest Ref Range: 30 - 150 mg/dL 118      Ref. Range 7/9/2020 09:14   Hemoglobin A1C External Latest Ref Range: 4.0 - 5.6 % 5.5   Estimated Avg Glucose Latest Ref Range: 68 - 131 mg/dL 111   TSH Latest Ref Range: 0.400 - 4.000 uIU/mL 1.683   Free T4 Latest Ref Range: 0.71 - 1.51 ng/dL 0.93      Ref. Range 10/17/2020 08:45   Cholesterol Latest Ref Range: 120 - 199 mg/dL 225 (H)   HDL Latest Ref Range: 40 - 75 mg/dL 59   HDL/Cholesterol Ratio Latest Ref Range: 20.0 - 50.0 % 26.2   LDL Cholesterol External Latest Ref Range: 63.0 - 159.0 mg/dL 147.6   Non-HDL Cholesterol Latest Units: mg/dL 166   Total Cholesterol/HDL Ratio Latest Ref Range: 2.0 - 5.0  3.8   Triglycerides Latest Ref Range: 30  - 150 mg/dL 92   Vit D, 25-Hydroxy Latest Ref Range: 30 - 96 ng/mL 37     EKG (12/2019)  Sinus tachycardia with Premature atrial complexes   Biatrial enlargement   Minimal voltage criteria for LVH, may be normal variant   When compared with ECG of 07-JUN-2019 15:01,   Premature atrial complexes are now Present   Nonspecific T wave abnormality, improved in Lateral leads     Weight 201.7 lbs  BMI 29.79  BFP 44.8%  SMM 61.3 lbs  BMR 1462 kcal    Vitals:    11/03/20 1601   BP: 126/84   Pulse: 67       Physical Exam  Vitals signs reviewed.   Constitutional:       General: She is not in acute distress.     Appearance: Normal appearance. She is obese. She is not ill-appearing, toxic-appearing or diaphoretic.   HENT:      Head: Normocephalic and atraumatic.   Eyes:      Extraocular Movements: Extraocular movements intact.   Neck:      Musculoskeletal: Normal range of motion.   Cardiovascular:      Rate and Rhythm: Normal rate.   Pulmonary:      Effort: Pulmonary effort is normal. No respiratory distress.   Musculoskeletal: Normal range of motion.      Right lower leg: No edema.      Left lower leg: No edema.   Skin:     General: Skin is warm and dry.   Neurological:      General: No focal deficit present.      Mental Status: She is alert and oriented to person, place, and time.      Gait: Gait normal.   Psychiatric:         Mood and Affect: Mood normal.         Behavior: Behavior normal.         Thought Content: Thought content normal.         Judgment: Judgment normal.         Assessment:       1. Overweight (BMI 25.0-29.9)    2. Essential hypertension    3. Mixed hyperlipidemia    4. Vitamin D deficiency    5. Primary osteoarthritis of both knees        Plan:   - Continue Topiramate 50 mg twice daily    - Although patient has been successful losing weight with phentermine in the past, EKG from 12/2019 showed premature atrial complexes so I felt that it was no longer appropriate for the patient.    - Log all food and  beverage intake with a daily calorie goal of 1200 calories per day    - Moderate intensity aerobic exercise for 30 minutes 3-4x/week    - Return to clinic in 4 weeks

## 2020-11-03 NOTE — PATIENT INSTRUCTIONS
Phentermine 37.5 mg daily.  Common side effects of phentermine include anxiety, insomnia, palpitations and increased blood pressure. It is for short-term usage along with diet and exercise. Stopping the medication without making lifestyle changes will result in regain of weight.     Topiramate 50 mg twice daily.  Topiramate is approved for migraine and seizure prevention. Weight loss is a common side effect that is well documented. Other potential side effects include a serious and possibly fatal rash in which case the medication should be discontinued immediately, vision changes, paresthesias, forgetfulness, fatigue, kidney stones, and upset stomach.         Weekly Weight:  Weigh yourself at least once a week to help keep track of your progress between visits. Tracking your weight will provide you with important feedback about the changes you are making. To measure your weight as accurately as possible, weigh yourself at the same time of day, wearing the same clothes, and using the same scale.         Food and Beverage Log:  Keep a log of all food and beverages that you consume.  Keeping track of calories will help you lose weight, because monitoring will help you become more aware of what you are eating and recognize your eating patterns.  Be mindful of your hunger level before eating and your satiety level after eating.  Just keeping records will help you make healthy changes in your diet and eat fewer calories.    Tips for keeping a calorie count:     ? Each day, aim for the daily calorie goal.     ? Record your food intake immediately after eating. If possible, look up calories before or immediately after eating.     ? Download an meg like [employer] Fitness Pal, Lose It!, or [employer] (Code: 66120) To keep track of your calories.    ? Measuring foods (using measuring cups or spoons) will help you be more accurate with counting calories.     ? Keep a running calorie count throughout the day.     ? Count daily calories  toward a weekly calorie total. If you eat too many calories one day, cut back slightly over the next few days to meet your weekly goal.         Meal Planning & Grocery Shopping    Meal planning builds the foundation for healthy eating. When you have structured ideas for healthy meals and foods available at home to prepare those meals, weight control becomes easier.  If only healthy foods are available at home, then you will be much more likely to eat healthy foods. And you will be less likely to go to a restaurant or  a fast food meal, which tend to be unhealthy and higher in calories than meals prepared at home.      Take 5-10 minutes each week to plan meals for the next 7 days.  Make a grocery list based on the meal plan.    Grocery Shopping Tips:  ? Shop on a full stomach.  ? Schedule your shopping for times when you are most motivated and able to be disciplined about your purchases. For example, after a stressful day at work it may be difficult to make the healthiest choices. Shopping at other times, such as early in the morning or after dinner, may be easier.  ? Focus your shopping on the outside aisles of the store, which tend to contain more fresh foods and lower calorie foods. The inside aisles tend to have more processed foods.  ? Stick to your list. Avoid buying unhealthy items just because they are on sale.   ? Compare nutrition labels to check the number of calories and percentage of fat.      What to buy:    Vegetables  - Fresh vegetables  - Frozen vegetables with no sauce or added salt  - Canned vegetables with no sauce or added salt    Protein  - Lean meats, such as chicken and turkey  - Limit red meats, such as beef to no more than 1x/week  - Limit processed meats, such as cold cuts, wilder, sausage, and hot dogs. Look for brands that have no nitrites and are minimally processed. Consider turkey sausage or turkey wilder.  - Fish and Shellfish  - Eggs  - Dried beans  - Canned beans (reduced  sodium)    Fat  - Use healthy oils, such as olive oil or canola oil, for cooking, salad dressings, etc.  - Unflavored nuts and seeds  - Nut butters (no added sugar)    Dairy  - Yogurt (no sugar added)  - Cheese  - Low-fat milk  - Unsweetened nondairy milks (almond milk, soy milk, etc)    Fruit  - Fresh Fruit  - Frozen fruit with no added sugar  - Canned fruit with no added sugar  - Dried fruit with no added sugar  - 100% fruit juice    Whole Grains  - Single ingredient grains, such as oats, quinoa, brown rice  - Whole-wheat pasta  - Sprouted whole-grain bread    What to avoid:  - Avoid fried foods  - Avoid foods with added sugar  - Avoid sugar-sweetened beverages  - Avoid ultra-processed foods      Lifestyle Activity    Lifestyle activity consists of all the activities that burn calories during the course of a normal day. Using the stairs, washing the dishes, or even getting up to turn off the television are all examples of lifestyle activity. All activities, no matter how small, burn calories. Increasing lifestyle activity can help with weight control, so building physical activity into your everyday routine is important.     A pedometer is a tool that can help you track how much lifestyle activity you are getting. It can help you stay active. A pedometer counts each step you take and displays your total steps on the screen. Many smartphones, including iPhones and Androids, have applications that automatically count your steps. If you decide to use this method, make sure you are holding or wearing your smartphone so it can count all of your steps.     During the next 2 weeks, aim for 5,000 or more steps per day. Each week aim to increase your daily step goal by 250 so that you will reach an average of 10,000 steps per day (equal to walking 4 to 5 miles).     Start making more active choices in your routine in order to increase the amount of walking you do each day.     Strategies to Increase Lifestyle  Activity:    ? Take several 5-10-minute walks during the day.   ? Set a reminder to take a 5 minute break from your desk every hour.   ? Choose the farthest entrance to a building that you are entering.   ? Host walking meetings at work.   ? Walk down the hickey to contact co-workers face-to-face, instead of emailing or calling.  ? Walk to a restroom, water cooler, or copy machine on a different floor at work.   ? Walk during your lunch break.   ? Park farther away in parking lots.   ? Get off the bus or train earlier and walk farther to your destination.   ? Take the stairs rather than the elevator or the escalator.   ? Start a walking club with co-workers or neighbors.   ? Walk - don't drive - for trips less than one mile.   ? Take an after-dinner walk with family.   ? Go for a walk while talking on your wireless phone.   ? Walk the dog more often.   ? If you prefer to stay indoors because of the weather, try walking in a shopping mall or doing laps around a large store.   ? Purchase a treadmill to use at home.   ? Schedule time for walking every week and stick to it like any other appointment.   ? Or think of your own strategy: _______________________________       Physical Activity    Physical activity improves your health and helps with weight control, especially weight loss maintenance.      When starting to incorporate an exercise routine into your day, it is helpful to set specific goals.  For example, I will walk at moderate intensity from 12:30-12:45pm on Monday, Wednesday, and Friday.  Schedule your exercise time into your calendar.  Think of any potential barriers that may come up and prevent you from exercising.  Develop solutions or back-up plans for when these barriers may arise.    Walking is an ideal activity to start with if you do not currently have an exercise routine. You can make the activity more fun by doing it with a friend or listening to music or a book on tape while you do it. If you  don't enjoy walking, think of other activities you like, such as bike riding or swimming.  Other alternatives include group fitness classes or exercise at home using DVDs, Apps, etc.    If you are new to exercising, then start with 10 minutes 3-4 days per week.  After two weeks, increase to 15 minutes 3-4 days per week.  If you already exercise more than this, continue at your higher level, or increase by 10-15 minutes if able.         Aerobic Activity  Aerobic Activity gets you breathing harder and your heart beating faster.  Eventually, you should work up to 150 - 300 minutes of moderate-intensity aerobic activity every week or 75 - 150 minutes of vigorous-intensity aerobic activity every week.  An easy way to gauge the intensity of your activity is with the Talk Test.  During moderate activity, you should be able to talk, but not sing without having to pause for a breath.  During vigorous activity, you shouldn't be able to say more than a few words without pausing for a breath.  You should not push yourself until you are completely out of breath, tired, or sore.    Examples of Aerobic Activity:  - Walking  - Running  - Swimming  - Biking  - Playing sports like tennis or basketball  - Dancing  - Jumping Rope      Strength Training  It is also recommended that you perform muscle-strengthening activities at least 2 days per week.  Be sure to include activities that work all major muscle groups (legs, arms, abdomen, back, buttocks, chest, and shoulders)    Examples of Strength Training  - Lifting weights  - Working with resistance band  - Using your body weight for resistance (squats, push-ups, sit-ups)  - Pilates  - Yoga      https://health.gov/moveyourway/activity-planner/      Remember to keep a record of your activity to track your progress.

## 2020-11-03 NOTE — PROGRESS NOTES
Subjective:      Patient ID: Rosamaria Agosto is a 60 y.o. female.    Chief Complaint: Pain of the Left Knee, Pain of the Right Knee, and Pain of the Left Ankle    HPI  60 year old female presents with chief complaint of left ankle pain >1 month. She was walking in some soft grass and the ankle sunk in a little and she started having pain after that. Pain is medial. It is worse when she turns it. She has pain with walking. She reports swelling. Diclofenac does not help. She does not wear inserts.   Patient reports intermittent bilateral knee pain x years. She reports h/o OA. She has difficulty going from sit to stand. She reports swelling. She is unsure if diclofenac helps. She took mobic in the past with tramadol. She had cortisone injections years ago that helped. She does not use assistive devices.   Review of Systems   Constitution: Negative for chills, fever and night sweats.   Cardiovascular: Negative for chest pain.   Respiratory: Negative for cough and shortness of breath.    Hematologic/Lymphatic: Does not bruise/bleed easily.   Skin: Negative for color change.   Gastrointestinal: Negative for heartburn.   Genitourinary: Negative for dysuria.   Neurological: Negative for numbness and paresthesias.   Psychiatric/Behavioral: Negative for altered mental status.   Allergic/Immunologic: Negative for persistent infections.         Objective:            General    Vitals reviewed.  Constitutional: She is oriented to person, place, and time. She appears well-developed and well-nourished.   Cardiovascular: Normal rate.    Neurological: She is alert and oriented to person, place, and time.         Left Ankle/Foot Exam     Inspection  Erythema: absent    Tenderness   The patient is tender to palpation of the medial malleolus and posterior tibial tendon.    Range of Motion   Ankle Joint  Dorsiflexion: abnormal   Plantar flexion: abnormal     Subtalar Joint   Inversion: abnormal   Eversion: abnormal     Tests   Single  Heel Rise: able to perform    Other   Sensation: normal    Comments:  Pain with single heel rise.       Vascular Exam       Left Pulses  Dorsalis Pedis:      2+            Right Knee Exam:  ROM 0-120 degrees  +crepitus  No effusion  TTP lateral joint line    Left Knee Exam:  ROM 0-120 degrees  +crepitus  No effusion  TTP medial joint line      X-ray knee: ordered and reviewed by myself. DJD present.  X-ray ankle: ordered and reviewed by myself. The alignment is within normal limits.  No fracture.  No marrow replacement process.  Calcaneal enthesopathy at the plantar fascia origin noted.       Assessment:       Encounter Diagnoses   Name Primary?    Primary osteoarthritis of both knees     Left ankle pain, unspecified chronicity Yes    Posterior tibial tendinitis of left lower extremity           Plan:       Discussed treatment options with patient. She would like cortisone injections for her knees. HEP given for knees. She was placed in a boot for the left ankle. She is WBAT. Recommend inserts with boot. RTC in 4 weeks for left ankle re-evaluation.     PROCEDURE:  I have explained the risks, benefits, and alternatives of the procedure in detail.  The patient voices understanding and all questions have been answered.  The patient agrees to proceed as planned. So after I performed a sterile prep of the skin in the normal fashion the bilateral knee is injected using a 22 gauge needle from the anteromedial approach with a combination of 4cc 1% plain lidocaine and 80 mg of depo medrol.  The patient is cautioned and immediate relief of pain is secondary to the local anesthetic and will be temporary.  After the anesthetic wears off there may be a increase in pain that may last for a few hours or a few days and they should use ice to help alleviate this flair up of pain.

## 2020-11-04 DIAGNOSIS — M25.512 ACUTE PAIN OF LEFT SHOULDER: ICD-10-CM

## 2020-11-04 RX ORDER — MELOXICAM 15 MG/1
15 TABLET ORAL DAILY
Qty: 30 TABLET | Refills: 1 | Status: SHIPPED | OUTPATIENT
Start: 2020-11-04 | End: 2021-08-20

## 2020-11-05 ENCOUNTER — TELEPHONE (OUTPATIENT)
Dept: ORTHOPEDICS | Facility: CLINIC | Age: 60
End: 2020-11-05

## 2020-11-28 ENCOUNTER — PATIENT OUTREACH (OUTPATIENT)
Dept: ADMINISTRATIVE | Facility: OTHER | Age: 60
End: 2020-11-28

## 2020-11-28 NOTE — PROGRESS NOTES
LINKS immunization registry updated  Care Everywhere updated  Health Maintenance updated  Chart reviewed for overdue Proactive [facility] ([facility]) health maintenance testing (CRS, Breast Ca, Diabetic Eye Exam)   Orders entered:N/A

## 2020-12-01 ENCOUNTER — TELEPHONE (OUTPATIENT)
Dept: OBSTETRICS AND GYNECOLOGY | Facility: CLINIC | Age: 60
End: 2020-12-01

## 2020-12-01 ENCOUNTER — OFFICE VISIT (OUTPATIENT)
Dept: ORTHOPEDICS | Facility: CLINIC | Age: 60
End: 2020-12-01
Payer: COMMERCIAL

## 2020-12-01 ENCOUNTER — OFFICE VISIT (OUTPATIENT)
Dept: BARIATRICS | Facility: CLINIC | Age: 60
End: 2020-12-01
Payer: COMMERCIAL

## 2020-12-01 VITALS
HEART RATE: 68 BPM | DIASTOLIC BLOOD PRESSURE: 80 MMHG | BODY MASS INDEX: 30.27 KG/M2 | OXYGEN SATURATION: 98 % | WEIGHT: 205 LBS | SYSTOLIC BLOOD PRESSURE: 148 MMHG

## 2020-12-01 DIAGNOSIS — M17.0 PRIMARY OSTEOARTHRITIS OF BOTH KNEES: ICD-10-CM

## 2020-12-01 DIAGNOSIS — M76.822 POSTERIOR TIBIAL TENDINITIS OF LEFT LOWER EXTREMITY: Primary | ICD-10-CM

## 2020-12-01 DIAGNOSIS — E78.2 MIXED HYPERLIPIDEMIA: ICD-10-CM

## 2020-12-01 DIAGNOSIS — I10 ESSENTIAL HYPERTENSION: ICD-10-CM

## 2020-12-01 DIAGNOSIS — E66.09 CLASS 1 OBESITY DUE TO EXCESS CALORIES WITH SERIOUS COMORBIDITY AND BODY MASS INDEX (BMI) OF 30.0 TO 30.9 IN ADULT: Primary | ICD-10-CM

## 2020-12-01 DIAGNOSIS — E55.9 VITAMIN D DEFICIENCY: ICD-10-CM

## 2020-12-01 PROCEDURE — 99213 OFFICE O/P EST LOW 20 MIN: CPT | Mod: S$GLB,,, | Performed by: STUDENT IN AN ORGANIZED HEALTH CARE EDUCATION/TRAINING PROGRAM

## 2020-12-01 PROCEDURE — 3077F SYST BP >= 140 MM HG: CPT | Mod: CPTII,S$GLB,, | Performed by: PHYSICIAN ASSISTANT

## 2020-12-01 PROCEDURE — 99999 PR PBB SHADOW E&M-EST. PATIENT-LVL IV: ICD-10-PCS | Mod: PBBFAC,,, | Performed by: STUDENT IN AN ORGANIZED HEALTH CARE EDUCATION/TRAINING PROGRAM

## 2020-12-01 PROCEDURE — 3077F SYST BP >= 140 MM HG: CPT | Mod: CPTII,S$GLB,, | Performed by: STUDENT IN AN ORGANIZED HEALTH CARE EDUCATION/TRAINING PROGRAM

## 2020-12-01 PROCEDURE — 3079F PR MOST RECENT DIASTOLIC BLOOD PRESSURE 80-89 MM HG: ICD-10-PCS | Mod: CPTII,S$GLB,, | Performed by: STUDENT IN AN ORGANIZED HEALTH CARE EDUCATION/TRAINING PROGRAM

## 2020-12-01 PROCEDURE — 99999 PR PBB SHADOW E&M-EST. PATIENT-LVL III: CPT | Mod: PBBFAC,,, | Performed by: PHYSICIAN ASSISTANT

## 2020-12-01 PROCEDURE — 99999 PR PBB SHADOW E&M-EST. PATIENT-LVL III: ICD-10-PCS | Mod: PBBFAC,,, | Performed by: PHYSICIAN ASSISTANT

## 2020-12-01 PROCEDURE — 3077F PR MOST RECENT SYSTOLIC BLOOD PRESSURE >= 140 MM HG: ICD-10-PCS | Mod: CPTII,S$GLB,, | Performed by: STUDENT IN AN ORGANIZED HEALTH CARE EDUCATION/TRAINING PROGRAM

## 2020-12-01 PROCEDURE — 99999 PR PBB SHADOW E&M-EST. PATIENT-LVL IV: CPT | Mod: PBBFAC,,, | Performed by: STUDENT IN AN ORGANIZED HEALTH CARE EDUCATION/TRAINING PROGRAM

## 2020-12-01 PROCEDURE — 1125F AMNT PAIN NOTED PAIN PRSNT: CPT | Mod: S$GLB,,, | Performed by: PHYSICIAN ASSISTANT

## 2020-12-01 PROCEDURE — 3077F PR MOST RECENT SYSTOLIC BLOOD PRESSURE >= 140 MM HG: ICD-10-PCS | Mod: CPTII,S$GLB,, | Performed by: PHYSICIAN ASSISTANT

## 2020-12-01 PROCEDURE — 3078F PR MOST RECENT DIASTOLIC BLOOD PRESSURE < 80 MM HG: ICD-10-PCS | Mod: CPTII,S$GLB,, | Performed by: PHYSICIAN ASSISTANT

## 2020-12-01 PROCEDURE — 1125F PR PAIN SEVERITY QUANTIFIED, PAIN PRESENT: ICD-10-PCS | Mod: S$GLB,,, | Performed by: STUDENT IN AN ORGANIZED HEALTH CARE EDUCATION/TRAINING PROGRAM

## 2020-12-01 PROCEDURE — 1125F PR PAIN SEVERITY QUANTIFIED, PAIN PRESENT: ICD-10-PCS | Mod: S$GLB,,, | Performed by: PHYSICIAN ASSISTANT

## 2020-12-01 PROCEDURE — 99213 PR OFFICE/OUTPT VISIT, EST, LEVL III, 20-29 MIN: ICD-10-PCS | Mod: S$GLB,,, | Performed by: STUDENT IN AN ORGANIZED HEALTH CARE EDUCATION/TRAINING PROGRAM

## 2020-12-01 PROCEDURE — 3008F BODY MASS INDEX DOCD: CPT | Mod: CPTII,S$GLB,, | Performed by: STUDENT IN AN ORGANIZED HEALTH CARE EDUCATION/TRAINING PROGRAM

## 2020-12-01 PROCEDURE — 3008F PR BODY MASS INDEX (BMI) DOCUMENTED: ICD-10-PCS | Mod: CPTII,S$GLB,, | Performed by: STUDENT IN AN ORGANIZED HEALTH CARE EDUCATION/TRAINING PROGRAM

## 2020-12-01 PROCEDURE — 99213 PR OFFICE/OUTPT VISIT, EST, LEVL III, 20-29 MIN: ICD-10-PCS | Mod: S$GLB,,, | Performed by: PHYSICIAN ASSISTANT

## 2020-12-01 PROCEDURE — 3078F DIAST BP <80 MM HG: CPT | Mod: CPTII,S$GLB,, | Performed by: PHYSICIAN ASSISTANT

## 2020-12-01 PROCEDURE — 99213 OFFICE O/P EST LOW 20 MIN: CPT | Mod: S$GLB,,, | Performed by: PHYSICIAN ASSISTANT

## 2020-12-01 PROCEDURE — 1125F AMNT PAIN NOTED PAIN PRSNT: CPT | Mod: S$GLB,,, | Performed by: STUDENT IN AN ORGANIZED HEALTH CARE EDUCATION/TRAINING PROGRAM

## 2020-12-01 PROCEDURE — 3079F DIAST BP 80-89 MM HG: CPT | Mod: CPTII,S$GLB,, | Performed by: STUDENT IN AN ORGANIZED HEALTH CARE EDUCATION/TRAINING PROGRAM

## 2020-12-01 RX ORDER — TOPIRAMATE 50 MG/1
50 TABLET, FILM COATED ORAL 2 TIMES DAILY
Qty: 180 TABLET | Refills: 0 | Status: SHIPPED | OUTPATIENT
Start: 2020-12-01 | End: 2023-04-03 | Stop reason: SDUPTHER

## 2020-12-01 NOTE — TELEPHONE ENCOUNTER
----- Message from Christiano Akins sent at 12/1/2020  3:32 PM CST -----  Regarding: Running late  Contact: self  Pt stated that she is still at her appt w/  Jess Phillips PA-C on the 5th floor she stated that she will be running late pt can be reached at [phone] (nlmx )

## 2020-12-01 NOTE — PROGRESS NOTES
Subjective:       Patient ID: Rosamaria Agosto is a 60 y.o. female.    Chief Complaint: Follow-up    Follow-up, appointment #2    Previousely been seen by Dr. Kamara, last appointment in May 2020 (virtual visit) at which time patient weighed 205 lbs. She has been prescribed phentermine, diethylpropion, and topiramate in the past.     Physical Activity: limited due to tendonitis, left leg in boot     Diet Recall:  Baked chicken, salmon  Occasional fried chicken  Pickled beets and okra  Turkey, turkey sandwich     Co-Morbidities:  HTN  HLD  Osteoarthritis  Vitamin D Deficency    Medical Weight Loss History  11/3/2020: 201.7 lbs, BMI 29.79, BFP 44.8%, SMM 61.3 lbs; topiramate 50 mg twice daily  12/1/2020: 205.6 lbs, BMI 30.3, BFP 43.3%, SMM 64.2 lbs; topiramate 50 mg twice daily      Review of Systems   Constitutional: Negative for chills and fever.   HENT: Negative for sore throat and trouble swallowing.    Eyes: Negative for visual disturbance.   Respiratory: Negative for shortness of breath.    Cardiovascular: Negative for chest pain and palpitations.   Gastrointestinal: Negative for abdominal pain, nausea and vomiting.   Integumentary:  Negative for rash.   Neurological: Negative for dizziness and light-headedness.   Psychiatric/Behavioral: The patient is not nervous/anxious.          Objective:       Weight: 205.6 lbs  BMI 30.3  BFP 43.3%  SMM 64.2 lbs  BMR 1512 kcal    Vitals:    12/01/20 1607   BP: (!) 148/80   Pulse: 68       Physical Exam  Vitals signs reviewed.   Constitutional:       General: She is not in acute distress.     Appearance: Normal appearance. She is obese. She is not ill-appearing, toxic-appearing or diaphoretic.   HENT:      Head: Normocephalic and atraumatic.   Eyes:      Extraocular Movements: Extraocular movements intact.   Neck:      Musculoskeletal: Normal range of motion.   Cardiovascular:      Rate and Rhythm: Normal rate.   Pulmonary:      Effort: Pulmonary effort is normal. No  respiratory distress.   Musculoskeletal: Normal range of motion.      Right lower leg: No edema.      Left lower leg: No edema.   Skin:     General: Skin is warm and dry.   Neurological:      General: No focal deficit present.      Mental Status: She is alert and oriented to person, place, and time.      Gait: Gait normal.   Psychiatric:         Mood and Affect: Mood normal.         Behavior: Behavior normal.         Thought Content: Thought content normal.         Judgment: Judgment normal.         Assessment:       1. Class 1 obesity due to excess calories with serious comorbidity and body mass index (BMI) of 30.0 to 30.9 in adult    2. Essential hypertension    3. Mixed hyperlipidemia    4. Primary osteoarthritis of both knees    5. Vitamin D deficiency        Plan:   - Continue Topiramate 50 mg twice daily     - Log all food and beverage intake with a daily calorie goal of 1200 calories per day     - Moderate intensity aerobic exercise for 30 minutes 3-4x/week     - Return to clinic in 4 weeks

## 2020-12-01 NOTE — PROGRESS NOTES
Subjective:      Patient ID: Rosamaria Agosto is a 60 y.o. female.    Chief Complaint: Pain of the Left Ankle    HPI  Patient returns for f/u for her left ankle. She has been wearing the boot on and off for 4 weeks. She says the boot helps and her ankle feels better with it. She wears it with her insert. She still has some swelling and pain but it has gotten better since last visit. She is doing ROM exercises. She takes diclofenac.   Review of Systems   Constitution: Negative for chills, fever and night sweats.   Cardiovascular: Negative for chest pain.   Respiratory: Negative for cough and shortness of breath.    Hematologic/Lymphatic: Does not bruise/bleed easily.   Skin: Negative for color change.   Gastrointestinal: Negative for heartburn.   Genitourinary: Negative for dysuria.   Neurological: Negative for numbness and paresthesias.   Psychiatric/Behavioral: Negative for altered mental status.   Allergic/Immunologic: Negative for persistent infections.         Objective:            General    Vitals reviewed.  Constitutional: She is oriented to person, place, and time. She appears well-developed and well-nourished.   Cardiovascular: Normal rate.    Neurological: She is alert and oriented to person, place, and time.         Left Ankle/Foot Exam     Inspection  Erythema: absent    Tenderness   The patient is tender to palpation of the posterior tibial tendon.    Range of Motion   Ankle Joint  Dorsiflexion: normal   Plantar flexion: normal     Subtalar Joint   Inversion: normal   Eversion: normal     Tests   Single Heel Rise: able to perform    Other   Sensation: normal    Comments:  Pain with inversion and single heel rise.                 Assessment:       Encounter Diagnosis   Name Primary?    Posterior tibial tendinitis of left lower extremity Yes          Plan:       Order placed for PT and sent to [facility]. She will call if they cannot accommodate her schedule. She will work on weaning out of the boot.  RTC in 4 weeks for re-evaluation.

## 2020-12-02 PROBLEM — E66.09 CLASS 1 OBESITY DUE TO EXCESS CALORIES WITH SERIOUS COMORBIDITY AND BODY MASS INDEX (BMI) OF 30.0 TO 30.9 IN ADULT: Status: ACTIVE | Noted: 2017-05-17

## 2020-12-02 PROBLEM — E66.811 CLASS 1 OBESITY DUE TO EXCESS CALORIES WITH SERIOUS COMORBIDITY AND BODY MASS INDEX (BMI) OF 30.0 TO 30.9 IN ADULT: Status: ACTIVE | Noted: 2017-05-17

## 2021-01-12 RX ORDER — ATORVASTATIN CALCIUM 80 MG/1
80 TABLET, FILM COATED ORAL NIGHTLY
Qty: 90 TABLET | Refills: 3 | Status: SHIPPED | OUTPATIENT
Start: 2021-01-12 | End: 2021-08-22

## 2021-03-02 ENCOUNTER — PATIENT OUTREACH (OUTPATIENT)
Dept: ADMINISTRATIVE | Facility: OTHER | Age: 61
End: 2021-03-02

## 2021-03-25 NOTE — PROGRESS NOTES
"    [facility] Note        Subjective     CC: Pain of the Right Knee      HPI    Kaitlynn Le is a 66 y.o. female who presents with new problem of: right knee pain.  Onset: atraumatic and gradual in nature. The issue has been ongoing for 1 month(s). Pain is a 5/10 on the pain scale. Pain is described as dull and aching. Associated symptoms include pain, popping, grinding and giving way/buckling. The pain is worse with walking and sleeping; resting and ice improve the pain. Previous treatments have included: NSAIDS.    I have reviewed the following portions of the patient's history:History of Present Illness and review of systems.      Patient here today for right knee pain over the last month or so.  No history of any recent trauma.  She and her  been walking more than normal.  She normally walks 2 miles a day but has stepped up to 4 miles.  She has an aching sensation on the lateral aspect of her knee.  She had prior arthroscopy in this knee with meniscal surgery.  She has noticed a lump on the outside of the knee as well.  She has tried anti-inflammatories.  Historically, she had a steroid injection in [place] as well as gel injections that helped only moderately.        ROS:    Constiutional:Pt denies fever, chills, nausea, or vomiting.  MSK:as above        Objective      Past Medical History  Past Medical History:   Diagnosis Date   • Cancer (CMS/HCC)     breast   • Elevated cholesterol    • GERD (gastroesophageal reflux disease) 3/12/2019   • History of radiation therapy    • Hyperlipidemia    • Kidney stones 04/13/2017    bilateral   • Osteoarthritis of knee    • Osteopenia of multiple sites 9/19/2018   • Osteoporosis    • Pes planus, flexible    • Sleep apnea    • Tibialis posterior tendinitis    • Urinary tract infection July 2017   • Varicose veins of legs    • Vertigo          Physical Exam  /70   Pulse 63   Ht 170.2 cm (67.01\")   Wt 60.8 kg (134 lb)   BMI 20.98 " Subjective:      Patient ID: Rosamaria Agosto is a 58 y.o. female.    Chief Complaint:  Vaginal Prolapse and bladder pressure      History of Present Illness  58-year-old para 2 with history of hysterectomy done transabdominally presents with concern about her prolapse.  She has actually taken images of it and shown her provider who is referred her to Urogynecology for assistance with this prolapse the patient finds extremely bothersome.  Patient is very active inclusive of weightlifting exercise specifically that she enjoys is squatting she noticed a prolapse after restarting situated training regimen.  Patient no issues of incontinence patient would very much like information on a pessary in other options.  Patient with no history of pain with intercourse incontinence is not is present but it is not bothersome enough to warrant any type of desired intervention at this time          Past Medical History:   Diagnosis Date    Abnormal Pap smear of cervix yrs ago    Arthritis     History of uterine fibroid     Hyperlipidemia     Hypertension     Sinus problem        Past Surgical History:   Procedure Laterality Date    BREAST BIOPSY  24-25 years old    ENCEPHALOCELE REPAIR  46    HYSTERECTOMY  93'-95'    ovaries remain    NASAL SINUS SURGERY      RESECTION-TURBINATES (SMR) Bilateral 2017    [facility]    SEPTOPLASTY Bilateral 2017    [facility]    SINUS SURGERY FUNCTIONAL ENDOSCOPIC WITH NAVIGATION stealth and propel needed Bilateral 2017    Performed by [facility] at [facility]    TUBAL LIGATION      VAGINAL DELIVERY         GYN & OB History  Patient's last menstrual period was 10/19/1993 (approximate).   Date of Last Pap: No result found    OB History    Para Term  AB Living   2 2 0     2   SAB TAB Ectopic Multiple Live Births           2      # Outcome Date GA Lbr Aleksey/  Weight Sex Delivery Anes PTL Lv   2 Para     M Vag-Spont   JESU   1 Para     F Vag-Spont   JESU       Health Maintenance       Date Due Completion Date    Hepatitis C Screening 1960 ---    TETANUS VACCINE 08/19/1979 ---    Influenza Vaccine 08/01/2019 12/17/2018    Mammogram 06/23/2020 6/23/2018    Lipid Panel 05/28/2023 5/28/2018    Colonoscopy 05/16/2024 5/16/2014 (Done)    Override on 5/16/2014: Done          Family History   Problem Relation Age of Onset    Stroke Maternal Grandmother     Diabetes Mother     Hypertension Mother     Diabetes Sister     Diabetes Sister     Breast cancer Neg Hx     Colon cancer Neg Hx     Ovarian cancer Neg Hx        Social History     Socioeconomic History    Marital status: Single     Spouse name: Not on file    Number of children: Not on file    Years of education: Not on file    Highest education level: Not on file   Occupational History    Not on file   Social Needs    Financial resource strain: Not on file    Food insecurity:     Worry: Not on file     Inability: Not on file    Transportation needs:     Medical: Not on file     Non-medical: Not on file   Tobacco Use    Smoking status: Never Smoker    Smokeless tobacco: Never Used   Substance and Sexual Activity    Alcohol use: Yes     Alcohol/week: 0.6 oz     Types: 1 Shots of liquor per week     Comment: seldom    Drug use: No    Sexual activity: Yes     Partners: Male     Birth control/protection: Post-menopausal, See Surgical Hx     Comment: hysterectomy 20 yrs ago   Lifestyle    Physical activity:     Days per week: Not on file     Minutes per session: Not on file    Stress: Not on file   Relationships    Social connections:     Talks on phone: Not on file     Gets together: Not on file     Attends Yarsani service: Not on file     Active member of club or organization: Not on file     Attends meetings of clubs or organizations: Not on file     Relationship status: Not on file   Other Topics  kg/m²     Body mass index is 20.98 kg/m².    Patient is well nourished and well developed.        Ortho Exam      Musculoskeletal:  Global Assessment:  Overall assessment of Lower Extremity Muscle Strength and Tone:  Right quadriceps--5/5   Right hamstrings--5/5       Right tibialis anterior--5/5  Right gastroc-soleus--5/5  Right EHL --5/5    Lower Extremity:    Inspection and Palpation:  Right knee:  Tenderness:  Over the lateral joint line and mild severity  Effusion:  1+.  There is a grape sized fluid collection at the lateral joint line.  Crepitus:  Positive  Pulses:  2+  Ecchymosis:  None  Warmth:  None     ROM:  Right:  Extension: 5    Flexion:120    Instability:    Right:  Lachman Test:  Negative   Varus stress test negative   Valgus stress test negative    Deformities/Malalignments/Discrepancies:    Left:  No deformities   Right:  Genu Varum    Functional Testing:  Oebd's test:  Negative  Patella grind test:  Positive  Q-angle:  normal          Imaging/Labs/EMG Reviewed:  Imaging Results (Last 24 Hours)     Procedure Component Value Units Date/Time    XR Knee 4+ View Right [[id number]] Resulted: 03/25/21 1153     Updated: 03/25/21 1155    Narrative:      Knee X-Ray    Indication: Pain    Study:  Upright AP, Skiers, Lateral, and Sunrise views of Right knee(s)    Comparison: None    Findings:    Patient appears to have mild degenerative changes in the medial   compartment.    There are moderate degenerative changes in the lateral compartment.    There are early moderate changes in the patellofemoral compartment.    Patient has overall neutral to slight valgus alignment.      Impression:   Moderate lateral compartment and early moderate patellofemoral compartment   degenerative changes of the knee              Assessment    Assessment:  1. Right knee pain, unspecified chronicity    2. Primary osteoarthritis of left knee    3. Meniscal cyst, right        Plan:  1. Recommend over the counter  "Concern    Not on file   Social History Narrative    Not on file       Review of Systems  Review of Systems   Constitutional: Negative for activity change and appetite change.   HENT: Negative for congestion and dental problem.    Respiratory: Negative for chest tightness and shortness of breath.    Cardiovascular: Negative for chest pain and leg swelling.   Gastrointestinal: Negative for abdominal distention and abdominal pain.   Genitourinary: Negative for decreased urine volume, difficulty urinating, dyspareunia, dysuria, enuresis, flank pain, frequency, genital sores, hematuria, pelvic pain, urgency, vaginal bleeding, vaginal discharge and vaginal pain.   Musculoskeletal: Negative for back pain and neck pain.   Neurological: Negative for dizziness and facial asymmetry.   Hematological: Negative for adenopathy. Does not bruise/bleed easily.   Psychiatric/Behavioral: Negative for agitation, behavioral problems and confusion.     Significant for positive pressure and bulge patient cannot stand sensation of part of her anatomy rubbing against her undergarments   Objective:   BP (!) 164/86 (BP Location: Left arm, Patient Position: Sitting)   Ht 5' 9" (1.753 m)   Wt 91.1 kg (200 lb 13.4 oz)   LMP 10/19/1993 (Approximate)   BMI 29.66 kg/m²     Physical Exam   Genitourinary: Pelvic exam was performed with patient supine. Skenes normal and bartholins normal. Right labia normal. Urethra exhibits urethral caruncle, urethral diverticulum and hypermobility. Urethra exhibits no urethral mass. There is a rectocele and a cystocele in the vagina. Cervix exhibits absence. Uterus is absent.   Empty cough stress test: Negative.  Kegel: 2/5    POP-Q  Aa: 0 Ba: 0 C: -5   GH: 3 PB: 2 TVL: 10   Ap: -1 Bp:  D:                           Assessment:     1. Vaginal vault prolapse after hysterectomy    2. 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 Vaginal atrophy            Plan:     1. Vaginal vault prolapse " anti-inflammatories for pain and/or swelling  2. Right lateral compartment and patellofemoral compartment arthritis with lateral meniscal cyst--we had a lengthy discussion with the patient regarding treatment options alternatives.  At this point, steroid injection will be given for her knee arthritis flareup.  I will see her back in about 6 to 8 weeks we will see how she is doing overall.  Consider other modalities if she is no better versus further imaging.  Her knee is not yet end-stage.    Procedure Note:    I discussed with the patient the potential benefits of performing a therapeutic injections as well as potential risks including but not limited to infection, swelling, pain, bleeding, bruising, nerve/vessel damage, skin color changes, transient elevation in blood glucose levels, and fat atrophy. After informed consent and after the areas were prepped with chlorhexadine soap, ethyl chloride was used to numb the skin. Via the anterolateral approach, 3mL of 1% lidocaine followed by 40mg of Kenalog were each injected into the right knee joint. The patient tolerated the procedure well. There were no complications. A sterile dressing was placed over the injection sites.        Harsh Moreno MD  03/25/21  12:52 ARCHANAT      Dragon disclaimer:  Much of this encounter note is an electronic transcription/translation of spoken language to printed text. The electronic translation of spoken language may permit erroneous, or at times, nonsensical words or phrases to be inadvertently transcribed; Although I have reviewed the note for such errors, some may still exist.   after hysterectomy    2. 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 Vaginal atrophy          After examination we presented to my office Y diagram patient's prolapse utilizing [employer] [facility] interactive web site.  We then utilized features of the web site to diagram help pessary works actually showed her an example of 1.  Patient would like to try additionally patient wanted information on different types of surgery.  Patient is sexually active.  Options are transvaginal native tissue repair versus sacral colpopexy both surgeries reviewed risks benefits and alternatives discussed.  At this point as stated patient wants to move forward with pessary we will use ice estrogen to increased thickness of the vaginal tissue this will make it more comfortable for the patient during fitting session patient return in 5 weeks for pessary fitting as well as care    Total time of this consultation was 40 min greater than 50% time 30 min spent in direct face-to-face discussion providing information for the patient and answering questions      There are no Patient Instructions on file for this visit.

## 2021-04-08 NOTE — LETTER
May 22, 2018      [facility] [address] [address]  Phone: [phone]  Fax: [phone]       Patient: Rosamaria Agosto   YOB: 1960  Date of Visit: 05/22/2018    To Whom It May Concern:    Timothy Agosto  was at [facility] on 05/22/2018. She may return to work/school on 05/25/2018 with no restrictions. If you have any questions or concerns, or if I can be of further assistance, please do not hesitate to contact me.    Sincerely,        Felicity Keenan MA      I was present during the key portion of the procedure.

## 2021-04-14 ENCOUNTER — PATIENT MESSAGE (OUTPATIENT)
Dept: INTERNAL MEDICINE | Facility: CLINIC | Age: 61
End: 2021-04-14

## 2021-04-15 RX ORDER — AMOXICILLIN AND CLAVULANATE POTASSIUM 875; 125 MG/1; MG/1
1 TABLET, FILM COATED ORAL 2 TIMES DAILY
Qty: 20 TABLET | Refills: 0 | Status: SHIPPED | OUTPATIENT
Start: 2021-04-15 | End: 2021-04-25

## 2021-04-15 RX ORDER — FLUTICASONE PROPIONATE 50 MCG
2 SPRAY, SUSPENSION (ML) NASAL DAILY
Qty: 16 G | Refills: 11 | Status: SHIPPED | OUTPATIENT
Start: 2021-04-15 | End: 2024-03-20

## 2021-04-15 RX ORDER — BENZONATATE 200 MG/1
200 CAPSULE ORAL 3 TIMES DAILY PRN
Qty: 30 CAPSULE | Refills: 0 | Status: SHIPPED | OUTPATIENT
Start: 2021-04-15 | End: 2021-04-25

## 2021-06-02 NOTE — TELEPHONE ENCOUNTER
"[facility] Daily Progress     Patient Name: Meggan Osei  Date: 10/16/2019  Visit #: 3  PTA visit #:  -  Referral Diagnosis: Myofascial pain  Referring provider: Jose Olmedo,   Visit Diagnosis:     ICD-10-CM    1. Chronic bilateral low back pain with bilateral sciatica M54.42     M54.41     G89.29    2. Chronic bilateral thoracic back pain M54.6     G89.29    3. Paresthesia of right upper extremity R20.2    4. Paresthesia of left upper extremity R20.2    5. Myofascial pain M79.18          Assessment:     HEP/POC compliance is  good .     Patient with good tolerance to MEDX. Leg pain is decreased today, however presents with more thoracic pain complaints. Thoracic pain centralized with stretches during session. Stretches added to HEP for at home management. Patient would benefit from further PT including further strengthening. Patient is non-descript with symptoms, making exercise selection more difficult.     Goal Status:  Pt. will demonstrate/verbalize independence in self-management of condition in : 4 weeks  Pt. will be independent with home exercise program in : 4 weeks    Pt will: demonstrate full gross trunk ROM without increased pain in 4 weeks  Pt will: demonstrate LLE strength per reformer equal to right (All bands >20 reps) to demonstrate improved and normal strength in 4 weeks  Pt will: demonstrate improved strength per MedX Lumbar IM by 30# on average in 4 weeks to demonstrate increased core strength      Plan / Patient Education:     Continue with initial plan of care.      Plan for next visit: MedX DE, rotary torso, reformer hip strengthening, hip/core strengthening    Subjective:     Pain Rating: \"Same\"    Legs are feeling stiff and sore today. Both shoulders are feeling numb.     Objective:     Poor sitting posture, forward head.  Centralization of thoracic symptoms following stretches.      Exercises:  Exercise #1: NuStep L5 5'  Comment #1: Rotary torso started to R " ----- Message from Alia Stewart sent at 8/22/2019  3:51 PM CDT -----  Contact: MARGIE FERNANDEZ [[id number]]  Name of Who is Calling: MARGIE FERNANDEZ [[id number]]      What is the request in detail: Pt is requesting a call back from clinical team in regard to her having another UTI .Please contact to further discuss and advise.          Can the clinic reply by [facility]:       What Number to Call Back if not in [facility]: [phone]                                     "32#  Exercise #2: Reformer single leg press all bands [id number]  Comment #2: Calf raises and calf stretch off steps 10x, 30\" stretch  Exercise #3: Treadmill warmup x5min  Comment #3: supine nerve glides x10  Exercise #4: prone press up on elbows x10  Comment #4: cat/cow x10  Exercise #5: reach and roll x10  Comment #5: trunk extension over foam roll x30 sec  Exercise #6: supine ITB stretch x30 seconds      Lumbar MedX  Enter Week / Visit #: Wk 2 V 1  Weight (lbs): 140#  Reps (#): 24  Time: 167  ROM (degrees): 0-57  Pain: no increase  Flex:Ext ratio: 2.68:1      Treatment Today     TREATMENT MINUTES COMMENTS   Evaluation     Self-care/ Home management     Manual therapy     Neuromuscular Re-education     Therapeutic Activity     Therapeutic Exercises 26 MEDX, rotary torso, HEP   Gait training     Modality__________________                Total 26    Blank areas are intentional and mean the treatment did not include these items.       Steven ELE, PT  10/16/2019  "

## 2021-06-21 ENCOUNTER — PATIENT OUTREACH (OUTPATIENT)
Dept: ADMINISTRATIVE | Facility: OTHER | Age: 61
End: 2021-06-21

## 2021-06-22 ENCOUNTER — OFFICE VISIT (OUTPATIENT)
Dept: CARDIOLOGY | Facility: CLINIC | Age: 61
End: 2021-06-22
Payer: COMMERCIAL

## 2021-06-22 VITALS
BODY MASS INDEX: 31.02 KG/M2 | HEIGHT: 69 IN | HEART RATE: 77 BPM | DIASTOLIC BLOOD PRESSURE: 77 MMHG | WEIGHT: 209.44 LBS | SYSTOLIC BLOOD PRESSURE: 147 MMHG

## 2021-06-22 DIAGNOSIS — R07.89 OTHER CHEST PAIN: ICD-10-CM

## 2021-06-22 DIAGNOSIS — I10 ESSENTIAL HYPERTENSION: Primary | ICD-10-CM

## 2021-06-22 DIAGNOSIS — E78.2 MIXED HYPERLIPIDEMIA: ICD-10-CM

## 2021-06-22 DIAGNOSIS — I83.813 VARICOSE VEINS OF BILATERAL LOWER EXTREMITIES WITH PAIN: ICD-10-CM

## 2021-06-22 PROCEDURE — 99205 PR OFFICE/OUTPT VISIT, NEW, LEVL V, 60-74 MIN: ICD-10-PCS | Mod: 25,S$GLB,, | Performed by: INTERNAL MEDICINE

## 2021-06-22 PROCEDURE — 1126F PR PAIN SEVERITY QUANTIFIED, NO PAIN PRESENT: ICD-10-PCS | Mod: S$GLB,,, | Performed by: INTERNAL MEDICINE

## 2021-06-22 PROCEDURE — 3008F PR BODY MASS INDEX (BMI) DOCUMENTED: ICD-10-PCS | Mod: CPTII,S$GLB,, | Performed by: INTERNAL MEDICINE

## 2021-06-22 PROCEDURE — 99999 PR PBB SHADOW E&M-EST. PATIENT-LVL IV: ICD-10-PCS | Mod: PBBFAC,,, | Performed by: INTERNAL MEDICINE

## 2021-06-22 PROCEDURE — 1126F AMNT PAIN NOTED NONE PRSNT: CPT | Mod: S$GLB,,, | Performed by: INTERNAL MEDICINE

## 2021-06-22 PROCEDURE — 93000 ELECTROCARDIOGRAM COMPLETE: CPT | Mod: S$GLB,,, | Performed by: INTERNAL MEDICINE

## 2021-06-22 PROCEDURE — 93000 EKG 12-LEAD: ICD-10-PCS | Mod: S$GLB,,, | Performed by: INTERNAL MEDICINE

## 2021-06-22 PROCEDURE — 99205 OFFICE O/P NEW HI 60 MIN: CPT | Mod: 25,S$GLB,, | Performed by: INTERNAL MEDICINE

## 2021-06-22 PROCEDURE — 99999 PR PBB SHADOW E&M-EST. PATIENT-LVL IV: CPT | Mod: PBBFAC,,, | Performed by: INTERNAL MEDICINE

## 2021-06-22 PROCEDURE — 3008F BODY MASS INDEX DOCD: CPT | Mod: CPTII,S$GLB,, | Performed by: INTERNAL MEDICINE

## 2021-06-28 ENCOUNTER — TELEPHONE (OUTPATIENT)
Dept: INTERNAL MEDICINE | Facility: CLINIC | Age: 61
End: 2021-06-28

## 2021-06-28 DIAGNOSIS — N63.20 LEFT BREAST LUMP: Primary | ICD-10-CM

## 2021-06-29 ENCOUNTER — HOSPITAL ENCOUNTER (OUTPATIENT)
Dept: RADIOLOGY | Facility: HOSPITAL | Age: 61
Discharge: HOME | End: 2021-06-29
Attending: FAMILY MEDICINE
Payer: COMMERCIAL

## 2021-06-29 VITALS — HEIGHT: 69 IN | WEIGHT: 208 LBS | BODY MASS INDEX: 30.81 KG/M2

## 2021-06-29 DIAGNOSIS — N63.20 LEFT BREAST LUMP: ICD-10-CM

## 2021-06-29 PROCEDURE — 76642 US BREAST LEFT LIMITED: ICD-10-PCS | Mod: 26,LT,, | Performed by: RADIOLOGY

## 2021-06-29 PROCEDURE — 77061 BREAST TOMOSYNTHESIS UNI: CPT | Mod: TC,LT

## 2021-06-29 PROCEDURE — 77065 MAMMO DIGITAL DIAGNOSTIC LEFT WITH TOMO: ICD-10-PCS | Mod: 26,LT,, | Performed by: RADIOLOGY

## 2021-06-29 PROCEDURE — 77061 MAMMO DIGITAL DIAGNOSTIC LEFT WITH TOMO: ICD-10-PCS | Mod: 26,LT,, | Performed by: RADIOLOGY

## 2021-06-29 PROCEDURE — 76642 ULTRASOUND BREAST LIMITED: CPT | Mod: TC,LT

## 2021-06-29 PROCEDURE — 77061 BREAST TOMOSYNTHESIS UNI: CPT | Mod: 26,LT,, | Performed by: RADIOLOGY

## 2021-06-29 PROCEDURE — 76642 ULTRASOUND BREAST LIMITED: CPT | Mod: 26,LT,, | Performed by: RADIOLOGY

## 2021-06-29 PROCEDURE — 77065 DX MAMMO INCL CAD UNI: CPT | Mod: 26,LT,, | Performed by: RADIOLOGY

## 2021-07-08 ENCOUNTER — HOSPITAL ENCOUNTER (OUTPATIENT)
Dept: CARDIOLOGY | Facility: HOSPITAL | Age: 61
Discharge: HOME | End: 2021-07-08
Attending: INTERNAL MEDICINE
Payer: COMMERCIAL

## 2021-07-08 VITALS
WEIGHT: 209 LBS | HEART RATE: 65 BPM | SYSTOLIC BLOOD PRESSURE: 130 MMHG | DIASTOLIC BLOOD PRESSURE: 80 MMHG | HEIGHT: 69 IN | BODY MASS INDEX: 30.96 KG/M2

## 2021-07-08 DIAGNOSIS — R07.89 OTHER CHEST PAIN: ICD-10-CM

## 2021-07-08 LAB
ASCENDING AORTA: 2.98 CM
AV INDEX (PROSTH): 0.72
AV MEAN GRADIENT: 5 MMHG
AV PEAK GRADIENT: 8 MMHG
AV VALVE AREA: 2.31 CM2
AV VELOCITY RATIO: 0.8
BSA FOR ECHO PROCEDURE: 2.15 M2
CV ECHO LV RWT: 0.44 CM
DOP CALC AO PEAK VEL: 1.44 M/S
DOP CALC AO VTI: 34.67 CM
DOP CALC LVOT AREA: 3.2 CM2
DOP CALC LVOT DIAMETER: 2.02 CM
DOP CALC LVOT PEAK VEL: 1.15 M/S
DOP CALC LVOT STROKE VOLUME: 79.92 CM3
DOP CALC RVOT PEAK VEL: 0.62 M/S
DOP CALC RVOT VTI: 14.72 CM
DOP CALCLVOT PEAK VEL VTI: 24.95 CM
E WAVE DECELERATION TIME: 166.43 MSEC
E/A RATIO: 0.97
E/E' RATIO: 8.62 M/S
ECHO LV POSTERIOR WALL: 0.9 CM (ref 0.6–1.1)
EJECTION FRACTION: 65 %
FRACTIONAL SHORTENING: 39 % (ref 28–44)
INTERVENTRICULAR SEPTUM: 0.94 CM (ref 0.6–1.1)
IVRT: 88.49 MSEC
LA MAJOR: 4.63 CM
LA MINOR: 4.78 CM
LA WIDTH: 3.56 CM
LEFT ATRIUM SIZE: 3.17 CM
LEFT ATRIUM VOLUME INDEX MOD: 17.7 ML/M2
LEFT ATRIUM VOLUME INDEX: 21.5 ML/M2
LEFT ATRIUM VOLUME MOD: 37.13 CM3
LEFT ATRIUM VOLUME: 45.12 CM3
LEFT INTERNAL DIMENSION IN SYSTOLE: 2.49 CM (ref 2.1–4)
LEFT VENTRICLE DIASTOLIC VOLUME INDEX: 35.1 ML/M2
LEFT VENTRICLE DIASTOLIC VOLUME: 73.72 ML
LEFT VENTRICLE MASS INDEX: 56 G/M2
LEFT VENTRICLE SYSTOLIC VOLUME INDEX: 10.5 ML/M2
LEFT VENTRICLE SYSTOLIC VOLUME: 22.07 ML
LEFT VENTRICULAR INTERNAL DIMENSION IN DIASTOLE: 4.09 CM (ref 3.5–6)
LEFT VENTRICULAR MASS: 117.17 G
LV LATERAL E/E' RATIO: 6.22 M/S
LV SEPTAL E/E' RATIO: 14 M/S
MV A" WAVE DURATION": 15.41 MSEC
MV PEAK A VEL: 0.58 M/S
MV PEAK E VEL: 0.56 M/S
MV STENOSIS PRESSURE HALF TIME: 48.27 MS
MV VALVE AREA P 1/2 METHOD: 4.56 CM2
PISA TR MAX VEL: 2.72 M/S
PULM VEIN S/D RATIO: 1.21
PV MEAN GRADIENT: 1 MMHG
PV PEAK D VEL: 0.39 M/S
PV PEAK S VEL: 0.47 M/S
PV PEAK VELOCITY: 0.86 CM/S
RA MAJOR: 4.13 CM
RA PRESSURE: 3 MMHG
RA WIDTH: 2.96 CM
RIGHT VENTRICULAR END-DIASTOLIC DIMENSION: 3.23 CM
SINUS: 2.52 CM
STJ: 2.35 CM
TDI LATERAL: 0.09 M/S
TDI SEPTAL: 0.04 M/S
TDI: 0.07 M/S
TR MAX PG: 30 MMHG
TRICUSPID ANNULAR PLANE SYSTOLIC EXCURSION: 2.43 CM
TV REST PULMONARY ARTERY PRESSURE: 33 MMHG

## 2021-07-08 PROCEDURE — 93227 HOLTER MONITOR - 48 HOUR (CUPID ONLY): ICD-10-PCS | Mod: ,,, | Performed by: INTERNAL MEDICINE

## 2021-07-08 PROCEDURE — 93225 XTRNL ECG REC<48 HRS REC: CPT

## 2021-07-08 PROCEDURE — 93227 XTRNL ECG REC<48 HR R&I: CPT | Mod: ,,, | Performed by: INTERNAL MEDICINE

## 2021-07-08 PROCEDURE — 93306 TTE W/DOPPLER COMPLETE: CPT

## 2021-07-08 PROCEDURE — 93306 ECHO (CUPID ONLY): ICD-10-PCS | Mod: 26,,, | Performed by: INTERNAL MEDICINE

## 2021-07-08 PROCEDURE — 93306 TTE W/DOPPLER COMPLETE: CPT | Mod: 26,,, | Performed by: INTERNAL MEDICINE

## 2021-07-13 LAB
OHS CV EVENT MONITOR DAY: 0
OHS CV HOLTER LENGTH DECIMAL HOURS: 48
OHS CV HOLTER LENGTH HOURS: 48
OHS CV HOLTER LENGTH MINUTES: 0

## 2021-07-14 ENCOUNTER — HOSPITAL ENCOUNTER (OUTPATIENT)
Dept: CARDIOLOGY | Facility: HOSPITAL | Age: 61
Discharge: HOME | End: 2021-07-14
Attending: INTERNAL MEDICINE
Payer: COMMERCIAL

## 2021-07-14 VITALS — HEIGHT: 69 IN | WEIGHT: 209 LBS | BODY MASS INDEX: 30.96 KG/M2

## 2021-07-14 DIAGNOSIS — R07.89 OTHER CHEST PAIN: ICD-10-CM

## 2021-07-14 LAB
CV STRESS BASE HR: 72 BPM
DIASTOLIC BLOOD PRESSURE: 83 MMHG
OHS CV CPX 1 MINUTE RECOVERY HEART RATE: 133 BPM
OHS CV CPX 85 PERCENT MAX PREDICTED HEART RATE MALE: 131
OHS CV CPX ESTIMATED METS: 10
OHS CV CPX MAX PREDICTED HEART RATE: 154
OHS CV CPX PATIENT IS FEMALE: 1
OHS CV CPX PATIENT IS MALE: 0
OHS CV CPX PEAK DIASTOLIC BLOOD PRESSURE: 92 MMHG
OHS CV CPX PEAK HEAR RATE: 142 BPM
OHS CV CPX PEAK RATE PRESSURE PRODUCT: NORMAL
OHS CV CPX PEAK SYSTOLIC BLOOD PRESSURE: 183 MMHG
OHS CV CPX PERCENT MAX PREDICTED HEART RATE ACHIEVED: 92
OHS CV CPX RATE PRESSURE PRODUCT PRESENTING: 9432
STRESS ECHO POST EXERCISE DUR MIN: 6 MINUTES
STRESS ECHO POST EXERCISE DUR SEC: 0 SECONDS
SYSTOLIC BLOOD PRESSURE: 131 MMHG

## 2021-07-14 PROCEDURE — 93016 CV STRESS TEST SUPVJ ONLY: CPT | Mod: ,,, | Performed by: INTERNAL MEDICINE

## 2021-07-14 PROCEDURE — 93017 CV STRESS TEST TRACING ONLY: CPT

## 2021-07-14 PROCEDURE — 93018 EXERCISE STRESS - EKG (CUPID ONLY): ICD-10-PCS | Mod: ,,, | Performed by: INTERNAL MEDICINE

## 2021-07-14 PROCEDURE — 93018 CV STRESS TEST I&R ONLY: CPT | Mod: ,,, | Performed by: INTERNAL MEDICINE

## 2021-07-14 PROCEDURE — 93016 EXERCISE STRESS - EKG (CUPID ONLY): ICD-10-PCS | Mod: ,,, | Performed by: INTERNAL MEDICINE

## 2021-07-20 ENCOUNTER — HOSPITAL ENCOUNTER (OUTPATIENT)
Dept: CARDIOLOGY | Facility: HOSPITAL | Age: 61
Discharge: HOME | End: 2021-07-20
Attending: INTERNAL MEDICINE
Payer: COMMERCIAL

## 2021-07-20 DIAGNOSIS — I83.813 VARICOSE VEINS OF BILATERAL LOWER EXTREMITIES WITH PAIN: ICD-10-CM

## 2021-07-20 LAB
LEFT SMALL SAPHENOUS KNEE DIA: 0.34 CM
LEFT SMALL SAPHENOUS KNEE REFLUX: 1090 MS
LEFT SMALL SAPHENOUS SPJ DIA: 0.2 CM
LEFT SMALL SAPHENOUS SPJ REFLUX: 2003 MS
RIGHT GREAT SAPHENOUS DISTAL THIGH DIA: 0.28 CM
RIGHT GREAT SAPHENOUS JUNCTION DIA: 0.54 CM
RIGHT GREAT SAPHENOUS JUNCTION REFLUX: 1575 MS
RIGHT GREAT SAPHENOUS KNEE DIA: 0.43 CM
RIGHT GREAT SAPHENOUS KNEE REFLUX: 3784 MS
RIGHT GREAT SAPHENOUS MIDDLE THIGH DIA: 0.45 CM
RIGHT GREAT SAPHENOUS MIDDLE THIGH REFLUX: 3693 MS
RIGHT GREAT SAPHENOUS PROXIMAL CALF DIA: 0.43 CM
RIGHT GREAT SAPHENOUS PROXIMAL CALF REFLUX: 5543 MS
RIGHT SMALL SAPHENOUS KNEE DIA: 0.25 CM
RIGHT SMALL SAPHENOUS SPJ DIA: 0.35 CM

## 2021-07-20 PROCEDURE — 93970 EXTREMITY STUDY: CPT | Mod: TC

## 2021-07-20 PROCEDURE — 93970 EXTREMITY STUDY: CPT | Mod: 26,,, | Performed by: INTERNAL MEDICINE

## 2021-07-20 PROCEDURE — 93970 CV US LOWER VENOUS INSUFFICIENCY BILATERAL (CUPID ONLY): ICD-10-PCS | Mod: 26,,, | Performed by: INTERNAL MEDICINE

## 2021-07-22 ENCOUNTER — PATIENT MESSAGE (OUTPATIENT)
Dept: ADMINISTRATIVE | Facility: OTHER | Age: 61
End: 2021-07-22

## 2021-07-22 ENCOUNTER — OFFICE VISIT (OUTPATIENT)
Dept: CARDIOLOGY | Facility: CLINIC | Age: 61
End: 2021-07-22
Payer: COMMERCIAL

## 2021-07-22 ENCOUNTER — PATIENT OUTREACH (OUTPATIENT)
Dept: ADMINISTRATIVE | Facility: OTHER | Age: 61
End: 2021-07-22

## 2021-07-22 VITALS
BODY MASS INDEX: 29.8 KG/M2 | SYSTOLIC BLOOD PRESSURE: 121 MMHG | OXYGEN SATURATION: 99 % | HEIGHT: 69 IN | HEART RATE: 60 BPM | DIASTOLIC BLOOD PRESSURE: 77 MMHG | WEIGHT: 201.19 LBS

## 2021-07-22 DIAGNOSIS — I10 ESSENTIAL HYPERTENSION: ICD-10-CM

## 2021-07-22 DIAGNOSIS — I87.2 VENOUS INSUFFICIENCY: ICD-10-CM

## 2021-07-22 DIAGNOSIS — R07.89 OTHER CHEST PAIN: Primary | ICD-10-CM

## 2021-07-22 DIAGNOSIS — E78.2 MIXED HYPERLIPIDEMIA: ICD-10-CM

## 2021-07-22 PROCEDURE — 3078F DIAST BP <80 MM HG: CPT | Mod: CPTII,S$GLB,, | Performed by: INTERNAL MEDICINE

## 2021-07-22 PROCEDURE — 99214 PR OFFICE/OUTPT VISIT, EST, LEVL IV, 30-39 MIN: ICD-10-PCS | Mod: S$GLB,,, | Performed by: INTERNAL MEDICINE

## 2021-07-22 PROCEDURE — 99999 PR PBB SHADOW E&M-EST. PATIENT-LVL IV: ICD-10-PCS | Mod: PBBFAC,,, | Performed by: INTERNAL MEDICINE

## 2021-07-22 PROCEDURE — 3078F PR MOST RECENT DIASTOLIC BLOOD PRESSURE < 80 MM HG: ICD-10-PCS | Mod: CPTII,S$GLB,, | Performed by: INTERNAL MEDICINE

## 2021-07-22 PROCEDURE — 1126F AMNT PAIN NOTED NONE PRSNT: CPT | Mod: CPTII,S$GLB,, | Performed by: INTERNAL MEDICINE

## 2021-07-22 PROCEDURE — 3008F BODY MASS INDEX DOCD: CPT | Mod: CPTII,S$GLB,, | Performed by: INTERNAL MEDICINE

## 2021-07-22 PROCEDURE — 1126F PR PAIN SEVERITY QUANTIFIED, NO PAIN PRESENT: ICD-10-PCS | Mod: CPTII,S$GLB,, | Performed by: INTERNAL MEDICINE

## 2021-07-22 PROCEDURE — 99214 OFFICE O/P EST MOD 30 MIN: CPT | Mod: S$GLB,,, | Performed by: INTERNAL MEDICINE

## 2021-07-22 PROCEDURE — 3074F SYST BP LT 130 MM HG: CPT | Mod: CPTII,S$GLB,, | Performed by: INTERNAL MEDICINE

## 2021-07-22 PROCEDURE — 3074F PR MOST RECENT SYSTOLIC BLOOD PRESSURE < 130 MM HG: ICD-10-PCS | Mod: CPTII,S$GLB,, | Performed by: INTERNAL MEDICINE

## 2021-07-22 PROCEDURE — 99999 PR PBB SHADOW E&M-EST. PATIENT-LVL IV: CPT | Mod: PBBFAC,,, | Performed by: INTERNAL MEDICINE

## 2021-07-22 PROCEDURE — 3008F PR BODY MASS INDEX (BMI) DOCUMENTED: ICD-10-PCS | Mod: CPTII,S$GLB,, | Performed by: INTERNAL MEDICINE

## 2021-07-22 RX ORDER — OLOPATADINE HYDROCHLORIDE 1 MG/ML
1 SOLUTION/ DROPS OPHTHALMIC 2 TIMES DAILY
COMMUNITY
Start: 2021-07-20

## 2021-08-04 DIAGNOSIS — I10 ESSENTIAL HYPERTENSION: ICD-10-CM

## 2021-08-04 DIAGNOSIS — E55.9 VITAMIN D DEFICIENCY: ICD-10-CM

## 2021-08-04 DIAGNOSIS — Z00.00 WELL ADULT EXAM: Primary | ICD-10-CM

## 2021-08-04 RX ORDER — HYDROCHLOROTHIAZIDE 25 MG/1
25 TABLET ORAL DAILY
Qty: 90 TABLET | Refills: 0 | Status: SHIPPED | OUTPATIENT
Start: 2021-08-04 | End: 2021-09-22 | Stop reason: SDUPTHER

## 2021-08-06 ENCOUNTER — TELEPHONE (OUTPATIENT)
Dept: INTERNAL MEDICINE | Facility: CLINIC | Age: 61
End: 2021-08-06

## 2021-08-07 ENCOUNTER — OFFICE VISIT (OUTPATIENT)
Dept: INTERNAL MEDICINE | Facility: CLINIC | Age: 61
End: 2021-08-07
Payer: COMMERCIAL

## 2021-08-07 ENCOUNTER — OFFICE VISIT (OUTPATIENT)
Dept: URGENT CARE | Facility: CLINIC | Age: 61
End: 2021-08-07
Payer: COMMERCIAL

## 2021-08-07 VITALS
WEIGHT: 201 LBS | HEART RATE: 80 BPM | HEIGHT: 69 IN | OXYGEN SATURATION: 96 % | TEMPERATURE: 99 F | SYSTOLIC BLOOD PRESSURE: 132 MMHG | BODY MASS INDEX: 29.77 KG/M2 | RESPIRATION RATE: 16 BRPM | DIASTOLIC BLOOD PRESSURE: 86 MMHG

## 2021-08-07 DIAGNOSIS — R09.81 NASAL CONGESTION: ICD-10-CM

## 2021-08-07 DIAGNOSIS — Z20.822 SUSPECTED COVID-19 VIRUS INFECTION: Primary | ICD-10-CM

## 2021-08-07 DIAGNOSIS — R05.9 COUGH: ICD-10-CM

## 2021-08-07 DIAGNOSIS — J01.90 ACUTE BACTERIAL SINUSITIS: Primary | ICD-10-CM

## 2021-08-07 DIAGNOSIS — B96.89 ACUTE BACTERIAL SINUSITIS: Primary | ICD-10-CM

## 2021-08-07 LAB
CTP QC/QA: YES
SARS-COV-2 RDRP RESP QL NAA+PROBE: NEGATIVE

## 2021-08-07 PROCEDURE — 3079F DIAST BP 80-89 MM HG: CPT | Mod: CPTII,S$GLB,, | Performed by: NURSE PRACTITIONER

## 2021-08-07 PROCEDURE — U0002: ICD-10-PCS | Mod: QW,S$GLB,, | Performed by: NURSE PRACTITIONER

## 2021-08-07 PROCEDURE — 99213 PR OFFICE/OUTPT VISIT, EST, LEVL III, 20-29 MIN: ICD-10-PCS | Mod: 95,,, | Performed by: INTERNAL MEDICINE

## 2021-08-07 PROCEDURE — 99213 OFFICE O/P EST LOW 20 MIN: CPT | Mod: 95,,, | Performed by: INTERNAL MEDICINE

## 2021-08-07 PROCEDURE — 1160F RVW MEDS BY RX/DR IN RCRD: CPT | Mod: CPTII,,, | Performed by: INTERNAL MEDICINE

## 2021-08-07 PROCEDURE — 3008F PR BODY MASS INDEX (BMI) DOCUMENTED: ICD-10-PCS | Mod: CPTII,S$GLB,, | Performed by: NURSE PRACTITIONER

## 2021-08-07 PROCEDURE — 3075F SYST BP GE 130 - 139MM HG: CPT | Mod: CPTII,S$GLB,, | Performed by: NURSE PRACTITIONER

## 2021-08-07 PROCEDURE — 1160F PR REVIEW ALL MEDS BY PRESCRIBER/CLIN PHARMACIST DOCUMENTED: ICD-10-PCS | Mod: CPTII,,, | Performed by: INTERNAL MEDICINE

## 2021-08-07 PROCEDURE — U0002 COVID-19 LAB TEST NON-CDC: HCPCS | Mod: QW,S$GLB,, | Performed by: NURSE PRACTITIONER

## 2021-08-07 PROCEDURE — 1159F MED LIST DOCD IN RCRD: CPT | Mod: CPTII,S$GLB,, | Performed by: NURSE PRACTITIONER

## 2021-08-07 PROCEDURE — 1159F MED LIST DOCD IN RCRD: CPT | Mod: CPTII,,, | Performed by: INTERNAL MEDICINE

## 2021-08-07 PROCEDURE — 3079F PR MOST RECENT DIASTOLIC BLOOD PRESSURE 80-89 MM HG: ICD-10-PCS | Mod: CPTII,S$GLB,, | Performed by: NURSE PRACTITIONER

## 2021-08-07 PROCEDURE — 99213 OFFICE O/P EST LOW 20 MIN: CPT | Mod: S$GLB,,, | Performed by: NURSE PRACTITIONER

## 2021-08-07 PROCEDURE — 99213 PR OFFICE/OUTPT VISIT, EST, LEVL III, 20-29 MIN: ICD-10-PCS | Mod: S$GLB,,, | Performed by: NURSE PRACTITIONER

## 2021-08-07 PROCEDURE — 1159F PR MEDICATION LIST DOCUMENTED IN MEDICAL RECORD: ICD-10-PCS | Mod: CPTII,S$GLB,, | Performed by: NURSE PRACTITIONER

## 2021-08-07 PROCEDURE — 3008F BODY MASS INDEX DOCD: CPT | Mod: CPTII,S$GLB,, | Performed by: NURSE PRACTITIONER

## 2021-08-07 PROCEDURE — 3075F PR MOST RECENT SYSTOLIC BLOOD PRESS GE 130-139MM HG: ICD-10-PCS | Mod: CPTII,S$GLB,, | Performed by: NURSE PRACTITIONER

## 2021-08-07 PROCEDURE — 1159F PR MEDICATION LIST DOCUMENTED IN MEDICAL RECORD: ICD-10-PCS | Mod: CPTII,,, | Performed by: INTERNAL MEDICINE

## 2021-08-07 RX ORDER — AMOXICILLIN AND CLAVULANATE POTASSIUM 875; 125 MG/1; MG/1
1 TABLET, FILM COATED ORAL EVERY 12 HOURS
Qty: 14 TABLET | Refills: 0 | Status: SHIPPED | OUTPATIENT
Start: 2021-08-07 | End: 2021-08-14

## 2021-08-07 RX ORDER — PREDNISONE 20 MG/1
20 TABLET ORAL DAILY
Qty: 5 TABLET | Refills: 0 | Status: SHIPPED | OUTPATIENT
Start: 2021-08-07 | End: 2021-08-12

## 2021-08-07 RX ORDER — BENZONATATE 200 MG/1
200 CAPSULE ORAL 3 TIMES DAILY PRN
Qty: 30 CAPSULE | Refills: 0 | Status: SHIPPED | OUTPATIENT
Start: 2021-08-07 | End: 2021-08-17

## 2021-08-20 ENCOUNTER — HOSPITAL ENCOUNTER (OUTPATIENT)
Dept: RADIOLOGY | Facility: HOSPITAL | Age: 61
Discharge: HOME | End: 2021-08-20
Attending: INTERNAL MEDICINE
Payer: COMMERCIAL

## 2021-08-20 ENCOUNTER — OFFICE VISIT (OUTPATIENT)
Dept: INTERNAL MEDICINE | Facility: CLINIC | Age: 61
End: 2021-08-20
Payer: COMMERCIAL

## 2021-08-20 VITALS
HEART RATE: 60 BPM | HEIGHT: 69 IN | TEMPERATURE: 98 F | OXYGEN SATURATION: 98 % | RESPIRATION RATE: 16 BRPM | BODY MASS INDEX: 30.4 KG/M2 | DIASTOLIC BLOOD PRESSURE: 80 MMHG | SYSTOLIC BLOOD PRESSURE: 134 MMHG | WEIGHT: 205.25 LBS

## 2021-08-20 DIAGNOSIS — J20.9 ACUTE BRONCHITIS, UNSPECIFIED ORGANISM: ICD-10-CM

## 2021-08-20 DIAGNOSIS — J32.9 BACTERIAL SINUSITIS: Primary | ICD-10-CM

## 2021-08-20 DIAGNOSIS — B96.89 BACTERIAL SINUSITIS: Primary | ICD-10-CM

## 2021-08-20 DIAGNOSIS — B96.89 BACTERIAL SINUSITIS: ICD-10-CM

## 2021-08-20 DIAGNOSIS — J32.9 BACTERIAL SINUSITIS: ICD-10-CM

## 2021-08-20 DIAGNOSIS — H60.90 OTITIS EXTERNA, UNSPECIFIED CHRONICITY, UNSPECIFIED LATERALITY, UNSPECIFIED TYPE: ICD-10-CM

## 2021-08-20 PROCEDURE — 1159F MED LIST DOCD IN RCRD: CPT | Mod: CPTII,S$GLB,, | Performed by: INTERNAL MEDICINE

## 2021-08-20 PROCEDURE — 71046 X-RAY EXAM CHEST 2 VIEWS: CPT | Mod: TC,PO

## 2021-08-20 PROCEDURE — 99999 PR PBB SHADOW E&M-EST. PATIENT-LVL IV: ICD-10-PCS | Mod: PBBFAC,,, | Performed by: INTERNAL MEDICINE

## 2021-08-20 PROCEDURE — 70220 X-RAY EXAM OF SINUSES: CPT | Mod: TC,PO

## 2021-08-20 PROCEDURE — 70220 X-RAY EXAM OF SINUSES: CPT | Mod: 26,,, | Performed by: RADIOLOGY

## 2021-08-20 PROCEDURE — 70220 XR SINUSES MIN 3 VIEWS: ICD-10-PCS | Mod: 26,,, | Performed by: RADIOLOGY

## 2021-08-20 PROCEDURE — 99214 OFFICE O/P EST MOD 30 MIN: CPT | Mod: S$GLB,,, | Performed by: INTERNAL MEDICINE

## 2021-08-20 PROCEDURE — 3008F PR BODY MASS INDEX (BMI) DOCUMENTED: ICD-10-PCS | Mod: CPTII,S$GLB,, | Performed by: INTERNAL MEDICINE

## 2021-08-20 PROCEDURE — 71046 XR CHEST PA AND LATERAL: ICD-10-PCS | Mod: 26,,, | Performed by: RADIOLOGY

## 2021-08-20 PROCEDURE — 1126F AMNT PAIN NOTED NONE PRSNT: CPT | Mod: CPTII,S$GLB,, | Performed by: INTERNAL MEDICINE

## 2021-08-20 PROCEDURE — 99214 PR OFFICE/OUTPT VISIT, EST, LEVL IV, 30-39 MIN: ICD-10-PCS | Mod: S$GLB,,, | Performed by: INTERNAL MEDICINE

## 2021-08-20 PROCEDURE — 3075F PR MOST RECENT SYSTOLIC BLOOD PRESS GE 130-139MM HG: ICD-10-PCS | Mod: CPTII,S$GLB,, | Performed by: INTERNAL MEDICINE

## 2021-08-20 PROCEDURE — 99999 PR PBB SHADOW E&M-EST. PATIENT-LVL IV: CPT | Mod: PBBFAC,,, | Performed by: INTERNAL MEDICINE

## 2021-08-20 PROCEDURE — 3075F SYST BP GE 130 - 139MM HG: CPT | Mod: CPTII,S$GLB,, | Performed by: INTERNAL MEDICINE

## 2021-08-20 PROCEDURE — 3079F PR MOST RECENT DIASTOLIC BLOOD PRESSURE 80-89 MM HG: ICD-10-PCS | Mod: CPTII,S$GLB,, | Performed by: INTERNAL MEDICINE

## 2021-08-20 PROCEDURE — 1159F PR MEDICATION LIST DOCUMENTED IN MEDICAL RECORD: ICD-10-PCS | Mod: CPTII,S$GLB,, | Performed by: INTERNAL MEDICINE

## 2021-08-20 PROCEDURE — 71046 X-RAY EXAM CHEST 2 VIEWS: CPT | Mod: 26,,, | Performed by: RADIOLOGY

## 2021-08-20 PROCEDURE — 1126F PR PAIN SEVERITY QUANTIFIED, NO PAIN PRESENT: ICD-10-PCS | Mod: CPTII,S$GLB,, | Performed by: INTERNAL MEDICINE

## 2021-08-20 PROCEDURE — 3008F BODY MASS INDEX DOCD: CPT | Mod: CPTII,S$GLB,, | Performed by: INTERNAL MEDICINE

## 2021-08-20 PROCEDURE — 3079F DIAST BP 80-89 MM HG: CPT | Mod: CPTII,S$GLB,, | Performed by: INTERNAL MEDICINE

## 2021-08-20 RX ORDER — CODEINE PHOSPHATE AND GUAIFENESIN 10; 100 MG/5ML; MG/5ML
10 SOLUTION ORAL EVERY 6 HOURS PRN
Qty: 120 ML | Refills: 0 | Status: SHIPPED | OUTPATIENT
Start: 2021-08-20 | End: 2021-08-25

## 2021-08-20 RX ORDER — CEFDINIR 300 MG/1
300 CAPSULE ORAL EVERY 12 HOURS
Qty: 20 CAPSULE | Refills: 0 | Status: SHIPPED | OUTPATIENT
Start: 2021-08-20 | End: 2021-08-30

## 2021-08-20 RX ORDER — NEOMYCIN SULFATE, POLYMYXIN B SULFATE AND HYDROCORTISONE 10; 3.5; 1 MG/ML; MG/ML; [USP'U]/ML
3 SUSPENSION/ DROPS AURICULAR (OTIC) 4 TIMES DAILY
Qty: 10 ML | Refills: 0 | Status: SHIPPED | OUTPATIENT
Start: 2021-08-20

## 2021-08-22 ENCOUNTER — PATIENT MESSAGE (OUTPATIENT)
Dept: INTERNAL MEDICINE | Facility: CLINIC | Age: 61
End: 2021-08-22

## 2021-08-22 DIAGNOSIS — E78.2 MIXED HYPERLIPIDEMIA: Primary | ICD-10-CM

## 2021-08-22 RX ORDER — DICLOFENAC SODIUM 75 MG/1
75 TABLET, DELAYED RELEASE ORAL 2 TIMES DAILY
Qty: 60 TABLET | Refills: 0 | Status: SHIPPED | OUTPATIENT
Start: 2021-08-22 | End: 2021-09-22 | Stop reason: SDUPTHER

## 2021-08-22 RX ORDER — ROSUVASTATIN CALCIUM 20 MG/1
20 TABLET, COATED ORAL NIGHTLY
Qty: 30 TABLET | Refills: 11 | Status: SHIPPED | OUTPATIENT
Start: 2021-08-22 | End: 2021-09-22 | Stop reason: SDUPTHER

## 2021-08-23 ENCOUNTER — TELEPHONE (OUTPATIENT)
Dept: INTERNAL MEDICINE | Facility: CLINIC | Age: 61
End: 2021-08-23

## 2021-08-24 ENCOUNTER — TELEPHONE (OUTPATIENT)
Dept: INTERNAL MEDICINE | Facility: CLINIC | Age: 61
End: 2021-08-24

## 2021-09-22 ENCOUNTER — LAB VISIT (OUTPATIENT)
Dept: LAB | Facility: HOSPITAL | Age: 61
End: 2021-09-22
Attending: FAMILY MEDICINE
Payer: COMMERCIAL

## 2021-09-22 ENCOUNTER — OFFICE VISIT (OUTPATIENT)
Dept: INTERNAL MEDICINE | Facility: CLINIC | Age: 61
End: 2021-09-22
Payer: COMMERCIAL

## 2021-09-22 VITALS
HEART RATE: 65 BPM | TEMPERATURE: 98 F | BODY MASS INDEX: 30.31 KG/M2 | SYSTOLIC BLOOD PRESSURE: 133 MMHG | WEIGHT: 205.25 LBS | OXYGEN SATURATION: 97 % | DIASTOLIC BLOOD PRESSURE: 84 MMHG

## 2021-09-22 DIAGNOSIS — I10 ESSENTIAL HYPERTENSION: ICD-10-CM

## 2021-09-22 DIAGNOSIS — E66.09 CLASS 1 OBESITY DUE TO EXCESS CALORIES WITH SERIOUS COMORBIDITY AND BODY MASS INDEX (BMI) OF 30.0 TO 30.9 IN ADULT: ICD-10-CM

## 2021-09-22 DIAGNOSIS — E78.2 MIXED HYPERLIPIDEMIA: ICD-10-CM

## 2021-09-22 DIAGNOSIS — E55.9 VITAMIN D DEFICIENCY: ICD-10-CM

## 2021-09-22 DIAGNOSIS — M17.0 PRIMARY OSTEOARTHRITIS OF BOTH KNEES: ICD-10-CM

## 2021-09-22 DIAGNOSIS — R73.03 PREDIABETES: ICD-10-CM

## 2021-09-22 DIAGNOSIS — Z00.00 WELL ADULT EXAM: ICD-10-CM

## 2021-09-22 DIAGNOSIS — Z00.00 WELL ADULT EXAM: Primary | ICD-10-CM

## 2021-09-22 DIAGNOSIS — I87.2 VENOUS INSUFFICIENCY: ICD-10-CM

## 2021-09-22 LAB
BILIRUB UR QL STRIP: NEGATIVE
CLARITY UR REFRACT.AUTO: CLEAR
COLOR UR AUTO: YELLOW
GLUCOSE UR QL STRIP: NEGATIVE
HGB UR QL STRIP: NEGATIVE
KETONES UR QL STRIP: NEGATIVE
LEUKOCYTE ESTERASE UR QL STRIP: NEGATIVE
NITRITE UR QL STRIP: NEGATIVE
PH UR STRIP: 5 [PH] (ref 5–8)
PROT UR QL STRIP: NEGATIVE
SP GR UR STRIP: 1.02 (ref 1–1.03)
URN SPEC COLLECT METH UR: NORMAL

## 2021-09-22 PROCEDURE — 81003 URINALYSIS AUTO W/O SCOPE: CPT | Performed by: FAMILY MEDICINE

## 2021-09-22 PROCEDURE — 1159F MED LIST DOCD IN RCRD: CPT | Mod: CPTII,S$GLB,, | Performed by: FAMILY MEDICINE

## 2021-09-22 PROCEDURE — 99396 PREV VISIT EST AGE 40-64: CPT | Mod: S$GLB,,, | Performed by: FAMILY MEDICINE

## 2021-09-22 PROCEDURE — 3044F HG A1C LEVEL LT 7.0%: CPT | Mod: CPTII,S$GLB,, | Performed by: FAMILY MEDICINE

## 2021-09-22 PROCEDURE — 3008F PR BODY MASS INDEX (BMI) DOCUMENTED: ICD-10-PCS | Mod: CPTII,S$GLB,, | Performed by: FAMILY MEDICINE

## 2021-09-22 PROCEDURE — 3044F PR MOST RECENT HEMOGLOBIN A1C LEVEL <7.0%: ICD-10-PCS | Mod: CPTII,S$GLB,, | Performed by: FAMILY MEDICINE

## 2021-09-22 PROCEDURE — 1160F RVW MEDS BY RX/DR IN RCRD: CPT | Mod: CPTII,S$GLB,, | Performed by: FAMILY MEDICINE

## 2021-09-22 PROCEDURE — 1159F PR MEDICATION LIST DOCUMENTED IN MEDICAL RECORD: ICD-10-PCS | Mod: CPTII,S$GLB,, | Performed by: FAMILY MEDICINE

## 2021-09-22 PROCEDURE — 3079F PR MOST RECENT DIASTOLIC BLOOD PRESSURE 80-89 MM HG: ICD-10-PCS | Mod: CPTII,S$GLB,, | Performed by: FAMILY MEDICINE

## 2021-09-22 PROCEDURE — 1160F PR REVIEW ALL MEDS BY PRESCRIBER/CLIN PHARMACIST DOCUMENTED: ICD-10-PCS | Mod: CPTII,S$GLB,, | Performed by: FAMILY MEDICINE

## 2021-09-22 PROCEDURE — 3079F DIAST BP 80-89 MM HG: CPT | Mod: CPTII,S$GLB,, | Performed by: FAMILY MEDICINE

## 2021-09-22 PROCEDURE — 3075F SYST BP GE 130 - 139MM HG: CPT | Mod: CPTII,S$GLB,, | Performed by: FAMILY MEDICINE

## 2021-09-22 PROCEDURE — 99999 PR PBB SHADOW E&M-EST. PATIENT-LVL IV: CPT | Mod: PBBFAC,,, | Performed by: FAMILY MEDICINE

## 2021-09-22 PROCEDURE — 3075F PR MOST RECENT SYSTOLIC BLOOD PRESS GE 130-139MM HG: ICD-10-PCS | Mod: CPTII,S$GLB,, | Performed by: FAMILY MEDICINE

## 2021-09-22 PROCEDURE — 3008F BODY MASS INDEX DOCD: CPT | Mod: CPTII,S$GLB,, | Performed by: FAMILY MEDICINE

## 2021-09-22 PROCEDURE — 99999 PR PBB SHADOW E&M-EST. PATIENT-LVL IV: ICD-10-PCS | Mod: PBBFAC,,, | Performed by: FAMILY MEDICINE

## 2021-09-22 PROCEDURE — 99396 PR PREVENTIVE VISIT,EST,40-64: ICD-10-PCS | Mod: S$GLB,,, | Performed by: FAMILY MEDICINE

## 2021-09-22 RX ORDER — DICLOFENAC SODIUM 75 MG/1
75 TABLET, DELAYED RELEASE ORAL 2 TIMES DAILY
Qty: 60 TABLET | Refills: 11 | Status: SHIPPED | OUTPATIENT
Start: 2021-09-22 | End: 2022-06-23

## 2021-09-22 RX ORDER — HYDROCHLOROTHIAZIDE 25 MG/1
25 TABLET ORAL DAILY
Qty: 90 TABLET | Refills: 3 | Status: SHIPPED | OUTPATIENT
Start: 2021-09-22 | End: 2022-03-11 | Stop reason: SDUPTHER

## 2021-09-22 RX ORDER — ROSUVASTATIN CALCIUM 20 MG/1
20 TABLET, COATED ORAL NIGHTLY
Qty: 90 TABLET | Refills: 3 | Status: SHIPPED | OUTPATIENT
Start: 2021-09-22 | End: 2022-03-11 | Stop reason: SDUPTHER

## 2021-11-29 ENCOUNTER — HOSPITAL ENCOUNTER (OUTPATIENT)
Dept: RADIOLOGY | Facility: HOSPITAL | Age: 61
Discharge: HOME | End: 2021-11-29
Attending: PHYSICIAN ASSISTANT
Payer: COMMERCIAL

## 2021-11-29 ENCOUNTER — OFFICE VISIT (OUTPATIENT)
Dept: ORTHOPEDICS | Facility: CLINIC | Age: 61
End: 2021-11-29
Payer: COMMERCIAL

## 2021-11-29 VITALS — HEIGHT: 69 IN | BODY MASS INDEX: 30.4 KG/M2 | WEIGHT: 205.25 LBS

## 2021-11-29 DIAGNOSIS — M25.572 LEFT ANKLE PAIN, UNSPECIFIED CHRONICITY: ICD-10-CM

## 2021-11-29 DIAGNOSIS — M76.822 POSTERIOR TIBIAL TENDINITIS OF LEFT LOWER EXTREMITY: Primary | ICD-10-CM

## 2021-11-29 DIAGNOSIS — M25.562 PAIN IN BOTH KNEES, UNSPECIFIED CHRONICITY: ICD-10-CM

## 2021-11-29 DIAGNOSIS — M25.561 PAIN IN BOTH KNEES, UNSPECIFIED CHRONICITY: ICD-10-CM

## 2021-11-29 DIAGNOSIS — M17.0 PRIMARY OSTEOARTHRITIS OF BOTH KNEES: ICD-10-CM

## 2021-11-29 PROCEDURE — 73564 X-RAY EXAM KNEE 4 OR MORE: CPT | Mod: TC,50

## 2021-11-29 PROCEDURE — 73610 XR ANKLE COMPLETE 3 VIEW LEFT: ICD-10-PCS | Mod: 26,LT,, | Performed by: RADIOLOGY

## 2021-11-29 PROCEDURE — 20610 DRAIN/INJ JOINT/BURSA W/O US: CPT | Mod: 50,S$GLB,, | Performed by: PHYSICIAN ASSISTANT

## 2021-11-29 PROCEDURE — 73564 XR KNEE ORTHO BILAT WITH FLEXION: ICD-10-PCS | Mod: 26,,, | Performed by: RADIOLOGY

## 2021-11-29 PROCEDURE — 99999 PR PBB SHADOW E&M-EST. PATIENT-LVL IV: ICD-10-PCS | Mod: PBBFAC,,, | Performed by: PHYSICIAN ASSISTANT

## 2021-11-29 PROCEDURE — 73610 X-RAY EXAM OF ANKLE: CPT | Mod: TC,LT

## 2021-11-29 PROCEDURE — 73610 X-RAY EXAM OF ANKLE: CPT | Mod: 26,LT,, | Performed by: RADIOLOGY

## 2021-11-29 PROCEDURE — 99999 PR PBB SHADOW E&M-EST. PATIENT-LVL IV: CPT | Mod: PBBFAC,,, | Performed by: PHYSICIAN ASSISTANT

## 2021-11-29 PROCEDURE — 99213 PR OFFICE/OUTPT VISIT, EST, LEVL III, 20-29 MIN: ICD-10-PCS | Mod: 25,S$GLB,, | Performed by: PHYSICIAN ASSISTANT

## 2021-11-29 PROCEDURE — 73564 X-RAY EXAM KNEE 4 OR MORE: CPT | Mod: 26,,, | Performed by: RADIOLOGY

## 2021-11-29 PROCEDURE — 20610 PR DRAIN/INJECT LARGE JOINT/BURSA: ICD-10-PCS | Mod: 50,S$GLB,, | Performed by: PHYSICIAN ASSISTANT

## 2021-11-29 PROCEDURE — 99213 OFFICE O/P EST LOW 20 MIN: CPT | Mod: 25,S$GLB,, | Performed by: PHYSICIAN ASSISTANT

## 2021-11-29 RX ADMIN — METHYLPREDNISOLONE ACETATE 80 MG: 80 INJECTION, SUSPENSION INTRA-ARTICULAR; INTRALESIONAL; INTRAMUSCULAR; SOFT TISSUE at 04:11

## 2021-11-30 ENCOUNTER — PATIENT OUTREACH (OUTPATIENT)
Dept: ADMINISTRATIVE | Facility: OTHER | Age: 61
End: 2021-11-30
Payer: COMMERCIAL

## 2021-12-09 RX ORDER — METHYLPREDNISOLONE ACETATE 80 MG/ML
80 INJECTION, SUSPENSION INTRA-ARTICULAR; INTRALESIONAL; INTRAMUSCULAR; SOFT TISSUE
Status: COMPLETED | OUTPATIENT
Start: 2021-11-29 | End: 2021-11-29

## 2021-12-16 ENCOUNTER — HOSPITAL ENCOUNTER (EMERGENCY)
Facility: OTHER | Age: 61
Discharge: HOME | End: 2021-12-16
Attending: EMERGENCY MEDICINE
Payer: COMMERCIAL

## 2021-12-16 VITALS
OXYGEN SATURATION: 99 % | HEIGHT: 69 IN | HEART RATE: 74 BPM | DIASTOLIC BLOOD PRESSURE: 78 MMHG | RESPIRATION RATE: 16 BRPM | SYSTOLIC BLOOD PRESSURE: 134 MMHG | TEMPERATURE: 98 F | WEIGHT: 205 LBS | BODY MASS INDEX: 30.36 KG/M2

## 2021-12-16 DIAGNOSIS — R03.0 ELEVATED BLOOD PRESSURE READING: ICD-10-CM

## 2021-12-16 DIAGNOSIS — R51.9 NONINTRACTABLE HEADACHE, UNSPECIFIED CHRONICITY PATTERN, UNSPECIFIED HEADACHE TYPE: Primary | ICD-10-CM

## 2021-12-16 LAB
ALBUMIN SERPL BCP-MCNC: 4.3 G/DL (ref 3.5–5.2)
ALP SERPL-CCNC: 107 U/L (ref 55–135)
ALT SERPL W/O P-5'-P-CCNC: 16 U/L (ref 10–44)
ANION GAP SERPL CALC-SCNC: 11 MMOL/L (ref 8–16)
AST SERPL-CCNC: 15 U/L (ref 10–40)
BASOPHILS # BLD AUTO: 0.07 K/UL (ref 0–0.2)
BASOPHILS NFR BLD: 0.6 % (ref 0–1.9)
BILIRUB SERPL-MCNC: 0.4 MG/DL (ref 0.1–1)
BUN SERPL-MCNC: 14 MG/DL (ref 6–20)
CALCIUM SERPL-MCNC: 9.9 MG/DL (ref 8.7–10.5)
CHLORIDE SERPL-SCNC: 98 MMOL/L (ref 95–110)
CO2 SERPL-SCNC: 29 MMOL/L (ref 23–29)
CREAT SERPL-MCNC: 0.7 MG/DL (ref 0.5–1.4)
DIFFERENTIAL METHOD: ABNORMAL
EOSINOPHIL # BLD AUTO: 0.1 K/UL (ref 0–0.5)
EOSINOPHIL NFR BLD: 0.5 % (ref 0–8)
ERYTHROCYTE [DISTWIDTH] IN BLOOD BY AUTOMATED COUNT: 13.8 % (ref 11.5–14.5)
EST. GFR  (AFRICAN AMERICAN): >60 ML/MIN/1.73 M^2
EST. GFR  (NON AFRICAN AMERICAN): >60 ML/MIN/1.73 M^2
GLUCOSE SERPL-MCNC: 100 MG/DL (ref 70–110)
HCT VFR BLD AUTO: 44.2 % (ref 37–48.5)
HCV AB SERPL QL IA: NEGATIVE
HGB BLD-MCNC: 14.3 G/DL (ref 12–16)
HIV 1+2 AB+HIV1 P24 AG SERPL QL IA: NEGATIVE
IMM GRANULOCYTES # BLD AUTO: 0.04 K/UL (ref 0–0.04)
IMM GRANULOCYTES NFR BLD AUTO: 0.3 % (ref 0–0.5)
LYMPHOCYTES # BLD AUTO: 1.4 K/UL (ref 1–4.8)
LYMPHOCYTES NFR BLD: 11.3 % (ref 18–48)
MCH RBC QN AUTO: 29.5 PG (ref 27–31)
MCHC RBC AUTO-ENTMCNC: 32.4 G/DL (ref 32–36)
MCV RBC AUTO: 91 FL (ref 82–98)
MONOCYTES # BLD AUTO: 0.7 K/UL (ref 0.3–1)
MONOCYTES NFR BLD: 5.3 % (ref 4–15)
NEUTROPHILS # BLD AUTO: 10.1 K/UL (ref 1.8–7.7)
NEUTROPHILS NFR BLD: 82 % (ref 38–73)
NRBC BLD-RTO: 0 /100 WBC
PLATELET # BLD AUTO: 371 K/UL (ref 150–450)
PMV BLD AUTO: 9.5 FL (ref 9.2–12.9)
POTASSIUM SERPL-SCNC: 4.2 MMOL/L (ref 3.5–5.1)
PROT SERPL-MCNC: 8 G/DL (ref 6–8.4)
RBC # BLD AUTO: 4.84 M/UL (ref 4–5.4)
SODIUM SERPL-SCNC: 138 MMOL/L (ref 136–145)
WBC # BLD AUTO: 12.34 K/UL (ref 3.9–12.7)

## 2021-12-16 PROCEDURE — 86803 HEPATITIS C AB TEST: CPT | Performed by: PHYSICIAN ASSISTANT

## 2021-12-16 PROCEDURE — 25000003 PHARM REV CODE 250: Performed by: PHYSICIAN ASSISTANT

## 2021-12-16 PROCEDURE — 80053 COMPREHEN METABOLIC PANEL: CPT | Performed by: PHYSICIAN ASSISTANT

## 2021-12-16 PROCEDURE — 85025 COMPLETE CBC W/AUTO DIFF WBC: CPT | Performed by: PHYSICIAN ASSISTANT

## 2021-12-16 PROCEDURE — 99284 EMERGENCY DEPT VISIT MOD MDM: CPT | Mod: 25

## 2021-12-16 PROCEDURE — 87389 HIV-1 AG W/HIV-1&-2 AB AG IA: CPT | Performed by: PHYSICIAN ASSISTANT

## 2021-12-16 RX ORDER — BUTALBITAL, ACETAMINOPHEN AND CAFFEINE 50; 325; 40 MG/1; MG/1; MG/1
1 TABLET ORAL
Status: COMPLETED | OUTPATIENT
Start: 2021-12-16 | End: 2021-12-16

## 2021-12-16 RX ADMIN — BUTALBITAL, ACETAMINOPHEN, AND CAFFEINE 1 TABLET: 50; 325; 40 TABLET ORAL at 05:12

## 2022-02-07 ENCOUNTER — PATIENT OUTREACH (OUTPATIENT)
Dept: ADMINISTRATIVE | Facility: OTHER | Age: 62
End: 2022-02-07
Payer: COMMERCIAL

## 2022-02-16 ENCOUNTER — OFFICE VISIT (OUTPATIENT)
Dept: URGENT CARE | Facility: CLINIC | Age: 62
End: 2022-02-16
Payer: COMMERCIAL

## 2022-02-16 VITALS
SYSTOLIC BLOOD PRESSURE: 165 MMHG | HEIGHT: 69 IN | TEMPERATURE: 98 F | RESPIRATION RATE: 18 BRPM | OXYGEN SATURATION: 98 % | HEART RATE: 69 BPM | WEIGHT: 205 LBS | BODY MASS INDEX: 30.36 KG/M2 | DIASTOLIC BLOOD PRESSURE: 92 MMHG

## 2022-02-16 DIAGNOSIS — T14.8XXA MUSCLE STRAIN: Primary | ICD-10-CM

## 2022-02-16 PROCEDURE — 99213 OFFICE O/P EST LOW 20 MIN: CPT | Mod: 25,S$GLB,, | Performed by: NURSE PRACTITIONER

## 2022-02-16 PROCEDURE — 3008F BODY MASS INDEX DOCD: CPT | Mod: CPTII,S$GLB,, | Performed by: NURSE PRACTITIONER

## 2022-02-16 PROCEDURE — 3080F DIAST BP >= 90 MM HG: CPT | Mod: CPTII,S$GLB,, | Performed by: NURSE PRACTITIONER

## 2022-02-16 PROCEDURE — 96372 PR INJECTION,THERAP/PROPH/DIAG2ST, IM OR SUBCUT: ICD-10-PCS | Mod: S$GLB,,, | Performed by: NURSE PRACTITIONER

## 2022-02-16 PROCEDURE — 96372 THER/PROPH/DIAG INJ SC/IM: CPT | Mod: S$GLB,,, | Performed by: NURSE PRACTITIONER

## 2022-02-16 PROCEDURE — 99213 PR OFFICE/OUTPT VISIT, EST, LEVL III, 20-29 MIN: ICD-10-PCS | Mod: 25,S$GLB,, | Performed by: NURSE PRACTITIONER

## 2022-02-16 PROCEDURE — 3077F PR MOST RECENT SYSTOLIC BLOOD PRESSURE >= 140 MM HG: ICD-10-PCS | Mod: CPTII,S$GLB,, | Performed by: NURSE PRACTITIONER

## 2022-02-16 PROCEDURE — 3080F PR MOST RECENT DIASTOLIC BLOOD PRESSURE >= 90 MM HG: ICD-10-PCS | Mod: CPTII,S$GLB,, | Performed by: NURSE PRACTITIONER

## 2022-02-16 PROCEDURE — 3077F SYST BP >= 140 MM HG: CPT | Mod: CPTII,S$GLB,, | Performed by: NURSE PRACTITIONER

## 2022-02-16 PROCEDURE — 1159F MED LIST DOCD IN RCRD: CPT | Mod: CPTII,S$GLB,, | Performed by: NURSE PRACTITIONER

## 2022-02-16 PROCEDURE — 3008F PR BODY MASS INDEX (BMI) DOCUMENTED: ICD-10-PCS | Mod: CPTII,S$GLB,, | Performed by: NURSE PRACTITIONER

## 2022-02-16 PROCEDURE — 1159F PR MEDICATION LIST DOCUMENTED IN MEDICAL RECORD: ICD-10-PCS | Mod: CPTII,S$GLB,, | Performed by: NURSE PRACTITIONER

## 2022-02-16 RX ORDER — TIZANIDINE 2 MG/1
2 TABLET ORAL EVERY 8 HOURS PRN
Qty: 15 TABLET | Refills: 0 | Status: SHIPPED | OUTPATIENT
Start: 2022-02-16 | End: 2022-02-21

## 2022-02-16 RX ORDER — TIZANIDINE 2 MG/1
4 TABLET ORAL EVERY 8 HOURS PRN
Qty: 15 TABLET | Refills: 0 | Status: SHIPPED | OUTPATIENT
Start: 2022-02-16 | End: 2022-02-16 | Stop reason: CLARIF

## 2022-02-16 RX ORDER — KETOROLAC TROMETHAMINE 30 MG/ML
30 INJECTION, SOLUTION INTRAMUSCULAR; INTRAVENOUS
Status: COMPLETED | OUTPATIENT
Start: 2022-02-16 | End: 2022-02-16

## 2022-02-16 RX ORDER — LIDOCAINE 50 MG/G
2 PATCH TOPICAL DAILY
Qty: 2 PATCH | Refills: 0 | Status: SHIPPED | OUTPATIENT
Start: 2022-02-16 | End: 2023-06-19

## 2022-02-16 RX ADMIN — KETOROLAC TROMETHAMINE 30 MG: 30 INJECTION, SOLUTION INTRAMUSCULAR; INTRAVENOUS at 10:02

## 2022-02-16 NOTE — LETTER
February 16, 2022      [facility] [address] [address]  Phone: [phone]  Fax: [phone]       Patient: Rosamaria Agosto   YOB: 1960  Date of Visit: 02/16/2022    To Whom It May Concern:    Timothy Agosto  was at [facility] on 02/16/2022. The patient may return to work/school on 2/17/22 with no restrictions. If you have any questions or concerns, or if I can be of further assistance, please do not hesitate to contact me.    Sincerely,    Priscilla Horne NP

## 2022-02-16 NOTE — PROGRESS NOTES
"Subjective:       Patient ID: Rosamaria Agosto is a 61 y.o. female.    Vitals:  height is 5' 9" (1.753 m) and weight is 93 kg (205 lb). Her temperature is 98 °F (36.7 °C). Her blood pressure is 165/92 (abnormal) and her pulse is 69. Her respiration is 18 and oxygen saturation is 98%.     Chief Complaint: Back Pain (Middle Rt side )    Back Pain  This is a new problem. The current episode started in the past 7 days (STARTED FRIDAY). The problem occurs constantly. The problem has been gradually worsening since onset. The quality of the pain is described as aching. The pain does not radiate. The pain is at a severity of 5/10. The pain is moderate. The pain is the same all the time. The symptoms are aggravated by bending and position. Stiffness is present all day. Pertinent negatives include no dysuria, fever, numbness, paresis, paresthesias, weakness or weight loss. Risk factors include recent trauma (MOVING A BED ). She has tried NSAIDs (800MG) for the symptoms. The treatment provided mild relief.       Constitution: Negative for fever.   Genitourinary: Negative for dysuria.   Musculoskeletal: Positive for back pain.   Neurological: Negative for numbness.       Objective:      Physical Exam   Constitutional: She is oriented to person, place, and time. She appears well-developed and well-nourished. She is cooperative.  Non-toxic appearance. She does not appear ill. No distress.      Comments:Appears well    HENT:   Head: Normocephalic and atraumatic.   Ears:   Right Ear: Hearing and external ear normal.   Left Ear: Hearing and external ear normal.   Nose: Nose normal. No mucosal edema, rhinorrhea or nasal deformity. No epistaxis. Right sinus exhibits no maxillary sinus tenderness and no frontal sinus tenderness. Left sinus exhibits no maxillary sinus tenderness and no frontal sinus tenderness.   Mouth/Throat: Uvula is midline, oropharynx is clear and moist and mucous membranes are normal. No trismus in the jaw. " Normal dentition. No uvula swelling. No posterior oropharyngeal erythema.   Eyes: Conjunctivae and lids are normal. Right eye exhibits no discharge. Left eye exhibits no discharge. No scleral icterus.   Neck: Trachea normal and phonation normal. Neck supple.   Cardiovascular: Normal rate, regular rhythm, normal heart sounds, intact distal pulses and normal pulses.   Pulmonary/Chest: Effort normal and breath sounds normal. No respiratory distress.   Lungs clear bilaterally        Comments: Lungs clear bilaterally    Abdominal: Normal appearance and bowel sounds are normal. She exhibits no distension and no mass. Soft. There is no abdominal tenderness.   Musculoskeletal: Normal range of motion.         General: No deformity or edema. Normal range of motion.      Comments: Ambulating without difficulty   Neurological: She is alert and oriented to person, place, and time. She exhibits normal muscle tone. Coordination normal.   Skin: Skin is warm, dry, intact, not diaphoretic and not pale.   Psychiatric: She has a normal mood and affect. Her speech is normal and behavior is normal. Judgment and thought content normal.   Nursing note and vitals reviewed.        Assessment:       1. Muscle strain          Plan:         Muscle strain  -     ketorolac injection 30 mg  -     Discontinue: tiZANidine (ZANAFLEX) 2 MG tablet; Take 2 tablets (4 mg total) by mouth every 8 (eight) hours as needed (muscle pain/muscle spasms).  Dispense: 15 tablet; Refill: 0  -     LIDOcaine (LIDODERM) 5 %; Place 2 patches onto the skin once daily. Remove & Discard patch within 12 hours or as directed by MD  Dispense: 2 patch; Refill: 0  -     tiZANidine (ZANAFLEX) 2 MG tablet; Take 1 tablet (2 mg total) by mouth every 8 (eight) hours as needed (muscle pain or spasms).  Dispense: 15 tablet; Refill: 0      Patient Instructions   Alternate heat and ice as needed for discomfort.     Take tizanidine (muscle relaxer) every 8 hours as needed for muscle pain  or spasms.     May apply lidocaine patch topically daily. Do not place heating pad over patch.     Over the counter you can use Tylenol (acetominophen) or Ibuprofen for your minor aches and pains as long as you have no contraindications.    Good nutrition. Lots of rest. Plenty of fluids    You must understand that you've received an Urgent Care treatment only and that you may be released before all your medical problems are known or treated. You, the patient, will arrange for follow up care as instructed.  Follow up with your PCP or specialty clinic as directed in the next 1-2 weeks if not improved or as needed.  You can call [phone] to schedule an appointment with the appropriate provider.  If your condition worsens we recommend that you receive another evaluation at the emergency room immediately or contact your primary medical clinics after hours call service to discuss your concerns.  Please return here or go to the Emergency Department for any concerns or worsening of condition.    Patient Education       Muscle Strain Discharge Instructions   About this topic   A muscle strain is also known as a pulled muscle. A muscle strain happens when muscles are stretched too much or work too hard. It can also happen if muscles are stretched too quickly. Muscle strains can be minor or serious. The amount of time it takes to heal will depend on how bad your muscle strain is as well as your age and overall health.       What care is needed at home?   · Ask your doctor what you need to do when you go home. Make sure you ask questions if you do not understand what the doctor says. This way you will know what you need to do.  · Rest your muscle. If you can, prop it on pillows when you rest. Once you have less pain, slowly increase your activity level. If your muscle starts to hurt again, rest it.  · Place an ice pack or a bag of frozen vegetables wrapped in a towel over the painful part. Never put ice right on the skin.  Use ice every 1 to 2 hours for 10 to 15 minutes at a time. Use for the first 24 to 48 hours after your injury.  · You may want to take medicines like acetaminophen, ibuprofen, or naproxen for swelling and pain.  What follow-up care is needed?   Your doctor may ask you to make visits to the office to check on your progress. Be sure to keep these visits.  What drugs may be needed?   The doctor may order drugs to:  · Help with pain and swelling  Will physical activity be limited?   You may have to limit your activity. Talk to your doctor about the right amount of activity for you.  What can be done to prevent this health problem?   · Warm-up before and cool-down after playing sports.  · Drink lots of water during and after a workout.  · Stretch your muscles on a regular basis.  · Wear proper clothing or footwear when you are playing sports.  When do I need to call the doctor?   · You are not able to move the injured muscle because of the pain.  · The pain or swelling become worse.  · You keep straining the same muscle.  Teach Back: Helping You Understand   The Teach Back Method helps you understand the information we are giving you. After you talk with the staff, tell them in your own words what you learned. This helps to make sure the staff has described each thing clearly. It also helps to explain things that may have been confusing. Before going home, make sure you can do these:  · I can tell you about my condition.  · I can tell you what may help ease my pain.  · I can tell you what I will do if I have more pain or swelling.  Where can I learn more?   National [place] of Arthritis and Musculoskeletal and Skin Diseases  http://www.niams.nih.gov/Health_Info/Sprains_Strains/default.asp   Last Reviewed Date   2021-06-18  Consumer Information Use and Disclaimer   This information is not specific medical advice and does not replace information you receive from your health care provider. This is only a brief summary of  general information. It does NOT include all information about conditions, illnesses, injuries, tests, procedures, treatments, therapies, discharge instructions or life-style choices that may apply to you. You must talk with your health care provider for complete information about your health and treatment options. This information should not be used to decide whether or not to accept your health care providers advice, instructions or recommendations. Only your health care provider has the knowledge and training to provide advice that is right for you.  Copyright   Copyright © 2021 [employer] and its affiliates and/or licensors. All rights reserved.

## 2022-02-16 NOTE — PATIENT INSTRUCTIONS
Alternate heat and ice as needed for discomfort.     Take tizanidine (muscle relaxer) every 8 hours as needed for muscle pain or spasms.     May apply lidocaine patch topically daily. Do not place heating pad over patch.     Over the counter you can use Tylenol (acetominophen) or Ibuprofen for your minor aches and pains as long as you have no contraindications.    Good nutrition. Lots of rest. Plenty of fluids    You must understand that you've received an Urgent Care treatment only and that you may be released before all your medical problems are known or treated. You, the patient, will arrange for follow up care as instructed.  Follow up with your PCP or specialty clinic as directed in the next 1-2 weeks if not improved or as needed.  You can call [phone] to schedule an appointment with the appropriate provider.  If your condition worsens we recommend that you receive another evaluation at the emergency room immediately or contact your primary medical clinics after hours call service to discuss your concerns.  Please return here or go to the Emergency Department for any concerns or worsening of condition.    Patient Education       Muscle Strain Discharge Instructions   About this topic   A muscle strain is also known as a pulled muscle. A muscle strain happens when muscles are stretched too much or work too hard. It can also happen if muscles are stretched too quickly. Muscle strains can be minor or serious. The amount of time it takes to heal will depend on how bad your muscle strain is as well as your age and overall health.       What care is needed at home?   · Ask your doctor what you need to do when you go home. Make sure you ask questions if you do not understand what the doctor says. This way you will know what you need to do.  · Rest your muscle. If you can, prop it on pillows when you rest. Once you have less pain, slowly increase your activity level. If your muscle starts to hurt again, rest  it.  · Place an ice pack or a bag of frozen vegetables wrapped in a towel over the painful part. Never put ice right on the skin. Use ice every 1 to 2 hours for 10 to 15 minutes at a time. Use for the first 24 to 48 hours after your injury.  · You may want to take medicines like acetaminophen, ibuprofen, or naproxen for swelling and pain.  What follow-up care is needed?   Your doctor may ask you to make visits to the office to check on your progress. Be sure to keep these visits.  What drugs may be needed?   The doctor may order drugs to:  · Help with pain and swelling  Will physical activity be limited?   You may have to limit your activity. Talk to your doctor about the right amount of activity for you.  What can be done to prevent this health problem?   · Warm-up before and cool-down after playing sports.  · Drink lots of water during and after a workout.  · Stretch your muscles on a regular basis.  · Wear proper clothing or footwear when you are playing sports.  When do I need to call the doctor?   · You are not able to move the injured muscle because of the pain.  · The pain or swelling become worse.  · You keep straining the same muscle.  Teach Back: Helping You Understand   The Teach Back Method helps you understand the information we are giving you. After you talk with the staff, tell them in your own words what you learned. This helps to make sure the staff has described each thing clearly. It also helps to explain things that may have been confusing. Before going home, make sure you can do these:  · I can tell you about my condition.  · I can tell you what may help ease my pain.  · I can tell you what I will do if I have more pain or swelling.  Where can I learn more?   National [place] of Arthritis and Musculoskeletal and Skin Diseases  http://www.niams.nih.gov/Health_Info/Sprains_Strains/default.asp   Last Reviewed Date   2021-06-18  Consumer Information Use and Disclaimer   This information is not  specific medical advice and does not replace information you receive from your health care provider. This is only a brief summary of general information. It does NOT include all information about conditions, illnesses, injuries, tests, procedures, treatments, therapies, discharge instructions or life-style choices that may apply to you. You must talk with your health care provider for complete information about your health and treatment options. This information should not be used to decide whether or not to accept your health care providers advice, instructions or recommendations. Only your health care provider has the knowledge and training to provide advice that is right for you.  Copyright   Copyright © 2021 [employer] and its affiliates and/or licensors. All rights reserved.

## 2022-03-10 ENCOUNTER — PATIENT OUTREACH (OUTPATIENT)
Dept: ADMINISTRATIVE | Facility: OTHER | Age: 62
End: 2022-03-10
Payer: COMMERCIAL

## 2022-03-11 ENCOUNTER — OFFICE VISIT (OUTPATIENT)
Dept: CARDIOLOGY | Facility: CLINIC | Age: 62
End: 2022-03-11
Payer: COMMERCIAL

## 2022-03-11 VITALS
WEIGHT: 204.13 LBS | HEIGHT: 69 IN | SYSTOLIC BLOOD PRESSURE: 118 MMHG | HEART RATE: 73 BPM | DIASTOLIC BLOOD PRESSURE: 75 MMHG | BODY MASS INDEX: 30.23 KG/M2

## 2022-03-11 DIAGNOSIS — I10 ESSENTIAL HYPERTENSION: Primary | ICD-10-CM

## 2022-03-11 DIAGNOSIS — E78.2 MIXED HYPERLIPIDEMIA: ICD-10-CM

## 2022-03-11 DIAGNOSIS — I87.2 VENOUS INSUFFICIENCY: ICD-10-CM

## 2022-03-11 PROCEDURE — 99213 PR OFFICE/OUTPT VISIT, EST, LEVL III, 20-29 MIN: ICD-10-PCS | Mod: S$GLB,,, | Performed by: INTERNAL MEDICINE

## 2022-03-11 PROCEDURE — 3008F BODY MASS INDEX DOCD: CPT | Mod: CPTII,S$GLB,, | Performed by: INTERNAL MEDICINE

## 2022-03-11 PROCEDURE — 1159F MED LIST DOCD IN RCRD: CPT | Mod: CPTII,S$GLB,, | Performed by: INTERNAL MEDICINE

## 2022-03-11 PROCEDURE — 1160F RVW MEDS BY RX/DR IN RCRD: CPT | Mod: CPTII,S$GLB,, | Performed by: INTERNAL MEDICINE

## 2022-03-11 PROCEDURE — 1159F PR MEDICATION LIST DOCUMENTED IN MEDICAL RECORD: ICD-10-PCS | Mod: CPTII,S$GLB,, | Performed by: INTERNAL MEDICINE

## 2022-03-11 PROCEDURE — 3008F PR BODY MASS INDEX (BMI) DOCUMENTED: ICD-10-PCS | Mod: CPTII,S$GLB,, | Performed by: INTERNAL MEDICINE

## 2022-03-11 PROCEDURE — 99999 PR PBB SHADOW E&M-EST. PATIENT-LVL IV: ICD-10-PCS | Mod: PBBFAC,,, | Performed by: INTERNAL MEDICINE

## 2022-03-11 PROCEDURE — 99999 PR PBB SHADOW E&M-EST. PATIENT-LVL IV: CPT | Mod: PBBFAC,,, | Performed by: INTERNAL MEDICINE

## 2022-03-11 PROCEDURE — 99213 OFFICE O/P EST LOW 20 MIN: CPT | Mod: S$GLB,,, | Performed by: INTERNAL MEDICINE

## 2022-03-11 PROCEDURE — 3074F SYST BP LT 130 MM HG: CPT | Mod: CPTII,S$GLB,, | Performed by: INTERNAL MEDICINE

## 2022-03-11 PROCEDURE — 3078F PR MOST RECENT DIASTOLIC BLOOD PRESSURE < 80 MM HG: ICD-10-PCS | Mod: CPTII,S$GLB,, | Performed by: INTERNAL MEDICINE

## 2022-03-11 PROCEDURE — 3078F DIAST BP <80 MM HG: CPT | Mod: CPTII,S$GLB,, | Performed by: INTERNAL MEDICINE

## 2022-03-11 PROCEDURE — 1160F PR REVIEW ALL MEDS BY PRESCRIBER/CLIN PHARMACIST DOCUMENTED: ICD-10-PCS | Mod: CPTII,S$GLB,, | Performed by: INTERNAL MEDICINE

## 2022-03-11 PROCEDURE — 3074F PR MOST RECENT SYSTOLIC BLOOD PRESSURE < 130 MM HG: ICD-10-PCS | Mod: CPTII,S$GLB,, | Performed by: INTERNAL MEDICINE

## 2022-03-11 RX ORDER — ROSUVASTATIN CALCIUM 20 MG/1
20 TABLET, COATED ORAL NIGHTLY
Qty: 90 TABLET | Refills: 3 | Status: SHIPPED | OUTPATIENT
Start: 2022-03-11 | End: 2022-09-24 | Stop reason: SDUPTHER

## 2022-03-11 RX ORDER — HYDROCHLOROTHIAZIDE 25 MG/1
25 TABLET ORAL DAILY
Qty: 90 TABLET | Refills: 3 | Status: SHIPPED | OUTPATIENT
Start: 2022-03-11 | End: 2022-09-24 | Stop reason: SDUPTHER

## 2022-03-11 NOTE — ASSESSMENT & PLAN NOTE
The patient has bilateral venous insufficiency with C4a, Ep, As, Pr disease bilaterally.  She appears more symptomatic on her left side.  We will continue with compression stockings for now and if the symptoms become more uncomfortable we can consider ablation.

## 2022-03-11 NOTE — ASSESSMENT & PLAN NOTE
Blood pressures are controlled on home log on hydrochlorothiazide 25 mg daily.  Reasonable to continue.

## 2022-03-11 NOTE — PROGRESS NOTES
Chief Complaint   Patient presents with    Leg Pain       HPI:  This is a 61-year-old female with a history of hyperlipidemia and venous insufficiency presenting for evaluation of recent chest pain syndrome and chronic palpitations.    The patient was initially evaluated for a CP syndrome that has since resolved with a negative gill and chronic palpitations with a negative holter that don't seem to limit her.    Patient denies any personal history of coronary artery disease, peripheral arterial disease, congestive heart failure, cardiomyopathy, or stroke.  She is minimally active at baseline by choice.  The patient has recently initiated an exercise program, which includes walking on the treadmill and riding a stationary bicycle. She completes basic ADLs without limitation.    She does have a history of hyperlipidemia and is on high-dose atorvastatin.  She has a history of hypertension and is on hydrochlorothiazide.  She has been checking her blood pressures at home and notes readings of 120s/80s as long as she is taking her medicines.  No family history of premature CAD.  She is a nonsmoker.    She does have a history of venous insufficiency and describes multiple ablations.  Currently she notes intermittent left lower extremity edema greater than right with a sensation of heaviness and fullness when the edema is present.  Symptoms are worse after standing on her feet for long periods of time.  She also has bilateral restless leg syndrome at night.  History of bilateral lower extremity varicosities as well that improved after her ablation, but have returned left greater than right.  Her dad and brother have a history of varicose veins as well.  She denies any history of DVT or PE.  She started using compression stockings after last visit, that helps with the intensity of her symptoms.      PHYSICAL EXAM:  Vitals:    03/11/22 1523   BP: 118/75   Pulse: 73       Physical Exam  Constitutional:       Appearance:  Normal appearance.   Neck:      Vascular: No carotid bruit or JVD.   Cardiovascular:      Rate and Rhythm: Normal rate and regular rhythm.      Pulses: Normal pulses.           Carotid pulses are 2+ on the right side and 2+ on the left side.       Radial pulses are 2+ on the right side and 2+ on the left side.        Dorsalis pedis pulses are 2+ on the right side and 2+ on the left side.        Posterior tibial pulses are 2+ on the right side and 2+ on the left side.      Heart sounds: S1 normal and S2 normal. No murmur heard.   No S3 sounds.    Pulmonary:      Effort: Pulmonary effort is normal.      Breath sounds: Normal breath sounds. No rales.   Feet:      Right foot:      Skin integrity: Skin integrity normal.      Left foot:      Skin integrity: Skin integrity normal.   Skin:     General: Skin is warm and dry.      Findings: No lesion.      Comments: Mild left lower extremity edema.  Chronic bilateral lower extremity skin changes.  Left lateral varicosities in the distal thigh and proximal calf noted.  Neurological:      Mental Status: She is alert and oriented to person, place, and time.      Motor: Motor function is intact.      Gait: Gait is intact.       LABS/CARDIAC TESTS:  CBC and BMP from 2021 are unremarkable.  Lipid panel revealing for an LDL of 147 and HDL 59. Normal A1c and TSH.  EKG July 2021 normal sinus rhythm with no Q-waves or ST changes.   Treadmill stress test July 2021 negative for ischemia with 6 minutes of exercise on a high ramp protocol no symptoms or EKG changes.  Holter July 2021 demonstrates sinus rhythm with average heart rate of 79 beats per minute.  No significant arrhythmias or heart block noted.  TTE July 2021 normal LV size and function.  No significant valve disease.  Venous duplex from 2019 shows no evidence of DVT with significant bilateral GSV reflux.  Follow-up duplex in 2020 reveals an ablated GSV with no evidence of left lower extremity DVT.  July 2021 shows no evidence  of DVT.  Right GSV reflux noted and left SSV reflux noted.    ASSESSMENT & PLAN:    Essential hypertension  Blood pressures are controlled on home log on hydrochlorothiazide 25 mg daily.  Reasonable to continue.    Hyperlipidemia  Patient is on high dose statin.  LDL can be checked when she has been compliant with his medicine for a few months.    Venous insufficiency  The patient has bilateral venous insufficiency with C4a, Ep, As, Pr disease bilaterally.  She appears more symptomatic on her left side.  We will continue with compression stockings for now and if the symptoms become more uncomfortable we can consider ablation.         Essential hypertension    Mixed hyperlipidemia    Venous insufficiency        Carolina Kitchen MD

## 2022-03-11 NOTE — ASSESSMENT & PLAN NOTE
Patient is on high dose statin.  LDL can be checked when she has been compliant with his medicine for a few months.

## 2022-03-17 NOTE — TELEPHONE ENCOUNTER
No new care gaps identified.  Powered by [employer] [facility]. Reference number: [id number].   3/17/2022 4:45:42 PM CDT

## 2022-03-21 RX ORDER — ATORVASTATIN CALCIUM 80 MG/1
80 TABLET, FILM COATED ORAL NIGHTLY
Qty: 90 TABLET | Refills: 3 | Status: CANCELLED | OUTPATIENT
Start: 2022-03-21

## 2022-03-21 NOTE — TELEPHONE ENCOUNTER
No new care gaps identified.  Powered by [employer]. Reference number: [id number].   3/21/2022 1:27:33 PM CDT

## 2022-03-21 NOTE — TELEPHONE ENCOUNTER
----- Message from Tushar Macias sent at 3/21/2022 12:40 PM CDT -----  Contact: [phone]   Requesting an RX refill or new RX.  Is this a refill or new RX:  refill    RX name and strength (copy/paste from chart):  atorvastatin (LIPITOR) 80 MG tablet     Is this a 30 day or 90 day RX: 30    Pharmacy name and phone # (copy/paste from chart):      [address] [address]  Phone: [phone] Fax: [phone]         The doctors have asked that we provide their patients with the following 2 reminders -- prescription refills can take up to 72 hours, and a friendly reminder that in the future you can use your [facility] account to request refills:

## 2022-03-21 NOTE — TELEPHONE ENCOUNTER
The patient is no longer on Lipitor.  Patient was started on Crestor and medication was sent to the pharmacy by her cardiologist.  Please inform the patient.  Thank you.

## 2022-03-23 RX ORDER — ATORVASTATIN CALCIUM 80 MG/1
80 TABLET, FILM COATED ORAL NIGHTLY
Qty: 30 TABLET | Refills: 11 | OUTPATIENT
Start: 2022-03-23

## 2022-03-23 NOTE — TELEPHONE ENCOUNTER
Quick DC. Inappropriate Request    Refill Authorization Note   Rosamaria Agosto  is requesting a refill authorization.  Brief Assessment and Rationale for Refill:  Quick Discontinue  Medication Therapy Plan:  discontinued on 7/16/2020 by Bill Burns atorvastatin now on rosuvastatin    Medication Reconciliation Completed:  No      Comments:   Pended Medication(s)       Requested Prescriptions     Refused Prescriptions Disp Refills    atorvastatin (LIPITOR) 80 MG tablet [Pharmacy Med Name: atorvastatin 80 mg tablet] 30 tablet 11     Sig: Take 1 tablet (80 mg total) by mouth nightly.     Refused By: PAULINE SALCIDO     Reason for Refusal: Refill not appropriate        Duplicate Pended Encounter(s)/ Last Prescribed Details: (includes pharmacy & prescriber details)   [facility] - [address] [address]   Phone:  [phone]  Fax:  [phone]   MILLY #:  --   LINNETTE Reason: --       Outpatient Medication Detail     Disp Refills Start End LINNETTE   rosuvastatin (CRESTOR) 20 MG tablet (Discontinued) 30 tablet 11 8/22/2021 9/22/2021 --   Sig - Route: Take 1 tablet (20 mg total) by mouth every evening. - Oral   Sent to pharmacy as: rosuvastatin (CRESTOR) 20 MG tablet   Class: Normal   Reason for Discontinue: Reorder   Order: [id number]   Date/Time Signed: 8/22/2021 14:18       E-Prescribing Status: Receipt confirmed by pharmacy (8/22/2021  2:21 PM CDT)       Ordering Encounter Report    Associated Reports   View Encounter                Note composed:3:20 PM 03/23/2022

## 2022-04-20 ENCOUNTER — PATIENT OUTREACH (OUTPATIENT)
Dept: ADMINISTRATIVE | Facility: OTHER | Age: 62
End: 2022-04-20
Payer: COMMERCIAL

## 2022-04-22 ENCOUNTER — OFFICE VISIT (OUTPATIENT)
Dept: SPORTS MEDICINE | Facility: CLINIC | Age: 62
End: 2022-04-22
Payer: COMMERCIAL

## 2022-04-22 ENCOUNTER — HOSPITAL ENCOUNTER (OUTPATIENT)
Dept: RADIOLOGY | Facility: HOSPITAL | Age: 62
Discharge: HOME | End: 2022-04-22
Attending: PHYSICIAN ASSISTANT
Payer: COMMERCIAL

## 2022-04-22 VITALS
BODY MASS INDEX: 30.91 KG/M2 | HEIGHT: 69 IN | WEIGHT: 208.69 LBS | DIASTOLIC BLOOD PRESSURE: 87 MMHG | HEART RATE: 64 BPM | RESPIRATION RATE: 16 BRPM | SYSTOLIC BLOOD PRESSURE: 135 MMHG

## 2022-04-22 DIAGNOSIS — M25.562 PAIN IN BOTH KNEES, UNSPECIFIED CHRONICITY: ICD-10-CM

## 2022-04-22 DIAGNOSIS — M25.561 PAIN IN BOTH KNEES, UNSPECIFIED CHRONICITY: ICD-10-CM

## 2022-04-22 DIAGNOSIS — M17.12 PRIMARY OSTEOARTHRITIS OF LEFT KNEE: Primary | ICD-10-CM

## 2022-04-22 DIAGNOSIS — G89.29 CHRONIC PAIN OF BOTH KNEES: ICD-10-CM

## 2022-04-22 DIAGNOSIS — M17.11 PRIMARY OSTEOARTHRITIS OF RIGHT KNEE: ICD-10-CM

## 2022-04-22 DIAGNOSIS — M25.561 CHRONIC PAIN OF BOTH KNEES: ICD-10-CM

## 2022-04-22 DIAGNOSIS — M25.562 CHRONIC PAIN OF BOTH KNEES: ICD-10-CM

## 2022-04-22 PROCEDURE — 73564 X-RAY EXAM KNEE 4 OR MORE: CPT | Mod: TC,50

## 2022-04-22 PROCEDURE — 1159F PR MEDICATION LIST DOCUMENTED IN MEDICAL RECORD: ICD-10-PCS | Mod: CPTII,S$GLB,, | Performed by: PHYSICIAN ASSISTANT

## 2022-04-22 PROCEDURE — 3075F SYST BP GE 130 - 139MM HG: CPT | Mod: CPTII,S$GLB,, | Performed by: PHYSICIAN ASSISTANT

## 2022-04-22 PROCEDURE — 73564 XR KNEE ORTHO BILAT WITH FLEXION: ICD-10-PCS | Mod: 26,,, | Performed by: RADIOLOGY

## 2022-04-22 PROCEDURE — 99214 PR OFFICE/OUTPT VISIT, EST, LEVL IV, 30-39 MIN: ICD-10-PCS | Mod: S$GLB,,, | Performed by: PHYSICIAN ASSISTANT

## 2022-04-22 PROCEDURE — 99214 OFFICE O/P EST MOD 30 MIN: CPT | Mod: S$GLB,,, | Performed by: PHYSICIAN ASSISTANT

## 2022-04-22 PROCEDURE — 3008F PR BODY MASS INDEX (BMI) DOCUMENTED: ICD-10-PCS | Mod: CPTII,S$GLB,, | Performed by: PHYSICIAN ASSISTANT

## 2022-04-22 PROCEDURE — 1160F PR REVIEW ALL MEDS BY PRESCRIBER/CLIN PHARMACIST DOCUMENTED: ICD-10-PCS | Mod: CPTII,S$GLB,, | Performed by: PHYSICIAN ASSISTANT

## 2022-04-22 PROCEDURE — 3079F DIAST BP 80-89 MM HG: CPT | Mod: CPTII,S$GLB,, | Performed by: PHYSICIAN ASSISTANT

## 2022-04-22 PROCEDURE — 73564 X-RAY EXAM KNEE 4 OR MORE: CPT | Mod: 26,,, | Performed by: RADIOLOGY

## 2022-04-22 PROCEDURE — 1160F RVW MEDS BY RX/DR IN RCRD: CPT | Mod: CPTII,S$GLB,, | Performed by: PHYSICIAN ASSISTANT

## 2022-04-22 PROCEDURE — 1159F MED LIST DOCD IN RCRD: CPT | Mod: CPTII,S$GLB,, | Performed by: PHYSICIAN ASSISTANT

## 2022-04-22 PROCEDURE — 99999 PR PBB SHADOW E&M-EST. PATIENT-LVL IV: CPT | Mod: PBBFAC,,, | Performed by: PHYSICIAN ASSISTANT

## 2022-04-22 PROCEDURE — 3075F PR MOST RECENT SYSTOLIC BLOOD PRESS GE 130-139MM HG: ICD-10-PCS | Mod: CPTII,S$GLB,, | Performed by: PHYSICIAN ASSISTANT

## 2022-04-22 PROCEDURE — 99999 PR PBB SHADOW E&M-EST. PATIENT-LVL IV: ICD-10-PCS | Mod: PBBFAC,,, | Performed by: PHYSICIAN ASSISTANT

## 2022-04-22 PROCEDURE — 3008F BODY MASS INDEX DOCD: CPT | Mod: CPTII,S$GLB,, | Performed by: PHYSICIAN ASSISTANT

## 2022-04-22 PROCEDURE — 3079F PR MOST RECENT DIASTOLIC BLOOD PRESSURE 80-89 MM HG: ICD-10-PCS | Mod: CPTII,S$GLB,, | Performed by: PHYSICIAN ASSISTANT

## 2022-04-22 RX ORDER — MELOXICAM 15 MG/1
15 TABLET ORAL DAILY
Qty: 30 TABLET | Refills: 2 | Status: SHIPPED | OUTPATIENT
Start: 2022-04-22 | End: 2022-06-23

## 2022-04-22 NOTE — PROGRESS NOTES
Subjective:          Chief Complaint: Rosamaria Agosto is a 61 y.o. female who presents to clinic for bilateral knee pain.    HPI   Patient who works as a  for [employer] presents to clinic with bilateral knee pain. Left knee pain is worse than right knee. She has been taking diclofenac 75mg BID with some relief of pain, but she would like to switch to a different medication.  She complains of global tenderness. Denies instability and mechanical symptoms.  Patient is currently being seen by bariatrics and has been prescribed medication for weight loss.  She is currently working on weight loss. Left knee frequently swells toward the end of the day.  States that she has to stand for prolonged periods of time for work which increases her bilateral knee pain.  She is here today to discuss treatment options.  No previous history of injections including steroids or viscosupplementation.    Review of Systems   Constitutional: Negative. Negative for chills, fever, weight gain and weight loss.   HENT: Negative for congestion and sore throat.    Eyes: Negative for blurred vision and double vision.   Cardiovascular: Negative for chest pain, leg swelling and palpitations.   Respiratory: Negative for cough and shortness of breath.    Hematologic/Lymphatic: Does not bruise/bleed easily.   Skin: Negative for itching, poor wound healing and rash.   Musculoskeletal: Positive for joint pain and stiffness. Negative for back pain, joint swelling, muscle weakness and myalgias.   Gastrointestinal: Negative for abdominal pain, constipation, diarrhea, nausea and vomiting.   Genitourinary: Negative.  Negative for frequency and hematuria.   Neurological: Negative for dizziness, headaches, numbness, paresthesias and sensory change.   Psychiatric/Behavioral: Negative for altered mental status and depression. The patient is not nervous/anxious.    Allergic/Immunologic: Negative for hives.       Pain Related Questions  Over the  past 3 days, what was your average pain during activity? (I.e. running, jogging, walking, climbing stairs, getting dressed, ect.): 6  Over the past 3 days, what was your highest pain level?: 9  Over the past 3 days, what was your lowest pain level? : 5    Other  How many nights a week are you awakened by your affected body part?: 2  Was the patient's HEIGHT measured or patient reported?: Patient Reported  Was the patient's WEIGHT measured or patient reported?: Measured      Objective:        General: Rosamaria is well-developed, well-nourished, appears stated age, in no acute distress, alert and oriented to time, place and person.     General    Vitals reviewed.  Constitutional: She is oriented to person, place, and time. She appears well-developed and well-nourished. No distress.   HENT:   Head: Normocephalic and atraumatic.   Eyes: EOM are normal.   Cardiovascular: Normal rate and regular rhythm.    Pulmonary/Chest: Effort normal. No respiratory distress.   Neurological: She is alert and oriented to person, place, and time. She has normal reflexes. No cranial nerve deficit. Coordination normal.   Psychiatric: She has a normal mood and affect. Her behavior is normal. Judgment and thought content normal.     General Musculoskeletal Exam   Gait: normal       Right Knee Exam     Inspection   Erythema: absent  Scars: absent  Swelling: present  Effusion: present  Deformity: absent  Bruising: absent    Tenderness   The patient is experiencing no tenderness.     Crepitus   The patient has crepitus of the patella.    Range of Motion   Extension:  0 normal   Flexion:  130 normal     Tests   Meniscus   Lino:  Medial - negative Lateral - negative  Ligament Examination Lachman: normal (-1 to 2mm) PCL-Posterior Drawer: normal (0 to 2mm)     MCL - Valgus: normal (0 to 2mm)  LCL - Varus: normalPivot Shift: normal (Equal)Reverse Pivot Shift: normal (Equal)  Posterolateral Corner: stable  Patella   Passive Patellar Tilt:  neutral  Patellar Glide (quadrants): Lateral - 1   Medial - 2  Patellar Grind: negative    Other   Sensation: normal    Left Knee Exam     Inspection   Erythema: absent  Scars: absent  Swelling: present  Effusion: present  Deformity: absent  Bruising: absent    Tenderness   The patient tender to palpation of the medial joint line.    Crepitus   The patient has crepitus of the patella.    Range of Motion   Extension:  0 normal   Flexion:  130 normal     Tests   Meniscus   Lino:  Medial - negative Lateral - negative  Stability Lachman: normal (-1 to 2mm) PCL-Posterior Drawer: normal (0 to 2mm)  MCL - Valgus: normal (0 to 2mm)  LCL - Varus: normal (0 to 2mm)Pivot Shift: normal (Equal)Reverse Pivot Shift: normal (Equal)  Posterolateral Corner: stable  Patella   Passive Patellar Tilt: neutral  Patellar Glide (Quadrants): Lateral - 1 Medial - 2  Patellar Grind: negative    Other   Sensation: normal    Muscle Strength   Right Lower Extremity   Hip Abduction: 5/5   Quadriceps:  5/5   Hamstrin/5   Left Lower Extremity   Hip Abduction: 5/5   Quadriceps:  5/5   Hamstrin/5     Reflexes     Left Side  Achilles:  2+  Quadriceps:  2+    Right Side   Achilles:  2+  Quadriceps:  2+    Vascular Exam     Right Pulses  Dorsalis Pedis:      2+  Posterior Tibial:      2+        Left Pulses  Dorsalis Pedis:      2+  Posterior Tibial:      2+        Radiographs:  2022  Bilateral knees:  FINDINGS:  Bilateral ortho four views knees.     Left: No fracture dislocation bone destruction or OCD seen.     Right: No fracture dislocation bone destruction or OCD seen.        Assessment:       Encounter Diagnoses   Name Primary?    Primary osteoarthritis of left knee Yes    Primary osteoarthritis of right knee     Chronic pain of both knees           Plan:       1. I made the decision to obtain old records of the patient including previous notes and imaging. New imaging was ordered today of the extremity or extremities  evaluated. I independently reviewed and interpreted the radiographs and/or MRIs today as well as prior imaging.  Reviewed radiographs with patient in detail.    2. Discontinue diclofenac 75 mg b.i.d..  Start Mobic 15 mg once daily p.r.n. pain    3.  placed for bilateral durolane injections.    4. Return to clinic in 4 weeks with Arina Corea PA-C for bilateral injections.                    Patient questionnaires may have been collected.

## 2022-05-24 DIAGNOSIS — M17.11 PRIMARY OSTEOARTHRITIS OF RIGHT KNEE: Primary | ICD-10-CM

## 2022-05-24 DIAGNOSIS — M17.12 PRIMARY OSTEOARTHRITIS OF LEFT KNEE: ICD-10-CM

## 2022-05-25 ENCOUNTER — OFFICE VISIT (OUTPATIENT)
Dept: SPORTS MEDICINE | Facility: CLINIC | Age: 62
End: 2022-05-25
Payer: COMMERCIAL

## 2022-05-25 VITALS
HEIGHT: 69 IN | DIASTOLIC BLOOD PRESSURE: 86 MMHG | SYSTOLIC BLOOD PRESSURE: 134 MMHG | BODY MASS INDEX: 30.07 KG/M2 | WEIGHT: 203 LBS | HEART RATE: 59 BPM

## 2022-05-25 DIAGNOSIS — M17.11 PRIMARY OSTEOARTHRITIS OF RIGHT KNEE: Primary | ICD-10-CM

## 2022-05-25 DIAGNOSIS — M17.12 PRIMARY OSTEOARTHRITIS OF LEFT KNEE: ICD-10-CM

## 2022-05-25 PROCEDURE — 1159F MED LIST DOCD IN RCRD: CPT | Mod: CPTII,S$GLB,, | Performed by: PHYSICIAN ASSISTANT

## 2022-05-25 PROCEDURE — 3008F PR BODY MASS INDEX (BMI) DOCUMENTED: ICD-10-PCS | Mod: CPTII,S$GLB,, | Performed by: PHYSICIAN ASSISTANT

## 2022-05-25 PROCEDURE — 99499 NO LOS: ICD-10-PCS | Mod: S$GLB,,, | Performed by: PHYSICIAN ASSISTANT

## 2022-05-25 PROCEDURE — 99499 UNLISTED E&M SERVICE: CPT | Mod: S$GLB,,, | Performed by: PHYSICIAN ASSISTANT

## 2022-05-25 PROCEDURE — 20610 DRAIN/INJ JOINT/BURSA W/O US: CPT | Mod: 50,S$GLB,, | Performed by: PHYSICIAN ASSISTANT

## 2022-05-25 PROCEDURE — 1159F PR MEDICATION LIST DOCUMENTED IN MEDICAL RECORD: ICD-10-PCS | Mod: CPTII,S$GLB,, | Performed by: PHYSICIAN ASSISTANT

## 2022-05-25 PROCEDURE — 3079F DIAST BP 80-89 MM HG: CPT | Mod: CPTII,S$GLB,, | Performed by: PHYSICIAN ASSISTANT

## 2022-05-25 PROCEDURE — 3008F BODY MASS INDEX DOCD: CPT | Mod: CPTII,S$GLB,, | Performed by: PHYSICIAN ASSISTANT

## 2022-05-25 PROCEDURE — 99999 PR PBB SHADOW E&M-EST. PATIENT-LVL IV: CPT | Mod: PBBFAC,,, | Performed by: PHYSICIAN ASSISTANT

## 2022-05-25 PROCEDURE — 1160F RVW MEDS BY RX/DR IN RCRD: CPT | Mod: CPTII,S$GLB,, | Performed by: PHYSICIAN ASSISTANT

## 2022-05-25 PROCEDURE — 1160F PR REVIEW ALL MEDS BY PRESCRIBER/CLIN PHARMACIST DOCUMENTED: ICD-10-PCS | Mod: CPTII,S$GLB,, | Performed by: PHYSICIAN ASSISTANT

## 2022-05-25 PROCEDURE — 20610 PR DRAIN/INJECT LARGE JOINT/BURSA: ICD-10-PCS | Mod: 50,S$GLB,, | Performed by: PHYSICIAN ASSISTANT

## 2022-05-25 PROCEDURE — 3075F PR MOST RECENT SYSTOLIC BLOOD PRESS GE 130-139MM HG: ICD-10-PCS | Mod: CPTII,S$GLB,, | Performed by: PHYSICIAN ASSISTANT

## 2022-05-25 PROCEDURE — 99999 PR PBB SHADOW E&M-EST. PATIENT-LVL IV: ICD-10-PCS | Mod: PBBFAC,,, | Performed by: PHYSICIAN ASSISTANT

## 2022-05-25 PROCEDURE — 3075F SYST BP GE 130 - 139MM HG: CPT | Mod: CPTII,S$GLB,, | Performed by: PHYSICIAN ASSISTANT

## 2022-05-25 PROCEDURE — 3079F PR MOST RECENT DIASTOLIC BLOOD PRESSURE 80-89 MM HG: ICD-10-PCS | Mod: CPTII,S$GLB,, | Performed by: PHYSICIAN ASSISTANT

## 2022-05-25 NOTE — PROGRESS NOTES
Patient is here for follow up of bilateral knee arthritis. Pt is requesting bilateral knee Monovisc injection.  [facility] Has failed other conservative modalities including NSAIDS, activity modification, weight loss.    The prior shot was tolerated well.    PHYSICAL EXAMINATION:     General: The patient is alert and oriented x 3. Mood is pleasant.   Observation of ears, eyes and nose reveals no gross abnormalities. No   labored breathing observed.     No signs of infection or adverse reaction to knee.    PROCEDURE NOTE:  Injection Procedure bilateral knees  A time out was performed, including verification of patient ID, procedure, site and side, availability of information and equipment, review of safety issues, and agreement with consent, the procedure site was marked.    After time out was performed, the patient was prepped aseptically with povidone-iodine swabsticks. A diagnostic and therapeutic injection of 4cc Monovisc was given under sterile technique using a 22g x 1.5 needle from the Superolateral  aspect of the bilateral Knee Joint in the supine position.      Rosamaria Agosto had no adverse reactions to the medication. Pain decreased. She was instructed to apply ice to the joint for 20 minutes and avoid strenuous activities for 24-36 hours following the injection. She was warned of possible blood sugar and/or blood pressure changes during that time. Following that time, she can resume regular activities.    She was reminded to call the clinic immediately for any adverse side effects as explained in clinic today.    RTC in 3 months with Arina Corea PA-C for follow up bilateral knees- virtual visit.  All questions were answered, pt will contact us for questions or concerns in the interim.

## 2022-06-23 ENCOUNTER — OFFICE VISIT (OUTPATIENT)
Dept: URGENT CARE | Facility: CLINIC | Age: 62
End: 2022-06-23
Payer: COMMERCIAL

## 2022-06-23 VITALS
WEIGHT: 203 LBS | TEMPERATURE: 98 F | BODY MASS INDEX: 30.07 KG/M2 | RESPIRATION RATE: 16 BRPM | SYSTOLIC BLOOD PRESSURE: 138 MMHG | HEIGHT: 69 IN | HEART RATE: 68 BPM | OXYGEN SATURATION: 98 % | DIASTOLIC BLOOD PRESSURE: 83 MMHG

## 2022-06-23 DIAGNOSIS — J01.90 ACUTE BACTERIAL SINUSITIS: ICD-10-CM

## 2022-06-23 DIAGNOSIS — R05.9 COUGH: ICD-10-CM

## 2022-06-23 DIAGNOSIS — Z11.59 ENCOUNTER FOR SCREENING FOR OTHER VIRAL DISEASES: Primary | ICD-10-CM

## 2022-06-23 DIAGNOSIS — J02.9 SORE THROAT: ICD-10-CM

## 2022-06-23 DIAGNOSIS — B96.89 ACUTE BACTERIAL SINUSITIS: ICD-10-CM

## 2022-06-23 DIAGNOSIS — R52 BODY ACHES: ICD-10-CM

## 2022-06-23 LAB
CTP QC/QA: YES
CTP QC/QA: YES
POC MOLECULAR INFLUENZA A AGN: NEGATIVE
POC MOLECULAR INFLUENZA B AGN: NEGATIVE
SARS-COV-2 RDRP RESP QL NAA+PROBE: NEGATIVE

## 2022-06-23 PROCEDURE — 3008F PR BODY MASS INDEX (BMI) DOCUMENTED: ICD-10-PCS | Mod: CPTII,S$GLB,,

## 2022-06-23 PROCEDURE — 1160F PR REVIEW ALL MEDS BY PRESCRIBER/CLIN PHARMACIST DOCUMENTED: ICD-10-PCS | Mod: CPTII,S$GLB,,

## 2022-06-23 PROCEDURE — 3075F PR MOST RECENT SYSTOLIC BLOOD PRESS GE 130-139MM HG: ICD-10-PCS | Mod: CPTII,S$GLB,,

## 2022-06-23 PROCEDURE — 3079F DIAST BP 80-89 MM HG: CPT | Mod: CPTII,S$GLB,,

## 2022-06-23 PROCEDURE — 1159F MED LIST DOCD IN RCRD: CPT | Mod: CPTII,S$GLB,,

## 2022-06-23 PROCEDURE — 1159F PR MEDICATION LIST DOCUMENTED IN MEDICAL RECORD: ICD-10-PCS | Mod: CPTII,S$GLB,,

## 2022-06-23 PROCEDURE — 3008F BODY MASS INDEX DOCD: CPT | Mod: CPTII,S$GLB,,

## 2022-06-23 PROCEDURE — 99214 OFFICE O/P EST MOD 30 MIN: CPT | Mod: S$GLB,,,

## 2022-06-23 PROCEDURE — 3079F PR MOST RECENT DIASTOLIC BLOOD PRESSURE 80-89 MM HG: ICD-10-PCS | Mod: CPTII,S$GLB,,

## 2022-06-23 PROCEDURE — 87502 POCT INFLUENZA A/B MOLECULAR: ICD-10-PCS | Mod: QW,S$GLB,,

## 2022-06-23 PROCEDURE — 99214 PR OFFICE/OUTPT VISIT, EST, LEVL IV, 30-39 MIN: ICD-10-PCS | Mod: S$GLB,,,

## 2022-06-23 PROCEDURE — 1160F RVW MEDS BY RX/DR IN RCRD: CPT | Mod: CPTII,S$GLB,,

## 2022-06-23 PROCEDURE — 3075F SYST BP GE 130 - 139MM HG: CPT | Mod: CPTII,S$GLB,,

## 2022-06-23 PROCEDURE — U0002 COVID-19 LAB TEST NON-CDC: HCPCS | Mod: QW,S$GLB,,

## 2022-06-23 PROCEDURE — U0002: ICD-10-PCS | Mod: QW,S$GLB,,

## 2022-06-23 PROCEDURE — 87502 INFLUENZA DNA AMP PROBE: CPT | Mod: QW,S$GLB,,

## 2022-06-23 RX ORDER — AMOXICILLIN AND CLAVULANATE POTASSIUM 875; 125 MG/1; MG/1
1 TABLET, FILM COATED ORAL EVERY 12 HOURS
Qty: 14 TABLET | Refills: 0 | Status: SHIPPED | OUTPATIENT
Start: 2022-06-23 | End: 2022-06-30

## 2022-06-23 RX ORDER — PROMETHAZINE HYDROCHLORIDE AND DEXTROMETHORPHAN HYDROBROMIDE 6.25; 15 MG/5ML; MG/5ML
5 SYRUP ORAL EVERY 4 HOURS PRN
Qty: 180 ML | Refills: 0 | Status: SHIPPED | OUTPATIENT
Start: 2022-06-23 | End: 2022-06-30

## 2022-06-23 RX ORDER — IBUPROFEN 800 MG/1
800 TABLET ORAL 3 TIMES DAILY
Qty: 21 TABLET | Refills: 0 | Status: SHIPPED | OUTPATIENT
Start: 2022-06-23 | End: 2022-06-30

## 2022-06-23 NOTE — ADDENDUM NOTE
Addended by: MARIA D WASHINGTON on: 6/23/2022 04:02 PM     Modules accepted: Orders, Level of Service

## 2022-06-23 NOTE — PROGRESS NOTES
"Subjective:       Patient ID: Rosamaria Agosto is a 61 y.o. female.    Vitals:  height is 5' 9" (1.753 m) and weight is 92.1 kg (203 lb). Her tympanic temperature is 98.2 °F (36.8 °C). Her blood pressure is 138/83 and her pulse is 68. Her respiration is 16 and oxygen saturation is 98%.     Chief Complaint: Sinus Problem    Patient reports sinus symptoms stated 2 days ago with body aches and sinus pressure. She is having a cough at night. Coughing up green/yellow phlegm. Ears hurt. She took ibuprofen for symptoms Tuesday which helped.     Sinus Problem  This is a new problem. The current episode started in the past 7 days. The problem has been gradually worsening since onset. There has been no fever. Her pain is at a severity of 5/10. Associated symptoms include congestion (off and on), coughing (productive yellow), ear pain, headaches, sinus pressure and a sore throat. Pertinent negatives include no chills, diaphoresis or sneezing. (Body aches) Treatments tried: Ibuprofen. The treatment provided no relief.       Constitution: Negative for chills, sweating, fatigue and fever.   HENT: Positive for ear pain, congestion (off and on), sinus pressure and sore throat.    Eyes: Negative for eye pain and eye redness.   Respiratory: Positive for cough (productive yellow).    Gastrointestinal: Negative for nausea, vomiting, constipation and diarrhea.   Allergic/Immunologic: Negative for itching and sneezing.   Neurological: Positive for headaches. Negative for disorientation and altered mental status.   Psychiatric/Behavioral: Negative for altered mental status and disorientation.       Objective:      Physical Exam   Constitutional: She is oriented to person, place, and time. She appears well-developed. She is cooperative.  Non-toxic appearance. She does not appear ill. No distress.      Comments:Patient sits comfortably in exam chair. Answers questions in complete sentences. Does not show any signs of distress or " discoloration.        HENT:   Head: Normocephalic and atraumatic.   Ears:   Right Ear: Hearing, tympanic membrane, external ear and ear canal normal.   Left Ear: Hearing, tympanic membrane, external ear and ear canal normal.   Nose: Congestion present. No mucosal edema, rhinorrhea or nasal deformity. No epistaxis. Right sinus exhibits frontal sinus tenderness. Right sinus exhibits no maxillary sinus tenderness. Left sinus exhibits frontal sinus tenderness. Left sinus exhibits no maxillary sinus tenderness.   Mouth/Throat: Uvula is midline and mucous membranes are normal. No trismus in the jaw. Normal dentition. No uvula swelling. Posterior oropharyngeal erythema present. No oropharyngeal exudate or posterior oropharyngeal edema.   Eyes: Conjunctivae and lids are normal. No scleral icterus.   Neck: Trachea normal and phonation normal. Neck supple. No edema present. No erythema present. No neck rigidity present.   Cardiovascular: Normal rate, regular rhythm, normal heart sounds and normal pulses.   Pulmonary/Chest: Effort normal and breath sounds normal. No stridor. No respiratory distress. She has no decreased breath sounds. She has no wheezes. She has no rhonchi. She has no rales.   Abdominal: Normal appearance.   Musculoskeletal: Normal range of motion.         General: No deformity. Normal range of motion.   Neurological: She is alert and oriented to person, place, and time. She exhibits normal muscle tone. Coordination normal.   Skin: Skin is warm, dry, intact, not diaphoretic and not pale.   Psychiatric: Her speech is normal and behavior is normal. Judgment and thought content normal.   Nursing note and vitals reviewed.        Results for orders placed or performed in visit on 06/23/22   POCT COVID-19 Rapid Screening   Result Value Ref Range    POC Rapid COVID Negative Negative     Acceptable Yes    POCT Influenza A/B MOLECULAR   Result Value Ref Range    POC Molecular Influenza A Ag Negative  Negative, Not Reported    POC Molecular Influenza B Ag Negative Negative, Not Reported     Acceptable Yes        Assessment:       1. Encounter for screening for other viral diseases    2. Body aches    3. Sore throat    4. Cough    5. Acute bacterial sinusitis          Plan:         Encounter for screening for other viral diseases  -     POCT COVID-19 Rapid Screening    Body aches  -     POCT Influenza A/B MOLECULAR    Sore throat  -     diphenhydrAMINE-aluminum-magnesium hydroxide-simethicone-LIDOcaine HCl 2%; Swish and spit 15 mLs every 4 (four) hours as needed (sore throat).  Dispense: 180 mL; Refill: 0    Cough  -     promethazine-dextromethorphan (PROMETHAZINE-DM) 6.25-15 mg/5 mL Syrp; Take 5 mLs by mouth every 4 (four) hours as needed (cough).  Dispense: 180 mL; Refill: 0    Acute bacterial sinusitis  -     amoxicillin-clavulanate 875-125mg (AUGMENTIN) 875-125 mg per tablet; Take 1 tablet by mouth every 12 (twelve) hours. for 7 days  Dispense: 14 tablet; Refill: 0                 Patient Instructions   - Rest.    - Drink plenty of fluids.    - Acetaminophen (tylenol) or Ibuprofen (advil,motrin) as directed as needed for fever/pain. Avoid tylenol if you have a history of liver disease. Do not take ibuprofen if you have a history of GI bleeding, kidney disease, or if you take blood thinners.   - Ibuprofen dosing for adults: 400 mg by mouth every 4-6 hours as needed. Max: 2400 mg/day; Info: use lowest effective dose, shortest effective treatment duration; give w/ food if GI upset occurs.  - Tylenol dosing for adults: [By mouth route, immediate-release form] Dose: 325-1000 mg by mouth every 4-6h as needed; Max: 1 g/4h and 4 g/day from all sources. [By mouth route, extended-release form] Dose: 650-1300 mg Extended Release by mouth every 8h as needed; Max: 4 g/day from all sources.     - You must understand that you have received an Urgent Care treatment only and that you may be released before all  of your medical problems are known or treated.   - You, the patient, will arrange for follow up care as instructed.   - If your condition worsens or fails to improve we recommend that you receive another evaluation at the ER immediately or contact your PCP to discuss your concerns or return here.   - Follow up with your PCP or specialty clinic as directed in the next 1-2 weeks if not improved or as needed.  You can call [phone] to schedule an appointment with the appropriate provider.    If your symptoms do not improve or worsen, go to the emergency room immediately.

## 2022-06-23 NOTE — PATIENT INSTRUCTIONS
- Rest.    - Drink plenty of fluids.    - Acetaminophen (tylenol) or Ibuprofen (advil,motrin) as directed as needed for fever/pain. Avoid tylenol if you have a history of liver disease. Do not take ibuprofen if you have a history of GI bleeding, kidney disease, or if you take blood thinners.   - Ibuprofen dosing for adults: 400 mg by mouth every 4-6 hours as needed. Max: 2400 mg/day; Info: use lowest effective dose, shortest effective treatment duration; give w/ food if GI upset occurs.  - Tylenol dosing for adults: [By mouth route, immediate-release form] Dose: 325-1000 mg by mouth every 4-6h as needed; Max: 1 g/4h and 4 g/day from all sources. [By mouth route, extended-release form] Dose: 650-1300 mg Extended Release by mouth every 8h as needed; Max: 4 g/day from all sources.     - You must understand that you have received an Urgent Care treatment only and that you may be released before all of your medical problems are known or treated.   - You, the patient, will arrange for follow up care as instructed.   - If your condition worsens or fails to improve we recommend that you receive another evaluation at the ER immediately or contact your PCP to discuss your concerns or return here.   - Follow up with your PCP or specialty clinic as directed in the next 1-2 weeks if not improved or as needed.  You can call [phone] to schedule an appointment with the appropriate provider.    If your symptoms do not improve or worsen, go to the emergency room immediately.

## 2022-07-20 DIAGNOSIS — Z12.31 OTHER SCREENING MAMMOGRAM: ICD-10-CM

## 2022-07-27 ENCOUNTER — HOSPITAL ENCOUNTER (OUTPATIENT)
Dept: RADIOLOGY | Facility: HOSPITAL | Age: 62
Discharge: HOME | End: 2022-07-27
Attending: FAMILY MEDICINE
Payer: COMMERCIAL

## 2022-07-27 DIAGNOSIS — Z12.31 OTHER SCREENING MAMMOGRAM: ICD-10-CM

## 2022-07-27 PROCEDURE — 77067 SCR MAMMO BI INCL CAD: CPT | Mod: 26,,, | Performed by: RADIOLOGY

## 2022-07-27 PROCEDURE — 77063 BREAST TOMOSYNTHESIS BI: CPT | Mod: 26,,, | Performed by: RADIOLOGY

## 2022-07-27 PROCEDURE — 77063 BREAST TOMOSYNTHESIS BI: CPT | Mod: TC

## 2022-07-27 PROCEDURE — 77063 MAMMO DIGITAL SCREENING BILAT WITH TOMO: ICD-10-PCS | Mod: 26,,, | Performed by: RADIOLOGY

## 2022-07-27 PROCEDURE — 77067 SCR MAMMO BI INCL CAD: CPT | Mod: TC

## 2022-07-27 PROCEDURE — 77067 MAMMO DIGITAL SCREENING BILAT WITH TOMO: ICD-10-PCS | Mod: 26,,, | Performed by: RADIOLOGY

## 2022-08-03 ENCOUNTER — PATIENT MESSAGE (OUTPATIENT)
Dept: BARIATRICS | Facility: CLINIC | Age: 62
End: 2022-08-03
Payer: COMMERCIAL

## 2022-08-04 ENCOUNTER — OFFICE VISIT (OUTPATIENT)
Dept: OBSTETRICS AND GYNECOLOGY | Facility: CLINIC | Age: 62
End: 2022-08-04
Payer: COMMERCIAL

## 2022-08-04 VITALS
SYSTOLIC BLOOD PRESSURE: 138 MMHG | BODY MASS INDEX: 30.36 KG/M2 | HEIGHT: 69 IN | WEIGHT: 205 LBS | DIASTOLIC BLOOD PRESSURE: 83 MMHG

## 2022-08-04 DIAGNOSIS — N95.2 ATROPHIC VAGINITIS: ICD-10-CM

## 2022-08-04 DIAGNOSIS — R39.9 UTI SYMPTOMS: ICD-10-CM

## 2022-08-04 DIAGNOSIS — Z01.419 ROUTINE GYNECOLOGICAL EXAMINATION: Primary | ICD-10-CM

## 2022-08-04 PROBLEM — M25.612 SHOULDER STIFFNESS, LEFT: Status: RESOLVED | Noted: 2020-02-06 | Resolved: 2022-08-04

## 2022-08-04 PROBLEM — J30.9 ALLERGIC RHINITIS: Status: RESOLVED | Noted: 2018-05-28 | Resolved: 2022-08-04

## 2022-08-04 PROBLEM — N81.10 VAGINAL PROLAPSE WITHOUT UTERINE PROLAPSE: Status: RESOLVED | Noted: 2019-06-24 | Resolved: 2022-08-04

## 2022-08-04 PROBLEM — M25.612 DECREASED ROM OF LEFT SHOULDER: Status: RESOLVED | Noted: 2019-07-29 | Resolved: 2022-08-04

## 2022-08-04 PROBLEM — M75.112 INCOMPLETE TEAR OF LEFT ROTATOR CUFF: Status: RESOLVED | Noted: 2019-07-24 | Resolved: 2022-08-04

## 2022-08-04 PROBLEM — T50.995A ALLERGIC REACTION TO DYE: Status: RESOLVED | Noted: 2019-12-31 | Resolved: 2022-08-04

## 2022-08-04 PROBLEM — J31.0 CHRONIC RHINITIS: Status: RESOLVED | Noted: 2017-05-17 | Resolved: 2022-08-04

## 2022-08-04 PROCEDURE — 3079F DIAST BP 80-89 MM HG: CPT | Mod: CPTII,S$GLB,, | Performed by: OBSTETRICS & GYNECOLOGY

## 2022-08-04 PROCEDURE — 99396 PR PREVENTIVE VISIT,EST,40-64: ICD-10-PCS | Mod: S$GLB,,, | Performed by: OBSTETRICS & GYNECOLOGY

## 2022-08-04 PROCEDURE — 1160F RVW MEDS BY RX/DR IN RCRD: CPT | Mod: CPTII,S$GLB,, | Performed by: OBSTETRICS & GYNECOLOGY

## 2022-08-04 PROCEDURE — 1160F PR REVIEW ALL MEDS BY PRESCRIBER/CLIN PHARMACIST DOCUMENTED: ICD-10-PCS | Mod: CPTII,S$GLB,, | Performed by: OBSTETRICS & GYNECOLOGY

## 2022-08-04 PROCEDURE — 3075F SYST BP GE 130 - 139MM HG: CPT | Mod: CPTII,S$GLB,, | Performed by: OBSTETRICS & GYNECOLOGY

## 2022-08-04 PROCEDURE — 3075F PR MOST RECENT SYSTOLIC BLOOD PRESS GE 130-139MM HG: ICD-10-PCS | Mod: CPTII,S$GLB,, | Performed by: OBSTETRICS & GYNECOLOGY

## 2022-08-04 PROCEDURE — 99396 PREV VISIT EST AGE 40-64: CPT | Mod: S$GLB,,, | Performed by: OBSTETRICS & GYNECOLOGY

## 2022-08-04 PROCEDURE — 1159F PR MEDICATION LIST DOCUMENTED IN MEDICAL RECORD: ICD-10-PCS | Mod: CPTII,S$GLB,, | Performed by: OBSTETRICS & GYNECOLOGY

## 2022-08-04 PROCEDURE — 87088 URINE BACTERIA CULTURE: CPT | Performed by: OBSTETRICS & GYNECOLOGY

## 2022-08-04 PROCEDURE — 3008F BODY MASS INDEX DOCD: CPT | Mod: CPTII,S$GLB,, | Performed by: OBSTETRICS & GYNECOLOGY

## 2022-08-04 PROCEDURE — 87077 CULTURE AEROBIC IDENTIFY: CPT | Performed by: OBSTETRICS & GYNECOLOGY

## 2022-08-04 PROCEDURE — 3008F PR BODY MASS INDEX (BMI) DOCUMENTED: ICD-10-PCS | Mod: CPTII,S$GLB,, | Performed by: OBSTETRICS & GYNECOLOGY

## 2022-08-04 PROCEDURE — 99999 PR PBB SHADOW E&M-EST. PATIENT-LVL III: ICD-10-PCS | Mod: PBBFAC,,, | Performed by: OBSTETRICS & GYNECOLOGY

## 2022-08-04 PROCEDURE — 87186 SC STD MICRODIL/AGAR DIL: CPT | Performed by: OBSTETRICS & GYNECOLOGY

## 2022-08-04 PROCEDURE — 99999 PR PBB SHADOW E&M-EST. PATIENT-LVL III: CPT | Mod: PBBFAC,,, | Performed by: OBSTETRICS & GYNECOLOGY

## 2022-08-04 PROCEDURE — 87086 URINE CULTURE/COLONY COUNT: CPT | Performed by: OBSTETRICS & GYNECOLOGY

## 2022-08-04 PROCEDURE — 1159F MED LIST DOCD IN RCRD: CPT | Mod: CPTII,S$GLB,, | Performed by: OBSTETRICS & GYNECOLOGY

## 2022-08-04 PROCEDURE — 3079F PR MOST RECENT DIASTOLIC BLOOD PRESSURE 80-89 MM HG: ICD-10-PCS | Mod: CPTII,S$GLB,, | Performed by: OBSTETRICS & GYNECOLOGY

## 2022-08-04 RX ORDER — DICLOFENAC SODIUM 75 MG/1
TABLET, DELAYED RELEASE ORAL
COMMUNITY
Start: 2022-06-30 | End: 2022-09-23 | Stop reason: SDUPTHER

## 2022-08-04 RX ORDER — ESTRADIOL 10 UG/1
1 INSERT VAGINAL
Qty: 8 TABLET | Refills: 12 | Status: SHIPPED | OUTPATIENT
Start: 2022-08-04

## 2022-08-04 NOTE — PROGRESS NOTES
Proteus vulgaris     CULTURE, URINE     Amox/K Clav'ate Resistant     Amp/Sulbactam Intermediate     Cefepime Sensitive     Ceftriaxone Sensitive     Ciprofloxacin Sensitive     Ertapenem Sensitive     Gentamicin Sensitive     Levofloxacin Sensitive     Meropenem Sensitive     Piperacillin/Tazo Sensitive     Tobramycin Sensitive     Trimeth/Sulfa Sensitive          PT HERE FOR ANNUAL.  ? LESION ON LABIA FOR A TIME.  ? BLACK SPOT IN R GROIN.  HAS VAGINAL DRYNESS.    ROS:  GENERAL: No fever, chills, fatigability or weight loss.  VULVAR: No pain, no lesions and no itching.  VAGINAL: No relaxation, no itching, no discharge, no abnormal bleeding and no lesions.  ABDOMEN: No abdominal pain. Denies nausea. Denies vomiting. No diarrhea. No constipation  BREAST: Denies pain. No lumps. No discharge.  URINARY: No incontinence, no nocturia, no frequency and no dysuria.  CARDIOVASCULAR: No chest pain. No shortness of breath. No leg cramps.  NEUROLOGICAL: No headaches. No vision changes.  The remainder of the review of systems was negative.    PE:   General Appearance: overweight Well developed. Well nourished. In no acute distress.  Urethral Meatus: Normal size. Normal location. No lesions. No prolapse.  Vulva: Atrophic. Lesions: No.  Urethra: No masses. No tenderness. No prolapse. No scarring.  Bladder: No masses. No tenderness.  Vagina: Mucosa NI: yes Discharge: no Atrophic: yes Rectocele: no Cystocele: no Vaginal cuff intact: yes  Cervix: Absent.  Uterus: Absent.  Adnexa: Masses: No Tenderness: No       CDS Nodularity: No   Abdomen: overweight  No masses. No tenderness.  Breasts: No bilateral masses. No bilateral discharge. No bilateral tenderness. No bilateral fibrocystic changes.  Neck: No thyroid enlargement. No thyroid masses.  Skin: 3.5 CM DARK LESION ON R MONS    PROCEDURES:    DIAGNOSIS:  1. Routine gynecological examination    2. Atrophic vaginitis    3. UTI symptoms        PLAN:     MEDICATIONS & ORDERS:  Orders  Placed This Encounter    Urine culture    estradioL (VAGIFEM) 10 mcg Tab       Patient was counseled today on the new ACS guidelines for cervical cytology screening as well as the current recommendations for breast cancer screening. She was counseled to follow up with her PCP for other routine health maintenance. Counseling session lasted approximately 10 minutes, and all her questions were answered.     Patient was counseled today on A.C.S. Pap guidelines and recommendations for yearly pelvic exams, mammograms and monthly self breast exams; to see her PCP for other health maintenance and the increased risks of CVD, MI, VTE, CVA , and Invasive Breast Cancer on Prempro and the increased risk of CVA with Premarin as reported by the W.H.I[employer] studies; the benefits of HRT/ERT; her personal risks which include; alternative therapies for vasomotor symptoms (not FDA approved) including soy, black cohosh, Vit E and avoidance of triggers such as cigarette smoking, alcohol, humidity, stress and caffeine; alternative Rxs for treatment of menopause symptoms such as antidepressants or Clonidine. Counseling session lasted approximately minutes, and all her questions were completely answered.      FOLLOW-UP: With me PRN. SHE DOES NOT NEED PERIODIC/ANNUAL EXAMS.  F/U DERM    Bryon Melgoza Jr, MD, FACOG

## 2022-08-06 ENCOUNTER — OFFICE VISIT (OUTPATIENT)
Dept: URGENT CARE | Facility: CLINIC | Age: 62
End: 2022-08-06
Payer: COMMERCIAL

## 2022-08-06 VITALS
RESPIRATION RATE: 16 BRPM | SYSTOLIC BLOOD PRESSURE: 161 MMHG | DIASTOLIC BLOOD PRESSURE: 89 MMHG | HEART RATE: 68 BPM | OXYGEN SATURATION: 100 % | HEIGHT: 69 IN | WEIGHT: 205 LBS | BODY MASS INDEX: 30.36 KG/M2 | TEMPERATURE: 98 F

## 2022-08-06 DIAGNOSIS — M54.50 ACUTE RIGHT-SIDED LOW BACK PAIN, UNSPECIFIED WHETHER SCIATICA PRESENT: ICD-10-CM

## 2022-08-06 DIAGNOSIS — N12 PYELONEPHRITIS: Primary | ICD-10-CM

## 2022-08-06 PROCEDURE — 3008F PR BODY MASS INDEX (BMI) DOCUMENTED: ICD-10-PCS | Mod: CPTII,S$GLB,, | Performed by: PHYSICIAN ASSISTANT

## 2022-08-06 PROCEDURE — 3008F BODY MASS INDEX DOCD: CPT | Mod: CPTII,S$GLB,, | Performed by: PHYSICIAN ASSISTANT

## 2022-08-06 PROCEDURE — 1159F MED LIST DOCD IN RCRD: CPT | Mod: CPTII,S$GLB,, | Performed by: PHYSICIAN ASSISTANT

## 2022-08-06 PROCEDURE — 96372 THER/PROPH/DIAG INJ SC/IM: CPT | Mod: S$GLB,,, | Performed by: PHYSICIAN ASSISTANT

## 2022-08-06 PROCEDURE — 99214 OFFICE O/P EST MOD 30 MIN: CPT | Mod: 25,S$GLB,, | Performed by: PHYSICIAN ASSISTANT

## 2022-08-06 PROCEDURE — 3077F PR MOST RECENT SYSTOLIC BLOOD PRESSURE >= 140 MM HG: ICD-10-PCS | Mod: CPTII,S$GLB,, | Performed by: PHYSICIAN ASSISTANT

## 2022-08-06 PROCEDURE — 99214 PR OFFICE/OUTPT VISIT, EST, LEVL IV, 30-39 MIN: ICD-10-PCS | Mod: 25,S$GLB,, | Performed by: PHYSICIAN ASSISTANT

## 2022-08-06 PROCEDURE — 3077F SYST BP >= 140 MM HG: CPT | Mod: CPTII,S$GLB,, | Performed by: PHYSICIAN ASSISTANT

## 2022-08-06 PROCEDURE — 1160F PR REVIEW ALL MEDS BY PRESCRIBER/CLIN PHARMACIST DOCUMENTED: ICD-10-PCS | Mod: CPTII,S$GLB,, | Performed by: PHYSICIAN ASSISTANT

## 2022-08-06 PROCEDURE — 1159F PR MEDICATION LIST DOCUMENTED IN MEDICAL RECORD: ICD-10-PCS | Mod: CPTII,S$GLB,, | Performed by: PHYSICIAN ASSISTANT

## 2022-08-06 PROCEDURE — 1160F RVW MEDS BY RX/DR IN RCRD: CPT | Mod: CPTII,S$GLB,, | Performed by: PHYSICIAN ASSISTANT

## 2022-08-06 PROCEDURE — 96372 PR INJECTION,THERAP/PROPH/DIAG2ST, IM OR SUBCUT: ICD-10-PCS | Mod: S$GLB,,, | Performed by: PHYSICIAN ASSISTANT

## 2022-08-06 PROCEDURE — 3079F PR MOST RECENT DIASTOLIC BLOOD PRESSURE 80-89 MM HG: ICD-10-PCS | Mod: CPTII,S$GLB,, | Performed by: PHYSICIAN ASSISTANT

## 2022-08-06 PROCEDURE — 3079F DIAST BP 80-89 MM HG: CPT | Mod: CPTII,S$GLB,, | Performed by: PHYSICIAN ASSISTANT

## 2022-08-06 RX ORDER — CIPROFLOXACIN 500 MG/1
500 TABLET ORAL 2 TIMES DAILY
Qty: 14 TABLET | Refills: 0 | Status: SHIPPED | OUTPATIENT
Start: 2022-08-06 | End: 2022-08-13

## 2022-08-06 RX ORDER — CEFTRIAXONE 1 G/1
1 INJECTION, POWDER, FOR SOLUTION INTRAMUSCULAR; INTRAVENOUS
Status: COMPLETED | OUTPATIENT
Start: 2022-08-06 | End: 2022-08-06

## 2022-08-06 RX ORDER — LIDOCAINE HYDROCHLORIDE 10 MG/ML
2.1 INJECTION INFILTRATION; PERINEURAL
Status: COMPLETED | OUTPATIENT
Start: 2022-08-06 | End: 2022-08-06

## 2022-08-06 RX ADMIN — CEFTRIAXONE 1 G: 1 INJECTION, POWDER, FOR SOLUTION INTRAMUSCULAR; INTRAVENOUS at 04:08

## 2022-08-06 RX ADMIN — LIDOCAINE HYDROCHLORIDE 2.1 ML: 10 INJECTION INFILTRATION; PERINEURAL at 04:08

## 2022-08-06 NOTE — PROGRESS NOTES
"Subjective:       Patient ID: Rosamaria Agosto is a 61 y.o. female.    Vitals:  height is 5' 9" (1.753 m) and weight is 93 kg (205 lb). Her temperature is 98 °F (36.7 °C). Her blood pressure is 161/89 (abnormal) and her pulse is 68. Her respiration is 16 and oxygen saturation is 100%.     Chief Complaint: Back Pain    60yo female presents with c/o R sided low back pain times 3-4 days.  She denies any injury or trauma.  Patient states that pain is constant.  Has expressed relief with Aleve, but pain returns.  No radiation of pain.  Pain is localized right side of low back.  Patient states that when seeing her OBGYN 2 days ago, mention the low back pain, and urine culture was sent.  She denies any urinary symptoms.  No radiation of pain down legs.  No lower extremity weakness or paresthesia.  Preliminary result is g negative amy, awaiting susceptibility.  Patient states that she had kidney infection years ago and it felt similar. She is not on antibiotics.     Back Pain  This is a new problem. The current episode started in the past 7 days (4d). The problem occurs constantly. The problem has been gradually worsening since onset. The pain is present in the lumbar spine (right side low back). The pain does not radiate. The pain is at a severity of 7/10. The pain is moderate. Pertinent negatives include no abdominal pain, bladder incontinence, bowel incontinence, chest pain, dysuria, fever, headaches, leg pain, numbness, paresis, paresthesias, pelvic pain, perianal numbness, tingling, weakness or weight loss. Risk factors include menopause and obesity. She has tried heat for the symptoms. The treatment provided mild relief.       Constitution: Negative for chills, sweating, fatigue and fever.   HENT: Negative for ear pain, congestion, postnasal drip, sinus pressure and trouble swallowing.    Neck: Negative for neck pain and neck stiffness.   Cardiovascular: Negative for chest pain, leg swelling and palpitations. "   Eyes: Negative for eye itching, eye pain and eye redness.   Respiratory: Negative for cough, sputum production and shortness of breath.    Gastrointestinal: Negative for abdominal pain, nausea, vomiting, diarrhea and bowel incontinence.   Genitourinary: Positive for flank pain. Negative for dysuria, frequency, urgency, bladder incontinence, hematuria and pelvic pain.   Musculoskeletal: Positive for back pain. Negative for pain, joint pain, abnormal ROM of joint and muscle cramps.   Neurological: Negative for dizziness, history of vertigo, headaches, loss of consciousness, numbness and tingling.       Objective:      Physical Exam   Constitutional: She is oriented to person, place, and time. She appears well-developed. She is cooperative.  Non-toxic appearance. She does not appear ill. No distress.   HENT:   Head: Normocephalic and atraumatic.   Ears:   Right Ear: External ear normal.   Left Ear: External ear normal.   Nose: Nose normal.   Mouth/Throat: Oropharynx is clear and moist and mucous membranes are normal.   Eyes: Conjunctivae and lids are normal.   Neck: Trachea normal and phonation normal. Neck supple.   Cardiovascular: Normal rate, regular rhythm, normal heart sounds and normal pulses.   Pulmonary/Chest: Effort normal and breath sounds normal. No respiratory distress.   Abdominal: Normal appearance and bowel sounds are normal. She exhibits no distension, no abdominal bruit, no pulsatile midline mass and no mass. Soft. There is no abdominal tenderness. There is right CVA tenderness. There is no guarding, negative Conway's sign and no left CVA tenderness.   Musculoskeletal: Normal range of motion.         General: No deformity. Normal range of motion.      Lumbar back: She exhibits tenderness. She exhibits normal range of motion and no bony tenderness.        Back:       Comments: Bilateral knees, hips full range of motion 5/5 strength. NV intact Lower extremities.    Neurological: She is alert and  oriented to person, place, and time. She has normal strength and normal reflexes. No sensory deficit.   Skin: Skin is warm, dry, intact, not diaphoretic and not pale.   Psychiatric: Her speech is normal and behavior is normal. Judgment and thought content normal.   Nursing note and vitals reviewed.        Results for orders placed or performed in visit on 08/04/22   Urine culture    Specimen: Urine, Clean Catch   Result Value Ref Range    Urine Culture, Routine (A)      GRAM NEGATIVE MARITZA  10,000 - 49,999 cfu/ml  Identification and susceptibility pending         Assessment:       1. Pyelonephritis    2. Acute right-sided low back pain, unspecified whether sciatica present        Urine culture from OBGYN 2 days ago reviewed.  Susceptibilities pending.  Preliminary read is g negative maritza.  Will begin antibiotic treatment for suspected pyelonephritis.  Given strict ER/follow-up precautions.  Discussed home care and treatment with patient.  Patient expresses understanding, agrees that plan of care.  Plan:         Pyelonephritis  -     ciprofloxacin HCl (CIPRO) 500 MG tablet; Take 1 tablet (500 mg total) by mouth 2 (two) times daily. for 7 days  Dispense: 14 tablet; Refill: 0  -     cefTRIAXone injection 1 g  -     LIDOcaine HCL 10 mg/ml (1%) injection 2.1 mL    Acute right-sided low back pain, unspecified whether sciatica present  -     Cancel: POCT Urinalysis, Dipstick, Automated, W/O Scope      Patient Instructions   - Rest.    - Drink plenty of fluids.    - Acetaminophen (tylenol) or Ibuprofen (advil,motrin) as directed as needed for fever/pain. Avoid tylenol if you have a history of liver disease. Do not take ibuprofen if you have a history of GI bleeding, kidney disease, or if you take blood thinners.     - You have been given an antibiotic to treat your condition today.    - Please complete the antibiotic as directed on the bottle.   - If you are female and on oral birth control pills, use additional methods to  prevent pregnancy while on antibiotics and for one cycle after.   - you can take otc probiotic to limit upset stomach    - Follow up with your PCP or specialty clinic as directed in the next 2-3 days if not improved or as needed.  You can call [phone] to schedule an appointment with the appropriate provider.    - Go to the ER or seek medical attention immediately if you develop new or worsening symptoms.     - You must understand that you have received an Urgent Care treatment only and that you may be released before all of your medical problems are known or treated.   - You, the patient, will arrange for follow up care as instructed.   - If your condition worsens or fails to improve we recommend that you receive another evaluation at the ER immediately or contact your PCP to discuss your concerns or return here.    Elevated Blood Pressure  Your blood pressure was elevated during your visit to the urgent care.  It was not so high that immediate care was needed but it is recommended that you monitor your blood pressure over the next week or two to make sure that it is not staying elevated.  Please have your blood pressure taken 2-3 times daily at different times of the day.  Write all of those blood pressures down and record the time that they were taken.  Keep all that information and take it with you to see your Primary Care Physician.  If your blood pressure is consistently above 140/90 you will need to follow up with your PCP more quickly

## 2022-08-06 NOTE — PATIENT INSTRUCTIONS
- Rest.    - Drink plenty of fluids.    - Acetaminophen (tylenol) or Ibuprofen (advil,motrin) as directed as needed for fever/pain. Avoid tylenol if you have a history of liver disease. Do not take ibuprofen if you have a history of GI bleeding, kidney disease, or if you take blood thinners.     - You have been given an antibiotic to treat your condition today.    - Please complete the antibiotic as directed on the bottle.   - If you are female and on oral birth control pills, use additional methods to prevent pregnancy while on antibiotics and for one cycle after.   - you can take otc probiotic to limit upset stomach    - Follow up with your PCP or specialty clinic as directed in the next 2-3 days if not improved or as needed.  You can call [phone] to schedule an appointment with the appropriate provider.    - Go to the ER or seek medical attention immediately if you develop new or worsening symptoms.     - You must understand that you have received an Urgent Care treatment only and that you may be released before all of your medical problems are known or treated.   - You, the patient, will arrange for follow up care as instructed.   - If your condition worsens or fails to improve we recommend that you receive another evaluation at the ER immediately or contact your PCP to discuss your concerns or return here.    Elevated Blood Pressure  Your blood pressure was elevated during your visit to the urgent care.  It was not so high that immediate care was needed but it is recommended that you monitor your blood pressure over the next week or two to make sure that it is not staying elevated.  Please have your blood pressure taken 2-3 times daily at different times of the day.  Write all of those blood pressures down and record the time that they were taken.  Keep all that information and take it with you to see your Primary Care Physician.  If your blood pressure is consistently above 140/90 you will need to  follow up with your PCP more quickly

## 2022-08-07 LAB — BACTERIA UR CULT: ABNORMAL

## 2022-08-08 ENCOUNTER — PATIENT MESSAGE (OUTPATIENT)
Dept: OBSTETRICS AND GYNECOLOGY | Facility: CLINIC | Age: 62
End: 2022-08-08
Payer: COMMERCIAL

## 2022-08-08 NOTE — LETTER
August 8, 2022    Rosamaria Agosto  [address] [facility] [address]         [facility]  [address] [address]  Phone: [phone]  Fax: [phone] August 8, 2022     Patient: Rosamaria Agosto   YOB: 1960   Date of Visit: 8/8/2022       To Whom It May Concern:    It is my medical opinion that Rosamaria Agosto may return to work on Thursday on 8/11/2022.    If you have any questions or concerns, please don't hesitate to call.    Sincerely,        Bryon Melgoza Jr., MD

## 2022-08-23 NOTE — PROGRESS NOTES
[facility]  Physical Therapy Note     Name: Rosamaria Agosto  Clinic Number: [id number]     Therapy Diagnosis: decreased ROM and pain at the shoulder s/p RTC  Physician: Arina Corea PA-C     Physician Orders: PT Eval and Treat   Medical Diagnosis from Referral: s/p RTC repair and biceps tenodesis  Evaluation Date: 7/26/2019  Authorization Period Expiration: 12/31/19  Plan of Care Expiration: 10/31/19 New plan of care extended to 2/29/20 with approved certifiction extension  Visit # / Visits authorized: new script 9/30;   30/30 completed     Time In:  400 pm  Time Out: 505 pm  Total Billable Time:  35 minutes ( MT 1, TE -1)     Precautions: Standard, RTC protocol NO AROM at elbow joint s/p biceps tenodesis, PROM at elbow and shoulder but no excessive ER stopping at initial end feel per phase I protocol and follow up with MD for protocol     Subjective      Pt reports:  Continues to feel good mobility with just tightness and no soreness today with work.  .  Pain : 2/10 at beginning and end of session 1/10 mostly tightness  Location: Left shoulder    TREATMENT     Rosamaria received therapeutic exercises to develop ROM for 45 minutes including:         Shoulder rows 3 x10 with YTB - NP  Shoulder extension 3 x10 with YTB-  NP    D2 flexion and then D2 extension pattern with min to mod resistance in supine      AROM shoulder scaption and then abduction 2 x10 with stretch at end ROM 20-30 secs after each set against the wall      Shoulder depression with 13# pulley with use of RUE to assist LUE to perform lat pulldown and LUE to hold in bottom position shoulder depression RUE assist to return to overhead position and relax at top of the range 2 x 15 with 10 sec stretch at top position for PROM       AAROM  3 x 10 reps scaption and again in abduction in midrange to keep smooth AAROM and  with pt in standng with arm on PT, PT apply humeral inferior glide with scaption motion teres minor  stretch- held static 3x 20 sec    Pulley 2 x 1min in scaption and then in abduction     AROM with PT into scaption and then in abduction in sidelying with cues for shoulder depression in supine and in tolerated incline position 45 and 70 deg 3 x10 each as tolerated towards progressive upright posture.from 10/28/19 scaption and serratus punches with 2#    Self AAROM with use cane or PVC pipe and RUE in supine and then in standing: scaption with elbow flexed toward full extension at end rom of scaption - 2x 10 reps:   Self AAROM in supine scaption with elbow extending for full ROM 2 x10     AROM at L shoulder 12/31/19  165 deg abd, in sidelying in 75 degrees elevation in head of mat    170 deg flex in supine with 75 deg elevation in head of mat  with cues to avoid shoulder hike, compensation.  60 deg with ER stopping at initial end feel in standing  And   Elbow extension at  0 deg    AAROM/ AROM 1/6/20  upright standing on min A  in midrange for AAROM and at end ROM  170/160 deg scaption  165/140 deg abuction  70 ER      Strength 12/31/19  scaption and abduction of left shoulder 3-/5  ER 3+/5    Manual therapy 20 min with STM./ MFR to pect minor, teres minor, and rhomboids, upper traps and levator, GH mobs grade I/II AP and lateral distraction for pain relief into empty end feel before restrictions and GH mobs grade Iv with distraction  in all direction for increased ROM  - teres minor stretch at end ROM in scaption and abduction with inferior humeral glide  - manual stretch pec minor  -subscap release with ER motion  - inferior humeral glide with inferior mobilization Grade IV    0 min ice to left shoulder     Education provided:   Sleeping position, sling positioning and shoulder pendulums, donning strategy for sling, review post surgery restrictions. Pt issued putty for , decreased edema pooling at elbow     Written Home Exercises Provided: yes.  Exercises were reviewed and Rosamaria was able to demonstrate them  "prior to the end of the session.  Rosamaria demonstrated good  understanding of the education provided.      See EMR under Patient Instructions for exercises provided 2019.     Assessment     Pt with increased shoulder hiking and guarding on arrival with reported pain with increased workload at work.  Pt still continues to struggles to acclimate with return to work. Pt with improved mechanics and AROM in supine and standing as session progress.  Pt with increased tolerance today with session and decreased pain with session.  Pt needed mod to min cues for mechanics today.      Goals:  Short Term Goals: 3 weeks   1.  Pt independent with HEP for s/p RTC repair and biceps tenodesis with return demonstration. MET  2.  Pt to be independent with donning and doffing surgical sling for proper pain management . Met 19  3.  Pt to increase PROM as tolerated at left shoulder to 90 deg shoulder flexion and 80 deg abduction without pain. MET  4.  Pt to decrease overall pain at left shoulder to less than 4/10 after session. Met 19     Long Term Goals: 12 weeks   1.  Pt to been independent with discharge HEP without cues and proper form.  2.  Pt return to independent ADLs to include dressing, grooming with BUEs without compensation.  3.  Pt to increased AROM at elbow WNL and Left shoulder to 170 deg flexion and 160 deg abduction, 70 deg ER without pain or compensation  4.  Pt to increased L shoulder strength to 4+/5 into flexion extension, ER, IR   5  Pt to increase tolerance to perform 1 hour of household chores, ie cleaning without increased L shoulder pain  6. Pt to decrease pain at left shoulder to 3/10 after session for improved functional activities. MET    NEW LT. Return to work without limitation and with no increased pain at end of the day less than 2/10 pain grossly for increased tolerance  8. Combined ROM into flex/ER and ext/IR left shoulder within 4" of R shoulder with reach up and down thoracic " spine.          Plan   Plan of care Certification: 7/26/2019 to 10/31/19 extend new script to 2/29/20 with approval of certification period      Cont with  New POC request extend POC to 2/29/20   Shanel Carvalho PT       Detail Level: Zone Initiate Treatment: Tac 1% apply 2x a day for 2 weeks repeat with flares.

## 2022-08-24 ENCOUNTER — TELEPHONE (OUTPATIENT)
Dept: SPORTS MEDICINE | Facility: CLINIC | Age: 62
End: 2022-08-24
Payer: COMMERCIAL

## 2022-09-23 ENCOUNTER — LAB VISIT (OUTPATIENT)
Dept: LAB | Facility: HOSPITAL | Age: 62
End: 2022-09-23
Attending: FAMILY MEDICINE
Payer: COMMERCIAL

## 2022-09-23 ENCOUNTER — OFFICE VISIT (OUTPATIENT)
Dept: INTERNAL MEDICINE | Facility: CLINIC | Age: 62
End: 2022-09-23
Payer: COMMERCIAL

## 2022-09-23 VITALS
WEIGHT: 200.19 LBS | TEMPERATURE: 97 F | RESPIRATION RATE: 19 BRPM | BODY MASS INDEX: 29.65 KG/M2 | OXYGEN SATURATION: 97 % | SYSTOLIC BLOOD PRESSURE: 130 MMHG | HEART RATE: 68 BPM | DIASTOLIC BLOOD PRESSURE: 80 MMHG | HEIGHT: 69 IN

## 2022-09-23 DIAGNOSIS — Z00.00 WELL ADULT EXAM: Primary | ICD-10-CM

## 2022-09-23 DIAGNOSIS — I10 ESSENTIAL HYPERTENSION: ICD-10-CM

## 2022-09-23 DIAGNOSIS — E55.9 VITAMIN D DEFICIENCY: ICD-10-CM

## 2022-09-23 DIAGNOSIS — E78.2 MIXED HYPERLIPIDEMIA: ICD-10-CM

## 2022-09-23 DIAGNOSIS — R51.9 NONINTRACTABLE HEADACHE, UNSPECIFIED CHRONICITY PATTERN, UNSPECIFIED HEADACHE TYPE: ICD-10-CM

## 2022-09-23 DIAGNOSIS — I87.2 VENOUS INSUFFICIENCY: ICD-10-CM

## 2022-09-23 DIAGNOSIS — R05.9 COUGH: ICD-10-CM

## 2022-09-23 DIAGNOSIS — Z00.00 WELL ADULT EXAM: ICD-10-CM

## 2022-09-23 DIAGNOSIS — J32.9 SINUSITIS, UNSPECIFIED CHRONICITY, UNSPECIFIED LOCATION: ICD-10-CM

## 2022-09-23 LAB
25(OH)D3+25(OH)D2 SERPL-MCNC: 54 NG/ML (ref 30–96)
ALBUMIN SERPL BCP-MCNC: 4.3 G/DL (ref 3.5–5.2)
ALP SERPL-CCNC: 92 U/L (ref 55–135)
ALT SERPL W/O P-5'-P-CCNC: 16 U/L (ref 10–44)
ANION GAP SERPL CALC-SCNC: 13 MMOL/L (ref 8–16)
AST SERPL-CCNC: 20 U/L (ref 10–40)
BASOPHILS # BLD AUTO: 0.06 K/UL (ref 0–0.2)
BASOPHILS NFR BLD: 1 % (ref 0–1.9)
BILIRUB SERPL-MCNC: 0.5 MG/DL (ref 0.1–1)
BUN SERPL-MCNC: 15 MG/DL (ref 8–23)
CALCIUM SERPL-MCNC: 9.7 MG/DL (ref 8.7–10.5)
CHLORIDE SERPL-SCNC: 98 MMOL/L (ref 95–110)
CHOLEST SERPL-MCNC: 288 MG/DL (ref 120–199)
CHOLEST/HDLC SERPL: 5.4 {RATIO} (ref 2–5)
CO2 SERPL-SCNC: 28 MMOL/L (ref 23–29)
CREAT SERPL-MCNC: 0.7 MG/DL (ref 0.5–1.4)
DIFFERENTIAL METHOD: ABNORMAL
EOSINOPHIL # BLD AUTO: 0.4 K/UL (ref 0–0.5)
EOSINOPHIL NFR BLD: 6.4 % (ref 0–8)
ERYTHROCYTE [DISTWIDTH] IN BLOOD BY AUTOMATED COUNT: 13.5 % (ref 11.5–14.5)
EST. GFR  (NO RACE VARIABLE): >60 ML/MIN/1.73 M^2
ESTIMATED AVG GLUCOSE: 111 MG/DL (ref 68–131)
GLUCOSE SERPL-MCNC: 100 MG/DL (ref 70–110)
HBA1C MFR BLD: 5.5 % (ref 4–5.6)
HCT VFR BLD AUTO: 45.2 % (ref 37–48.5)
HDLC SERPL-MCNC: 53 MG/DL (ref 40–75)
HDLC SERPL: 18.4 % (ref 20–50)
HGB BLD-MCNC: 13.9 G/DL (ref 12–16)
IMM GRANULOCYTES # BLD AUTO: 0.02 K/UL (ref 0–0.04)
IMM GRANULOCYTES NFR BLD AUTO: 0.3 % (ref 0–0.5)
LDLC SERPL CALC-MCNC: 213.2 MG/DL (ref 63–159)
LYMPHOCYTES # BLD AUTO: 2 K/UL (ref 1–4.8)
LYMPHOCYTES NFR BLD: 34.2 % (ref 18–48)
MCH RBC QN AUTO: 29.4 PG (ref 27–31)
MCHC RBC AUTO-ENTMCNC: 30.8 G/DL (ref 32–36)
MCV RBC AUTO: 96 FL (ref 82–98)
MONOCYTES # BLD AUTO: 0.6 K/UL (ref 0.3–1)
MONOCYTES NFR BLD: 9.9 % (ref 4–15)
NEUTROPHILS # BLD AUTO: 2.9 K/UL (ref 1.8–7.7)
NEUTROPHILS NFR BLD: 48.2 % (ref 38–73)
NONHDLC SERPL-MCNC: 235 MG/DL
NRBC BLD-RTO: 0 /100 WBC
PLATELET # BLD AUTO: 410 K/UL (ref 150–450)
PMV BLD AUTO: 10.3 FL (ref 9.2–12.9)
POTASSIUM SERPL-SCNC: 3.7 MMOL/L (ref 3.5–5.1)
PROT SERPL-MCNC: 7.9 G/DL (ref 6–8.4)
RBC # BLD AUTO: 4.73 M/UL (ref 4–5.4)
SODIUM SERPL-SCNC: 139 MMOL/L (ref 136–145)
T4 FREE SERPL-MCNC: 0.92 NG/DL (ref 0.71–1.51)
TRIGL SERPL-MCNC: 109 MG/DL (ref 30–150)
TSH SERPL DL<=0.005 MIU/L-ACNC: 1.11 UIU/ML (ref 0.4–4)
WBC # BLD AUTO: 5.94 K/UL (ref 3.9–12.7)

## 2022-09-23 PROCEDURE — 99999 PR PBB SHADOW E&M-EST. PATIENT-LVL V: CPT | Mod: PBBFAC,,, | Performed by: FAMILY MEDICINE

## 2022-09-23 PROCEDURE — 99396 PREV VISIT EST AGE 40-64: CPT | Mod: S$GLB,,, | Performed by: FAMILY MEDICINE

## 2022-09-23 PROCEDURE — 3079F PR MOST RECENT DIASTOLIC BLOOD PRESSURE 80-89 MM HG: ICD-10-PCS | Mod: CPTII,S$GLB,, | Performed by: FAMILY MEDICINE

## 2022-09-23 PROCEDURE — 84439 ASSAY OF FREE THYROXINE: CPT | Performed by: FAMILY MEDICINE

## 2022-09-23 PROCEDURE — 1159F PR MEDICATION LIST DOCUMENTED IN MEDICAL RECORD: ICD-10-PCS | Mod: CPTII,S$GLB,, | Performed by: FAMILY MEDICINE

## 2022-09-23 PROCEDURE — 3008F PR BODY MASS INDEX (BMI) DOCUMENTED: ICD-10-PCS | Mod: CPTII,S$GLB,, | Performed by: FAMILY MEDICINE

## 2022-09-23 PROCEDURE — 1159F MED LIST DOCD IN RCRD: CPT | Mod: CPTII,S$GLB,, | Performed by: FAMILY MEDICINE

## 2022-09-23 PROCEDURE — 3008F BODY MASS INDEX DOCD: CPT | Mod: CPTII,S$GLB,, | Performed by: FAMILY MEDICINE

## 2022-09-23 PROCEDURE — U0003 INFECTIOUS AGENT DETECTION BY NUCLEIC ACID (DNA OR RNA); SEVERE ACUTE RESPIRATORY SYNDROME CORONAVIRUS 2 (SARS-COV-2) (CORONAVIRUS DISEASE [COVID-19]), AMPLIFIED PROBE TECHNIQUE, MAKING USE OF HIGH THROUGHPUT TECHNOLOGIES AS DESCRIBED BY CMS-2020-01-R: HCPCS | Performed by: FAMILY MEDICINE

## 2022-09-23 PROCEDURE — 80053 COMPREHEN METABOLIC PANEL: CPT | Performed by: FAMILY MEDICINE

## 2022-09-23 PROCEDURE — 85025 COMPLETE CBC W/AUTO DIFF WBC: CPT | Performed by: FAMILY MEDICINE

## 2022-09-23 PROCEDURE — 99396 PR PREVENTIVE VISIT,EST,40-64: ICD-10-PCS | Mod: S$GLB,,, | Performed by: FAMILY MEDICINE

## 2022-09-23 PROCEDURE — 3075F SYST BP GE 130 - 139MM HG: CPT | Mod: CPTII,S$GLB,, | Performed by: FAMILY MEDICINE

## 2022-09-23 PROCEDURE — 1160F RVW MEDS BY RX/DR IN RCRD: CPT | Mod: CPTII,S$GLB,, | Performed by: FAMILY MEDICINE

## 2022-09-23 PROCEDURE — 99999 PR PBB SHADOW E&M-EST. PATIENT-LVL V: ICD-10-PCS | Mod: PBBFAC,,, | Performed by: FAMILY MEDICINE

## 2022-09-23 PROCEDURE — 83036 HEMOGLOBIN GLYCOSYLATED A1C: CPT | Performed by: FAMILY MEDICINE

## 2022-09-23 PROCEDURE — 1160F PR REVIEW ALL MEDS BY PRESCRIBER/CLIN PHARMACIST DOCUMENTED: ICD-10-PCS | Mod: CPTII,S$GLB,, | Performed by: FAMILY MEDICINE

## 2022-09-23 PROCEDURE — 80061 LIPID PANEL: CPT | Performed by: FAMILY MEDICINE

## 2022-09-23 PROCEDURE — 36415 COLL VENOUS BLD VENIPUNCTURE: CPT | Mod: PO | Performed by: FAMILY MEDICINE

## 2022-09-23 PROCEDURE — 3079F DIAST BP 80-89 MM HG: CPT | Mod: CPTII,S$GLB,, | Performed by: FAMILY MEDICINE

## 2022-09-23 PROCEDURE — 82306 VITAMIN D 25 HYDROXY: CPT | Performed by: FAMILY MEDICINE

## 2022-09-23 PROCEDURE — 84443 ASSAY THYROID STIM HORMONE: CPT | Performed by: FAMILY MEDICINE

## 2022-09-23 PROCEDURE — 3075F PR MOST RECENT SYSTOLIC BLOOD PRESS GE 130-139MM HG: ICD-10-PCS | Mod: CPTII,S$GLB,, | Performed by: FAMILY MEDICINE

## 2022-09-23 PROCEDURE — U0005 INFEC AGEN DETEC AMPLI PROBE: HCPCS | Performed by: FAMILY MEDICINE

## 2022-09-23 RX ORDER — BENZONATATE 200 MG/1
200 CAPSULE ORAL 3 TIMES DAILY PRN
Qty: 30 CAPSULE | Refills: 0 | Status: SHIPPED | OUTPATIENT
Start: 2022-09-23 | End: 2022-10-03

## 2022-09-23 RX ORDER — DICLOFENAC SODIUM 75 MG/1
75 TABLET, DELAYED RELEASE ORAL 2 TIMES DAILY
Qty: 60 TABLET | Refills: 3 | Status: SHIPPED | OUTPATIENT
Start: 2022-09-23 | End: 2023-07-25

## 2022-09-23 NOTE — PROGRESS NOTES
Subjective:       Patient ID: Rosamaria Agosto is a 62 y.o. female.    Chief Complaint: Annual Exam, Sore Throat (X 3 days ), Headache, and mucus  62-year-old  female presents to clinic today for annual physical exam.  Hypertension remains well controlled on hydrochlorothiazide 25 mg daily.  She has been treated for hyperlipidemia in the past with Lipitor 80 mg daily; however, despite use cholesterol levels continue to remain substantially elevated.  It was recommended that Lipitor be discontinued last year and the patient start Crestor 20 mg daily; however, she reports stopping the medication secondary to headaches.  She continues to use diclofenac as needed for back and knee pain.  She has a past surgical history of hysterectomy, bilateral tubal ligation, breast biopsy, encephalocele repair, nasal sinus surgery, and bladder lift surgery.  She has a family history of diabetes and hypertension.  She is up-to-date with all screening exams and vaccinations.  Finally, she notes worsening nasal congestion, ear pressure, postnasal drip, sinus pressure, sore throat, and cough for the past week.  She has used nasal saline spray and TheraFlu with minimal relief.  Sore Throat   Associated symptoms include congestion, coughing, ear pain and headaches. Pertinent negatives include no abdominal pain, diarrhea, neck pain, shortness of breath or vomiting.   Headache   Associated symptoms include coughing, ear pain, rhinorrhea, sinus pressure and a sore throat. Pertinent negatives include no abdominal pain, back pain, dizziness, eye redness, fever, hearing loss, nausea, neck pain, tinnitus or vomiting.   Review of Systems   Constitutional:  Negative for appetite change, chills, fatigue and fever.   HENT:  Positive for nasal congestion, ear pain, postnasal drip, rhinorrhea, sinus pressure/congestion and sore throat. Negative for hearing loss and tinnitus.    Eyes:  Negative for redness, itching and visual  disturbance.   Respiratory:  Positive for cough. Negative for chest tightness and shortness of breath.    Cardiovascular:  Negative for chest pain and palpitations.   Gastrointestinal:  Negative for abdominal pain, constipation, diarrhea, nausea and vomiting.   Genitourinary:  Negative for decreased urine volume, difficulty urinating, dysuria, frequency, hematuria and urgency.   Musculoskeletal:  Negative for back pain, myalgias, neck pain and neck stiffness.   Integumentary:  Negative for rash.   Neurological:  Positive for headaches. Negative for dizziness and light-headedness.   Psychiatric/Behavioral: Negative.         Objective:      Physical Exam  Vitals and nursing note reviewed.   Constitutional:       General: She is not in acute distress.     Appearance: She is well-developed. She is not diaphoretic.   HENT:      Head: Normocephalic and atraumatic.      Right Ear: Hearing, tympanic membrane, ear canal and external ear normal.      Left Ear: Hearing, tympanic membrane, ear canal and external ear normal.      Nose: Nose normal.      Right Turbinates: Swollen.      Left Turbinates: Swollen.      Mouth/Throat:      Pharynx: No oropharyngeal exudate.   Eyes:      General: No scleral icterus.        Right eye: No discharge.         Left eye: No discharge.      Conjunctiva/sclera: Conjunctivae normal.      Pupils: Pupils are equal, round, and reactive to light.   Neck:      Thyroid: No thyromegaly.      Vascular: No JVD.      Trachea: No tracheal deviation.   Cardiovascular:      Rate and Rhythm: Normal rate and regular rhythm.      Heart sounds: Normal heart sounds. No murmur heard.    No friction rub. No gallop.   Pulmonary:      Effort: Pulmonary effort is normal. No respiratory distress.      Breath sounds: Normal breath sounds. No stridor. No wheezing or rales.   Abdominal:      General: Bowel sounds are normal. There is no distension.      Palpations: Abdomen is soft. There is no mass.      Tenderness:  There is no abdominal tenderness. There is no guarding or rebound.   Musculoskeletal:         General: No tenderness. Normal range of motion.      Cervical back: Normal range of motion and neck supple.   Lymphadenopathy:      Cervical: No cervical adenopathy.   Skin:     General: Skin is warm and dry.      Coloration: Skin is not pale.      Findings: No erythema or rash.   Neurological:      Mental Status: She is alert and oriented to person, place, and time.   Psychiatric:         Behavior: Behavior normal.         Thought Content: Thought content normal.         Judgment: Judgment normal.       Assessment:       Problem List Items Addressed This Visit       Essential hypertension    Hyperlipidemia    Relevant Orders    Lipid Panel    Venous insufficiency    Vitamin D deficiency    Relevant Orders    Vitamin D     Other Visit Diagnoses       Well adult exam    -  Primary    Relevant Orders    CBC Auto Differential    Comprehensive Metabolic Panel    Lipid Panel    T4, Free    TSH    Urinalysis    Vitamin D    Hemoglobin A1C    Sinusitis, unspecified chronicity, unspecified location        Relevant Medications    benzonatate (TESSALON) 200 MG capsule    Cough        Relevant Orders    COVID-19 Routine Screening    Nonintractable headache, unspecified chronicity pattern, unspecified headache type        Relevant Orders    COVID-19 Routine Screening              Plan:         1. CBC, CMP, UA, TSH, free T4, fasting lipids, vitamin-D level, and hemoglobin A1c.  2. Continue hydrochlorothiazide 25 mg daily.  Hypertension is well controlled.  3. Encourage diet and exercise.  Will repeat cholesterol levels for further evaluation of hyperlipidemia.  Pending results appropriate statin will be prescribed.  4. Venous insufficiency remained stable.  5. Continue over-the-counter vitamin-D 2000 units daily.  Vitamin-D deficiency is stable.  6. COVID swab.  7. Continue Flonase nasal spray, Claritin, and nasal saline as needed.  8.  Return to clinic as needed or in 1 year for annual physical exam.

## 2022-09-24 ENCOUNTER — PATIENT MESSAGE (OUTPATIENT)
Dept: INTERNAL MEDICINE | Facility: CLINIC | Age: 62
End: 2022-09-24
Payer: COMMERCIAL

## 2022-09-24 DIAGNOSIS — E78.2 MIXED HYPERLIPIDEMIA: ICD-10-CM

## 2022-09-24 LAB — SARS-COV-2 RNA RESP QL NAA+PROBE: NOT DETECTED

## 2022-09-24 RX ORDER — ROSUVASTATIN CALCIUM 20 MG/1
20 TABLET, COATED ORAL NIGHTLY
Qty: 90 TABLET | Refills: 3 | Status: SHIPPED | OUTPATIENT
Start: 2022-09-24 | End: 2023-01-02

## 2022-09-24 RX ORDER — AMOXICILLIN AND CLAVULANATE POTASSIUM 875; 125 MG/1; MG/1
1 TABLET, FILM COATED ORAL 2 TIMES DAILY
Qty: 20 TABLET | Refills: 0 | Status: SHIPPED | OUTPATIENT
Start: 2022-09-24 | End: 2022-10-04

## 2022-09-24 RX ORDER — DICLOFENAC SODIUM 75 MG/1
75 TABLET, DELAYED RELEASE ORAL 2 TIMES DAILY
Qty: 60 TABLET | Refills: 3 | Status: CANCELLED | OUTPATIENT
Start: 2022-09-24

## 2022-09-24 NOTE — TELEPHONE ENCOUNTER
The patient is requesting Rx.    Do I need a Prescription for the yellow almost green mucus for my sinus ?

## 2022-10-06 ENCOUNTER — PATIENT MESSAGE (OUTPATIENT)
Dept: BARIATRICS | Facility: CLINIC | Age: 62
End: 2022-10-06
Payer: COMMERCIAL

## 2022-10-28 ENCOUNTER — OFFICE VISIT (OUTPATIENT)
Dept: URGENT CARE | Facility: CLINIC | Age: 62
End: 2022-10-28
Payer: COMMERCIAL

## 2022-10-28 VITALS
HEIGHT: 69 IN | SYSTOLIC BLOOD PRESSURE: 157 MMHG | OXYGEN SATURATION: 100 % | TEMPERATURE: 98 F | HEART RATE: 67 BPM | WEIGHT: 200 LBS | DIASTOLIC BLOOD PRESSURE: 92 MMHG | RESPIRATION RATE: 18 BRPM | BODY MASS INDEX: 29.62 KG/M2

## 2022-10-28 DIAGNOSIS — M77.32 CALCANEAL SPUR OF LEFT FOOT: Primary | ICD-10-CM

## 2022-10-28 DIAGNOSIS — M72.2 PLANTAR FASCIITIS OF LEFT FOOT: ICD-10-CM

## 2022-10-28 DIAGNOSIS — M79.672 PAIN OF LEFT HEEL: ICD-10-CM

## 2022-10-28 PROCEDURE — 73650 X-RAY EXAM OF HEEL: CPT | Mod: LT,S$GLB,, | Performed by: RADIOLOGY

## 2022-10-28 PROCEDURE — 3080F PR MOST RECENT DIASTOLIC BLOOD PRESSURE >= 90 MM HG: ICD-10-PCS | Mod: CPTII,S$GLB,,

## 2022-10-28 PROCEDURE — 3077F PR MOST RECENT SYSTOLIC BLOOD PRESSURE >= 140 MM HG: ICD-10-PCS | Mod: CPTII,S$GLB,,

## 2022-10-28 PROCEDURE — 1159F MED LIST DOCD IN RCRD: CPT | Mod: CPTII,S$GLB,,

## 2022-10-28 PROCEDURE — 99213 PR OFFICE/OUTPT VISIT, EST, LEVL III, 20-29 MIN: ICD-10-PCS | Mod: 25,S$GLB,,

## 2022-10-28 PROCEDURE — 96372 THER/PROPH/DIAG INJ SC/IM: CPT | Mod: S$GLB,,,

## 2022-10-28 PROCEDURE — 1160F PR REVIEW ALL MEDS BY PRESCRIBER/CLIN PHARMACIST DOCUMENTED: ICD-10-PCS | Mod: CPTII,S$GLB,,

## 2022-10-28 PROCEDURE — 96372 PR INJECTION,THERAP/PROPH/DIAG2ST, IM OR SUBCUT: ICD-10-PCS | Mod: S$GLB,,,

## 2022-10-28 PROCEDURE — 73650 XR CALCANEUS 2 VIEW LEFT: ICD-10-PCS | Mod: LT,S$GLB,, | Performed by: RADIOLOGY

## 2022-10-28 PROCEDURE — 3044F PR MOST RECENT HEMOGLOBIN A1C LEVEL <7.0%: ICD-10-PCS | Mod: CPTII,S$GLB,,

## 2022-10-28 PROCEDURE — 1160F RVW MEDS BY RX/DR IN RCRD: CPT | Mod: CPTII,S$GLB,,

## 2022-10-28 PROCEDURE — 3080F DIAST BP >= 90 MM HG: CPT | Mod: CPTII,S$GLB,,

## 2022-10-28 PROCEDURE — 1159F PR MEDICATION LIST DOCUMENTED IN MEDICAL RECORD: ICD-10-PCS | Mod: CPTII,S$GLB,,

## 2022-10-28 PROCEDURE — 99213 OFFICE O/P EST LOW 20 MIN: CPT | Mod: 25,S$GLB,,

## 2022-10-28 PROCEDURE — 3077F SYST BP >= 140 MM HG: CPT | Mod: CPTII,S$GLB,,

## 2022-10-28 PROCEDURE — 3044F HG A1C LEVEL LT 7.0%: CPT | Mod: CPTII,S$GLB,,

## 2022-10-28 RX ORDER — NAPROXEN 500 MG/1
500 TABLET ORAL 2 TIMES DAILY
Qty: 20 TABLET | Refills: 0 | Status: SHIPPED | OUTPATIENT
Start: 2022-10-28 | End: 2022-11-07

## 2022-10-28 RX ORDER — PREDNISONE 20 MG/1
20 TABLET ORAL DAILY
Qty: 3 TABLET | Refills: 0 | Status: SHIPPED | OUTPATIENT
Start: 2022-10-28 | End: 2023-06-19

## 2022-10-28 RX ORDER — KETOROLAC TROMETHAMINE 30 MG/ML
30 INJECTION, SOLUTION INTRAMUSCULAR; INTRAVENOUS
Status: COMPLETED | OUTPATIENT
Start: 2022-10-28 | End: 2022-10-28

## 2022-10-28 RX ADMIN — KETOROLAC TROMETHAMINE 30 MG: 30 INJECTION, SOLUTION INTRAMUSCULAR; INTRAVENOUS at 07:10

## 2022-10-28 NOTE — LETTER
October 28, 2022      [facility] [address] [address]  Phone: [phone]  Fax: [phone]       Patient: Rosamaria Agosto   YOB: 1960  Date of Visit: 10/28/2022    To Whom It May Concern:    Timothy Agosto  was at [facility] on 10/28/2022. The patient may return to work on 11/2/2022 with no restrictions. If you have any questions or concerns, or if I can be of further assistance, please do not hesitate to contact me.    Sincerely,    Zandra Pulido PA-C

## 2022-10-28 NOTE — PROGRESS NOTES
"Subjective:       Patient ID: Rosamaria Agosto is a 62 y.o. female.    Vitals:  height is 5' 9.02" (1.753 m) and weight is 90.7 kg (200 lb). Her tympanic temperature is 97.9 °F (36.6 °C). Her blood pressure is 157/92 (abnormal) and her pulse is 67. Her respiration is 18 and oxygen saturation is 100%.     Chief Complaint: Ankle Pain    62 y.o female presents today c/o Lt ankle/foot swelling and pain that started a few days ago.  Patient denies injuries and denies hx of trauma to her L ankle.    Ankle Pain   The incident occurred 5 to 7 days ago. The injury mechanism is unknown. The pain is present in the left ankle and left foot. The pain is at a severity of 10/10. The pain is severe. Associated symptoms include an inability to bear weight. Pertinent negatives include no loss of motion, loss of sensation, muscle weakness, numbness or tingling. She reports no foreign bodies present. The symptoms are aggravated by movement. She has tried NSAIDs and acetaminophen for the symptoms. The treatment provided mild relief.     Neurological:  Negative for numbness.     Objective:      Physical Exam   Constitutional: She is oriented to person, place, and time. She appears well-developed. She is cooperative.  Non-toxic appearance. She does not appear ill. No distress.   HENT:   Head: Normocephalic and atraumatic. Head is without abrasion, without contusion and without laceration.   Ears:   Right Ear: Hearing, tympanic membrane, external ear and ear canal normal. No hemotympanum.   Left Ear: Hearing, tympanic membrane, external ear and ear canal normal. No hemotympanum.   Nose: Nose normal. No mucosal edema, rhinorrhea or nasal deformity. No epistaxis. Right sinus exhibits no maxillary sinus tenderness and no frontal sinus tenderness. Left sinus exhibits no maxillary sinus tenderness and no frontal sinus tenderness.   Mouth/Throat: Uvula is midline, oropharynx is clear and moist and mucous membranes are normal. No trismus in the " jaw. Normal dentition. No uvula swelling. No posterior oropharyngeal erythema.   Eyes: Conjunctivae, EOM and lids are normal. Pupils are equal, round, and reactive to light. Right eye exhibits no discharge. Left eye exhibits no discharge. No scleral icterus.   Neck: Trachea normal and phonation normal. Neck supple. No tracheal deviation present. No neck rigidity present. No spinous process tenderness present. No muscular tenderness present.   Cardiovascular: Normal rate, regular rhythm, normal heart sounds and normal pulses.   Pulmonary/Chest: Effort normal and breath sounds normal. No respiratory distress.   Abdominal: Normal appearance and bowel sounds are normal. She exhibits no distension and no mass. Soft. There is no abdominal tenderness.   Musculoskeletal: Normal range of motion.         General: No deformity. Normal range of motion.      Right foot: Swelling present.      Left foot: Normal range of motion. Bony tenderness and swelling present. No crepitus.      Comments: Tenderness to palpation of calcaneus.    Neurological: She is alert and oriented to person, place, and time. She has normal strength. No cranial nerve deficit or sensory deficit. She exhibits normal muscle tone. She displays no seizure activity. Coordination normal. GCS eye subscore is 4. GCS verbal subscore is 5. GCS motor subscore is 6.   Skin: Skin is warm, dry, intact, not diaphoretic and not pale. Capillary refill takes less than 2 seconds. No abrasion, No burn, No bruising and No ecchymosis   Psychiatric: Her speech is normal and behavior is normal. Judgment and thought content normal.   Nursing note and vitals reviewed.      XR CALCANEUS 2 VIEW LEFT    Result Date: 10/28/2022  EXAMINATION: XR CALCANEUS 2 VIEW LEFT CLINICAL HISTORY: Pain in left foot TECHNIQUE: Tangential and lateral views of the left calcaneus were performed. COMPARISON: The 11/29/2021 FINDINGS: Pes planus deformity of foot is again noted.  Osteophyte at the calcaneal  tubercle at the insertion of the plantar fascia and Achilles tendon appears stable.  Question posterior facet coalition of the talus and calcaneus is suggested on the lateral projection. There is no evidence of calcaneal fracture.     No acute finding of the left calcaneus. Degenerative changes of the calcaneal tubercle posteriorly as described. Questioned posterior facet talar calcaneal coalition. Electronically signed by: Eber Krishnamurthy Date:    10/28/2022 Time:    19:35     Assessment:       1. Calcaneal spur of left foot    2. Pain of left heel    3. Plantar fasciitis of left foot          Plan:       Toradol shot given for plantar fasciitis and calcaneal pain. Discussed stretching exercises, ice and warm soaks. Follow up with podiatry or pcp if symptoms do not improve.     Calcaneal spur of left foot  -     ketorolac injection 30 mg  -     predniSONE (DELTASONE) 20 MG tablet; Take 1 tablet (20 mg total) by mouth once daily.  Dispense: 3 tablet; Refill: 0  -     naproxen (NAPROSYN) 500 MG tablet; Take 1 tablet (500 mg total) by mouth 2 (two) times daily. for 10 days  Dispense: 20 tablet; Refill: 0    Pain of left heel  -     XR CALCANEUS 2 VIEW LEFT; Future; Expected date: 10/28/2022    Plantar fasciitis of left foot  -     ketorolac injection 30 mg  -     predniSONE (DELTASONE) 20 MG tablet; Take 1 tablet (20 mg total) by mouth once daily.  Dispense: 3 tablet; Refill: 0  -     naproxen (NAPROSYN) 500 MG tablet; Take 1 tablet (500 mg total) by mouth 2 (two) times daily. for 10 days  Dispense: 20 tablet; Refill: 0

## 2022-11-09 ENCOUNTER — TELEPHONE (OUTPATIENT)
Dept: INTERNAL MEDICINE | Facility: CLINIC | Age: 62
End: 2022-11-09
Payer: COMMERCIAL

## 2022-11-09 NOTE — TELEPHONE ENCOUNTER
----- Message from Italia Wolf sent at 11/9/2022  1:21 PM CST -----  Contact: pt [phone]  Pt is wanting to know if she can schedule an injection appt for Friday(11/11). Per pt, she is having left heel spur pain. Please call.        Thank you

## 2022-11-09 NOTE — TELEPHONE ENCOUNTER
Returned pts call in regards to :  Pt is wanting to know if she can schedule an injection appt for Friday(11/11). Per pt, she is having left heel spur pain. Please call.      Scheduled an appointment

## 2022-11-11 ENCOUNTER — OFFICE VISIT (OUTPATIENT)
Dept: INTERNAL MEDICINE | Facility: CLINIC | Age: 62
End: 2022-11-11
Payer: COMMERCIAL

## 2022-11-11 VITALS
RESPIRATION RATE: 18 BRPM | BODY MASS INDEX: 29.98 KG/M2 | SYSTOLIC BLOOD PRESSURE: 135 MMHG | HEIGHT: 69 IN | WEIGHT: 202.38 LBS | DIASTOLIC BLOOD PRESSURE: 88 MMHG | HEART RATE: 65 BPM | TEMPERATURE: 98 F | OXYGEN SATURATION: 98 %

## 2022-11-11 DIAGNOSIS — M79.672 INTRACTABLE LEFT HEEL PAIN: Primary | ICD-10-CM

## 2022-11-11 DIAGNOSIS — M72.2 PLANTAR FASCIITIS, LEFT: ICD-10-CM

## 2022-11-11 PROCEDURE — 99214 OFFICE O/P EST MOD 30 MIN: CPT | Mod: S$GLB,,, | Performed by: FAMILY MEDICINE

## 2022-11-11 PROCEDURE — 1159F PR MEDICATION LIST DOCUMENTED IN MEDICAL RECORD: ICD-10-PCS | Mod: CPTII,S$GLB,, | Performed by: FAMILY MEDICINE

## 2022-11-11 PROCEDURE — 3008F PR BODY MASS INDEX (BMI) DOCUMENTED: ICD-10-PCS | Mod: CPTII,S$GLB,, | Performed by: FAMILY MEDICINE

## 2022-11-11 PROCEDURE — 99214 PR OFFICE/OUTPT VISIT, EST, LEVL IV, 30-39 MIN: ICD-10-PCS | Mod: S$GLB,,, | Performed by: FAMILY MEDICINE

## 2022-11-11 PROCEDURE — 3075F SYST BP GE 130 - 139MM HG: CPT | Mod: CPTII,S$GLB,, | Performed by: FAMILY MEDICINE

## 2022-11-11 PROCEDURE — 1160F RVW MEDS BY RX/DR IN RCRD: CPT | Mod: CPTII,S$GLB,, | Performed by: FAMILY MEDICINE

## 2022-11-11 PROCEDURE — 3044F PR MOST RECENT HEMOGLOBIN A1C LEVEL <7.0%: ICD-10-PCS | Mod: CPTII,S$GLB,, | Performed by: FAMILY MEDICINE

## 2022-11-11 PROCEDURE — 99999 PR PBB SHADOW E&M-EST. PATIENT-LVL V: CPT | Mod: PBBFAC,,, | Performed by: FAMILY MEDICINE

## 2022-11-11 PROCEDURE — 1160F PR REVIEW ALL MEDS BY PRESCRIBER/CLIN PHARMACIST DOCUMENTED: ICD-10-PCS | Mod: CPTII,S$GLB,, | Performed by: FAMILY MEDICINE

## 2022-11-11 PROCEDURE — 3075F PR MOST RECENT SYSTOLIC BLOOD PRESS GE 130-139MM HG: ICD-10-PCS | Mod: CPTII,S$GLB,, | Performed by: FAMILY MEDICINE

## 2022-11-11 PROCEDURE — 3044F HG A1C LEVEL LT 7.0%: CPT | Mod: CPTII,S$GLB,, | Performed by: FAMILY MEDICINE

## 2022-11-11 PROCEDURE — 3079F DIAST BP 80-89 MM HG: CPT | Mod: CPTII,S$GLB,, | Performed by: FAMILY MEDICINE

## 2022-11-11 PROCEDURE — 3008F BODY MASS INDEX DOCD: CPT | Mod: CPTII,S$GLB,, | Performed by: FAMILY MEDICINE

## 2022-11-11 PROCEDURE — 3079F PR MOST RECENT DIASTOLIC BLOOD PRESSURE 80-89 MM HG: ICD-10-PCS | Mod: CPTII,S$GLB,, | Performed by: FAMILY MEDICINE

## 2022-11-11 PROCEDURE — 99999 PR PBB SHADOW E&M-EST. PATIENT-LVL V: ICD-10-PCS | Mod: PBBFAC,,, | Performed by: FAMILY MEDICINE

## 2022-11-11 PROCEDURE — 1159F MED LIST DOCD IN RCRD: CPT | Mod: CPTII,S$GLB,, | Performed by: FAMILY MEDICINE

## 2022-11-11 RX ORDER — MELOXICAM 15 MG/1
TABLET ORAL
Qty: 10 TABLET | Refills: 0 | Status: SHIPPED | OUTPATIENT
Start: 2022-11-11 | End: 2023-07-25

## 2022-11-11 RX ORDER — METHOCARBAMOL 750 MG/1
750 TABLET, FILM COATED ORAL 4 TIMES DAILY PRN
Qty: 40 TABLET | Refills: 0 | Status: SHIPPED | OUTPATIENT
Start: 2022-11-11 | End: 2022-11-21

## 2022-11-11 NOTE — PROGRESS NOTES
Subjective:       Patient ID: Rosamaria Agosto is a 62 y.o. female.    Chief Complaint: Foot Pain (L FOOT PAIN for about a month)  62-year-old  female presents to clinic today accompanied by her daughter secondary to a complaint of continued left ankle and foot pain that she rates at a 10/10.  She reports that the pain has been ongoing for approximately 2 weeks.  She presented to urgent care 2 weeks ago for evaluation and x-ray revealed a calcaneal spur.  She was given a shot of Toradol and prescribed a 3 day course of prednisone and naproxen which has not helped with her pain.  She bought an over-the-counter walking boot with mild relief.  Foot Pain  Associated symptoms include arthralgias (foot). Pertinent negatives include no abdominal pain, chest pain, chills, congestion, coughing, fatigue, fever, headaches, myalgias, nausea, neck pain, rash, sore throat or vomiting.   Review of Systems   Constitutional:  Negative for appetite change, chills, fatigue and fever.   HENT:  Negative for nasal congestion, ear pain, hearing loss, postnasal drip, rhinorrhea, sinus pressure/congestion, sore throat and tinnitus.    Eyes:  Negative for redness, itching and visual disturbance.   Respiratory:  Negative for cough, chest tightness and shortness of breath.    Cardiovascular:  Negative for chest pain and palpitations.   Gastrointestinal:  Negative for abdominal pain, constipation, diarrhea, nausea and vomiting.   Genitourinary:  Negative for decreased urine volume, difficulty urinating, dysuria, frequency, hematuria and urgency.   Musculoskeletal:  Positive for arthralgias (foot). Negative for back pain, myalgias, neck pain and neck stiffness.   Integumentary:  Negative for rash.   Neurological:  Negative for dizziness, light-headedness and headaches.   Psychiatric/Behavioral: Negative.         Objective:      Physical Exam  Vitals and nursing note reviewed.   Constitutional:       General: She is not in acute  distress.     Appearance: She is well-developed. She is not diaphoretic.   HENT:      Head: Normocephalic and atraumatic.      Right Ear: External ear normal.      Left Ear: External ear normal.      Nose: Nose normal.      Mouth/Throat:      Pharynx: No oropharyngeal exudate.   Eyes:      General: No scleral icterus.        Right eye: No discharge.         Left eye: No discharge.      Conjunctiva/sclera: Conjunctivae normal.      Pupils: Pupils are equal, round, and reactive to light.   Neck:      Thyroid: No thyromegaly.      Vascular: No JVD.      Trachea: No tracheal deviation.   Cardiovascular:      Rate and Rhythm: Normal rate and regular rhythm.      Heart sounds: Normal heart sounds. No murmur heard.    No friction rub. No gallop.   Pulmonary:      Effort: Pulmonary effort is normal. No respiratory distress.      Breath sounds: Normal breath sounds. No stridor. No wheezing or rales.   Abdominal:      General: Bowel sounds are normal. There is no distension.      Palpations: Abdomen is soft. There is no mass.      Tenderness: There is no abdominal tenderness. There is no guarding or rebound.   Musculoskeletal:         General: No tenderness. Normal range of motion.      Cervical back: Normal range of motion and neck supple.      Comments: Left foot in walking boot     Lymphadenopathy:      Cervical: No cervical adenopathy.   Skin:     General: Skin is warm and dry.      Coloration: Skin is not pale.      Findings: No erythema or rash.   Neurological:      Mental Status: She is alert and oriented to person, place, and time.   Psychiatric:         Behavior: Behavior normal.         Thought Content: Thought content normal.         Judgment: Judgment normal.       Assessment:       Problem List Items Addressed This Visit    None  Visit Diagnoses       Intractable left heel pain    -  Primary    Relevant Medications    meloxicam (MOBIC) 15 MG tablet    methocarbamoL (ROBAXIN) 750 MG Tab    Other Relevant Orders     Ambulatory referral/consult to Podiatry    Plantar fasciitis, left        Relevant Medications    meloxicam (MOBIC) 15 MG tablet    methocarbamoL (ROBAXIN) 750 MG Tab    Other Relevant Orders    Ambulatory referral/consult to Podiatry              Plan:         1. Meloxicam 15 mg daily for 10 days.  2. Robaxin 750 mg q.i.d. p.r.n. pain.  3. Refer to Podiatry for further evaluation of left foot pain.    4. Return to clinic as needed if symptoms persist or worsen.

## 2022-11-15 ENCOUNTER — OFFICE VISIT (OUTPATIENT)
Dept: PODIATRY | Facility: CLINIC | Age: 62
End: 2022-11-15
Payer: COMMERCIAL

## 2022-11-15 VITALS
WEIGHT: 202.38 LBS | BODY MASS INDEX: 29.98 KG/M2 | DIASTOLIC BLOOD PRESSURE: 75 MMHG | HEART RATE: 74 BPM | HEIGHT: 69 IN | SYSTOLIC BLOOD PRESSURE: 155 MMHG

## 2022-11-15 DIAGNOSIS — M79.672 INTRACTABLE LEFT HEEL PAIN: ICD-10-CM

## 2022-11-15 DIAGNOSIS — M72.2 PLANTAR FASCIITIS, LEFT: ICD-10-CM

## 2022-11-15 PROCEDURE — 3008F BODY MASS INDEX DOCD: CPT | Mod: CPTII,S$GLB,, | Performed by: PODIATRIST

## 2022-11-15 PROCEDURE — 1159F MED LIST DOCD IN RCRD: CPT | Mod: CPTII,S$GLB,, | Performed by: PODIATRIST

## 2022-11-15 PROCEDURE — 3008F PR BODY MASS INDEX (BMI) DOCUMENTED: ICD-10-PCS | Mod: CPTII,S$GLB,, | Performed by: PODIATRIST

## 2022-11-15 PROCEDURE — 99999 PR PBB SHADOW E&M-EST. PATIENT-LVL V: CPT | Mod: PBBFAC,,, | Performed by: PODIATRIST

## 2022-11-15 PROCEDURE — 3077F PR MOST RECENT SYSTOLIC BLOOD PRESSURE >= 140 MM HG: ICD-10-PCS | Mod: CPTII,S$GLB,, | Performed by: PODIATRIST

## 2022-11-15 PROCEDURE — 20550 NJX 1 TENDON SHEATH/LIGAMENT: CPT | Mod: S$GLB,,, | Performed by: PODIATRIST

## 2022-11-15 PROCEDURE — 3044F HG A1C LEVEL LT 7.0%: CPT | Mod: CPTII,S$GLB,, | Performed by: PODIATRIST

## 2022-11-15 PROCEDURE — 3078F DIAST BP <80 MM HG: CPT | Mod: CPTII,S$GLB,, | Performed by: PODIATRIST

## 2022-11-15 PROCEDURE — 99204 OFFICE O/P NEW MOD 45 MIN: CPT | Mod: 25,S$GLB,, | Performed by: PODIATRIST

## 2022-11-15 PROCEDURE — 3044F PR MOST RECENT HEMOGLOBIN A1C LEVEL <7.0%: ICD-10-PCS | Mod: CPTII,S$GLB,, | Performed by: PODIATRIST

## 2022-11-15 PROCEDURE — 99999 PR PBB SHADOW E&M-EST. PATIENT-LVL V: ICD-10-PCS | Mod: PBBFAC,,, | Performed by: PODIATRIST

## 2022-11-15 PROCEDURE — 20550 PR INJECT TENDON SHEATH/LIGAMENT: ICD-10-PCS | Mod: S$GLB,,, | Performed by: PODIATRIST

## 2022-11-15 PROCEDURE — 99204 PR OFFICE/OUTPT VISIT, NEW, LEVL IV, 45-59 MIN: ICD-10-PCS | Mod: 25,S$GLB,, | Performed by: PODIATRIST

## 2022-11-15 PROCEDURE — 3077F SYST BP >= 140 MM HG: CPT | Mod: CPTII,S$GLB,, | Performed by: PODIATRIST

## 2022-11-15 PROCEDURE — 1159F PR MEDICATION LIST DOCUMENTED IN MEDICAL RECORD: ICD-10-PCS | Mod: CPTII,S$GLB,, | Performed by: PODIATRIST

## 2022-11-15 PROCEDURE — 3078F PR MOST RECENT DIASTOLIC BLOOD PRESSURE < 80 MM HG: ICD-10-PCS | Mod: CPTII,S$GLB,, | Performed by: PODIATRIST

## 2022-11-15 RX ADMIN — TRIAMCINOLONE ACETONIDE 40 MG: 40 INJECTION, SUSPENSION INTRA-ARTICULAR; INTRAMUSCULAR at 09:11

## 2022-11-15 NOTE — PROCEDURES
Tendon Sheath    Date/Time: 11/15/2022 9:30 AM  Performed by: Allegra Bañuelos DPM  Authorized by: Allegra Bañuelos DPM     Consent Done?:  Yes (Written)  Indications:  Pain  Site marked: the procedure site was marked    Timeout: prior to procedure the correct patient, procedure, and site was verified    Prep: patient was prepped and draped in usual sterile fashion      Local anesthesia used?: Yes    Anesthesia:  Local infiltration  Local anesthetic:  Bupivacaine 0.5% without epinephrine  Anesthetic total (ml):  1    Location:  Foot  Foot joint: left plantar fascia.  Ultrasonic guidance for needle placement?: No    Needle size:  25 G  Approach:  Medial  Medications:  40 mg triamcinolone acetonide 40 mg/mL  Patient tolerance:  Patient tolerated the procedure well with no immediate complications    Additional Comments: After sterile skin prep, steroid injection was performed with patient written consent and timeout procedure with patient identifiers, site markings, and procedures in agreement to all present, at left plantar fascia using 40 mg of Triamcinalone,  and 1mL 1% lidocaine plain. This was well tolerated.

## 2022-11-15 NOTE — PROGRESS NOTES
Subjective:      Patient ID: Rosamaria Agosto is a 62 y.o. female.    Chief Complaint:   Heel Spurs (Left foot)    Rosamaria is a 62 y.o. female who presents to the podiatry clinic  with complaint of  left foot pain. Onset of the symptoms was about a month ago. Precipitating event: none known. Current symptoms include: ability to bear weight, but with some pain. Aggravating factors: any weight bearing, rising after sitting, and walking. Symptoms have waxed and waned but are worse overall. Patient has had prior foot problems.      Patient relates it feels like an ice pick a stubbing her foot.  She is tried some inserts in some cream.    She relates she was 0 better after the shot in the hip and the pills; she is somewhat better from the medication that her primary care doctor prescribed    She relates she had something many years ago similar however was told she had a spur.  She is not sure what foot was in    Patient relates she is in the  and she is having difficulty standing on her foot.  She did buy a surgical shoe which is helping  Patient relates she tried a cam boot many years ago but it was heavy and it caused issues with her bladder.  She would prefer not to use it again    Patient has not had any specific back or knee pain currently patient denies history of gout    PCP 11/11/22  62-year-old  female presents to clinic today accompanied by her daughter secondary to a complaint of continued left ankle and foot pain that she rates at a 10/10.  She reports that the pain has been ongoing for approximately 2 weeks.  She presented to urgent care 2 weeks ago for evaluation and x-ray revealed a calcaneal spur.  She was given a shot of Toradol and prescribed a 3 day course of prednisone and naproxen which has not helped with her pain.  She bought an over-the-counter walking boot with mild relief.   1. Meloxicam 15 mg daily for 10 days.  2. Robaxin 750 mg q.i.d. p.r.n. pain.  3.  Refer to Podiatry for further evaluation of left foot pain.    4. Return to clinic as needed if symptoms persist or worsen.    Urgent care 10/28/22:     62 y.o female presents today c/o Lt ankle/foot swelling and pain that started a few days ago.  Patient denies injuries and denies hx of trauma to her L ankle.  Ankle Pain   The incident occurred 5 to 7 days ago. The injury mechanism is unknown. The pain is present in the left ankle and left foot. The pain is at a severity of 10/10. The pain is severe. Associated symptoms include an inability to bear weight. Pertinent negatives include no loss of motion, loss of sensation, muscle weakness, numbness or tingling. She reports no foreign bodies present. The symptoms are aggravated by movement. She has tried NSAIDs and acetaminophen for the symptoms. The treatment provided mild relief.      Toradol shot given for plantar fasciitis and calcaneal pain. Discussed stretching exercises, ice and warm soaks. Follow up with podiatry or pcp if symptoms do not improve.      Calcaneal spur of left foot  -     ketorolac injection 30 mg  -     predniSONE (DELTASONE) 20 MG tablet; Take 1 tablet (20 mg total) by mouth once daily.  Dispense: 3 tablet; Refill: 0  -     naproxen (NAPROSYN) 500 MG tablet; Take 1 tablet (500 mg total) by mouth 2 (two) times daily. for 10 days  Dispense: 20 tablet; Refill: 0     Pain of left heel  -     XR CALCANEUS 2 VIEW LEFT; Future; Expected date: 10/28/2022     Plantar fasciitis of left foot  -     ketorolac injection 30 mg  -     predniSONE (DELTASONE) 20 MG tablet; Take 1 tablet (20 mg total) by mouth once daily.  Dispense: 3 tablet; Refill: 0  -     naproxen (NAPROSYN) 500 MG tablet; Take 1 tablet (500 mg total) by mouth 2 (two) times daily. for 10 days  Dispense: 20 tablet; Refill: 0           Past Medical History:   Diagnosis Date    Arthritis     Hyperlipidemia     Hypertension     Sinusitis      Past Surgical History:   Procedure Laterality Date     ARTHROSCOPIC REPAIR OF ROTATOR CUFF OF SHOULDER Left 07/24/2019    Procedure: REPAIR, ROTATOR CUFF, ARTHROSCOPIC;  Surgeon: ASMITA Soto MD;  Location: [facility] OR 2ND FLR;  Service: Orthopedics;  Laterality: Left;    ARTHROSCOPY OF SHOULDER WITH DECOMPRESSION OF SUBACROMIAL SPACE Left 07/24/2019    Procedure: ARTHROSCOPY, SHOULDER, WITH SUBACROMIAL SPACE DECOMPRESSION;  Surgeon: ASMITA Soto MD;  Location: [facility] OR 2ND FLR;  Service: Orthopedics;  Laterality: Left;    BILATERAL SALPINGO-OOPHORECTOMY (BSO)  06/24/2019    BREAST BIOPSY  24-25 years old    CYSTOSCOPY N/A 06/24/2019    Procedure: CYSTOSCOPY;  Surgeon: Anson Ellison MD;  Location: [facility] OR;  Service: OB/GYN;  Laterality: N/A;    ENCEPHALOCELE REPAIR  46    HYSTERECTOMY  1994    NASAL SINUS SURGERY      ROBOT-ASSISTED LAPAROSCOPIC ABDOMINAL SACROCOLPOPEXY USING DA MAC XI N/A 06/24/2019    Procedure: XI ROBOTIC SACROCOLPOPEXY, ABDOMINAL;  Surgeon: Anson Ellison MD;  Location: [facility] OR;  Service: OB/GYN;  Laterality: N/A;    SHOULDER ARTHROSCOPY Left 07/24/2019    Procedure: BICEPS TENODESIS;  Surgeon: ASMITA Soto MD;  Location: [facility] OR 2ND FLR;  Service: Orthopedics;  Laterality: Left;    TUBAL LIGATION       Current Outpatient Medications on File Prior to Visit   Medication Sig Dispense Refill    diclofenac (VOLTAREN) 75 MG EC tablet Take 1 tablet (75 mg total) by mouth 2 (two) times daily. 60 tablet 3    diphenhydrAMINE-aluminum-magnesium hydroxide-simethicone-LIDOcaine HCl 2% Swish and spit 15 mLs every 4 (four) hours as needed (sore throat). 180 mL 0    ergocalciferol (ERGOCALCIFEROL) 50,000 unit Cap Take 1 capsule (50,000 Units total) by mouth every 7 days. 4 capsule 2    estradioL (VAGIFEM) 10 mcg Tab Place 1 tablet (10 mcg total) vaginally twice a week. 8 tablet 12    fluocinolone and shower cap (DERMA-SMOOTHE/FS SCALP OIL) 0.01 % Oil Apply oil to damp scalp nightly and cover with shower cap. 1 Bottle 3    fluticasone propionate  (FLONASE) 50 mcg/actuation nasal spray 2 sprays (100 mcg total) by Each Nostril route once daily. 16 g 11    hydroCHLOROthiazide (HYDRODIURIL) 25 MG tablet Take 1 tablet (25 mg total) by mouth once daily. 90 tablet 3    LIDOcaine (LIDODERM) 5 % Place 2 patches onto the skin once daily. Remove & Discard patch within 12 hours or as directed by MD 2 patch 0    meloxicam (MOBIC) 15 MG tablet Take one tablet by mouth with a meal for 10 days. 10 tablet 0    [] methocarbamoL (ROBAXIN) 750 MG Tab Take 1 tablet (750 mg total) by mouth 4 (four) times daily as needed (muscle strain). 40 tablet 0    neomycin-polymyxin-hydrocortisone (CORTISPORIN) 3.5-10,000-1 mg/mL-unit/mL-% otic suspension Place 3 drops into the left ear 4 (four) times daily. 10 mL 0    olopatadine (PATANOL) 0.1 % ophthalmic solution Place 1 drop into both eyes 2 (two) times daily.      predniSONE (DELTASONE) 20 MG tablet Take 1 tablet (20 mg total) by mouth once daily. 3 tablet 0    rosuvastatin (CRESTOR) 20 MG tablet Take 1 tablet (20 mg total) by mouth every evening. 90 tablet 3    azelastine (ASTELIN) 137 mcg (0.1 %) nasal spray 1 spray (137 mcg total) by Nasal route 2 (two) times daily. 30 mL 2    cetirizine (ZYRTEC) 10 MG tablet Take 1 tablet (10 mg total) by mouth once daily. for 14 days 14 tablet 0    gabapentin (NEURONTIN) 300 MG capsule Take 1 capsule (300 mg total) by mouth 2 (two) times daily. 60 capsule 11    topiramate (TOPAMAX) 50 MG tablet Take 1 tablet (50 mg total) by mouth 2 (two) times daily. 180 tablet 0     No current facility-administered medications on file prior to visit.     Review of patient's allergies indicates:   Allergen Reactions    Sotradecol [sodium tetradecyl sulfate]      Hives and itching that resolved with Benadryl and Solumedrol.       Review of Systems   Constitutional: Negative for chills, decreased appetite, fever, malaise/fatigue, night sweats, weight gain and weight loss.   Cardiovascular:  Negative for chest  "pain, claudication, dyspnea on exertion, leg swelling, palpitations and syncope.   Respiratory:  Negative for cough and shortness of breath.    Endocrine: Negative for cold intolerance and heat intolerance.   Hematologic/Lymphatic: Negative for bleeding problem. Does not bruise/bleed easily.   Skin:  Negative for color change, dry skin, flushing, itching, nail changes, poor wound healing, rash, skin cancer, suspicious lesions and unusual hair distribution.   Musculoskeletal:  Negative for arthritis, back pain, falls, gout, joint pain, joint swelling, muscle cramps, muscle weakness, myalgias, neck pain and stiffness.        Foot pain   Gastrointestinal:  Negative for diarrhea, nausea and vomiting.   Neurological:  Negative for dizziness, focal weakness, light-headedness, numbness, paresthesias, tremors, vertigo and weakness.   Psychiatric/Behavioral:  Negative for altered mental status and depression. The patient does not have insomnia.    Allergic/Immunologic: Negative.          Objective:       Vitals:    11/15/22 0940   BP: (!) 155/75   Pulse: 74   Weight: 91.8 kg (202 lb 6.1 oz)   Height: 5' 9" (1.753 m)   PainSc:   5   PainLoc: Foot   91.8 kg (202 lb 6.1 oz)     Physical Exam  Vitals reviewed.   Constitutional:       General: She is not in acute distress.     Appearance: She is well-developed. She is not ill-appearing, toxic-appearing or diaphoretic.      Comments: Surgical shoe      Cardiovascular:      Pulses:           Dorsalis pedis pulses are 2+ on the right side and 2+ on the left side.        Posterior tibial pulses are 2+ on the right side and 2+ on the left side.   Musculoskeletal:         General: No deformity.      Right lower leg: No edema.      Left lower leg: No edema.      Right ankle: Normal.      Right Achilles Tendon: Normal. No tenderness.      Left ankle: Normal.      Left Achilles Tendon: Normal. No tenderness.      Right foot: Decreased range of motion. No deformity, tenderness or bony " tenderness.      Left foot: Decreased range of motion. Tenderness present. No deformity or bony tenderness.      Comments: Sharp deep pain to palpation inferior heel left at medial calcaneal tubercle without ecchymosis, erythema, edema, or cardinal signs infection, and no signs of trauma.     Flexible pes planus foot type w/ medial arch collapse and mild gastroc equinus     Feet:      Right foot:      Protective Sensation: 10 sites tested.  10 sites sensed.      Skin integrity: Dry skin present. No ulcer, blister, skin breakdown, erythema, warmth or callus.      Toenail Condition: Right toenails are normal.      Left foot:      Protective Sensation: 10 sites tested.  10 sites sensed.      Skin integrity: Dry skin present. No ulcer, blister, skin breakdown, erythema, warmth or callus.      Toenail Condition: Left toenails are normal.      Comments: No open lesions or signs of infection no excessive warmth  Skin:     General: Skin is warm.      Capillary Refill: Capillary refill takes 2 to 3 seconds.      Coloration: Skin is not pale.      Findings: No erythema or rash.   Neurological:      Mental Status: She is alert and oriented to person, place, and time.      Sensory: No sensory deficit.      Gait: Gait abnormal.   Psychiatric:         Attention and Perception: Attention normal.         Mood and Affect: Mood normal.         Speech: Speech normal.         Behavior: Behavior normal.         Thought Content: Thought content normal.         Cognition and Memory: Cognition normal.         Judgment: Judgment normal.       XR CALCANEUS 2 VIEW LEFT  Narrative: EXAMINATION:  XR CALCANEUS 2 VIEW LEFT    CLINICAL HISTORY:  Pain in left foot    TECHNIQUE:  Tangential and lateral views of the left calcaneus were performed.    COMPARISON:  The 11/29/2021    FINDINGS:  Pes planus deformity of foot is again noted.  Osteophyte at the calcaneal tubercle at the insertion of the plantar fascia and Achilles tendon appears stable.   Question posterior facet coalition of the talus and calcaneus is suggested on the lateral projection.    There is no evidence of calcaneal fracture.  Impression: No acute finding of the left calcaneus.    Degenerative changes of the calcaneal tubercle posteriorly as described.    Questioned posterior facet talar calcaneal coalition.    Electronically signed by: Eber Krishnamurthy  Date:    10/28/2022  Time:    19:35       Assessment:       Encounter Diagnoses   Name Primary?    Intractable left heel pain     Plantar fasciitis, left          Plan:       Rosamaria was seen today for heel spurs.    Diagnoses and all orders for this visit:    Intractable left heel pain  -     Ambulatory referral/consult to Podiatry  -     Cancel: Ambulatory referral/consult to Physical/Occupational Therapy; Future  -     Ambulatory referral/consult to Physical/Occupational Therapy; Future    Plantar fasciitis, left  -     Ambulatory referral/consult to Podiatry  -     Cancel: Ambulatory referral/consult to Physical/Occupational Therapy; Future  -     Tendon Sheath  -     Ambulatory referral/consult to Physical/Occupational Therapy; Future    Other orders  -     Cancel: Tendon Sheath    I counseled the patient on her conditions, their implications and medical management.    - Questioned posterior facet talar calcaneal coalition on xray  Patient's pain is rather acute in physical exam seems consistent with plantar fasciitis    - discussed with patient local injection in the foot she is already been offloaded and taken oral medication with limited relief    She relates she can not tolerate a Cam boot immobilization secondary to the heaviness of the boot and  her bladder issues    Physical therapy ordered    Patient tolerated injection well    Discussed with patient next step would likely be MRI.     If all fails then discussed with patient possibly there is a back or knee component    Follow-up 4-6 weeks sooner if needed    45 minute spent with  chart review and patient care        No follow-ups on file.

## 2022-11-16 ENCOUNTER — PATIENT MESSAGE (OUTPATIENT)
Dept: PODIATRY | Facility: CLINIC | Age: 62
End: 2022-11-16
Payer: COMMERCIAL

## 2022-11-22 RX ORDER — TRIAMCINOLONE ACETONIDE 40 MG/ML
40 INJECTION, SUSPENSION INTRA-ARTICULAR; INTRAMUSCULAR
Status: DISCONTINUED | OUTPATIENT
Start: 2022-11-15 | End: 2022-11-22 | Stop reason: HOSPADM

## 2022-11-30 ENCOUNTER — OFFICE VISIT (OUTPATIENT)
Dept: URGENT CARE | Facility: CLINIC | Age: 62
End: 2022-11-30
Payer: COMMERCIAL

## 2022-11-30 VITALS
WEIGHT: 202 LBS | RESPIRATION RATE: 16 BRPM | OXYGEN SATURATION: 99 % | SYSTOLIC BLOOD PRESSURE: 137 MMHG | TEMPERATURE: 98 F | DIASTOLIC BLOOD PRESSURE: 82 MMHG | HEART RATE: 60 BPM | BODY MASS INDEX: 29.92 KG/M2 | HEIGHT: 69 IN

## 2022-11-30 DIAGNOSIS — L50.9 URTICARIA: Primary | ICD-10-CM

## 2022-11-30 PROCEDURE — 3008F PR BODY MASS INDEX (BMI) DOCUMENTED: ICD-10-PCS | Mod: CPTII,S$GLB,,

## 2022-11-30 PROCEDURE — 3075F PR MOST RECENT SYSTOLIC BLOOD PRESS GE 130-139MM HG: ICD-10-PCS | Mod: CPTII,S$GLB,,

## 2022-11-30 PROCEDURE — 3008F BODY MASS INDEX DOCD: CPT | Mod: CPTII,S$GLB,,

## 2022-11-30 PROCEDURE — 3075F SYST BP GE 130 - 139MM HG: CPT | Mod: CPTII,S$GLB,,

## 2022-11-30 PROCEDURE — 3044F PR MOST RECENT HEMOGLOBIN A1C LEVEL <7.0%: ICD-10-PCS | Mod: CPTII,S$GLB,,

## 2022-11-30 PROCEDURE — 99213 PR OFFICE/OUTPT VISIT, EST, LEVL III, 20-29 MIN: ICD-10-PCS | Mod: S$GLB,,,

## 2022-11-30 PROCEDURE — 1159F PR MEDICATION LIST DOCUMENTED IN MEDICAL RECORD: ICD-10-PCS | Mod: CPTII,S$GLB,,

## 2022-11-30 PROCEDURE — 99213 OFFICE O/P EST LOW 20 MIN: CPT | Mod: S$GLB,,,

## 2022-11-30 PROCEDURE — 1159F MED LIST DOCD IN RCRD: CPT | Mod: CPTII,S$GLB,,

## 2022-11-30 PROCEDURE — 1160F PR REVIEW ALL MEDS BY PRESCRIBER/CLIN PHARMACIST DOCUMENTED: ICD-10-PCS | Mod: CPTII,S$GLB,,

## 2022-11-30 PROCEDURE — 3079F PR MOST RECENT DIASTOLIC BLOOD PRESSURE 80-89 MM HG: ICD-10-PCS | Mod: CPTII,S$GLB,,

## 2022-11-30 PROCEDURE — 3079F DIAST BP 80-89 MM HG: CPT | Mod: CPTII,S$GLB,,

## 2022-11-30 PROCEDURE — 1160F RVW MEDS BY RX/DR IN RCRD: CPT | Mod: CPTII,S$GLB,,

## 2022-11-30 PROCEDURE — 3044F HG A1C LEVEL LT 7.0%: CPT | Mod: CPTII,S$GLB,,

## 2022-11-30 RX ORDER — CETIRIZINE HYDROCHLORIDE 10 MG/1
10 TABLET ORAL DAILY
Qty: 14 TABLET | Refills: 0 | Status: SHIPPED | OUTPATIENT
Start: 2022-11-30 | End: 2022-11-30

## 2022-11-30 RX ORDER — FAMOTIDINE 20 MG/1
20 TABLET, FILM COATED ORAL 2 TIMES DAILY
Qty: 28 TABLET | Refills: 0 | Status: SHIPPED | OUTPATIENT
Start: 2022-11-30 | End: 2023-04-03

## 2022-11-30 RX ORDER — HYDROCORTISONE 25 MG/G
CREAM TOPICAL 2 TIMES DAILY
Qty: 28 G | Refills: 1 | Status: SHIPPED | OUTPATIENT
Start: 2022-11-30 | End: 2023-04-03

## 2022-11-30 RX ORDER — CETIRIZINE HYDROCHLORIDE 10 MG/1
10 TABLET ORAL DAILY
Qty: 14 TABLET | Refills: 0 | Status: SHIPPED | OUTPATIENT
Start: 2022-11-30 | End: 2022-12-14

## 2022-12-01 NOTE — PROGRESS NOTES
"Subjective:       Patient ID: Rosamaria Agosto is a 62 y.o. female.    Vitals:  height is 5' 9" (1.753 m) and weight is 91.6 kg (202 lb). Her temperature is 98 °F (36.7 °C). Her blood pressure is 137/82 and her pulse is 60. Her respiration is 16 and oxygen saturation is 99%.     Chief Complaint: No chief complaint on file.    61 yo female presents to the urgent care with c/o itching and a rash. Pt reports rash started Sunday after she put on some new boots. She states she is itchy and that the itching is affecting her sleep. She states that she has used an antibiotic cream on it to soothe symptoms but noticed no improvement. Benadryl has helped but she notes that the rash is spreading to other parts of her body. She denies CP, SOB, rash on her face.     Rash  This is a new problem. The current episode started in the past 7 days. The problem has been gradually worsening since onset. The affected locations include the abdomen, left upper leg, right arm, right upper leg and back. The rash is characterized by itchiness and redness. Associated symptoms include rhinorrhea. Pertinent negatives include no anorexia, congestion, cough, diarrhea, eye pain, facial edema, fatigue, fever, joint pain, nail changes, shortness of breath, sore throat or vomiting. Past treatments include antibiotic cream (Benadryl). The treatment provided moderate relief.     Constitution: Negative for fatigue and fever.   HENT:  Negative for congestion and sore throat.    Neck: Negative for neck pain and neck stiffness.   Cardiovascular:  Negative for chest pain, palpitations and sob on exertion.   Eyes:  Negative for eye pain.   Respiratory:  Negative for cough, shortness of breath, stridor and wheezing.    Gastrointestinal:  Negative for vomiting and diarrhea.   Skin:  Positive for hives. Negative for pale.   Allergic/Immunologic: Positive for hives.     Objective:      Physical Exam   Constitutional:  Non-toxic appearance. She does not appear " ill. No distress. normal  HENT:   Head: Normocephalic.   Mouth/Throat: Uvula is midline and oropharynx is clear and moist. No trismus in the jaw. No uvula swelling. No oropharyngeal exudate or posterior oropharyngeal erythema. Oropharynx is clear.   Airway is intact. No evidence of anaphylaxis. No signs of facial swelling. No urticaria appreciated on the neck and face.       Comments: Airway is intact. No evidence of anaphylaxis. No signs of facial swelling. No urticaria appreciated on the neck and face.   Cardiovascular: Normal rate, regular rhythm and normal heart sounds.   No murmur heard.Exam reveals no gallop and no friction rub.   Pulmonary/Chest: Effort normal and breath sounds normal. No accessory muscle usage or stridor. No apnea, no tachypnea and no bradypnea. No respiratory distress. She has no wheezes. She has no rhonchi. She has no rales. She exhibits no tenderness.   No signs of respiratory distress. Airway is intact. Lungs were clear on exam.          Comments: No signs of respiratory distress. Airway is intact. Lungs were clear on exam.     Abdominal: Normal appearance.   Neurological: She is alert.   Skin: Skin is not diaphoretic.        Nursing note and vitals reviewed.     Assessment:       1. Urticaria          Plan:     Previous external notes reviewed.  Vital signs reviewed.  Labs ordered. Labs reviewed.  Discussed urticaria, home care, tx options, and given follow up precautions  Patient involved with the treatment plan and agreed to the plan.  Patient informed on warning signs, patient understood warning signs.  Patient informed to return to the urgent care or go to the ER if warning signs appear.  Patient informed that if her rash spreads to her face and/or if she develops the warning signs that we discussed to go to the closest ER. Patient understood.     Patient Instructions   Please return here or go to the Emergency Department for any concerns or worsening of condition.  Please take the  prescribed (Zyrtec and Pepcid) medication as directed. Feel free to use the OTC version instead if you choose.   Please apply ointment to affected areas as directed.  You were given a dermatology referral in case your symptoms do not get better, please call [phone] for scheduling and appointments.   Please follow up with your primary care doctor or specialist as needed.    If you  smoke, please stop smoking.    Urticaria  -     Ambulatory referral/consult to Dermatology  -     famotidine (PEPCID) 20 MG tablet; Take 1 tablet (20 mg total) by mouth 2 (two) times daily. for 14 days  Dispense: 28 tablet; Refill: 0  -     hydrocortisone 2.5 % cream; Apply topically 2 (two) times daily. for 14 days  Dispense: 28 g; Refill: 1  -     Discontinue: cetirizine (ZYRTEC) 10 MG tablet; Take 1 tablet (10 mg total) by mouth once daily. for 14 days  Dispense: 14 tablet; Refill: 0  -     cetirizine (ZYRTEC) 10 MG tablet; Take 1 tablet (10 mg total) by mouth once daily. for 14 days  Dispense: 14 tablet; Refill: 0       Abdirizak You PA-C

## 2022-12-01 NOTE — PATIENT INSTRUCTIONS
Please return here or go to the Emergency Department for any concerns or worsening of condition.  Please take the prescribed (Zyrtec and Pepcid) medication as directed. Feel free to use the OTC version instead if you choose.   Please apply ointment to affected areas as directed.  You were given a dermatology referral in case your symptoms do not get better, please call [phone] for scheduling and appointments.   Please follow up with your primary care doctor or specialist as needed.    If you  smoke, please stop smoking.

## 2022-12-06 ENCOUNTER — TELEPHONE (OUTPATIENT)
Dept: PODIATRY | Facility: CLINIC | Age: 62
End: 2022-12-06
Payer: COMMERCIAL

## 2022-12-19 ENCOUNTER — TELEPHONE (OUTPATIENT)
Dept: PODIATRY | Facility: CLINIC | Age: 62
End: 2022-12-19
Payer: COMMERCIAL

## 2022-12-21 ENCOUNTER — OFFICE VISIT (OUTPATIENT)
Dept: DERMATOLOGY | Facility: CLINIC | Age: 62
End: 2022-12-21
Payer: COMMERCIAL

## 2022-12-21 VITALS — BODY MASS INDEX: 29.83 KG/M2 | WEIGHT: 202 LBS

## 2022-12-21 DIAGNOSIS — L30.4 INTERTRIGO: Primary | ICD-10-CM

## 2022-12-21 PROCEDURE — 1159F PR MEDICATION LIST DOCUMENTED IN MEDICAL RECORD: ICD-10-PCS | Mod: CPTII,S$GLB,, | Performed by: DERMATOLOGY

## 2022-12-21 PROCEDURE — 1159F MED LIST DOCD IN RCRD: CPT | Mod: CPTII,S$GLB,, | Performed by: DERMATOLOGY

## 2022-12-21 PROCEDURE — 3008F BODY MASS INDEX DOCD: CPT | Mod: CPTII,S$GLB,, | Performed by: DERMATOLOGY

## 2022-12-21 PROCEDURE — 99213 PR OFFICE/OUTPT VISIT, EST, LEVL III, 20-29 MIN: ICD-10-PCS | Mod: S$GLB,,, | Performed by: DERMATOLOGY

## 2022-12-21 PROCEDURE — 3044F HG A1C LEVEL LT 7.0%: CPT | Mod: CPTII,S$GLB,, | Performed by: DERMATOLOGY

## 2022-12-21 PROCEDURE — 99999 PR PBB SHADOW E&M-EST. PATIENT-LVL IV: CPT | Mod: PBBFAC,,, | Performed by: DERMATOLOGY

## 2022-12-21 PROCEDURE — 1160F RVW MEDS BY RX/DR IN RCRD: CPT | Mod: CPTII,S$GLB,, | Performed by: DERMATOLOGY

## 2022-12-21 PROCEDURE — 99999 PR PBB SHADOW E&M-EST. PATIENT-LVL IV: ICD-10-PCS | Mod: PBBFAC,,, | Performed by: DERMATOLOGY

## 2022-12-21 PROCEDURE — 3044F PR MOST RECENT HEMOGLOBIN A1C LEVEL <7.0%: ICD-10-PCS | Mod: CPTII,S$GLB,, | Performed by: DERMATOLOGY

## 2022-12-21 PROCEDURE — 1160F PR REVIEW ALL MEDS BY PRESCRIBER/CLIN PHARMACIST DOCUMENTED: ICD-10-PCS | Mod: CPTII,S$GLB,, | Performed by: DERMATOLOGY

## 2022-12-21 PROCEDURE — 99213 OFFICE O/P EST LOW 20 MIN: CPT | Mod: S$GLB,,, | Performed by: DERMATOLOGY

## 2022-12-21 PROCEDURE — 3008F PR BODY MASS INDEX (BMI) DOCUMENTED: ICD-10-PCS | Mod: CPTII,S$GLB,, | Performed by: DERMATOLOGY

## 2022-12-21 RX ORDER — ECONAZOLE NITRATE 10 MG/G
CREAM TOPICAL
Qty: 30 G | Refills: 2 | Status: SHIPPED | OUTPATIENT
Start: 2022-12-21

## 2022-12-21 NOTE — PROGRESS NOTES
Subjective:       Patient ID:  Rosamaria Agosto is a 62 y.o. female who presents for   Chief Complaint   Patient presents with    Spot      abdomen, several months, itching      Rash on lower abdomen for months no tx itches.     Spot - Initial  Affected locations: abdomen  Signs / symptoms: asymptomatic  Aggravated by: nothing  Relieving factors/Treatments tried: nothing    Review of Systems   Constitutional:  Negative for fever, chills, weight loss, weight gain, fatigue, night sweats and malaise.   Skin:  Positive for itching and rash. Negative for daily sunscreen use, activity-related sunscreen use and wears hat.   Hematologic/Lymphatic: Does not bruise/bleed easily.      Objective:    Physical Exam   Constitutional: She appears well-developed and well-nourished.   Neurological: She is alert and oriented to person, place, and time.   Psychiatric: She has a normal mood and affect.   Skin:   Areas Examined (abnormalities noted in diagram):   Abdomen Inspection Performed            Diagram Legend     Erythematous scaling macule/papule c/w actinic keratosis       Vascular papule c/w angioma      Pigmented verrucoid papule/plaque c/w seborrheic keratosis      Yellow umbilicated papule c/w sebaceous hyperplasia      Irregularly shaped tan macule c/w lentigo     1-2 mm smooth white papules consistent with Milia      Movable subcutaneous cyst with punctum c/w epidermal inclusion cyst      Subcutaneous movable cyst c/w pilar cyst      Firm pink to brown papule c/w dermatofibroma      Pedunculated fleshy papule(s) c/w skin tag(s)      Evenly pigmented macule c/w junctional nevus     Mildly variegated pigmented, slightly irregular-bordered macule c/w mildly atypical nevus      Flesh colored to evenly pigmented papule c/w intradermal nevus       Pink pearly papule/plaque c/w basal cell carcinoma      Erythematous hyperkeratotic cursted plaque c/w SCC      Surgical scar with no sign of skin cancer recurrence      Open and  closed comedones      Inflammatory papules and pustules      Verrucoid papule consistent consistent with wart     Erythematous eczematous patches and plaques     Dystrophic onycholytic nail with subungual debris c/w onychomycosis     Umbilicated papule    Erythematous-base heme-crusted tan verrucoid plaque consistent with inflamed seborrheic keratosis     Erythematous Silvery Scaling Plaque c/w Psoriasis     See annotation      Assessment / Plan:        Intertrigo  -     econazole nitrate 1 % cream; Use bid  Dispense: 30 g; Refill: 2  Vinegar and water 1/2 each apply hs               No follow-ups on file.   Benzodiazepine abuse

## 2022-12-30 ENCOUNTER — LAB VISIT (OUTPATIENT)
Dept: LAB | Facility: HOSPITAL | Age: 62
End: 2022-12-30
Attending: FAMILY MEDICINE
Payer: COMMERCIAL

## 2022-12-30 DIAGNOSIS — E78.2 MIXED HYPERLIPIDEMIA: ICD-10-CM

## 2022-12-30 LAB
ALBUMIN SERPL BCP-MCNC: 4.1 G/DL (ref 3.5–5.2)
ALP SERPL-CCNC: 83 U/L (ref 55–135)
ALT SERPL W/O P-5'-P-CCNC: 13 U/L (ref 10–44)
ANION GAP SERPL CALC-SCNC: 10 MMOL/L (ref 8–16)
AST SERPL-CCNC: 15 U/L (ref 10–40)
BILIRUB SERPL-MCNC: 0.5 MG/DL (ref 0.1–1)
BUN SERPL-MCNC: 13 MG/DL (ref 8–23)
CALCIUM SERPL-MCNC: 10.3 MG/DL (ref 8.7–10.5)
CHLORIDE SERPL-SCNC: 99 MMOL/L (ref 95–110)
CHOLEST SERPL-MCNC: 338 MG/DL (ref 120–199)
CHOLEST/HDLC SERPL: 5.1 {RATIO} (ref 2–5)
CO2 SERPL-SCNC: 30 MMOL/L (ref 23–29)
CREAT SERPL-MCNC: 0.8 MG/DL (ref 0.5–1.4)
EST. GFR  (NO RACE VARIABLE): >60 ML/MIN/1.73 M^2
GLUCOSE SERPL-MCNC: 109 MG/DL (ref 70–110)
HDLC SERPL-MCNC: 66 MG/DL (ref 40–75)
HDLC SERPL: 19.5 % (ref 20–50)
LDLC SERPL CALC-MCNC: 246.4 MG/DL (ref 63–159)
NONHDLC SERPL-MCNC: 272 MG/DL
POTASSIUM SERPL-SCNC: 4.3 MMOL/L (ref 3.5–5.1)
PROT SERPL-MCNC: 8.1 G/DL (ref 6–8.4)
SODIUM SERPL-SCNC: 139 MMOL/L (ref 136–145)
TRIGL SERPL-MCNC: 128 MG/DL (ref 30–150)

## 2022-12-30 PROCEDURE — 80061 LIPID PANEL: CPT | Performed by: FAMILY MEDICINE

## 2022-12-30 PROCEDURE — 36415 COLL VENOUS BLD VENIPUNCTURE: CPT | Mod: PO | Performed by: FAMILY MEDICINE

## 2022-12-30 PROCEDURE — 80053 COMPREHEN METABOLIC PANEL: CPT | Performed by: FAMILY MEDICINE

## 2023-01-02 ENCOUNTER — PATIENT MESSAGE (OUTPATIENT)
Dept: INTERNAL MEDICINE | Facility: CLINIC | Age: 63
End: 2023-01-02
Payer: COMMERCIAL

## 2023-01-02 DIAGNOSIS — E78.2 MIXED HYPERLIPIDEMIA: Primary | ICD-10-CM

## 2023-01-02 RX ORDER — ROSUVASTATIN CALCIUM 20 MG/1
20 TABLET, COATED ORAL NIGHTLY
Qty: 90 TABLET | Refills: 3 | Status: SHIPPED | OUTPATIENT
Start: 2023-01-02 | End: 2024-03-20 | Stop reason: SDUPTHER

## 2023-01-10 ENCOUNTER — PATIENT MESSAGE (OUTPATIENT)
Dept: PODIATRY | Facility: CLINIC | Age: 63
End: 2023-01-10
Payer: COMMERCIAL

## 2023-01-10 DIAGNOSIS — M79.672 INTRACTABLE LEFT HEEL PAIN: Primary | ICD-10-CM

## 2023-01-10 DIAGNOSIS — M72.2 PLANTAR FASCIITIS, LEFT: ICD-10-CM

## 2023-01-11 ENCOUNTER — TELEPHONE (OUTPATIENT)
Dept: PODIATRY | Facility: CLINIC | Age: 63
End: 2023-01-11
Payer: COMMERCIAL

## 2023-01-11 RX ORDER — MELOXICAM 15 MG/1
15 TABLET ORAL DAILY
Qty: 20 TABLET | Refills: 0 | Status: SHIPPED | OUTPATIENT
Start: 2023-01-11 | End: 2023-01-31

## 2023-01-11 NOTE — TELEPHONE ENCOUNTER
----- Message from Allegra Bañuelos DPM sent at 1/11/2023  7:45 AM CST -----  Call patient and let her know I have prescribed Mobic to her pharmacy she can take it once a day

## 2023-03-15 ENCOUNTER — OFFICE VISIT (OUTPATIENT)
Dept: CARDIOLOGY | Facility: CLINIC | Age: 63
End: 2023-03-15
Payer: COMMERCIAL

## 2023-03-15 ENCOUNTER — PATIENT MESSAGE (OUTPATIENT)
Dept: BARIATRICS | Facility: CLINIC | Age: 63
End: 2023-03-15
Payer: COMMERCIAL

## 2023-03-15 VITALS — SYSTOLIC BLOOD PRESSURE: 160 MMHG | DIASTOLIC BLOOD PRESSURE: 93 MMHG | HEART RATE: 68 BPM

## 2023-03-15 DIAGNOSIS — I10 ESSENTIAL HYPERTENSION: Primary | ICD-10-CM

## 2023-03-15 DIAGNOSIS — E66.09 CLASS 1 OBESITY DUE TO EXCESS CALORIES WITH SERIOUS COMORBIDITY AND BODY MASS INDEX (BMI) OF 30.0 TO 30.9 IN ADULT: ICD-10-CM

## 2023-03-15 DIAGNOSIS — I87.2 VENOUS INSUFFICIENCY: ICD-10-CM

## 2023-03-15 DIAGNOSIS — E78.2 MIXED HYPERLIPIDEMIA: ICD-10-CM

## 2023-03-15 PROCEDURE — 99214 PR OFFICE/OUTPT VISIT, EST, LEVL IV, 30-39 MIN: ICD-10-PCS | Mod: S$GLB,,, | Performed by: INTERNAL MEDICINE

## 2023-03-15 PROCEDURE — 99999 PR PBB SHADOW E&M-EST. PATIENT-LVL II: ICD-10-PCS | Mod: PBBFAC,,, | Performed by: INTERNAL MEDICINE

## 2023-03-15 PROCEDURE — 99214 OFFICE O/P EST MOD 30 MIN: CPT | Mod: S$GLB,,, | Performed by: INTERNAL MEDICINE

## 2023-03-15 PROCEDURE — 99999 PR PBB SHADOW E&M-EST. PATIENT-LVL II: CPT | Mod: PBBFAC,,, | Performed by: INTERNAL MEDICINE

## 2023-03-15 RX ORDER — BENAZEPRIL/HYDROCHLOROTHIAZIDE 20 MG-25MG
1 TABLET ORAL DAILY
Qty: 90 TABLET | Refills: 3 | Status: SHIPPED | OUTPATIENT
Start: 2023-03-15 | End: 2023-08-16

## 2023-03-15 NOTE — PROGRESS NOTES
HISTORY:    62-year-old female with a history of hyperlipidemia and venous insufficiency presenting for follow-up.     Patient without anginal pain. + GERD. No SOB or CARTER. Had negative ischemic gill in 2021.      Patient denies any personal history of coronary artery disease, peripheral arterial disease, congestive heart failure, cardiomyopathy, or stroke.      She is moderately active at baseline. Works a lot and is constantly on her feet. Has not been working out.      She does have a history of hyperlipidemia and is on rosuvastatin 20x1 at this time.  She has a history of hypertension and is on hydrochlorothiazide.  She has been checking her blood pressures at home and notes readings of 120s/80s as long as she is taking her medicines.  No family history of premature CAD.  She is a nonsmoker.     She does have a history of venous insufficiency and describes multiple ablations. Currently she notes intermittent left lower extremity edema greater than right with a sensation of heaviness and fullness when the edema is present.  Symptoms are worse after standing on her feet for long periods of time.  She also has bilateral restless leg syndrome at night.  History of bilateral lower extremity varicosities as well that improved after her ablation, but have returned left greater than right.  Her dad and brother have a history of varicose veins as well.  She denies any history of DVT or PE.  She started using compression stockings and they help with symptoms.        PHYSICAL EXAM:    Vitals:    03/15/23 1358   BP: (!) 160/93   Pulse: 68       NAD, A+Ox3.  No jvd, no bruit.  RRR nml s1,s2. No murmurs.  CTA B no wheezes or crackles.  No edema.    LABS/STUDIES (imaging reviewed during clinic visit):    CBC and BMP from 2022 are unremarkable.  Lipid panel revealing for an LDL of 246 and HDL of 66.  LDL was previously 147. . Normal A1c and TSH.  EKG July 2021 normal sinus rhythm with no Q-waves or ST changes.   Treadmill stress  test July 2021 negative for ischemia with 6 minutes of exercise on a high ramp protocol no symptoms or EKG changes.  Holter July 2021 demonstrates sinus rhythm with average heart rate of 79 beats per minute.  No significant arrhythmias or heart block noted.  TTE July 2021 normal LV size and function.  No significant valve disease.  Venous duplex from 2019 shows no evidence of DVT with significant bilateral GSV reflux. Follow-up duplex in 2020 reveals an ablated GSV with no evidence of left lower extremity DVT.  July 2021 shows no evidence of DVT.  Right GSV reflux noted and left SSV reflux noted.      ASSESSMENT & PLAN:    1. Essential hypertension    2. Mixed hyperlipidemia    3. Venous insufficiency    4. Class 1 obesity due to excess calories with serious comorbidity and body mass index (BMI) of 30.0 to 30.9 in adult        Orders Placed This Encounter    Basic Metabolic Panel    Lipid Panel    benazepril-hydrochlorthiazide (LOTENSIN HCT) 20-25 mg Tab        Pt reports BPs controlled on home log on hydrochlorothiazide 25 mg daily. Did not take today. Not certain this is accurate. Will add benazepril 20x1. BMP in 2 weeks.     Patient was not taking  statin and LDL was markedly elevated. Has been taking rosuvastatin 20x1. Recheck BMP.    The patient has bilateral venous insufficiency with C4a, Ep, As, Pr disease bilaterally.  She appears more symptomatic on her left side.  We will continue with compression stockings for now and if the symptoms become more uncomfortable we can consider ablation.     We discussed the importance of diet modifications and instituting an exercise regimen.      Follow up in about 6 months (around 9/15/2023).      Carolina Kitchen MD

## 2023-03-21 ENCOUNTER — TELEPHONE (OUTPATIENT)
Dept: BARIATRICS | Facility: CLINIC | Age: 63
End: 2023-03-21
Payer: COMMERCIAL

## 2023-03-21 ENCOUNTER — PATIENT MESSAGE (OUTPATIENT)
Dept: PODIATRY | Facility: CLINIC | Age: 63
End: 2023-03-21
Payer: COMMERCIAL

## 2023-03-21 ENCOUNTER — TELEPHONE (OUTPATIENT)
Dept: PODIATRY | Facility: CLINIC | Age: 63
End: 2023-03-21
Payer: COMMERCIAL

## 2023-03-21 NOTE — TELEPHONE ENCOUNTER
----- Message from Meena Sanabria sent at 3/21/2023 11:52 AM CDT -----  Regarding: pt call back requested  Name of Who is Calling:MARGIE FERNANDEZ [[id number]]          What is the request in detail: pt call back concerning appointment           Can the clinic reply by [facility]: no            [facility] to [facility]: [phone] (home)

## 2023-03-21 NOTE — TELEPHONE ENCOUNTER
----- Message from Xavier Puente sent at 3/21/2023 11:45 AM CDT -----  Contact: Patient  Type:  Patient Call          Who Called: patient         Does the patient know what this is regarding?: Requesting a call back about having her appt scheduled after 2:00 ;please advise           Would the patient rather a call back or a response via [facility]?call           Best Call Back Number:[phone]             Additional Information:

## 2023-03-27 ENCOUNTER — OFFICE VISIT (OUTPATIENT)
Dept: PODIATRY | Facility: CLINIC | Age: 63
End: 2023-03-27
Payer: COMMERCIAL

## 2023-03-27 VITALS
WEIGHT: 203.5 LBS | HEIGHT: 69 IN | BODY MASS INDEX: 30.14 KG/M2 | HEART RATE: 77 BPM | SYSTOLIC BLOOD PRESSURE: 149 MMHG | DIASTOLIC BLOOD PRESSURE: 91 MMHG

## 2023-03-27 DIAGNOSIS — G89.29 CHRONIC HEEL PAIN, LEFT: Primary | ICD-10-CM

## 2023-03-27 DIAGNOSIS — M72.2 PLANTAR FASCIITIS, LEFT: ICD-10-CM

## 2023-03-27 DIAGNOSIS — M79.672 CHRONIC HEEL PAIN, LEFT: Primary | ICD-10-CM

## 2023-03-27 PROCEDURE — 99213 PR OFFICE/OUTPT VISIT, EST, LEVL III, 20-29 MIN: ICD-10-PCS | Mod: S$GLB,,, | Performed by: PODIATRIST

## 2023-03-27 PROCEDURE — 99999 PR PBB SHADOW E&M-EST. PATIENT-LVL III: CPT | Mod: PBBFAC,,, | Performed by: PODIATRIST

## 2023-03-27 PROCEDURE — 99999 PR PBB SHADOW E&M-EST. PATIENT-LVL III: ICD-10-PCS | Mod: PBBFAC,,, | Performed by: PODIATRIST

## 2023-03-27 PROCEDURE — 99213 OFFICE O/P EST LOW 20 MIN: CPT | Mod: S$GLB,,, | Performed by: PODIATRIST

## 2023-03-27 NOTE — PROGRESS NOTES
Subjective:      Patient ID: Rosamaria Agosto is a 62 y.o. female.    Chief Complaint:   Foot Pain (bilateral)      Rosamaria is a 62 y.o. female who returns to the podiatry clinic  with complaint of  left foot pain.  Patient relates she feels she may need the MRI because nothing resolved her foot pain    She relates that the injection did somewhat work but not 100%  She also relates that she took the Mobic in it provided zero relief  The pain is mostly still on her heel she is having some left ankle swelling but feels it is because she is standing on it a lot  Patient relates that physical therapy sessions are not helping at all either    She has been using inserts in a new pair of work supportive shoes  Now the right foot is hurting under the toe joints.  She had an extra padding to help    Past Medical History:   Diagnosis Date    Arthritis     Hyperlipidemia     Hypertension     Sinusitis      Past Surgical History:   Procedure Laterality Date    ARTHROSCOPIC REPAIR OF ROTATOR CUFF OF SHOULDER Left 07/24/2019    Procedure: REPAIR, ROTATOR CUFF, ARTHROSCOPIC;  Surgeon: ASMITA Soto MD;  Location: [facility] OR [address] [place] [address];  Service: Orthopedics;  Laterality: Left;    ARTHROSCOPY OF SHOULDER WITH DECOMPRESSION OF SUBACROMIAL SPACE Left 07/24/2019    Procedure: ARTHROSCOPY, SHOULDER, WITH SUBACROMIAL SPACE DECOMPRESSION;  Surgeon: ASMITA Soto MD;  Location: [facility] OR [address] [place] [address];  Service: Orthopedics;  Laterality: Left;    BILATERAL SALPINGO-OOPHORECTOMY (BSO)  06/24/2019    BREAST BIOPSY  24-25 years old    CYSTOSCOPY N/A 06/24/2019    Procedure: CYSTOSCOPY;  Surgeon: Anson Ellison MD;  Location: [facility];  Service: OB/GYN;  Laterality: N/A;    ENCEPHALOCELE REPAIR  46    HYSTERECTOMY  1994    NASAL SINUS SURGERY      ROBOT-ASSISTED LAPAROSCOPIC ABDOMINAL SACROCOLPOPEXY USING DA MAC XI N/A 06/24/2019    Procedure: XI ROBOTIC SACROCOLPOPEXY, ABDOMINAL;  Surgeon: Anson Ellison MD;  Location: [facility];  Service:  OB/GYN;  Laterality: N/A;    SHOULDER ARTHROSCOPY Left 07/24/2019    Procedure: BICEPS TENODESIS;  Surgeon: ASMITA Soto MD;  Location: [facility] OR [address] [place] [address];  Service: Orthopedics;  Laterality: Left;    TUBAL LIGATION       Current Outpatient Medications on File Prior to Visit   Medication Sig Dispense Refill    benazepril-hydrochlorthiazide (LOTENSIN HCT) 20-25 mg Tab Take 1 tablet by mouth once daily. 90 tablet 3    diclofenac (VOLTAREN) 75 MG EC tablet Take 1 tablet (75 mg total) by mouth 2 (two) times daily. 60 tablet 3    diphenhydrAMINE-aluminum-magnesium hydroxide-simethicone-LIDOcaine HCl 2% Swish and spit 15 mLs every 4 (four) hours as needed (sore throat). 180 mL 0    econazole nitrate 1 % cream Use bid 30 g 2    ergocalciferol (ERGOCALCIFEROL) 50,000 unit Cap Take 1 capsule (50,000 Units total) by mouth every 7 days. 4 capsule 2    estradioL (VAGIFEM) 10 mcg Tab Place 1 tablet (10 mcg total) vaginally twice a week. 8 tablet 12    fluocinolone and shower cap (DERMA-SMOOTHE/FS SCALP OIL) 0.01 % Oil Apply oil to damp scalp nightly and cover with shower cap. 1 Bottle 3    fluticasone propionate (FLONASE) 50 mcg/actuation nasal spray 2 sprays (100 mcg total) by Each Nostril route once daily. 16 g 11    meloxicam (MOBIC) 15 MG tablet Take one tablet by mouth with a meal for 10 days. 10 tablet 0    neomycin-polymyxin-hydrocortisone (CORTISPORIN) 3.5-10,000-1 mg/mL-unit/mL-% otic suspension Place 3 drops into the left ear 4 (four) times daily. 10 mL 0    olopatadine (PATANOL) 0.1 % ophthalmic solution Place 1 drop into both eyes 2 (two) times daily.      rosuvastatin (CRESTOR) 20 MG tablet Take 1 tablet (20 mg total) by mouth every evening. 90 tablet 3    azelastine (ASTELIN) 137 mcg (0.1 %) nasal spray 1 spray (137 mcg total) by Nasal route 2 (two) times daily. 30 mL 2    cetirizine (ZYRTEC) 10 MG tablet Take 1 tablet (10 mg total) by mouth once daily. for 14 days 14 tablet 0    famotidine (PEPCID) 20 MG tablet  Take 1 tablet (20 mg total) by mouth 2 (two) times daily. for 14 days 28 tablet 0    gabapentin (NEURONTIN) 300 MG capsule Take 1 capsule (300 mg total) by mouth 2 (two) times daily. 60 capsule 11    hydrocortisone 2.5 % cream Apply topically 2 (two) times daily. for 14 days 28 g 1    LIDOcaine (LIDODERM) 5 % Place 2 patches onto the skin once daily. Remove & Discard patch within 12 hours or as directed by MD (Patient not taking: Reported on 12/21/2022) 2 patch 0    predniSONE (DELTASONE) 20 MG tablet Take 1 tablet (20 mg total) by mouth once daily. (Patient not taking: Reported on 3/27/2023) 3 tablet 0    topiramate (TOPAMAX) 50 MG tablet Take 1 tablet (50 mg total) by mouth 2 (two) times daily. 180 tablet 0     No current facility-administered medications on file prior to visit.     Review of patient's allergies indicates:   Allergen Reactions    Sotradecol [sodium tetradecyl sulfate]      Hives and itching that resolved with Benadryl and Solumedrol.       Review of Systems   Constitutional: Negative for chills, decreased appetite, fever, malaise/fatigue, night sweats, weight gain and weight loss.   Cardiovascular:  Negative for chest pain, claudication, dyspnea on exertion, leg swelling, palpitations and syncope.   Respiratory:  Negative for cough and shortness of breath.    Endocrine: Negative for cold intolerance and heat intolerance.   Hematologic/Lymphatic: Negative for bleeding problem. Does not bruise/bleed easily.   Skin:  Negative for color change, dry skin, flushing, itching, nail changes, poor wound healing, rash, skin cancer, suspicious lesions and unusual hair distribution.   Musculoskeletal:  Negative for arthritis, back pain, falls, gout, joint pain, joint swelling, muscle cramps, muscle weakness, myalgias, neck pain and stiffness.        Foot pain   Gastrointestinal:  Negative for diarrhea, nausea and vomiting.   Neurological:  Negative for dizziness, focal weakness, light-headedness, numbness,  "paresthesias, tremors, vertigo and weakness.   Psychiatric/Behavioral:  Negative for altered mental status and depression. The patient does not have insomnia.    Allergic/Immunologic: Negative.          Objective:       Vitals:    23 1150   BP: (!) 149/91   Pulse: 77   Weight: 92.3 kg (203 lb 7.8 oz)   Height: 5' 9" (1.753 m)   PainSc:   7   PainLoc: Foot   92.3 kg (203 lb 7.8 oz)     Physical Exam  Vitals reviewed.   Constitutional:       General: She is not in acute distress.     Appearance: She is well-developed. She is not ill-appearing, toxic-appearing or diaphoretic.      Comments: Surgical shoe      Cardiovascular:      Pulses:           Dorsalis pedis pulses are 2+ on the right side and 2+ on the left side.        Posterior tibial pulses are 2+ on the right side and 2+ on the left side.   Musculoskeletal:         General: No deformity.      Left lower le+ Edema present.      Right ankle: Normal.      Right Achilles Tendon: Normal. No tenderness.      Left ankle: Normal.      Left Achilles Tendon: Normal. No tenderness.      Right foot: Decreased range of motion. No deformity, tenderness or bony tenderness.      Left foot: Decreased range of motion. Tenderness present. No deformity or bony tenderness.      Comments: Sharp deep pain to palpation inferior heel left at medial calcaneal tubercle without ecchymosis, erythema, edema, or cardinal signs infection, and no signs of trauma.   No pain along the posterior tibial tendon or navicular insertion  Flexible pes planus foot type w/ medial arch collapse and mild gastroc equinus      Mild pain on palpation to right sub 2nd and 3rd metatarsophalangeal joints no pain with range of motion   Feet:      Right foot:      Protective Sensation: 10 sites tested.  10 sites sensed.      Skin integrity: No ulcer, blister, skin breakdown, erythema, warmth, callus or dry skin.      Toenail Condition: Right toenails are normal.      Left foot:      Protective " Sensation: 10 sites tested.  10 sites sensed.      Skin integrity: No ulcer, blister, skin breakdown, erythema, warmth, callus or dry skin.      Toenail Condition: Left toenails are normal.      Comments: No open lesions or signs of infection no excessive warmth  Skin:     General: Skin is warm.      Capillary Refill: Capillary refill takes 2 to 3 seconds.      Coloration: Skin is not pale.      Findings: No erythema or rash.   Neurological:      Mental Status: She is alert and oriented to person, place, and time.      Sensory: No sensory deficit.      Gait: Gait abnormal.   Psychiatric:         Attention and Perception: Attention normal.         Mood and Affect: Mood normal.         Speech: Speech normal.         Behavior: Behavior normal.         Thought Content: Thought content normal.         Cognition and Memory: Cognition normal.         Judgment: Judgment normal.       XR CALCANEUS 2 VIEW LEFT  Narrative: EXAMINATION:  XR CALCANEUS 2 VIEW LEFT    CLINICAL HISTORY:  Pain in left foot    TECHNIQUE:  Tangential and lateral views of the left calcaneus were performed.    COMPARISON:  The 11/29/2021    FINDINGS:  Pes planus deformity of foot is again noted.  Osteophyte at the calcaneal tubercle at the insertion of the plantar fascia and Achilles tendon appears stable.  Question posterior facet coalition of the talus and calcaneus is suggested on the lateral projection.    There is no evidence of calcaneal fracture.  Impression: No acute finding of the left calcaneus.    Degenerative changes of the calcaneal tubercle posteriorly as described.    Questioned posterior facet talar calcaneal coalition.    Electronically signed by: Eber Krishnamurthy  Date:    10/28/2022  Time:    19:35       Assessment:       Encounter Diagnoses   Name Primary?    Chronic heel pain, left Yes    Plantar fasciitis, left          Plan:       Rosamaria was seen today for foot pain.    Diagnoses and all orders for this visit:    Chronic heel pain,  left  -     MRI Foot ([facility]) Left Without Contrast; Future    Plantar fasciitis, left  -     MRI Foot ([facility]) Left Without Contrast; Future    I counseled the patient on her conditions, their implications and medical management.      Patient has failed conservative therapy including physical therapy, oral NSAIDs, injection    order please for MRI evaluate for t plantar fascial tear or calcaneal stress fracture    Patient reiterates that she is unable to tolerate a cam boot secondary to the heaviness    Recommend if MRI is negative for any tears consider another injection  Patient also may need to be referred for plantar fascial release surgical consult    Wrapped foot with lidocaine gel and a ankle support Coban brace    Applied a metatarsal offloading pad to the right forefoot patient can remove if needed    prn

## 2023-04-01 ENCOUNTER — LAB VISIT (OUTPATIENT)
Dept: LAB | Facility: HOSPITAL | Age: 63
End: 2023-04-01
Attending: INTERNAL MEDICINE
Payer: COMMERCIAL

## 2023-04-01 DIAGNOSIS — I10 ESSENTIAL HYPERTENSION: ICD-10-CM

## 2023-04-01 DIAGNOSIS — E78.2 MIXED HYPERLIPIDEMIA: ICD-10-CM

## 2023-04-01 LAB
ALBUMIN SERPL BCP-MCNC: 3.8 G/DL (ref 3.5–5.2)
ALP SERPL-CCNC: 74 U/L (ref 55–135)
ALT SERPL W/O P-5'-P-CCNC: 14 U/L (ref 10–44)
ANION GAP SERPL CALC-SCNC: 6 MMOL/L (ref 8–16)
ANION GAP SERPL CALC-SCNC: 6 MMOL/L (ref 8–16)
AST SERPL-CCNC: 18 U/L (ref 10–40)
BILIRUB SERPL-MCNC: 0.6 MG/DL (ref 0.1–1)
BUN SERPL-MCNC: 12 MG/DL (ref 8–23)
BUN SERPL-MCNC: 12 MG/DL (ref 8–23)
CALCIUM SERPL-MCNC: 9.3 MG/DL (ref 8.7–10.5)
CALCIUM SERPL-MCNC: 9.3 MG/DL (ref 8.7–10.5)
CHLORIDE SERPL-SCNC: 104 MMOL/L (ref 95–110)
CHLORIDE SERPL-SCNC: 104 MMOL/L (ref 95–110)
CHOLEST SERPL-MCNC: 201 MG/DL (ref 120–199)
CHOLEST SERPL-MCNC: 201 MG/DL (ref 120–199)
CHOLEST/HDLC SERPL: 3.7 {RATIO} (ref 2–5)
CHOLEST/HDLC SERPL: 3.7 {RATIO} (ref 2–5)
CO2 SERPL-SCNC: 29 MMOL/L (ref 23–29)
CO2 SERPL-SCNC: 29 MMOL/L (ref 23–29)
CREAT SERPL-MCNC: 0.6 MG/DL (ref 0.5–1.4)
CREAT SERPL-MCNC: 0.6 MG/DL (ref 0.5–1.4)
EST. GFR  (NO RACE VARIABLE): >60 ML/MIN/1.73 M^2
EST. GFR  (NO RACE VARIABLE): >60 ML/MIN/1.73 M^2
GLUCOSE SERPL-MCNC: 102 MG/DL (ref 70–110)
GLUCOSE SERPL-MCNC: 102 MG/DL (ref 70–110)
HDLC SERPL-MCNC: 54 MG/DL (ref 40–75)
HDLC SERPL-MCNC: 54 MG/DL (ref 40–75)
HDLC SERPL: 26.9 % (ref 20–50)
HDLC SERPL: 26.9 % (ref 20–50)
LDLC SERPL CALC-MCNC: 132.6 MG/DL (ref 63–159)
LDLC SERPL CALC-MCNC: 132.6 MG/DL (ref 63–159)
NONHDLC SERPL-MCNC: 147 MG/DL
NONHDLC SERPL-MCNC: 147 MG/DL
POTASSIUM SERPL-SCNC: 4.1 MMOL/L (ref 3.5–5.1)
POTASSIUM SERPL-SCNC: 4.1 MMOL/L (ref 3.5–5.1)
PROT SERPL-MCNC: 7.1 G/DL (ref 6–8.4)
SODIUM SERPL-SCNC: 139 MMOL/L (ref 136–145)
SODIUM SERPL-SCNC: 139 MMOL/L (ref 136–145)
TRIGL SERPL-MCNC: 72 MG/DL (ref 30–150)
TRIGL SERPL-MCNC: 72 MG/DL (ref 30–150)

## 2023-04-01 PROCEDURE — 36415 COLL VENOUS BLD VENIPUNCTURE: CPT | Mod: PO | Performed by: FAMILY MEDICINE

## 2023-04-01 PROCEDURE — 80053 COMPREHEN METABOLIC PANEL: CPT | Performed by: FAMILY MEDICINE

## 2023-04-01 PROCEDURE — 80061 LIPID PANEL: CPT | Performed by: FAMILY MEDICINE

## 2023-04-03 ENCOUNTER — OFFICE VISIT (OUTPATIENT)
Dept: BARIATRICS | Facility: CLINIC | Age: 63
End: 2023-04-03
Payer: COMMERCIAL

## 2023-04-03 VITALS
DIASTOLIC BLOOD PRESSURE: 88 MMHG | WEIGHT: 202.5 LBS | SYSTOLIC BLOOD PRESSURE: 136 MMHG | BODY MASS INDEX: 29.99 KG/M2 | HEIGHT: 69 IN | HEART RATE: 71 BPM | OXYGEN SATURATION: 98 %

## 2023-04-03 DIAGNOSIS — E66.09 CLASS 1 OBESITY DUE TO EXCESS CALORIES WITH SERIOUS COMORBIDITY AND BODY MASS INDEX (BMI) OF 30.0 TO 30.9 IN ADULT: Primary | ICD-10-CM

## 2023-04-03 DIAGNOSIS — I10 ESSENTIAL HYPERTENSION: ICD-10-CM

## 2023-04-03 DIAGNOSIS — E78.2 MIXED HYPERLIPIDEMIA: ICD-10-CM

## 2023-04-03 DIAGNOSIS — Z71.3 ENCOUNTER FOR WEIGHT LOSS COUNSELING: ICD-10-CM

## 2023-04-03 PROCEDURE — 99213 PR OFFICE/OUTPT VISIT, EST, LEVL III, 20-29 MIN: ICD-10-PCS | Mod: S$GLB,,, | Performed by: STUDENT IN AN ORGANIZED HEALTH CARE EDUCATION/TRAINING PROGRAM

## 2023-04-03 PROCEDURE — 99999 PR PBB SHADOW E&M-EST. PATIENT-LVL V: ICD-10-PCS | Mod: PBBFAC,,, | Performed by: STUDENT IN AN ORGANIZED HEALTH CARE EDUCATION/TRAINING PROGRAM

## 2023-04-03 PROCEDURE — 99999 PR PBB SHADOW E&M-EST. PATIENT-LVL V: CPT | Mod: PBBFAC,,, | Performed by: STUDENT IN AN ORGANIZED HEALTH CARE EDUCATION/TRAINING PROGRAM

## 2023-04-03 PROCEDURE — 99213 OFFICE O/P EST LOW 20 MIN: CPT | Mod: S$GLB,,, | Performed by: STUDENT IN AN ORGANIZED HEALTH CARE EDUCATION/TRAINING PROGRAM

## 2023-04-03 RX ORDER — TOPIRAMATE 50 MG/1
50 TABLET, FILM COATED ORAL 2 TIMES DAILY
Qty: 180 TABLET | Refills: 0 | Status: SHIPPED | OUTPATIENT
Start: 2023-04-03 | End: 2023-07-02

## 2023-04-03 NOTE — PATIENT INSTRUCTIONS
Copyright © 2011, [facility]. For more information about The Healthy Eating Plate, please see The Nutrition Source, Department of Nutrition, [employer], www.thenutritionsource.org, and [employer], www.health.[place].edu.      Meal Planning & Grocery Shopping    Meal planning builds the foundation for healthy eating. When you have structured ideas for healthy meals and foods available at home to prepare those meals, weight control becomes easier.  If only healthy foods are available at home, then you will be much more likely to eat healthy foods. And you will be less likely to go to a restaurant or  a fast food meal, which tend to be unhealthy and higher in calories than meals prepared at home.      Take 5-10 minutes each week to plan meals for the next 7 days.  Make a grocery list based on the meal plan.    Grocery Shopping Tips:  Shop on a full stomach.  Schedule your shopping for times when you are most motivated and able to be disciplined about your purchases. For example, after a stressful day at work it may be difficult to make the healthiest choices. Shopping at other times, such as early in the morning or after dinner, may be easier.  Focus your shopping on the outside aisles of the store, which tend to contain more fresh foods and lower calorie foods. The inside aisles tend to have more processed foods.  Stick to your list. Avoid buying unhealthy items just because they are on sale.   Compare nutrition labels to check the number of calories and percentage of fat.      What to buy:    Vegetables  Fresh vegetables  Frozen vegetables with no sauce or added salt  Canned vegetables with no sauce or added salt    Protein  Lean meats, such as chicken and turkey  Limit red meats, such as beef to no more than 1x/week  Limit processed meats, such as cold cuts, wilder, sausage, and hot dogs. Look for brands that have no nitrites and are minimally  processed. Consider turkey sausage or turkey wilder.  Fish and Shellfish  Eggs  Dried beans  Canned beans (reduced sodium)    Fat  Use healthy oils, such as olive oil or canola oil, for cooking, salad dressings, etc.  Unflavored nuts and seeds  Nut butters (no added sugar)    Dairy  Yogurt (no sugar added)  Cheese  Low-fat milk  Unsweetened nondairy milks (almond milk, soy milk, etc)    Fruit  Fresh Fruit  Frozen fruit with no added sugar  Canned fruit with no added sugar  Dried fruit with no added sugar  100% fruit juice    Whole Grains  Single ingredient grains, such as oats, quinoa, brown rice  Whole-wheat pasta  Sprouted whole-grain bread    What to avoid:  - Avoid fried foods  - Avoid foods with added sugar  - Avoid sugar-sweetened beverages  - Avoid ultra-processed foods

## 2023-04-03 NOTE — PROGRESS NOTES
Subjective:       Patient ID: Rosamaria Agosto is a 62 y.o. female.    Chief Complaint: Follow-up, Obesity, and Weight Check    Patient presents for treatment of obesity.    Previousely been seen by Dr. Kamara, last appointment in May 2020 (virtual visit) at which time patient weighed 205 lbs. She has been prescribed phentermine, diethylpropion, and topiramate in the past.    Co-Morbidities:  HTN  HLD  Osteoarthritis  Vitamin D Deficency    Physical Activity:       Diet Recall:  Baked chicken, salmon  Occasional fried chicken  Pickled beets and okra  Turkey, turkey sandwich   1 soft drink/day    Medical Weight Loss History  11/3/2020: 201.7 lbs, BMI 29.79, BFP 44.8%, SMM 61.3 lbs; topiramate 50 mg twice daily  12/1/2020: 205.6 lbs, BMI 30.3, BFP 43.3%, SMM 64.2 lbs; topiramate 50 mg twice daily  4/3/2023: 202.5 lbs, BMI 29.9, BFP 41.6%, BFM 84.3%, SMM 64.6 lbs, BMR 1528 kcal      Review of Systems   Constitutional:  Negative for chills and fever.   HENT:  Negative for sore throat and trouble swallowing.    Eyes:  Negative for visual disturbance.   Respiratory:  Negative for shortness of breath.    Cardiovascular:  Negative for chest pain and palpitations.   Gastrointestinal:  Negative for abdominal pain, nausea and vomiting.   Integumentary:  Negative for rash.   Neurological:  Negative for dizziness and light-headedness.   Psychiatric/Behavioral:  The patient is not nervous/anxious.        Objective:        Latest Reference Range & Units 09/23/22 10:13 12/30/22 10:50 04/01/23 10:54   Sodium 136 - 145 mmol/L  136 - 145 mmol/L 139 139 139  139   Potassium 3.5 - 5.1 mmol/L  3.5 - 5.1 mmol/L 3.7 4.3 4.1  4.1   Chloride 95 - 110 mmol/L  95 - 110 mmol/L 98 99 104  104   CO2 23 - 29 mmol/L  23 - 29 mmol/L 28 30 (H) 29  29   Anion Gap 8 - 16 mmol/L  8 - 16 mmol/L 13 10 6 (L)  6 (L)   BUN 8 - 23 mg/dL  8 - 23 mg/dL 15 13 12  12   Creatinine 0.5 - 1.4 mg/dL  0.5 - 1.4 mg/dL 0.7 0.8 0.6  0.6   eGFR >60 mL/min/1.73  m^2  >60 mL/min/1.73 m^2 >60.0 >60.0 >60.0  >60.0   Glucose 70 - 110 mg/dL  70 - 110 mg/dL 100 109 102  102   Calcium 8.7 - 10.5 mg/dL  8.7 - 10.5 mg/dL 9.7 10.3 9.3  9.3   Alkaline Phosphatase 55 - 135 U/L 92 83 74   PROTEIN TOTAL 6.0 - 8.4 g/dL 7.9 8.1 7.1   Albumin 3.5 - 5.2 g/dL 4.3 4.1 3.8   BILIRUBIN TOTAL 0.1 - 1.0 mg/dL 0.5 0.5 0.6   AST 10 - 40 U/L 20 15 18   ALT 10 - 44 U/L 16 13 14   Cholesterol 120 - 199 mg/dL  120 - 199 mg/dL 288 (H) 338 (H) 201 (H)  201 (H)   HDL 40 - 75 mg/dL  40 - 75 mg/dL 53 66 54  54   HDL/Cholesterol Ratio 20.0 - 50.0 %  20.0 - 50.0 % 18.4 (L) 19.5 (L) 26.9  26.9   LDL Cholesterol External 63.0 - 159.0 mg/dL  63.0 - 159.0 mg/dL 213.2 (H) 246.4 (H) 132.6  132.6   Non-HDL Cholesterol mg/dL  mg/dL 235 272 147  147   Total Cholesterol/HDL Ratio 2.0 - 5.0   2.0 - 5.0  5.4 (H) 5.1 (H) 3.7  3.7   Triglycerides 30 - 150 mg/dL  30 - 150 mg/dL 109 128 72  72   Vit D, 25-Hydroxy 30 - 96 ng/mL 54     Hemoglobin A1C External 4.0 - 5.6 % 5.5     Estimated Avg Glucose 68 - 131 mg/dL 111     TSH 0.400 - 4.000 uIU/mL 1.113     Free T4 0.71 - 1.51 ng/dL 0.92     (H): Data is abnormally high  (L): Data is abnormally low      Vitals:    04/03/23 0827   BP: 136/88   Pulse: 71         Physical Exam  Vitals reviewed.   Constitutional:       General: She is not in acute distress.     Appearance: Normal appearance. She is obese. She is not ill-appearing, toxic-appearing or diaphoretic.   HENT:      Head: Normocephalic and atraumatic.   Eyes:      Extraocular Movements: Extraocular movements intact.   Cardiovascular:      Rate and Rhythm: Normal rate.   Pulmonary:      Effort: Pulmonary effort is normal. No respiratory distress.   Musculoskeletal:         General: Normal range of motion.      Cervical back: Normal range of motion.      Right lower leg: No edema.      Left lower leg: No edema.   Skin:     General: Skin is warm and dry.   Neurological:      General: No focal deficit present.      Mental  Status: She is alert and oriented to person, place, and time.      Gait: Gait normal.   Psychiatric:         Mood and Affect: Mood normal.         Behavior: Behavior normal.         Thought Content: Thought content normal.         Judgment: Judgment normal.       Assessment:       1. Class 1 obesity due to excess calories with serious comorbidity and body mass index (BMI) of 30.0 to 30.9 in adult    2. Essential hypertension    3. Mixed hyperlipidemia    4. Encounter for weight loss counseling          Plan:   - Topiramate 50 mg twice daily     - Log all food and beverage intake with a daily calorie goal of 1200 calories per day     - Moderate intensity aerobic exercise for 30 minutes 3-4x/week

## 2023-04-13 ENCOUNTER — PATIENT MESSAGE (OUTPATIENT)
Dept: PODIATRY | Facility: CLINIC | Age: 63
End: 2023-04-13
Payer: COMMERCIAL

## 2023-04-13 DIAGNOSIS — M79.672 CHRONIC HEEL PAIN, LEFT: Primary | ICD-10-CM

## 2023-04-13 DIAGNOSIS — G89.29 CHRONIC HEEL PAIN, LEFT: Primary | ICD-10-CM

## 2023-04-17 RX ORDER — LIDOCAINE HYDROCHLORIDE 20 MG/ML
JELLY TOPICAL 2 TIMES DAILY
Qty: 30 ML | Refills: 0 | Status: SHIPPED | OUTPATIENT
Start: 2023-04-17

## 2023-06-19 ENCOUNTER — OFFICE VISIT (OUTPATIENT)
Dept: URGENT CARE | Facility: CLINIC | Age: 63
End: 2023-06-19
Payer: COMMERCIAL

## 2023-06-19 VITALS
WEIGHT: 202.63 LBS | OXYGEN SATURATION: 98 % | TEMPERATURE: 98 F | DIASTOLIC BLOOD PRESSURE: 88 MMHG | HEART RATE: 63 BPM | RESPIRATION RATE: 16 BRPM | HEIGHT: 69 IN | BODY MASS INDEX: 30.01 KG/M2 | SYSTOLIC BLOOD PRESSURE: 152 MMHG

## 2023-06-19 DIAGNOSIS — L30.9 DERMATITIS: Primary | ICD-10-CM

## 2023-06-19 PROCEDURE — 99213 OFFICE O/P EST LOW 20 MIN: CPT | Mod: S$GLB,,, | Performed by: PHYSICIAN ASSISTANT

## 2023-06-19 PROCEDURE — 99213 PR OFFICE/OUTPT VISIT, EST, LEVL III, 20-29 MIN: ICD-10-PCS | Mod: S$GLB,,, | Performed by: PHYSICIAN ASSISTANT

## 2023-06-19 RX ORDER — TRIAMCINOLONE ACETONIDE 1 MG/G
CREAM TOPICAL 2 TIMES DAILY
Qty: 15 G | Refills: 0 | Status: SHIPPED | OUTPATIENT
Start: 2023-06-19 | End: 2023-07-03

## 2023-06-19 RX ORDER — FLUOCINOLONE ACETONIDE 0.11 MG/ML
OIL TOPICAL 3 TIMES DAILY
Qty: 118.28 ML | Refills: 1 | Status: SHIPPED | OUTPATIENT
Start: 2023-06-19 | End: 2023-07-03

## 2023-06-19 NOTE — PATIENT INSTRUCTIONS
After reviewing your Dermatology note this was the treatment was provided last time that did give the relief.  Use the steroid shampoo will and shower cap as directed do not use past 2 weeks.  Make sure to wash her hands after each use so as not to spread the steroid into your eyes.  Steroids can thin the skin please do not use past 2 weeks as previously stated.  I have also sent you a topical steroid to use as needed for spot treatment for the itching around the hairline.  Follow-up with your primary care or dermatologist as we discussed.

## 2023-06-19 NOTE — PROGRESS NOTES
"Subjective:      Patient ID: Rosamaria Agosto is a 62 y.o. female.    Vitals:  height is 5' 9" (1.753 m) and weight is 91.9 kg (202 lb 9.6 oz). Her oral temperature is 98.2 °F (36.8 °C). Her blood pressure is 152/88 (abnormal) and her pulse is 63. Her respiration is 16 and oxygen saturation is 98%.     Chief Complaint: Neck Pain    Pt reports that she has been having a feeling of crawling in her head and it is now moving in to her face and neck. Pt reports that she has been having this feeling for almost a week. Pt reports that its no pain. She has been taking benadryl with no relief.  This has happened before in the previous years but she cannot remember the treatment that was provided.  No known causes patient has not dyed her hair recently and no new shampoos or hair products.    Neck Pain   This is a new problem. The current episode started in the past 7 days. The pain is associated with nothing. The pain is present in the right side, midline and left side. The pain is at a severity of 0/10. The patient is experiencing no pain. Nothing aggravates the symptoms. Pertinent negatives include no chest pain, fever, headaches, leg pain, numbness, pain with swallowing, paresis, tingling, trouble swallowing, visual change or weight loss. Treatments tried: benadryl. The treatment provided no relief.     Constitution: Negative for chills, sweating, fatigue and fever.   HENT:  Negative for ear pain, ear discharge, congestion, postnasal drip, sinus pain, sinus pressure, sore throat and trouble swallowing.    Neck: Negative for neck pain and neck stiffness.   Cardiovascular:  Negative for chest pain.   Eyes:  Negative for eye discharge and eye itching.   Respiratory:  Negative for shortness of breath.    Gastrointestinal:  Negative for abdominal pain, nausea, vomiting, constipation and diarrhea.   Musculoskeletal:  Negative for muscle cramps and muscle ache.   Skin:  Positive for rash. Negative for color change, pale, " wound, abrasion, laceration, erythema, bruising and abscess.   Neurological:  Negative for dizziness, headaches, numbness and tingling.   Past Medical History:   Diagnosis Date    Arthritis     Hyperlipidemia     Hypertension     Sinusitis        Past Surgical History:   Procedure Laterality Date    ARTHROSCOPIC REPAIR OF ROTATOR CUFF OF SHOULDER Left 07/24/2019    Procedure: REPAIR, ROTATOR CUFF, ARTHROSCOPIC;  Surgeon: ASMITA Soto MD;  Location: [address];  Service: Orthopedics;  Laterality: Left;    ARTHROSCOPY OF SHOULDER WITH DECOMPRESSION OF SUBACROMIAL SPACE Left 07/24/2019    Procedure: ARTHROSCOPY, SHOULDER, WITH SUBACROMIAL SPACE DECOMPRESSION;  Surgeon: ASMITA Soto MD;  Location: [facility] OR [address] [place] [address];  Service: Orthopedics;  Laterality: Left;    BILATERAL SALPINGO-OOPHORECTOMY (BSO)  06/24/2019    BREAST BIOPSY  24-25 years old    CYSTOSCOPY N/A 06/24/2019    Procedure: CYSTOSCOPY;  Surgeon: Anson Ellison MD;  Location: [facility];  Service: OB/GYN;  Laterality: N/A;    ENCEPHALOCELE REPAIR  46    HYSTERECTOMY  1994    NASAL SINUS SURGERY      ROBOT-ASSISTED LAPAROSCOPIC ABDOMINAL SACROCOLPOPEXY USING DA MAC XI N/A 06/24/2019    Procedure: XI ROBOTIC SACROCOLPOPEXY, ABDOMINAL;  Surgeon: Anson Ellison MD;  Location: [facility];  Service: OB/GYN;  Laterality: N/A;    SHOULDER ARTHROSCOPY Left 07/24/2019    Procedure: BICEPS TENODESIS;  Surgeon: ASMITA Soto MD;  Location: [facility] OR [address] [place] [address];  Service: Orthopedics;  Laterality: Left;    TUBAL LIGATION         Family History   Problem Relation Age of Onset    Stroke Maternal Grandmother     Diabetes Mother     Hypertension Mother     Diabetes Sister     Diabetes Sister     Breast cancer Neg Hx     Colon cancer Neg Hx     Ovarian cancer Neg Hx        Social History     Socioeconomic History    Marital status: Single   Tobacco Use    Smoking status: Never    Smokeless tobacco: Never   Substance and Sexual Activity    Alcohol use: Yes      Alcohol/week: 1.0 standard drink     Types: 1 Shots of liquor per week     Comment: seldom    Drug use: No    Sexual activity: Yes     Partners: Male     Birth control/protection: Post-menopausal, See Surgical Hx     Comment: hysterectomy 20 yrs ago       Current Outpatient Medications   Medication Sig Dispense Refill    benazepril-hydrochlorthiazide (LOTENSIN HCT) 20-25 mg Tab Take 1 tablet by mouth once daily. 90 tablet 3    diclofenac (VOLTAREN) 75 MG EC tablet Take 1 tablet (75 mg total) by mouth 2 (two) times daily. 60 tablet 3    diphenhydrAMINE-aluminum-magnesium hydroxide-simethicone-LIDOcaine HCl 2% Swish and spit 15 mLs every 4 (four) hours as needed (sore throat). 180 mL 0    econazole nitrate 1 % cream Use bid 30 g 2    ergocalciferol (ERGOCALCIFEROL) 50,000 unit Cap Take 1 capsule (50,000 Units total) by mouth every 7 days. 4 capsule 2    estradioL (VAGIFEM) 10 mcg Tab Place 1 tablet (10 mcg total) vaginally twice a week. 8 tablet 12    fluocinolone and shower cap (DERMA-SMOOTHE/FS SCALP OIL) 0.01 % Oil Apply oil to damp scalp nightly and cover with shower cap. 1 Bottle 3    fluticasone propionate (FLONASE) 50 mcg/actuation nasal spray 2 sprays (100 mcg total) by Each Nostril route once daily. 16 g 11    LIDOcaine HCL 2% (XYLOCAINE) 2 % jelly Apply topically 2 (two) times daily. P.r.n. 30 mL 0    meloxicam (MOBIC) 15 MG tablet Take one tablet by mouth with a meal for 10 days. 10 tablet 0    neomycin-polymyxin-hydrocortisone (CORTISPORIN) 3.5-10,000-1 mg/mL-unit/mL-% otic suspension Place 3 drops into the left ear 4 (four) times daily. 10 mL 0    olopatadine (PATANOL) 0.1 % ophthalmic solution Place 1 drop into both eyes 2 (two) times daily.      rosuvastatin (CRESTOR) 20 MG tablet Take 1 tablet (20 mg total) by mouth every evening. 90 tablet 3    topiramate (TOPAMAX) 50 MG tablet Take 1 tablet (50 mg total) by mouth 2 (two) times daily. 180 tablet 0    azelastine (ASTELIN) 137 mcg (0.1 %) nasal spray 1  spray (137 mcg total) by Nasal route 2 (two) times daily. 30 mL 2    cetirizine (ZYRTEC) 10 MG tablet Take 1 tablet (10 mg total) by mouth once daily. for 14 days 14 tablet 0    famotidine (PEPCID) 20 MG tablet Take 1 tablet (20 mg total) by mouth 2 (two) times daily. for 14 days 28 tablet 0    fluocinolone (DERMA-SMOOTHE) 0.01 % external oil Apply topically 3 (three) times daily. for 14 days 118.28 mL 1    gabapentin (NEURONTIN) 300 MG capsule Take 1 capsule (300 mg total) by mouth 2 (two) times daily. 60 capsule 11    hydrocortisone 2.5 % cream Apply topically 2 (two) times daily. for 14 days 28 g 1    triamcinolone acetonide 0.1% (KENALOG) 0.1 % cream Apply topically 2 (two) times daily. for 14 days 15 g 0     No current facility-administered medications for this visit.       Review of patient's allergies indicates:   Allergen Reactions    Sotradecol [sodium tetradecyl sulfate]      Hives and itching that resolved with Benadryl and Solumedrol.        Objective:     Physical Exam   Constitutional: She is oriented to person, place, and time.  Non-toxic appearance. She does not appear ill. No distress. normal  HENT:   Head: Normocephalic and atraumatic.   Ears:   Right Ear: Tympanic membrane, external ear and ear canal normal. impacted cerumen  Left Ear: Tympanic membrane, external ear and ear canal normal. impacted cerumen  Nose: Nose normal. No rhinorrhea or congestion.   Mouth/Throat: Mucous membranes are moist. No oropharyngeal exudate or posterior oropharyngeal erythema.   Eyes: Conjunctivae are normal. Right eye exhibits no discharge. Left eye exhibits no discharge.   Cardiovascular: Normal rate, regular rhythm and normal heart sounds.   No murmur heard.Exam reveals no gallop.   Pulmonary/Chest: Effort normal and breath sounds normal. No stridor. No respiratory distress. She has no wheezes. She has no rhonchi. She has no rales.   Abdominal: Normal appearance.   Neurological: She is alert and oriented to  person, place, and time.   Skin: Skin is warm, dry, not diaphoretic and not pale. No erythema        Psychiatric: Her behavior is normal. Mood, judgment and thought content normal.   Nursing note and vitals reviewed.    Assessment:     1. Dermatitis        Plan:       Dermatitis  -     fluocinolone (DERMA-SMOOTHE) 0.01 % external oil; Apply topically 3 (three) times daily. for 14 days  Dispense: 118.28 mL; Refill: 1  -     triamcinolone acetonide 0.1% (KENALOG) 0.1 % cream; Apply topically 2 (two) times daily. for 14 days  Dispense: 15 g; Refill: 0    Reviewed dermatology notes from 3 years ago was same treatment that works for her.    Patient Instructions   After reviewing your Dermatology note this was the treatment was provided last time that did give the relief.  Use the steroid shampoo will and shower cap as directed do not use past 2 weeks.  Make sure to wash her hands after each use so as not to spread the steroid into your eyes.  Steroids can thin the skin please do not use past 2 weeks as previously stated.  I have also sent you a topical steroid to use as needed for spot treatment for the itching around the hairline.  Follow-up with your primary care or dermatologist as we discussed.

## 2023-07-10 ENCOUNTER — TELEPHONE (OUTPATIENT)
Dept: PODIATRY | Facility: CLINIC | Age: 63
End: 2023-07-10
Payer: COMMERCIAL

## 2023-07-25 ENCOUNTER — TELEPHONE (OUTPATIENT)
Dept: SPORTS MEDICINE | Facility: CLINIC | Age: 63
End: 2023-07-25

## 2023-07-25 ENCOUNTER — OFFICE VISIT (OUTPATIENT)
Dept: SPORTS MEDICINE | Facility: CLINIC | Age: 63
End: 2023-07-25
Payer: COMMERCIAL

## 2023-07-25 ENCOUNTER — TELEPHONE (OUTPATIENT)
Dept: SPORTS MEDICINE | Facility: CLINIC | Age: 63
End: 2023-07-25
Payer: COMMERCIAL

## 2023-07-25 VITALS
SYSTOLIC BLOOD PRESSURE: 132 MMHG | WEIGHT: 200.63 LBS | HEIGHT: 69 IN | DIASTOLIC BLOOD PRESSURE: 84 MMHG | HEART RATE: 69 BPM | BODY MASS INDEX: 29.71 KG/M2

## 2023-07-25 DIAGNOSIS — M76.61 ACHILLES TENDINITIS OF RIGHT LOWER EXTREMITY: ICD-10-CM

## 2023-07-25 DIAGNOSIS — M17.0 PRIMARY OSTEOARTHRITIS OF BOTH KNEES: Primary | ICD-10-CM

## 2023-07-25 PROCEDURE — 20610 PR DRAIN/INJECT LARGE JOINT/BURSA: ICD-10-PCS | Mod: 50,S$GLB,, | Performed by: PHYSICIAN ASSISTANT

## 2023-07-25 PROCEDURE — 99999 PR PBB SHADOW E&M-EST. PATIENT-LVL IV: ICD-10-PCS | Mod: PBBFAC,,, | Performed by: PHYSICIAN ASSISTANT

## 2023-07-25 PROCEDURE — 99213 PR OFFICE/OUTPT VISIT, EST, LEVL III, 20-29 MIN: ICD-10-PCS | Mod: 25,S$GLB,, | Performed by: PHYSICIAN ASSISTANT

## 2023-07-25 PROCEDURE — 20610 DRAIN/INJ JOINT/BURSA W/O US: CPT | Mod: 50,S$GLB,, | Performed by: PHYSICIAN ASSISTANT

## 2023-07-25 PROCEDURE — 99999 PR PBB SHADOW E&M-EST. PATIENT-LVL IV: CPT | Mod: PBBFAC,,, | Performed by: PHYSICIAN ASSISTANT

## 2023-07-25 PROCEDURE — 99213 OFFICE O/P EST LOW 20 MIN: CPT | Mod: 25,S$GLB,, | Performed by: PHYSICIAN ASSISTANT

## 2023-07-25 RX ORDER — TRIAMCINOLONE ACETONIDE 40 MG/ML
40 INJECTION, SUSPENSION INTRA-ARTICULAR; INTRAMUSCULAR
Status: COMPLETED | OUTPATIENT
Start: 2023-07-25 | End: 2023-07-25

## 2023-07-25 RX ORDER — CELECOXIB 200 MG/1
200 CAPSULE ORAL 2 TIMES DAILY WITH MEALS
Qty: 60 CAPSULE | Refills: 0 | Status: SHIPPED | OUTPATIENT
Start: 2023-07-25 | End: 2023-12-10 | Stop reason: ALTCHOICE

## 2023-07-25 RX ADMIN — TRIAMCINOLONE ACETONIDE 40 MG: 40 INJECTION, SUSPENSION INTRA-ARTICULAR; INTRAMUSCULAR at 03:07

## 2023-07-25 NOTE — TELEPHONE ENCOUNTER
Spoke to the patient in regards to requested knee injections today with Arina Corea PA-C. While on the call I informed the patient that Arina Corea PA-C  is out of office until 7/31. I also informed the patient that the Monovisc gel injection she last received require an order by her provider and must be approved by insurance. I recommended short acting steroid in the meantime to help with pain until she is able to get the gel injections. Risk and benefits explained to patient. Patient wants to proceed with steroid injection. Patient scheduled with Joselito Summers PA-C today 7/25/2023 at the [facility] for 2:30 pm.

## 2023-07-25 NOTE — TELEPHONE ENCOUNTER
----- Message from Aby Parikh sent at 7/25/2023  3:43 PM CDT -----  Regarding: pt advice  Contact: [phone]  MARGIE FERNADNEZ calling regarding Patient Advice (message) for #can Celebrex be sent due to she's on her way to the pharmacy. Please call      [address] [address]  Phone: [phone] Fax: [phone]

## 2023-07-25 NOTE — PROGRESS NOTES
Subjective:          Chief Complaint: Rosamaria Agosto is a 62 y.o. female who presents to clinic for bilateral knee pain.    Interval Hx: Patient presents for follow-up s/p Monovisc injection of bilateral knee with Arina Corea 1 year ago. Reports symptoms improved significantly with viscosupplementation for a few months.. Denies new injury or trauma.  Patient presents to discuss continued treatment options.    She also reports new right heel pain today.  She does have appointment with Podiatry but will not be seen until August.  She reports her pain is posterior ankle and points to distal Achilles, aching, worse with standing for long periods of time.  Better with rest.      Previous HPI: Patient who works as a  for [employer] presents to clinic with bilateral knee pain. Left knee pain is worse than right knee. She has been taking diclofenac 75mg BID with some relief of pain, but she would like to switch to a different medication.  She complains of global tenderness. Denies instability and mechanical symptoms.  Patient is currently being seen by bariatrics and has been prescribed medication for weight loss.  She is currently working on weight loss. Left knee frequently swells toward the end of the day.  States that she has to stand for prolonged periods of time for work which increases her bilateral knee pain.  She is here today to discuss treatment options.  No previous history of injections including steroids or viscosupplementation.         Review of Systems   Constitutional: Negative. Negative for chills, fever, weight gain and weight loss.   HENT:  Negative for congestion and sore throat.    Eyes:  Negative for blurred vision and double vision.   Cardiovascular:  Negative for chest pain, leg swelling and palpitations.   Respiratory:  Negative for cough and shortness of breath.    Hematologic/Lymphatic: Does not bruise/bleed easily.   Skin:  Negative for itching, poor wound healing and rash.    Musculoskeletal:  Positive for joint pain, joint swelling and stiffness. Negative for back pain, muscle weakness and myalgias.   Gastrointestinal:  Negative for abdominal pain, constipation, diarrhea, nausea and vomiting.   Genitourinary: Negative.  Negative for frequency and hematuria.   Neurological:  Negative for dizziness, headaches, numbness, paresthesias and sensory change.   Psychiatric/Behavioral:  Negative for altered mental status and depression. The patient is not nervous/anxious.    Allergic/Immunologic: Negative for hives.     Pain Related Questions  Over the past 3 days, what was your average pain during activity? (I.e. running, jogging, walking, climbing stairs, getting dressed, ect.): 6  Over the past 3 days, what was your highest pain level?: 7  Over the past 3 days, what was your lowest pain level? : 5    Other  How many nights a week are you awakened by your affected body part?: 0  Was the patient's HEIGHT measured or patient reported?: Patient Reported  Was the patient's WEIGHT measured or patient reported?: Measured      Objective:        General: Rosamaria is well-developed, well-nourished, appears stated age, in no acute distress, alert and oriented to time, place and person.     General    Vitals reviewed.  Constitutional: She is oriented to person, place, and time. She appears well-developed and well-nourished. No distress.   HENT:   Head: Normocephalic and atraumatic.   Eyes: EOM are normal.   Cardiovascular:  Normal rate and regular rhythm.            Pulmonary/Chest: Effort normal. No respiratory distress.   Neurological: She is alert and oriented to person, place, and time. She has normal reflexes. No cranial nerve deficit. Coordination normal.   Psychiatric: She has a normal mood and affect. Her behavior is normal. Judgment and thought content normal.     General Musculoskeletal Exam   Gait: normal     Right Ankle/Foot Exam     Swelling   The patient is swollen on the Achilles  insertion.    Tenderness   The patient is tender to palpation of the Achilles insertion.    Pain   The patient exhibits pain of the Achilles insertion.    Range of Motion   Ankle Joint   Dorsiflexion:  abnormal Right ankle dorsiflexion: limited by pain.  Plantar flexion:  normal   Reinoso Test:  negative      Right Knee Exam     Inspection   Erythema: absent  Scars: absent  Swelling: present  Effusion: present  Deformity: absent  Bruising: absent    Tenderness   The patient is experiencing no tenderness.     Crepitus   The patient has crepitus of the patella.    Range of Motion   Extension:  0 normal   Flexion:  130 normal     Tests   Meniscus   Lino:  Medial - negative Lateral - negative  Ligament Examination   Lachman: normal (-1 to 2mm)   PCL-Posterior Drawer: normal (0 to 2mm)     MCL - Valgus: normal (0 to 2mm)  LCL - Varus: normal  Pivot Shift: normal (Equal)  Reverse Pivot Shift: normal (Equal)  Posterolateral Corner: stable  Patella   Passive Patellar Tilt: neutral  Patellar Glide (quadrants): Lateral - 1   Medial - 2  Patellar Grind: negative    Other   Sensation: normal    Left Knee Exam     Inspection   Erythema: absent  Scars: absent  Swelling: present  Effusion: present  Deformity: absent  Bruising: absent    Tenderness   The patient tender to palpation of the medial joint line.    Crepitus   The patient has crepitus of the patella.    Range of Motion   Extension:  0 normal   Flexion:  130 normal     Tests   Meniscus   Lino:  Medial - negative Lateral - negative  Stability   Lachman: normal (-1 to 2mm)   PCL-Posterior Drawer: normal (0 to 2mm)  MCL - Valgus: normal (0 to 2mm)  LCL - Varus: normal (0 to 2mm)  Pivot Shift: normal (Equal)  Reverse Pivot Shift: normal (Equal)  Posterolateral Corner: stable  Patella   Passive Patellar Tilt: neutral  Patellar Glide (Quadrants): Lateral - 1 Medial - 2  Patellar Grind: negative    Other   Sensation: normal    Muscle Strength   Right Lower Extremity    Hip Abduction: 5/5   Quadriceps:  5/5   Hamstrin/5   Left Lower Extremity   Hip Abduction: 5/5   Quadriceps:  5/5   Hamstrin/5     Reflexes     Left Side  Achilles:  2+  Quadriceps:  2+    Right Side   Achilles:  2+  Quadriceps:  2+    Vascular Exam     Right Pulses  Dorsalis Pedis:      2+  Posterior Tibial:      2+        Left Pulses  Dorsalis Pedis:      2+  Posterior Tibial:      2+        Edema  Right Lower Leg: present  Left Lower Leg: present    Radiographs:  2022  Bilateral knees:  FINDINGS:  Bilateral ortho four views knees.     Left: No fracture dislocation bone destruction or OCD seen.     Right: No fracture dislocation bone destruction or OCD seen.        Assessment:       Encounter Diagnoses   Name Primary?    Primary osteoarthritis of both knees Yes    Achilles tendinitis of right lower extremity          Plan:       We have discussed a variety of treatment options including medications, injections, physical therapy and other alternative treatments. Pt is requesting CSI and physical therapy at this time.    I made the decision to obtain old records of the patient including previous notes and imaging. I independently reviewed and interpreted lab results today as well as prior imaging.     1. Injection Procedure  A time out was performed, including verification of patient ID, procedure, site and side, availability of information and equipment, review of safety issues, and agreement with consent, the procedure site was marked.    After time out was performed, the patient was prepped aseptically with chloraprep swabsticks. A diagnostic and therapeutic injection of 1:4cc Kenalog/Marcaine was given under sterile technique using a 22g x 1.5 needle from the Superolateral  aspect of the bilateral Knee Joint in the supine position.      Rosamaria Agosto had no adverse reactions to the medication. Pain decreased. She was instructed to apply ice to the joint for 20 minutes and avoid strenuous  activities for 24-36 hours following the injection. She was warned of possible blood sugar and/or blood pressure changes during that time. Following that time, she can resume regular activities.    She was reminded to call the clinic immediately for any adverse side effects as explained in clinic today.    2. D/c Mobic, Celebrex 200 mg twice daily PRN for pain management.  3. She was encouraged to re-initiate HEP.  4. Ice compress to the affected area 2-3x a day for 15-20 minutes as needed for pain management.  5. We also discussed repeating viscosupplementation alternating with CSI.  She agreed to plan and will contact us when she is ready to repeat injections.  6. 10490 - Joselito Summers PA-C performed a custom orthotic / brace adjustment, fitting and training with the patient. The patient demonstrated understanding and proper care. This was performed for 15 minutes.    - to heel inserts, instructed to wear both heel inserts into 1 shoe for management prior to seeing Podiatry.  7. RTC to see Joselito Summers PA-C as needed for follow-up.    All of the patient's questions were answered and the patient will contact us if they have any questions or concerns in the interim.              Patient questionnaires may have been collected.

## 2023-07-31 ENCOUNTER — TELEPHONE (OUTPATIENT)
Dept: SPORTS MEDICINE | Facility: CLINIC | Age: 63
End: 2023-07-31
Payer: COMMERCIAL

## 2023-07-31 DIAGNOSIS — M17.11 PRIMARY OSTEOARTHRITIS OF RIGHT KNEE: Primary | ICD-10-CM

## 2023-07-31 DIAGNOSIS — M17.12 PRIMARY OSTEOARTHRITIS OF LEFT KNEE: ICD-10-CM

## 2023-07-31 NOTE — PROGRESS NOTES
We have discussed a variety of treatment options including medications, injections, physical therapy and other alternative treatments. I also explained the indications, risks and benefits of surgery.  Patient's pain is refractory HEP, conservative management, and NSAIDs. Pt would like to proceed with  visco-supplementation.    Medical Necessity for viscosupplementation use: After thorough evaluation of the patient, I have determined that viscosupplementation treatment is medically necessary. The patient has painful degenerative joint disease (DJD) of the knee(s) with failure of conservative treatments including lifestyle modifications and rehabilitation exercises. Oral analgesics including NSAIDs have not adequately controlled the patient's symptoms. There is radiographic evidence of Kellgren-Gume grade II (or greater) osteoarthritic (OA) changes, or if lack of radiographic evidence, there is arthroscopic or other evidence of chondrosis of the knee(s).     I made the decision to obtain old records of the patient including previous notes and imaging. I independently reviewed and interpreted lab results today as well as prior imaging.        1. Pre-authorization placed for bilateral Monovisc injections.  2. Ice compress to the affected area 2-3x a day for 15-20 minutes as needed for pain management.  3. RTC to see Arina Corea PA-C for bilateral Monovisc injections.    All of the patient's questions were answered and the patient will contact us if they have any questions or concerns in the interim.

## 2023-07-31 NOTE — TELEPHONE ENCOUNTER
Spoke to patient to schedule her monovisc injections. She stated that she will call back later once she has her schedule for work.

## 2023-07-31 NOTE — TELEPHONE ENCOUNTER
----- Message from Arina Corea PA-C sent at 7/31/2023  9:57 AM CDT -----  Regarding: FW: order  Please call her and get her scheduled for bilateral Monovisc injections at her convenience.  ----- Message -----  From: Jeremiah Tapia MA  Sent: 7/25/2023  10:21 AM CDT  To: Arina Corea PA-C  Subject: order                                            Hey,      The attached patient is requesting bilateral Monovisc. Last injected 5/25/22. Patient reports no new injuries. She's coming in today 7/25/23 to see Melina for bilateral knee CSI.         Jeremiah Tapia MA  Medical Assistant to Arina Corea PA-C

## 2023-08-02 DIAGNOSIS — Z12.31 OTHER SCREENING MAMMOGRAM: ICD-10-CM

## 2023-08-09 ENCOUNTER — TELEPHONE (OUTPATIENT)
Dept: PODIATRY | Facility: CLINIC | Age: 63
End: 2023-08-09
Payer: COMMERCIAL

## 2023-08-09 NOTE — TELEPHONE ENCOUNTER
Patient left a message via the Patient Portal requested an earlier appointment than their scheduled appointment. Patient was not able to get a same day appointment.  Patient was called but didn't answer. VM was left. Scheduled another appt for tomorrow for her just in case she preferred anything earlier than her next week appointment. Next week appt was not cancelled.

## 2023-08-10 ENCOUNTER — OFFICE VISIT (OUTPATIENT)
Dept: PODIATRY | Facility: CLINIC | Age: 63
End: 2023-08-10
Payer: COMMERCIAL

## 2023-08-10 VITALS
HEART RATE: 60 BPM | WEIGHT: 200.63 LBS | HEIGHT: 69 IN | DIASTOLIC BLOOD PRESSURE: 88 MMHG | BODY MASS INDEX: 29.71 KG/M2 | SYSTOLIC BLOOD PRESSURE: 150 MMHG

## 2023-08-10 DIAGNOSIS — M72.2 PLANTAR FASCIITIS, LEFT: Primary | ICD-10-CM

## 2023-08-10 DIAGNOSIS — M76.61 ACHILLES TENDINITIS OF RIGHT LOWER EXTREMITY: ICD-10-CM

## 2023-08-10 PROCEDURE — 99214 PR OFFICE/OUTPT VISIT, EST, LEVL IV, 30-39 MIN: ICD-10-PCS | Mod: S$GLB,,, | Performed by: PODIATRIST

## 2023-08-10 PROCEDURE — 99999 PR PBB SHADOW E&M-EST. PATIENT-LVL IV: CPT | Mod: PBBFAC,,, | Performed by: PODIATRIST

## 2023-08-10 PROCEDURE — 99999 PR PBB SHADOW E&M-EST. PATIENT-LVL IV: ICD-10-PCS | Mod: PBBFAC,,, | Performed by: PODIATRIST

## 2023-08-10 PROCEDURE — 99214 OFFICE O/P EST MOD 30 MIN: CPT | Mod: S$GLB,,, | Performed by: PODIATRIST

## 2023-08-10 RX ORDER — LEG BRACE
1 EACH MISCELLANEOUS DAILY
Qty: 1 EACH | Refills: 0 | Status: SHIPPED | OUTPATIENT
Start: 2023-08-10

## 2023-08-10 NOTE — PROGRESS NOTES
Subjective:      Patient ID: Rosamaria Agosto is a 63 y.o. female.    Chief Complaint:   Heel Pain (R back of heel pain. Pt stated that she feels pulling and experiences muscle spasms.) and Foot Pain (Ball of foot pain. Soreness)      Rosamaria is a 63 y.o. female who returns to the podiatry clinic  with complaint of  left foot pain.      Pt relates left is doing fine.  Now the right heel/ achilles is the main problem.  no weakness    Saw Knee doctor... gave heel cups for her shoes.  Ortho:  She also reports new right heel pain today.  She does have appointment with Podiatry but will not be seen until August.  She reports her pain is posterior ankle and points to distal Achilles, aching, worse with standing for long periods of time.  Better with rest.  2. D/c Mobic, Celebrex 200 mg twice daily PRN for pain management.  3. She was encouraged to re-initiate HEP.  4. Ice compress to the affected area 2-3x a day for 15-20 minutes as needed for pain management.  5. We also discussed repeating viscosupplementation alternating with CSI.  She agreed to plan and will contact us when she is ready to repeat injections.  6. 62511 - Joselito Summers PA-C performed a custom orthotic / brace adjustment, fitting and training with the patient. The patient demonstrated understanding and proper care. This was performed for 15 minutes.               - to heel inserts, instructed to wear both heel inserts into 1       Last time:    Rx lidocaine gel  MRI was too costly to obtain    Last visit plan:  Patient has failed conservative therapy including physical therapy, oral NSAIDs, injection    order please for MRI evaluate for t plantar fascial tear or calcaneal stress fracture    Patient reiterates that she is unable to tolerate a cam boot secondary to the heaviness    Recommend if MRI is negative for any tears consider another injection  Patient also may need to be referred for plantar fascial release surgical consult    Wrapped foot with  lidocaine gel and a ankle support Coban brace    Applied a metatarsal offloading pad to the right forefoot patient can remove if needed    prn                                                    Past Medical History:   Diagnosis Date    Arthritis     Hyperlipidemia     Hypertension     Sinusitis      Past Surgical History:   Procedure Laterality Date    ARTHROSCOPIC REPAIR OF ROTATOR CUFF OF SHOULDER Left 07/24/2019    Procedure: REPAIR, ROTATOR CUFF, ARTHROSCOPIC;  Surgeon: ASMITA Soto MD;  [facility];  Service: Orthopedics;  Laterality: Left;    ARTHROSCOPY OF SHOULDER WITH DECOMPRESSION OF SUBACROMIAL SPACE Left 07/24/2019    Procedure: ARTHROSCOPY, SHOULDER, WITH SUBACROMIAL SPACE DECOMPRESSION;  Surgeon: ASMITA Soto MD;  Location: [facility] OR 2ND FLR;  Service: Orthopedics;  Laterality: Left;    BILATERAL SALPINGO-OOPHORECTOMY (BSO)  06/24/2019    BREAST BIOPSY  24-25 years old    CYSTOSCOPY N/A 06/24/2019    Procedure: CYSTOSCOPY;  Surgeon: Anson Ellison MD;  [facility];  Service: OB/GYN;  Laterality: N/A;    ENCEPHALOCELE REPAIR  46    HYSTERECTOMY  1994    NASAL SINUS SURGERY      ROBOT-ASSISTED LAPAROSCOPIC ABDOMINAL SACROCOLPOPEXY USING DA MAC XI N/A 06/24/2019    Procedure: XI ROBOTIC SACROCOLPOPEXY, ABDOMINAL;  Surgeon: Anson Ellison MD;  Location: [facility];  Service: OB/GYN;  Laterality: N/A;    SHOULDER ARTHROSCOPY Left 07/24/2019    Procedure: BICEPS TENODESIS;  Surgeon: ASMITA Soto MD;  [facility];  Service: Orthopedics;  Laterality: Left;    TUBAL LIGATION       Current Outpatient Medications on File Prior to Visit   Medication Sig Dispense Refill    azelastine (ASTELIN) 137 mcg (0.1 %) nasal spray 1 spray (137 mcg total) by Nasal route 2 (two) times daily. 30 mL 2    celecoxib (CELEBREX) 200 MG capsule Take 1 capsule (200 mg total) by mouth 2 (two) times daily with meals. (breakfast and dinner) 60 capsule 0    cetirizine (ZYRTEC) 10 MG tablet  Take 1 tablet (10 mg total) by mouth once daily. for 14 days 14 tablet 0    diphenhydrAMINE-aluminum-magnesium hydroxide-simethicone-LIDOcaine HCl 2% Swish and spit 15 mLs every 4 (four) hours as needed (sore throat). 180 mL 0    econazole nitrate 1 % cream Use bid 30 g 2    ergocalciferol (ERGOCALCIFEROL) 50,000 unit Cap Take 1 capsule (50,000 Units total) by mouth every 7 days. 4 capsule 2    estradioL (VAGIFEM) 10 mcg Tab Place 1 tablet (10 mcg total) vaginally twice a week. 8 tablet 12    famotidine (PEPCID) 20 MG tablet Take 1 tablet (20 mg total) by mouth 2 (two) times daily. for 14 days 28 tablet 0    fluocinolone (DERMA-SMOOTHE) 0.01 % external oil Apply topically 3 (three) times daily. for 14 days 118.28 mL 1    fluocinolone and shower cap (DERMA-SMOOTHE/FS SCALP OIL) 0.01 % Oil Apply oil to damp scalp nightly and cover with shower cap. 1 Bottle 3    fluticasone propionate (FLONASE) 50 mcg/actuation nasal spray 2 sprays (100 mcg total) by Each Nostril route once daily. 16 g 11    gabapentin (NEURONTIN) 300 MG capsule Take 1 capsule (300 mg total) by mouth 2 (two) times daily. 60 capsule 11    hydrocortisone 2.5 % cream Apply topically 2 (two) times daily. for 14 days 28 g 1    LIDOcaine HCL 2% (XYLOCAINE) 2 % jelly Apply topically 2 (two) times daily. P.r.n. 30 mL 0    neomycin-polymyxin-hydrocortisone (CORTISPORIN) 3.5-10,000-1 mg/mL-unit/mL-% otic suspension Place 3 drops into the left ear 4 (four) times daily. 10 mL 0    olopatadine (PATANOL) 0.1 % ophthalmic solution Place 1 drop into both eyes 2 (two) times daily.      rosuvastatin (CRESTOR) 20 MG tablet Take 1 tablet (20 mg total) by mouth every evening. 90 tablet 3    topiramate (TOPAMAX) 50 MG tablet Take 1 tablet (50 mg total) by mouth 2 (two) times daily. 180 tablet 0    triamcinolone acetonide 0.1% (KENALOG) 0.1 % cream Apply topically 2 (two) times daily. for 14 days 15 g 0     No current facility-administered medications on file prior to visit.  "    Review of patient's allergies indicates:   Allergen Reactions    Sotradecol [sodium tetradecyl sulfate]      Hives and itching that resolved with Benadryl and Solumedrol.       Review of Systems   Constitutional: Negative for chills, decreased appetite, fever, malaise/fatigue, night sweats, weight gain and weight loss.   Cardiovascular:  Negative for chest pain, claudication, dyspnea on exertion, leg swelling, palpitations and syncope.   Respiratory:  Negative for cough and shortness of breath.    Endocrine: Negative for cold intolerance and heat intolerance.   Hematologic/Lymphatic: Negative for bleeding problem. Does not bruise/bleed easily.   Skin:  Negative for color change, dry skin, flushing, itching, nail changes, poor wound healing, rash, skin cancer, suspicious lesions and unusual hair distribution.   Musculoskeletal:  Negative for arthritis, back pain, falls, gout, joint pain, joint swelling, muscle cramps, muscle weakness, myalgias, neck pain and stiffness.        Foot pain   Gastrointestinal:  Negative for diarrhea, nausea and vomiting.   Neurological:  Negative for dizziness, focal weakness, light-headedness, numbness, paresthesias, tremors, vertigo and weakness.   Psychiatric/Behavioral:  Negative for altered mental status and depression. The patient does not have insomnia.    Allergic/Immunologic: Negative.            Objective:       Vitals:    08/10/23 0919   BP: (!) 150/88   Pulse: 60   Weight: 91 kg (200 lb 9.9 oz)   Height: 5' 9" (1.753 m)   PainSc:   7   PainLoc: Foot   91 kg (200 lb 9.9 oz)     Physical Exam  Vitals reviewed.   Constitutional:       General: She is not in acute distress.     Appearance: She is well-developed. She is not ill-appearing, toxic-appearing or diaphoretic.      Comments: Surgical shoe      Cardiovascular:      Pulses:           Dorsalis pedis pulses are 2+ on the right side and 2+ on the left side.        Posterior tibial pulses are 2+ on the right side and 2+ on " the left side.   Musculoskeletal:         General: No deformity.      Left lower le+ Edema present.      Right ankle:      Right Achilles Tendon: Tenderness present. No defects.      Left ankle: Normal.      Left Achilles Tendon: Normal. No tenderness or defects.      Right foot: Decreased range of motion. No deformity, tenderness or bony tenderness.      Left foot: Decreased range of motion. No deformity, tenderness or bony tenderness.      Comments: No pain to palpation inferior heel left   No pain along the posterior tibial tendon or navicular insertion  Flexible pes planus foot type w/ medial arch collapse and mild gastroc equinus        Mild pop right achilles tendon insertion  No pop with side to side compression calcaneus     Feet:      Right foot:      Protective Sensation: 10 sites tested.  10 sites sensed.      Skin integrity: No ulcer, blister, skin breakdown, erythema, warmth, callus or dry skin.      Toenail Condition: Right toenails are normal.      Left foot:      Protective Sensation: 10 sites tested.  10 sites sensed.      Skin integrity: No ulcer, blister, skin breakdown, erythema, warmth, callus or dry skin.      Toenail Condition: Left toenails are normal.      Comments: No open lesions or signs of infection no excessive warmth  Skin:     General: Skin is warm.      Capillary Refill: Capillary refill takes 2 to 3 seconds.      Coloration: Skin is not pale.      Findings: No erythema or rash.   Neurological:      Mental Status: She is alert and oriented to person, place, and time.      Sensory: No sensory deficit.      Gait: Gait abnormal.   Psychiatric:         Attention and Perception: Attention normal.         Mood and Affect: Mood normal.         Speech: Speech normal.         Behavior: Behavior normal.         Thought Content: Thought content normal.         Cognition and Memory: Cognition normal.         Judgment: Judgment normal.         XR CALCANEUS 2 VIEW LEFT  Narrative:  EXAMINATION:  XR CALCANEUS 2 VIEW LEFT    CLINICAL HISTORY:  Pain in left foot    TECHNIQUE:  Tangential and lateral views of the left calcaneus were performed.    COMPARISON:  The 11/29/2021    FINDINGS:  Pes planus deformity of foot is again noted.  Osteophyte at the calcaneal tubercle at the insertion of the plantar fascia and Achilles tendon appears stable.  Question posterior facet coalition of the talus and calcaneus is suggested on the lateral projection.    There is no evidence of calcaneal fracture.  Impression: No acute finding of the left calcaneus.    Degenerative changes of the calcaneal tubercle posteriorly as described.    Questioned posterior facet talar calcaneal coalition.    Electronically signed by: Eber Krishnamurthy  Date:    10/28/2022  Time:    19:35       Assessment:       Encounter Diagnoses   Name Primary?    Plantar fasciitis, left Yes    Achilles tendinitis of right lower extremity            Plan:       Rosamaria was seen today for heel pain and foot pain.    Diagnoses and all orders for this visit:    Plantar fasciitis, left    Achilles tendinitis of right lower extremity    Other orders  -     leg brace (ANKLE BRACE) Misc; 1 each by Misc.(Non-Drug; Combo Route) route Daily.        I counseled the patient on her conditions, their implications and medical management.    - left plantar fascia pain resolved    - right achilles pain:    - start the celebrex.. pt to use as prescribed from other provider    - can not tolerate cam boot     - heel lifts demonstarted and dispensed    - recommend ankle brace    - consider p.thearpy    - MRI too costly    - prn

## 2023-08-14 ENCOUNTER — PATIENT MESSAGE (OUTPATIENT)
Dept: CARDIOLOGY | Facility: CLINIC | Age: 63
End: 2023-08-14
Payer: COMMERCIAL

## 2023-08-16 RX ORDER — HYDROCHLOROTHIAZIDE 25 MG/1
25 TABLET ORAL DAILY
Qty: 90 TABLET | Refills: 3 | Status: SHIPPED | OUTPATIENT
Start: 2023-08-16

## 2023-08-16 NOTE — TELEPHONE ENCOUNTER
Pt complaining of HA and dizziness on hctz-benazepril. Will change regimen back to hctz 25x1 per pt request.

## 2023-08-25 ENCOUNTER — OFFICE VISIT (OUTPATIENT)
Dept: URGENT CARE | Facility: CLINIC | Age: 63
End: 2023-08-25
Payer: COMMERCIAL

## 2023-08-25 VITALS
OXYGEN SATURATION: 98 % | HEIGHT: 69 IN | WEIGHT: 200 LBS | RESPIRATION RATE: 17 BRPM | BODY MASS INDEX: 29.62 KG/M2 | DIASTOLIC BLOOD PRESSURE: 90 MMHG | HEART RATE: 62 BPM | TEMPERATURE: 98 F | SYSTOLIC BLOOD PRESSURE: 171 MMHG

## 2023-08-25 DIAGNOSIS — N30.00 ACUTE CYSTITIS WITHOUT HEMATURIA: Primary | ICD-10-CM

## 2023-08-25 DIAGNOSIS — R30.0 DYSURIA: ICD-10-CM

## 2023-08-25 LAB
BILIRUB UR QL STRIP: POSITIVE
GLUCOSE UR QL STRIP: NEGATIVE
INDURATION: ABNORMAL
KETONES UR QL STRIP: NEGATIVE
LEUKOCYTE ESTERASE UR QL STRIP: NEGATIVE
PH, POC UA: 5.5
POC BLOOD, URINE: NEGATIVE
POC NITRATES, URINE: POSITIVE
PROT UR QL STRIP: POSITIVE
SP GR UR STRIP: 1.02 (ref 1–1.03)
UROBILINOGEN UR STRIP-ACNC: 4 (ref 0.1–1.1)

## 2023-08-25 PROCEDURE — 99214 OFFICE O/P EST MOD 30 MIN: CPT | Mod: S$GLB,,, | Performed by: NURSE PRACTITIONER

## 2023-08-25 PROCEDURE — 81003 URINALYSIS AUTO W/O SCOPE: CPT | Mod: QW,S$GLB,, | Performed by: NURSE PRACTITIONER

## 2023-08-25 PROCEDURE — 81003 POCT URINALYSIS, DIPSTICK, AUTOMATED, W/O SCOPE: ICD-10-PCS | Mod: QW,S$GLB,, | Performed by: NURSE PRACTITIONER

## 2023-08-25 PROCEDURE — 99214 PR OFFICE/OUTPT VISIT, EST, LEVL IV, 30-39 MIN: ICD-10-PCS | Mod: S$GLB,,, | Performed by: NURSE PRACTITIONER

## 2023-08-25 RX ORDER — NITROFURANTOIN 25; 75 MG/1; MG/1
100 CAPSULE ORAL 2 TIMES DAILY
Qty: 10 CAPSULE | Refills: 0 | Status: SHIPPED | OUTPATIENT
Start: 2023-08-25 | End: 2023-08-30

## 2023-08-25 NOTE — PATIENT INSTRUCTIONS
- You must understand that you have received an Urgent Care treatment only and that you may be released before all of your medical problems are known or treated.   - You, the patient, will arrange for follow up care as instructed.   - If your condition worsens or fails to improve we recommend that you receive another evaluation at the ER immediately or contact your PCP to discuss your concerns.   - You can call [phone] or [phone] to help schedule an appointment with the appropriate provider.

## 2023-08-25 NOTE — PROGRESS NOTES
"Subjective:      Patient ID: Rosamaria Agosto is a 63 y.o. female.    Vitals:  height is 5' 9" (1.753 m) and weight is 90.7 kg (200 lb). Her oral temperature is 98.2 °F (36.8 °C). Her blood pressure is 171/90 (abnormal) and her pulse is 62. Her respiration is 17 and oxygen saturation is 98%.     Chief Complaint: Dysuria    Pt is a 62 yo female states her symptoms started 2 days ago. Pt states she feels pressure in her lower abdomen. Pt states she is having urgency but not frequency. Denies burning sensation but constantly feels pressure and pain at the end of urination. Pt has been taking AZO for her symptoms with mild relief. She reports she did not take her blood pressure medications this morning, she will take them when she gets home. Denies headache, changes in vision.     Dysuria   This is a new problem. The current episode started in the past 7 days. The problem occurs every urination. The problem has been unchanged. The quality of the pain is described as aching. The pain is at a severity of 5/10. The pain is mild. There has been no fever. She is Sexually active. There is No history of pyelonephritis. Associated symptoms include flank pain and frequency. Pertinent negatives include no behavior changes, chills, discharge, hematuria, hesitancy, nausea, possible pregnancy, sweats, urgency, vomiting, weight loss, bubble bath use, constipation, rash or withholding. Treatments tried: AZO. The treatment provided no relief. Her past medical history is significant for hypertension and recurrent UTIs. There is no history of catheterization, diabetes insipidus, diabetes mellitus, genitourinary reflux, kidney stones, a single kidney, STD, urinary stasis or a urological procedure.       Constitution: Negative for chills.   Gastrointestinal:  Negative for nausea, vomiting and constipation.   Genitourinary:  Positive for dysuria, frequency and flank pain. Negative for urgency and hematuria.   Skin:  Negative for rash.    "   Objective:     Physical Exam   Constitutional: She is oriented to person, place, and time.   HENT:   Head: Normocephalic.   Ears:   Right Ear: External ear normal.   Left Ear: External ear normal.   Nose: Nose normal.   Mouth/Throat: Mucous membranes are moist.   Eyes: Conjunctivae are normal.   Cardiovascular: Normal rate.   Pulmonary/Chest: Effort normal.   Abdominal: There is no abdominal tenderness. There is no left CVA tenderness and no right CVA tenderness.   Musculoskeletal: Normal range of motion.         General: Normal range of motion.   Neurological: She is alert and oriented to person, place, and time.   Skin: Skin is dry.   Psychiatric: Her behavior is normal.     Results for orders placed or performed in visit on 08/25/23   POCT Urinalysis, Dipstick, Automated, W/O Scope   Result Value Ref Range    POC Blood, Urine Negative Negative    POC Bilirubin, Urine Positive (A) Negative    POC Urobilinogen, Urine 4.0 (A) 0.1 - 1.1    POC Ketones, Urine Negative Negative    POC Protein, Urine Positive (A) Negative    POC Nitrates, Urine Positive (A) Negative    POC Glucose, Urine Negative Negative    pH, UA 5.5     POC Specific Gravity, Urine 1.02 1.003 - 1.029    POC Leukocytes, Urine Negative Negative    Induration       Assessment:     1. Acute cystitis without hematuria    2. Dysuria        Plan:       Acute cystitis without hematuria  -     nitrofurantoin, macrocrystal-monohydrate, (MACROBID) 100 MG capsule; Take 1 capsule (100 mg total) by mouth 2 (two) times daily. for 5 days  Dispense: 10 capsule; Refill: 0    Dysuria  -     POCT Urinalysis, Dipstick, Automated, W/O Scope      Patient Instructions   - You must understand that you have received an Urgent Care treatment only and that you may be released before all of your medical problems are known or treated.   - You, the patient, will arrange for follow up care as instructed.   - If your condition worsens or fails to improve we recommend that you  receive another evaluation at the ER immediately or contact your PCP to discuss your concerns.   - You can call [phone] or [phone] to help schedule an appointment with the appropriate provider.

## 2023-09-02 ENCOUNTER — HOSPITAL ENCOUNTER (EMERGENCY)
Facility: OTHER | Age: 63
Discharge: HOME | End: 2023-09-02
Attending: EMERGENCY MEDICINE
Payer: COMMERCIAL

## 2023-09-02 VITALS
SYSTOLIC BLOOD PRESSURE: 145 MMHG | OXYGEN SATURATION: 98 % | RESPIRATION RATE: 17 BRPM | DIASTOLIC BLOOD PRESSURE: 89 MMHG | WEIGHT: 200 LBS | HEART RATE: 64 BPM | HEIGHT: 69 IN | TEMPERATURE: 98 F | BODY MASS INDEX: 29.62 KG/M2

## 2023-09-02 DIAGNOSIS — M79.605 CHRONIC PAIN OF BOTH LOWER EXTREMITIES: ICD-10-CM

## 2023-09-02 DIAGNOSIS — M79.671 CHRONIC HEEL PAIN, RIGHT: ICD-10-CM

## 2023-09-02 DIAGNOSIS — M79.18 MYALGIA, LOWER LEG: ICD-10-CM

## 2023-09-02 DIAGNOSIS — M79.604 CHRONIC PAIN OF BOTH LOWER EXTREMITIES: ICD-10-CM

## 2023-09-02 DIAGNOSIS — M77.31 CALCANEAL SPUR OF RIGHT FOOT: Primary | ICD-10-CM

## 2023-09-02 DIAGNOSIS — G89.29 CHRONIC HEEL PAIN, RIGHT: ICD-10-CM

## 2023-09-02 DIAGNOSIS — G89.29 CHRONIC PAIN OF BOTH LOWER EXTREMITIES: ICD-10-CM

## 2023-09-02 LAB
ANION GAP SERPL CALC-SCNC: 7 MMOL/L (ref 8–16)
BUN SERPL-MCNC: 18 MG/DL (ref 8–23)
CALCIUM SERPL-MCNC: 9.6 MG/DL (ref 8.7–10.5)
CHLORIDE SERPL-SCNC: 105 MMOL/L (ref 95–110)
CO2 SERPL-SCNC: 28 MMOL/L (ref 23–29)
CREAT SERPL-MCNC: 0.7 MG/DL (ref 0.5–1.4)
EST. GFR  (NO RACE VARIABLE): >60 ML/MIN/1.73 M^2
GLUCOSE SERPL-MCNC: 111 MG/DL (ref 70–110)
MAGNESIUM SERPL-MCNC: 2.1 MG/DL (ref 1.6–2.6)
POTASSIUM SERPL-SCNC: 3.9 MMOL/L (ref 3.5–5.1)
SODIUM SERPL-SCNC: 140 MMOL/L (ref 136–145)

## 2023-09-02 PROCEDURE — 80048 BASIC METABOLIC PNL TOTAL CA: CPT | Performed by: NURSE PRACTITIONER

## 2023-09-02 PROCEDURE — 99284 EMERGENCY DEPT VISIT MOD MDM: CPT

## 2023-09-02 PROCEDURE — 83735 ASSAY OF MAGNESIUM: CPT | Performed by: NURSE PRACTITIONER

## 2023-09-02 RX ORDER — CYCLOBENZAPRINE HCL 10 MG
10 TABLET ORAL NIGHTLY PRN
Qty: 20 TABLET | Refills: 0 | Status: SHIPPED | OUTPATIENT
Start: 2023-09-02 | End: 2023-09-02 | Stop reason: SDUPTHER

## 2023-09-02 RX ORDER — CYCLOBENZAPRINE HCL 10 MG
10 TABLET ORAL NIGHTLY PRN
Qty: 20 TABLET | Refills: 0 | Status: SHIPPED | OUTPATIENT
Start: 2023-09-02 | End: 2023-12-15 | Stop reason: SDUPTHER

## 2023-09-02 NOTE — ED PROVIDER NOTES
.     Source of History:  Patient    Chief complaint:  Ankle Pain (Reports R ankle and heel pain with swelling onset 1 month ago. Sees a podiatrist, states she was given a heel raiser to help with symptoms. Denies recent falls. L leg appears more swollen compared to the R leg. Denies hx of DVTs)      HPI:  Rosamaria Agosto is a 63 y.o. female presenting for an x-ray of her right heel.  Patient has plantar fasciitis of the left foot, symptoms have improved.  Recently she is been experiencing severe right heel pain.  Pain radiates to her foot.  She is tried heel lifts, seen ortho and Podiatry.  She is taking Celebrex and applying topical medications.  She states that every time she steps she feels pain.  She states that sometimes she hit the back of her heel on a step and wonders if maybe she chipped it and a chipped bone is contributing to her pain.  She states that she is never had an x-ray of the right foot/heel.  Patient also reports at night when she lays down she has sharp pains that start on the top of both feet and radiate up the anterior part of her shin.  She states that they feel like Charley horses.  They happen every night.  Patient denies pain in the calf, prior history of DVT, history of coagulopathy    This is the extent to the patients complaints today here in the emergency department.    ROS: As per HPI     Review of patient's allergies indicates:   Allergen Reactions    Sotradecol [sodium tetradecyl sulfate]      Hives and itching that resolved with Benadryl and Solumedrol.       PMH:  As per HPI and below:  Past Medical History:   Diagnosis Date    Arthritis     Hyperlipidemia     Hypertension     Sinusitis      Past Surgical History:   Procedure Laterality Date    ARTHROSCOPIC REPAIR OF ROTATOR CUFF OF SHOULDER Left 07/24/2019    Procedure: REPAIR, ROTATOR CUFF, ARTHROSCOPIC;  Surgeon: ASMITA Soto MD;  Location: [facility] OR [address] [place] [address];  Service: Orthopedics;  Laterality: Left;    ARTHROSCOPY OF  "SHOULDER WITH DECOMPRESSION OF SUBACROMIAL SPACE Left 07/24/2019    Procedure: ARTHROSCOPY, SHOULDER, WITH SUBACROMIAL SPACE DECOMPRESSION;  Surgeon: ASMITA Soto MD;  Location: [facility] OR [address] [place] [address];  Service: Orthopedics;  Laterality: Left;    BILATERAL SALPINGO-OOPHORECTOMY (BSO)  06/24/2019    BREAST BIOPSY  24-25 years old    CYSTOSCOPY N/A 06/24/2019    Procedure: CYSTOSCOPY;  Surgeon: Anson Ellison MD;  Location: [facility];  Service: OB/GYN;  Laterality: N/A;    ENCEPHALOCELE REPAIR  46    HYSTERECTOMY  1994    NASAL SINUS SURGERY      ROBOT-ASSISTED LAPAROSCOPIC ABDOMINAL SACROCOLPOPEXY USING DA MAC XI N/A 06/24/2019    Procedure: XI ROBOTIC SACROCOLPOPEXY, ABDOMINAL;  Surgeon: Anson Ellison MD;  Location: [facility];  Service: OB/GYN;  Laterality: N/A;    SHOULDER ARTHROSCOPY Left 07/24/2019    Procedure: BICEPS TENODESIS;  Surgeon: ASMITA Soto MD;  Location: [facility] OR [address] [place] [address];  Service: Orthopedics;  Laterality: Left;    TUBAL LIGATION         Social History     Tobacco Use    Smoking status: Never    Smokeless tobacco: Never   Substance Use Topics    Alcohol use: Yes     Alcohol/week: 1.0 standard drink of alcohol     Types: 1 Shots of liquor per week     Comment: seldom    Drug use: No       Physical Exam:    BP (!) 145/89 (BP Location: Left arm, Patient Position: Sitting)   Pulse 64   Temp 98.4 °F (36.9 °C) (Oral)   Resp 17   Ht 5' 9" (1.753 m)   Wt 90.7 kg (200 lb)   LMP 10/19/1993 (Approximate)   SpO2 98%   BMI 29.53 kg/m²   Nursing note and vital signs reviewed.  Appearance: Afebrile. Not toxic appearing. No acute distress.  Head: Atraumatic  Eyes: No conjunctival injection. No scleral icterus  ENT: Normal phonation  Chest/ Respiratory: No respiratory distress. No accessory muscle use.  Cardiovascular: Regular rate   Abdomen:  Not distended.    Musculoskeletal: Good range of motion all joints.  No deformities.  Neck supple.  No meningismus.  Skin: No rashes seen.  Good turgor.  No " ecchymoses.  Neurologic: GCS 15. Ambulates with a steady gait.   Mental Status:  Alert and oriented x 3.  Appropriate, conversant    Labs that have been ordered have been independently reviewed and interpreted by myself.      Labs Reviewed   BASIC METABOLIC PANEL - Abnormal; Notable for the following components:       Result Value    Glucose 111 (*)     Anion Gap 7 (*)     All other components within normal limits   MAGNESIUM       Imaging Results              X-Ray Calcaneus 2 View Right (Final result)  Result time 09/02/23 11:13:00      Final result by Hira Duran MD (09/02/23 11:13:00)                   Impression:      As above.      Electronically signed by: Hira Duarn MD  Date:    09/02/2023  Time:    11:13               Narrative:    EXAMINATION:  XR CALCANEUS 2 VIEW RIGHT    CLINICAL HISTORY:  Pain in right foot    TECHNIQUE:  Tangential and lateral views of the right calcaneus were performed.    COMPARISON:  03/06/2019    FINDINGS:  No fracture.  No lytic or blastic lesion.  Achilles enthesopathy with thickening of the insertional Achilles tendon noted.  Plantar calcaneal spur noted.                                        Initial Impression/ Differential Dx:  Urgent evaluation of 63 y.o. female presenting with chronic right heel pain. Patient is afebrile, not toxic appearing and hemodynamically stable.  Patient has been managed by Podiatry and Ortho for issues with her bilateral knees and bilateral feet.  She has plantar fasciitis of the left foot which is under control and it sounds like she has plantar fasciitis of the right which is uncontrolled.  Patient is requesting an x-ray of her right calcaneus as she states it has never been imaged.  Will obtain.  Patient is also reporting nightly cramps which may represent restless legs syndrome, however could be due to electrolyte abnormalities.  Will check basic labs.  Bilateral calves equal in size with no overlying erythema edema or tenderness, Homans  sign negative bilaterally.  Patient has no history of coagulopathy, DVT, no recent travel.  No risk factors for DVT.  Although questioned in triage weather 1 leg is larger than the other, appear equal in size on my exam and no indication to rule out DVT at this time.    Differential Diagnosis includes, but is not limited to:  Fracture, electrolyte abnormality, calcaneal fracture, muscle spasm, restless legs syndrome, achilles tendonitis, bone spur    MDM:        ED Course as of 09/02/23 1143   Sat Sep 02, 2023   1115 Potassium: 3.9 [CU]   1115 Magnesium : 2.1 [CU]   1122 X-Ray Calcaneus 2 View Right  No fracture.  No lytic or blastic lesion.  Achilles enthesopathy with thickening of the insertional Achilles tendon noted.  Plantar calcaneal spur noted.    Patient's symptoms may be due to Achilles tendinitis.  Plantar calcaneal spur may also be a contributing factor.  Will have patient follow-up with her podiatrist for continued management.  As patient has chronic pain, will also place referral for pain management.  Will also discharge patient home with muscle relaxers to trial before bed to see if it prevents the reported muscle spasms.  Patient is amenable to this plan and discharged home in good condition. Patient educated on signs and symptoms to monitor for and when to return to ED. Patient verbalized understanding agrees with treatment plan. All questions and concerns addressed.      [CU]      ED Course User Index  [CU] Mark Gross, NP                 Diagnostic Impression:    1. Calcaneal spur of right foot    2. Chronic heel pain, right    3. Myalgia, lower leg    4. Chronic pain of both lower extremities         ED Disposition Condition    Discharge Good            ED Prescriptions       Medication Sig Dispense Start Date End Date Auth. Provider    cyclobenzaprine (FLEXERIL) 10 MG tablet  (Status: Discontinued) Take 1 tablet (10 mg total) by mouth nightly as needed for Muscle spasms. 20 tablet 9/2/2023  9/2/2023 Mark Gross NP    cyclobenzaprine (FLEXERIL) 10 MG tablet Take 1 tablet (10 mg total) by mouth nightly as needed for Muscle spasms. 20 tablet 9/2/2023 -- Mark Gross NP          Follow-up Information       Follow up With Specialties Details Why Contact Info    [facility]   [address] [place] [id number]  [phone]             Mark Gross NP  09/02/23 1146

## 2023-09-02 NOTE — Clinical Note
"Rosamaria Mcnulty" Surendra was seen and treated in our emergency department on 9/2/2023.  She may return to work on 09/07/2023.       If you have any questions or concerns, please don't hesitate to call.      Mark Gross NP"

## 2023-09-02 NOTE — ED NOTES
"-presents to ED w/ right posterior upper ankle "point" pain for over a month, + swelling noted around "point"  LOC: The patient is awake, alert and aware of environment with an appropriate affect, the patient is oriented x 3 and speaking appropriately.  APPEARANCE: Patient resting comfortably and in no acute distress, patient is clean and well groomed, patient's clothing is properly fastened.  SKIN: The skin is warm and dry, patient has normal skin turgor and moist mucus membranes, skin intact, no breakdown or brusing noted.  MUSKULOSKELETAL: Patient moving all extremities well w/ limited ROM to right foot due to ankle pain  RESPIRATORY: Airway is open and patent, respirations are spontaneous, patient has a normal effort and rate. Breath sounds are clear and equal bilaterally.  CARDIAC: Normal heart sounds. No peripheral edema.  ABDOMEN: Soft and non tender to palpation, no distention noted. Bowel sounds present.   "

## 2023-09-04 ENCOUNTER — PATIENT MESSAGE (OUTPATIENT)
Dept: PODIATRY | Facility: CLINIC | Age: 63
End: 2023-09-04
Payer: COMMERCIAL

## 2023-09-05 ENCOUNTER — TELEPHONE (OUTPATIENT)
Dept: PODIATRY | Facility: CLINIC | Age: 63
End: 2023-09-05
Payer: COMMERCIAL

## 2023-09-05 NOTE — TELEPHONE ENCOUNTER
Pt was called regarding the need to clarify appt that was scheduled for injection. Provider does not injection the back of heel but only the bottom of foot. Per provider, if pt is needing an injection in heel, she can refer her the a provider whom can better service her. Pt didn't answer. Vm was left detailing this message.

## 2023-09-06 ENCOUNTER — OFFICE VISIT (OUTPATIENT)
Dept: PODIATRY | Facility: CLINIC | Age: 63
End: 2023-09-06
Payer: COMMERCIAL

## 2023-09-06 VITALS
HEIGHT: 69 IN | WEIGHT: 198.63 LBS | HEART RATE: 64 BPM | SYSTOLIC BLOOD PRESSURE: 164 MMHG | BODY MASS INDEX: 29.42 KG/M2 | DIASTOLIC BLOOD PRESSURE: 82 MMHG

## 2023-09-06 DIAGNOSIS — M72.2 PLANTAR FASCIITIS, RIGHT: Primary | ICD-10-CM

## 2023-09-06 DIAGNOSIS — M76.61 ACHILLES TENDINITIS OF RIGHT LOWER EXTREMITY: ICD-10-CM

## 2023-09-06 PROCEDURE — 99999 PR PBB SHADOW E&M-EST. PATIENT-LVL IV: CPT | Mod: PBBFAC,,, | Performed by: PODIATRIST

## 2023-09-06 PROCEDURE — 20550 NJX 1 TENDON SHEATH/LIGAMENT: CPT | Mod: RT,S$GLB,, | Performed by: PODIATRIST

## 2023-09-06 PROCEDURE — 99213 OFFICE O/P EST LOW 20 MIN: CPT | Mod: 25,S$GLB,, | Performed by: PODIATRIST

## 2023-09-06 PROCEDURE — 99999 PR PBB SHADOW E&M-EST. PATIENT-LVL IV: ICD-10-PCS | Mod: PBBFAC,,, | Performed by: PODIATRIST

## 2023-09-06 PROCEDURE — 99213 PR OFFICE/OUTPT VISIT, EST, LEVL III, 20-29 MIN: ICD-10-PCS | Mod: 25,S$GLB,, | Performed by: PODIATRIST

## 2023-09-06 PROCEDURE — 20550 PR INJECT TENDON SHEATH/LIGAMENT: ICD-10-PCS | Mod: RT,S$GLB,, | Performed by: PODIATRIST

## 2023-09-06 RX ORDER — TRIAMCINOLONE ACETONIDE 40 MG/ML
40 INJECTION, SUSPENSION INTRA-ARTICULAR; INTRAMUSCULAR
Status: DISCONTINUED | OUTPATIENT
Start: 2023-09-06 | End: 2023-09-06 | Stop reason: HOSPADM

## 2023-09-06 RX ADMIN — TRIAMCINOLONE ACETONIDE 40 MG: 40 INJECTION, SUSPENSION INTRA-ARTICULAR; INTRAMUSCULAR at 11:09

## 2023-09-06 NOTE — PROGRESS NOTES
Subjective:      Patient ID: Rosamaria Agosto is a 63 y.o. female.    Chief Complaint:   Plantar Fasciitis (Left foot plantar fasciitis injection )      Rosamaria is a 63 y.o. female who returns to the podiatry clinic  with complaint of  right foot pain/requesting shot.  Patient relates that she is hoping that getting a shot in the plantar fascia on the bottom of her right foot will help the back of the heel  Patient relates that she may be interested in the surgery to fix her right lower extremity however she would have to wait until next summer  She relates a left has been doing well      9/2/23: ED    Chief complaint:  Ankle Pain (Reports R ankle and heel pain with swelling onset 1 month ago. Sees a podiatrist, states she was given a heel raiser to help with symptoms. Denies recent falls. L leg appears more swollen compared to the R leg. Denies hx of DVTs)        HPI:  Rosamaria Agosto is a 63 y.o. female presenting for an x-ray of her right heel.  Patient has plantar fasciitis of the left foot, symptoms have improved.  Recently she is been experiencing severe right heel pain.  Pain radiates to her foot.  She is tried heel lifts, seen ortho and Podiatry.  She is taking Celebrex and applying topical medications.  She states that every time she steps she feels pain.  She states that sometimes she hit the back of her heel on a step and wonders if maybe she chipped it and a chipped bone is contributing to her pain.  She states that she is never had an x-ray of the right foot/heel.  Patient also reports at night when she lays down she has sharp pains that start on the top of both feet and radiate up the anterior part of her shin.  She states that they feel like Charley horses.  They happen every night.  Patient denies pain in the calf, prior history of DVT, history of coagulopathy         X-Ray Calcaneus 2 View Right (Final result)  Result time 09/02/23 11:13:00            Final result by Hira Duran MD (09/02/23  11:13:00)                           Impression:        As above.        Electronically signed by:     Hira Duran MD  Date:                                            09/02/2023  Time:                                            11:13                     Narrative:     EXAMINATION:  XR CALCANEUS 2 VIEW RIGHT     CLINICAL HISTORY:  Pain in right foot     TECHNIQUE:  Tangential and lateral views of the right calcaneus were performed.     COMPARISON:  03/06/2019     FINDINGS:  No fracture.  No lytic or blastic lesion.  Achilles enthesopathy with thickening of the insertional Achilles tendon noted.  Plantar calcaneal spur noted.                                                  Initial Impression/ Differential Dx:  Urgent evaluation of 63 y.o. female presenting with chronic right heel pain. Patient is afebrile, not toxic appearing and hemodynamically stable.  Patient has been managed by Podiatry and Ortho for issues with her bilateral knees and bilateral feet.  She has plantar fasciitis of the left foot which is under control and it sounds like she has plantar fasciitis of the right which is uncontrolled.  Patient is requesting an x-ray of her right calcaneus as she states it has never been imaged.  Will obtain.  Patient is also reporting nightly cramps which may represent restless legs syndrome, however could be due to electrolyte abnormalities.  Will check basic labs.  Bilateral calves equal in size with no overlying erythema edema or tenderness, Homans sign negative bilaterally.  Patient has no history of coagulopathy, DVT, no recent travel.  No risk factors for DVT.  Although questioned in triage weather 1 leg is larger than the other, appear equal in size on my exam and no indication to rule out DVT at this time.     Differential Diagnosis includes, but is not limited to:  Fracture, electrolyte abnormality, calcaneal fracture, muscle spasm, restless legs syndrome, achilles tendonitis, bone spur            Last  visit: 8/2023  - left plantar fascia pain resolved    - right achilles pain:    - start the celebrex.. pt to use as prescribed from other provider    - can not tolerate cam boot     - heel lifts demonstarted and dispensed    - recommend ankle brace    - consider p.thearpy    - MRI too costly    - prn                                                       Past Medical History:   Diagnosis Date    Arthritis     Hyperlipidemia     Hypertension     Sinusitis      Past Surgical History:   Procedure Laterality Date    ARTHROSCOPIC REPAIR OF ROTATOR CUFF OF SHOULDER Left 07/24/2019    Procedure: REPAIR, ROTATOR CUFF, ARTHROSCOPIC;  Surgeon: ASMITA Soto MD;  Location: [address];  Service: Orthopedics;  Laterality: Left;    ARTHROSCOPY OF SHOULDER WITH DECOMPRESSION OF SUBACROMIAL SPACE Left 07/24/2019    Procedure: ARTHROSCOPY, SHOULDER, WITH SUBACROMIAL SPACE DECOMPRESSION;  Surgeon: ASMITA Soto MD;  Location: [facility] OR [address] [place] [address];  Service: Orthopedics;  Laterality: Left;    BILATERAL SALPINGO-OOPHORECTOMY (BSO)  06/24/2019    BREAST BIOPSY  24-25 years old    CYSTOSCOPY N/A 06/24/2019    Procedure: CYSTOSCOPY;  Surgeon: Anson Ellison MD;  Location: [facility];  Service: OB/GYN;  Laterality: N/A;    ENCEPHALOCELE REPAIR  46    HYSTERECTOMY  1994    NASAL SINUS SURGERY      ROBOT-ASSISTED LAPAROSCOPIC ABDOMINAL SACROCOLPOPEXY USING DA MAC XI N/A 06/24/2019    Procedure: XI ROBOTIC SACROCOLPOPEXY, ABDOMINAL;  Surgeon: Anson Ellison MD;  Location: [facility];  Service: OB/GYN;  Laterality: N/A;    SHOULDER ARTHROSCOPY Left 07/24/2019    Procedure: BICEPS TENODESIS;  Surgeon: ASMITA Soto MD;  Location: [address];  Service: Orthopedics;  Laterality: Left;    TUBAL LIGATION       Current Outpatient Medications on File Prior to Visit   Medication Sig Dispense Refill    celecoxib (CELEBREX) 200 MG capsule Take 1 capsule (200 mg total) by mouth 2 (two) times daily with meals. (breakfast and dinner) 60  capsule 0    cyclobenzaprine (FLEXERIL) 10 MG tablet Take 1 tablet (10 mg total) by mouth nightly as needed for Muscle spasms. 20 tablet 0    econazole nitrate 1 % cream Use bid 30 g 2    estradioL (VAGIFEM) 10 mcg Tab Place 1 tablet (10 mcg total) vaginally twice a week. 8 tablet 12    fluocinolone and shower cap (DERMA-SMOOTHE/FS SCALP OIL) 0.01 % Oil Apply oil to damp scalp nightly and cover with shower cap. 1 Bottle 3    fluticasone propionate (FLONASE) 50 mcg/actuation nasal spray 2 sprays (100 mcg total) by Each Nostril route once daily. 16 g 11    hydroCHLOROthiazide (HYDRODIURIL) 25 MG tablet Take 1 tablet (25 mg total) by mouth once daily. 90 tablet 3    leg brace (ANKLE BRACE) Misc 1 each by Misc.(Non-Drug; Combo Route) route Daily. 1 each 0    LIDOcaine HCL 2% (XYLOCAINE) 2 % jelly Apply topically 2 (two) times daily. P.r.n. 30 mL 0    neomycin-polymyxin-hydrocortisone (CORTISPORIN) 3.5-10,000-1 mg/mL-unit/mL-% otic suspension Place 3 drops into the left ear 4 (four) times daily. 10 mL 0    olopatadine (PATANOL) 0.1 % ophthalmic solution Place 1 drop into both eyes 2 (two) times daily.      rosuvastatin (CRESTOR) 20 MG tablet Take 1 tablet (20 mg total) by mouth every evening. 90 tablet 3    azelastine (ASTELIN) 137 mcg (0.1 %) nasal spray 1 spray (137 mcg total) by Nasal route 2 (two) times daily. 30 mL 2    cetirizine (ZYRTEC) 10 MG tablet Take 1 tablet (10 mg total) by mouth once daily. for 14 days 14 tablet 0    diphenhydrAMINE-aluminum-magnesium hydroxide-simethicone-LIDOcaine HCl 2% Swish and spit 15 mLs every 4 (four) hours as needed (sore throat). 180 mL 0    ergocalciferol (ERGOCALCIFEROL) 50,000 unit Cap Take 1 capsule (50,000 Units total) by mouth every 7 days. 4 capsule 2    famotidine (PEPCID) 20 MG tablet Take 1 tablet (20 mg total) by mouth 2 (two) times daily. for 14 days 28 tablet 0    fluocinolone (DERMA-SMOOTHE) 0.01 % external oil Apply topically 3 (three) times daily. for 14 days  118.28 mL 1    gabapentin (NEURONTIN) 300 MG capsule Take 1 capsule (300 mg total) by mouth 2 (two) times daily. 60 capsule 11    hydrocortisone 2.5 % cream Apply topically 2 (two) times daily. for 14 days 28 g 1    topiramate (TOPAMAX) 50 MG tablet Take 1 tablet (50 mg total) by mouth 2 (two) times daily. 180 tablet 0    triamcinolone acetonide 0.1% (KENALOG) 0.1 % cream Apply topically 2 (two) times daily. for 14 days 15 g 0     No current facility-administered medications on file prior to visit.     Review of patient's allergies indicates:   Allergen Reactions    Sotradecol [sodium tetradecyl sulfate]      Hives and itching that resolved with Benadryl and Solumedrol.       Review of Systems   Constitutional: Negative for chills, decreased appetite, fever, malaise/fatigue, night sweats, weight gain and weight loss.   Cardiovascular:  Negative for chest pain, claudication, dyspnea on exertion, leg swelling, palpitations and syncope.   Respiratory:  Negative for cough and shortness of breath.    Endocrine: Negative for cold intolerance and heat intolerance.   Hematologic/Lymphatic: Negative for bleeding problem. Does not bruise/bleed easily.   Skin:  Negative for color change, dry skin, flushing, itching, nail changes, poor wound healing, rash, skin cancer, suspicious lesions and unusual hair distribution.   Musculoskeletal:  Negative for arthritis, back pain, falls, gout, joint pain, joint swelling, muscle cramps, muscle weakness, myalgias, neck pain and stiffness.        Foot pain   Gastrointestinal:  Negative for diarrhea, nausea and vomiting.   Neurological:  Negative for dizziness, focal weakness, light-headedness, numbness, paresthesias, tremors, vertigo and weakness.   Psychiatric/Behavioral:  Negative for altered mental status and depression. The patient does not have insomnia.    Allergic/Immunologic: Negative.            Objective:       Vitals:    09/06/23 1126   BP: (!) 164/82   Pulse: 64   Weight: 90.1  "kg (198 lb 9.6 oz)   Height: 5' 9" (1.753 m)   PainSc:   6   PainLoc: Foot   90.1 kg (198 lb 9.6 oz)     Physical Exam  Vitals reviewed.   Constitutional:       General: She is not in acute distress.     Appearance: She is well-developed. She is not ill-appearing, toxic-appearing or diaphoretic.      Comments: Supportive shoes   Cardiovascular:      Pulses:           Dorsalis pedis pulses are 2+ on the right side and 2+ on the left side.        Posterior tibial pulses are 2+ on the right side and 2+ on the left side.   Musculoskeletal:         General: No deformity.      Right lower leg: No edema.      Left lower leg: No edema.      Right ankle:      Right Achilles Tendon: Tenderness present. No defects.      Left ankle: Normal.      Left Achilles Tendon: Normal. No tenderness or defects.      Right foot: Decreased range of motion. Tenderness present. No deformity or bony tenderness.      Left foot: Decreased range of motion. No deformity, tenderness or bony tenderness.      Comments: No pain left    Mild pop right achilles tendon insertion  No pop with side to side compression calcaneus  Positive pain on palpation to plantar fascial insertion right   Feet:      Right foot:      Protective Sensation: 10 sites tested.  10 sites sensed.      Skin integrity: No ulcer, blister, skin breakdown, erythema, warmth, callus or dry skin.      Toenail Condition: Right toenails are normal.      Left foot:      Protective Sensation: 10 sites tested.  10 sites sensed.      Skin integrity: No ulcer, blister, skin breakdown, erythema, warmth, callus or dry skin.      Toenail Condition: Left toenails are normal.      Comments: No open lesions or signs of infection no excessive warmth  Skin:     General: Skin is warm.      Capillary Refill: Capillary refill takes 2 to 3 seconds.      Coloration: Skin is not pale.      Findings: No erythema or rash.   Neurological:      Mental Status: She is alert and oriented to person, place, and " time.      Sensory: No sensory deficit.      Gait: Gait abnormal.   Psychiatric:         Attention and Perception: Attention normal.         Mood and Affect: Mood normal.         Speech: Speech normal.         Behavior: Behavior normal.         Thought Content: Thought content normal.         Cognition and Memory: Cognition normal.         Judgment: Judgment normal.               Assessment:       Encounter Diagnoses   Name Primary?    Plantar fasciitis, right Yes    Achilles tendinitis of right lower extremity              Plan:       Rosamaria was seen today for plantar fasciitis.    Diagnoses and all orders for this visit:    Plantar fasciitis, right  -     Tendon Sheath    Achilles tendinitis of right lower extremity          I counseled the patient on her conditions, their implications and medical management.    Emergency room notes reviewed    Discussed with patient do not recommend injecting the Achilles unless there is immobilization.  Patient is unable to tolerate a cam boot secondary to bladder dysfunction     Patient tolerated injection well right foot    Left foot has improved and remained stable    Recommend considering surgical intervention/consult patient may consider for the summer    Patient can message with update of symptoms always can repeat another plantar fascial injection in the future

## 2023-09-06 NOTE — PROCEDURES
Tendon Sheath    Date/Time: 9/6/2023 11:15 AM    Performed by: Allegra Bañuelos DPM  Authorized by: Allegra Bañuelos DPM    Consent Done?:  Yes (Written)  Indications:  Pain  Site marked: the procedure site was marked    Timeout: prior to procedure the correct patient, procedure, and site was verified    Prep: patient was prepped and draped in usual sterile fashion      Local anesthesia used?: Yes    Anesthesia:  Local infiltration  Local anesthetic:  Lidocaine 1% without epinephrine  Anesthetic total (ml):  1    Location:  Foot  Foot joint: right plantar fascia.  Ultrasonic guidance for needle placement?: No    Needle size:  25 G  Approach:  Medial  Medications:  40 mg triamcinolone acetonide 40 mg/mL  Patient tolerance:  Patient tolerated the procedure well with no immediate complications    Additional Comments: After sterile skin prep, steroid injection was performed with patient written consent and timeout procedure with patient identifiers, site markings, and procedures in agreement to all present, at right plantar fascia using 40 mg of Triamcinalone,  and 1mL 1% Lidocaine plain. This was well tolerated.

## 2023-09-13 ENCOUNTER — TELEPHONE (OUTPATIENT)
Dept: ORTHOPEDICS | Facility: CLINIC | Age: 63
End: 2023-09-13
Payer: COMMERCIAL

## 2023-10-04 ENCOUNTER — OFFICE VISIT (OUTPATIENT)
Dept: PAIN MEDICINE | Facility: CLINIC | Age: 63
End: 2023-10-04
Attending: ANESTHESIOLOGY
Payer: COMMERCIAL

## 2023-10-04 VITALS
HEART RATE: 73 BPM | OXYGEN SATURATION: 100 % | SYSTOLIC BLOOD PRESSURE: 128 MMHG | RESPIRATION RATE: 18 BRPM | BODY MASS INDEX: 28.96 KG/M2 | WEIGHT: 195.56 LBS | HEIGHT: 69 IN | DIASTOLIC BLOOD PRESSURE: 86 MMHG

## 2023-10-04 DIAGNOSIS — M76.60 INSERTIONAL ACHILLES TENDINOPATHY: Primary | ICD-10-CM

## 2023-10-04 DIAGNOSIS — G89.29 CHRONIC PAIN OF BOTH LOWER EXTREMITIES: ICD-10-CM

## 2023-10-04 DIAGNOSIS — M79.671 PAIN OF RIGHT HEEL: ICD-10-CM

## 2023-10-04 DIAGNOSIS — M72.2 PLANTAR FASCIITIS OF RIGHT FOOT: ICD-10-CM

## 2023-10-04 DIAGNOSIS — M79.604 CHRONIC PAIN OF BOTH LOWER EXTREMITIES: ICD-10-CM

## 2023-10-04 DIAGNOSIS — M79.605 CHRONIC PAIN OF BOTH LOWER EXTREMITIES: ICD-10-CM

## 2023-10-04 PROCEDURE — 99203 OFFICE O/P NEW LOW 30 MIN: CPT | Mod: S$GLB,,, | Performed by: ANESTHESIOLOGY

## 2023-10-04 PROCEDURE — 99203 PR OFFICE/OUTPT VISIT, NEW, LEVL III, 30-44 MIN: ICD-10-PCS | Mod: S$GLB,,, | Performed by: ANESTHESIOLOGY

## 2023-10-04 PROCEDURE — 99999 PR PBB SHADOW E&M-EST. PATIENT-LVL V: CPT | Mod: PBBFAC,,, | Performed by: ANESTHESIOLOGY

## 2023-10-04 PROCEDURE — 99999 PR PBB SHADOW E&M-EST. PATIENT-LVL V: ICD-10-PCS | Mod: PBBFAC,,, | Performed by: ANESTHESIOLOGY

## 2023-10-04 NOTE — PROGRESS NOTES
Subjective:      Patient ID: Rosamaria Agosto is a 63 y.o. female.    Chief Complaint: Foot Pain (Right worse )    Referred by: Mark Gross NP     HPI    The patient endorses a 8 month history of BL heal pain without any specific inciting event. Pain is worse on right than left. Localized over the achilles tendon insertion of the right, primarily along the medial aspect. No radiation up the leg. Pain is described as a very sharp pain, rated 5/10 (worse 10/10, best 0/10). Pain is worse with walking. Better with stretching, previous shots. Pt reports previous relief with formal physical therapy, continues stretches to this day. She has tried heel raisers without relief, but has not used arch supports. Pt denies any current radicular symptoms.       Pain medications:   Celebrex - no relief  Meloxicam - No relief.  Diclofenac - takes for arthritis - no relief of heel pain.   Tramadol - decent relief of symptoms.       Physical Therapy:   Beneficial in the past. 2021      Interventional Pain History  Left foot plantar fascia injection - decent improvement in symptoms.         Past Medical History:   Diagnosis Date    Arthritis     Hyperlipidemia     Hypertension     Sinusitis        Past Surgical History:   Procedure Laterality Date    ARTHROSCOPIC REPAIR OF ROTATOR CUFF OF SHOULDER Left 07/24/2019    Procedure: REPAIR, ROTATOR CUFF, ARTHROSCOPIC;  Surgeon: ASMITA Soto MD;  Location: [address];  Service: Orthopedics;  Laterality: Left;    ARTHROSCOPY OF SHOULDER WITH DECOMPRESSION OF SUBACROMIAL SPACE Left 07/24/2019    Procedure: ARTHROSCOPY, SHOULDER, WITH SUBACROMIAL SPACE DECOMPRESSION;  Surgeon: ASMITA Soto MD;  Location: [address];  Service: Orthopedics;  Laterality: Left;    BILATERAL SALPINGO-OOPHORECTOMY (BSO)  06/24/2019    BREAST BIOPSY  24-25 years old    CYSTOSCOPY N/A 06/24/2019    Procedure: CYSTOSCOPY;  Surgeon: Anson Ellison MD;  Location: [facility];  Service: OB/GYN;   Laterality: N/A;    ENCEPHALOCELE REPAIR  46    HYSTERECTOMY  1994    NASAL SINUS SURGERY      ROBOT-ASSISTED LAPAROSCOPIC ABDOMINAL SACROCOLPOPEXY USING DA MAC XI N/A 06/24/2019    Procedure: XI ROBOTIC SACROCOLPOPEXY, ABDOMINAL;  Surgeon: Anson Ellison MD;  Location: [facility] OR;  Service: OB/GYN;  Laterality: N/A;    SHOULDER ARTHROSCOPY Left 07/24/2019    Procedure: BICEPS TENODESIS;  Surgeon: ASMITA Soto MD;  Location: [address];  Service: Orthopedics;  Laterality: Left;    TUBAL LIGATION         Review of patient's allergies indicates:   Allergen Reactions    Sotradecol [sodium tetradecyl sulfate]      Hives and itching that resolved with Benadryl and Solumedrol.       Current Outpatient Medications   Medication Sig Dispense Refill    celecoxib (CELEBREX) 200 MG capsule Take 1 capsule (200 mg total) by mouth 2 (two) times daily with meals. (breakfast and dinner) 60 capsule 0    cyclobenzaprine (FLEXERIL) 10 MG tablet Take 1 tablet (10 mg total) by mouth nightly as needed for Muscle spasms. 20 tablet 0    econazole nitrate 1 % cream Use bid 30 g 2    fluocinolone and shower cap (DERMA-SMOOTHE/FS SCALP OIL) 0.01 % Oil Apply oil to damp scalp nightly and cover with shower cap. 1 Bottle 3    fluticasone propionate (FLONASE) 50 mcg/actuation nasal spray 2 sprays (100 mcg total) by Each Nostril route once daily. 16 g 11    hydroCHLOROthiazide (HYDRODIURIL) 25 MG tablet Take 1 tablet (25 mg total) by mouth once daily. 90 tablet 3    leg brace (ANKLE BRACE) Misc 1 each by Misc.(Non-Drug; Combo Route) route Daily. 1 each 0    LIDOcaine HCL 2% (XYLOCAINE) 2 % jelly Apply topically 2 (two) times daily. P.r.n. 30 mL 0    rosuvastatin (CRESTOR) 20 MG tablet Take 1 tablet (20 mg total) by mouth every evening. 90 tablet 3    azelastine (ASTELIN) 137 mcg (0.1 %) nasal spray 1 spray (137 mcg total) by Nasal route 2 (two) times daily. 30 mL 2    cetirizine (ZYRTEC) 10 MG tablet Take 1 tablet (10 mg total) by mouth  once daily. for 14 days 14 tablet 0    diphenhydrAMINE-aluminum-magnesium hydroxide-simethicone-LIDOcaine HCl 2% Swish and spit 15 mLs every 4 (four) hours as needed (sore throat). 180 mL 0    ergocalciferol (ERGOCALCIFEROL) 50,000 unit Cap Take 1 capsule (50,000 Units total) by mouth every 7 days. 4 capsule 2    estradioL (VAGIFEM) 10 mcg Tab Place 1 tablet (10 mcg total) vaginally twice a week. (Patient not taking: Reported on 10/4/2023) 8 tablet 12    famotidine (PEPCID) 20 MG tablet Take 1 tablet (20 mg total) by mouth 2 (two) times daily. for 14 days 28 tablet 0    fluocinolone (DERMA-SMOOTHE) 0.01 % external oil Apply topically 3 (three) times daily. for 14 days 118.28 mL 1    gabapentin (NEURONTIN) 300 MG capsule Take 1 capsule (300 mg total) by mouth 2 (two) times daily. 60 capsule 11    hydrocortisone 2.5 % cream Apply topically 2 (two) times daily. for 14 days 28 g 1    neomycin-polymyxin-hydrocortisone (CORTISPORIN) 3.5-10,000-1 mg/mL-unit/mL-% otic suspension Place 3 drops into the left ear 4 (four) times daily. (Patient not taking: Reported on 10/4/2023) 10 mL 0    olopatadine (PATANOL) 0.1 % ophthalmic solution Place 1 drop into both eyes 2 (two) times daily.      topiramate (TOPAMAX) 50 MG tablet Take 1 tablet (50 mg total) by mouth 2 (two) times daily. 180 tablet 0    triamcinolone acetonide 0.1% (KENALOG) 0.1 % cream Apply topically 2 (two) times daily. for 14 days 15 g 0     No current facility-administered medications for this visit.       Family History   Problem Relation Age of Onset    Stroke Maternal Grandmother     Diabetes Mother     Hypertension Mother     Diabetes Sister     Diabetes Sister     Breast cancer Neg Hx     Colon cancer Neg Hx     Ovarian cancer Neg Hx        Social History     Socioeconomic History    Marital status: Single   Tobacco Use    Smoking status: Never    Smokeless tobacco: Never   Substance and Sexual Activity    Alcohol use: Yes     Alcohol/week: 1.0 standard drink  of alcohol     Types: 1 Shots of liquor per week     Comment: seldom    Drug use: No    Sexual activity: Yes     Partners: Male     Birth control/protection: Post-menopausal, See Surgical Hx     Comment: hysterectomy 20 yrs ago     Social Determinants of Health     Financial Resource Strain: Low Risk  (8/7/2021)    Overall Financial Resource Strain (CARDIA)     Difficulty of Paying Living Expenses: Not hard at all   Food Insecurity: Unknown (8/7/2021)    Hunger Vital Sign     Worried About Running Out of Food in the Last Year: Patient refused     Ran Out of Food in the Last Year: Patient refused   Transportation Needs: No Transportation Needs (8/7/2021)    PRAPARE - Transportation     Lack of Transportation (Medical): No     Lack of Transportation (Non-Medical): No   Physical Activity: Insufficiently Active (8/7/2021)    Exercise Vital Sign     Days of Exercise per Week: 1 day     Minutes of Exercise per Session: 20 min   Stress: Stress Concern Present (8/7/2021)    [employer] - Occupational Stress Questionnaire     Feeling of Stress : Very much   Social Connections: Unknown (8/7/2021)    Social Connection and Isolation Panel [NHANES]     Frequency of Communication with Friends and Family: More than three times a week     Frequency of Social Gatherings with Friends and Family: Never     Active Member of Clubs or Organizations: Yes     Marital Status: Patient refused           Review of Systems   Constitutional: Negative for chills, decreased appetite, diaphoresis and fever.   HENT:  Negative for ear discharge.    Cardiovascular:  Negative for chest pain.   Respiratory:  Negative for cough, shortness of breath and wheezing.    Skin:  Negative for rash.   Musculoskeletal:  Positive for back pain. Negative for joint pain and muscle weakness.   Gastrointestinal:  Negative for abdominal pain, bowel incontinence, constipation, diarrhea, jaundice, nausea and vomiting.   Genitourinary:   "Negative for bladder incontinence.   Neurological:  Negative for headaches.           Objective:   /86 (BP Location: Right arm, Patient Position: Sitting, BP Method: Small (Automatic))   Pulse 73   Resp 18   Ht 5' 9" (1.753 m)   Wt 88.7 kg (195 lb 8.8 oz)   LMP 10/19/1993 (Approximate)   SpO2 100%   BMI 28.88 kg/m²   Pain Disability Index Review:      10/4/2023     9:30 AM   Last 3 PDI Scores   Pain Disability Index (PDI) 34     Normocephalic.  Atraumatic.  Affect appropriate.  Breathing unlabored.  Extra ocular muscles intact.           General    Constitutional: She is oriented to person, place, and time. She appears well-developed and well-nourished. No distress.   HENT:   Head: Normocephalic and atraumatic.   Nose: Nose normal.   Pulmonary/Chest: Effort normal. No respiratory distress.   Neurological: She is alert and oriented to person, place, and time.   Psychiatric: She has a normal mood and affect. Her behavior is normal.     General Musculoskeletal Exam   Gait: normal     Right Ankle/Foot Exam     Inspection   Deformity: absent  Erythema: absent  Bruising:  Foot - absent  Effusion: Ankle - absent Foot - absent  Atrophy: Ankle - absent Foot - absent    Tenderness   The patient is tender to palpation of the Achilles insertion and Achilles tendon.    Pain   The patient exhibits pain of the plantar arch, Achilles insertion and Achilles tendon.    Range of Motion   Ankle Joint   Dorsiflexion:  normal   Plantar flexion:  normal   Subtalar Joint   Inversion:  normal   Eversion:  normal     Alignment   Knee Alignment: neutral  Hindfoot Alignment: neutral  Midfoot Alignment: normal  Forefoot Alignment: normal    Tests   Anterior drawer: negative  External Rotation Test: negative  Squeeze Test: negative    Other   Ankle Crepitus: absent  Foot Crepitus:  absent  Sensation: normal  Peroneal Subluxation: negative    Comments:  Exquisite TTP over medial insertion point of achilles tendon. Lesser TTP over the " medial calcaneal facet.     Left Ankle/Foot Exam   Left ankle exam is normal.    Inspection  Deformity: absent  Erythema: absent  Effusion: Ankle - absent Foot - absent  Atrophy: Ankle - absent Foot - absent    Range of Motion   Ankle Joint  Dorsiflexion:  normal   Plantar flexion:  normal     Subtalar Joint   Inversion:  normal   Eversion:  normal     Alignment   Knee Alignment: neutral  Hindfoot Alignment: neutral  Midfoot Alignment: normal  Forefoot Alignment: normal    Tests   Anterior drawer: negative    Other   Foot Crepitus:  absent  Ankle Crepitus: absent  Sensation: normal  Back (L-Spine & T-Spine) / Neck (C-Spine) Exam     Back (L-Spine & T-Spine) Range of Motion   Extension:  normal   Flexion:  normal   Lateral bend right:  normal   Lateral bend left:  normal   Rotation right:  normal   Rotation left:  normal     Other   She has no scoliosis .  Spinal Kyphosis:  Absent  Spinal Lordosis:  Absent      Muscle Strength   Right Lower Extremity   Hip Abduction: 5/5   Hip Flexion: 5/5   Hip Extensors: 5/5  Quadriceps:  5/5   Hamstrin/5   Anterior tibial:  5/5   Gastrocsoleus:  5/5   Peroneal muscle:  5/5   EHL:  5/5  FDL: 5/5  EDL: 5/5  FHL: 5/5  Left Lower Extremity   Hip Abduction: 5/5   Hip Flexion: 5/5   Hip Extensors: 5/5  Quadriceps:  5/5   Hamstrin/5   Anterior tibial:  5/5   Gastrocsoleus:  5/5   Peroneal muscle:  5/5   EHL:  5/5  FDL: 5/5  EDL: 5/5  FHL: 5/5    Reflexes     Left Side  Achilles:  2+  Babinski Sign:  absent  Ankle Clonus:  absent  Quadriceps:  2+    Right Side   Achilles:  2+  Babinski Sign:  absent  Ankle Clonus:  absent  Tinel Sign: absent  Quadriceps:  2+  \      Imaging:      XR R Foot 2023  FINDINGS:  No fracture.  No lytic or blastic lesion.  Achilles enthesopathy with thickening of the insertional Achilles tendon noted.  Plantar calcaneal spur noted.     Impression:     As above.             Assessment:       Encounter Diagnoses   Name Primary?    Insertional Achilles  tendinopathy Yes    Plantar fasciitis of right foot     Pain of right heel     Chronic pain of both lower extremities          Plan:     We discussed with the patient the assessment and recommendations. The following is the plan we agreed on:    - At this point, the patient has tried mutliple different conservative management strategies for current pain, including stretching, PT, bracing, medications, without complete relief of pain. She may benefit from more interventional strategies, especially in the setting of enthesopathy--consider tenjet.   - Will send referral to Dr. Mclaughlin for evaluation under ultrasound and consideration of Tenjet.   - Pt can continue heel lifts for comfort.   - RTC prn.       Rosamaria was seen today for foot pain.    Diagnoses and all orders for this visit:    Insertional Achilles tendinopathy    Plantar fasciitis of right foot    Pain of right heel    Chronic pain of both lower extremities  -     Ambulatory referral/consult to Pain Clinic       I performed a history, review of systems, and physical exam with the patient. The assessment and plan were discussed and agreed upon with Dr. Christian, the attending of record, before sharing with the patient. The face to face encounter time, including answering all patient questions, was 40 minutes.     Avery Chandra M.D.  PGY-4  [employer] [facility]    I have personally taken the history and examined this patient and agree with the resident's note as stated above.

## 2023-10-31 ENCOUNTER — PATIENT MESSAGE (OUTPATIENT)
Dept: PODIATRY | Facility: CLINIC | Age: 63
End: 2023-10-31
Payer: COMMERCIAL

## 2023-11-13 NOTE — PROGRESS NOTES
Subjective:     Rosamaria Agosto     Chief Complaint   Patient presents with    Right Ankle - Pain     HPI    Rosamaria is a 63 y.o. female coming in today for right heel pain that began over 1 year ago, referred by Dr. Christian. Pt. describes the pain as a 6/10 achy pain that does not radiate. Today the pain is localized to the distal achilles tendon. There was not a fall/injury/ or trauma associated with the onset of symptoms. The pain is better with rest, NSAIDs, muscle relaxers and worse with being on her feet too long, first thing in the morning, standing after sitting, sitting with feet dangling, weather changes. Pt attended PT in [facility] 1 year ago with good temporary relief. She continues to stretch her calf and do her HEP consistently. She wore a boot for about 2 weeks but notes that this worsened her symptoms after bladder lift surgery because it was too heavy. Pt reports left foot pain resolved with plantar fascia injection 8/10/23. She had a right plantar fascia injection 9/6/23 with some relief. Pt has been using compression boots to help with circulation and muscle spasm. She has tried multiple orthopedic shoes and insoles with minimal relief. Pt. Denies any other musculoskeletal complaints at this time.     Joint instability? no  Mechanical locking/clicking? no  Affecting ADL's? yes  Affecting sleep? yes    Occupation: [employer]     Review of Systems   Constitutional:  Negative for chills and fever.   Musculoskeletal:  Positive for joint pain and myalgias. Negative for back pain, falls and neck pain.   Neurological:  Negative for dizziness, tingling, focal weakness, weakness and headaches.     PAST MEDICAL HISTORY:   Past Medical History:   Diagnosis Date    Arthritis     Hyperlipidemia     Hypertension     Sinusitis      PAST SURGICAL HISTORY:   Past Surgical History:   Procedure Laterality Date    ARTHROSCOPIC REPAIR OF ROTATOR CUFF OF SHOULDER Left 07/24/2019    Procedure: REPAIR,  ROTATOR CUFF, ARTHROSCOPIC;  Surgeon: ASMITA Soto MD;  Location: [facility] OR 2ND FLR;  Service: Orthopedics;  Laterality: Left;    ARTHROSCOPY OF SHOULDER WITH DECOMPRESSION OF SUBACROMIAL SPACE Left 07/24/2019    Procedure: ARTHROSCOPY, SHOULDER, WITH SUBACROMIAL SPACE DECOMPRESSION;  Surgeon: ASMITA Soot MD;  Location: [facility] OR 2ND FLR;  Service: Orthopedics;  Laterality: Left;    BILATERAL SALPINGO-OOPHORECTOMY (BSO)  06/24/2019    BREAST BIOPSY  24-25 years old    CYSTOSCOPY N/A 06/24/2019    Procedure: CYSTOSCOPY;  Surgeon: Anson Ellison MD;  Location: [facility] OR;  Service: OB/GYN;  Laterality: N/A;    ENCEPHALOCELE REPAIR  46    HYSTERECTOMY  1994    NASAL SINUS SURGERY      ROBOT-ASSISTED LAPAROSCOPIC ABDOMINAL SACROCOLPOPEXY USING DA MAC XI N/A 06/24/2019    Procedure: XI ROBOTIC SACROCOLPOPEXY, ABDOMINAL;  Surgeon: Anson Ellison MD;  Location: [facility] OR;  Service: OB/GYN;  Laterality: N/A;    SHOULDER ARTHROSCOPY Left 07/24/2019    Procedure: BICEPS TENODESIS;  Surgeon: ASMITA Soto MD;  Location: [facility] OR 2ND FLR;  Service: Orthopedics;  Laterality: Left;    TUBAL LIGATION       FAMILY HISTORY:   Family History   Problem Relation Age of Onset    Stroke Maternal Grandmother     Diabetes Mother     Hypertension Mother     Diabetes Sister     Diabetes Sister     Breast cancer Neg Hx     Colon cancer Neg Hx     Ovarian cancer Neg Hx      SOCIAL HISTORY:   Social History     Socioeconomic History    Marital status: Single   Tobacco Use    Smoking status: Never    Smokeless tobacco: Never   Substance and Sexual Activity    Alcohol use: Yes     Alcohol/week: 1.0 standard drink of alcohol     Types: 1 Shots of liquor per week     Comment: seldom    Drug use: No    Sexual activity: Yes     Partners: Male     Birth control/protection: Post-menopausal, See Surgical Hx     Comment: hysterectomy 20 yrs ago     Social Determinants of Health     Financial Resource Strain: Low Risk  (8/7/2021)    Overall  Financial Resource Strain (CARDIA)     Difficulty of Paying Living Expenses: Not hard at all   Food Insecurity: Unknown (8/7/2021)    Hunger Vital Sign     Worried About Running Out of Food in the Last Year: Patient refused     Ran Out of Food in the Last Year: Patient refused   Transportation Needs: No Transportation Needs (8/7/2021)    PRAPARE - Transportation     Lack of Transportation (Medical): No     Lack of Transportation (Non-Medical): No   Physical Activity: Insufficiently Active (8/7/2021)    Exercise Vital Sign     Days of Exercise per Week: 1 day     Minutes of Exercise per Session: 20 min   Stress: Stress Concern Present (8/7/2021)    [employer] [place] [employer] - Occupational Stress Questionnaire     Feeling of Stress : Very much   Social Connections: Unknown (8/7/2021)    Social Connection and Isolation Panel [NHANES]     Frequency of Communication with Friends and Family: More than three times a week     Frequency of Social Gatherings with Friends and Family: Never     Active Member of Clubs or Organizations: Yes     Marital Status: Patient refused       MEDICATIONS:   Current Outpatient Medications:     celecoxib (CELEBREX) 200 MG capsule, Take 1 capsule (200 mg total) by mouth 2 (two) times daily with meals. (breakfast and dinner), Disp: 60 capsule, Rfl: 0    cyclobenzaprine (FLEXERIL) 10 MG tablet, Take 1 tablet (10 mg total) by mouth nightly as needed for Muscle spasms., Disp: 20 tablet, Rfl: 0    econazole nitrate 1 % cream, Use bid, Disp: 30 g, Rfl: 2    fluocinolone and shower cap (DERMA-SMOOTHE/FS SCALP OIL) 0.01 % Oil, Apply oil to damp scalp nightly and cover with shower cap., Disp: 1 Bottle, Rfl: 3    fluticasone propionate (FLONASE) 50 mcg/actuation nasal spray, 2 sprays (100 mcg total) by Each Nostril route once daily., Disp: 16 g, Rfl: 11    hydroCHLOROthiazide (HYDRODIURIL) 25 MG tablet, Take 1 tablet (25 mg total) by mouth once daily., Disp: 90 tablet, Rfl: 3    leg brace  (ANKLE BRACE) Misc, 1 each by Misc.(Non-Drug; Combo Route) route Daily., Disp: 1 each, Rfl: 0    LIDOcaine HCL 2% (XYLOCAINE) 2 % jelly, Apply topically 2 (two) times daily. P.r.n., Disp: 30 mL, Rfl: 0    olopatadine (PATANOL) 0.1 % ophthalmic solution, Place 1 drop into both eyes 2 (two) times daily., Disp: , Rfl:     rosuvastatin (CRESTOR) 20 MG tablet, Take 1 tablet (20 mg total) by mouth every evening., Disp: 90 tablet, Rfl: 3    azelastine (ASTELIN) 137 mcg (0.1 %) nasal spray, 1 spray (137 mcg total) by Nasal route 2 (two) times daily., Disp: 30 mL, Rfl: 2    cetirizine (ZYRTEC) 10 MG tablet, Take 1 tablet (10 mg total) by mouth once daily. for 14 days, Disp: 14 tablet, Rfl: 0    estradioL (VAGIFEM) 10 mcg Tab, Place 1 tablet (10 mcg total) vaginally twice a week. (Patient not taking: Reported on 10/4/2023), Disp: 8 tablet, Rfl: 12    famotidine (PEPCID) 20 MG tablet, Take 1 tablet (20 mg total) by mouth 2 (two) times daily. for 14 days, Disp: 28 tablet, Rfl: 0    fluocinolone (DERMA-SMOOTHE) 0.01 % external oil, Apply topically 3 (three) times daily. for 14 days, Disp: 118.28 mL, Rfl: 1    hydrocortisone 2.5 % cream, Apply topically 2 (two) times daily. for 14 days, Disp: 28 g, Rfl: 1    neomycin-polymyxin-hydrocortisone (CORTISPORIN) 3.5-10,000-1 mg/mL-unit/mL-% otic suspension, Place 3 drops into the left ear 4 (four) times daily. (Patient not taking: Reported on 10/4/2023), Disp: 10 mL, Rfl: 0    topiramate (TOPAMAX) 50 MG tablet, Take 1 tablet (50 mg total) by mouth 2 (two) times daily., Disp: 180 tablet, Rfl: 0    triamcinolone acetonide 0.1% (KENALOG) 0.1 % cream, Apply topically 2 (two) times daily. for 14 days, Disp: 15 g, Rfl: 0  ALLERGIES:   Review of patient's allergies indicates:   Allergen Reactions    Sotradecol [sodium tetradecyl sulfate]      Hives and itching that resolved with Benadryl and Solumedrol.     Reviewed ED visit on 9/2/23 with Dr. Pablo: Urgent evaluation of 63 y.o. female  presenting with chronic right heel pain. Patient is afebrile, not toxic appearing and hemodynamically stable.  Patient has been managed by Podiatry and Ortho for issues with her bilateral knees and bilateral feet.  She has plantar fasciitis of the left foot which is under control and it sounds like she has plantar fasciitis of the right which is uncontrolled.  Patient is requesting an x-ray of her right calcaneus as she states it has never been imaged.  Will obtain.  Patient is also reporting nightly cramps which may represent restless legs syndrome, however could be due to electrolyte abnormalities.  Will check basic labs.  Bilateral calves equal in size with no overlying erythema edema or tenderness, Homans sign negative bilaterally.  Patient has no history of coagulopathy, DVT, no recent travel.  No risk factors for DVT.  Although questioned in triage weather 1 leg is larger than the other, appear equal in size on my exam and no indication to rule out DVT at this time.   IMAGIN. X-ray ordered due to right heel pain. (AP and lateral views) taken 23.   2. X-ray images were reviewed personally by me and then directly with patient.  3. FINDINGS: No fracture.  No lytic or blastic lesion.  Achilles enthesopathy with thickening of the insertional Achilles tendon noted.  Plantar calcaneal spur noted.   4. IMPRESSION: as above    Reviewed office visit on 23 with Dr. Hendrix:  Discussed with patient do not recommend injecting the Achilles unless there is immobilization.  Patient is unable to tolerate a cam boot secondary to bladder dysfunction   Patient tolerated injection well right foot  Left foot has improved and remained stable  Recommend considering surgical intervention/consult patient may consider for the summer  Patient can message with update of symptoms always can repeat another plantar fascial injection in the future     Reviewed office visit on 10/4/23 with Dr. Christian:  - At this point, the patient  has tried mutliple different conservative management strategies for current pain, including stretching, PT, bracing, medications, without complete relief of pain. She may benefit from more interventional strategies, especially in the setting of enthesopathy--consider tenjet.   - Will send referral to Dr. Mclaughlin for evaluation under ultrasound and consideration of Tenjet.   - Pt can continue heel lifts for comfort.   - RTC prn.     Objective:     VITAL SIGNS: BP (!) 149/89   Pulse 62   Wt 89 kg (196 lb 3.4 oz)   LMP 10/19/1993 (Approximate)   BMI 28.98 kg/m²    General    Nursing note and vitals reviewed.  Constitutional: She is oriented to person, place, and time. She appears well-developed and well-nourished.   HENT:   Head: Normocephalic and atraumatic.   no nasal discharge, no external ear redness or discharge   Eyes:   EOM is full and smooth  No eye redness or discharge   Neck: Neck supple. No tracheal deviation present.   Cardiovascular:  Normal rate.            2+ Radial pulse bilaterally  2+ Dorsalis Pedis pulse bilaterally  No LE edema appreciated   Pulmonary/Chest: Effort normal. No respiratory distress.   Abdominal: She exhibits no distension.   No rigidity   Neurological: She is alert and oriented to person, place, and time. She exhibits normal muscle tone. Coordination normal.   See details below   Psychiatric: She has a normal mood and affect. Her behavior is normal.           MUSCULOSKELETAL EXAM  ANKLE: right ANKLE  The affected ankle is compared to the contralateral ankle.    Observation:    There is no edema, erythema, or ecchymosis.   Shoes reveal a normal wear pattern.   Normal callus pattern on the feet bilaterally.    Achilles tendon edema at the calcaneal insertion with bony prominence  No leg or intrinsic foot muscle atrophy.  Squatting reveals symmetric pronation of the bilateral feet.   Able to rise on toes with symmetric supination, but pain on right at distal achilles attachment.     Normal gait without evidence of antalgia.    ROM (* = with pain):  Active dorsiflexion to 20° on left and 20° on right  Active plantarflexion to 50° on left and 50° on right    Active ankle inversion to 35° on left and 35° on right  Active ankle eversion to 15° on left and 15° on right  Full active flexion/extension of the toes bilaterally.   + right Heel cords  tightness.    Tenderness To Palpation:  No tenderness at the ATFL, CFL, PTFL, or deltoid ligaments  No tenderness over the distal anterior syndesmosis, distal tibia/fibula, fibular head/shaft  No tenderness at medial or lateral malleoli   No tenderness at navicular, cuboid, cuneiforms, talus, or calcaneous  No tenderness along the metatarsals or phalanges  + tenderness at the Achilles tendon calcaneal insertion and kager's fat pad  No tenderness at the posterior tibial or peroneal tendons    Strength Testing (* = with pain):  Dorsiflexion - 5/5 on left and 5/5 on right  Platarflexion - 5/5 on left and 5/5 on right  Resisted Inversion - 5/5 on left and 5/5 on right  Resisted Eversion - 5/5 on left and 5/5 on right  Great Toe Extension - 5/5 on left and 5/5 on right  Great Toe Flexion - 5/5 on left and 5/5 on right    Special Tests:  Anterior talar drawer - negative and without dimpling  Talar tilt - negative  Reverse Talar tilt - negative    Heel tap test - negative  Distal tib/fib squeeze test - negative  External rotation stress (Kleiger) test - negative  Reinoso squeeze test - negative    Metatarsal squeeze test - negative  Midfoot stress test - negative  Calcaneal squeeze test - negative    No subluxation of the peroneal tendons with resisted eversion.    Vascular/Sensory Exam:  DP and PT pulses intact bilaterally  No skin changes, no abnormal hair distribution  Sensation intact to light touch throughout the saphenous, sural, superficial peroneal, deep peroneal, and tibial nerve distributions  Negative Tinel's test over tarsal tunnel  2+/4 reflexes at  L4 and S1 dermatomes  Capillary refill intact <2 seconds in all toes bilaterally.    DIAGNOSTIC ULTRASOUND FOCUSED::   CLINICAL INDICATION:  right achilles pain    TECHNIQUE: Real time ultrasound examination of the right achilles tendon was performed with SonoSite Edge 2, 9-L MHz linear probe(s). Images were saved and stored for documentation.     FINDINGS: The images are of adequate diagnostic quality with identification of all echogenic structures made except for the vascular structures unless otherwise noted. There is no sonographic evidence of periosteal abnormalities or nerve irregularities.  There is distal Achilles tendon thickening with significant mixed hypoechoic change just proximal to the calcaneal attachment was noted, taking up approximately 80% of the distal Achilles tendon, and associated calcaneal spurring.  The distal Achilles attachment appeared intact with static and dynamic testing.  In the short axis plane of the distal Achilles tendon, a circumferential hypoechoic space-occupying lesion in the deep Achilles tendon fibers was noted, possibly representing a calcification.  Hyperechoic sclerotic change just deep to the Achilles tendon in the fascial pain plane between the Achilles tendon in the Kager's fat pad was also noted.   The rest of the sonographic examination was unremarkable.    IMPRESSION:  Significant distal Achilles tendinopathy just proximal to the calcaneal insertion as described above with associated calcaneal spurring and possible early calcification formation.     Ultrasound images were directly reviewed with the patient and then a treatment plan was discussed. Images were saved and stored for documentation.     Assessment:      Encounter Diagnosis   Name Primary?    Insertional Achilles tendinopathy Yes        Plan:   1. Chronic right heel pain secondary to Achilles tendinosis with associated calcaneal spurring and enthesopathy as noted on today's x-ray and limited diagnostic  musculoskeletal ultrasound.  Conservative treatment of home exercise program, formal physical therapy, walking boot, for orthotics, and NSAID have failed to resolved her pain.   - Discussed with pt. option of an US guided minimally invasive tenotomy of the achilles tendon. The procedure and rehab protocol was discussed in detail with patient and all questions were answered. Hand outs of rehab protocol and Tenjet procedure also given.   - however, given the extent tendinotic tissue at the distal Achilles, referral placed to foot and ankle orthopedic surgery for surgical evaluation as well (Dr. Lovelace)  - recommend continuing home exercise program from prior formal PT and firm soled, supportive shoe wear with orthotics.  -  X-ray images of right calcaneus taken 9/2/23 (AP and lateral views) showed Achilles enthesopathy with thickening of the insertional Achilles tendon noted.  Plantar calcaneal spur noted. Images were personally reviewed with patient.  - limited diagnostic musculoskeletal ultrasound of the left Achilles tendon today showed sonographic evidence of significant distal Achilles tendinopathy just proximal to the calcaneal insertion with associated calcaneal spurring and possible early calcification formation. Images were personally reviewed with patient.    2. Follow-up as needed if pt. decides to proceed with minimally invasive tenotomy of the right Achilles tendon, or new issue arises    3. Patient agreeable to today's plan and all questions were answered    This note is dictated using the M*Modal Fluency Direct word recognition program. There are word recognition mistakes that are occasionally missed on review.

## 2023-11-14 ENCOUNTER — PATIENT MESSAGE (OUTPATIENT)
Dept: SPORTS MEDICINE | Facility: CLINIC | Age: 63
End: 2023-11-14

## 2023-11-14 ENCOUNTER — OFFICE VISIT (OUTPATIENT)
Dept: SPORTS MEDICINE | Facility: CLINIC | Age: 63
End: 2023-11-14
Payer: COMMERCIAL

## 2023-11-14 VITALS
HEART RATE: 62 BPM | SYSTOLIC BLOOD PRESSURE: 149 MMHG | DIASTOLIC BLOOD PRESSURE: 89 MMHG | WEIGHT: 196.19 LBS | BODY MASS INDEX: 28.98 KG/M2

## 2023-11-14 DIAGNOSIS — M76.60 INSERTIONAL ACHILLES TENDINOPATHY: Primary | ICD-10-CM

## 2023-11-14 PROCEDURE — 99999 PR PBB SHADOW E&M-EST. PATIENT-LVL III: CPT | Mod: PBBFAC,,, | Performed by: NEUROMUSCULOSKELETAL MEDICINE & OMM

## 2023-11-14 PROCEDURE — 99214 OFFICE O/P EST MOD 30 MIN: CPT | Mod: 25,S$GLB,, | Performed by: NEUROMUSCULOSKELETAL MEDICINE & OMM

## 2023-11-14 PROCEDURE — 76882 US LMTD JT/FCL EVL NVASC XTR: CPT | Mod: S$GLB,,, | Performed by: NEUROMUSCULOSKELETAL MEDICINE & OMM

## 2023-11-14 PROCEDURE — 99999 PR PBB SHADOW E&M-EST. PATIENT-LVL III: ICD-10-PCS | Mod: PBBFAC,,, | Performed by: NEUROMUSCULOSKELETAL MEDICINE & OMM

## 2023-11-14 PROCEDURE — 99214 PR OFFICE/OUTPT VISIT, EST, LEVL IV, 30-39 MIN: ICD-10-PCS | Mod: 25,S$GLB,, | Performed by: NEUROMUSCULOSKELETAL MEDICINE & OMM

## 2023-11-14 PROCEDURE — 76882 PR  US,EXTREMITY,NONVASCULAR,REAL-TIME IMAGE,LIMITED: ICD-10-PCS | Mod: S$GLB,,, | Performed by: NEUROMUSCULOSKELETAL MEDICINE & OMM

## 2023-11-14 NOTE — PROGRESS NOTES
ULTRASOUND EXAM REPORT    Patient: Rosamaria Agosto, 63 y.o. female     FOCUSED::   1. Diagnostic Extremity - [facility]-Sports Ultrasound was recommended due to right achilles pain.  2. Diagnostic Extremity - [facility]-Sports Ultrasound Performed: Florence Mclaughlin Extremity Study: right achilles tendon.    TECHNIQUE: Real time ultrasound examination of the right achilles tendon was performed with SonoSite Edge 2, 9-L MHz linear probe(s). Images were saved and stored for documentation.     FINDINGS: The images are of adequate diagnostic quality with identification of all echogenic structures made except for the vascular structures unless otherwise noted. There is no sonographic evidence of periosteal abnormalities or nerve irregularities.  There is distal Achilles tendon thickening with significant mixed hypoechoic change just proximal to the calcaneal attachment was noted, taking up approximately 80% of the distal Achilles tendon, and associated calcaneal spurring.  The distal Achilles attachment appeared intact with static and dynamic testing.  In the short axis plane of the distal Achilles tendon, a circumferential hypoechoic space-occupying lesion in the deep Achilles tendon fibers was noted, possibly representing a calcification.  Hyperechoic sclerotic change just deep to the Achilles tendon in the fascial pain plane between the Achilles tendon in the Kager's fat pad was also noted.   The rest of the sonographic examination was unremarkable.    IMPRESSION:  Significant distal Achilles tendinopathy just proximal to the calcaneal insertion as described above with associated calcaneal spurring and possible early calcification formation.

## 2023-11-20 ENCOUNTER — TELEPHONE (OUTPATIENT)
Dept: INTERNAL MEDICINE | Facility: CLINIC | Age: 63
End: 2023-11-20
Payer: COMMERCIAL

## 2023-11-20 NOTE — TELEPHONE ENCOUNTER
----- Message from Anabel Ga MA sent at 11/20/2023 11:15 AM CST -----  Contact: [phone]  Handicap tag ok?    ----- Message -----  From: Arlette Allen  Sent: 11/20/2023  11:12 AM CST  To: Katy Khan Staff    Patient is requesting a Handicap for her . She need to bring her mother (Uma Lucero. MRN [id number])  for her appointments.    Please call and advise.

## 2023-11-21 ENCOUNTER — OFFICE VISIT (OUTPATIENT)
Dept: ORTHOPEDICS | Facility: CLINIC | Age: 63
End: 2023-11-21
Payer: COMMERCIAL

## 2023-11-21 VITALS — HEIGHT: 69 IN | WEIGHT: 196.19 LBS | BODY MASS INDEX: 29.06 KG/M2

## 2023-11-21 DIAGNOSIS — M76.60 INSERTIONAL ACHILLES TENDINOPATHY: ICD-10-CM

## 2023-11-21 PROCEDURE — 99213 OFFICE O/P EST LOW 20 MIN: CPT | Mod: S$GLB,,, | Performed by: SURGERY

## 2023-11-21 PROCEDURE — 99213 PR OFFICE/OUTPT VISIT, EST, LEVL III, 20-29 MIN: ICD-10-PCS | Mod: S$GLB,,, | Performed by: SURGERY

## 2023-11-21 PROCEDURE — 99999 PR PBB SHADOW E&M-EST. PATIENT-LVL III: ICD-10-PCS | Mod: PBBFAC,,, | Performed by: SURGERY

## 2023-11-21 PROCEDURE — 99999 PR PBB SHADOW E&M-EST. PATIENT-LVL III: CPT | Mod: PBBFAC,,, | Performed by: SURGERY

## 2023-11-21 NOTE — PROGRESS NOTES
Patient ID: Rosamaria Agosto is a 63 y.o. female.    Chief Complaint: Pain of the Right Ankle    HPI     Rosamaria Agosto has experienced problems with the right foot over the past multiple years.  She is briefly tried boot immobilization, however she was unable to tolerate the boot at that time.  She is had some injections with Dr. Mclaughlin.  She is tried physical therapy and stretching which he states has helped.  She presents here for surgical consultation.  ROS      Objective:      Ortho/SPM Exam        There were no vitals filed for this visit.    The patient is not in acute distress.   Body habitus is normal.  The skin over the foot and ankle is intact.  Effusion 0  Palpation- tender to palpation about the insertion of the Achilles  Range of motion- 10° dorsiflexion to 40° plantar flexion  Ligament laxity exam:  Stable  Flatfoot negative. Corrects with heel rise  Pulses DP present, PT present.  Motor normal 5/5 strength in all tested muscle groups.   Sensory normal.    IMAGING- two views of the calcaneus previously ordered were independently interpreted by me.  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  These images were independently reviewed and discussed with the patient.      Assessment:       Encounter Diagnosis   Name Primary?    Insertional Achilles tendinopathy             Right insertional Achilles tendinopathy      Plan:   I discussed operative and nonoperative management of the condition of the patient.  Nonoperatively start with boot immobilization for 6 weeks.  After that time we will restart physical therapy. If after a failure to improve with 3-6 months of physical therapy we can consider surgery.  I discussed that the surgery is really a six-month recovery, and for her this would consist of ostectomy, Olive's resection, Achilles tendon debridement, and Achilles tendon repair plus or minus the addition of an FHL transfer to her right foot.    Patient expressed understanding of  this plan and all questions were answered.    We will begin with boot immobilization for 6 weeks and see her back after that time.    Ruddy Mcmanus MD  [facility]  Department of Orthopedic Surgery    This note is dictated using the M*Modal Fluency Direct word recognition program. There are word recognition mistakes that are occasionally missed on review

## 2023-11-29 NOTE — TELEPHONE ENCOUNTER
----- Message from Josh See sent at 12/19/2022  9:58 AM CST -----  Contact: [phone]  the patient is calling to get scheduled for a appt.  Pt access tried but no appts are available.  the patient can be reached at.   [phone]       Licensed Psychologist Licensed Psychologist and Psychology Trainee Licensed Psychologist and Psychology Trainee Licensed Psychologist and Psychology Trainee

## 2023-12-10 ENCOUNTER — OFFICE VISIT (OUTPATIENT)
Dept: URGENT CARE | Facility: CLINIC | Age: 63
End: 2023-12-10
Payer: COMMERCIAL

## 2023-12-10 VITALS
TEMPERATURE: 99 F | DIASTOLIC BLOOD PRESSURE: 79 MMHG | OXYGEN SATURATION: 98 % | SYSTOLIC BLOOD PRESSURE: 148 MMHG | RESPIRATION RATE: 15 BRPM | HEART RATE: 69 BPM | BODY MASS INDEX: 29.26 KG/M2 | WEIGHT: 197.56 LBS | HEIGHT: 69 IN

## 2023-12-10 DIAGNOSIS — G43.809 OTHER MIGRAINE WITHOUT STATUS MIGRAINOSUS, NOT INTRACTABLE: Primary | ICD-10-CM

## 2023-12-10 PROCEDURE — 99213 PR OFFICE/OUTPT VISIT, EST, LEVL III, 20-29 MIN: ICD-10-PCS | Mod: 25,S$GLB,,

## 2023-12-10 PROCEDURE — 96372 THER/PROPH/DIAG INJ SC/IM: CPT | Mod: S$GLB,,,

## 2023-12-10 PROCEDURE — 99213 OFFICE O/P EST LOW 20 MIN: CPT | Mod: 25,S$GLB,,

## 2023-12-10 PROCEDURE — 96372 PR INJECTION,THERAP/PROPH/DIAG2ST, IM OR SUBCUT: ICD-10-PCS | Mod: S$GLB,,,

## 2023-12-10 RX ORDER — BUTALBITAL, ACETAMINOPHEN AND CAFFEINE 50; 325; 40 MG/1; MG/1; MG/1
1 TABLET ORAL EVERY 4 HOURS PRN
Qty: 18 TABLET | Refills: 0 | Status: SHIPPED | OUTPATIENT
Start: 2023-12-10 | End: 2023-12-13

## 2023-12-10 RX ORDER — KETOROLAC TROMETHAMINE 30 MG/ML
30 INJECTION, SOLUTION INTRAMUSCULAR; INTRAVENOUS
Status: COMPLETED | OUTPATIENT
Start: 2023-12-10 | End: 2023-12-10

## 2023-12-10 RX ADMIN — KETOROLAC TROMETHAMINE 30 MG: 30 INJECTION, SOLUTION INTRAMUSCULAR; INTRAVENOUS at 05:12

## 2023-12-10 NOTE — PROGRESS NOTES
"Subjective:      Patient ID: Rosamaria Agosto is a 63 y.o. female.    Vitals:  height is 5' 9" (1.753 m) and weight is 89.6 kg (197 lb 8.5 oz). Her oral temperature is 98.5 °F (36.9 °C). Her blood pressure is 148/79 (abnormal) and her pulse is 69. Her respiration is 15 and oxygen saturation is 98%.     Chief Complaint: Headache    Pt presents today w/ headache; onset yesterday 12/09/23. Pt c/o nausea, neck pain, photosensitivity and vision change. Pt reports she has "flashes " in vision; pt had normal BP and glucose readings at home .Pt has hx hypertension.  Pt denies any recent head trauma, dizziness, heart palpitations and dizziness. Pt tried tylenol;no relief.     Headache   This is a new problem. The current episode started yesterday. The problem occurs constantly. The problem has been unchanged. The pain is located in the Temporal region. The pain does not radiate. The pain quality is not similar to prior headaches. The quality of the pain is described as dull and aching. The pain is at a severity of 8/10. The pain is severe. Associated symptoms include nausea, neck pain, photophobia and a visual change. Pertinent negatives include no abdominal pain, abnormal behavior, anorexia, back pain, blurred vision, coughing, dizziness, drainage, ear pain, eye pain, eye redness, eye watering, facial sweating, fever, hearing loss, insomnia, loss of balance, muscle aches, numbness, phonophobia, rhinorrhea, scalp tenderness, seizures, sinus pressure, sore throat, swollen glands, tingling, tinnitus, vomiting, weakness or weight loss. The symptoms are aggravated by activity. She has tried acetaminophen for the symptoms. The treatment provided mild relief. Her past medical history is significant for hypertension. There is no history of cancer, cluster headaches, immunosuppression, migraine headaches, migraines in the family, obesity, pseudotumor cerebri, recent head traumas, sinus disease or TMJ.       Constitution: Negative " for chills, fatigue and fever.   HENT:  Negative for ear pain, tinnitus, hearing loss, sinus pressure and sore throat.    Neck: Positive for neck pain.   Cardiovascular:  Negative for chest pain.   Eyes:  Positive for photophobia. Negative for eye pain, eye redness, vision loss, double vision and blurred vision.   Respiratory:  Negative for cough and shortness of breath.    Gastrointestinal:  Positive for nausea. Negative for abdominal pain, vomiting and diarrhea.   Musculoskeletal:  Negative for back pain.   Neurological:  Positive for headaches. Negative for dizziness, loss of balance, history of migraines, numbness and seizures.   Psychiatric/Behavioral:  The patient does not have insomnia.       Objective:     Physical Exam   Constitutional: She is oriented to person, place, and time. She appears well-developed.  Non-toxic appearance. She does not appear ill. No distress.   HENT:   Head: Normocephalic and atraumatic.   Ears:   Right Ear: Hearing, tympanic membrane, external ear and ear canal normal.   Left Ear: Hearing, tympanic membrane, external ear and ear canal normal.   Nose: Nose normal. No mucosal edema, rhinorrhea, nasal deformity or congestion. No epistaxis. Right sinus exhibits no maxillary sinus tenderness and no frontal sinus tenderness. Left sinus exhibits no maxillary sinus tenderness and no frontal sinus tenderness.   Mouth/Throat: Uvula is midline, oropharynx is clear and moist and mucous membranes are normal. No trismus in the jaw. Normal dentition. No uvula swelling. No oropharyngeal exudate or posterior oropharyngeal erythema.   Eyes: Conjunctivae, EOM and lids are normal. Pupils are equal, round, and reactive to light. No scleral icterus.   Neck: Trachea normal and phonation normal. Neck supple. No neck rigidity present. No decreased range of motion present.   Cardiovascular: Normal rate, regular rhythm, normal heart sounds and normal pulses.   Pulmonary/Chest: Effort normal and breath sounds  normal. No stridor. No respiratory distress. She has no wheezes. She has no rhonchi. She has no rales.   Abdominal: Normal appearance and bowel sounds are normal. She exhibits no distension. Soft. There is no abdominal tenderness.   Musculoskeletal: Normal range of motion.         General: No deformity. Normal range of motion.      Cervical back: She exhibits tenderness. She exhibits no bony tenderness.   Neurological: no focal deficit. She is alert, oriented to person, place, and time and at baseline. She displays no weakness. No cranial nerve deficit or sensory deficit. She exhibits normal muscle tone. Coordination and gait normal.   Skin: Skin is warm, dry, intact, not diaphoretic and not pale.   Psychiatric: Her speech is normal and behavior is normal. Mood, judgment and thought content normal.   Nursing note and vitals reviewed.      Assessment:     1. Other migraine without status migrainosus, not intractable        Plan:       Other migraine without status migrainosus, not intractable  -     ketorolac injection 30 mg  -     butalbital-acetaminophen-caffeine -40 mg (FIORICET, ESGIC) -40 mg per tablet; Take 1 tablet by mouth every 4 (four) hours as needed for Pain.  Dispense: 18 tablet; Refill: 0            Discussed results/diagnosis/plan with patient in clinic. Strict precautions given to patient to monitor for worsening signs and symptoms. Advised to follow up with PCP or specialist.  Explained side effects of medications prescribed with patient and informed him/her to discontinue use if he/she has any side effects and to inform UC or PCP if this occurs. All questions answered. Strict ED verses clinic return precautions stressed and given in depth. Advised if symptoms worsens of fail to improve he/she should go to the Emergency Room. Discharge and follow-up instructions given verbally/printed with the patient who expressed understanding and willingness to comply with my recommendations. Patient  voiced understanding and in agreement with current treatment plan. Patient exits the exam room in no acute distress. Conversant and engaged during discharge discussion, verbalized understanding.

## 2023-12-10 NOTE — PATIENT INSTRUCTIONS
Try Fioricet for your migraine as directed. Only use for 3 days.     Eat a nutritious meal when you return home. Lie in a dark, quiet room and try to take a nap afterwards. Place a cool towel on your forehead. Apply warm compresses to your posterior head and neck for muscle relief. Drink plenty of fluids (water) to maintain hydration. Eat a nutritious diet and avoid processed foods. Avoid chocolate, alcohol, excessive caffeine, aged cheese. Do not skip meals. Avoid stressors. Sleep on your back with a pillow supporting your neck, lower back and knees for the best spinal alignment. Monitoring for triggers and avoid them. Create a headache diary.     The daily fluid recommendation for women is 2.7 liters (five 16 oz bottles).     Follow up with PCP for continued symptoms in 1 week.     Go to the ER if you have worsening headache with vision changes, nausea/vomiting and unable to tolerate fluids, dizziness/lightheadedness, off balance, one sided weakness, chest pain, shortness of breath, palpitations.

## 2023-12-13 ENCOUNTER — HOSPITAL ENCOUNTER (OUTPATIENT)
Dept: RADIOLOGY | Facility: HOSPITAL | Age: 63
Discharge: HOME | End: 2023-12-13
Attending: FAMILY MEDICINE
Payer: COMMERCIAL

## 2023-12-13 DIAGNOSIS — Z12.31 OTHER SCREENING MAMMOGRAM: ICD-10-CM

## 2023-12-13 PROCEDURE — 77067 SCR MAMMO BI INCL CAD: CPT | Mod: 26,,, | Performed by: RADIOLOGY

## 2023-12-13 PROCEDURE — 77063 MAMMO DIGITAL SCREENING BILAT WITH TOMO: ICD-10-PCS | Mod: 26,,, | Performed by: RADIOLOGY

## 2023-12-13 PROCEDURE — 77067 SCR MAMMO BI INCL CAD: CPT | Mod: TC

## 2023-12-13 PROCEDURE — 77063 BREAST TOMOSYNTHESIS BI: CPT | Mod: 26,,, | Performed by: RADIOLOGY

## 2023-12-13 PROCEDURE — 77067 MAMMO DIGITAL SCREENING BILAT WITH TOMO: ICD-10-PCS | Mod: 26,,, | Performed by: RADIOLOGY

## 2023-12-15 ENCOUNTER — OFFICE VISIT (OUTPATIENT)
Dept: URGENT CARE | Facility: CLINIC | Age: 63
End: 2023-12-15
Payer: COMMERCIAL

## 2023-12-15 VITALS
TEMPERATURE: 98 F | SYSTOLIC BLOOD PRESSURE: 161 MMHG | WEIGHT: 197 LBS | RESPIRATION RATE: 18 BRPM | BODY MASS INDEX: 29.18 KG/M2 | OXYGEN SATURATION: 100 % | DIASTOLIC BLOOD PRESSURE: 86 MMHG | HEART RATE: 61 BPM | HEIGHT: 69 IN

## 2023-12-15 DIAGNOSIS — R10.9 FLANK PAIN: Primary | ICD-10-CM

## 2023-12-15 LAB
BILIRUB UR QL STRIP: NEGATIVE
GLUCOSE UR QL STRIP: NEGATIVE
KETONES UR QL STRIP: NEGATIVE
LEUKOCYTE ESTERASE UR QL STRIP: NEGATIVE
PH, POC UA: 5 (ref 5–8)
POC BLOOD, URINE: NEGATIVE
POC NITRATES, URINE: NEGATIVE
PROT UR QL STRIP: NEGATIVE
SP GR UR STRIP: 1.01 (ref 1–1.03)
UROBILINOGEN UR STRIP-ACNC: NORMAL (ref 0.1–1.1)

## 2023-12-15 PROCEDURE — 81003 POCT URINALYSIS, DIPSTICK, AUTOMATED, W/O SCOPE: ICD-10-PCS | Mod: QW,S$GLB,, | Performed by: FAMILY MEDICINE

## 2023-12-15 PROCEDURE — 99214 OFFICE O/P EST MOD 30 MIN: CPT | Mod: 25,S$GLB,, | Performed by: FAMILY MEDICINE

## 2023-12-15 PROCEDURE — 96372 THER/PROPH/DIAG INJ SC/IM: CPT | Mod: S$GLB,,, | Performed by: FAMILY MEDICINE

## 2023-12-15 PROCEDURE — 87086 URINE CULTURE/COLONY COUNT: CPT | Performed by: FAMILY MEDICINE

## 2023-12-15 PROCEDURE — 96372 PR INJECTION,THERAP/PROPH/DIAG2ST, IM OR SUBCUT: ICD-10-PCS | Mod: S$GLB,,, | Performed by: FAMILY MEDICINE

## 2023-12-15 PROCEDURE — 99214 PR OFFICE/OUTPT VISIT, EST, LEVL IV, 30-39 MIN: ICD-10-PCS | Mod: 25,S$GLB,, | Performed by: FAMILY MEDICINE

## 2023-12-15 PROCEDURE — 81003 URINALYSIS AUTO W/O SCOPE: CPT | Mod: QW,S$GLB,, | Performed by: FAMILY MEDICINE

## 2023-12-15 RX ORDER — KETOROLAC TROMETHAMINE 30 MG/ML
30 INJECTION, SOLUTION INTRAMUSCULAR; INTRAVENOUS
Status: COMPLETED | OUTPATIENT
Start: 2023-12-15 | End: 2023-12-15

## 2023-12-15 RX ORDER — AMOXICILLIN AND CLAVULANATE POTASSIUM 875; 125 MG/1; MG/1
1 TABLET, FILM COATED ORAL EVERY 12 HOURS
Qty: 14 TABLET | Refills: 0 | Status: SHIPPED | OUTPATIENT
Start: 2023-12-15 | End: 2023-12-22

## 2023-12-15 RX ORDER — CYCLOBENZAPRINE HCL 10 MG
10 TABLET ORAL 3 TIMES DAILY PRN
Qty: 20 TABLET | Refills: 2 | Status: SHIPPED | OUTPATIENT
Start: 2023-12-15 | End: 2024-01-14

## 2023-12-15 RX ADMIN — KETOROLAC TROMETHAMINE 30 MG: 30 INJECTION, SOLUTION INTRAMUSCULAR; INTRAVENOUS at 12:12

## 2023-12-15 NOTE — LETTER
December 15, 2023      [facility] [address] [address]  Phone: [phone]  Fax: [phone]       Patient: Rosamaria Agosto   YOB: 1960  Date of Visit: 12/15/2023    To Whom It May Concern:    Timothy Agosto  was at [facility] on 12/15/2023. The patient may return to work/school on 12/18/23  with no restrictions. If you have any questions or concerns, or if I can be of further assistance, please do not hesitate to contact me.    Sincerely,    Alexandra Simental, DO

## 2023-12-15 NOTE — PROGRESS NOTES
"Subjective:      Patient ID: Rosamaria Agosto is a 63 y.o. female.    Vitals:  height is 5' 9" (1.753 m) and weight is 89.4 kg (197 lb). Her oral temperature is 98.3 °F (36.8 °C). Her blood pressure is 161/86 (abnormal) and her pulse is 61. Her respiration is 18 and oxygen saturation is 100%.     Chief Complaint: Flank Pain    Pt states she have left side flank/bavk pain. Pt states her symptoms started 3 days ago. Pt  is concerned that it would be a urinary tract infection because she has been drinking sodas lately.  She reports no fever no nausea or vomiting no dysuria or hematuria. Pt states the pain is a snatching pain,   Occurring with movement. . Pt states she took several OTC medication for her symptoms.     Flank Pain  This is a new problem. The current episode started in the past 7 days. The problem occurs constantly. The problem has been gradually worsening since onset. The quality of the pain is described as aching and shooting. The pain is at a severity of 8/10. The pain is severe. The pain is The same all the time. The symptoms are aggravated by bending, standing, twisting and sitting. Stiffness is present All day. Pertinent negatives include no abdominal pain, bladder incontinence, bowel incontinence, chest pain, dysuria, fever, headaches, leg pain, numbness, paresis, paresthesias, pelvic pain, tingling, weakness or weight loss. She has tried NSAIDs (muscle relaxer) for the symptoms.       Constitution: Negative for fever.   Cardiovascular:  Negative for chest pain.   Gastrointestinal:  Negative for abdominal pain and bowel incontinence.   Genitourinary:  Positive for flank pain. Negative for dysuria, bladder incontinence and pelvic pain.   Neurological:  Negative for headaches and numbness.      Objective:     Physical Exam  Constitutional: Pt oriented to person, place, and time.  Non-toxic appearance.   Patient does not appear ill. No distress. normal  HENT: No icterus or facial swelling " appreciated  Head: Normocephalic and atraumatic.   Nose: No congestion.   Pulmonary/Chest: Effort normal. No stridor. No respiratory distress.   Abdominal: Normal appearance. Neg CVA tenderness, there was some thoracic paraspinal muscle spasms  Abdomen exhibits no distension.   Musculoskeletal:         General: No swelling.   Neurological: no focal deficit. Patient is alert and oriented to person, place, and time.   Skin: Skin is not diaphoretic and not pale. no jaundice  Psychiatric: Patients behavior is normal. Mood, judgment and thought content normal.     Assessment:     1. Flank pain        Plan:       Flank pain  UA not impresive for UTI, however, pt states has been dx with pyelo in past. Will stasrt on abx and d/c if Ucx neg. Likely MSK in nature given no fever, systemic symptoms, and pain worse with mvmt.     -     POCT Urinalysis, Dipstick, Automated, W/O Scope  -     Urine culture  -     ketorolac injection 30 mg  -     amoxicillin-clavulanate 875-125mg (AUGMENTIN) 875-125 mg per tablet; Take 1 tablet by mouth every 12 (twelve) hours. for 7 days  Dispense: 14 tablet; Refill: 0  -     cyclobenzaprine (FLEXERIL) 10 MG tablet; Take 1 tablet (10 mg total) by mouth 3 (three) times daily as needed for Muscle spasms. pain  Dispense: 20 tablet; Refill: 2      Patient Instructions   Rest and hydrate and you can start the antibiotic called amoxicillin/clavulanateTo cover in case there is a component of kidney infection dear pain.    You can also use cyclobenzaprine muscle relaxer and we are giving you an injection of ketorolac/ Toradol here in clinic for pain.      At this time it is not certain if it is your kidney causing the pain or just a muscle spasm.  However,  with your history of kidney infections in the past we will go ahead and start you on an antibiotic.  the lab so far leans towards musculoskeletal cause.  We will send the urine culture and if it is negative for any bacteria we will have you stop the  antibiotic since that would indicate that it is likely not a kidney infection.

## 2023-12-15 NOTE — PATIENT INSTRUCTIONS
Rest and hydrate and you can start the antibiotic called amoxicillin/clavulanateTo cover in case there is a component of kidney infection dear pain.    You can also use cyclobenzaprine muscle relaxer and we are giving you an injection of ketorolac/ Toradol here in clinic for pain.      At this time it is not certain if it is your kidney causing the pain or just a muscle spasm.  However,  with your history of kidney infections in the past we will go ahead and start you on an antibiotic.  the lab so far leans towards musculoskeletal cause.  We will send the urine culture and if it is negative for any bacteria we will have you stop the antibiotic since that would indicate that it is likely not a kidney infection.

## 2023-12-17 LAB — BACTERIA UR CULT: NORMAL

## 2024-02-16 ENCOUNTER — TELEPHONE (OUTPATIENT)
Dept: INTERNAL MEDICINE | Facility: CLINIC | Age: 64
End: 2024-02-16
Payer: COMMERCIAL

## 2024-02-16 NOTE — TELEPHONE ENCOUNTER
----- Message from Bill Burns MD sent at 2/16/2024  3:07 PM CST -----  Contact: Pt  [phone]  I have updated the date.  ----- Message -----  From: Anabel Ga MA  Sent: 2/16/2024   1:13 PM CST  To: Bill Burns MD      ----- Message -----  From: Tammy Owens  Sent: 2/16/2024   1:08 PM CST  To: Katy [facility] Staff    Patient has filed for an extension on her FMLA and needs you complete the same paperwork with the END date of 5/31/2024    Please call and advise.    Thank You

## 2024-02-21 ENCOUNTER — TELEPHONE (OUTPATIENT)
Dept: INTERNAL MEDICINE | Facility: CLINIC | Age: 64
End: 2024-02-21
Payer: COMMERCIAL

## 2024-02-21 NOTE — TELEPHONE ENCOUNTER
----- Message from Arlette Lugo sent at 2/21/2024 11:21 AM CST -----  Contact: [phone]  Patient is requesting a call back from the staff regarding: pt wants to know is the nurse was able to fax over the paperwork FMLA to the company.

## 2024-03-20 ENCOUNTER — OFFICE VISIT (OUTPATIENT)
Dept: INTERNAL MEDICINE | Facility: CLINIC | Age: 64
End: 2024-03-20
Payer: COMMERCIAL

## 2024-03-20 ENCOUNTER — LAB VISIT (OUTPATIENT)
Dept: LAB | Facility: HOSPITAL | Age: 64
End: 2024-03-20
Attending: FAMILY MEDICINE
Payer: COMMERCIAL

## 2024-03-20 VITALS
HEART RATE: 65 BPM | TEMPERATURE: 97 F | WEIGHT: 193.31 LBS | DIASTOLIC BLOOD PRESSURE: 82 MMHG | OXYGEN SATURATION: 98 % | RESPIRATION RATE: 18 BRPM | BODY MASS INDEX: 28.63 KG/M2 | SYSTOLIC BLOOD PRESSURE: 120 MMHG | HEIGHT: 69 IN

## 2024-03-20 DIAGNOSIS — E55.9 VITAMIN D DEFICIENCY: ICD-10-CM

## 2024-03-20 DIAGNOSIS — E78.2 MIXED HYPERLIPIDEMIA: ICD-10-CM

## 2024-03-20 DIAGNOSIS — R09.82 PND (POST-NASAL DRIP): ICD-10-CM

## 2024-03-20 DIAGNOSIS — G47.00 INSOMNIA, UNSPECIFIED TYPE: ICD-10-CM

## 2024-03-20 DIAGNOSIS — Z00.00 WELL ADULT EXAM: Primary | ICD-10-CM

## 2024-03-20 DIAGNOSIS — I10 ESSENTIAL HYPERTENSION: ICD-10-CM

## 2024-03-20 DIAGNOSIS — Z00.00 WELL ADULT EXAM: ICD-10-CM

## 2024-03-20 LAB
25(OH)D3+25(OH)D2 SERPL-MCNC: 17 NG/ML (ref 30–96)
ALBUMIN SERPL BCP-MCNC: 3.8 G/DL (ref 3.5–5.2)
ALP SERPL-CCNC: 83 U/L (ref 55–135)
ALT SERPL W/O P-5'-P-CCNC: 10 U/L (ref 10–44)
ANION GAP SERPL CALC-SCNC: 10 MMOL/L (ref 8–16)
AST SERPL-CCNC: 14 U/L (ref 10–40)
BASOPHILS # BLD AUTO: 0.06 K/UL (ref 0–0.2)
BASOPHILS NFR BLD: 0.8 % (ref 0–1.9)
BILIRUB SERPL-MCNC: 0.6 MG/DL (ref 0.1–1)
BUN SERPL-MCNC: 10 MG/DL (ref 8–23)
CALCIUM SERPL-MCNC: 9.7 MG/DL (ref 8.7–10.5)
CHLORIDE SERPL-SCNC: 105 MMOL/L (ref 95–110)
CHOLEST SERPL-MCNC: 297 MG/DL (ref 120–199)
CHOLEST/HDLC SERPL: 5.4 {RATIO} (ref 2–5)
CO2 SERPL-SCNC: 26 MMOL/L (ref 23–29)
CREAT SERPL-MCNC: 0.7 MG/DL (ref 0.5–1.4)
DIFFERENTIAL METHOD BLD: NORMAL
EOSINOPHIL # BLD AUTO: 0.3 K/UL (ref 0–0.5)
EOSINOPHIL NFR BLD: 3.6 % (ref 0–8)
ERYTHROCYTE [DISTWIDTH] IN BLOOD BY AUTOMATED COUNT: 13.7 % (ref 11.5–14.5)
EST. GFR  (NO RACE VARIABLE): >60 ML/MIN/1.73 M^2
ESTIMATED AVG GLUCOSE: 111 MG/DL (ref 68–131)
GLUCOSE SERPL-MCNC: 98 MG/DL (ref 70–110)
HBA1C MFR BLD: 5.5 % (ref 4–5.6)
HCT VFR BLD AUTO: 43.1 % (ref 37–48.5)
HDLC SERPL-MCNC: 55 MG/DL (ref 40–75)
HDLC SERPL: 18.5 % (ref 20–50)
HGB BLD-MCNC: 14.1 G/DL (ref 12–16)
IMM GRANULOCYTES # BLD AUTO: 0.01 K/UL (ref 0–0.04)
IMM GRANULOCYTES NFR BLD AUTO: 0.1 % (ref 0–0.5)
LDLC SERPL CALC-MCNC: 223 MG/DL (ref 63–159)
LYMPHOCYTES # BLD AUTO: 1.9 K/UL (ref 1–4.8)
LYMPHOCYTES NFR BLD: 25.7 % (ref 18–48)
MCH RBC QN AUTO: 30.3 PG (ref 27–31)
MCHC RBC AUTO-ENTMCNC: 32.7 G/DL (ref 32–36)
MCV RBC AUTO: 93 FL (ref 82–98)
MONOCYTES # BLD AUTO: 0.5 K/UL (ref 0.3–1)
MONOCYTES NFR BLD: 6.9 % (ref 4–15)
NEUTROPHILS # BLD AUTO: 4.5 K/UL (ref 1.8–7.7)
NEUTROPHILS NFR BLD: 62.9 % (ref 38–73)
NONHDLC SERPL-MCNC: 242 MG/DL
NRBC BLD-RTO: 0 /100 WBC
PLATELET # BLD AUTO: 385 K/UL (ref 150–450)
PMV BLD AUTO: 9.7 FL (ref 9.2–12.9)
POTASSIUM SERPL-SCNC: 4.3 MMOL/L (ref 3.5–5.1)
PROT SERPL-MCNC: 7.3 G/DL (ref 6–8.4)
RBC # BLD AUTO: 4.65 M/UL (ref 4–5.4)
SODIUM SERPL-SCNC: 141 MMOL/L (ref 136–145)
T4 FREE SERPL-MCNC: 1.03 NG/DL (ref 0.71–1.51)
TRIGL SERPL-MCNC: 95 MG/DL (ref 30–150)
TSH SERPL DL<=0.005 MIU/L-ACNC: 1.13 UIU/ML (ref 0.4–4)
WBC # BLD AUTO: 7.2 K/UL (ref 3.9–12.7)

## 2024-03-20 PROCEDURE — 82306 VITAMIN D 25 HYDROXY: CPT | Performed by: FAMILY MEDICINE

## 2024-03-20 PROCEDURE — 80053 COMPREHEN METABOLIC PANEL: CPT | Performed by: FAMILY MEDICINE

## 2024-03-20 PROCEDURE — 99396 PREV VISIT EST AGE 40-64: CPT | Mod: S$GLB,,, | Performed by: FAMILY MEDICINE

## 2024-03-20 PROCEDURE — 84439 ASSAY OF FREE THYROXINE: CPT | Performed by: FAMILY MEDICINE

## 2024-03-20 PROCEDURE — 84443 ASSAY THYROID STIM HORMONE: CPT | Performed by: FAMILY MEDICINE

## 2024-03-20 PROCEDURE — 85025 COMPLETE CBC W/AUTO DIFF WBC: CPT | Performed by: FAMILY MEDICINE

## 2024-03-20 PROCEDURE — 83036 HEMOGLOBIN GLYCOSYLATED A1C: CPT | Performed by: FAMILY MEDICINE

## 2024-03-20 PROCEDURE — 99999 PR PBB SHADOW E&M-EST. PATIENT-LVL V: CPT | Mod: PBBFAC,,, | Performed by: FAMILY MEDICINE

## 2024-03-20 PROCEDURE — 80061 LIPID PANEL: CPT | Performed by: FAMILY MEDICINE

## 2024-03-20 PROCEDURE — 36415 COLL VENOUS BLD VENIPUNCTURE: CPT | Mod: PO | Performed by: FAMILY MEDICINE

## 2024-03-20 RX ORDER — BENZONATATE 200 MG/1
200 CAPSULE ORAL 3 TIMES DAILY PRN
Qty: 30 CAPSULE | Refills: 0 | Status: SHIPPED | OUTPATIENT
Start: 2024-03-20 | End: 2024-03-30

## 2024-03-20 RX ORDER — FLUTICASONE PROPIONATE 50 MCG
2 SPRAY, SUSPENSION (ML) NASAL DAILY
Qty: 16 G | Refills: 11 | Status: SHIPPED | OUTPATIENT
Start: 2024-03-20

## 2024-03-20 RX ORDER — ROSUVASTATIN CALCIUM 20 MG/1
20 TABLET, COATED ORAL NIGHTLY
Qty: 90 TABLET | Refills: 3 | Status: SHIPPED | OUTPATIENT
Start: 2024-03-20 | End: 2025-03-20

## 2024-03-20 RX ORDER — AZELASTINE 1 MG/ML
1 SPRAY, METERED NASAL 2 TIMES DAILY
Qty: 30 ML | Refills: 11 | Status: SHIPPED | OUTPATIENT
Start: 2024-03-20 | End: 2025-03-20

## 2024-03-20 RX ORDER — TRAZODONE HYDROCHLORIDE 50 MG/1
25-50 TABLET ORAL NIGHTLY PRN
Qty: 30 TABLET | Refills: 11 | Status: SHIPPED | OUTPATIENT
Start: 2024-03-20 | End: 2025-03-20

## 2024-03-20 NOTE — PROGRESS NOTES
Subjective     Patient ID: Rosamaria Agosto is a 63 y.o. female.    Chief Complaint: Annual Exam and Palpitations (Stress from losing mother recently )  63-year-old  female presents to clinic today for annual physical exam.  Hypertension remains well controlled on hydrochlorothiazide 25 mg daily.  Hyperlipidemia remains stable on Crestor 20 mg daily.  She reports a past surgical history of hysterectomy, bilateral tubal ligation, breast biopsy, encephalocele repair, nasal sinus surgery, and bladder lift surgery.  She reports a family history of diabetes and hypertension.  She is up-to-date with all vaccinations.  Colonoscopy is due at this time and has been ordered.  Finally, she reports increased difficulty sleeping since the death of her mother.  She has also been noticing increased nasal congestion and occasionally productive cough.  Palpitations   Associated symptoms include anxiety and coughing (productive). Pertinent negatives include no chest pain, dizziness, fever, nausea, shortness of breath or vomiting.     Review of Systems   Constitutional:  Negative for appetite change, chills, fatigue and fever.   HENT:  Positive for nasal congestion. Negative for ear pain, hearing loss, postnasal drip, rhinorrhea, sinus pressure/congestion, sore throat and tinnitus.    Eyes:  Negative for redness, itching and visual disturbance.   Respiratory:  Positive for cough (productive). Negative for chest tightness and shortness of breath.    Cardiovascular:  Positive for palpitations. Negative for chest pain.   Gastrointestinal:  Negative for abdominal pain, constipation, diarrhea, nausea and vomiting.   Genitourinary:  Negative for decreased urine volume, difficulty urinating, dysuria, frequency, hematuria and urgency.   Musculoskeletal:  Negative for back pain, myalgias, neck pain and neck stiffness.   Integumentary:  Negative for rash.   Neurological:  Negative for dizziness, light-headedness and headaches.    Psychiatric/Behavioral:  Positive for sleep disturbance. The patient is nervous/anxious.           Objective     Physical Exam  Vitals and nursing note reviewed.   Constitutional:       General: She is not in acute distress.     Appearance: She is well-developed. She is not diaphoretic.   HENT:      Head: Normocephalic and atraumatic.      Right Ear: Hearing, tympanic membrane, ear canal and external ear normal.      Left Ear: Hearing, tympanic membrane, ear canal and external ear normal.      Nose: Rhinorrhea present. Rhinorrhea is clear.      Right Turbinates: Swollen.      Left Turbinates: Swollen.      Mouth/Throat:      Pharynx: No oropharyngeal exudate.   Eyes:      General: No scleral icterus.        Right eye: No discharge.         Left eye: No discharge.      Conjunctiva/sclera: Conjunctivae normal.      Pupils: Pupils are equal, round, and reactive to light.   Neck:      Thyroid: No thyromegaly.      Vascular: No JVD.      Trachea: No tracheal deviation.   Cardiovascular:      Rate and Rhythm: Normal rate and regular rhythm.      Heart sounds: Normal heart sounds. No murmur heard.     No friction rub. No gallop.   Pulmonary:      Effort: Pulmonary effort is normal. No respiratory distress.      Breath sounds: Normal breath sounds. No stridor. No wheezing or rales.   Abdominal:      General: Bowel sounds are normal. There is no distension.      Palpations: Abdomen is soft. There is no mass.      Tenderness: There is no abdominal tenderness. There is no guarding or rebound.   Musculoskeletal:         General: No tenderness. Normal range of motion.      Cervical back: Normal range of motion and neck supple.   Lymphadenopathy:      Cervical: No cervical adenopathy.   Skin:     General: Skin is warm and dry.      Coloration: Skin is not pale.      Findings: No erythema or rash.   Neurological:      Mental Status: She is alert and oriented to person, place, and time.   Psychiatric:         Behavior: Behavior  normal.         Thought Content: Thought content normal.         Judgment: Judgment normal.            Assessment and Plan     1. Well adult exam  -     CBC Auto Differential; Future; Expected date: 03/20/2024  -     Comprehensive Metabolic Panel; Future; Expected date: 03/20/2024  -     Lipid Panel; Future; Expected date: 03/20/2024  -     T4, Free; Future; Expected date: 03/20/2024  -     TSH; Future; Expected date: 03/20/2024  -     Urinalysis; Future; Expected date: 03/20/2024  -     Hemoglobin A1C; Future; Expected date: 03/20/2024  -     Vitamin D; Future; Expected date: 03/20/2024    2. Essential hypertension    3. Mixed hyperlipidemia  -     Lipid Panel; Future; Expected date: 03/20/2024  -     rosuvastatin (CRESTOR) 20 MG tablet; Take 1 tablet (20 mg total) by mouth every evening.  Dispense: 90 tablet; Refill: 3    4. Vitamin D deficiency  -     Vitamin D; Future; Expected date: 03/20/2024    5. Venous insufficiency    6. PND (post-nasal drip)  -     fluticasone propionate (FLONASE) 50 mcg/actuation nasal spray; 2 sprays (100 mcg total) by Each Nostril route once daily.  Dispense: 16 g; Refill: 11  -     azelastine (ASTELIN) 137 mcg (0.1 %) nasal spray; 1 spray (137 mcg total) by Nasal route 2 (two) times daily.  Dispense: 30 mL; Refill: 11    7. Insomnia, unspecified type  -     traZODone (DESYREL) 50 MG tablet; Take 0.5-1 tablets (25-50 mg total) by mouth nightly as needed for Insomnia.  Dispense: 30 tablet; Refill: 11    Other orders  -     benzonatate (TESSALON) 200 MG capsule; Take 1 capsule (200 mg total) by mouth 3 (three) times daily as needed for Cough.  Dispense: 30 capsule; Refill: 0        1. CBC, CMP, UA, TSH, free T4, fasting lipids, vitamin-D level, and hemoglobin A1c.    2. Continue hydrochlorothiazide 25 mg daily.  Hypertension is well controlled.  3. Continue Crestor 20 mg daily.  Hyperlipidemia stable.  4. Continue over-the-counter vitamin-D 2000 units daily.  Vitamin-D deficiency is  stable.    5. Recommend starting Flonase nasal spray 2 sprays per nostril daily and Astelin nasal spray 1 spray per nostril b.i.d. for treatment of postnasal drip.  6. Recommend trial of trazodone 25-50 mg at bedtime as needed for insomnia.    7. Return to clinic as needed or in 1 year for annual physical exam.               Follow up in about 1 year (around 3/20/2025), or if symptoms worsen or fail to improve, for Annual exam.

## 2024-03-21 ENCOUNTER — PATIENT MESSAGE (OUTPATIENT)
Dept: INTERNAL MEDICINE | Facility: CLINIC | Age: 64
End: 2024-03-21
Payer: COMMERCIAL

## 2024-03-21 DIAGNOSIS — E78.2 MIXED HYPERLIPIDEMIA: Primary | ICD-10-CM

## 2024-04-23 ENCOUNTER — OFFICE VISIT (OUTPATIENT)
Dept: SPORTS MEDICINE | Facility: CLINIC | Age: 64
End: 2024-04-23
Payer: COMMERCIAL

## 2024-04-23 ENCOUNTER — HOSPITAL ENCOUNTER (OUTPATIENT)
Dept: RADIOLOGY | Facility: HOSPITAL | Age: 64
Discharge: HOME | End: 2024-04-23
Attending: PHYSICIAN ASSISTANT
Payer: COMMERCIAL

## 2024-04-23 VITALS
SYSTOLIC BLOOD PRESSURE: 127 MMHG | WEIGHT: 195.56 LBS | DIASTOLIC BLOOD PRESSURE: 81 MMHG | BODY MASS INDEX: 28.96 KG/M2 | HEART RATE: 72 BPM | HEIGHT: 69 IN

## 2024-04-23 DIAGNOSIS — M17.0 PRIMARY OSTEOARTHRITIS OF BOTH KNEES: Primary | ICD-10-CM

## 2024-04-23 DIAGNOSIS — M25.569 KNEE PAIN, UNSPECIFIED CHRONICITY, UNSPECIFIED LATERALITY: ICD-10-CM

## 2024-04-23 DIAGNOSIS — M25.561 PAIN IN BOTH KNEES, UNSPECIFIED CHRONICITY: ICD-10-CM

## 2024-04-23 DIAGNOSIS — M25.562 PAIN IN BOTH KNEES, UNSPECIFIED CHRONICITY: ICD-10-CM

## 2024-04-23 PROCEDURE — 99999 PR PBB SHADOW E&M-EST. PATIENT-LVL V: CPT | Mod: PBBFAC,,, | Performed by: PHYSICIAN ASSISTANT

## 2024-04-23 PROCEDURE — 73564 X-RAY EXAM KNEE 4 OR MORE: CPT | Mod: 26,50,, | Performed by: RADIOLOGY

## 2024-04-23 PROCEDURE — 73564 X-RAY EXAM KNEE 4 OR MORE: CPT | Mod: TC,50

## 2024-04-23 PROCEDURE — 99214 OFFICE O/P EST MOD 30 MIN: CPT | Mod: 25,S$GLB,, | Performed by: PHYSICIAN ASSISTANT

## 2024-04-23 PROCEDURE — 20610 DRAIN/INJ JOINT/BURSA W/O US: CPT | Mod: 50,S$GLB,, | Performed by: PHYSICIAN ASSISTANT

## 2024-04-23 RX ORDER — CYCLOBENZAPRINE HCL 10 MG
10 TABLET ORAL 3 TIMES DAILY PRN
Qty: 90 TABLET | Refills: 1 | Status: SHIPPED | OUTPATIENT
Start: 2024-04-23

## 2024-04-23 RX ORDER — DICLOFENAC SODIUM 75 MG/1
75 TABLET, DELAYED RELEASE ORAL 2 TIMES DAILY
Qty: 60 TABLET | Refills: 2 | Status: SHIPPED | OUTPATIENT
Start: 2024-04-23

## 2024-04-23 RX ORDER — TRIAMCINOLONE ACETONIDE 40 MG/ML
40 INJECTION, SUSPENSION INTRA-ARTICULAR; INTRAMUSCULAR
Status: COMPLETED | OUTPATIENT
Start: 2024-04-23 | End: 2024-04-23

## 2024-04-23 RX ADMIN — TRIAMCINOLONE ACETONIDE 40 MG: 40 INJECTION, SUSPENSION INTRA-ARTICULAR; INTRAMUSCULAR at 03:04

## 2024-04-23 NOTE — PROGRESS NOTES
Subjective:          Chief Complaint: Rosamaria Agosto is a 63 y.o. female who presents to clinic for bilateral knee pain.    Interval Hx: Patient presents for follow-up s/p Monovisc injection of bilateral knee with Arina Corea 1 year ago. Reports symptoms improved significantly with viscosupplementation for a few months.. Denies new injury or trauma.  Patient presents to discuss continued treatment options.    She also reports new right heel pain today.  She does have appointment with Podiatry but will not be seen until August.  She reports her pain is posterior ankle and points to distal Achilles, aching, worse with standing for long periods of time.  Better with rest.      Previous HPI: Patient who works as a  for [employer] presents to clinic with bilateral knee pain. Left knee pain is worse than right knee. She has been taking diclofenac 75mg BID with some relief of pain, but she would like to switch to a different medication.  She complains of global tenderness. Denies instability and mechanical symptoms.  Patient is currently being seen by bariatrics and has been prescribed medication for weight loss.  She is currently working on weight loss. Left knee frequently swells toward the end of the day.  States that she has to stand for prolonged periods of time for work which increases her bilateral knee pain.  She is here today to discuss treatment options.  No previous history of injections including steroids or viscosupplementation.           Review of Systems   Constitutional: Negative. Negative for chills, fever, weight gain and weight loss.   HENT:  Negative for congestion and sore throat.    Eyes:  Negative for blurred vision and double vision.   Cardiovascular:  Negative for chest pain, leg swelling and palpitations.   Respiratory:  Negative for cough and shortness of breath.    Hematologic/Lymphatic: Does not bruise/bleed easily.   Skin:  Negative for itching, poor wound healing and  rash.   Musculoskeletal:  Positive for joint pain, joint swelling and stiffness. Negative for back pain, muscle weakness and myalgias.   Gastrointestinal:  Negative for abdominal pain, constipation, diarrhea, nausea and vomiting.   Genitourinary: Negative.  Negative for frequency and hematuria.   Neurological:  Negative for dizziness, headaches, numbness, paresthesias and sensory change.   Psychiatric/Behavioral:  Negative for altered mental status and depression. The patient is not nervous/anxious.    Allergic/Immunologic: Negative for hives.       Pain Related Questions  Over the past 3 days, what was your highest pain level?: 9  Over the past 3 days, what was your lowest pain level? : 9    Other  How many nights a week are you awakened by your affected body part?: 7  Was the patient's HEIGHT measured or patient reported?: Patient Reported  Was the patient's WEIGHT measured or patient reported?: Measured      Objective:        General: Rosamaria is well-developed, well-nourished, appears stated age, in no acute distress, alert and oriented to time, place and person.     General    Vitals reviewed.  Constitutional: She is oriented to person, place, and time. She appears well-developed and well-nourished. No distress.   HENT:   Head: Normocephalic and atraumatic.   Eyes: EOM are normal.   Cardiovascular:  Normal rate and regular rhythm.            Pulmonary/Chest: Effort normal. No respiratory distress.   Neurological: She is alert and oriented to person, place, and time. She has normal reflexes. No cranial nerve deficit. Coordination normal.   Psychiatric: She has a normal mood and affect. Her behavior is normal. Judgment and thought content normal.     General Musculoskeletal Exam   Gait: normal     Right Ankle/Foot Exam     Swelling   The patient is swollen on the Achilles insertion.    Tenderness   The patient is tender to palpation of the Achilles insertion.    Pain   The patient exhibits pain of the Achilles  insertion.    Range of Motion   Ankle Joint   Dorsiflexion:  abnormal Right ankle dorsiflexion: limited by pain.  Plantar flexion:  normal   Reinoso Test:  negative      Right Knee Exam     Inspection   Erythema: absent  Scars: absent  Swelling: present  Effusion: present  Deformity: absent  Bruising: absent    Tenderness   The patient is experiencing no tenderness.     Crepitus   The patient has crepitus of the patella.    Range of Motion   Extension:  0 normal   Flexion:  130 normal     Tests   Meniscus   Lino:  Medial - negative Lateral - negative  Ligament Examination   Lachman: normal (-1 to 2mm)   PCL-Posterior Drawer: normal (0 to 2mm)     MCL - Valgus: normal (0 to 2mm)  LCL - Varus: normal  Pivot Shift: normal (Equal)  Reverse Pivot Shift: normal (Equal)  Posterolateral Corner: stable  Patella   Passive Patellar Tilt: neutral  Patellar Glide (quadrants): Lateral - 1   Medial - 2  Patellar Grind: negative    Other   Sensation: normal    Left Knee Exam     Inspection   Erythema: absent  Scars: absent  Swelling: present  Effusion: present  Deformity: absent  Bruising: absent    Tenderness   The patient tender to palpation of the medial joint line.    Crepitus   The patient has crepitus of the patella.    Range of Motion   Extension:  0 normal   Flexion:  130 normal     Tests   Meniscus   Lino:  Medial - negative Lateral - negative  Stability   Lachman: normal (-1 to 2mm)   PCL-Posterior Drawer: normal (0 to 2mm)  MCL - Valgus: normal (0 to 2mm)  LCL - Varus: normal (0 to 2mm)  Pivot Shift: normal (Equal)  Reverse Pivot Shift: normal (Equal)  Posterolateral Corner: stable  Patella   Passive Patellar Tilt: neutral  Patellar Glide (Quadrants): Lateral - 1 Medial - 2  Patellar Grind: negative    Other   Sensation: normal    Muscle Strength   Right Lower Extremity   Hip Abduction: 5/5   Quadriceps:  5/5   Hamstrin/5   Left Lower Extremity   Hip Abduction: 5/5   Quadriceps:  5/5   Hamstrin/5      Reflexes     Left Side  Achilles:  2+  Quadriceps:  2+    Right Side   Achilles:  2+  Quadriceps:  2+    Vascular Exam     Right Pulses  Dorsalis Pedis:      2+  Posterior Tibial:      2+        Left Pulses  Dorsalis Pedis:      2+  Posterior Tibial:      2+        Edema  Right Lower Leg: present  Left Lower Leg: present      Radiographs:  04/22/2022  Bilateral knees:  FINDINGS:  Bilateral ortho four views knees.     Left: No fracture dislocation bone destruction or OCD seen.     Right: No fracture dislocation bone destruction or OCD seen.        Assessment:       Encounter Diagnoses   Name Primary?    Primary osteoarthritis of both knees Yes    Pain in both knees, unspecified chronicity          Plan:       We have discussed a variety of treatment options including medications, injections, physical therapy and other alternative treatments. Pt is requesting repeat CSI and physical therapy at this time.    I made the decision to obtain old records of the patient including previous notes and imaging. I independently reviewed and interpreted lab results today as well as prior imaging.     1. Injection Procedure  A time out was performed, including verification of patient ID, procedure, site and side, availability of information and equipment, review of safety issues, and agreement with consent, the procedure site was marked.    After time out was performed, the patient was prepped aseptically with chloraprep swabsticks. A diagnostic and therapeutic injection of 1:4cc Kenalog/Marcaine was given under sterile technique using a 22g x 1.5 needle from the Superolateral  aspect of the bilateral Knee Joint in the supine position.      Rosamaria Agosto had no adverse reactions to the medication. Pain decreased. She was instructed to apply ice to the joint for 20 minutes and avoid strenuous activities for 24-36 hours following the injection. She was warned of possible blood sugar and/or blood pressure changes during that  time. Following that time, she can resume regular activities.    She was reminded to call the clinic immediately for any adverse side effects as explained in clinic today.    2. Diclofenac 75 mg BID, Flexeril 10 mg TID PRN  3. She was encouraged to re-initiate HEP.  4. Ambulatory referral to physical therapy for patellofemoral protocol.  5. We also discussed repeating viscosupplementation alternating with CSI.  She agreed to plan and will contact us when she is ready to repeat injections.  6. Plans to follow-up with foot and ankle specialist for possible surgical options in the future.  7. RTC to see Joselito Summers PA-C as needed for follow-up.    All of the patient's questions were answered and the patient will contact us if they have any questions or concerns in the interim.              Patient questionnaires may have been collected.

## 2024-05-20 ENCOUNTER — PATIENT MESSAGE (OUTPATIENT)
Dept: ADMINISTRATIVE | Facility: HOSPITAL | Age: 64
End: 2024-05-20
Payer: COMMERCIAL

## 2024-05-27 ENCOUNTER — PATIENT OUTREACH (OUTPATIENT)
Dept: ADMINISTRATIVE | Facility: HOSPITAL | Age: 64
End: 2024-05-27
Payer: COMMERCIAL

## 2024-06-03 ENCOUNTER — TELEPHONE (OUTPATIENT)
Dept: INTERNAL MEDICINE | Facility: CLINIC | Age: 64
End: 2024-06-03
Payer: COMMERCIAL

## 2024-06-03 NOTE — TELEPHONE ENCOUNTER
----- Message from Tammy Owens sent at 6/3/2024  9:31 AM CDT -----  Contact: Pt  [phone]  [id number]    Type: Test Results    What test was performed? labs    Who ordered the test? Dr Burns    When and where were the test performed? 3/20 [facility]    Would you like response via [facility]: Call back    Comments: Patient is concerned about her kidney's, she is stating she has pain and no energy at all.    Please call and advise.    Thank You

## 2024-06-03 NOTE — TELEPHONE ENCOUNTER
Patient have flank and dizziness and was wondering if anything with her labs in March would explain her current symptoms   Advised patient that labs from march the Dr stated that we essentially normal and would not likely to have anything to due with her current symptoms.  Patient agree to come in for a visit was able to get her in this week on 6/5 with DR Tao    No

## 2024-06-05 ENCOUNTER — LAB VISIT (OUTPATIENT)
Dept: LAB | Facility: HOSPITAL | Age: 64
End: 2024-06-05
Attending: INTERNAL MEDICINE
Payer: COMMERCIAL

## 2024-06-05 ENCOUNTER — OFFICE VISIT (OUTPATIENT)
Dept: INTERNAL MEDICINE | Facility: CLINIC | Age: 64
End: 2024-06-05
Payer: COMMERCIAL

## 2024-06-05 VITALS
WEIGHT: 195.75 LBS | HEIGHT: 69 IN | HEART RATE: 63 BPM | RESPIRATION RATE: 17 BRPM | DIASTOLIC BLOOD PRESSURE: 80 MMHG | OXYGEN SATURATION: 97 % | SYSTOLIC BLOOD PRESSURE: 120 MMHG | BODY MASS INDEX: 28.99 KG/M2 | TEMPERATURE: 98 F

## 2024-06-05 DIAGNOSIS — G89.29 CHRONIC BILATERAL LOW BACK PAIN WITHOUT SCIATICA: ICD-10-CM

## 2024-06-05 DIAGNOSIS — R53.83 OTHER FATIGUE: ICD-10-CM

## 2024-06-05 DIAGNOSIS — R42 LIGHTHEADEDNESS: ICD-10-CM

## 2024-06-05 DIAGNOSIS — R42 LIGHTHEADEDNESS: Primary | ICD-10-CM

## 2024-06-05 DIAGNOSIS — M54.50 CHRONIC BILATERAL LOW BACK PAIN WITHOUT SCIATICA: ICD-10-CM

## 2024-06-05 LAB
BASOPHILS # BLD AUTO: 0.07 K/UL (ref 0–0.2)
BASOPHILS NFR BLD: 0.8 % (ref 0–1.9)
DIFFERENTIAL METHOD BLD: NORMAL
EOSINOPHIL # BLD AUTO: 0.2 K/UL (ref 0–0.5)
EOSINOPHIL NFR BLD: 2.1 % (ref 0–8)
ERYTHROCYTE [DISTWIDTH] IN BLOOD BY AUTOMATED COUNT: 13.6 % (ref 11.5–14.5)
ERYTHROCYTE [SEDIMENTATION RATE] IN BLOOD BY PHOTOMETRIC METHOD: 24 MM/HR (ref 0–36)
HCT VFR BLD AUTO: 42.3 % (ref 37–48.5)
HGB BLD-MCNC: 14 G/DL (ref 12–16)
IMM GRANULOCYTES # BLD AUTO: 0.03 K/UL (ref 0–0.04)
IMM GRANULOCYTES NFR BLD AUTO: 0.3 % (ref 0–0.5)
LYMPHOCYTES # BLD AUTO: 2.3 K/UL (ref 1–4.8)
LYMPHOCYTES NFR BLD: 26.1 % (ref 18–48)
MCH RBC QN AUTO: 30.4 PG (ref 27–31)
MCHC RBC AUTO-ENTMCNC: 33.1 G/DL (ref 32–36)
MCV RBC AUTO: 92 FL (ref 82–98)
MONOCYTES # BLD AUTO: 0.7 K/UL (ref 0.3–1)
MONOCYTES NFR BLD: 7.3 % (ref 4–15)
NEUTROPHILS # BLD AUTO: 5.7 K/UL (ref 1.8–7.7)
NEUTROPHILS NFR BLD: 63.4 % (ref 38–73)
NRBC BLD-RTO: 0 /100 WBC
PLATELET # BLD AUTO: 362 K/UL (ref 150–450)
PMV BLD AUTO: 10 FL (ref 9.2–12.9)
RBC # BLD AUTO: 4.61 M/UL (ref 4–5.4)
TSH SERPL DL<=0.005 MIU/L-ACNC: 2.07 UIU/ML (ref 0.4–4)
WBC # BLD AUTO: 8.92 K/UL (ref 3.9–12.7)

## 2024-06-05 PROCEDURE — 84443 ASSAY THYROID STIM HORMONE: CPT | Performed by: INTERNAL MEDICINE

## 2024-06-05 PROCEDURE — 36415 COLL VENOUS BLD VENIPUNCTURE: CPT | Mod: PO | Performed by: INTERNAL MEDICINE

## 2024-06-05 PROCEDURE — 85652 RBC SED RATE AUTOMATED: CPT | Performed by: INTERNAL MEDICINE

## 2024-06-05 PROCEDURE — 99214 OFFICE O/P EST MOD 30 MIN: CPT | Mod: S$GLB,,, | Performed by: INTERNAL MEDICINE

## 2024-06-05 PROCEDURE — 85025 COMPLETE CBC W/AUTO DIFF WBC: CPT | Performed by: INTERNAL MEDICINE

## 2024-06-05 PROCEDURE — 99999 PR PBB SHADOW E&M-EST. PATIENT-LVL IV: CPT | Mod: PBBFAC,,, | Performed by: INTERNAL MEDICINE

## 2024-06-05 NOTE — PROGRESS NOTES
History of present illness:   63-year-old lady established patient of .  The patient has history of hypertension dyslipidemia obesity.  She is in today with a couple of complaints.  She states for a few days she has had some recurrent minor feelings of lightheadedness which lasts for few seconds.  Not associated with any other symptomatologies such as headache, palpitations or other.  No nausea no vomiting.  Not sure if is a positional nature to the symptoms.  Denies any prior history of same.  No new medications or changes in activities.  Other complaint is few weeks of some mild bilateral lower back pain.  Comes and goes with certain movements and positions.  Nonradiating.  No  symptoms.    Current medications:   Medications all noted and reviewed.      Review of systems:   She does complain of general somewhat chronic fatigue.  No fever no chills no generalized body aches.  HEENT: No hoarseness no dysphagia no URI symptoms.  Respiratory: No cough no shortness a breath.    Cardiovascular: No chest pain palpitations or syncope.  No symptoms of claudication.    GI: No nausea no vomiting abdominal pain or diarrhea.    : No dysuria frequency change in the color character of her urine.    Skin: No rashes.    Physical examination:   General: Alert appropriately groomed lady no acute distress.    Vital signs: All noted and reviewed as normal.    Eyes:  Sclerae white and nonicteric.  Pupils equal round and reactive to light.    HEENT: Normocephalic.  Neck supple no masses.  No cervical adenopathy.  Ear canals and tympanic membranes are normal.  Cardiovascular: Regular rate rhythm.  No significant murmur.    Lungs: Clear to auscultation.    Musculoskeletal: Back demonstrates no gross deformity.  Area of concern is the lumbar paraspinous musculature bilaterally.  Negative straight leg raising.  No CVA tenderness.  Abdomen: Soft benign nontender.  No abdominal bruit.  Neurologic: No focal motor deficits  apparent.  Gait normal.    Mental status: Alert oriented affect mood all appropriate.    Impression:  Recent mild episodes of transient feeling of lightheadedness nonspecific.  Could be benign positional vertigo versus other.    Musculoskeletal lower back pain  Fatigue chronic.      Plan:   She had lab data performed in March of this year, will recheck CBC, TSH and sedimentation rate.    Observe symptoms and if the lightheaded feeling continues over the next week or so or worsen or change contact the office.  Symptomatic treatment for her lower back issues advise if persist.

## 2024-06-20 ENCOUNTER — LAB VISIT (OUTPATIENT)
Dept: LAB | Facility: HOSPITAL | Age: 64
End: 2024-06-20
Attending: FAMILY MEDICINE
Payer: COMMERCIAL

## 2024-06-20 DIAGNOSIS — E78.2 MIXED HYPERLIPIDEMIA: ICD-10-CM

## 2024-06-20 LAB
CHOLEST SERPL-MCNC: 226 MG/DL (ref 120–199)
CHOLEST/HDLC SERPL: 3.5 {RATIO} (ref 2–5)
HDLC SERPL-MCNC: 64 MG/DL (ref 40–75)
HDLC SERPL: 28.3 % (ref 20–50)
LDLC SERPL CALC-MCNC: 144 MG/DL (ref 63–159)
NONHDLC SERPL-MCNC: 162 MG/DL
TRIGL SERPL-MCNC: 90 MG/DL (ref 30–150)

## 2024-06-20 PROCEDURE — 80061 LIPID PANEL: CPT | Performed by: FAMILY MEDICINE

## 2024-06-20 PROCEDURE — 36415 COLL VENOUS BLD VENIPUNCTURE: CPT | Mod: PO | Performed by: FAMILY MEDICINE

## 2024-08-02 ENCOUNTER — OFFICE VISIT (OUTPATIENT)
Dept: URGENT CARE | Facility: CLINIC | Age: 64
End: 2024-08-02
Payer: COMMERCIAL

## 2024-08-02 VITALS
WEIGHT: 195 LBS | OXYGEN SATURATION: 97 % | HEIGHT: 69 IN | RESPIRATION RATE: 18 BRPM | SYSTOLIC BLOOD PRESSURE: 123 MMHG | BODY MASS INDEX: 28.88 KG/M2 | TEMPERATURE: 99 F | HEART RATE: 89 BPM | DIASTOLIC BLOOD PRESSURE: 84 MMHG

## 2024-08-02 DIAGNOSIS — R09.89 CHEST CONGESTION: ICD-10-CM

## 2024-08-02 DIAGNOSIS — R05.1 ACUTE COUGH: ICD-10-CM

## 2024-08-02 DIAGNOSIS — U07.1 COVID: Primary | ICD-10-CM

## 2024-08-02 DIAGNOSIS — U07.1 COVID-19 VIRUS DETECTED: ICD-10-CM

## 2024-08-02 DIAGNOSIS — R06.02 SHORTNESS OF BREATH: ICD-10-CM

## 2024-08-02 LAB
CTP QC/QA: YES
SARS-COV-2 AG RESP QL IA.RAPID: POSITIVE

## 2024-08-02 PROCEDURE — 71046 X-RAY EXAM CHEST 2 VIEWS: CPT | Mod: S$GLB,,, | Performed by: RADIOLOGY

## 2024-08-02 RX ORDER — PROMETHAZINE HYDROCHLORIDE AND DEXTROMETHORPHAN HYDROBROMIDE 6.25; 15 MG/5ML; MG/5ML
5 SYRUP ORAL NIGHTLY PRN
Qty: 118 ML | Refills: 0 | Status: SHIPPED | OUTPATIENT
Start: 2024-08-02

## 2024-08-02 RX ORDER — BENZONATATE 100 MG/1
100 CAPSULE ORAL 3 TIMES DAILY PRN
Qty: 30 CAPSULE | Refills: 0 | Status: SHIPPED | OUTPATIENT
Start: 2024-08-02 | End: 2024-08-12

## 2024-08-02 RX ORDER — ALBUTEROL SULFATE 90 UG/1
2 AEROSOL, METERED RESPIRATORY (INHALATION) EVERY 6 HOURS PRN
Qty: 18 G | Refills: 0 | Status: SHIPPED | OUTPATIENT
Start: 2024-08-02 | End: 2025-08-02

## 2024-08-02 NOTE — LETTER
[address] [place] [address] ? [place], [address] ? Phone [phone] ? Fax [phone]           Return to Work/School    Patient: Rosamaria Agosto  YOB: 1960   Date: 08/02/2024      To Whom It May Concern:     Rosamaria Agosto was in contact with/seen in my office on 08/02/2024. COVID-19 is present in our communities across the state. Not all patients are eligible or appropriate to be tested. In this situation, your employee meets the following criteria:     Rosamaria Agosto has met the criteria for COVID-19 testing and has a POSITIVE result. She can return to work once they are asymptomatic for 24 hours without the use of fever reducing medications and with improvement of symptoms. Pt can return back to work on 8/6/2024.     If you have any questions or concerns, or if I can be of further assistance, please do not hesitate to contact me.     Sincerely,    Nicole Keenan NP

## 2024-08-02 NOTE — PATIENT INSTRUCTIONS
You are COVID positive today. Take anti-viral (Paxlovid) twice a day for the next 5 days to decrease symptoms. Always take with food and water to avoid GI upset. I gave you a handout with information of the medication for you to review.   ----STOP your crestor x 10 days    There is no longer a quarantine period in place. You can return to school/work once fever free for 24 hours and symptoms are improving. The CDC does recommend wearing a mask in public x 10 days.     Try tessalon perles as directed during the day for your cough. It will help numb the back of your throat so you do not have the urge to cough.      Take promethazine/DM cough syrup at night for cough. Promethazine is an oral anti-histamine and will help with sinus relief. DM (dextromethorphan) is a decongestant. This medication will make you drowsy. Make sure you do not drive a vehicle, drink alcohol, operate machinery or take other sedating medications while taking.     Use albuterol inhaler for chest tightness/shortness of breath/wheezing/coughing fits as directed. I included information in your discharge paperwork about this medication for you to review. Check glucose at home and eat a well balanced diet to avoid elevated sugar levels.     Try a decongestant and corticosteroid nasal spray like flonase for the next few days for sinus relief. Initial: 2 sprays in each nostril once daily for 1 week. Reduce to 1 spray in each nostril once per day. Stop taking if you develop a nose bleed. Nasal saline spray can be used together with flonase to help moisten nostrils. An antihistamine like zyrtec, claritin, allegra can also be helpful for sinus relief and will help dry nasal passages.     Regular (Guaifenesin) Mucinex 1200 mg twice per day for 10 days can help thin secretions for better clearance. Drink plenty of fluids with this.     Honey is a natural cough suppressant.     If you do have Hypertension or palpitations, it is safe to take Coricidin HBP  (multi-symptom flu) for relief of sinus symptoms.  Try DASH diet to help lower BP and buy a blood pressure cuff for home monitoring. Check blood pressure at least 2 times per day and create a log. Avoid eating foods that are high in salt. Eat more foods with potassium, magnesium and calcium which will help dilate your vessels and decrease your BP.     Warm tea/warm liquids will help soothe the back of your throat. Warm water salt gurgles can also be helpful. A dry throat will cause pain. Make sure to stay hydrated. Water and pedilyte are the best to drink. Neti pot irrigation, humidifier in your room, avoiding fans, warm compresses to face, eating/drinking hot soups, hot shower before bedtime can help.     The recommended daily fluid intake for women is 2.7 liters (five 16 oz bottles).      Alternate Tylenol and ibuprofen every 4 hours as needed for fever and body aches.  Please take NSAIDs with a full glass of water and food to avoid GI upset.      Please only use over the counter cough and cold medications for 3-5 days at a time to avoid rebound symptoms.     Getting plenty of rest can aid in a faster recovery of illnesses.     Please follow-up with your primary care provider or return to the clinic if not better/worsening symptoms in 1 week.     Report to the ER if you have chest pain, shortness of breath, palpitations.

## 2024-08-02 NOTE — PROGRESS NOTES
"Subjective:      Patient ID: Rosamaria Agosto is a 63 y.o. female.    Vitals:  height is 5' 9" (1.753 m) and weight is 88.5 kg (195 lb). Her oral temperature is 98.5 °F (36.9 °C). Her blood pressure is 123/84 and her pulse is 89. Her respiration is 18 and oxygen saturation is 97%.     Chief Complaint: Cough    64 yo female c/o fever (102F), dry cough (sometimes productive of yellow sputum), body aches, bilateral otalgia (itchy and aching), post nasal drip/sore throat, decreased appetite, headache, and heartburn. Pt reports difficulty breathing when bending over, causes dizziness. Mild SOB. Deep breathing causes a cough. Pt reports self medicating with augmentin (left over rx) and robutussin. Pt reports pain level is a 7. Symptoms started 2 days ago. Pt requests chest xray, especially concerned due to working around mold.    Cough  This is a new problem. The current episode started in the past 7 days. The problem has been unchanged. The problem occurs every few minutes. The cough is Productive of sputum. Associated symptoms include ear pain, a fever, headaches, heartburn, myalgias, postnasal drip, a sore throat and shortness of breath. Pertinent negatives include no chest pain, chills, ear congestion, hemoptysis, nasal congestion, rash, rhinorrhea, sweats, weight loss or wheezing. The symptoms are aggravated by other (mold; bending over). She has tried OTC cough suppressant for the symptoms. The treatment provided no relief. There is no history of asthma, bronchiectasis, bronchitis, COPD, emphysema, environmental allergies or pneumonia.       Constitution: Positive for fatigue and fever. Negative for chills.   HENT:  Positive for ear pain, postnasal drip and sore throat.    Cardiovascular:  Negative for chest pain.   Respiratory:  Positive for cough and shortness of breath. Negative for bloody sputum and wheezing.    Gastrointestinal:  Positive for heartburn. Negative for abdominal pain, nausea, vomiting and " diarrhea.   Musculoskeletal:  Positive for muscle ache.   Skin:  Negative for rash.   Allergic/Immunologic: Negative for environmental allergies.   Neurological:  Positive for headaches.      Objective:     Physical Exam   Constitutional: She is oriented to person, place, and time. She appears well-developed. She is cooperative.  Non-toxic appearance. She does not appear ill. No distress.   HENT:   Head: Normocephalic and atraumatic.   Ears:   Right Ear: Hearing, tympanic membrane, external ear and ear canal normal.   Left Ear: Hearing, tympanic membrane, external ear and ear canal normal.   Nose: Nose normal. No mucosal edema, rhinorrhea, nasal deformity or congestion. No epistaxis. Right sinus exhibits no maxillary sinus tenderness and no frontal sinus tenderness. Left sinus exhibits no maxillary sinus tenderness and no frontal sinus tenderness.   Mouth/Throat: Uvula is midline, oropharynx is clear and moist and mucous membranes are normal. No trismus in the jaw. Normal dentition. No uvula swelling. No oropharyngeal exudate, posterior oropharyngeal edema or posterior oropharyngeal erythema.   Eyes: Conjunctivae and lids are normal. Pupils are equal, round, and reactive to light. No scleral icterus.   Neck: Trachea normal and phonation normal. Neck supple. No edema present. No erythema present. No neck rigidity present.   Cardiovascular: Normal rate, regular rhythm, normal heart sounds and normal pulses.   Pulmonary/Chest: Effort normal and breath sounds normal. No stridor. No respiratory distress. She has no decreased breath sounds. She has no wheezes. She has no rhonchi. She has no rales.   Abdominal: Normal appearance.   Musculoskeletal: Normal range of motion.         General: No deformity. Normal range of motion.   Lymphadenopathy:     She has no cervical adenopathy.   Neurological: She is alert and oriented to person, place, and time. She exhibits normal muscle tone. Coordination normal.   Skin: Skin is warm,  dry, intact, not diaphoretic and not pale.   Psychiatric: Her speech is normal and behavior is normal. Judgment and thought content normal.   Nursing note and vitals reviewed.      Assessment:     1. COVID    2. Acute cough    3. Shortness of breath        Plan:     Results for orders placed or performed in visit on 08/02/24   SARS Coronavirus 2 Antigen, POCT Manual Read   Result Value Ref Range    SARS Coronavirus 2 Antigen Positive (A) Negative     Acceptable Yes      COVID risk score: 3     EXAMINATION:  XR CHEST PA AND LATERAL     CLINICAL HISTORY:  Shortness of breath     TECHNIQUE:  PA and lateral views of the chest were performed.     COMPARISON:  No 08/20/2021 ne     FINDINGS:  Heart size normal.  The lungs are clear.  No pleural effusion     Impression:     See above        Electronically signed by:Mejia Beavers MD  Date:                                            08/02/2024  Time:                                           10:16      COVID  -     nirmatrelvir-ritonavir 300 mg (150 mg x 2)-100 mg copackaged tablets (EUA); Take 3 tablets by mouth 2 (two) times daily for 5 days. Each dose contains 2 nirmatrelvir (pink tablets) and 1 ritonavir (white tablet). Take all 3 tablets together  Dispense: 30 tablet; Refill: 0    Acute cough  -     SARS Coronavirus 2 Antigen, POCT Manual Read  -     benzonatate (TESSALON) 100 MG capsule; Take 1 capsule (100 mg total) by mouth 3 (three) times daily as needed for Cough.  Dispense: 30 capsule; Refill: 0  -     promethazine-dextromethorphan (PROMETHAZINE-DM) 6.25-15 mg/5 mL Syrp; Take 5 mLs by mouth nightly as needed (cough).  Dispense: 118 mL; Refill: 0    Shortness of breath  -     XR CHEST PA AND LATERAL; Future; Expected date: 08/02/2024      30 minutes spent on patient's encounter. This includes face to face time and non-face to face time preparing to see the patient (eg, review of tests), obtaining and/or reviewing separately obtained history,  documenting clinical information in the electronic or other health record, independently interpreting results and communicating results to the patient/family/caregiver, or care coordinator.        Discussed results/diagnosis/plan with patient in clinic. Strict precautions given to patient to monitor for worsening signs and symptoms. Advised to follow up with PCP or specialist.  Explained side effects of medications prescribed with patient and informed him/her to discontinue use if he/she has any side effects and to inform UC or PCP if this occurs. All questions answered. Strict ED verses clinic return precautions stressed and given in depth. Advised if symptoms worsens of fail to improve he/she should go to the Emergency Room. Discharge and follow-up instructions given verbally/printed with the patient who expressed understanding and willingness to comply with my recommendations. Patient voiced understanding and in agreement with current treatment plan. Patient exits the exam room in no acute distress. Conversant and engaged during discharge discussion, verbalized understanding.

## 2024-08-03 ENCOUNTER — TELEPHONE (OUTPATIENT)
Dept: URGENT CARE | Facility: CLINIC | Age: 64
End: 2024-08-03
Payer: COMMERCIAL

## 2024-08-03 NOTE — TELEPHONE ENCOUNTER
I called pt @ 11:37 am 08/03/24 and informed pt of the rx paxlovid discount paxcess # phone number and pt verbalized understanding .

## 2024-08-03 NOTE — TELEPHONE ENCOUNTER
----- Message from Jerrod Ashton MA sent at 8/3/2024 11:08 AM CDT -----  Regarding: alternate medication  Pt requesting a call from the  provider call to discuss alternate medication. Pt states she can not afford the nirmatrelvir-ritonavir 300 mg prescribed on 8/2/24               Pt can be reached at   [phone]

## 2024-10-24 RX ORDER — HYDROCHLOROTHIAZIDE 25 MG/1
25 TABLET ORAL DAILY
Qty: 90 TABLET | Refills: 3 | Status: SHIPPED | OUTPATIENT
Start: 2024-10-24

## 2024-11-26 ENCOUNTER — TELEPHONE (OUTPATIENT)
Dept: SPORTS MEDICINE | Facility: CLINIC | Age: 64
End: 2024-11-26
Payer: COMMERCIAL

## 2024-11-26 NOTE — TELEPHONE ENCOUNTER
----- Message from Med Assistant Steele sent at 11/25/2024  4:13 PM CST -----  Regarding: FW: Injection  Contact: [phone]    ----- Message -----  From: Samantha Diane  Sent: 11/25/2024   2:46 PM CST  To: Melina Nettles Staff  Subject: Injection                                        Pt is calling in ref to her 11/27 injection appt with provider. Pt says if it is possible to schedule injection on Tues 11/26 she would like a sooner appt because she is off on that day. Patient Requesting Call Back @  [phone]

## 2024-11-27 ENCOUNTER — HOSPITAL ENCOUNTER (OUTPATIENT)
Dept: RADIOLOGY | Facility: HOSPITAL | Age: 64
Discharge: HOME | End: 2024-11-27
Attending: PHYSICIAN ASSISTANT
Payer: COMMERCIAL

## 2024-11-27 ENCOUNTER — OFFICE VISIT (OUTPATIENT)
Dept: SPORTS MEDICINE | Facility: CLINIC | Age: 64
End: 2024-11-27
Payer: COMMERCIAL

## 2024-11-27 VITALS
DIASTOLIC BLOOD PRESSURE: 85 MMHG | SYSTOLIC BLOOD PRESSURE: 145 MMHG | BODY MASS INDEX: 28.91 KG/M2 | HEART RATE: 69 BPM | WEIGHT: 195.19 LBS | HEIGHT: 69 IN

## 2024-11-27 DIAGNOSIS — M17.0 PRIMARY OSTEOARTHRITIS OF BOTH KNEES: ICD-10-CM

## 2024-11-27 DIAGNOSIS — M25.561 PAIN IN BOTH KNEES, UNSPECIFIED CHRONICITY: ICD-10-CM

## 2024-11-27 DIAGNOSIS — M25.562 PAIN IN BOTH KNEES, UNSPECIFIED CHRONICITY: ICD-10-CM

## 2024-11-27 DIAGNOSIS — M25.561 PAIN IN BOTH KNEES, UNSPECIFIED CHRONICITY: Primary | ICD-10-CM

## 2024-11-27 DIAGNOSIS — M25.562 PAIN IN BOTH KNEES, UNSPECIFIED CHRONICITY: Primary | ICD-10-CM

## 2024-11-27 PROCEDURE — 99999 PR PBB SHADOW E&M-EST. PATIENT-LVL V: CPT | Mod: PBBFAC,,, | Performed by: PHYSICIAN ASSISTANT

## 2024-11-27 RX ORDER — TRIAMCINOLONE ACETONIDE 40 MG/ML
40 INJECTION, SUSPENSION INTRA-ARTICULAR; INTRAMUSCULAR
Status: COMPLETED | OUTPATIENT
Start: 2024-11-27 | End: 2024-11-27

## 2024-11-27 RX ADMIN — TRIAMCINOLONE ACETONIDE 40 MG: 40 INJECTION, SUSPENSION INTRA-ARTICULAR; INTRAMUSCULAR at 10:11

## 2024-11-27 NOTE — PROGRESS NOTES
Subjective:          Chief Complaint: Rosamaria Agosto is a 64 y.o. female who presents to clinic for bilateral knee pain.    Interval Hx: Patient presents for follow-up s/p CSI of bilateral knee on 4/23/2024. Reports symptoms improved significantly with CSI and recently returned with no new LUCIO, similar to previously documented symptoms. Denies new injury or trauma.  Patient presents to discuss continued treatment options.    Interval Hx: Patient presents for follow-up s/p Monovisc injection of bilateral knee with Arina Corea 1 year ago. Reports symptoms improved significantly with viscosupplementation for a few months.. Denies new injury or trauma.  Patient presents to discuss continued treatment options.    She also reports new right heel pain today.  She does have appointment with Podiatry but will not be seen until August.  She reports her pain is posterior ankle and points to distal Achilles, aching, worse with standing for long periods of time.  Better with rest.      Previous HPI: Patient who works as a  for [employer] presents to clinic with bilateral knee pain. Left knee pain is worse than right knee. She has been taking diclofenac 75mg BID with some relief of pain, but she would like to switch to a different medication.  She complains of global tenderness. Denies instability and mechanical symptoms.  Patient is currently being seen by bariatrics and has been prescribed medication for weight loss.  She is currently working on weight loss. Left knee frequently swells toward the end of the day.  States that she has to stand for prolonged periods of time for work which increases her bilateral knee pain.  She is here today to discuss treatment options.  No previous history of injections including steroids or viscosupplementation.           Review of Systems   Constitutional: Negative. Negative for chills, fever, weight gain and weight loss.   HENT:  Negative for congestion and sore throat.     Eyes:  Negative for blurred vision and double vision.   Cardiovascular:  Negative for chest pain, leg swelling and palpitations.   Respiratory:  Negative for cough and shortness of breath.    Hematologic/Lymphatic: Does not bruise/bleed easily.   Skin:  Negative for itching, poor wound healing and rash.   Musculoskeletal:  Positive for joint pain, joint swelling and stiffness. Negative for back pain, muscle weakness and myalgias.   Gastrointestinal:  Negative for abdominal pain, constipation, diarrhea, nausea and vomiting.   Genitourinary: Negative.  Negative for frequency and hematuria.   Neurological:  Negative for dizziness, headaches, numbness, paresthesias and sensory change.   Psychiatric/Behavioral:  Negative for altered mental status and depression. The patient is not nervous/anxious.    Allergic/Immunologic: Negative for hives.                   Objective:        General: Rosamaria is well-developed, well-nourished, appears stated age, in no acute distress, alert and oriented to time, place and person.     General    Vitals reviewed.  Constitutional: She is oriented to person, place, and time. She appears well-developed and well-nourished. No distress.   HENT:   Head: Normocephalic and atraumatic.   Eyes: EOM are normal.   Cardiovascular:  Normal rate and regular rhythm.            Pulmonary/Chest: Effort normal. No respiratory distress.   Neurological: She is alert and oriented to person, place, and time. She has normal reflexes. No cranial nerve deficit. Coordination normal.   Psychiatric: She has a normal mood and affect. Her behavior is normal. Judgment and thought content normal.     General Musculoskeletal Exam   Gait: normal     Right Ankle/Foot Exam     Swelling   The patient is swollen on the Achilles insertion.    Tenderness   The patient is tender to palpation of the Achilles insertion.    Pain   The patient exhibits pain of the Achilles insertion.    Range of Motion   Ankle Joint   Dorsiflexion:   abnormal Right ankle dorsiflexion: limited by pain.  Plantar flexion:  normal   Reinoso Test:  negative      Right Knee Exam     Inspection   Erythema: absent  Scars: absent  Swelling: present  Effusion: present  Deformity: absent  Bruising: absent    Tenderness   The patient is experiencing no tenderness.     Crepitus   The patient has crepitus of the patella.    Range of Motion   Extension:  0 normal   Flexion:  130 normal     Tests   Meniscus   Lino:  Medial - negative Lateral - negative  Ligament Examination   Lachman: normal (-1 to 2mm)   PCL-Posterior Drawer: normal (0 to 2mm)     MCL - Valgus: normal (0 to 2mm)  LCL - Varus: normal  Pivot Shift: normal (Equal)  Reverse Pivot Shift: normal (Equal)  Posterolateral Corner: stable  Patella   Passive Patellar Tilt: neutral  Patellar Glide (quadrants): Lateral - 1   Medial - 2  Patellar Grind: negative    Other   Sensation: normal    Left Knee Exam     Inspection   Erythema: absent  Scars: absent  Swelling: present  Effusion: present  Deformity: absent  Bruising: absent    Tenderness   The patient tender to palpation of the medial joint line.    Crepitus   The patient has crepitus of the patella.    Range of Motion   Extension:  0 normal   Flexion:  130 normal     Tests   Meniscus   Lino:  Medial - negative Lateral - negative  Stability   Lachman: normal (-1 to 2mm)   PCL-Posterior Drawer: normal (0 to 2mm)  MCL - Valgus: normal (0 to 2mm)  LCL - Varus: normal (0 to 2mm)  Pivot Shift: normal (Equal)  Reverse Pivot Shift: normal (Equal)  Posterolateral Corner: stable  Patella   Passive Patellar Tilt: neutral  Patellar Glide (Quadrants): Lateral - 1 Medial - 2  Patellar Grind: negative    Other   Sensation: normal    Muscle Strength   Right Lower Extremity   Hip Abduction: 5/5   Quadriceps:  5/5   Hamstrin/5   Left Lower Extremity   Hip Abduction: 5/5   Quadriceps:  5/5   Hamstrin/5     Reflexes     Left Side  Achilles:  2+  Quadriceps:   2+    Right Side   Achilles:  2+  Quadriceps:  2+    Vascular Exam     Right Pulses  Dorsalis Pedis:      2+  Posterior Tibial:      2+        Left Pulses  Dorsalis Pedis:      2+  Posterior Tibial:      2+        Edema  Right Lower Leg: present  Left Lower Leg: present      Radiographs:  04/22/2022  Bilateral knees:  FINDINGS:  Bilateral ortho four views knees.     Left: No fracture dislocation bone destruction or OCD seen.     Right: No fracture dislocation bone destruction or OCD seen.        Assessment:       Encounter Diagnoses   Name Primary?    Primary osteoarthritis of both knees     Pain in both knees, unspecified chronicity Yes           Plan:       We have discussed a variety of treatment options including medications, injections, physical therapy and other alternative treatments. Pt is requesting repeat CSI and physical therapy at this time.    I made the decision to obtain old records of the patient including previous notes and imaging. I independently reviewed and interpreted lab results today as well as prior imaging.     1. Injection Procedure  A time out was performed, including verification of patient ID, procedure, site and side, availability of information and equipment, review of safety issues, and agreement with consent, the procedure site was marked.    After time out was performed, the patient was prepped aseptically with chloraprep swabsticks. A diagnostic and therapeutic injection of 1:4cc Kenalog/Marcaine was given under sterile technique using a 22g x 1.5 needle from the Superolateral  aspect of the bilateral Knee Joint in the supine position.      Rosamaria Agosto had no adverse reactions to the medication. Pain decreased. She was instructed to apply ice to the joint for 20 minutes and avoid strenuous activities for 24-36 hours following the injection. She was warned of possible blood sugar and/or blood pressure changes during that time. Following that time, she can resume regular  activities.    She was reminded to call the clinic immediately for any adverse side effects as explained in clinic today.    2. Diclofenac 75 mg BID, Flexeril 10 mg TID PRN  3. She was encouraged to re-initiate HEP.  4. Ambulatory referral to physical therapy for patellofemoral protocol.  5. We also discussed repeating viscosupplementation alternating with CSI.  She agreed to plan and will contact us when she is ready to repeat injections.  6. Plans to follow-up with foot and ankle specialist for possible surgical options in the future.  7. RTC to see Joselito Summers PA-C as needed for follow-up.    All of the patient's questions were answered and the patient will contact us if they have any questions or concerns in the interim.      Preauth sent for repeat Monovisc in 6 months        Patient questionnaires may have been collected.

## 2024-12-11 ENCOUNTER — TELEPHONE (OUTPATIENT)
Dept: INTERNAL MEDICINE | Facility: CLINIC | Age: 64
End: 2024-12-11
Payer: COMMERCIAL

## 2024-12-11 DIAGNOSIS — Z12.31 ENCOUNTER FOR SCREENING MAMMOGRAM FOR MALIGNANT NEOPLASM OF BREAST: Primary | ICD-10-CM

## 2024-12-11 NOTE — TELEPHONE ENCOUNTER
----- Message from Ely sent at 12/11/2024  4:20 PM CST -----  Type:  Patient Requesting Referral    Who Called:pt  Does the patient already have the specialty appointment scheduled?:no  If yes, what is the date of that appointment?:n/a  Referral to What Specialty:Mammo  Reason for Referral:mammo  Does the patient want the referral with a specific physician?:yes  [facility]  Patient Requesting a Response?:yes  Would the patient rather a call back or a response via [facility]? call  Best Call Back Number: [phone]  Additional Information:

## 2024-12-18 ENCOUNTER — HOSPITAL ENCOUNTER (OUTPATIENT)
Dept: RADIOLOGY | Facility: HOSPITAL | Age: 64
Discharge: HOME | End: 2024-12-18
Attending: FAMILY MEDICINE
Payer: COMMERCIAL

## 2024-12-18 DIAGNOSIS — Z12.31 ENCOUNTER FOR SCREENING MAMMOGRAM FOR MALIGNANT NEOPLASM OF BREAST: ICD-10-CM

## 2024-12-18 PROCEDURE — 77067 SCR MAMMO BI INCL CAD: CPT | Mod: TC,PO

## 2024-12-23 ENCOUNTER — OFFICE VISIT (OUTPATIENT)
Dept: INTERNAL MEDICINE | Facility: CLINIC | Age: 64
End: 2024-12-23
Payer: COMMERCIAL

## 2024-12-23 ENCOUNTER — TELEPHONE (OUTPATIENT)
Dept: INTERNAL MEDICINE | Facility: CLINIC | Age: 64
End: 2024-12-23
Payer: COMMERCIAL

## 2024-12-23 DIAGNOSIS — I10 ESSENTIAL HYPERTENSION: ICD-10-CM

## 2024-12-23 DIAGNOSIS — Z00.00 ANNUAL PHYSICAL EXAM: ICD-10-CM

## 2024-12-23 DIAGNOSIS — I87.2 VENOUS INSUFFICIENCY: Primary | ICD-10-CM

## 2024-12-23 DIAGNOSIS — G25.81 RESTLESS LEGS: ICD-10-CM

## 2024-12-23 PROCEDURE — 99214 OFFICE O/P EST MOD 30 MIN: CPT | Mod: 95,,, | Performed by: INTERNAL MEDICINE

## 2024-12-23 NOTE — TELEPHONE ENCOUNTER
----- Message from Ester Hanson MD sent at 12/23/2024  2:12 PM CST -----  1. Please schedule labs anytime.  Not fasting  2. I ordered a referral to vascular.  Please assist the patient with scheduling.    3.  Please schedule leg ultrasound.  Thank you!

## 2024-12-23 NOTE — PROGRESS NOTES
The patient location is: [place]  The chief complaint leading to consultation is: Varicose Veins  Visit type: Virtual visit with synchronous audio and video  Contact time spent with patient: 18 minutes  Each patient to whom he or she provides medical services by telemedicine is:  (1) informed of the relationship between the physician and patient and the respective role of any other health care provider with respect to management of the patient; and (2) notified that he or she may decline to receive medical services by telemedicine and may withdraw from such care at any time.    Subjective:       Patient ID: Rosamaria Agosto is a 64 y.o. female who  has a past medical history of Arthritis, Hyperlipidemia, Hypertension, and Sinusitis.    Chief Complaint: Varicose Veins     History was obtained from the patient and supplemented through chart review.  She is a patient of Bill Burns MD.  Was last seen  for lightheadedness.    HPI    BLE venous insufficiency:  US 7/2021 s DVT.   Right GSV reflux (above and below knee segments).  Left SSV reflux.  Bilateral calf  veins with associated reflux noted.    2/2017 US s DVT, but with bilateral reflux.   No h/o ABIs.    Has bilateral leg pain daily.  L>RLE.  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  She feels soreness at the anterior aspect of her b/l ankles and travels up her bilateral shins.  No calf pain.  Warm showers, massage helps.    Has some b/l ankle edema. Not bothersome.   Took a 7 hr car ride last week to [place]. Stopped twice.   Otherwise, no recent surgery or immobility. No personal or h/o clotting disorders.    Denies CP, SOB, CARTER, lightheadedness, palpitations. Had brief episode of dizziness that resolved.     Also reports significant Mani horse cramps; toes can feel tight when turning in bed. Has to get out of bed, walk.  Has relief with hot showers.     Tobacco:  never    No paresthesias.    Admits to dehydration, only drinking  1-2 bottle of water a day.  Exercise: constantly walking at work    Labs 6/2024 without anemia. Normal MCV. Lytes wnl.    Lab Results   Component Value Date    FERRITIN 175 06/23/2009     Lab Results   Component Value Date    UIBC 205 06/23/2009    IRON 155 06/23/2009    TIBC 360 06/23/2009    FESATURATED 43 06/23/2009      Lab Results   Component Value Date    CCJFZSNN45 861 06/23/2009     Lab Results   Component Value Date    FOLATE 15.1 06/23/2009     Lab Results   Component Value Date    TSH 2.069 06/05/2024    FREET4 1.03 03/20/2024     HTN:    HTN regimen:  HCTZ 25  Home BP: None  BP Readings from Last 3 Encounters:   11/27/24 (!) 145/85   08/02/24 123/84   06/05/24 120/80     Review of Systems   Constitutional:  Negative for activity change.   HENT:  Negative for hearing loss and trouble swallowing.    Eyes:  Negative for discharge.   Respiratory:  Negative for chest tightness and wheezing.    Cardiovascular:  Negative for chest pain and palpitations.   Gastrointestinal:  Negative for constipation, diarrhea and vomiting.   Genitourinary:  Negative for difficulty urinating and hematuria.   Neurological:  Negative for headaches.   Psychiatric/Behavioral:  Negative for dysphoric mood.        Past Medical History:   Diagnosis Date    Arthritis     Hyperlipidemia     Hypertension     Sinusitis      Past Surgical History:   Procedure Laterality Date    ARTHROSCOPIC REPAIR OF ROTATOR CUFF OF SHOULDER Left 07/24/2019    Procedure: REPAIR, ROTATOR CUFF, ARTHROSCOPIC;  Surgeon: ASMITA Soto MD;  Location: [address];  Service: Orthopedics;  Laterality: Left;    ARTHROSCOPY OF SHOULDER WITH DECOMPRESSION OF SUBACROMIAL SPACE Left 07/24/2019    Procedure: ARTHROSCOPY, SHOULDER, WITH SUBACROMIAL SPACE DECOMPRESSION;  Surgeon: ASMITA Soto MD;  Location: [address];  Service: Orthopedics;  Laterality: Left;    BILATERAL SALPINGO-OOPHORECTOMY (BSO)  06/24/2019    BREAST BIOPSY  24-25 years old     CYSTOSCOPY N/A 06/24/2019    Procedure: CYSTOSCOPY;  Surgeon: Anson Ellison MD;  Location: [facility] OR;  Service: OB/GYN;  Laterality: N/A;    ENCEPHALOCELE REPAIR  46    HYSTERECTOMY  1994    NASAL SINUS SURGERY      ROBOT-ASSISTED LAPAROSCOPIC ABDOMINAL SACROCOLPOPEXY USING DA MAC XI N/A 06/24/2019    Procedure: XI ROBOTIC SACROCOLPOPEXY, ABDOMINAL;  Surgeon: Anson Ellison MD;  Location: [facility] OR;  Service: OB/GYN;  Laterality: N/A;    SHOULDER ARTHROSCOPY Left 07/24/2019    Procedure: BICEPS TENODESIS;  Surgeon: ASMITA Soto MD;  Location: [facility] OR [address] [place] [address];  Service: Orthopedics;  Laterality: Left;    TUBAL LIGATION       Family History   Problem Relation Name Age of Onset    Stroke Maternal Grandmother      Diabetes Mother      Hypertension Mother      Diabetes Sister      Diabetes Sister      Breast cancer Neg Hx      Colon cancer Neg Hx      Ovarian cancer Neg Hx       Social History     Socioeconomic History    Marital status: Single   Tobacco Use    Smoking status: Never    Smokeless tobacco: Never   Substance and Sexual Activity    Alcohol use: Yes     Alcohol/week: 1.0 standard drink of alcohol     Types: 1 Shots of liquor per week     Comment: seldom    Drug use: No    Sexual activity: Yes     Partners: Male     Birth control/protection: Post-menopausal, See Surgical Hx     Comment: hysterectomy 20 yrs ago     Social Drivers of Health     Financial Resource Strain: Low Risk  (12/23/2024)    Overall Financial Resource Strain (CARDIA)     Difficulty of Paying Living Expenses: Not hard at all   Food Insecurity: Food Insecurity Present (12/23/2024)    Hunger Vital Sign     Worried About Running Out of Food in the Last Year: Never true     Ran Out of Food in the Last Year: Sometimes true   Transportation Needs: No Transportation Needs (8/7/2021)    PRAPARE - Transportation     Lack of Transportation (Medical): No     Lack of Transportation (Non-Medical): No   Physical Activity: Inactive (12/23/2024)     Exercise Vital Sign     Days of Exercise per Week: 0 days     Minutes of Exercise per Session: 10 min   Stress: Stress Concern Present (12/23/2024)    [employer] - Occupational Stress Questionnaire     Feeling of Stress : Rather much   Housing Stability: Unknown (12/23/2024)    Housing Stability Vital Sign     Unable to Pay for Housing in the Last Year: No     Objective:      There were no vitals filed for this visit.   Physical Exam  Constitutional:       General: She is not in acute distress.     Appearance: She is well-developed. She is not ill-appearing or diaphoretic.   HENT:      Head: Normocephalic.   Eyes:      General: No scleral icterus.        Right eye: No discharge.         Left eye: No discharge.   Pulmonary:      Effort: Pulmonary effort is normal. No respiratory distress.   Skin:     Coloration: Skin is not pale.      Findings: No erythema.      Comments: No visible leg/ankle edema. + varicose veins at her shins.   Neurological:      Mental Status: She is alert.   Psychiatric:         Behavior: Behavior normal.         Lab Results   Component Value Date    WBC 8.92 06/05/2024    HGB 14.0 06/05/2024    HCT 42.3 06/05/2024     06/05/2024    CHOL 226 (H) 06/20/2024    TRIG 90 06/20/2024    HDL 64 06/20/2024    ALT 10 03/20/2024    AST 14 03/20/2024     03/20/2024    K 4.3 03/20/2024     03/20/2024    CREATININE 0.7 03/20/2024    BUN 10 03/20/2024    CO2 26 03/20/2024    TSH 2.069 06/05/2024    INR 1.1 06/07/2019    HGBA1C 5.5 03/20/2024       The 10-year ASCVD risk score (Audi HEMPHILL, et al., 2019) is: 11.8%    Values used to calculate the score:      Age: 64 years      Sex: Female      Is Non- : Yes      Diabetic: No      Tobacco smoker: No      Systolic Blood Pressure: 145 mmHg      Is BP treated: Yes      HDL Cholesterol: 64 mg/dL      Total Cholesterol: 226 mg/dL    (Imaging have been independently reviewed)  7/2021 US s DVT.    Right GSV reflux (above and below knee segments).  Left SSV reflux.  Bilateral calf  veins with associated reflux noted.    Assessment:       1. Venous insufficiency    2. Restless legs    3. Essential hypertension    4. Annual physical exam      Plan:     Rosamaria was seen today for varicose veins.    Diagnoses and all orders for this visit:    Venous insufficiency  Comments:  Rec compression stockings qAM.  Repeat venous ultrasound.  Refer to vascular to discuss treatment options.  Orders:  -     Ambulatory referral/consult to Vascular Surgery; Future  -     US Lower Extremity Veins Bilateral; Future    Restless legs  Comments:  Lytes, TSH WNL. Rec increasing hydration, stretching, getting adequate sleep.  Check iron panel.  Orders:  -     Ambulatory referral/consult to Vascular Surgery; Future  -     Ferritin; Future  -     Iron and TIBC; Future    Essential hypertension  Comments:  Last BP reading was elevated during Sports Med visit.  No recent home BP. Cont HCTZ for edema.  Sending staff message to reach out in 1 wk for home BP reading.    Annual physical exam  -     Ferritin; Future  -     Iron and TIBC; Future     Side effects of medication(s) were discussed in detail and patient voiced understanding.  Patient will call back for any issues or complications.     RTC PRN.

## 2024-12-23 NOTE — TELEPHONE ENCOUNTER
All of the following completed, per Dr. Hanson:    ----- Message from Ester Hanson MD sent at 12/23/2024  2:12 PM CST -----  1. Please schedule labs anytime.  Not fasting  2. I ordered a referral to vascular.  Please assist the patient with scheduling.    3.  Please schedule leg ultrasound.  Thank you!        Pt pleasant and verbalized understanding.

## 2024-12-24 ENCOUNTER — HOSPITAL ENCOUNTER (OUTPATIENT)
Dept: RADIOLOGY | Facility: OTHER | Age: 64
Discharge: HOME | End: 2024-12-24
Attending: INTERNAL MEDICINE
Payer: COMMERCIAL

## 2024-12-24 DIAGNOSIS — I87.2 VENOUS INSUFFICIENCY: ICD-10-CM

## 2024-12-24 PROCEDURE — 93970 EXTREMITY STUDY: CPT | Mod: TC

## 2024-12-24 PROCEDURE — 93970 EXTREMITY STUDY: CPT | Mod: 26,,, | Performed by: RADIOLOGY

## 2024-12-30 ENCOUNTER — TELEPHONE (OUTPATIENT)
Dept: INTERNAL MEDICINE | Facility: CLINIC | Age: 64
End: 2024-12-30
Payer: COMMERCIAL

## 2024-12-30 VITALS — DIASTOLIC BLOOD PRESSURE: 83 MMHG | SYSTOLIC BLOOD PRESSURE: 120 MMHG

## 2024-12-30 DIAGNOSIS — Z00.00 ANNUAL PHYSICAL EXAM: Primary | ICD-10-CM

## 2024-12-30 NOTE — TELEPHONE ENCOUNTER
I called patient.     Yesterday. 120/83    I set up annual w/ labs prior with her.  Appt slips mailed for march.

## 2024-12-30 NOTE — TELEPHONE ENCOUNTER
----- Message from Med Assistant Rolon sent at 12/30/2024  9:34 AM CST -----    ----- Message -----  From: Ester Hanson MD  Sent: 12/30/2024   8:00 AM CST  To: Katy Khan Staff    Her last blood pressure number in our records was high.  Please ask the patient what her home blood pressure numbers have been.  Please add it in the remote BP vitals and let her PCP know. Thank you!

## 2025-02-04 ENCOUNTER — TELEPHONE (OUTPATIENT)
Dept: VASCULAR SURGERY | Facility: CLINIC | Age: 65
End: 2025-02-04
Payer: COMMERCIAL

## 2025-02-04 NOTE — TELEPHONE ENCOUNTER
Called and spoke with pt.regarding appt.scheduled on 2/26/2025 with vascular provider. Appt. cancelled due to provider unavailable. Pt. requesting her new pt.appt.from referral with  to be rescheduled at [facility] location since closer to home. Message sent to [facility] office to reach out to pt.and schedule. Pt.verbalized understanding.

## 2025-02-21 ENCOUNTER — INITIAL CONSULT (OUTPATIENT)
Dept: VASCULAR SURGERY | Facility: CLINIC | Age: 65
End: 2025-02-21
Payer: COMMERCIAL

## 2025-02-21 VITALS
DIASTOLIC BLOOD PRESSURE: 81 MMHG | HEART RATE: 68 BPM | BODY MASS INDEX: 28.93 KG/M2 | OXYGEN SATURATION: 99 % | HEIGHT: 69 IN | WEIGHT: 195.31 LBS | SYSTOLIC BLOOD PRESSURE: 115 MMHG

## 2025-02-21 DIAGNOSIS — I87.2 VENOUS INSUFFICIENCY: ICD-10-CM

## 2025-02-21 DIAGNOSIS — G25.81 RESTLESS LEGS: ICD-10-CM

## 2025-02-21 NOTE — PROGRESS NOTES
Bob Burkett MD, RPVI                                 [facility]                           [facility]                             02/21/2025    HPI:  Rosamaria Agosto is a 64 y.o. female with Problem List[1] being managed by PCP and specialists who is here today for evaluation of BLE edema.  Patient states location is BLE occurring for months.  Associated signs and symptoms include varicose veins.  Quality is heavy and severity is 6/10.  Symptoms began mo ago.  Alleviating factors include elevation.  Worsening factors include dependency.  Patient has been wearing compression stockings for greater than 3 months.  Symptoms do limit patient's functional status and daily activities.  no DVT history.  no venous interventions.  no low sodium diet.  no excessive water intake.    Migraine with aura: no  PFO/ASD/right to left shunt: no  Pregnant: no  Breastfeeding: no    no MI  no Stroke  Tobacco use: denies    Past Medical History:   Diagnosis Date    Arthritis     Hyperlipidemia     Hypertension     Sinusitis      Past Surgical History:   Procedure Laterality Date    ARTHROSCOPIC REPAIR OF ROTATOR CUFF OF SHOULDER Left 07/24/2019    Procedure: REPAIR, ROTATOR CUFF, ARTHROSCOPIC;  Surgeon: ASMITA Soto MD;  Location: [address];  Service: Orthopedics;  Laterality: Left;    ARTHROSCOPY OF SHOULDER WITH DECOMPRESSION OF SUBACROMIAL SPACE Left 07/24/2019    Procedure: ARTHROSCOPY, SHOULDER, WITH SUBACROMIAL SPACE DECOMPRESSION;  Surgeon: ASMITA Soto MD;  Location: [address];  Service: Orthopedics;  Laterality: Left;    BILATERAL SALPINGO-OOPHORECTOMY (BSO)  06/24/2019    BREAST BIOPSY  24-25 years old    CYSTOSCOPY N/A 06/24/2019    Procedure: CYSTOSCOPY;  Surgeon: Anson Ellison MD;  Location: [facility];  Service: OB/GYN;  Laterality: N/A;    ENCEPHALOCELE REPAIR  46    HYSTERECTOMY  1994    NASAL SINUS SURGERY      ROBOT-ASSISTED LAPAROSCOPIC ABDOMINAL  SACROCOLPOPEXY USING DA MAC XI N/A 06/24/2019    Procedure: XI ROBOTIC SACROCOLPOPEXY, ABDOMINAL;  Surgeon: Anson Ellison MD;  Location: [facility];  Service: OB/GYN;  Laterality: N/A;    SHOULDER ARTHROSCOPY Left 07/24/2019    Procedure: BICEPS TENODESIS;  Surgeon: ASMITA Soto MD;  [facility];  Service: Orthopedics;  Laterality: Left;    TUBAL LIGATION       Family History   Problem Relation Name Age of Onset    Stroke Maternal Grandmother      Diabetes Mother      Hypertension Mother      Diabetes Sister      Diabetes Sister      Breast cancer Neg Hx      Colon cancer Neg Hx      Ovarian cancer Neg Hx       Social History[2]  Current Medications[3]    REVIEW OF SYSTEMS:  General: No fevers or chills; ENT: No sore throat; Allergy and Immunology: no persistent infections; Hematological and Lymphatic: No history of bleeding or easy bruising; Endocrine: negative; Respiratory: no cough, shortness of breath, or wheezing; Cardiovascular: no chest pain or dyspnea on exertion; Gastrointestinal: no abdominal pain/back, change in bowel habits, or bloody stools; Genito-Urinary: no dysuria, trouble voiding, or hematuria; Musculoskeletal: edema; Neurological: no TIA or stroke symptoms; Psychiatric: no nervousness, anxiety or depression.    PHYSICAL EXAM:      Pulse: 68         General appearance:  Alert, well-appearing, and in no distress.  Oriented to person, place, and time                    Neurological: Normal speech, no focal findings noted; CN II - XII grossly intact. RLE with sensation to light touch, LLE with sensation to light touch.            Musculoskeletal: Digits/nail without cyanosis/clubbing.  Strength 5/5 BLE.                    Neck: Supple, no significant adenopathy                  Chest:  No wheezes, symmetric air entry. No use of accessory muscles               Cardiac: Normal rate and regular rhythm            Abdomen: Soft, nontender, nondistended      Extremities:   1+ R DP  "pulse, 1+ L DP pulse      1+ RLE edema, 1+ LLE edema    Skin:  RLE no ulcer; LLE no ulcer      RLE no spider veins, LLE no spider veins      RLE + varicose veins, LLE + varicose veins    CEAP 3/3    VCSS 5    LAB RESULTS:  No results found for: "CBC"  Lab Results   Component Value Date    LABPROT 11.3 06/07/2019    INR 1.1 06/07/2019     Lab Results   Component Value Date     03/20/2024    K 4.3 03/20/2024     03/20/2024    CO2 26 03/20/2024    GLU 98 03/20/2024    BUN 10 03/20/2024    CREATININE 0.7 03/20/2024    CALCIUM 9.7 03/20/2024    ANIONGAP 10 03/20/2024    EGFRNONAA >60 12/16/2021     Lab Results   Component Value Date    WBC 8.92 06/05/2024    RBC 4.61 06/05/2024    HGB 14.0 06/05/2024    HCT 42.3 06/05/2024    MCV 92 06/05/2024    MCH 30.4 06/05/2024    MCHC 33.1 06/05/2024    RDW 13.6 06/05/2024     06/05/2024    MPV 10.0 06/05/2024    GRAN 5.7 06/05/2024    GRAN 63.4 06/05/2024    LYMPH 2.3 06/05/2024    LYMPH 26.1 06/05/2024    MONO 0.7 06/05/2024    MONO 7.3 06/05/2024    EOS 0.2 06/05/2024    BASO 0.07 06/05/2024    EOSINOPHIL 2.1 06/05/2024    BASOPHIL 0.8 06/05/2024    DIFFMETHOD Automated 06/05/2024     .  Lab Results   Component Value Date    HGBA1C 5.5 03/20/2024       IMAGING:  All pertinent imaging has been reviewed and interpreted independently.    Venous US 12/2024 Impression:  FINDINGS:  All veins demonstrate normal flow, and compressibility.  No popliteal fossa mass, cyst, aneurysm seen.  No DVT seen.     Right veins:     GSV junction 7.6 mm.     GSV proximal thigh 5.5 mm.     GSV mid thigh 4.1 mm.     GSV distal thigh 3.5 mm.  Reflux time 3322 milliseconds     GSV below knee 3 mm.  Reflux time 1172 milliseconds     GSV mid calf 5.5 mm.  Reflux time 3418 milliseconds.     LSV proximal 3.8 mm.     LSV mid calf 2.7 mm.  Reflux time 1116 milliseconds.     A SV proximal thigh 2.4 mm.     Left veins:     GSV junction 8.9 mm.     GSV proximal thigh 6.1 mm.     GSV mid calf 2.2 " mm.     GSV distal thigh 3.1 mm.     GSV below knee 3.0 mm.  Reflux time 2020 milliseconds.     GSV mid calf 1.5 mm.     LSV proximal thigh 2.4 mm.     LSV mid calf 1.4 mm.     A SV proximal thigh 2.2 mm.     Impression:     No DVT seen bilaterally.     Bilateral reflux.    IMP/PLAN:  64 y.o. female with Problem List[4] being managed by PCP and specialists who is here today for evaluation of BLE edema and varicose veins.    -recommend compression with Rx stockings, elevation, dietary changes associated with water and sodium intake discussed at length with patient  -Exercise   -Rec R GSV EVLT  -Possible future R SSV EVLT and L GSV calf EVLT    I spent 12 minutes evaluating this patient and greater than 50% of the time was spent counseling, coordinator care and discussing the plan of care.  All questions were answered and patient stated understanding with agreement with the above treatment plan.    Bob Burkett MD RPVI  Vascular and Endovascular Surgery             [1]   Patient Active Problem List  Diagnosis    Pain of lower extremity    Venous insufficiency    Hyperlipidemia    Essential hypertension    Class 1 obesity due to excess calories with serious comorbidity and body mass index (BMI) of 30.0 to 30.9 in adult    Osteoarthritis    Vitamin D deficiency    Chronic sinusitis    Deviated nasal septum    Nasal turbinate hypertrophy    Facial edema   [2]   Social History  Socioeconomic History    Marital status: Single   Tobacco Use    Smoking status: Never    Smokeless tobacco: Never   Substance and Sexual Activity    Alcohol use: Yes     Alcohol/week: 1.0 standard drink of alcohol     Types: 1 Shots of liquor per week     Comment: seldom    Drug use: No    Sexual activity: Yes     Partners: Male     Birth control/protection: Post-menopausal, See Surgical Hx     Comment: hysterectomy 20 yrs ago     Social Drivers of Health     Financial Resource Strain: Low Risk  (12/23/2024)    Overall Financial Resource  Strain (CARDIA)     Difficulty of Paying Living Expenses: Not hard at all   Food Insecurity: Food Insecurity Present (12/23/2024)    Hunger Vital Sign     Worried About Running Out of Food in the Last Year: Never true     Ran Out of Food in the Last Year: Sometimes true   Transportation Needs: No Transportation Needs (8/7/2021)    PRAPARE - Transportation     Lack of Transportation (Medical): No     Lack of Transportation (Non-Medical): No   Physical Activity: Inactive (12/23/2024)    Exercise Vital Sign     Days of Exercise per Week: 0 days     Minutes of Exercise per Session: 10 min   Stress: Stress Concern Present (12/23/2024)    [employer] - Occupational Stress Questionnaire     Feeling of Stress : Rather much   Housing Stability: Unknown (12/23/2024)    Housing Stability Vital Sign     Unable to Pay for Housing in the Last Year: No   [3]   Current Outpatient Medications:     cyclobenzaprine (FLEXERIL) 10 MG tablet, Take 1 tablet (10 mg total) by mouth 3 (three) times daily as needed for Muscle spasms., Disp: 90 tablet, Rfl: 1    diclofenac (VOLTAREN) 75 MG EC tablet, Take 1 tablet (75 mg total) by mouth 2 (two) times daily., Disp: 60 tablet, Rfl: 2    fluticasone propionate (FLONASE) 50 mcg/actuation nasal spray, 2 sprays (100 mcg total) by Each Nostril route once daily., Disp: 16 g, Rfl: 11    hydroCHLOROthiazide (HYDRODIURIL) 25 MG tablet, Take 1 tablet (25 mg total) by mouth once daily., Disp: 90 tablet, Rfl: 3    rosuvastatin (CRESTOR) 20 MG tablet, Take 1 tablet (20 mg total) by mouth every evening., Disp: 90 tablet, Rfl: 3    traZODone (DESYREL) 50 MG tablet, Take 0.5-1 tablets (25-50 mg total) by mouth nightly as needed for Insomnia., Disp: 30 tablet, Rfl: 11    albuterol (PROVENTIL HFA) 90 mcg/actuation inhaler, Inhale 2 puffs into the lungs every 6 (six) hours as needed for Wheezing or Shortness of Breath. Rescue (Patient not taking: Reported on 2/21/2025), Disp: 18 g,  Rfl: 0    azelastine (ASTELIN) 137 mcg (0.1 %) nasal spray, 1 spray (137 mcg total) by Nasal route 2 (two) times daily. (Patient not taking: Reported on 2/21/2025), Disp: 30 mL, Rfl: 11    cetirizine (ZYRTEC) 10 MG tablet, Take 1 tablet (10 mg total) by mouth once daily. for 14 days (Patient not taking: Reported on 12/23/2024), Disp: 14 tablet, Rfl: 0    econazole nitrate 1 % cream, Use bid (Patient not taking: Reported on 2/21/2025), Disp: 30 g, Rfl: 2    estradioL (VAGIFEM) 10 mcg Tab, Place 1 tablet (10 mcg total) vaginally twice a week. (Patient not taking: Reported on 8/2/2024), Disp: 8 tablet, Rfl: 12    famotidine (PEPCID) 20 MG tablet, Take 1 tablet (20 mg total) by mouth 2 (two) times daily. for 14 days (Patient not taking: Reported on 12/23/2024), Disp: 28 tablet, Rfl: 0    fluocinolone (DERMA-SMOOTHE) 0.01 % external oil, Apply topically 3 (three) times daily. for 14 days (Patient not taking: Reported on 12/23/2024), Disp: 118.28 mL, Rfl: 1    fluocinolone and shower cap (DERMA-SMOOTHE/FS SCALP OIL) 0.01 % Oil, Apply oil to damp scalp nightly and cover with shower cap. (Patient not taking: Reported on 2/21/2025), Disp: 1 Bottle, Rfl: 3    hydrocortisone 2.5 % cream, Apply topically 2 (two) times daily. for 14 days (Patient not taking: Reported on 12/23/2024), Disp: 28 g, Rfl: 1    leg brace (ANKLE BRACE) Misc, 1 each by Misc.(Non-Drug; Combo Route) route Daily. (Patient not taking: Reported on 2/21/2025), Disp: 1 each, Rfl: 0    LIDOcaine HCL 2% (XYLOCAINE) 2 % jelly, Apply topically 2 (two) times daily. P.r.n. (Patient not taking: Reported on 2/21/2025), Disp: 30 mL, Rfl: 0    neomycin-polymyxin-hydrocortisone (CORTISPORIN) 3.5-10,000-1 mg/mL-unit/mL-% otic suspension, Place 3 drops into the left ear 4 (four) times daily. (Patient not taking: Reported on 2/21/2025), Disp: 10 mL, Rfl: 0    olopatadine (PATANOL) 0.1 % ophthalmic solution, Place 1 drop into both eyes 2 (two) times daily. (Patient not  taking: Reported on 2/21/2025), Disp: , Rfl:     promethazine-dextromethorphan (PROMETHAZINE-DM) 6.25-15 mg/5 mL Syrp, Take 5 mLs by mouth nightly as needed (cough). (Patient not taking: Reported on 2/21/2025), Disp: 118 mL, Rfl: 0    topiramate (TOPAMAX) 50 MG tablet, Take 1 tablet (50 mg total) by mouth 2 (two) times daily. (Patient not taking: Reported on 12/23/2024), Disp: 180 tablet, Rfl: 0    triamcinolone acetonide 0.1% (KENALOG) 0.1 % cream, Apply topically 2 (two) times daily. for 14 days (Patient not taking: Reported on 12/23/2024), Disp: 15 g, Rfl: 0  [4]   Patient Active Problem List  Diagnosis    Pain of lower extremity    Venous insufficiency    Hyperlipidemia    Essential hypertension    Class 1 obesity due to excess calories with serious comorbidity and body mass index (BMI) of 30.0 to 30.9 in adult    Osteoarthritis    Vitamin D deficiency    Chronic sinusitis    Deviated nasal septum    Nasal turbinate hypertrophy    Facial edema

## 2025-02-25 DIAGNOSIS — G25.81 RESTLESS LEGS: Primary | ICD-10-CM

## 2025-02-25 DIAGNOSIS — M62.838 MUSCLE SPASMS OF LOWER EXTREMITY: ICD-10-CM

## 2025-02-27 DIAGNOSIS — G25.81 RESTLESS LEGS: ICD-10-CM

## 2025-02-27 DIAGNOSIS — I87.2 VENOUS INSUFFICIENCY: Primary | ICD-10-CM

## 2025-02-27 DIAGNOSIS — M62.838 MUSCLE SPASMS OF LOWER EXTREMITY: ICD-10-CM

## 2025-02-28 ENCOUNTER — TELEPHONE (OUTPATIENT)
Dept: VASCULAR SURGERY | Facility: CLINIC | Age: 65
End: 2025-02-28
Payer: COMMERCIAL

## 2025-02-28 DIAGNOSIS — M79.606 PAIN AND SWELLING OF LOWER EXTREMITY, UNSPECIFIED LATERALITY: Primary | ICD-10-CM

## 2025-02-28 DIAGNOSIS — M79.89 PAIN AND SWELLING OF LOWER EXTREMITY, UNSPECIFIED LATERALITY: Primary | ICD-10-CM

## 2025-03-06 ENCOUNTER — TELEPHONE (OUTPATIENT)
Dept: VASCULAR SURGERY | Facility: CLINIC | Age: 65
End: 2025-03-06
Payer: COMMERCIAL

## 2025-03-14 ENCOUNTER — LAB VISIT (OUTPATIENT)
Dept: LAB | Facility: HOSPITAL | Age: 65
End: 2025-03-14
Attending: FAMILY MEDICINE
Payer: COMMERCIAL

## 2025-03-14 DIAGNOSIS — Z00.00 ANNUAL PHYSICAL EXAM: ICD-10-CM

## 2025-03-14 LAB
25(OH)D3+25(OH)D2 SERPL-MCNC: 12 NG/ML (ref 30–96)
ALBUMIN SERPL BCP-MCNC: 4.1 G/DL (ref 3.5–5.2)
ALP SERPL-CCNC: 81 U/L (ref 40–150)
ALT SERPL W/O P-5'-P-CCNC: 10 U/L (ref 10–44)
ANION GAP SERPL CALC-SCNC: 10 MMOL/L (ref 8–16)
AST SERPL-CCNC: 15 U/L (ref 10–40)
BASOPHILS # BLD AUTO: 0.07 K/UL (ref 0–0.2)
BASOPHILS NFR BLD: 1 % (ref 0–1.9)
BILIRUB SERPL-MCNC: 0.6 MG/DL (ref 0.1–1)
BUN SERPL-MCNC: 16 MG/DL (ref 8–23)
CALCIUM SERPL-MCNC: 9.8 MG/DL (ref 8.7–10.5)
CHLORIDE SERPL-SCNC: 104 MMOL/L (ref 95–110)
CHOLEST SERPL-MCNC: 306 MG/DL (ref 120–199)
CHOLEST/HDLC SERPL: 5.1 {RATIO} (ref 2–5)
CO2 SERPL-SCNC: 26 MMOL/L (ref 23–29)
CREAT SERPL-MCNC: 0.7 MG/DL (ref 0.5–1.4)
DIFFERENTIAL METHOD BLD: NORMAL
EOSINOPHIL # BLD AUTO: 0.2 K/UL (ref 0–0.5)
EOSINOPHIL NFR BLD: 3.2 % (ref 0–8)
ERYTHROCYTE [DISTWIDTH] IN BLOOD BY AUTOMATED COUNT: 13.7 % (ref 11.5–14.5)
EST. GFR  (NO RACE VARIABLE): >60 ML/MIN/1.73 M^2
ESTIMATED AVG GLUCOSE: 108 MG/DL (ref 68–131)
GLUCOSE SERPL-MCNC: 102 MG/DL (ref 70–110)
HBA1C MFR BLD: 5.4 % (ref 4–5.6)
HCT VFR BLD AUTO: 45.3 % (ref 37–48.5)
HDLC SERPL-MCNC: 60 MG/DL (ref 40–75)
HDLC SERPL: 19.6 % (ref 20–50)
HGB BLD-MCNC: 14.6 G/DL (ref 12–16)
IMM GRANULOCYTES # BLD AUTO: 0.02 K/UL (ref 0–0.04)
IMM GRANULOCYTES NFR BLD AUTO: 0.3 % (ref 0–0.5)
LDLC SERPL CALC-MCNC: 223.8 MG/DL (ref 63–159)
LYMPHOCYTES # BLD AUTO: 2.1 K/UL (ref 1–4.8)
LYMPHOCYTES NFR BLD: 29.7 % (ref 18–48)
MCH RBC QN AUTO: 30.4 PG (ref 27–31)
MCHC RBC AUTO-ENTMCNC: 32.2 G/DL (ref 32–36)
MCV RBC AUTO: 94 FL (ref 82–98)
MONOCYTES # BLD AUTO: 0.6 K/UL (ref 0.3–1)
MONOCYTES NFR BLD: 8.7 % (ref 4–15)
NEUTROPHILS # BLD AUTO: 4.1 K/UL (ref 1.8–7.7)
NEUTROPHILS NFR BLD: 57.1 % (ref 38–73)
NONHDLC SERPL-MCNC: 246 MG/DL
NRBC BLD-RTO: 0 /100 WBC
PLATELET # BLD AUTO: 358 K/UL (ref 150–450)
PMV BLD AUTO: 10.1 FL (ref 9.2–12.9)
POTASSIUM SERPL-SCNC: 4.6 MMOL/L (ref 3.5–5.1)
PROT SERPL-MCNC: 7.9 G/DL (ref 6–8.4)
RBC # BLD AUTO: 4.81 M/UL (ref 4–5.4)
SODIUM SERPL-SCNC: 140 MMOL/L (ref 136–145)
T4 FREE SERPL-MCNC: 1.03 NG/DL (ref 0.71–1.51)
TRIGL SERPL-MCNC: 111 MG/DL (ref 30–150)
TSH SERPL DL<=0.005 MIU/L-ACNC: 1.46 UIU/ML (ref 0.4–4)
WBC # BLD AUTO: 7.16 K/UL (ref 3.9–12.7)

## 2025-03-14 PROCEDURE — 36415 COLL VENOUS BLD VENIPUNCTURE: CPT | Mod: PO | Performed by: FAMILY MEDICINE

## 2025-03-14 PROCEDURE — 83036 HEMOGLOBIN GLYCOSYLATED A1C: CPT | Performed by: FAMILY MEDICINE

## 2025-03-14 PROCEDURE — 84439 ASSAY OF FREE THYROXINE: CPT | Performed by: FAMILY MEDICINE

## 2025-03-14 PROCEDURE — 85025 COMPLETE CBC W/AUTO DIFF WBC: CPT | Performed by: FAMILY MEDICINE

## 2025-03-14 PROCEDURE — 84443 ASSAY THYROID STIM HORMONE: CPT | Performed by: FAMILY MEDICINE

## 2025-03-14 PROCEDURE — 80053 COMPREHEN METABOLIC PANEL: CPT | Performed by: FAMILY MEDICINE

## 2025-03-14 PROCEDURE — 80061 LIPID PANEL: CPT | Performed by: FAMILY MEDICINE

## 2025-03-14 PROCEDURE — 82306 VITAMIN D 25 HYDROXY: CPT | Performed by: FAMILY MEDICINE

## 2025-03-16 ENCOUNTER — PATIENT MESSAGE (OUTPATIENT)
Dept: INTERNAL MEDICINE | Facility: CLINIC | Age: 65
End: 2025-03-16
Payer: COMMERCIAL

## 2025-03-16 DIAGNOSIS — I10 ESSENTIAL HYPERTENSION: ICD-10-CM

## 2025-03-16 DIAGNOSIS — E78.2 MIXED HYPERLIPIDEMIA: ICD-10-CM

## 2025-03-16 DIAGNOSIS — E55.9 VITAMIN D DEFICIENCY: Primary | ICD-10-CM

## 2025-03-16 RX ORDER — ERGOCALCIFEROL 1.25 MG/1
50000 CAPSULE ORAL
Qty: 12 CAPSULE | Refills: 0 | Status: SHIPPED | OUTPATIENT
Start: 2025-03-16

## 2025-03-16 NOTE — TELEPHONE ENCOUNTER
You schedule a repeat vitamin-D level and fasting lipid panel in 3 months.  Thank you.   What Is The Reason For Today's Visit?: History of Melanoma Additional History: 3 month FBSE\\nLast skin exam May 2024\\n Year Excised?: Mohs 12/2022

## 2025-03-22 DIAGNOSIS — M25.562 PAIN IN BOTH KNEES, UNSPECIFIED CHRONICITY: ICD-10-CM

## 2025-03-22 DIAGNOSIS — M25.561 PAIN IN BOTH KNEES, UNSPECIFIED CHRONICITY: ICD-10-CM

## 2025-03-22 DIAGNOSIS — M17.0 PRIMARY OSTEOARTHRITIS OF BOTH KNEES: ICD-10-CM

## 2025-03-24 RX ORDER — DICLOFENAC SODIUM 75 MG/1
75 TABLET, DELAYED RELEASE ORAL 2 TIMES DAILY
Qty: 60 TABLET | Refills: 2 | Status: SHIPPED | OUTPATIENT
Start: 2025-03-24

## 2025-04-02 ENCOUNTER — OFFICE VISIT (OUTPATIENT)
Dept: INTERNAL MEDICINE | Facility: CLINIC | Age: 65
End: 2025-04-02
Payer: COMMERCIAL

## 2025-04-02 ENCOUNTER — OFFICE VISIT (OUTPATIENT)
Dept: CARDIOLOGY | Facility: CLINIC | Age: 65
End: 2025-04-02
Payer: COMMERCIAL

## 2025-04-02 VITALS
SYSTOLIC BLOOD PRESSURE: 130 MMHG | HEIGHT: 69 IN | BODY MASS INDEX: 28.37 KG/M2 | WEIGHT: 191.56 LBS | RESPIRATION RATE: 18 BRPM | OXYGEN SATURATION: 97 % | DIASTOLIC BLOOD PRESSURE: 80 MMHG | HEART RATE: 65 BPM | TEMPERATURE: 98 F

## 2025-04-02 VITALS
WEIGHT: 192.44 LBS | DIASTOLIC BLOOD PRESSURE: 82 MMHG | HEIGHT: 69 IN | HEART RATE: 76 BPM | SYSTOLIC BLOOD PRESSURE: 136 MMHG | BODY MASS INDEX: 28.5 KG/M2

## 2025-04-02 DIAGNOSIS — G25.81 RESTLESS LEGS: ICD-10-CM

## 2025-04-02 DIAGNOSIS — E66.811 CLASS 1 OBESITY DUE TO EXCESS CALORIES WITH SERIOUS COMORBIDITY AND BODY MASS INDEX (BMI) OF 30.0 TO 30.9 IN ADULT: ICD-10-CM

## 2025-04-02 DIAGNOSIS — G47.00 INSOMNIA, UNSPECIFIED TYPE: ICD-10-CM

## 2025-04-02 DIAGNOSIS — I10 ESSENTIAL HYPERTENSION: ICD-10-CM

## 2025-04-02 DIAGNOSIS — R06.02 SOB (SHORTNESS OF BREATH) ON EXERTION: Primary | ICD-10-CM

## 2025-04-02 DIAGNOSIS — I87.2 VENOUS INSUFFICIENCY: ICD-10-CM

## 2025-04-02 DIAGNOSIS — E78.2 MIXED HYPERLIPIDEMIA: ICD-10-CM

## 2025-04-02 DIAGNOSIS — E55.9 VITAMIN D DEFICIENCY: ICD-10-CM

## 2025-04-02 DIAGNOSIS — Z23 NEED FOR PNEUMOCOCCAL 20-VALENT CONJUGATE VACCINATION: ICD-10-CM

## 2025-04-02 DIAGNOSIS — R09.82 PND (POST-NASAL DRIP): ICD-10-CM

## 2025-04-02 DIAGNOSIS — E66.09 CLASS 1 OBESITY DUE TO EXCESS CALORIES WITH SERIOUS COMORBIDITY AND BODY MASS INDEX (BMI) OF 30.0 TO 30.9 IN ADULT: ICD-10-CM

## 2025-04-02 DIAGNOSIS — Z00.00 WELL ADULT EXAM: Primary | ICD-10-CM

## 2025-04-02 PROCEDURE — 99999 PR PBB SHADOW E&M-EST. PATIENT-LVL IV: CPT | Mod: PBBFAC,,, | Performed by: INTERNAL MEDICINE

## 2025-04-02 PROCEDURE — 99396 PREV VISIT EST AGE 40-64: CPT | Mod: 25,S$GLB,, | Performed by: FAMILY MEDICINE

## 2025-04-02 PROCEDURE — 90677 PCV20 VACCINE IM: CPT | Mod: S$GLB,,, | Performed by: FAMILY MEDICINE

## 2025-04-02 PROCEDURE — 90471 IMMUNIZATION ADMIN: CPT | Mod: S$GLB,,, | Performed by: FAMILY MEDICINE

## 2025-04-02 PROCEDURE — 99999 PR PBB SHADOW E&M-EST. PATIENT-LVL V: CPT | Mod: PBBFAC,,, | Performed by: FAMILY MEDICINE

## 2025-04-02 RX ORDER — ERGOCALCIFEROL 1.25 MG/1
50000 CAPSULE ORAL
Qty: 12 CAPSULE | Refills: 3 | Status: SHIPPED | OUTPATIENT
Start: 2025-04-02

## 2025-04-02 RX ORDER — HYDROXYZINE HYDROCHLORIDE 25 MG/1
25 TABLET, FILM COATED ORAL NIGHTLY PRN
Qty: 90 TABLET | Refills: 3 | Status: SHIPPED | OUTPATIENT
Start: 2025-04-02

## 2025-04-02 RX ORDER — FLUTICASONE PROPIONATE 50 MCG
2 SPRAY, SUSPENSION (ML) NASAL DAILY
Qty: 16 G | Refills: 11 | Status: SHIPPED | OUTPATIENT
Start: 2025-04-02

## 2025-04-02 RX ORDER — ROSUVASTATIN CALCIUM 20 MG/1
20 TABLET, COATED ORAL NIGHTLY
Qty: 90 TABLET | Refills: 3 | Status: SHIPPED | OUTPATIENT
Start: 2025-04-02 | End: 2026-04-02

## 2025-04-02 RX ORDER — AZELASTINE 1 MG/ML
1 SPRAY, METERED NASAL 2 TIMES DAILY
Qty: 30 ML | Refills: 11 | Status: SHIPPED | OUTPATIENT
Start: 2025-04-02 | End: 2026-04-02

## 2025-04-02 RX ORDER — HYDROCHLOROTHIAZIDE 25 MG/1
25 TABLET ORAL DAILY
Qty: 90 TABLET | Refills: 3 | Status: SHIPPED | OUTPATIENT
Start: 2025-04-02

## 2025-04-02 NOTE — PROGRESS NOTES
[facility]      Chief Complaint   Patient presents with    mixed hyperlipidemia    Hypertension       Patient ID: Rosamaria Agosto is a 64 y.o. female with HTN, HLD, venous insufficiency, overweight, who presents for an initial appointment. Pertinent history/events are as follows:     -Pt kindly referred by Dr. Burns for evaluation of HTN, HLD.     -Pt followed by Dr. Kitchen in the past.     HPI:  Ms. Agosto reports SOB on exertion for the past 1 year. She has no chest pain or chest discomfort. No smoking history. No family history of CAD/MI or sudden death. She is mourning the passing of her mother 1 year ago this March. Works as .  States her job is stressful.     Past Medical History:   Diagnosis Date    Arthritis     Hyperlipidemia     Hypertension     Sinusitis      Past Surgical History:   Procedure Laterality Date    ARTHROSCOPIC REPAIR OF ROTATOR CUFF OF SHOULDER Left 07/24/2019    Procedure: REPAIR, ROTATOR CUFF, ARTHROSCOPIC;  Surgeon: ASMITA oSto MD;  Location: [facility] OR [address] [place] [address];  Service: Orthopedics;  Laterality: Left;    ARTHROSCOPY OF SHOULDER WITH DECOMPRESSION OF SUBACROMIAL SPACE Left 07/24/2019    Procedure: ARTHROSCOPY, SHOULDER, WITH SUBACROMIAL SPACE DECOMPRESSION;  Surgeon: ASMITA Soto MD;  Location: [facility] OR [address] [place] [address];  Service: Orthopedics;  Laterality: Left;    BILATERAL SALPINGO-OOPHORECTOMY (BSO)  06/24/2019    BREAST BIOPSY  24-25 years old    CYSTOSCOPY N/A 06/24/2019    Procedure: CYSTOSCOPY;  Surgeon: Anson Ellison MD;  Location: [facility];  Service: OB/GYN;  Laterality: N/A;    ENCEPHALOCELE REPAIR  46    HYSTERECTOMY  1994    NASAL SINUS SURGERY      ROBOT-ASSISTED LAPAROSCOPIC ABDOMINAL SACROCOLPOPEXY USING DA MAC XI N/A 06/24/2019    Procedure: XI ROBOTIC SACROCOLPOPEXY, ABDOMINAL;  Surgeon: Anson Ellison MD;  Location: [facility] OR;  Service: OB/GYN;  Laterality: N/A;    SHOULDER ARTHROSCOPY Left 07/24/2019    Procedure: BICEPS  RAUL;  Surgeon: ASMITA Soto MD;  Location: [facility] OR [address] [place] [address];  Service: Orthopedics;  Laterality: Left;    TUBAL LIGATION       Social History[1]  Family History   Problem Relation Name Age of Onset    Stroke Maternal Grandmother      Diabetes Mother      Hypertension Mother      Diabetes Sister      Diabetes Sister      Breast cancer Neg Hx      Colon cancer Neg Hx      Ovarian cancer Neg Hx         Review of patient's allergies indicates:   Allergen Reactions    Sotradecol [sodium tetradecyl sulfate]      Hives and itching that resolved with Benadryl and Solumedrol.       Medication List with Changes/Refills   New Medications    HYDROXYZINE HCL (ATARAX) 25 MG TABLET    Take 1 tablet (25 mg total) by mouth nightly as needed (Allergies/Insomnia).   Current Medications    ALBUTEROL (PROVENTIL HFA) 90 MCG/ACTUATION INHALER    Inhale 2 puffs into the lungs every 6 (six) hours as needed for Wheezing or Shortness of Breath. Rescue    CYCLOBENZAPRINE (FLEXERIL) 10 MG TABLET    Take 1 tablet (10 mg total) by mouth 3 (three) times daily as needed for Muscle spasms.    DICLOFENAC (VOLTAREN) 75 MG EC TABLET    Take 1 tablet (75 mg total) by mouth 2 (two) times daily.   Changed and/or Refilled Medications    Modified Medication Previous Medication    AZELASTINE (ASTELIN) 137 MCG (0.1 %) NASAL SPRAY azelastine (ASTELIN) 137 mcg (0.1 %) nasal spray       1 spray (137 mcg total) by Nasal route 2 (two) times daily.    1 spray (137 mcg total) by Nasal route 2 (two) times daily.    ERGOCALCIFEROL (VITAMIN D2) 50,000 UNIT CAP ergocalciferol (VITAMIN D2) 50,000 unit Cap       Take 1 capsule (50,000 Units total) by mouth every 7 days.    Take 1 capsule (50,000 Units total) by mouth every 7 days.    FLUTICASONE PROPIONATE (FLONASE) 50 MCG/ACTUATION NASAL SPRAY fluticasone propionate (FLONASE) 50 mcg/actuation nasal spray       2 sprays (100 mcg total) by Each Nostril route once daily.    2 sprays (100 mcg total) by Each Nostril  route once daily.    HYDROCHLOROTHIAZIDE (HYDRODIURIL) 25 MG TABLET hydroCHLOROthiazide (HYDRODIURIL) 25 MG tablet       Take 1 tablet (25 mg total) by mouth once daily.    Take 1 tablet (25 mg total) by mouth once daily.    ROSUVASTATIN (CRESTOR) 20 MG TABLET rosuvastatin (CRESTOR) 20 MG tablet       Take 1 tablet (20 mg total) by mouth every evening.    Take 1 tablet (20 mg total) by mouth every evening.   Discontinued Medications    CETIRIZINE (ZYRTEC) 10 MG TABLET    Take 1 tablet (10 mg total) by mouth once daily. for 14 days    ECONAZOLE NITRATE 1 % CREAM    Use bid    ESTRADIOL (VAGIFEM) 10 MCG TAB    Place 1 tablet (10 mcg total) vaginally twice a week.    FAMOTIDINE (PEPCID) 20 MG TABLET    Take 1 tablet (20 mg total) by mouth 2 (two) times daily. for 14 days    FLUOCINOLONE (DERMA-SMOOTHE) 0.01 % EXTERNAL OIL    Apply topically 3 (three) times daily. for 14 days    FLUOCINOLONE AND SHOWER CAP (DERMA-SMOOTHE/FS SCALP OIL) 0.01 % OIL    Apply oil to damp scalp nightly and cover with shower cap.    HYDROCORTISONE 2.5 % CREAM    Apply topically 2 (two) times daily. for 14 days    LEG BRACE (ANKLE BRACE) MISC    1 each by Misc.(Non-Drug; Combo Route) route Daily.    LIDOCAINE HCL 2% (XYLOCAINE) 2 % JELLY    Apply topically 2 (two) times daily. P.r.n.    NEOMYCIN-POLYMYXIN-HYDROCORTISONE (CORTISPORIN) 3.5-10,000-1 MG/ML-UNIT/ML-% OTIC SUSPENSION    Place 3 drops into the left ear 4 (four) times daily.    OLOPATADINE (PATANOL) 0.1 % OPHTHALMIC SOLUTION    Place 1 drop into both eyes 2 (two) times daily.    PROMETHAZINE-DEXTROMETHORPHAN (PROMETHAZINE-DM) 6.25-15 MG/5 ML SYRP    Take 5 mLs by mouth nightly as needed (cough).    TOPIRAMATE (TOPAMAX) 50 MG TABLET    Take 1 tablet (50 mg total) by mouth 2 (two) times daily.    TRAZODONE (DESYREL) 50 MG TABLET    Take 0.5-1 tablets (25-50 mg total) by mouth nightly as needed for Insomnia.    TRIAMCINOLONE ACETONIDE 0.1% (KENALOG) 0.1 % CREAM    Apply topically 2 (two)  "times daily. for 14 days       Review of Systems  Constitution: Denies chills, fever, and sweats.  HENT: Denies headaches or blurry vision.  Cardiovascular: Denies chest pain or irregular heart beat.  Respiratory: Positive for SOB on exertion.  Gastrointestinal: Denies abdominal pain, nausea, or vomiting.  Musculoskeletal: Denies muscle cramps.  Neurological: Denies dizziness or focal weakness.  Psychiatric/Behavioral: Normal mental status.  Hematologic/Lymphatic: Denies bleeding problem or easy bruising/bleeding.  Skin: Denies rash or suspicious lesions    Physical Examination  /82   Pulse 76   Ht 5' 9" (1.753 m)   Wt 87.3 kg (192 lb 7.4 oz)   LMP 10/19/1993 (Approximate)   BMI 28.42 kg/m²     Constitutional: No acute distress, conversant  HEENT: Sclera anicteric, Pupils equal, round and reactive to light, extraocular motions intact, Oropharynx clear  Neck: No JVD, no carotid bruits  Cardiovascular: regular rate and rhythm, no murmur, rubs or gallops, normal S1/S2  Pulmonary: Clear to auscultation bilaterally  Abdominal: Abdomen soft, nontender, nondistended, positive bowel sounds  Extremities: No lower extremity edema,   Pulses:  Carotid pulses are 2+ on the right side, and 2+ on the left side.  Radial pulses are 2+ on the right side, and 2+ on the left side.   Femoral pulses are 2+ on the right side, and 2+ on the left side.  Popliteal pulses are 2+ on the right side, and 2+ on the left side.   Dorsalis pedis pulses are 2+ on the right side, and 2+ on the left side.   Posterior tibial pulses are 2+ on the right side, and 2+ on the left side.    Skin: No ecchymosis, erythema, or ulcers  Psych: Alert and oriented x 3, appropriate affect  Neuro: CNII-XII intact, no focal deficits    Labs:  Most Recent Data  CBC:   Lab Results   Component Value Date    WBC 7.16 03/14/2025    HGB 14.6 03/14/2025    HCT 45.3 03/14/2025     03/14/2025    MCV 94 03/14/2025    RDW 13.7 03/14/2025     BMP:   Lab Results "   Component Value Date     03/14/2025    K 4.6 03/14/2025     03/14/2025    CO2 26 03/14/2025    BUN 16 03/14/2025    CREATININE 0.7 03/14/2025     03/14/2025    CALCIUM 9.8 03/14/2025    MG 2.1 09/02/2023    PHOS 1.9 (L) 01/01/2020     LFTS;   Lab Results   Component Value Date    PROT 7.9 03/14/2025    ALBUMIN 4.1 03/14/2025    BILITOT 0.6 03/14/2025    AST 15 03/14/2025    ALKPHOS 81 03/14/2025    ALT 10 03/14/2025     COAGS:   Lab Results   Component Value Date    INR 1.1 06/07/2019     FLP:   Lab Results   Component Value Date    CHOL 306 (H) 03/14/2025    HDL 60 03/14/2025    LDLCALC 223.8 (H) 03/14/2025    TRIG 111 03/14/2025    CHOLHDL 19.6 (L) 03/14/2025     CARDIAC:   Lab Results   Component Value Date    TROPONINI <0.006 03/22/2019       Imaging:    BLE Venous Reflux Study 7/20/2021:  No evidence of lower extremity DVT bilaterally.  Right GSV reflux (above and below knee segments).  Left SSV reflux.  Bilateral calf  veins with associated reflux noted.    Echo 7/8/2021:  The left ventricle is normal in size with concentric remodeling and normal systolic function.  The estimated ejection fraction is 65%.  Normal right ventricular size with normal right ventricular systolic function.  The estimated PA systolic pressure is 33 mmHg.    Assessment/Plan:  Rosamaria Agosto is a 64 y.o. female with HTN, HLD, venous insufficiency, overweight, who presents for an initial appointment.    SOB on Exertion- Pt with risk factors for CAD. Check exercise stress echo with doppler.     2. HTN- Continue HCTZ 25 mg daily.    3. HLD- Continue rosuvastatin 20 mg daily.     4. Overweight- Encourage diet, exercise, and weight loss.    5. Venous Insufficiency- Stable. Recommend wearing graduated compression hose.  Limit sodium intake to 2000 mg daily.  Elevate legs when resting.    Will call pt with results of stress test  Otherwise, follow up in 3 months with lipids prior    Total duration of face to  face visit time 15 minutes.  Total time spent counseling greater than fifty percent of total visit time.  Counseling included discussion regarding imaging findings, diagnosis, possibilities, treatment options, risks and benefits.  The patient had many questions regarding the options and long-term effects.    Román Menjivar MD, PhD  Interventional Cardiology           [1]   Social History  Socioeconomic History    Marital status: Single   Tobacco Use    Smoking status: Never    Smokeless tobacco: Never   Substance and Sexual Activity    Alcohol use: Yes     Alcohol/week: 1.0 standard drink of alcohol     Types: 1 Shots of liquor per week     Comment: seldom    Drug use: No    Sexual activity: Yes     Partners: Male     Birth control/protection: Post-menopausal, See Surgical Hx     Comment: hysterectomy 20 yrs ago     Social Drivers of Health     Financial Resource Strain: Low Risk  (12/23/2024)    Overall Financial Resource Strain (CARDIA)     Difficulty of Paying Living Expenses: Not hard at all   Food Insecurity: Food Insecurity Present (12/23/2024)    Hunger Vital Sign     Worried About Running Out of Food in the Last Year: Never true     Ran Out of Food in the Last Year: Sometimes true   Transportation Needs: No Transportation Needs (8/7/2021)    PRAPARE - Transportation     Lack of Transportation (Medical): No     Lack of Transportation (Non-Medical): No   Physical Activity: Inactive (12/23/2024)    Exercise Vital Sign     Days of Exercise per Week: 0 days     Minutes of Exercise per Session: 10 min   Stress: Stress Concern Present (12/23/2024)    Citizen of the [place] [employer] - Occupational Stress Questionnaire     Feeling of Stress : Rather much   Housing Stability: Unknown (12/23/2024)    Housing Stability Vital Sign     Unable to Pay for Housing in the Last Year: No

## 2025-04-02 NOTE — PROGRESS NOTES
Subjective     Patient ID: Rosamaria Agosto is a 64 y.o. female.    Chief Complaint: Annual Exam and Medication Refill (all)  64-year-old  female presents to clinic today for annual physical exam.  Hypertension remains well controlled on hydrochlorothiazide 25 mg daily.  She has been treated for hyperlipidemia with Crestor 20 mg daily but reports that over the past year she has slacked off on use.  I have encouraged the patient to restart Crestor.  She continues to note postnasal drip despite use of Flonase nasal spray and Astelin.  She also reports continued difficulty sleeping; therefore, I have recommended addition of Atarax for treatment of both.  She is also scheduled to see Cardiology for further evaluation.  She reports a past surgical history of hysterectomy, bilateral tubal ligation, breast biopsy, encephalocele repair, nasal sinus surgery, and bladder lift surgery.  She reports a family history of diabetes and hypertension.  She is up-to-date with colonoscopy.  Prevnar 20 has been given today.  Medication Refill  Associated symptoms include congestion and coughing. Pertinent negatives include no abdominal pain, chest pain, chills, fatigue, fever, headaches, myalgias, nausea, neck pain, rash, sore throat or vomiting.     Review of Systems   Constitutional:  Negative for appetite change, chills, fatigue and fever.   HENT:  Positive for nasal congestion and sinus pressure/congestion. Negative for ear pain, hearing loss, postnasal drip, rhinorrhea, sore throat and tinnitus.    Eyes:  Negative for redness, itching and visual disturbance.   Respiratory:  Positive for cough. Negative for chest tightness and shortness of breath.    Cardiovascular:  Negative for chest pain and palpitations.   Gastrointestinal:  Negative for abdominal pain, constipation, diarrhea, nausea and vomiting.   Genitourinary:  Negative for decreased urine volume, difficulty urinating, dysuria, frequency, hematuria and  urgency.   Musculoskeletal:  Negative for back pain, myalgias, neck pain and neck stiffness.   Integumentary:  Negative for rash.   Neurological:  Negative for dizziness, light-headedness and headaches.   Psychiatric/Behavioral: Negative.            Objective     Physical Exam  Vitals and nursing note reviewed.   Constitutional:       General: She is not in acute distress.     Appearance: She is well-developed. She is not diaphoretic.   HENT:      Head: Normocephalic and atraumatic.      Right Ear: External ear normal.      Left Ear: External ear normal.      Nose: Nose normal.      Mouth/Throat:      Pharynx: No oropharyngeal exudate.   Eyes:      General: No scleral icterus.        Right eye: No discharge.         Left eye: No discharge.      Conjunctiva/sclera: Conjunctivae normal.      Pupils: Pupils are equal, round, and reactive to light.   Neck:      Thyroid: No thyromegaly.      Vascular: No JVD.      Trachea: No tracheal deviation.   Cardiovascular:      Rate and Rhythm: Normal rate and regular rhythm.      Heart sounds: Normal heart sounds. No murmur heard.     No friction rub. No gallop.   Pulmonary:      Effort: Pulmonary effort is normal. No respiratory distress.      Breath sounds: Normal breath sounds. No stridor. No wheezing or rales.   Abdominal:      General: Bowel sounds are normal. There is no distension.      Palpations: Abdomen is soft. There is no mass.      Tenderness: There is no abdominal tenderness. There is no guarding or rebound.   Musculoskeletal:         General: No tenderness. Normal range of motion.      Cervical back: Normal range of motion and neck supple.   Lymphadenopathy:      Cervical: No cervical adenopathy.   Skin:     General: Skin is warm and dry.      Coloration: Skin is not pale.      Findings: No erythema or rash.   Neurological:      Mental Status: She is alert and oriented to person, place, and time.   Psychiatric:         Behavior: Behavior normal.         Thought  Content: Thought content normal.         Judgment: Judgment normal.            Assessment and Plan     1. Well adult exam    2. Essential hypertension  -     hydroCHLOROthiazide (HYDRODIURIL) 25 MG tablet; Take 1 tablet (25 mg total) by mouth once daily.  Dispense: 90 tablet; Refill: 3    3. Mixed hyperlipidemia  -     rosuvastatin (CRESTOR) 20 MG tablet; Take 1 tablet (20 mg total) by mouth every evening.  Dispense: 90 tablet; Refill: 3    4. Venous insufficiency    5. Restless legs    6. Insomnia, unspecified type  -     hydrOXYzine HCL (ATARAX) 25 MG tablet; Take 1 tablet (25 mg total) by mouth nightly as needed (Allergies/Insomnia).  Dispense: 90 tablet; Refill: 3    7. Need for pneumococcal 20-valent conjugate vaccination  -     pneumoc 20-cris conj-dip cr(PF) (PREVNAR-20 (PF)) injection Syrg 0.5 mL    8. PND (post-nasal drip)  -     hydrOXYzine HCL (ATARAX) 25 MG tablet; Take 1 tablet (25 mg total) by mouth nightly as needed (Allergies/Insomnia).  Dispense: 90 tablet; Refill: 3  -     azelastine (ASTELIN) 137 mcg (0.1 %) nasal spray; 1 spray (137 mcg total) by Nasal route 2 (two) times daily.  Dispense: 30 mL; Refill: 11  -     fluticasone propionate (FLONASE) 50 mcg/actuation nasal spray; 2 sprays (100 mcg total) by Each Nostril route once daily.  Dispense: 16 g; Refill: 11    9. Vitamin D deficiency  -     ergocalciferol (VITAMIN D2) 50,000 unit Cap; Take 1 capsule (50,000 Units total) by mouth every 7 days.  Dispense: 12 capsule; Refill: 3        1. Labs have been reviewed and are overall within normal limits except for a substantially elevated cholesterol levels.    2. Continue hydrochlorothiazide 25 mg daily.  Hypertension is well controlled.  3. Encourage compliance with Crestor 20 mg daily.  Follow-up with cardiology as scheduled.  4, 5.  Continue use of compression socks and follow-up with vascular Medicine as scheduled.  Venous insufficiency and restless leg syndrome is improving.    6, 7.  Continue  Flonase nasal spray and Astelin nasal spray as prescribed and start Atarax 25 mg nightly to assist with postnasal drip and insomnia.    8. Continue vitamin-D 09999 units once weekly.  Vitamin-D deficiency is stable.    9. Prevnar 20 given.  10. Return to clinic as needed or in 1 year for annual physical exam.               Follow up in about 1 year (around 4/2/2026), or if symptoms worsen or fail to improve, for Annual exam.

## 2025-04-12 ENCOUNTER — OFFICE VISIT (OUTPATIENT)
Dept: URGENT CARE | Facility: CLINIC | Age: 65
End: 2025-04-12
Payer: COMMERCIAL

## 2025-04-12 VITALS
HEIGHT: 69 IN | SYSTOLIC BLOOD PRESSURE: 171 MMHG | RESPIRATION RATE: 19 BRPM | TEMPERATURE: 99 F | BODY MASS INDEX: 27.25 KG/M2 | HEART RATE: 68 BPM | WEIGHT: 184 LBS | DIASTOLIC BLOOD PRESSURE: 94 MMHG | OXYGEN SATURATION: 97 %

## 2025-04-12 DIAGNOSIS — R09.89 CHEST CONGESTION: ICD-10-CM

## 2025-04-12 DIAGNOSIS — R09.81 NASAL CONGESTION: ICD-10-CM

## 2025-04-12 DIAGNOSIS — R09.82 POST-NASAL DRIP: ICD-10-CM

## 2025-04-12 DIAGNOSIS — J06.9 VIRAL URI WITH COUGH: Primary | ICD-10-CM

## 2025-04-12 DIAGNOSIS — R50.9 FEVER, UNSPECIFIED FEVER CAUSE: ICD-10-CM

## 2025-04-12 DIAGNOSIS — R52 BODY ACHES: ICD-10-CM

## 2025-04-12 LAB
CTP QC/QA: YES
CTP QC/QA: YES
POC MOLECULAR INFLUENZA A AGN: NEGATIVE
POC MOLECULAR INFLUENZA B AGN: NEGATIVE
SARS CORONAVIRUS 2 ANTIGEN: NEGATIVE

## 2025-04-12 PROCEDURE — 87811 SARS-COV-2 COVID19 W/OPTIC: CPT | Mod: QW,S$GLB,, | Performed by: NURSE PRACTITIONER

## 2025-04-12 PROCEDURE — 87502 INFLUENZA DNA AMP PROBE: CPT | Mod: QW,S$GLB,, | Performed by: NURSE PRACTITIONER

## 2025-04-12 PROCEDURE — 99213 OFFICE O/P EST LOW 20 MIN: CPT | Mod: S$GLB,,, | Performed by: NURSE PRACTITIONER

## 2025-04-12 RX ORDER — ALBUTEROL SULFATE 90 UG/1
2 INHALANT RESPIRATORY (INHALATION) EVERY 6 HOURS PRN
Qty: 18 G | Refills: 0 | Status: SHIPPED | OUTPATIENT
Start: 2025-04-12 | End: 2026-04-12

## 2025-04-12 NOTE — PROGRESS NOTES
"Subjective:      Patient ID: Rosamaria Agosto is a 64 y.o. female.    Vitals:  height is 5' 9" (1.753 m) and weight is 83.5 kg (184 lb). Her oral temperature is 99.3 °F (37.4 °C). Her blood pressure is 171/94 (abnormal) and her pulse is 68. Her respiration is 19 and oxygen saturation is 97%.     Chief Complaint: Cough    Pt presents today w/ productive cough (yellow sputum) accompanied w/ nasal congestion and sore throat ; onset approx 2x days ago. Pt c/o chest discomfort after coughing , headache , body aches , fatigue and nausea. Pt denies fever and vomiting. Pt has hx HLD and HTN. Pt self medicated w/ theraflu , tylenol , mucinex and tessalon ;mild relief.     64-year-old female presents to clinic with complaint of cough, nasal congestion, sore throat, fatigue, nausea, fever, headache and body aches x 2 days treating with Theraflu, Tylenol, Mucinex and Tessalon. Family medicine visit ten days ago 4/2/25 c/o postnasal drip, nasal congestion, sinus pressure/congestion and cough atarax added.  History of chronic sinusitis, nasal turbinates hypertrophy, deviated nasal septum, nasal sinus surgery 8/21/17 septoplasty and resection turbinates, total ethmoidectomy, maxillary antrostomy. Medication profile albuterol HFA, Astelin, Flonase. . History of hypertension did not take medication prescribed for treatment prior to clinic arrival, infuenza vaccine 01/2/25, covid vaccines x 5 doses. Requesting refill on albuterol MDI    Cough  This is a new problem. The current episode started in the past 7 days. The problem has been unchanged. The cough is Productive of sputum. Associated symptoms include chest pain, headaches, myalgias, nasal congestion, postnasal drip and a sore throat. Pertinent negatives include no chills, ear congestion, ear pain, fever, heartburn, hemoptysis, rash, rhinorrhea, shortness of breath, sweats, weight loss or wheezing. Associated symptoms comments: Body aches and fatigue . Nothing aggravates the " symptoms. Treatments tried: mucinex, tylenol  and tessalon. The treatment provided mild relief. There is no history of asthma, bronchiectasis, bronchitis, COPD, emphysema, environmental allergies or pneumonia.       Constitution: Positive for fatigue. Negative for chills and fever.   HENT:  Positive for congestion, postnasal drip and sore throat. Negative for ear pain, sinus pain and sinus pressure.    Cardiovascular:  Positive for chest pain.   Respiratory:  Positive for cough. Negative for bloody sputum, shortness of breath and wheezing.    Gastrointestinal:  Positive for nausea. Negative for heartburn.   Musculoskeletal:  Positive for muscle ache.   Skin:  Negative for rash.   Allergic/Immunologic: Negative for environmental allergies.   Neurological:  Positive for headaches.      Objective:     Physical Exam   Constitutional: She is oriented to person, place, and time. She appears well-developed. She is cooperative.  Non-toxic appearance. She does not appear ill. No distress.   HENT:   Head: Normocephalic and atraumatic.   Ears:   Right Ear: Hearing, tympanic membrane, external ear and ear canal normal.   Left Ear: Hearing, tympanic membrane, external ear and ear canal normal.   Nose: Mucosal edema present. No rhinorrhea or nasal deformity. No epistaxis. Right sinus exhibits no maxillary sinus tenderness and no frontal sinus tenderness. Left sinus exhibits no maxillary sinus tenderness and no frontal sinus tenderness.   Mouth/Throat: Uvula is midline, oropharynx is clear and moist and mucous membranes are normal. No trismus in the jaw. Normal dentition. No uvula swelling. No oropharyngeal exudate, posterior oropharyngeal edema or posterior oropharyngeal erythema.   Eyes: Conjunctivae and lids are normal. No scleral icterus.   Neck: Trachea normal and phonation normal. Neck supple. No edema present. No erythema present. No neck rigidity present.   Cardiovascular: Normal rate, regular rhythm and normal heart  sounds.   Pulmonary/Chest: Effort normal and breath sounds normal. No respiratory distress. She has no decreased breath sounds. She has no rhonchi.   Abdominal: Normal appearance.   Musculoskeletal: Normal range of motion.         General: No deformity. Normal range of motion.   Neurological: She is alert and oriented to person, place, and time. She exhibits normal muscle tone. Coordination normal.   Skin: Skin is warm, dry, intact, not diaphoretic and not pale.   Psychiatric: Her speech is normal and behavior is normal. Judgment and thought content normal.   Nursing note and vitals reviewed.      Assessment:     1. Viral URI with cough    2. Fever, unspecified fever cause    3. Body aches    4. Nasal congestion    5. Post-nasal drip      Results for orders placed or performed in visit on 04/12/25   POCT Influenza A/B MOLECULAR    Collection Time: 04/12/25  9:29 AM   Result Value Ref Range    POC Molecular Influenza A Ag Negative Negative    POC Molecular Influenza B Ag Negative Negative     Acceptable Yes    SARS Coronavirus 2 Antigen, POCT Manual Read    Collection Time: 04/12/25  9:30 AM   Result Value Ref Range    SARS Coronavirus 2 Antigen Negative Negative, Presumptive Negative     Acceptable Yes       Plan:       Viral URI with cough    Fever, unspecified fever cause    Body aches  -     POCT Influenza A/B MOLECULAR  -     SARS Coronavirus 2 Antigen, POCT Manual Read    Nasal congestion    Post-nasal drip      Patient Instructions   Please drink plenty of fluids.  Please get plenty of rest.  Please return here or go to the Emergency Department for any concerns or worsening of condition.  It is ok to take over the counter plain Allegra or Claritin or Zyrtec.   If you do have Hypertension or palpitations, it is safe to take Coricidin HBP for relief of sinus symptoms.  We recommend you take Flonase (Fluticasone) or another nasally inhaled steroid unless you are already taking  one.  Nasal irrigation with a saline spray or Netti Pot like device per their directions is also recommended.  If not allergic, please take over the counter Tylenol (Acetaminophen) and/or Motrin (Ibuprofen) as directed for control of pain and/or fever.  Please follow up with your primary care doctor or specialist as needed.    If you  smoke, please stop smoking.

## 2025-05-01 ENCOUNTER — TELEPHONE (OUTPATIENT)
Dept: VASCULAR SURGERY | Facility: CLINIC | Age: 65
End: 2025-05-01
Payer: COMMERCIAL

## 2025-05-23 ENCOUNTER — TELEPHONE (OUTPATIENT)
Dept: SPORTS MEDICINE | Facility: CLINIC | Age: 65
End: 2025-05-23
Payer: COMMERCIAL

## 2025-05-23 NOTE — TELEPHONE ENCOUNTER
----- Message from Nino sent at 5/23/2025  8:48 AM CDT -----  Regarding: PT'S REQUESTING A CALL BACK REGARDING BEING SEEN TODAY IF POSSIBLE FOR INJECTION  Contact: PT  Confirmed contact info below:Contact Name: Rosamaria Nba Number: [phone]

## 2025-05-26 ENCOUNTER — OFFICE VISIT (OUTPATIENT)
Dept: SPORTS MEDICINE | Facility: CLINIC | Age: 65
End: 2025-05-26
Payer: COMMERCIAL

## 2025-05-26 VITALS
HEART RATE: 71 BPM | SYSTOLIC BLOOD PRESSURE: 113 MMHG | DIASTOLIC BLOOD PRESSURE: 67 MMHG | BODY MASS INDEX: 28.29 KG/M2 | WEIGHT: 191 LBS | HEIGHT: 69 IN

## 2025-05-26 DIAGNOSIS — M75.102 ROTATOR CUFF SYNDROME OF LEFT SHOULDER: ICD-10-CM

## 2025-05-26 DIAGNOSIS — M17.0 PRIMARY OSTEOARTHRITIS OF BOTH KNEES: Primary | ICD-10-CM

## 2025-05-26 PROCEDURE — 20611 DRAIN/INJ JOINT/BURSA W/US: CPT | Mod: 50,S$GLB,, | Performed by: PHYSICIAN ASSISTANT

## 2025-05-26 PROCEDURE — 99214 OFFICE O/P EST MOD 30 MIN: CPT | Mod: 25,S$GLB,, | Performed by: PHYSICIAN ASSISTANT

## 2025-05-26 PROCEDURE — 99999 PR PBB SHADOW E&M-EST. PATIENT-LVL IV: CPT | Mod: PBBFAC,,, | Performed by: PHYSICIAN ASSISTANT

## 2025-05-26 RX ORDER — TRIAMCINOLONE ACETONIDE 40 MG/ML
40 INJECTION, SUSPENSION INTRA-ARTICULAR; INTRAMUSCULAR
Status: COMPLETED | OUTPATIENT
Start: 2025-05-26 | End: 2025-05-26

## 2025-05-26 RX ADMIN — TRIAMCINOLONE ACETONIDE 40 MG: 40 INJECTION, SUSPENSION INTRA-ARTICULAR; INTRAMUSCULAR at 03:05

## 2025-05-26 NOTE — PROGRESS NOTES
Subjective:          Chief Complaint: Rosamaria Agosto is a 64 y.o. female who presents to clinic for bilateral knee pain.    History of Present Illness    CHIEF COMPLAINT:  - Bilateral knee pain for steroid injections    HPI:  Rosamaria presents for evaluation of bilateral knee pain, describing her knees as significantly swollen all around. Pain started last Tuesday when her knee suddenly jerked back while in the yard, nearly causing her to fall backwards. She reports limping due to pain.    She has a history of receiving gel injections and steroid injections for her knee pain. Her last steroid injection was on November 27th, 2024, which provided relief until recently. She denies any new trauma or injuries. She reports taking Diclofenac for her arthritis, which she finds to be the most effective medication for her condition.    Two months ago, she experienced a shoulder injury while carrying a basket of water for her brother. When attempting to jump a curb, the basket tilted, and she tried to prevent it from falling. This action caused pain in her shoulder, which she still feels when lying down. She has a history of rotator cuff surgery on the same shoulder.    She denies any formal medical diagnoses.    PREVIOUS TREATMENTS:  - Bilateral knee steroid injections: 04/23/2024  - Monovisc (gel) injections to knees: One year prior to steroid injections  - Steroid injection: 11/27/2024, provided significant benefit until recently  - Heel injection for Achilles tendinitis: Provided some relief    MEDICATIONS:  - Diclofenac: For arthritis, patient reports as being effective    SURGICAL HISTORY:  - Rotator cuff surgery    WORK STATUS:  - Employed as a supervisor at [employer]  - Job requires prolonged periods of standing  - Experiences pain while working but continues to attend work      ROS:  General: negative fever, negative chills, negative fatigue, negative weight gain, negative weight loss  Eyes: negative vision  changes, negative redness, negative discharge  ENT: negative ear pain, negative nasal congestion, negative sore throat  Cardiovascular: negative chest pain, negative palpitations, negative lower extremity edema  Respiratory: negative cough, negative shortness of breath  Gastrointestinal: negative abdominal pain, negative nausea, negative vomiting, negative diarrhea, negative constipation, negative blood in stool  Genitourinary: negative dysuria, negative hematuria, negative frequency  Musculoskeletal: negative joint pain, negative muscle pain, +joint swelling, +pain with movement  Skin: negative rash, negative lesion  Neurological: negative headache, negative dizziness, negative numbness, negative tingling  Psychiatric: negative anxiety, negative depression, negative sleep difficulty           Pain Related Questions  Over the past 3 days, what was your average pain during activity? (I.e. running, jogging, walking, climbing stairs, getting dressed, ect.): 8  Over the past 3 days, what was your highest pain level?: 10  Over the past 3 days, what was your lowest pain level? : 7    Other  How many nights a week are you awakened by your affected body part?: 7  Was the patient's HEIGHT measured or patient reported?: Patient Reported  Was the patient's WEIGHT measured or patient reported?: Measured      Objective:        General: Rosamaria is well-developed, well-nourished, appears stated age, in no acute distress, alert and oriented to time, place and person.     General    Nursing note and vitals reviewed.  Constitutional: She is oriented to person, place, and time. She appears well-developed and well-nourished. No distress.   HENT:   Head: Normocephalic and atraumatic.   Eyes: Conjunctivae and EOM are normal. Pupils are equal, round, and reactive to light.   Neck: Neck supple. No JVD present.   Cardiovascular:  Normal rate, regular rhythm, normal heart sounds and intact distal pulses.            No murmur  heard.  Pulmonary/Chest: Effort normal and breath sounds normal. No respiratory distress.   Abdominal: Soft. Bowel sounds are normal. She exhibits no distension. There is no abdominal tenderness.   Neurological: She is alert and oriented to person, place, and time. She has normal reflexes. No cranial nerve deficit. Coordination normal.   Psychiatric: She has a normal mood and affect. Her behavior is normal. Judgment and thought content normal.     General Musculoskeletal Exam   Gait: normal     Right Ankle/Foot Exam     Swelling   The patient is swollen on the Achilles insertion.    Tenderness   The patient is tender to palpation of the Achilles insertion.    Pain   The patient exhibits pain of the Achilles insertion.    Range of Motion   Ankle Joint   Dorsiflexion:  abnormal Right ankle dorsiflexion: limited by pain.  Plantar flexion:  normal   Reinoso Test:  negative      Right Knee Exam     Inspection   Erythema: absent  Scars: absent  Swelling: present  Effusion: present  Deformity: absent  Bruising: absent    Tenderness   The patient is tender to palpation of the medial joint line and patella.    Crepitus   The patient has crepitus of the patella.    Range of Motion   Extension:  0 normal   Flexion:  130 normal     Tests   Meniscus   Lino:  Medial - negative Lateral - negative  Ligament Examination   Lachman: normal (-1 to 2mm)   PCL-Posterior Drawer: normal (0 to 2mm)     MCL - Valgus: normal (0 to 2mm)  LCL - Varus: normal  Pivot Shift: normal (Equal)  Reverse Pivot Shift: normal (Equal)  Dial Test at 90 degrees: normal (< 5 degrees)  Posterolateral Corner: stable  Patella   Passive Patellar Tilt: neutral  Patellar Glide (quadrants): Lateral - 1   Medial - 2  Patellar Grind: negative    Other   Popliteal (Baker's) Cyst: absent  Sensation: normal    Left Knee Exam     Inspection   Erythema: absent  Scars: absent  Swelling: present  Effusion: present  Deformity: absent  Bruising: absent    Tenderness   The  patient tender to palpation of the medial joint line and patella.    Crepitus   The patient has crepitus of the patella.    Range of Motion   Extension:  0 normal   Flexion:  130 normal     Tests   Meniscus   Lino:  Medial - negative Lateral - negative  Stability   Lachman: normal (-1 to 2mm)   PCL-Posterior Drawer: normal (0 to 2mm)  MCL - Valgus: normal (0 to 2mm)  LCL - Varus: normal (0 to 2mm)  Pivot Shift: normal (Equal)  Reverse Pivot Shift: normal (Equal)  Dial Test at 90 degrees: normal (< 5 degrees)  Posterolateral Corner: stable  Patella   Passive Patellar Tilt: neutral  Patellar Glide (Quadrants): Lateral - 1 Medial - 2  Patellar Grind: negative    Other   Popliteal (Baker's) Cyst: absent  Sensation: normal    Right Hip Exam     Tests   Alvarado: negative  Left Hip Exam     Tests   Alvarado: negative          Muscle Strength   Right Lower Extremity   Hip Abduction: 5/5   Quadriceps:  4/5   Hamstrin/5   Left Lower Extremity   Hip Abduction: 5/5   Quadriceps:  4/5   Hamstrin/5     Reflexes     Left Side  Achilles:  2+  Quadriceps:  2+    Right Side   Achilles:  2+  Quadriceps:  2+    Vascular Exam     Right Pulses  Dorsalis Pedis:      2+  Posterior Tibial:      2+        Left Pulses  Dorsalis Pedis:      2+  Posterior Tibial:      2+        Edema  Right Lower Leg: absent  Left Lower Leg: present      Radiographs:  2022  Bilateral knees:  FINDINGS:  Bilateral ortho four views knees.     Left: No fracture dislocation bone destruction or OCD seen.     Right: No fracture dislocation bone destruction or OCD seen.        Assessment:       Encounter Diagnoses   Name Primary?    Primary osteoarthritis of both knees Yes    Rotator cuff syndrome of left shoulder            Plan:       We have discussed a variety of treatment options including medications, injections, physical therapy and other alternative treatments. Pt is requesting repeat CSI and physical therapy at this time.    I made the decision to  obtain old records of the patient including previous notes and imaging. I independently reviewed and interpreted lab results today as well as prior imaging.     1. Injection Procedure  A time out was performed, including verification of patient ID, procedure, site and side, availability of information and equipment, review of safety issues, and agreement with consent, the procedure site was marked.    Ultrasound Guidance for needle placement: yes   Ultrasound guidance was used for needle localization with SonoSite Edge 2, 15-4 MHz (L). Images were saved and stored for documentation. Dynamic visualization of the needles was continuous throughout the procedure and maintained good position and correct needle placement.      After time out was performed, the patient was prepped aseptically with chloraprep swabsticks. A diagnostic and therapeutic injection of 1:4cc Kenalog/Marcaine was given under sterile technique using a 22g x 1.5 needle from the Superolateral  aspect of the bilateral Knee Joint in the supine position.      Rosamaria Agosto had no adverse reactions to the medication. Pain decreased. She was instructed to apply ice to the joint for 20 minutes and avoid strenuous activities for 24-36 hours following the injection. She was warned of possible blood sugar and/or blood pressure changes during that time. Following that time, she can resume regular activities.    She was reminded to call the clinic immediately for any adverse side effects as explained in clinic today.    2. Diclofenac 75 mg BID, Flexeril 10 mg TID PRN  3. She was encouraged to re-initiate HEP.  4. Ambulatory referral to physical therapy for RC protocol  5. We also discussed repeating viscosupplementation alternating with CSI.  She agreed to plan and will contact us when she is ready to repeat injections.  6. Plans to follow-up with foot and ankle specialist for possible surgical options in the future.  7. RTC to see Joselito Summers PA-C as  needed for follow-up.    All of the patient's questions were answered and the patient will contact us if they have any questions or concerns in the interim.      Preauth sent for repeat Monovisc in 3 months    Assessment & Plan    PROCEDURES:  1. Administered bilateral knee steroid injections.  2. Gel injections are safer than repeated steroid injections.  3. Submitted prior authorization for gel injections.  4. Scheduled bilateral knee gel injections in 3 months.    PATIENT INSTRUCTIONS:  1. Perform rotator cuff exercises at home, including internal and external rotation with bands.  2. Continue stretching exercises in the shower.    MEDICATIONS:  1. Take Diclofenac for arthritis pain as needed, limiting use to about 2 weeks.    REFERRALS:  1. Referred to PT at [facility] for shoulder exercises.            Patient questionnaires may have been collected.

## 2025-06-02 ENCOUNTER — TELEPHONE (OUTPATIENT)
Dept: SPORTS MEDICINE | Facility: CLINIC | Age: 65
End: 2025-06-02
Payer: COMMERCIAL

## 2025-06-06 ENCOUNTER — TELEPHONE (OUTPATIENT)
Dept: SPORTS MEDICINE | Facility: CLINIC | Age: 65
End: 2025-06-06
Payer: COMMERCIAL

## 2025-06-06 DIAGNOSIS — M17.0 PRIMARY OSTEOARTHRITIS OF BOTH KNEES: Primary | ICD-10-CM

## 2025-06-06 RX ORDER — LIDOCAINE 50 MG/G
1 PATCH TOPICAL DAILY
Qty: 14 PATCH | Refills: 0 | Status: SHIPPED | OUTPATIENT
Start: 2025-06-06

## 2025-06-06 RX ORDER — METHOCARBAMOL 500 MG/1
500 TABLET, FILM COATED ORAL 3 TIMES DAILY PRN
Qty: 40 TABLET | Refills: 2 | Status: SHIPPED | OUTPATIENT
Start: 2025-06-06

## 2025-06-16 ENCOUNTER — TELEPHONE (OUTPATIENT)
Dept: SPORTS MEDICINE | Facility: CLINIC | Age: 65
End: 2025-06-16
Payer: COMMERCIAL

## 2025-06-16 DIAGNOSIS — M17.0 PRIMARY OSTEOARTHRITIS OF BOTH KNEES: Primary | ICD-10-CM

## 2025-06-16 RX ORDER — TRAMADOL HYDROCHLORIDE 50 MG/1
50 TABLET, FILM COATED ORAL EVERY 6 HOURS PRN
Qty: 28 TABLET | Refills: 0 | Status: SHIPPED | OUTPATIENT
Start: 2025-06-16 | End: 2025-06-23

## 2025-06-16 NOTE — TELEPHONE ENCOUNTER
Spoke with patient regarding getting rx sent in for her pain again I forwarded the message to Joselito

## 2025-06-23 ENCOUNTER — OFFICE VISIT (OUTPATIENT)
Dept: BARIATRICS | Facility: CLINIC | Age: 65
End: 2025-06-23
Payer: COMMERCIAL

## 2025-06-23 ENCOUNTER — PATIENT MESSAGE (OUTPATIENT)
Dept: BARIATRICS | Facility: CLINIC | Age: 65
End: 2025-06-23

## 2025-06-23 VITALS
BODY MASS INDEX: 28.85 KG/M2 | HEART RATE: 68 BPM | DIASTOLIC BLOOD PRESSURE: 89 MMHG | HEIGHT: 69 IN | TEMPERATURE: 99 F | SYSTOLIC BLOOD PRESSURE: 153 MMHG | WEIGHT: 194.81 LBS

## 2025-06-23 DIAGNOSIS — M17.0 PRIMARY OSTEOARTHRITIS OF BOTH KNEES: ICD-10-CM

## 2025-06-23 DIAGNOSIS — E78.2 MIXED HYPERLIPIDEMIA: ICD-10-CM

## 2025-06-23 DIAGNOSIS — I10 ESSENTIAL HYPERTENSION: ICD-10-CM

## 2025-06-23 DIAGNOSIS — E66.3 OVERWEIGHT (BMI 25.0-29.9): Primary | ICD-10-CM

## 2025-06-23 DIAGNOSIS — Z71.3 ENCOUNTER FOR WEIGHT LOSS COUNSELING: ICD-10-CM

## 2025-06-23 PROCEDURE — 99999 PR PBB SHADOW E&M-EST. PATIENT-LVL IV: CPT | Mod: PBBFAC,,, | Performed by: STUDENT IN AN ORGANIZED HEALTH CARE EDUCATION/TRAINING PROGRAM

## 2025-06-23 PROCEDURE — 99213 OFFICE O/P EST LOW 20 MIN: CPT | Mod: S$GLB,,, | Performed by: STUDENT IN AN ORGANIZED HEALTH CARE EDUCATION/TRAINING PROGRAM

## 2025-06-23 RX ORDER — SEMAGLUTIDE 0.5 MG/.5ML
0.5 INJECTION, SOLUTION SUBCUTANEOUS
Qty: 2 ML | Refills: 0 | Status: SHIPPED | OUTPATIENT
Start: 2025-07-21 | End: 2025-08-12

## 2025-06-23 RX ORDER — SEMAGLUTIDE 1 MG/.5ML
1 INJECTION, SOLUTION SUBCUTANEOUS
Qty: 2 ML | Refills: 0 | Status: SHIPPED | OUTPATIENT
Start: 2025-08-18 | End: 2025-09-09

## 2025-06-23 RX ORDER — SEMAGLUTIDE 0.25 MG/.5ML
0.25 INJECTION, SOLUTION SUBCUTANEOUS
Qty: 2 ML | Refills: 0 | Status: SHIPPED | OUTPATIENT
Start: 2025-06-23 | End: 2025-07-15

## 2025-06-23 NOTE — PROGRESS NOTES
Subjective:       Patient ID: Rosamaria Agosto is a 64 y.o. female.    Chief Complaint: Follow-up and Weight Check    Patient presents for treatment of obesity.    Previousely been seen by Dr. Kamara, last appointment in May 2020 (virtual visit) at which time patient weighed 205 lbs. She has been prescribed phentermine, diethylpropion, and topiramate in the past.    Co-Morbidities:  HTN  HLD  Osteoarthritis  Vitamin D Deficency    Physical Activity:   Limited by knee pain    Diet Recall:  Baked chicken, salmon  Occasional fried chicken  Pickled beets and okra  Turkey, turkey sandwich  1 soft drink/day    Medical Weight Loss History  11/3/2020: 201.7 lbs, BMI 29.79, BFP 44.8%, SMM 61.3 lbs; topiramate 50 mg twice daily  12/1/2020: 205.6 lbs, BMI 30.3, BFP 43.3%, SMM 64.2 lbs; topiramate 50 mg twice daily  4/3/2023: 202.5 lbs, BMI 29.9, BFP 41.6%, BFM 84.3 lbs, SMM 64.6 lbs, BMR 1528 kcal  6/23/2025: 192.4 lbs, BMI 28.4, BFP 42.4%, BFM 81.5 lbs, SMM 60.2 lbs, BMR 1456 kcal      Review of Systems   Constitutional:  Negative for chills and fever.   Respiratory:  Negative for shortness of breath.    Cardiovascular:  Negative for chest pain and palpitations.   Gastrointestinal:  Negative for abdominal pain, nausea and vomiting.   Neurological:  Negative for dizziness and light-headedness.         Objective:        Latest Reference Range & Units 09/23/22 10:13 12/30/22 10:50 04/01/23 10:54   Sodium 136 - 145 mmol/L  136 - 145 mmol/L 139 139 139  139   Potassium 3.5 - 5.1 mmol/L  3.5 - 5.1 mmol/L 3.7 4.3 4.1  4.1   Chloride 95 - 110 mmol/L  95 - 110 mmol/L 98 99 104  104   CO2 23 - 29 mmol/L  23 - 29 mmol/L 28 30 (H) 29  29   Anion Gap 8 - 16 mmol/L  8 - 16 mmol/L 13 10 6 (L)  6 (L)   BUN 8 - 23 mg/dL  8 - 23 mg/dL 15 13 12  12   Creatinine 0.5 - 1.4 mg/dL  0.5 - 1.4 mg/dL 0.7 0.8 0.6  0.6   eGFR >60 mL/min/1.73 m^2  >60 mL/min/1.73 m^2 >60.0 >60.0 >60.0  >60.0   Glucose 70 - 110 mg/dL  70 - 110 mg/dL 100 109 102  102    Calcium 8.7 - 10.5 mg/dL  8.7 - 10.5 mg/dL 9.7 10.3 9.3  9.3   Alkaline Phosphatase 55 - 135 U/L 92 83 74   PROTEIN TOTAL 6.0 - 8.4 g/dL 7.9 8.1 7.1   Albumin 3.5 - 5.2 g/dL 4.3 4.1 3.8   BILIRUBIN TOTAL 0.1 - 1.0 mg/dL 0.5 0.5 0.6   AST 10 - 40 U/L 20 15 18   ALT 10 - 44 U/L 16 13 14   Cholesterol 120 - 199 mg/dL  120 - 199 mg/dL 288 (H) 338 (H) 201 (H)  201 (H)   HDL 40 - 75 mg/dL  40 - 75 mg/dL 53 66 54  54   HDL/Cholesterol Ratio 20.0 - 50.0 %  20.0 - 50.0 % 18.4 (L) 19.5 (L) 26.9  26.9   LDL Cholesterol External 63.0 - 159.0 mg/dL  63.0 - 159.0 mg/dL 213.2 (H) 246.4 (H) 132.6  132.6   Non-HDL Cholesterol mg/dL  mg/dL 235 272 147  147   Total Cholesterol/HDL Ratio 2.0 - 5.0   2.0 - 5.0  5.4 (H) 5.1 (H) 3.7  3.7   Triglycerides 30 - 150 mg/dL  30 - 150 mg/dL 109 128 72  72   Vit D, 25-Hydroxy 30 - 96 ng/mL 54     Hemoglobin A1C External 4.0 - 5.6 % 5.5     Estimated Avg Glucose 68 - 131 mg/dL 111     TSH 0.400 - 4.000 uIU/mL 1.113     Free T4 0.71 - 1.51 ng/dL 0.92     (H): Data is abnormally high  (L): Data is abnormally low      Vitals:    06/23/25 0849   BP: (!) 153/89   Pulse: 68   Temp: 98.6 °F (37 °C)       Physical Exam  Vitals reviewed.   Constitutional:       General: She is not in acute distress.     Appearance: Normal appearance. She is not ill-appearing, toxic-appearing or diaphoretic.   HENT:      Head: Normocephalic and atraumatic.   Cardiovascular:      Rate and Rhythm: Normal rate.   Pulmonary:      Effort: Pulmonary effort is normal. No respiratory distress.   Skin:     General: Skin is warm and dry.   Neurological:      Mental Status: She is alert and oriented to person, place, and time.         Assessment:       1. Overweight (BMI 25.0-29.9)    2. Mixed hyperlipidemia    3. Essential hypertension    4. Primary osteoarthritis of both knees    5. Encounter for weight loss counseling            Plan:   - Wegovy weekly injections     - Log all food and beverage intake with a daily calorie goal of  1200 calories per day     - Moderate intensity aerobic exercise for 30 minutes 3-4x/week

## 2025-06-26 ENCOUNTER — HOSPITAL ENCOUNTER (OUTPATIENT)
Dept: CARDIOLOGY | Facility: HOSPITAL | Age: 65
Discharge: HOME | End: 2025-06-26
Attending: INTERNAL MEDICINE
Payer: COMMERCIAL

## 2025-06-26 VITALS — HEIGHT: 69 IN | BODY MASS INDEX: 28.44 KG/M2 | WEIGHT: 192 LBS

## 2025-06-26 DIAGNOSIS — R06.02 SOB (SHORTNESS OF BREATH) ON EXERTION: ICD-10-CM

## 2025-06-26 LAB
AORTIC SIZE INDEX (SOV): 1.4 CM/M2
AORTIC SIZE INDEX: 1.7 CM/M2
ASCENDING AORTA: 3.4 CM
AV AREA BY CONTINUOUS VTI: 2.3 CM2
AV INDEX (PROSTH): 0.75
AV LVOT MEAN GRADIENT: 3 MMHG
AV LVOT PEAK GRADIENT: 6 MMHG
AV MEAN GRADIENT: 5 MMHG
AV PEAK GRADIENT: 9 MMHG
AV VALVE AREA BY VELOCITY RATIO: 2.5 CM²
AV VALVE AREA: 2.3 CM2
AV VELOCITY RATIO: 0.8
BSA FOR ECHO PROCEDURE: 2.06 M2
CV ECHO LV RWT: 0.49 CM
CV STRESS BASE HR: 66 BPM
DIASTOLIC BLOOD PRESSURE: 90 MMHG
DOP CALC AO PEAK VEL: 1.5 M/S
DOP CALC AO VTI: 35.3 CM
DOP CALC LVOT AREA: 3.1 CM2
DOP CALC LVOT DIAMETER: 2 CM
DOP CALC LVOT PEAK VEL: 1.2 M/S
DOP CALC LVOT STROKE VOLUME: 82.9 CM3
DOP CALC RVOT AREA: 3.7 CM2
DOP CALC RVOT DIAMETER: 2.17 CM
DOP CALCLVOT PEAK VEL VTI: 26.4 CM
E WAVE DECELERATION TIME: 265 MS
E/A RATIO: 0.87
E/E' RATIO: 6 M/S
ECHO EF ESTIMATED: 62 %
ECHO LV POSTERIOR WALL: 1 CM (ref 0.6–1.1)
FRACTIONAL SHORTENING: 36.6 % (ref 28–44)
INTERVENTRICULAR SEPTUM: 1 CM (ref 0.6–1.1)
IVC DIAMETER: 0.77 CM
IVRT: 83 MS
LA MAJOR: 6.1 CM
LA MINOR: 6.1 CM
LA WIDTH: 4.2 CM
LEFT ATRIUM SIZE: 3.3 CM
LEFT ATRIUM VOLUME INDEX MOD: 36 ML/M2
LEFT ATRIUM VOLUME INDEX: 35 ML/M2
LEFT ATRIUM VOLUME MOD: 74 ML
LEFT ATRIUM VOLUME: 72 CM3
LEFT INTERNAL DIMENSION IN SYSTOLE: 2.6 CM (ref 2.1–4)
LEFT VENTRICLE DIASTOLIC VOLUME INDEX: 33 ML/M2
LEFT VENTRICLE DIASTOLIC VOLUME: 67 ML
LEFT VENTRICLE MASS INDEX: 65.1 G/M2
LEFT VENTRICLE SYSTOLIC VOLUME INDEX: 12.3 ML/M2
LEFT VENTRICLE SYSTOLIC VOLUME: 25 ML
LEFT VENTRICULAR INTERNAL DIMENSION IN DIASTOLE: 4.1 CM (ref 3.5–6)
LEFT VENTRICULAR MASS: 132.1 G
LV LATERAL E/E' RATIO: 5.2
LV SEPTAL E/E' RATIO: 8.4
MV A" WAVE DURATION": 77.07 MS
MV PEAK A VEL: 0.77 M/S
MV PEAK E VEL: 0.67 M/S
OHS CV CPX 1 MINUTE RECOVERY HEART RATE: 118 BPM
OHS CV CPX 85 PERCENT MAX PREDICTED HEART RATE MALE: 133
OHS CV CPX ESTIMATED METS: 10
OHS CV CPX MAX PREDICTED HEART RATE: 157
OHS CV CPX PATIENT IS FEMALE: 1
OHS CV CPX PATIENT IS MALE: 0
OHS CV CPX PEAK DIASTOLIC BLOOD PRESSURE: 103 MMHG
OHS CV CPX PEAK HEAR RATE: 129 BPM
OHS CV CPX PEAK RATE PRESSURE PRODUCT: ABNORMAL
OHS CV CPX PEAK SYSTOLIC BLOOD PRESSURE: 194 MMHG
OHS CV CPX PERCENT MAX PREDICTED HEART RATE ACHIEVED: 86
OHS CV CPX RATE PRESSURE PRODUCT PRESENTING: ABNORMAL
OHS CV RV/LV RATIO: 0.83 CM
PISA TR MAX VEL: 2.5 M/S
PULM VEIN A" WAVE DURATION": 77.07 MS
PULM VEIN S/D RATIO: 1.2
PULMONIC VEIN PEAK A VELOCITY: 0.3 M/S
PV PEAK D VEL: 0.44 M/S
PV PEAK S VEL: 0.53 M/S
RA MAJOR: 5.08 CM
RA PRESSURE ESTIMATED: 3 MMHG
RA WIDTH: 3.67 CM
RIGHT ATRIAL AREA: 17.8 CM2
RIGHT VENTRICLE DIASTOLIC BASEL DIMENSION: 3.4 CM
RV TB RVSP: 6 MMHG
RV TISSUE DOPPLER FREE WALL SYSTOLIC VELOCITY 1 (APICAL 4 CHAMBER VIEW): 13.65 CM/S
SINUS: 2.9 CM
STJ: 2.5 CM
STRESS ECHO POST EXERCISE DUR MIN: 6 MINUTES
STRESS ECHO POST EXERCISE DUR SEC: 28 SECONDS
SYSTOLIC BLOOD PRESSURE: 179 MMHG
TDI LATERAL: 0.13 M/S
TDI SEPTAL: 0.08 M/S
TDI: 0.11 M/S
TRICUSPID ANNULAR PLANE SYSTOLIC EXCURSION: 1.8 CM
TV PEAK GRADIENT: 29 MMHG
TV REST PULMONARY ARTERY PRESSURE: 28 MMHG
Z-SCORE OF LEFT VENTRICULAR DIMENSION IN END DIASTOLE: -3.83
Z-SCORE OF LEFT VENTRICULAR DIMENSION IN END SYSTOLE: -2.76

## 2025-06-26 PROCEDURE — 93351 STRESS TTE COMPLETE: CPT | Mod: 26,,, | Performed by: INTERNAL MEDICINE

## 2025-06-26 PROCEDURE — 93320 DOPPLER ECHO COMPLETE: CPT | Mod: 26,,, | Performed by: INTERNAL MEDICINE

## 2025-06-26 PROCEDURE — 93325 DOPPLER ECHO COLOR FLOW MAPG: CPT | Mod: 26,,, | Performed by: INTERNAL MEDICINE

## 2025-06-26 PROCEDURE — 93325 DOPPLER ECHO COLOR FLOW MAPG: CPT

## 2025-06-27 ENCOUNTER — TELEPHONE (OUTPATIENT)
Dept: BARIATRICS | Facility: CLINIC | Age: 65
End: 2025-06-27
Payer: COMMERCIAL

## 2025-06-27 DIAGNOSIS — E66.3 OVERWEIGHT (BMI 25.0-29.9): Primary | ICD-10-CM

## 2025-06-27 DIAGNOSIS — I10 ESSENTIAL HYPERTENSION: ICD-10-CM

## 2025-06-27 DIAGNOSIS — E78.2 MIXED HYPERLIPIDEMIA: ICD-10-CM

## 2025-06-27 NOTE — TELEPHONE ENCOUNTER
Spoke with pt in regard to medication Wegovy. I advised the pt of the information (portal msg) that was obtained by Dr. Rosario. Pt stated that she can not afford the self pay option and would like Dr. Rosario to send in a prescription for Phentermine to the [facility] pharmacy. Pt stated that she had recently completed a stress test and her cardiologist told her that her heart function is fine and she can have Phentermine. I advised her that I would send this information over to Dr. Rosario to be advised. Pt verbalized understanding and the call was disconnected.

## 2025-07-01 ENCOUNTER — PATIENT MESSAGE (OUTPATIENT)
Dept: BARIATRICS | Facility: CLINIC | Age: 65
End: 2025-07-01
Payer: COMMERCIAL

## 2025-07-01 RX ORDER — PHENTERMINE AND TOPIRAMATE 3.75; 23 MG/1; MG/1
1 CAPSULE, EXTENDED RELEASE ORAL EVERY MORNING
Qty: 30 CAPSULE | Refills: 0 | Status: SHIPPED | OUTPATIENT
Start: 2025-07-01 | End: 2025-07-31

## 2025-07-03 ENCOUNTER — OFFICE VISIT (OUTPATIENT)
Dept: URGENT CARE | Facility: CLINIC | Age: 65
End: 2025-07-03
Payer: COMMERCIAL

## 2025-07-03 VITALS
HEIGHT: 69 IN | HEART RATE: 66 BPM | BODY MASS INDEX: 28.41 KG/M2 | OXYGEN SATURATION: 98 % | WEIGHT: 191.81 LBS | RESPIRATION RATE: 20 BRPM | DIASTOLIC BLOOD PRESSURE: 80 MMHG | SYSTOLIC BLOOD PRESSURE: 156 MMHG | TEMPERATURE: 98 F

## 2025-07-03 DIAGNOSIS — L50.9 HIVES: Primary | ICD-10-CM

## 2025-07-03 DIAGNOSIS — I10 HYPERTENSION, UNSPECIFIED TYPE: ICD-10-CM

## 2025-07-03 RX ORDER — TRIAMCINOLONE ACETONIDE 1 MG/G
CREAM TOPICAL 2 TIMES DAILY
Qty: 80 G | Refills: 1 | Status: SHIPPED | OUTPATIENT
Start: 2025-07-03

## 2025-07-03 RX ORDER — DEXAMETHASONE SODIUM PHOSPHATE 10 MG/ML
10 INJECTION INTRAMUSCULAR; INTRAVENOUS
Status: COMPLETED | OUTPATIENT
Start: 2025-07-03 | End: 2025-07-03

## 2025-07-03 RX ADMIN — DEXAMETHASONE SODIUM PHOSPHATE 10 MG: 10 INJECTION INTRAMUSCULAR; INTRAVENOUS at 10:07

## 2025-07-03 NOTE — LETTER
July 3, 2025      [facility] [address] [address]  Phone: [phone]  Fax: [phone]       Patient: Rosamaria Agosto   YOB: 1960  Date of Visit: 07/03/2025    To Whom It May Concern:    Timothy Agosto  was at [facility] on 07/03/2025. The patient may return to work/school on 7/3/2025 with no restrictions. If you have any questions or concerns, or if I can be of further assistance, please do not hesitate to contact me.    Sincerely,    Nicole Keenan, NP

## 2025-07-03 NOTE — PATIENT INSTRUCTIONS
Apply steroid cream as directed. Try an oral anti-histamine like zyrtec during the day for itching. Over the counter Pepcid twice per day can be helpful (helps decrease inflammation).  You can try benadryl at night for itching. This will make you drowsy. Make sure not to drive, drink alcohol, operate machinery or take other sedating medications while taking this.      Apply ice packs or wet cloths to the rash to reduce itching. Keep cool and stay out of the sun. Leave rash open to the air as much as you can. Avoid using fragrant soaps as this can cause irritation. Only use gentle soaps. Stay moisturized. Monitor for triggers and avoid them.     Alternate tylenol and ibuprofen every 4 hours for pain and to decrease inflammation. Always take ibuprofen with food and water to decrease GI upset. Stop taking ibuprofen if you develop abdominal pain or blood in stool.     Drink plenty of fluids daily (water is best).   The recommended daily fluid intake for women is 2.7 liters (five 16 oz bottles).      Eat nutritious foods high in antioxidants that include fruits and vegetables. Try a Mediterranean diet to decrease inflammation and will help boost your immune system.     Getting enough sleep at night (7-8 hours), eating nutritious foods, managing stress levels and maintaining physical activity (3 days a week of 45 min of aerobic activity) can all help your immune system.    Follow up with PCP for continued symptoms in 1 week.     Go to the ER if you have shortness of breath, mouth/throat swelling, chest pain, palpitations, dizziness.       Elevated BP  Check your BP twice per day and keep a log.  Continue blood pressure medications as directed and do not miss doses.     Try a DASH diet. I have attached information in your discharge paperwork to review. A Mediterranean diet will help to decrease inflammation and would help lower your BP. Increase water intake to help your body get rid of an increase in salt. Avoid alcohol  as this can increase your BP. Increase aerobic activity to 3 times per week and 45 min at a time. Practice good sleep hygiene by sleeping in a dark, cool, comfortable bed. No tv/device use before bed or tv while sleeping. It is recommended to get 7-8 hours of uninterrupted sleep per night.     The recommended daily fluid intake for women is 2.7 liters (five 16 oz bottles).     Follow up with your PCP for further management.     Go to the ER if your BP is consistently >160/100 and symptomatic with a severe headache, vomiting and unable to tolerate fluids, numbness/tingling/weakness to body, difficulty speaking, balance difficulty, confusion.          ([facility] recommendation)  The Mediterranean diet is based on plant-based foods and healthy fats.     You eat LOTS of vegetables, fruits, beans, lentils, nuts, whole grains (wheat bread, brown rice) & extra virgin olive oil.   ---- These foods are high in fiber and antioxidants.   ---- These nutrients help reduce inflammation.   ---- Fiber helps regulate bowel movements.   ---- Antioxidants protect you against cancer.     Eat a moderate amount of fish (salmon, herring, mackerel, sardines, anchovies, halibut, rainbow trout, tuna)   ---- (fish are high in omega-3 fatty acids)    Eat a moderate amount of cheese and yogurt.    Eat little or no meat-- eat more poultry (baked chicken, grilled chicken, ground turkey)  ---- avoid red meat and pork (causes inflammation and linked to colon cancer)    Eat little or no sweats, sugary drinks or butter.     Limit your sodium intake. A diet high in salt can increase your blood pressure and predisposes you to a heart attack or stroke.     BENEFITS OF DIET  --- Lowers your risk of cardiovascular disease (heart attack, stroke) and many other diseases.   --- Supports a healthy body weight.   --- Supports a healthy blood sugar, blood pressure and cholesterol.   --- Lowers your risk of metabolic syndrome   --- Supports a heathy balance of  good gut bacteria in your digestive system.   --- Lowers your risk for cancer.   --- Helps with brain function and slows decline as we age.   --- Helps you live longer.     Talk to your primary care doctor about a dietician referral for further guidance.

## 2025-07-03 NOTE — PROGRESS NOTES
"Subjective:      Patient ID: Rosamaria Agosto is a 64 y.o. female.    Vitals:  height is 5' 9" (1.753 m) and weight is 87 kg (191 lb 12.8 oz). Her oral temperature is 98.2 °F (36.8 °C). Her blood pressure is 156/80 (abnormal) and her pulse is 66. Her respiration is 20 and oxygen saturation is 98%.     Chief Complaint: Rash    Rosamaria Agosto is a 64 y.o. female who presents today with a chief complaint of rash located bilateral arms, bilateral legs, abdomen, and chest that began over a week ago.  Patient is uncertain what is causing her outbreaks.  Denies SOB or throat closing. Patient took OTC Hydrocortisone & Benadryl w/ slight relief to symptoms.  Did not take BP med today.     Rash  This is a new problem. The current episode started 1 to 4 weeks ago. The problem has been gradually worsening since onset. The rash is diffuse. The rash is characterized by itchiness, redness and swelling. It is unknown if there was an exposure to a precipitant. Pertinent negatives include no anorexia, congestion, cough, diarrhea, eye pain, facial edema, fatigue, fever, joint pain, nail changes, rhinorrhea, shortness of breath, sore throat or vomiting. Past treatments include antihistamine and topical steroids. The treatment provided mild relief.     Constitution: Negative for fatigue and fever.   HENT:  Negative for congestion and sore throat.    Eyes:  Negative for eye pain.   Respiratory:  Negative for cough and shortness of breath.    Gastrointestinal:  Negative for vomiting and diarrhea.   Skin:  Positive for rash. Negative for erythema.   Allergic/Immunologic: Positive for itching.      Objective:     Physical Exam   Constitutional: She is oriented to person, place, and time. She appears well-developed.   HENT:   Head: Normocephalic and atraumatic. Head is without abrasion, without contusion and without laceration.   Ears:   Right Ear: External ear normal.   Left Ear: External ear normal.   Nose: Nose normal. "   Mouth/Throat: Oropharynx is clear and moist and mucous membranes are normal.   Eyes: Conjunctivae, EOM and lids are normal. Pupils are equal, round, and reactive to light.   Neck: Trachea normal and phonation normal. Neck supple.   Cardiovascular: Normal rate, regular rhythm and normal heart sounds.   Pulmonary/Chest: Effort normal and breath sounds normal. No stridor. No respiratory distress. She has no wheezes. She has no rhonchi. She has no rales.   Musculoskeletal: Normal range of motion.         General: Normal range of motion.   Neurological: She is alert and oriented to person, place, and time.   Skin: Skin is warm, dry, intact, rash and urticarial (hives generalized body; legs, arms, trunk). Capillary refill takes less than 2 seconds. No abrasion, No burn, No bruising, No erythema and No ecchymosis   Psychiatric: Her speech is normal and behavior is normal. Judgment and thought content normal.   Nursing note and vitals reviewed.      Assessment:     1. Hives    2. Hypertension, unspecified type        Plan:       Hives  -     dexAMETHasone injection 10 mg  -     triamcinolone acetonide 0.1% (KENALOG) 0.1 % cream; Apply topically 2 (two) times daily.  Dispense: 80 g; Refill: 1    Hypertension, unspecified type            Discussed results/diagnosis/plan with patient in clinic. Strict precautions given to patient to monitor for worsening signs and symptoms. Advised to follow up with PCP or specialist.  Explained side effects of medications prescribed with patient and informed him/her to discontinue use if he/she has any side effects and to inform UC or PCP if this occurs. All questions answered. Strict ED verses clinic return precautions stressed and given in depth. Advised if symptoms worsens of fail to improve he/she should go to the Emergency Room. Discharge and follow-up instructions given verbally/printed with the patient who expressed understanding and willingness to comply with my recommendations.  Patient voiced understanding and in agreement with current treatment plan. Patient exits the exam room in no acute distress. Conversant and engaged during discharge discussion, verbalized understanding.

## 2025-07-11 ENCOUNTER — OFFICE VISIT (OUTPATIENT)
Dept: CARDIOLOGY | Facility: CLINIC | Age: 65
End: 2025-07-11
Payer: COMMERCIAL

## 2025-07-11 VITALS
HEIGHT: 69 IN | WEIGHT: 198.44 LBS | BODY MASS INDEX: 29.39 KG/M2 | DIASTOLIC BLOOD PRESSURE: 74 MMHG | HEART RATE: 72 BPM | SYSTOLIC BLOOD PRESSURE: 126 MMHG

## 2025-07-11 DIAGNOSIS — I87.2 VENOUS INSUFFICIENCY: ICD-10-CM

## 2025-07-11 DIAGNOSIS — R06.02 SOB (SHORTNESS OF BREATH) ON EXERTION: Primary | ICD-10-CM

## 2025-07-11 DIAGNOSIS — E66.09 CLASS 1 OBESITY DUE TO EXCESS CALORIES WITH SERIOUS COMORBIDITY AND BODY MASS INDEX (BMI) OF 30.0 TO 30.9 IN ADULT: ICD-10-CM

## 2025-07-11 DIAGNOSIS — E66.811 CLASS 1 OBESITY DUE TO EXCESS CALORIES WITH SERIOUS COMORBIDITY AND BODY MASS INDEX (BMI) OF 30.0 TO 30.9 IN ADULT: ICD-10-CM

## 2025-07-11 DIAGNOSIS — I10 ESSENTIAL HYPERTENSION: ICD-10-CM

## 2025-07-11 DIAGNOSIS — E78.2 MIXED HYPERLIPIDEMIA: ICD-10-CM

## 2025-07-11 PROCEDURE — 99999 PR PBB SHADOW E&M-EST. PATIENT-LVL IV: CPT | Mod: PBBFAC,,, | Performed by: INTERNAL MEDICINE

## 2025-07-11 RX ORDER — CELECOXIB 100 MG/1
100 CAPSULE ORAL
COMMUNITY

## 2025-07-11 NOTE — PATIENT INSTRUCTIONS
Assessment/Plan:  Rosamaria Agosto is a 64 y.o. female with HTN, HLD, venous insufficiency, overweight, who presents for a follow up appointment.    SOB on Exertion- Ms. Agosto reports continued SOB on exertion. Exercise Stress Echo 6/26/2025 is negative with no echocardiographic evidence of stress induced ischemia. The exercise capacity was average.Echo portion with EF 60-65%; normal diastolic function; left atrium is mildly dilated; mild TR; estimated pulmonary artery systolic pressure is 28 mmHg; normal venous pressure at 3 mmHg. Symptoms likely related to overweight and deconditioning. Continue aggressive risk factor modification for the prevention of CAD, including weight loss.     2. HTN- Continue HCTZ 25 mg daily.    3. HLD- Continue rosuvastatin 20 mg daily.     4. Overweight- Encourage diet, exercise, and weight loss.    5. Venous Insufficiency- Stable. Pt would like to proceed with EVLT/sclerotherapy. Follow up with Dr. Burkett. Recommend wearing graduated compression hose.  Limit sodium intake to 2000 mg daily.  Elevate legs when resting.    Follow up in 6 months with lipids prior

## 2025-07-11 NOTE — PROGRESS NOTES
[facility]      Chief Complaint   Patient presents with    mixed hyperlipidemia    Hypertension    Shortness of Breath       Patient ID: Rosamaria Agosto is a 64 y.o. female with HTN, HLD, venous insufficiency, overweight, who presents for a follow up appointment. Pertinent history/events are as follows:     -Pt kindly referred by Dr. Burns for evaluation of HTN, HLD.     -Pt followed by Dr. Kitchen in the past.     -At our initial clinic visit on 4/2/2025, Ms. Agosto reported SOB on exertion for the past 1 year. She has no chest pain or chest discomfort. No smoking history. Reports family of CAD with MI in mother. She is mourning the passing of her mother 1 year ago this March. Works as .  States her job is stressful.   Plan:  SOB on Exertion- Pt with risk factors for CAD. Check exercise stress echo with doppler.   HTN- Continue HCTZ 25 mg daily.  HLD- Continue rosuvastatin 20 mg daily.   Overweight- Encourage diet, exercise, and weight loss.  Venous Insufficiency- Stable. Recommend wearing graduated compression hose.  Limit sodium intake to 2000 mg daily.  Elevate legs when resting.    HPI:  Ms. Agosto reports continued SOB on exertion. Exercise Stress Echo 6/26/2025 is negative with no echocardiographic evidence of stress induced ischemia. The exercise capacity was average.Echo portion with EF 60-65%; normal diastolic function; left atrium is mildly dilated; mild TR; estimated pulmonary artery systolic pressure is 28 mmHg; normal venous pressure at 3 mmHg.    Past Medical History:   Diagnosis Date    Arthritis     Hyperlipidemia     Hypertension     Sinusitis      Past Surgical History:   Procedure Laterality Date    ARTHROSCOPIC REPAIR OF ROTATOR CUFF OF SHOULDER Left 07/24/2019    Procedure: REPAIR, ROTATOR CUFF, ARTHROSCOPIC;  Surgeon: ASMITA Soto MD;  Location: [facility] OR [address] [place] [address];  Service: Orthopedics;  Laterality: Left;    ARTHROSCOPY OF SHOULDER WITH DECOMPRESSION  OF SUBACROMIAL SPACE Left 07/24/2019    Procedure: ARTHROSCOPY, SHOULDER, WITH SUBACROMIAL SPACE DECOMPRESSION;  Surgeon: ASMITA Soto MD;  Location: [facility] OR 2ND FLR;  Service: Orthopedics;  Laterality: Left;    BILATERAL SALPINGO-OOPHORECTOMY (BSO)  06/24/2019    BREAST BIOPSY  24-25 years old    CYSTOSCOPY N/A 06/24/2019    Procedure: CYSTOSCOPY;  Surgeon: Anson Ellison MD;  Location: [facility] OR;  Service: OB/GYN;  Laterality: N/A;    ENCEPHALOCELE REPAIR  46    HYSTERECTOMY  1994    NASAL SINUS SURGERY      ROBOT-ASSISTED LAPAROSCOPIC ABDOMINAL SACROCOLPOPEXY USING DA MAC XI N/A 06/24/2019    Procedure: XI ROBOTIC SACROCOLPOPEXY, ABDOMINAL;  Surgeon: Anson Ellison MD;  Location: [facility] OR;  Service: OB/GYN;  Laterality: N/A;    SHOULDER ARTHROSCOPY Left 07/24/2019    Procedure: BICEPS TENODESIS;  Surgeon: ASMITA Soto MD;  Location: [facility] OR 2ND FLR;  Service: Orthopedics;  Laterality: Left;    TUBAL LIGATION       Social History[1]  Family History   Problem Relation Name Age of Onset    Stroke Maternal Grandmother      Diabetes Mother      Hypertension Mother      Diabetes Sister      Diabetes Sister      Breast cancer Neg Hx      Colon cancer Neg Hx      Ovarian cancer Neg Hx         Review of patient's allergies indicates:   Allergen Reactions    Sotradecol [sodium tetradecyl sulfate]      Hives and itching that resolved with Benadryl and Solumedrol.       Medication List with Changes/Refills   Current Medications    ALBUTEROL (PROVENTIL HFA) 90 MCG/ACTUATION INHALER    Inhale 2 puffs into the lungs every 6 (six) hours as needed for Wheezing or Shortness of Breath. Rescue    AZELASTINE (ASTELIN) 137 MCG (0.1 %) NASAL SPRAY    1 spray (137 mcg total) by Nasal route 2 (two) times daily.    CELECOXIB (CELEBREX) 100 MG CAPSULE    Take 100 mg by mouth as needed for Pain.    CYCLOBENZAPRINE (FLEXERIL) 10 MG TABLET    Take 1 tablet (10 mg total) by mouth 3 (three) times daily as needed for Muscle spasms.  "   DICLOFENAC (VOLTAREN) 75 MG EC TABLET    Take 1 tablet (75 mg total) by mouth 2 (two) times daily.    ERGOCALCIFEROL (VITAMIN D2) 50,000 UNIT CAP    Take 1 capsule (50,000 Units total) by mouth every 7 days.    FLUTICASONE PROPIONATE (FLONASE) 50 MCG/ACTUATION NASAL SPRAY    2 sprays (100 mcg total) by Each Nostril route once daily.    HYDROCHLOROTHIAZIDE (HYDRODIURIL) 25 MG TABLET    Take 1 tablet (25 mg total) by mouth once daily.    HYDROXYZINE HCL (ATARAX) 25 MG TABLET    Take 1 tablet (25 mg total) by mouth nightly as needed (Allergies/Insomnia).    LIDOCAINE (LIDODERM) 5 %    Place 1 patch onto the skin once daily. Remove & Discard patch within 12 hours or as directed by MD    METHOCARBAMOL (ROBAXIN) 500 MG TAB    Take 1 tablet (500 mg total) by mouth 3 (three) times daily as needed (muscle spasms).    PHENTERMINE-TOPIRAMATE (QSYMIA) 3.75-23 MG CM24    Take 1 capsule by mouth every morning.    ROSUVASTATIN (CRESTOR) 20 MG TABLET    Take 1 tablet (20 mg total) by mouth every evening.    TRAMADOL HCL (TRAMADOL ORAL)    Take 1 tablet by mouth as needed.    TRIAMCINOLONE ACETONIDE 0.1% (KENALOG) 0.1 % CREAM    Apply topically 2 (two) times daily.       Review of Systems  Constitution: Denies chills, fever, and sweats.  HENT: Denies headaches or blurry vision.  Cardiovascular: Denies chest pain or irregular heart beat.  Respiratory: Positive for SOB on exertion.  Gastrointestinal: Denies abdominal pain, nausea, or vomiting.  Musculoskeletal: Denies muscle cramps.  Neurological: Denies dizziness or focal weakness.  Psychiatric/Behavioral: Normal mental status.  Hematologic/Lymphatic: Denies bleeding problem or easy bruising/bleeding.  Skin: Denies rash or suspicious lesions    Physical Examination  /74   Pulse 72   Ht 5' 9" (1.753 m)   Wt 90 kg (198 lb 6.6 oz)   LMP 10/19/1993 (Approximate)   BMI 29.30 kg/m²     Constitutional: No acute distress, conversant  HEENT: Sclera anicteric, Pupils equal, round " and reactive to light, extraocular motions intact, Oropharynx clear  Neck: No JVD, no carotid bruits  Cardiovascular: regular rate and rhythm, no murmur, rubs or gallops, normal S1/S2  Pulmonary: Clear to auscultation bilaterally  Abdominal: Abdomen soft, nontender, nondistended, positive bowel sounds  Extremities: No lower extremity edema,   Pulses:  Carotid pulses are 2+ on the right side, and 2+ on the left side.  Radial pulses are 2+ on the right side, and 2+ on the left side.   Femoral pulses are 2+ on the right side, and 2+ on the left side.  Popliteal pulses are 2+ on the right side, and 2+ on the left side.   Dorsalis pedis pulses are 2+ on the right side, and 2+ on the left side.   Posterior tibial pulses are 2+ on the right side, and 2+ on the left side.    Skin: No ecchymosis, erythema, or ulcers  Psych: Alert and oriented x 3, appropriate affect  Neuro: CNII-XII intact, no focal deficits    Labs:  Most Recent Data  CBC:   Lab Results   Component Value Date    WBC 7.16 03/14/2025    HGB 14.6 03/14/2025    HCT 45.3 03/14/2025     03/14/2025    MCV 94 03/14/2025    RDW 13.7 03/14/2025     BMP:   Lab Results   Component Value Date     03/14/2025    K 4.6 03/14/2025     03/14/2025    CO2 26 03/14/2025    BUN 16 03/14/2025    CREATININE 0.7 03/14/2025     03/14/2025    CALCIUM 9.8 03/14/2025    MG 2.1 09/02/2023    PHOS 1.9 (L) 01/01/2020     LFTS;   Lab Results   Component Value Date    PROT 7.9 03/14/2025    ALBUMIN 4.1 03/14/2025    BILITOT 0.6 03/14/2025    AST 15 03/14/2025    ALKPHOS 81 03/14/2025    ALT 10 03/14/2025     COAGS:   Lab Results   Component Value Date    INR 1.1 06/07/2019     FLP:   Lab Results   Component Value Date    CHOL 306 (H) 03/14/2025    HDL 60 03/14/2025    LDLCALC 223.8 (H) 03/14/2025    TRIG 111 03/14/2025    CHOLHDL 19.6 (L) 03/14/2025     CARDIAC:   Lab Results   Component Value Date    TROPONINI <0.006 03/22/2019       Imaging:    Exercise Stress  Echo 6/26/2025:    Post-stress Conclusion: The study is negative with no echocardiographic evidence of stress induced ischemia.    ECG Conclusion: The ECG portion of the study is negative for ischemia.    Stress Protocol: The patient exercised for 6 minutes 28 seconds on a high ramp protocol, achieving a peak heart rate of 129 bpm, which is 86% of the age predicted maximum heart rate. Their exercise capacity was average. The patient reported no symptoms during the stress test. The test was stopped because the patient experienced fatigue.    Left Ventricle: The left ventricle is normal in size. Normal wall thickness. There is concentric remodeling. There is normal systolic function with a visually estimated ejection fraction of 60 - 65%. There is normal diastolic function. Average E/e' ratio is 6.    Right Ventricle: The right ventricle is normal in size measuring 3.4 cm. Wall thickness is normal. Systolic function is normal.    Left Atrium: The left atrium is mildly dilated    Tricuspid Valve: There is mild regurgitation.    Pulmonary Artery: The estimated pulmonary artery systolic pressure is 28 mmHg.    IVC/SVC: Normal venous pressure at 3 mmHg.       BLE Venous Reflux Study 7/20/2021:  No evidence of lower extremity DVT bilaterally.  Right GSV reflux (above and below knee segments).  Left SSV reflux.  Bilateral calf  veins with associated reflux noted.    Echo 7/8/2021:  The left ventricle is normal in size with concentric remodeling and normal systolic function.  The estimated ejection fraction is 65%.  Normal right ventricular size with normal right ventricular systolic function.  The estimated PA systolic pressure is 33 mmHg.    Assessment/Plan:  Rosamaria Agosto is a 64 y.o. female with HTN, HLD, venous insufficiency, overweight, who presents for a follow up appointment.    SOB on Exertion- Ms. Agosto reports continued SOB on exertion. Exercise Stress Echo 6/26/2025 is negative with no  echocardiographic evidence of stress induced ischemia. The exercise capacity was average.Echo portion with EF 60-65%; normal diastolic function; left atrium is mildly dilated; mild TR; estimated pulmonary artery systolic pressure is 28 mmHg; normal venous pressure at 3 mmHg. Symptoms likely related to overweight and deconditioning. Continue aggressive risk factor modification for the prevention of CAD, including weight loss.     2. HTN- Continue HCTZ 25 mg daily.    3. HLD- Continue rosuvastatin 20 mg daily.     4. Overweight- Encourage diet, exercise, and weight loss.    5. Venous Insufficiency- Stable. Pt would like to proceed with EVLT/sclerotherapy. Follow up with Dr. Burkett. Recommend wearing graduated compression hose.  Limit sodium intake to 2000 mg daily.  Elevate legs when resting.    Follow up in 6 months with lipids prior    Total duration of face to face visit time 15 minutes.  Total time spent counseling greater than fifty percent of total visit time.  Counseling included discussion regarding imaging findings, diagnosis, possibilities, treatment options, risks and benefits.  The patient had many questions regarding the options and long-term effects.    Román Menjivar MD, PhD  Interventional Cardiology           [1]   Social History  Socioeconomic History    Marital status: Single   Tobacco Use    Smoking status: Never    Smokeless tobacco: Never   Substance and Sexual Activity    Alcohol use: Yes     Alcohol/week: 1.0 standard drink of alcohol     Types: 1 Shots of liquor per week     Comment: seldom    Drug use: No    Sexual activity: Yes     Partners: Male     Birth control/protection: Post-menopausal, See Surgical Hx     Comment: hysterectomy 20 yrs ago     Social Drivers of Health     Financial Resource Strain: Low Risk  (12/23/2024)    Overall Financial Resource Strain (CARDIA)     Difficulty of Paying Living Expenses: Not hard at all   Food Insecurity: Food Insecurity Present (12/23/2024)     Hunger Vital Sign     Worried About Running Out of Food in the Last Year: Never true     Ran Out of Food in the Last Year: Sometimes true   Transportation Needs: No Transportation Needs (8/7/2021)    PRAPARE - Transportation     Lack of Transportation (Medical): No     Lack of Transportation (Non-Medical): No   Physical Activity: Inactive (12/23/2024)    Exercise Vital Sign     Days of Exercise per Week: 0 days     Minutes of Exercise per Session: 10 min   Stress: Stress Concern Present (12/23/2024)    [employer] - Occupational Stress Questionnaire     Feeling of Stress : Rather much   Housing Stability: Unknown (12/23/2024)    Housing Stability Vital Sign     Unable to Pay for Housing in the Last Year: No

## (undated) DEVICE — PAD ABD 8X10 STERILE

## (undated) DEVICE — SEE MEDLINE ITEM 157131

## (undated) DEVICE — SYR 10CC LUER LOCK

## (undated) DEVICE — SEE L#120831

## (undated) DEVICE — GLOVE BIOGEL SKINSENSE PI 7.5

## (undated) DEVICE — NDL ARTHSCP MF SCORPION

## (undated) DEVICE — SYR 30CC LUER LOCK

## (undated) DEVICE — DECANTER VIAL ASEPTIC TRANSFER

## (undated) DEVICE — MATRIX HEMOSTATIC FLOSEAL 5ML

## (undated) DEVICE — APPLICATOR FLOSEAL ENDO 35CM

## (undated) DEVICE — ELECTRODE REM PLYHSV RETURN 9

## (undated) DEVICE — DRAPE STERI INSTRUMENT 1018

## (undated) DEVICE — SUT MONOCRYL 3-0 PS-1

## (undated) DEVICE — CANNULA TWIST IN 7MM X 7CM

## (undated) DEVICE — DRAPE UNDER BUTTOCKS WITH POUCH

## (undated) DEVICE — SCRUB 10% POVIDONE IODINE 4OZ

## (undated) DEVICE — KIT WING PAD POSITIONING

## (undated) DEVICE — SYR 50CC LL

## (undated) DEVICE — NDL REGULAR BEVEL 21G 2IN

## (undated) DEVICE — DRESSING XEROFORM FOIL PK 1X8

## (undated) DEVICE — APPLICATOR CHLORAPREP ORN 26ML

## (undated) DEVICE — CATH FOLEY 16F COUNCIL STA LOC

## (undated) DEVICE — KIT SHOULDER POSITIONER SPIDER

## (undated) DEVICE — SOL NS 1000CC

## (undated) DEVICE — SUT 0 8-27IN VICRYL PL CT-1

## (undated) DEVICE — BLADE SCALP OPHTL RND TIP

## (undated) DEVICE — DRAPE STERI U-SHAPED 47X51IN

## (undated) DEVICE — SOL PVP-I SCRUB 7.5% 4OZ

## (undated) DEVICE — PAD COLD THERAPY KNEE WRAP ON

## (undated) DEVICE — RETRACTOR LONE STAR 28.3X18.3

## (undated) DEVICE — PACK LAPSCP/PELVSCPY III TIBRN

## (undated) DEVICE — JELLY SURGILUBE 5GR

## (undated) DEVICE — SUT ABS CLIP LAPRA-TY CTD

## (undated) DEVICE — GLOVE BIOGEL SKINSENSE PI 7.0

## (undated) DEVICE — DRESSING TELFA STRL 4X3 LF

## (undated) DEVICE — DRAPE ADPT RUMI II ADVINCULA

## (undated) DEVICE — SEE MEDLINE ITEM 152530

## (undated) DEVICE — TRAY DRY SKIN SCRUB PREP

## (undated) DEVICE — GLOVE BIOGEL SKINSENSE PI 6.5

## (undated) DEVICE — SEE MEDLINE ITEM 146313

## (undated) DEVICE — DRESSING AQUACEL FOAM 3 X 3

## (undated) DEVICE — BLADE SURG STAINLESS STEEL #15

## (undated) DEVICE — SUT VICRYL 0 27 CT-2

## (undated) DEVICE — SUT 4-0 CHROMIC GUT / RB1

## (undated) DEVICE — Device

## (undated) DEVICE — SOL ELECTROLUBE ANTI-STIC

## (undated) DEVICE — DEVICE ANC SW STAT FOLEY 6-24

## (undated) DEVICE — CLOSURE SKIN STERI STRIP 1/2X4

## (undated) DEVICE — TAPE SURG DURAPORE 2 X10YD

## (undated) DEVICE — BLADE SHAVER 4.5 6/BX

## (undated) DEVICE — PORT AIRSEAL 12/120MM LPI

## (undated) DEVICE — DRAPE COLUMN DAVINCI XI

## (undated) DEVICE — COVER LIGHT HANDLE 80/CA

## (undated) DEVICE — SEE MEDLINE ITEM 156913

## (undated) DEVICE — KIT URINE METER 350ML STAT LOC

## (undated) DEVICE — DRAPE ARM DAVINCI XI

## (undated) DEVICE — SEE MEDLINE ITEM 152622

## (undated) DEVICE — TROCAR ENDOPATH XCEL 5X100MM

## (undated) DEVICE — CONTAINER SPECIMEN STRL 4OZ

## (undated) DEVICE — SUT ETHIBOND EXCEL 0 CT2 30

## (undated) DEVICE — DRESSING SURGICAL 1X3

## (undated) DEVICE — SPONGE PATTY SURGICAL .5X3IN

## (undated) DEVICE — PROBE ARTHO ENERGY 90 DEG

## (undated) DEVICE — SYR B-D DISP CONTROL 10CC100/C

## (undated) DEVICE — SUT VICRYL+ 27 UR-6 VIOL

## (undated) DEVICE — NDL 18GA X1 1/2 REG BEVEL

## (undated) DEVICE — SUT VICRYL CTD 2-0 GI 27 SH

## (undated) DEVICE — SEE MEDLINE ITEM 156930

## (undated) DEVICE — SUT MCRYL PLUS 4-0 PS2 27IN

## (undated) DEVICE — SEAL UNIVERSAL 5MM-8MM XI

## (undated) DEVICE — DRESSING LEUKOPLAST FLEX 1X3IN

## (undated) DEVICE — NDL HYPO REG 25G X 1 1/2

## (undated) DEVICE — PUMP COLD THERAPY

## (undated) DEVICE — PAD SHOULDER CARE POLAR

## (undated) DEVICE — SUPPORT SLING SHOT II MEDIUM

## (undated) DEVICE — OBTURATOR 8MM BLUNT XI

## (undated) DEVICE — IRRIGATOR ENDOSCOPY DISP.

## (undated) DEVICE — CATH IV INTROCAN 14G X 2.

## (undated) DEVICE — SOL 9P NACL IRR PIC IL

## (undated) DEVICE — TIP SACROCOLPOPEXY SM 1.3X3.5

## (undated) DEVICE — SOL WATER STRL IRR 1000ML

## (undated) DEVICE — SEE MEDLINE ITEM 156923

## (undated) DEVICE — TRACKER PATIENT NON INVASIVE

## (undated) DEVICE — DRAPE STERI-DRAPE 1000 17X11IN

## (undated) DEVICE — BLADE SHAVER LANZA 4.2X13CM

## (undated) DEVICE — TUBE SET INFLOW/OUTFLOW

## (undated) DEVICE — DRESSING XEROFORM 1X8IN

## (undated) DEVICE — STAPLER SKIN PROXIMATE WIDE

## (undated) DEVICE — HOOK STAY ELAS 5MM 8EA/PK

## (undated) DEVICE — BLADE INFERIOR TURBINATE 5/PK

## (undated) DEVICE — BAG TISS RETRV MONARCH 10MM

## (undated) DEVICE — SEE MEDLINE ITEM 146347

## (undated) DEVICE — SET TRI-LUMEN FILTERED TUBE

## (undated) DEVICE — GLOVE BIOGEL 7.5

## (undated) DEVICE — SOL IRR NACL .9% 3000ML

## (undated) DEVICE — COVER TIP CURVED SCISSORS XI

## (undated) DEVICE — APPLICATOR ARISTA FLEX XL

## (undated) DEVICE — PENCIL ROCKER SWITCH 10FT CORD

## (undated) DEVICE — SEE MEDLINE ITEM 146420

## (undated) DEVICE — POWDER ARISTA AH 3G

## (undated) DEVICE — CANNULA PASSPORT 8 MM X 4CM.

## (undated) DEVICE — SOL STRL WATER INJ 1000ML BG

## (undated) DEVICE — PAD PREP 50/CA

## (undated) DEVICE — BANDAGE GAUZE 6PLY FLUFF 2X3

## (undated) DEVICE — KIT PROCEDURE STER INLET CLOSU

## (undated) DEVICE — SEE MEDLINE ITEM 146417

## (undated) DEVICE — SUT MONOCRYL 0 CT-1 UND MON

## (undated) DEVICE — SEE MEDLINE ITEM 157110

## (undated) DEVICE — SUT PLAIN 4-0 SC-1 18IN

## (undated) DEVICE — WARMER DRAPE STERILE LF

## (undated) DEVICE — SET CYSTO IRRIGATION UNIV SPIK

## (undated) DEVICE — PAD ABDOMINAL 5X9 STERILE

## (undated) DEVICE — DRESSING GAUZE 6PLY 4X4